# Patient Record
Sex: FEMALE | Race: WHITE | NOT HISPANIC OR LATINO | Employment: OTHER | URBAN - NONMETROPOLITAN AREA
[De-identification: names, ages, dates, MRNs, and addresses within clinical notes are randomized per-mention and may not be internally consistent; named-entity substitution may affect disease eponyms.]

---

## 2017-01-02 ENCOUNTER — HOSPITAL ENCOUNTER (OUTPATIENT)
Dept: PHYSICAL THERAPY | Facility: OTHER | Age: 72
Setting detail: THERAPIES SERIES
End: 2017-01-02

## 2017-01-02 ENCOUNTER — ANTICOAGULATION - GICH (OUTPATIENT)
Dept: INTERNAL MEDICINE | Facility: OTHER | Age: 72
End: 2017-01-02

## 2017-01-02 DIAGNOSIS — Z51.81 ENCOUNTER FOR THERAPEUTIC DRUG LEVEL MONITORING: ICD-10-CM

## 2017-01-02 DIAGNOSIS — Z79.01 LONG TERM CURRENT USE OF ANTICOAGULANT: ICD-10-CM

## 2017-01-02 DIAGNOSIS — I63.9 CEREBRAL INFARCTION (H): ICD-10-CM

## 2017-01-02 LAB — INR - HISTORICAL: 2.1

## 2017-01-04 ENCOUNTER — HOSPITAL ENCOUNTER (OUTPATIENT)
Dept: PHYSICAL THERAPY | Facility: OTHER | Age: 72
Setting detail: THERAPIES SERIES
End: 2017-01-04

## 2017-01-05 ENCOUNTER — HISTORY (OUTPATIENT)
Dept: FAMILY MEDICINE | Facility: OTHER | Age: 72
End: 2017-01-05

## 2017-01-05 ENCOUNTER — OFFICE VISIT - GICH (OUTPATIENT)
Dept: FAMILY MEDICINE | Facility: OTHER | Age: 72
End: 2017-01-05

## 2017-01-05 DIAGNOSIS — I48.20 CHRONIC ATRIAL FIBRILLATION (H): ICD-10-CM

## 2017-01-05 DIAGNOSIS — I63.9 CEREBRAL INFARCTION (H): ICD-10-CM

## 2017-01-05 DIAGNOSIS — Z79.01 LONG TERM CURRENT USE OF ANTICOAGULANT: ICD-10-CM

## 2017-01-05 DIAGNOSIS — F32.1 MAJOR DEPRESSIVE DISORDER, SINGLE EPISODE, MODERATE (H): ICD-10-CM

## 2017-01-05 DIAGNOSIS — F41.9 ANXIETY DISORDER: ICD-10-CM

## 2017-01-05 DIAGNOSIS — Z23 ENCOUNTER FOR IMMUNIZATION: ICD-10-CM

## 2017-01-05 DIAGNOSIS — Z12.31 ENCOUNTER FOR SCREENING MAMMOGRAM FOR MALIGNANT NEOPLASM OF BREAST: ICD-10-CM

## 2017-01-05 DIAGNOSIS — Z51.81 ENCOUNTER FOR THERAPEUTIC DRUG LEVEL MONITORING: ICD-10-CM

## 2017-01-10 ENCOUNTER — HOSPITAL ENCOUNTER (OUTPATIENT)
Dept: PHYSICAL THERAPY | Facility: OTHER | Age: 72
Setting detail: THERAPIES SERIES
End: 2017-01-10

## 2017-01-12 ENCOUNTER — HOSPITAL ENCOUNTER (OUTPATIENT)
Dept: PHYSICAL THERAPY | Facility: OTHER | Age: 72
Setting detail: THERAPIES SERIES
End: 2017-01-12

## 2017-01-30 ENCOUNTER — ANTICOAGULATION - GICH (OUTPATIENT)
Dept: INTERNAL MEDICINE | Facility: OTHER | Age: 72
End: 2017-01-30

## 2017-01-30 DIAGNOSIS — Z51.81 ENCOUNTER FOR THERAPEUTIC DRUG LEVEL MONITORING: ICD-10-CM

## 2017-01-30 DIAGNOSIS — I63.9 CEREBRAL INFARCTION (H): ICD-10-CM

## 2017-01-30 DIAGNOSIS — Z79.01 LONG TERM CURRENT USE OF ANTICOAGULANT: ICD-10-CM

## 2017-01-30 LAB — INR - HISTORICAL: 1.7

## 2017-02-06 ENCOUNTER — HOSPITAL ENCOUNTER (OUTPATIENT)
Dept: RADIOLOGY | Facility: OTHER | Age: 72
End: 2017-02-06
Attending: FAMILY MEDICINE

## 2017-02-06 ENCOUNTER — HISTORY (OUTPATIENT)
Dept: RADIOLOGY | Facility: OTHER | Age: 72
End: 2017-02-06

## 2017-02-06 ENCOUNTER — HISTORY (OUTPATIENT)
Dept: FAMILY MEDICINE | Facility: OTHER | Age: 72
End: 2017-02-06

## 2017-02-06 ENCOUNTER — ANTICOAGULATION - GICH (OUTPATIENT)
Dept: INTERNAL MEDICINE | Facility: OTHER | Age: 72
End: 2017-02-06

## 2017-02-06 ENCOUNTER — OFFICE VISIT - GICH (OUTPATIENT)
Dept: FAMILY MEDICINE | Facility: OTHER | Age: 72
End: 2017-02-06

## 2017-02-06 DIAGNOSIS — Z79.01 LONG TERM CURRENT USE OF ANTICOAGULANT: ICD-10-CM

## 2017-02-06 DIAGNOSIS — Z51.81 ENCOUNTER FOR THERAPEUTIC DRUG LEVEL MONITORING: ICD-10-CM

## 2017-02-06 DIAGNOSIS — I48.20 CHRONIC ATRIAL FIBRILLATION (H): ICD-10-CM

## 2017-02-06 DIAGNOSIS — I63.9 CEREBRAL INFARCTION (H): ICD-10-CM

## 2017-02-06 DIAGNOSIS — F32.1 MAJOR DEPRESSIVE DISORDER, SINGLE EPISODE, MODERATE (H): ICD-10-CM

## 2017-02-06 DIAGNOSIS — Z12.31 ENCOUNTER FOR SCREENING MAMMOGRAM FOR MALIGNANT NEOPLASM OF BREAST: ICD-10-CM

## 2017-02-06 DIAGNOSIS — F41.9 ANXIETY DISORDER: ICD-10-CM

## 2017-02-06 LAB — INR - HISTORICAL: 1.8

## 2017-02-06 ASSESSMENT — ANXIETY QUESTIONNAIRES
7. FEELING AFRAID AS IF SOMETHING AWFUL MIGHT HAPPEN: NOT AT ALL
5. BEING SO RESTLESS THAT IT IS HARD TO SIT STILL: NOT AT ALL
3. WORRYING TOO MUCH ABOUT DIFFERENT THINGS: NOT AT ALL
GAD7 TOTAL SCORE: 0
2. NOT BEING ABLE TO STOP OR CONTROL WORRYING: NOT AT ALL
4. TROUBLE RELAXING: NOT AT ALL
1. FEELING NERVOUS, ANXIOUS, OR ON EDGE: NOT AT ALL
6. BECOMING EASILY ANNOYED OR IRRITABLE: NOT AT ALL

## 2017-02-06 ASSESSMENT — PATIENT HEALTH QUESTIONNAIRE - PHQ9: SUM OF ALL RESPONSES TO PHQ QUESTIONS 1-9: 0

## 2017-02-13 ENCOUNTER — COMMUNICATION - GICH (OUTPATIENT)
Dept: FAMILY MEDICINE | Facility: OTHER | Age: 72
End: 2017-02-13

## 2017-02-13 DIAGNOSIS — Z79.01 LONG TERM CURRENT USE OF ANTICOAGULANT: ICD-10-CM

## 2017-02-13 DIAGNOSIS — Z51.81 ENCOUNTER FOR THERAPEUTIC DRUG LEVEL MONITORING: ICD-10-CM

## 2017-02-13 DIAGNOSIS — I63.9 CEREBRAL INFARCTION (H): ICD-10-CM

## 2017-02-20 ENCOUNTER — ANTICOAGULATION - GICH (OUTPATIENT)
Dept: INTERNAL MEDICINE | Facility: OTHER | Age: 72
End: 2017-02-20

## 2017-02-20 DIAGNOSIS — Z51.81 ENCOUNTER FOR THERAPEUTIC DRUG LEVEL MONITORING: ICD-10-CM

## 2017-02-20 DIAGNOSIS — I63.9 CEREBRAL INFARCTION (H): ICD-10-CM

## 2017-02-20 DIAGNOSIS — Z79.01 LONG TERM CURRENT USE OF ANTICOAGULANT: ICD-10-CM

## 2017-02-20 LAB — INR - HISTORICAL: 2.3

## 2017-03-06 ENCOUNTER — ANTICOAGULATION - GICH (OUTPATIENT)
Dept: INTERNAL MEDICINE | Facility: OTHER | Age: 72
End: 2017-03-06

## 2017-03-06 DIAGNOSIS — I63.9 CEREBRAL INFARCTION (H): ICD-10-CM

## 2017-03-06 DIAGNOSIS — Z51.81 ENCOUNTER FOR THERAPEUTIC DRUG LEVEL MONITORING: ICD-10-CM

## 2017-03-06 DIAGNOSIS — Z79.01 LONG TERM CURRENT USE OF ANTICOAGULANT: ICD-10-CM

## 2017-03-06 LAB — INR - HISTORICAL: 2.5

## 2017-03-23 ENCOUNTER — COMMUNICATION - GICH (OUTPATIENT)
Dept: FAMILY MEDICINE | Facility: OTHER | Age: 72
End: 2017-03-23

## 2017-03-23 DIAGNOSIS — E78.5 HYPERLIPIDEMIA: ICD-10-CM

## 2017-04-03 ENCOUNTER — ANTICOAGULATION - GICH (OUTPATIENT)
Dept: INTERNAL MEDICINE | Facility: OTHER | Age: 72
End: 2017-04-03

## 2017-04-03 DIAGNOSIS — Z79.01 LONG TERM CURRENT USE OF ANTICOAGULANT: ICD-10-CM

## 2017-04-03 DIAGNOSIS — Z51.81 ENCOUNTER FOR THERAPEUTIC DRUG LEVEL MONITORING: ICD-10-CM

## 2017-04-03 DIAGNOSIS — I63.9 CEREBRAL INFARCTION (H): ICD-10-CM

## 2017-04-03 LAB — INR - HISTORICAL: 2.3

## 2017-04-27 ENCOUNTER — COMMUNICATION - GICH (OUTPATIENT)
Dept: SURGERY | Facility: OTHER | Age: 72
End: 2017-04-27

## 2017-05-01 ENCOUNTER — ANTICOAGULATION - GICH (OUTPATIENT)
Dept: INTERNAL MEDICINE | Facility: OTHER | Age: 72
End: 2017-05-01

## 2017-05-01 DIAGNOSIS — Z51.81 ENCOUNTER FOR THERAPEUTIC DRUG LEVEL MONITORING: ICD-10-CM

## 2017-05-01 DIAGNOSIS — Z79.01 LONG TERM CURRENT USE OF ANTICOAGULANT: ICD-10-CM

## 2017-05-01 DIAGNOSIS — I63.9 CEREBRAL INFARCTION (H): ICD-10-CM

## 2017-05-01 LAB — INR - HISTORICAL: 2

## 2017-05-18 ENCOUNTER — COMMUNICATION - GICH (OUTPATIENT)
Dept: FAMILY MEDICINE | Facility: OTHER | Age: 72
End: 2017-05-18

## 2017-05-18 DIAGNOSIS — Z79.01 LONG TERM CURRENT USE OF ANTICOAGULANT: ICD-10-CM

## 2017-05-18 DIAGNOSIS — Z51.81 ENCOUNTER FOR THERAPEUTIC DRUG LEVEL MONITORING: ICD-10-CM

## 2017-05-18 DIAGNOSIS — I63.9 CEREBRAL INFARCTION (H): ICD-10-CM

## 2017-05-31 ENCOUNTER — ANTICOAGULATION - GICH (OUTPATIENT)
Dept: INTERNAL MEDICINE | Facility: OTHER | Age: 72
End: 2017-05-31

## 2017-05-31 DIAGNOSIS — I63.9 CEREBRAL INFARCTION (H): ICD-10-CM

## 2017-05-31 DIAGNOSIS — Z51.81 ENCOUNTER FOR THERAPEUTIC DRUG LEVEL MONITORING: ICD-10-CM

## 2017-05-31 DIAGNOSIS — Z79.01 LONG TERM CURRENT USE OF ANTICOAGULANT: ICD-10-CM

## 2017-05-31 LAB — INR - HISTORICAL: 2

## 2017-06-20 ENCOUNTER — COMMUNICATION - GICH (OUTPATIENT)
Dept: FAMILY MEDICINE | Facility: OTHER | Age: 72
End: 2017-06-20

## 2017-06-20 DIAGNOSIS — I10 ESSENTIAL (PRIMARY) HYPERTENSION: ICD-10-CM

## 2017-06-28 ENCOUNTER — ANTICOAGULATION - GICH (OUTPATIENT)
Dept: INTERNAL MEDICINE | Facility: OTHER | Age: 72
End: 2017-06-28

## 2017-06-28 DIAGNOSIS — Z51.81 ENCOUNTER FOR THERAPEUTIC DRUG LEVEL MONITORING: ICD-10-CM

## 2017-06-28 DIAGNOSIS — Z79.01 LONG TERM CURRENT USE OF ANTICOAGULANT: ICD-10-CM

## 2017-06-28 DIAGNOSIS — I63.9 CEREBRAL INFARCTION (H): ICD-10-CM

## 2017-06-28 LAB — INR - HISTORICAL: 2.3

## 2017-07-26 ENCOUNTER — ANTICOAGULATION - GICH (OUTPATIENT)
Dept: INTERNAL MEDICINE | Facility: OTHER | Age: 72
End: 2017-07-26

## 2017-07-26 DIAGNOSIS — Z51.81 ENCOUNTER FOR THERAPEUTIC DRUG LEVEL MONITORING: ICD-10-CM

## 2017-07-26 DIAGNOSIS — I63.9 CEREBRAL INFARCTION (H): ICD-10-CM

## 2017-07-26 DIAGNOSIS — Z79.01 LONG TERM CURRENT USE OF ANTICOAGULANT: ICD-10-CM

## 2017-07-26 LAB — INR - HISTORICAL: 2.2

## 2017-08-17 ENCOUNTER — COMMUNICATION - GICH (OUTPATIENT)
Dept: FAMILY MEDICINE | Facility: OTHER | Age: 72
End: 2017-08-17

## 2017-08-17 DIAGNOSIS — I63.9 CEREBRAL INFARCTION (H): ICD-10-CM

## 2017-08-17 DIAGNOSIS — Z51.81 ENCOUNTER FOR THERAPEUTIC DRUG LEVEL MONITORING: ICD-10-CM

## 2017-08-17 DIAGNOSIS — Z79.01 LONG TERM CURRENT USE OF ANTICOAGULANT: ICD-10-CM

## 2017-08-23 ENCOUNTER — ANTICOAGULATION - GICH (OUTPATIENT)
Dept: INTERNAL MEDICINE | Facility: OTHER | Age: 72
End: 2017-08-23

## 2017-08-23 DIAGNOSIS — Z79.01 LONG TERM CURRENT USE OF ANTICOAGULANT: ICD-10-CM

## 2017-08-23 DIAGNOSIS — Z51.81 ENCOUNTER FOR THERAPEUTIC DRUG LEVEL MONITORING: ICD-10-CM

## 2017-08-23 DIAGNOSIS — I63.9 CEREBRAL INFARCTION (H): ICD-10-CM

## 2017-08-23 LAB — INR - HISTORICAL: 2.8

## 2017-08-30 ENCOUNTER — HISTORY (OUTPATIENT)
Dept: FAMILY MEDICINE | Facility: OTHER | Age: 72
End: 2017-08-30

## 2017-08-30 ENCOUNTER — AMBULATORY - GICH (OUTPATIENT)
Dept: FAMILY MEDICINE | Facility: OTHER | Age: 72
End: 2017-08-30

## 2017-08-30 ENCOUNTER — COMMUNICATION - GICH (OUTPATIENT)
Dept: FAMILY MEDICINE | Facility: OTHER | Age: 72
End: 2017-08-30

## 2017-08-30 ENCOUNTER — OFFICE VISIT - GICH (OUTPATIENT)
Dept: FAMILY MEDICINE | Facility: OTHER | Age: 72
End: 2017-08-30

## 2017-08-30 DIAGNOSIS — R29.898 OTHER SYMPTOMS AND SIGNS INVOLVING THE MUSCULOSKELETAL SYSTEM: ICD-10-CM

## 2017-08-30 DIAGNOSIS — Z79.01 LONG TERM CURRENT USE OF ANTICOAGULANT: ICD-10-CM

## 2017-08-30 DIAGNOSIS — Z12.11 ENCOUNTER FOR SCREENING FOR MALIGNANT NEOPLASM OF COLON: ICD-10-CM

## 2017-08-30 DIAGNOSIS — F32.1 MAJOR DEPRESSIVE DISORDER, SINGLE EPISODE, MODERATE (H): ICD-10-CM

## 2017-08-30 DIAGNOSIS — F41.9 ANXIETY DISORDER: ICD-10-CM

## 2017-08-30 DIAGNOSIS — F33.40 RECURRENT MAJOR DEPRESSIVE DISORDER IN REMISSION (H): ICD-10-CM

## 2017-08-30 DIAGNOSIS — Z87.891 PERSONAL HISTORY OF NICOTINE DEPENDENCE: ICD-10-CM

## 2017-08-30 DIAGNOSIS — I10 ESSENTIAL (PRIMARY) HYPERTENSION: ICD-10-CM

## 2017-08-30 DIAGNOSIS — N39.41 URGE INCONTINENCE: ICD-10-CM

## 2017-08-30 DIAGNOSIS — R68.2 DRY MOUTH: ICD-10-CM

## 2017-08-30 DIAGNOSIS — I48.20 CHRONIC ATRIAL FIBRILLATION (H): ICD-10-CM

## 2017-08-30 DIAGNOSIS — I63.9 CEREBRAL INFARCTION (H): ICD-10-CM

## 2017-08-30 DIAGNOSIS — Z51.81 ENCOUNTER FOR THERAPEUTIC DRUG LEVEL MONITORING: ICD-10-CM

## 2017-08-30 LAB
A/G RATIO - HISTORICAL: 1.2 (ref 1–2)
ABSOLUTE BASOPHILS - HISTORICAL: 0 THOU/CU MM
ABSOLUTE EOSINOPHILS - HISTORICAL: 0.2 THOU/CU MM
ABSOLUTE IMMATURE GRANULOCYTES(METAS,MYELOS,PROS) - HISTORICAL: 0 THOU/CU MM
ABSOLUTE LYMPHOCYTES - HISTORICAL: 1.7 THOU/CU MM (ref 0.9–2.9)
ABSOLUTE MONOCYTES - HISTORICAL: 1 THOU/CU MM
ABSOLUTE NEUTROPHILS - HISTORICAL: 5.8 THOU/CU MM (ref 1.7–7)
ALBUMIN SERPL-MCNC: 3.7 G/DL (ref 3.5–5.7)
ALP SERPL-CCNC: 97 IU/L (ref 34–104)
ALT (SGPT) - HISTORICAL: 36 IU/L (ref 7–52)
ANION GAP - HISTORICAL: 9 (ref 5–18)
AST SERPL-CCNC: 26 IU/L (ref 13–39)
BASOPHILS # BLD AUTO: 0.3 %
BILIRUB SERPL-MCNC: 0.6 MG/DL (ref 0.3–1)
BILIRUB UR QL: NEGATIVE
BUN SERPL-MCNC: 16 MG/DL (ref 7–25)
BUN/CREAT RATIO - HISTORICAL: 17
CALCIUM SERPL-MCNC: 10.2 MG/DL (ref 8.6–10.3)
CHLORIDE SERPLBLD-SCNC: 107 MMOL/L (ref 98–107)
CHOL/HDL RATIO - HISTORICAL: 2.25
CHOLESTEROL TOTAL: 90 MG/DL
CLARITY, URINE: CLEAR CLARITY
CO2 SERPL-SCNC: 25 MMOL/L (ref 21–31)
COLOR UR: YELLOW COLOR
CREAT SERPL-MCNC: 0.95 MG/DL (ref 0.7–1.3)
EOSINOPHIL NFR BLD AUTO: 1.8 %
ERYTHROCYTE [DISTWIDTH] IN BLOOD BY AUTOMATED COUNT: 13.6 % (ref 11.5–15.5)
GFR IF NOT AFRICAN AMERICAN - HISTORICAL: 58 ML/MIN/1.73M2
GLOBULIN - HISTORICAL: 3 G/DL (ref 2–3.7)
GLUCOSE SERPL-MCNC: 75 MG/DL (ref 70–105)
GLUCOSE URINE: NEGATIVE MG/DL
HCT VFR BLD AUTO: 43.6 % (ref 33–51)
HDLC SERPL-MCNC: 40 MG/DL (ref 23–92)
HEMOGLOBIN: 14.4 G/DL (ref 12–16)
IMMATURE GRANULOCYTES(METAS,MYELOS,PROS) - HISTORICAL: 0.2 %
KETONES UR QL: NEGATIVE MG/DL
LDLC SERPL CALC-MCNC: 39 MG/DL
LEUKOCYTE ESTERASE URINE: NEGATIVE
LYMPHOCYTES NFR BLD AUTO: 20 % (ref 20–44)
MCH RBC QN AUTO: 31.6 PG (ref 26–34)
MCHC RBC AUTO-ENTMCNC: 33 G/DL (ref 32–36)
MCV RBC AUTO: 96 FL (ref 80–100)
MONOCYTES NFR BLD AUTO: 11.1 %
NEUTROPHILS NFR BLD AUTO: 66.6 % (ref 42–72)
NITRITE UR QL STRIP: NEGATIVE
NON-HDL CHOLESTEROL - HISTORICAL: 50 MG/DL
OCCULT BLOOD,URINE - HISTORICAL: NEGATIVE
PATIENT STATUS - HISTORICAL: NORMAL
PH UR: 6.5 [PH]
PLATELET # BLD AUTO: 166 THOU/CU MM (ref 140–440)
PMV BLD: 11.1 FL (ref 6.5–11)
POTASSIUM SERPL-SCNC: 4 MMOL/L (ref 3.5–5.1)
PROT SERPL-MCNC: 6.7 G/DL (ref 6.4–8.9)
PROTEIN QUALITATIVE,URINE - HISTORICAL: NEGATIVE MG/DL
RED BLOOD COUNT - HISTORICAL: 4.55 MIL/CU MM (ref 4–5.2)
SODIUM SERPL-SCNC: 141 MMOL/L (ref 133–143)
SP GR UR STRIP: 1.01
TRIGL SERPL-MCNC: 55 MG/DL
UROBILINOGEN,QUALITATIVE - HISTORICAL: NORMAL EU/DL
WHITE BLOOD COUNT - HISTORICAL: 8.7 THOU/CU MM (ref 4.5–11)

## 2017-08-30 ASSESSMENT — ANXIETY QUESTIONNAIRES
1. FEELING NERVOUS, ANXIOUS, OR ON EDGE: NOT AT ALL
6. BECOMING EASILY ANNOYED OR IRRITABLE: NOT AT ALL
4. TROUBLE RELAXING: NOT AT ALL
2. NOT BEING ABLE TO STOP OR CONTROL WORRYING: NOT AT ALL
7. FEELING AFRAID AS IF SOMETHING AWFUL MIGHT HAPPEN: NOT AT ALL
5. BEING SO RESTLESS THAT IT IS HARD TO SIT STILL: NOT AT ALL
3. WORRYING TOO MUCH ABOUT DIFFERENT THINGS: NOT AT ALL
GAD7 TOTAL SCORE: 0

## 2017-09-01 ENCOUNTER — COMMUNICATION - GICH (OUTPATIENT)
Dept: SURGERY | Facility: OTHER | Age: 72
End: 2017-09-01

## 2017-09-01 DIAGNOSIS — Z86.0100 HISTORY OF COLONIC POLYPS: ICD-10-CM

## 2017-09-07 ENCOUNTER — OFFICE VISIT - GICH (OUTPATIENT)
Dept: UROLOGY | Facility: OTHER | Age: 72
End: 2017-09-07

## 2017-09-07 ENCOUNTER — HISTORY (OUTPATIENT)
Dept: UROLOGY | Facility: OTHER | Age: 72
End: 2017-09-07

## 2017-09-07 DIAGNOSIS — R68.2 DRY MOUTH: ICD-10-CM

## 2017-09-07 DIAGNOSIS — N39.41 URGE INCONTINENCE: ICD-10-CM

## 2017-09-11 ENCOUNTER — COMMUNICATION - GICH (OUTPATIENT)
Dept: SURGERY | Facility: OTHER | Age: 72
End: 2017-09-11

## 2017-09-11 ENCOUNTER — COMMUNICATION - GICH (OUTPATIENT)
Dept: UROLOGY | Facility: OTHER | Age: 72
End: 2017-09-11

## 2017-09-11 DIAGNOSIS — N39.41 URGE INCONTINENCE: ICD-10-CM

## 2017-09-12 ENCOUNTER — HOSPITAL ENCOUNTER (OUTPATIENT)
Dept: PHYSICAL THERAPY | Facility: OTHER | Age: 72
Setting detail: THERAPIES SERIES
End: 2017-09-12
Attending: FAMILY MEDICINE

## 2017-09-12 DIAGNOSIS — I63.9 CEREBRAL INFARCTION (H): ICD-10-CM

## 2017-09-12 DIAGNOSIS — R29.898 OTHER SYMPTOMS AND SIGNS INVOLVING THE MUSCULOSKELETAL SYSTEM: ICD-10-CM

## 2017-09-14 ENCOUNTER — HOSPITAL ENCOUNTER (OUTPATIENT)
Dept: PHYSICAL THERAPY | Facility: OTHER | Age: 72
Setting detail: THERAPIES SERIES
End: 2017-09-14
Attending: FAMILY MEDICINE

## 2017-09-19 ENCOUNTER — HOSPITAL ENCOUNTER (OUTPATIENT)
Dept: PHYSICAL THERAPY | Facility: OTHER | Age: 72
Setting detail: THERAPIES SERIES
End: 2017-09-19
Attending: FAMILY MEDICINE

## 2017-09-20 ENCOUNTER — ANTICOAGULATION - GICH (OUTPATIENT)
Dept: INTERNAL MEDICINE | Facility: OTHER | Age: 72
End: 2017-09-20

## 2017-09-20 DIAGNOSIS — I63.9 CEREBRAL INFARCTION (H): ICD-10-CM

## 2017-09-20 DIAGNOSIS — Z79.01 LONG TERM CURRENT USE OF ANTICOAGULANT: ICD-10-CM

## 2017-09-20 DIAGNOSIS — Z51.81 ENCOUNTER FOR THERAPEUTIC DRUG LEVEL MONITORING: ICD-10-CM

## 2017-09-20 LAB — INR - HISTORICAL: 2.4

## 2017-09-21 ENCOUNTER — HOSPITAL ENCOUNTER (OUTPATIENT)
Dept: PHYSICAL THERAPY | Facility: OTHER | Age: 72
Setting detail: THERAPIES SERIES
End: 2017-09-21
Attending: FAMILY MEDICINE

## 2017-09-28 ENCOUNTER — HOSPITAL ENCOUNTER (OUTPATIENT)
Dept: PHYSICAL THERAPY | Facility: OTHER | Age: 72
Setting detail: THERAPIES SERIES
End: 2017-09-28
Attending: FAMILY MEDICINE

## 2017-10-05 ENCOUNTER — HISTORY (OUTPATIENT)
Dept: UROLOGY | Facility: OTHER | Age: 72
End: 2017-10-05

## 2017-10-05 ENCOUNTER — HOSPITAL ENCOUNTER (OUTPATIENT)
Dept: PHYSICAL THERAPY | Facility: OTHER | Age: 72
Setting detail: THERAPIES SERIES
End: 2017-10-05
Attending: FAMILY MEDICINE

## 2017-10-05 ENCOUNTER — OFFICE VISIT - GICH (OUTPATIENT)
Dept: UROLOGY | Facility: OTHER | Age: 72
End: 2017-10-05

## 2017-10-05 DIAGNOSIS — N39.41 URGE INCONTINENCE: ICD-10-CM

## 2017-10-10 ENCOUNTER — HOSPITAL ENCOUNTER (OUTPATIENT)
Dept: PHYSICAL THERAPY | Facility: OTHER | Age: 72
Setting detail: THERAPIES SERIES
End: 2017-10-10
Attending: FAMILY MEDICINE

## 2017-10-12 ENCOUNTER — HOSPITAL ENCOUNTER (OUTPATIENT)
Dept: PHYSICAL THERAPY | Facility: OTHER | Age: 72
Setting detail: THERAPIES SERIES
End: 2017-10-12
Attending: FAMILY MEDICINE

## 2017-10-17 ENCOUNTER — HOSPITAL ENCOUNTER (OUTPATIENT)
Dept: PHYSICAL THERAPY | Facility: OTHER | Age: 72
Setting detail: THERAPIES SERIES
End: 2017-10-17
Attending: FAMILY MEDICINE

## 2017-10-18 ENCOUNTER — ANTICOAGULATION - GICH (OUTPATIENT)
Dept: INTERNAL MEDICINE | Facility: OTHER | Age: 72
End: 2017-10-18

## 2017-10-18 DIAGNOSIS — Z79.01 LONG TERM CURRENT USE OF ANTICOAGULANT: ICD-10-CM

## 2017-10-18 DIAGNOSIS — I63.9 CEREBRAL INFARCTION (H): ICD-10-CM

## 2017-10-18 DIAGNOSIS — Z51.81 ENCOUNTER FOR THERAPEUTIC DRUG LEVEL MONITORING: ICD-10-CM

## 2017-10-18 LAB — INR - HISTORICAL: 1.9

## 2017-10-23 ENCOUNTER — SURGERY (OUTPATIENT)
Dept: SURGERY | Facility: OTHER | Age: 72
End: 2017-10-23

## 2017-10-23 ENCOUNTER — HISTORY (OUTPATIENT)
Dept: EMERGENCY MEDICINE | Facility: OTHER | Age: 72
End: 2017-10-23

## 2017-10-30 ENCOUNTER — ANTICOAGULATION - GICH (OUTPATIENT)
Dept: INTERNAL MEDICINE | Facility: OTHER | Age: 72
End: 2017-10-30

## 2017-10-30 DIAGNOSIS — I63.9 CEREBRAL INFARCTION (H): ICD-10-CM

## 2017-10-30 DIAGNOSIS — Z79.01 LONG TERM CURRENT USE OF ANTICOAGULANT: ICD-10-CM

## 2017-10-30 DIAGNOSIS — Z51.81 ENCOUNTER FOR THERAPEUTIC DRUG LEVEL MONITORING: ICD-10-CM

## 2017-10-30 LAB — INR - HISTORICAL: 2.9

## 2017-11-01 ENCOUNTER — OFFICE VISIT - GICH (OUTPATIENT)
Dept: CARDIOLOGY | Facility: OTHER | Age: 72
End: 2017-11-01

## 2017-11-01 ENCOUNTER — MEDICAL CORRESPONDENCE (OUTPATIENT)
Dept: CARDIOLOGY | Facility: CLINIC | Age: 72
End: 2017-11-01
Payer: COMMERCIAL

## 2017-11-01 ENCOUNTER — HISTORY (OUTPATIENT)
Dept: CARDIOLOGY | Facility: OTHER | Age: 72
End: 2017-11-01

## 2017-11-01 ENCOUNTER — HISTORY (OUTPATIENT)
Dept: INTENSIVE CARE | Facility: OTHER | Age: 72
End: 2017-11-01

## 2017-11-01 DIAGNOSIS — I48.0 PAROXYSMAL ATRIAL FIBRILLATION (H): ICD-10-CM

## 2017-11-01 DIAGNOSIS — I63.9 CEREBRAL INFARCTION (H): ICD-10-CM

## 2017-11-01 DIAGNOSIS — R00.1 BRADYCARDIA: ICD-10-CM

## 2017-11-01 LAB
T4 FREE SERPL-MCNC: 1.09 NG/DL (ref 0.58–1.64)
TSH - HISTORICAL: 1.52 UIU/ML (ref 0.34–5.6)

## 2017-11-01 PROCEDURE — 99205 OFFICE O/P NEW HI 60 MIN: CPT | Mod: ZP | Performed by: NURSE PRACTITIONER

## 2017-11-01 ASSESSMENT — ANXIETY QUESTIONNAIRES
5. BEING SO RESTLESS THAT IT IS HARD TO SIT STILL: NOT AT ALL
7. FEELING AFRAID AS IF SOMETHING AWFUL MIGHT HAPPEN: NOT AT ALL
4. TROUBLE RELAXING: NOT AT ALL
2. NOT BEING ABLE TO STOP OR CONTROL WORRYING: NOT AT ALL
3. WORRYING TOO MUCH ABOUT DIFFERENT THINGS: NOT AT ALL
GAD7 TOTAL SCORE: 0
6. BECOMING EASILY ANNOYED OR IRRITABLE: NOT AT ALL
1. FEELING NERVOUS, ANXIOUS, OR ON EDGE: NOT AT ALL

## 2017-11-01 ASSESSMENT — PATIENT HEALTH QUESTIONNAIRE - PHQ9: SUM OF ALL RESPONSES TO PHQ QUESTIONS 1-9: 1

## 2017-11-05 ENCOUNTER — AMBULATORY - GICH (OUTPATIENT)
Dept: SCHEDULING | Facility: OTHER | Age: 72
End: 2017-11-05

## 2017-11-08 ENCOUNTER — HISTORY (OUTPATIENT)
Dept: CARDIOLOGY | Facility: OTHER | Age: 72
End: 2017-11-08

## 2017-11-08 ENCOUNTER — ANTICOAGULATION - GICH (OUTPATIENT)
Dept: INTERNAL MEDICINE | Facility: OTHER | Age: 72
End: 2017-11-08

## 2017-11-08 ENCOUNTER — MEDICAL CORRESPONDENCE (OUTPATIENT)
Dept: CARDIOLOGY | Facility: CLINIC | Age: 72
End: 2017-11-08
Payer: COMMERCIAL

## 2017-11-08 ENCOUNTER — TRANSFERRED RECORDS (OUTPATIENT)
Dept: HEALTH INFORMATION MANAGEMENT | Facility: CLINIC | Age: 72
End: 2017-11-08

## 2017-11-08 ENCOUNTER — OFFICE VISIT - GICH (OUTPATIENT)
Dept: CARDIOLOGY | Facility: OTHER | Age: 72
End: 2017-11-08

## 2017-11-08 DIAGNOSIS — Z95.0 PRESENCE OF CARDIAC PACEMAKER: ICD-10-CM

## 2017-11-08 DIAGNOSIS — I10 ESSENTIAL (PRIMARY) HYPERTENSION: ICD-10-CM

## 2017-11-08 DIAGNOSIS — Z79.01 LONG TERM CURRENT USE OF ANTICOAGULANT: ICD-10-CM

## 2017-11-08 DIAGNOSIS — I48.20 CHRONIC ATRIAL FIBRILLATION (H): ICD-10-CM

## 2017-11-08 DIAGNOSIS — I63.9 CEREBRAL INFARCTION (H): ICD-10-CM

## 2017-11-08 DIAGNOSIS — R00.1 BRADYCARDIA: ICD-10-CM

## 2017-11-08 DIAGNOSIS — Z51.81 ENCOUNTER FOR THERAPEUTIC DRUG LEVEL MONITORING: ICD-10-CM

## 2017-11-08 LAB — INR - HISTORICAL: 1.5

## 2017-11-08 PROCEDURE — 99214 OFFICE O/P EST MOD 30 MIN: CPT | Mod: ZP | Performed by: NURSE PRACTITIONER

## 2017-11-10 ENCOUNTER — COMMUNICATION - GICH (OUTPATIENT)
Dept: CARDIOLOGY | Facility: OTHER | Age: 72
End: 2017-11-10

## 2017-11-16 ENCOUNTER — OFFICE VISIT - GICH (OUTPATIENT)
Dept: FAMILY MEDICINE | Facility: OTHER | Age: 72
End: 2017-11-16

## 2017-11-16 ENCOUNTER — HISTORY (OUTPATIENT)
Dept: FAMILY MEDICINE | Facility: OTHER | Age: 72
End: 2017-11-16

## 2017-11-16 ENCOUNTER — ANTICOAGULATION - GICH (OUTPATIENT)
Dept: INTERNAL MEDICINE | Facility: OTHER | Age: 72
End: 2017-11-16

## 2017-11-16 DIAGNOSIS — I48.20 CHRONIC ATRIAL FIBRILLATION (H): ICD-10-CM

## 2017-11-16 DIAGNOSIS — Z51.81 ENCOUNTER FOR THERAPEUTIC DRUG LEVEL MONITORING: ICD-10-CM

## 2017-11-16 DIAGNOSIS — Z79.01 LONG TERM CURRENT USE OF ANTICOAGULANT: ICD-10-CM

## 2017-11-16 DIAGNOSIS — E78.5 HYPERLIPIDEMIA: ICD-10-CM

## 2017-11-16 DIAGNOSIS — Z95.0 PRESENCE OF CARDIAC PACEMAKER: ICD-10-CM

## 2017-11-16 DIAGNOSIS — F33.40 RECURRENT MAJOR DEPRESSIVE DISORDER IN REMISSION (H): ICD-10-CM

## 2017-11-16 DIAGNOSIS — I63.9 CEREBRAL INFARCTION (H): ICD-10-CM

## 2017-11-16 DIAGNOSIS — I10 ESSENTIAL (PRIMARY) HYPERTENSION: ICD-10-CM

## 2017-11-16 LAB — INR - HISTORICAL: 2.3

## 2017-11-16 ASSESSMENT — PATIENT HEALTH QUESTIONNAIRE - PHQ9: SUM OF ALL RESPONSES TO PHQ QUESTIONS 1-9: 0

## 2017-11-20 ENCOUNTER — OFFICE VISIT - GICH (OUTPATIENT)
Dept: CARDIOLOGY | Facility: OTHER | Age: 72
End: 2017-11-20

## 2017-11-20 ENCOUNTER — HISTORY (OUTPATIENT)
Dept: CARDIOLOGY | Facility: OTHER | Age: 72
End: 2017-11-20

## 2017-11-20 DIAGNOSIS — I48.20 CHRONIC ATRIAL FIBRILLATION (H): ICD-10-CM

## 2017-11-20 DIAGNOSIS — Z95.0 PRESENCE OF CARDIAC PACEMAKER: ICD-10-CM

## 2017-11-20 DIAGNOSIS — I10 ESSENTIAL (PRIMARY) HYPERTENSION: ICD-10-CM

## 2017-11-20 ASSESSMENT — ANXIETY QUESTIONNAIRES
5. BEING SO RESTLESS THAT IT IS HARD TO SIT STILL: NOT AT ALL
GAD7 TOTAL SCORE: 0
7. FEELING AFRAID AS IF SOMETHING AWFUL MIGHT HAPPEN: NOT AT ALL
1. FEELING NERVOUS, ANXIOUS, OR ON EDGE: NOT AT ALL
4. TROUBLE RELAXING: NOT AT ALL
6. BECOMING EASILY ANNOYED OR IRRITABLE: NOT AT ALL
3. WORRYING TOO MUCH ABOUT DIFFERENT THINGS: NOT AT ALL
2. NOT BEING ABLE TO STOP OR CONTROL WORRYING: NOT AT ALL

## 2017-11-27 ENCOUNTER — ANTICOAGULATION - GICH (OUTPATIENT)
Dept: INTERNAL MEDICINE | Facility: OTHER | Age: 72
End: 2017-11-27

## 2017-11-27 DIAGNOSIS — Z79.01 LONG TERM CURRENT USE OF ANTICOAGULANT: ICD-10-CM

## 2017-11-27 DIAGNOSIS — I63.9 CEREBRAL INFARCTION (H): ICD-10-CM

## 2017-11-27 DIAGNOSIS — Z51.81 ENCOUNTER FOR THERAPEUTIC DRUG LEVEL MONITORING: ICD-10-CM

## 2017-11-27 LAB — INR - HISTORICAL: 1.9

## 2017-12-11 ENCOUNTER — ANTICOAGULATION - GICH (OUTPATIENT)
Dept: INTERNAL MEDICINE | Facility: OTHER | Age: 72
End: 2017-12-11

## 2017-12-11 DIAGNOSIS — I63.9 CEREBRAL INFARCTION (H): ICD-10-CM

## 2017-12-11 DIAGNOSIS — Z51.81 ENCOUNTER FOR THERAPEUTIC DRUG LEVEL MONITORING: ICD-10-CM

## 2017-12-11 DIAGNOSIS — Z79.01 LONG TERM CURRENT USE OF ANTICOAGULANT: ICD-10-CM

## 2017-12-11 LAB — INR - HISTORICAL: 2.1

## 2017-12-27 NOTE — PROGRESS NOTES
"Patient Information     Patient Name MRN Sex Kate Akins [id number] Female 1945      Progress Notes by Sandy Klein, PT at 2017  1:48 PM     Author:  Sandy Klein PT Service:  (none) Author Type:  PT- Physical Therapist     Filed:  2017  2:45 PM Date of Service:  2017  1:48 PM Status:  Signed     :  Sandy Klein PT (PT- Physical Therapist)            [facility]  Outpatient PT - Daily note    Date of Service: 2017   Visit #3    PSFS Visit:  3/10  PSFS Completed:  2017     Patient Name: Kate Chacon   YOB: 1945   Referring MD/Provider: Asher Campos MD  Medical and Treatment Diagnosis: right leg weakness s/p ischemic stroke  PT Treatment Diagnosis: impaired lower extremity strength and endurance, right > left   Insurance: Medicare, and [employer]  Start of Service: 17  Certification Dates: Start of Service: 17  Medicare/MA Re-Cert Due: 17       Subjective        Patient reports her right hip [points to right SI joint] is sore today.         Objective  STANDING:      Lumbar ROM:   Forward Flexion Test: left pos  Hip Drop Test: neg  Gilet's: neg     SITTING:     LES / Sacral Base Positioning: posterior left base  Lumbar Findings: neg  L5 Lift Test: negative      SUPINE:     Leg length: left leg long, left anterior superior iliac spine inferior     PRONE:     Palpation:  LES / Sacral Base Findings: none, equal  Lumbar Findings: none  posterior superior iliac spine high left     Today's Intervention:    -TM 10% 1.1 mph x3 mins, retro at 0.6 mph x2 mins    Supine muscle energy technique for left anterior innominate rotation    Med x:   Leg press 120# seat 19 x20   Hip abduction 50# 2x10   hamstring curls 60# seat 9 x15    Lateral step up and over on foam    Contralateral heel taps, walking balance with 1 loss of balance requiring mod A to correct    Step ups to 6\" step right lower extremity to fatigue    Foam balance: " wide stance, mini squat to over head press with 6# med ball in bilateral upper extremities    Home Exercise Program:    Access Code: [email or web address] URL: [email or web address] Date: 09/12/2017 Prepared by: Sandy Klein   Exercises   Standing Hip Flexion with Anchored Resistance and Chair Support - 10-15 reps - 1-2 sets - 5 seconds hold - 1x daily   Standing Hip Abduction with Anchored Resistance - 10-15 reps - 1-2 sets - 5 hold - 1x daily   Standing Hip Extension with Anchored Resistance - 10-15 reps - 1-2 sets - 5 hold - 1x daily   Added 9/14/2017: Single leg stance with level pelvis x1 min bilateral     Assessment  Patient demonstrates improvement in innominate symmetry following muscle energy technique. Decrease in pain reported following as well. Patient demonstrates anxiety with foam balance activities as well as step ups due to hesitancy with right hip flexion capability.     Patient is at increased fall risk due to poor lower extremity endurance and would benefit from skilled physical therapy services to address limitations and return to prior level of function.    Completed 9/12/2017:  Patient Specific Functional and Pain Scales (PSFS)  Clinician Instructions: Complete after the history and before the exam.   Initial Assessment:   We want to know what 3 activities in your life you are unable to perform, or are having the most difficulty performing, as a result of your chief problem. Please list and score at least 3 activities that you are unable to perform, or having the most difficulty performing, because of your chief problem.   Patient Specific Activity Scoring Scheme (score one number for each activity):   Activity Score (0-10)  0= Unable to perform activity  10= Able to perform activity at same level as before injury or problem   1. Picking up the right leg 5/10   2. Walking 30 mins without hanging onto something 2/10   3.  /10   4.  /10   5.  /10   Totals:  7/20= 35% ability, 65% impairment    Patient verbally states that they understand that the information they have provided above is current and complete to the best of their knowledge.   Patient Specific Functional Scale Modifier Scale Conversion: (patient's modifier that correlates with pt's score on PSFS): 4-CL (60% Impaired).  Initial Primary G Code and Modifier:    Per the Patient's intake and/or assessment the Primary G Code is: Mobility .   The Patient's Impairment, Limitation or Restriction Modifier would be best described as: CK - 40% - 60% Impairment.   Goal Primary G Code and Modifier:    The Patient's G Code Goal would be: Mobility    The Patient's Impairment, Limitation or Restriction Modifier goal would be best described as: CI - 1% - 20% Impairment.       Goals:  Patient goal:  Improve lower extremity strength and stability with walking in 8 weeks.    Functional Short Term Goals (4 weeks):   Patient will have improved MMT grade by 1/2 grade to improve stability during ambulation.  Patient will ambulate 15 mins without hanging onto cart or other object for support.      Functional Long Term Goals (8 weeks):   Patient will ambulate 30 minutes with no support for improved stability and endurance.  Patient will have improved MMT grade by at least 1 full grade for decreased fall risk and improved stepping strategy.      Plan    Treatment Plan:      Frequency:   16 visits    Duration of Treatment: 8 weeks    Planned Interventions:    Home Exercise Program development  Therapeutic Exercise (ROM & Strengthening)  Therapeutic Activities  Neuromuscular Re-education  Manual Therapy  Ultrasound  E-Stim  Gait Training  Hot Packs / Cold Pack Modalities  Vasopneumatic Compression with Cold Therapy ( Game Ready )    Plan for next visit:  TM on incline, single leg balance, step ups, upright bike or Nustep    Thank you for your referral to [facility].  Please call with any questions, concerns or comments.  [phone]  [phone]  Sandy Klein, PT, DPT    The signature, of the referring medical provider, on this document indicates certification of the above prescribed plan of care and is medically necessary.

## 2017-12-27 NOTE — PROGRESS NOTES
Patient Information     Patient Name MRN Sex Kate Akins [id number] Female 1945      Progress Notes by Asher Campos MD at 2017 11:30 AM     Author:  Asher Campos MD Service:  (none) Author Type:  Physician     Filed:  2017  3:08 PM Encounter Date:  2017 Status:  Signed     :  Asher Campos MD (Physician)            Nursing Notes:   Tiffany White  2017 11:34 AM  Signed  She is here today to follow up after having a pacemaker put in at [facility].  Tiffany White LPN..................2017   11:31 AM      SUBJECTIVE:  Kate Chacon  is a 72 y.o. female who comes in today for follow-up after pacemaker placement for bradycardia. She was scheduled for colonoscopy but prior to her prep was bradycardic. She was put in the hospital and her beta blocker was held. She developed rapid heart rate and ended up having to be hospitalized again. They tried gradually increasing her beta blocker but she did not tolerate that and so was sent to [facility] for consultation with the electrophysiology specialist and ended up with a pacemaker placement. Her metoprolol was gradually increased to 25 mg twice a day and she seems to be tolerating that well.    Past Medical, Family, and Social History reviewed and updated as noted below.   ROS is negative except as noted above       No Known Allergies,   Family History       Problem   Relation Age of Onset     Diabetes  Father      Hypertension  Father      Other  Mother      h/o coronary artery disease/dementia       Asthma  Mother      Cancer  Maternal Aunt      Uterine       Diabetes  Maternal Grandmother      Cancer-breast  No Family History    ,   Current Outpatient Prescriptions on File Prior to Visit       Medication  Sig Dispense Refill     CALCIUM CARBONATE (TUMS ORAL) Take 1 tablet by mouth every 8 hours if needed (Heartburn).       ibuprofen (ADVIL; MOTRIN) 200 mg tablet Take 200 mg by mouth 4 times daily if needed for  Pain or Headache.       oxybutynin XL (DITROPAN XL) 10 mg CR tablet Take 10 mg by mouth at bedtime.       warfarin (COUMADIN) 5 mg tablet TAKE ONE TABLET BY MOUTH ONCE DAILY 90 tablet prn     No current facility-administered medications on file prior to visit.    ,   Past Medical History:     Diagnosis  Date     Ganglion cyst of wrist     right      Helicobacter positive gastritis     treated and 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       NVD (normal vaginal delivery)     x3    ,   Patient Active Problem List       Diagnosis  Date Noted     Status post placement of cardiac pacemaker  11/08/2017     DNI (do not intubate)  10/25/2017     As discussed with patient on 10/25/17        Urge incontinence  10/05/2017     MDD (recurrent major depressive disorder) in remission (HC)  08/30/2017     Former smoker  08/30/2017     Anxiety  01/05/2017     Hypertension  12/08/2016     Chronic atrial fibrillation (HC)  11/02/2016     Anticoagulation goal of INR 2 to 3  10/28/2016     Ischemic stroke (HC)  10/15/2016     DIVERTICULOSIS, SIGMOID COLON  04/04/2011     COLONIC POLYPS, ADENOMATOUS, HX OF  03/02/2011     OSTEOPOROSIS, LUMBAR SPINE  02/08/2006   ,   Past Surgical History:      Procedure  Laterality Date     APPENDECTOMY       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       COLONOSCOPY  4/4/11     COLONOSCOPY  5/7/12     ESOPHAGOGASTRODUODENOSCOPY  8/19/05     TONSILLECTOMY      and   Social History      Substance Use Topics        Smoking status:  Former Smoker     Packs/day: 0.50     Years: 49.00     Types: Cigarettes     Quit date: 10/14/2016     Smokeless tobacco:  Never Used     Alcohol use  No     OBJECTIVE:  BP 90/66  Pulse 76  Wt 79.8 kg (176 lb)  Breastfeeding? No  BMI 27.57 kg/m2   EXAM:  alert and cooperative, no distress. Blood pressure is as noted above. Lungs are clear, no rales  rhonchi wheezes are heard. Cardiac today is regular with a rate in the 70s. No pedal edema. Pacemaker pocket appears to be healing well. Steri-Strips are still in place.  ASSESSMENT/Plan :      Kate was seen today for follow up.    Diagnoses and all orders for this visit:    Chronic atrial fibrillation (HC)  -     metoprolol tartrate (LOPRESSOR) 25 mg tablet; Take 1 tablet by mouth 2 times daily.    Anticoagulation goal of INR 2 to 3    Hypertension  -     lisinopril (PRINIVIL; ZESTRIL) 20 mg tablet; Take 1 tablet by mouth once daily.  -     metoprolol tartrate (LOPRESSOR) 25 mg tablet; Take 1 tablet by mouth 2 times daily.    Status post placement of cardiac pacemaker    MDD (recurrent major depressive disorder) in remission (HC)  -     sertraline (ZOLOFT) 50 mg tablet; Take 1 tablet by mouth at bedtime.    Dyslipidemia  -     atorvastatin (LIPITOR) 80 mg tablet; Take 1 tablet by mouth at bedtime.     renewal on her medications. Her blood pressure is a little bit lower than we like and so we'll drop her lisinopril back to 20 mg daily down from 40. It had been increased when she was in the hospital and her beta blocker was held prior to pacemaker placement.    Continue with follow-up in the [facility]. Follow up here on an as-needed basis. She will let us know when she wants to proceed with colonoscopy again.      Asher Campos MD

## 2017-12-27 NOTE — PROGRESS NOTES
Patient Information     Patient Name MRN Sex Kate Akins [id number] Female 1945      Progress Notes by Steve Fox MD at 10/5/2017 11:15 AM     Author:  Steve Fox MD Service:  (none) Author Type:  Physician     Filed:  10/5/2017 11:43 AM Encounter Date:  10/5/2017 Status:  Signed     :  Steve Fox MD (Physician)            Type of Visit  EST    Chief Complaint  Incontinence    HPI  Ms. Chacon is a 72 y.o. female with history of CVA who presents with incontinence.    Prior to starting oxybutynin the patient's previous symptoms were as follows:  Patient leaks urine about several times daily.  Volume of incontinence is described as Small.  Overall, leaking urine interferes (bother score) with daily living approximately 5/10.  Onset was 3 Month(s) ago.    Since starting oxybutynin XL 10 mg once daily 4 weeks ago the patient reports significant improvement in incontinence.  Her quality of life score is now 1-2 out of 10.  She continues to deny dysuria and hematuria.  She also denies constipation or dry mouth and is tolerating the medication quite well.  She is very satisfied with the results.      Family History  Family History       Problem   Relation Age of Onset     Diabetes  Father      Hypertension  Father      Other  Mother      h/o coronary artery disease/dementia       Asthma  Mother      Cancer  Maternal Aunt      Uterine       Diabetes  Maternal Grandmother      Cancer-breast  No Family History        Review of Systems  I personally reviewed the ROS with the patient.    Nursing Notes:   Virgie Gallardo  10/5/2017 11:21 AM  Signed  Here for one month follow up on Oxybutynin.  Review of Systems:    Weight loss:    No     Recent fever/chills:  No   Night sweats:   No  Current skin rash:  No   Recent hair loss:  No  Heat intolerance:  No   Cold intolerance:  No  Chest pain:   No   Palpitations:   No  Shortness of breath:  No   Wheezing:   No  Constipation:     No   Diarrhea:   No   Nausea:   No   Vomiting:   No   Kidney/side pain:  No   Back pain:   No  Frequent headaches:  No   Dizziness:     No  Leg swelling:   No   Calf pain:    No    Post-Void Residual  Verbal order read back from Steve Fox MD to perform a post-void residual bladder scan.  A post-void residual was measured by ultrasonic bladder scanner.  18 mL  Virgie Gallardo LPN  10/5/2017  11:13 AM              Physical Exam  Vitals:     10/05/17 1107   BP: 132/70   Resp: 12   Weight: 81.6 kg (180 lb)     Constitutional: NAD, WDWN  Head: NCAT  Eyes: Conjunctivae normal  Cardiovascular: Regular rate  Pulmonary/Chest: Respirations are even and non-labored bilaterally  Abdominal: Soft with no distension, tenderness, masses, guarding or CVA tenderness  Neurological: A + O x 3,  cranial nerves II-XII grossly intact  Extremities: MARI x 4, warm, no clubbing, no cyanosis  Skin: Pink, warm, dry with no rash  Psychiatric:  Normal mood and affect  Genitourinary: Nonpalpable bladder    Labs  CREATININE (mg/dL)    Date Value   08/30/2017 0.95       Results for YOUSIF COLLINS (MRN [id number]) as of 9/7/2017 11:33   10/15/2016 08:25 8/30/2017 09:32   COLOR                     Yellow Yellow   CLARITY                   Clear Clear   SPECIFIC GRAVITY,URINE    1.010 1.015   PH,URINE                  7.0 6.5   UROBILINOGEN,QUALITATIVE  Normal Normal   PROTEIN, URINE Negative Negative   GLUCOSE, URINE Negative Negative   KETONES,URINE             Negative Negative   BILIRUBIN,URINE           Negative Negative   OCCULT BLOOD,URINE        Negative Negative   NITRITE                   Negative Negative   LEUKOCYTE ESTERASE        Negative Negative       Post-Void Residual  A post-void residual was measured by ultrasonic bladder scanner.  18 mL today  21 mL (previously recorded)    Assessment  Ms. Collins is a 72 y.o. female w/h/o CVA with urge incontinence.  She is doing well.    Plan  continue oxybutynin ER 10mg PO once  daily  Follow up in 1 year with [facility]

## 2017-12-27 NOTE — PROGRESS NOTES
Patient Information     Patient Name MRN Sex Kate Akins [id number] Female 1945      Progress Notes by Asher Campos MD at 2017  8:15 AM     Author:  Asher Campos MD  Service:  (none) Author Type:  Physician     Filed:  2017  1:13 PM  Encounter Date:  2017 Status:  Addendum     :  Asher Campos MD (Physician)        Related Notes: Original Note by Asher Campos MD (Physician) filed at 2017  9:14 AM            Nursing Notes:   Nicol Darden  2017  8:48 AM  Signed  Pt present for follow up stroke. Pt does want zoster vaccine.  Nicol Darden ....................  2017   8:22 AM      SUBJECTIVE:  Kate Chacon  is a 72 y.o. female who comes in today for follow-up. I last saw her 6 months ago. She continues on warfarin. She continues on metoprolol, lisinopril, and Lipitor. She continues on sertraline for depression.    She is walking but feels she is losing strength in her right leg as she is not able to do weights.  She is back driving. She has some numbness in her right index finger and thumb since the stroke.     She has had some itching of her ears.     She is having trouble with urinary urgency. She has very dry mouth, so would unlikely tolerate an oral medication.     She is up-to-date on health maintenance issues but has not had Zostavax.    PHQ Depression Screening 2017   Date of PHQ exam (doc flow) 2017   1. Lack of interest/pleasure 0 - Not at all 0 - Not at all   2. Feeling down/depressed 0 - Not at all 0 - Not at all   PHQ-2 TOTAL SCORE 0 0   3. Trouble sleeping 0 - Not at all -   4. Decreased energy 0 - Not at all -   5. Appetite change 0 - Not at all -   6. Feelings of failure 0 - Not at all -   7. Trouble concentrating 0 - Not at all -   8. Activity level 0 - Not at all -   9. Hurting yourself 0 - Not at all -   PHQ-9 TOTAL SCORE 0 -   PHQ-9 Severity Level none -   Functional Impairment not difficult at all -    Some recent data might be hidden        ANETTE-7 ANXIETY SCREENING 2/6/2017 8/30/2017   ANETTE date (doc flow) 2/6/2017 8/30/2017   Nervous, anxious 0 0   Cannot stop worrying 0 0   Worry about different things 0 0   Cannot relax 0 0   Feeling restless 0 0   Easily annoyed/irritated 0 0   Afraid of awful event 0 0   Score 0 0   Severity none none   Some recent data might be hidden          Past Medical, Family, and Social History reviewed and updated as noted below.   ROS is negative except as noted above       No Known Allergies,   Family History       Problem   Relation Age of Onset     Diabetes  Father      Hypertension  Father      Other  Mother      h/o coronary artery disease/dementia       Asthma  Mother      Cancer  Maternal Aunt      Uterine       Diabetes  Maternal Grandmother      Cancer-breast  No Family History    ,   Current Outpatient Prescriptions on File Prior to Visit       Medication  Sig Dispense Refill     atorvastatin (LIPITOR) 80 mg tablet Take 1 tablet by mouth at bedtime. 90 tablet 3     ibuprofen (ADVIL) 200 mg tablet Take 400 mg by mouth 4 times daily if needed.       lisinopril (PRINIVIL; ZESTRIL) 20 mg tablet Take 1 tablet by mouth once daily. 90 tablet 3     No current facility-administered medications on file prior to visit.    ,   Past Medical History:     Diagnosis  Date     Ganglion cyst of wrist     right      Helicobacter positive gastritis     treated and 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       NVD (normal vaginal delivery)     x3    ,   Patient Active Problem List      Diagnosis Date Noted     MDD (recurrent major depressive disorder) in remission (HC) 08/30/2017     Former smoker 08/30/2017     Anxiety 01/05/2017     Hypertension 12/08/2016     Chronic atrial fibrillation (HC) 11/02/2016     Anticoagulation goal of INR 2 to 3 10/28/2016     Ischemic stroke (HC) 10/15/2016     DIVERTICULOSIS, SIGMOID COLON 04/04/2011     COLONIC POLYPS, ADENOMATOUS, HX OF  03/02/2011     OSTEOPOROSIS, LUMBAR SPINE 02/08/2006   ,   Past Surgical History:      Procedure  Laterality Date     APPENDECTOMY       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       COLONOSCOPY  4/4/11     COLONOSCOPY  5/7/12     ESOPHAGOGASTRODUODENOSCOPY  8/19/05     TONSILLECTOMY      and   Social History      Substance Use Topics        Smoking status:  Former Smoker     Packs/day: 0.50     Years: 49.00     Types: Cigarettes     Quit date: 10/14/2016     Smokeless tobacco:  Never Used     Alcohol use  No     OBJECTIVE:  /74  Pulse 82  Wt 80.8 kg (178 lb 3.2 oz)  BMI 27.91 kg/m2   EXAM:  Alert and cooperative, no distress. Gets up onto the exam table with no difficulty but slight weakness noted in the right leg. Formal neurologic exam reveals slight weakness of the right leg but not easy to detect other than when she is weightbearing. Lungs are clear, no rales rhonchi or wheezes are heard. Cardiac is regular today rate is controlled. No pedal edema. TMs are clear. Some wax on the left greater than the right. Abdomen is soft and nontender.  ASSESSMENT/Plan :      Kate was seen today for follow up.    Diagnoses and all orders for this visit:    Chronic atrial fibrillation (HC)  -     CBC AND DIFFERENTIAL; Future  -     COMP METABOLIC PANEL; Future  -     LIPID PANEL; Future  -     metoprolol (LOPRESSOR) 100 mg tablet; Take 1 tablet by mouth 2 times daily.  -     CBC AND DIFFERENTIAL  -     COMP METABOLIC PANEL  -     LIPID PANEL  -     CBC WITH AUTO DIFFERENTIAL    Anticoagulation goal of INR 2 to 3  -     CBC AND DIFFERENTIAL; Future  -     warfarin (COUMADIN) 5 mg tablet; TAKE ONE TABLET BY MOUTH ONCE DAILY  -     CBC AND DIFFERENTIAL  -     CBC WITH AUTO DIFFERENTIAL    Hypertension  -     CBC AND DIFFERENTIAL; Future  -     COMP METABOLIC PANEL; Future  -     LIPID PANEL; Future  -     CBC  AND DIFFERENTIAL  -     COMP METABOLIC PANEL  -     LIPID PANEL  -     CBC WITH AUTO DIFFERENTIAL    Ischemic stroke (HC)  -     CBC AND DIFFERENTIAL; Future  -     COMP METABOLIC PANEL; Future  -     LIPID PANEL; Future  -     AMB CONSULT TO PHYSICAL THERAPY; Future  -     CBC AND DIFFERENTIAL  -     COMP METABOLIC PANEL  -     LIPID PANEL  -     CBC WITH AUTO DIFFERENTIAL    MDD (recurrent major depressive disorder) in remission (HC)    Anxiety  -     sertraline (ZOLOFT) 50 mg tablet; Take 1 tablet by mouth once daily.    Former smoker    Right leg weakness  -     AMB CONSULT TO PHYSICAL THERAPY; Future    Single major depressive episode, moderate (HC)  -     sertraline (ZOLOFT) 50 mg tablet; Take 1 tablet by mouth once daily.    Dry mouth  -     AMB CONSULT TO UROLOGY; Future    Urge incontinence  -     AMB CONSULT TO UROLOGY; Future  -     URINALYSIS W REFLEX MICROSCOPIC IF POSITIVE; Future  -     URINALYSIS W REFLEX MICROSCOPIC IF POSITIVE    Cerebrovascular accident (CVA), unspecified mechanism (HC)  -     warfarin (COUMADIN) 5 mg tablet; TAKE ONE TABLET BY MOUTH ONCE DAILY    Screen for colon cancer  -     COLONOSCOPY SCREENING-GICH; Future    Other orders  -     Cancel: OMNI ZOSTER VACCINE LIVE SQ      She is doing well. We'll continue with her current medications and those are renewed. She will continue to use some hydrocortisone cream for her itching ears which seems to help. Also discussed cerumen removal at some point if needed.    Referred back to physical therapy for evaluation and treatment and then she can transition to the peak performance program.    Will notify of lab results when available. Continue with anticoagulation clinic.    For her urge incontinence, she has significant dry mouth I don't think she would tolerate oral medications. Referred to urology for evaluation and consideration of possible Botox injections or other options of treatment. If she would be a candidate for intravesicular  Botox, would then be safe to stop warfarin for several days prior.    She'll stop at the pharmacy today to get Zostavax.    Follow-up in 6-12 months, sooner if needed.  She is due for colonoscopy and should be at low risk for same. Will need to hold warfarin for 4 days prior.     A total of 25 minutes was spent with the patient, greater than 50% of the time was spent in counseling/discussion of the aforementioned concerns.     Asher Campos MD

## 2017-12-27 NOTE — PROGRESS NOTES
"Patient Information     Patient Name MRN Sex Kate Akins [id number] Female 1945      Progress Notes by Sandy Klein, PT at 10/12/2017 11:06 AM     Author:  Sandy Klein, PT Service:  (none) Author Type:  PT- Physical Therapist     Filed:  10/12/2017 12:02 PM Date of Service:  10/12/2017 11:06 AM Status:  Signed     :  Sandy Klein PT (PT- Physical Therapist)            [facility]  Outpatient PT - Daily note    Date of Service: 10/12/2017   Visit #8    PSFS Visit:  8/10  PSFS Completed:  2017     Patient Name: Kaet Chacon   YOB: 1945   Referring MD/Provider: Asher Campos MD  Medical and Treatment Diagnosis: right leg weakness s/p ischemic stroke  PT Treatment Diagnosis: impaired lower extremity strength and endurance, right > left   Insurance: Medicare, and [employer]  Start of Service: 17  Certification Dates: Start of Service: 17  Medicare/MA Re-Cert Due: 17       Subjective        Kate reports no pain today. Frustrated with how \"heavy\" the right leg feels. Does feel it is somewhat stronger, but wobbly and unstable still.    Objective    Today's Intervention:      TM x4 minutes 10% incline 1.1 mph with B railings working on decreasing upper extremity support and focusing on heel strike;    side stepping 10%  x2 mins each direction    single leg balance with TRX straps    BOSU   Step ups to round side, upper extremity support on railing   single leg balance flat side    Hurdles 9\" x4 times through, reciprocal pattern    6\" step: cues for R TKE    - lateral step down RLE x20 8\"  - retro step up R x20 easy      Med x: patient instructed in set up of machines for transition to PEAK program   Leg press seat 19 100# x20, right only   Hip abduction 54# 2x15         Knee ext 40# single leg x15 seat 9   hamstring curls seat 9 60#  x15       Deferred: 1 foot on foam, 1 foot on round surface BOSU x1 min each                                "               Home Exercise Program:    Access Code: [email or web address] URL: [email or web address] Date: 09/12/2017 Prepared by: Sandy Klein   Exercises   Standing Hip Flexion with Anchored Resistance and Chair Support - 10-15 reps - 1-2 sets - 5 seconds hold - 1x daily   Standing Hip Abduction with Anchored Resistance - 10-15 reps - 1-2 sets - 5 hold - 1x daily   Standing Hip Extension with Anchored Resistance - 10-15 reps - 1-2 sets - 5 hold - 1x daily   Added 9/14/2017: Single leg stance with level pelvis x1 min bilateral     Assessment  Patient demonstrates need for cues to promote heel strike during gait on treadmill, improving strength for TKE activities but continued fatigue with therapeutic exercise.     Patient is at increased fall risk due to poor lower extremity endurance and would benefit from skilled physical therapy services to address limitations and return to prior level of function.    Completed 9/12/2017:  Patient Specific Functional and Pain Scales (PSFS)  Clinician Instructions: Complete after the history and before the exam.   Initial Assessment:   We want to know what 3 activities in your life you are unable to perform, or are having the most difficulty performing, as a result of your chief problem. Please list and score at least 3 activities that you are unable to perform, or having the most difficulty performing, because of your chief problem.   Patient Specific Activity Scoring Scheme (score one number for each activity):   Activity Score (0-10)  0= Unable to perform activity  10= Able to perform activity at same level as before injury or problem   1. Picking up the right leg 5/10   2. Walking 30 mins without hanging onto something 2/10   3.  /10   4.  /10   5.  /10   Totals:  7/20= 35% ability, 65% impairment   Patient verbally states that they understand that the information they have provided above is current and complete to the best of their knowledge.   Patient Specific Functional  Scale Modifier Scale Conversion: (patient's modifier that correlates with pt's score on PSFS): 4-CL (60% Impaired).  Initial Primary G Code and Modifier:    Per the Patient's intake and/or assessment the Primary G Code is: Mobility .   The Patient's Impairment, Limitation or Restriction Modifier would be best described as: CK - 40% - 60% Impairment.   Goal Primary G Code and Modifier:    The Patient's G Code Goal would be: Mobility    The Patient's Impairment, Limitation or Restriction Modifier goal would be best described as: CI - 1% - 20% Impairment.       Goals:  Patient goal:  Improve lower extremity strength and stability with walking in 8 weeks.    Functional Short Term Goals (4 weeks):   Patient will have improved MMT grade by 1/2 grade to improve stability during ambulation.  Patient will ambulate 15 mins without hanging onto cart or other object for support.      Functional Long Term Goals (8 weeks):   Patient will ambulate 30 minutes with no support for improved stability and endurance.  Patient will have improved MMT grade by at least 1 full grade for decreased fall risk and improved stepping strategy.      Plan    Treatment Plan:      Frequency:   16 visits    Duration of Treatment: 8 weeks    Planned Interventions:    Home Exercise Program development  Therapeutic Exercise (ROM & Strengthening)  Therapeutic Activities  Neuromuscular Re-education  Manual Therapy  Ultrasound  E-Stim  Gait Training  Hot Packs / Cold Pack Modalities  Vasopneumatic Compression with Cold Therapy ( Game Ready )    Plan for next visit:  TM on incline, single leg balance, step ups, upright bike or Nustep    Thank you for your referral to [facility].  Please call with any questions, concerns or comments.  [phone]  Sandy Klein, PT, DPT    The signature, of the referring medical provider, on this document indicates certification of the above prescribed plan of care and is medically  necessary.

## 2017-12-27 NOTE — PROGRESS NOTES
Patient Information     Patient Name MRN Kate Ann [id number] Female 1945      Progress Notes by Steve Fox MD at 2017 11:15 AM     Author:  Steve Fox MD Service:  (none) Author Type:  Physician     Filed:  2017  1:57 PM Encounter Date:  2017 Status:  Signed     :  Steve Fox MD (Physician)            Type of Visit  NPV    Chief Complaint  Incontinence    HPI  Ms. Chacon is a 72 y.o. female with history of CVA who presents with incontinence.  Patient leaks urine about several times daily.  Volume of incontinence is described as Small.  Overall, leaking urine interferes (bother score) with daily living approximately 5/10.    Patient uses 0 pads per day.  Onset was 3 Month(s) ago.  Patient denies: dysuria and gross hematuria.    Leakage of urine occurs with urgency? Yes  Leakage of urine occurs with valsalva? No  Leakage of urine occurs without sensation? No    Failed treatments:     None      Past Medical History  She  has a past medical history of Ganglion cyst of wrist; Helicobacter positive gastritis; and NVD (normal vaginal delivery).  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    Medications  She has a current medication list which includes the following prescription(s): atorvastatin, ibuprofen, lisinopril, metoprolol,  sertraline, and warfarin.    Allergies  No Known Allergies    Social History  She  reports that she quit smoking about 10 months ago. Her smoking use included Cigarettes. She has a 24.50 pack-year smoking history. She has never used smokeless tobacco. She reports that she does not drink alcohol or use illicit drugs.  No drug abuse.    Family History  Family History       Problem   Relation Age of Onset     Diabetes  Father      Hypertension  Father      Other  Mother      h/o coronary artery disease/dementia       Asthma  Mother      Cancer  Maternal Aunt      Uterine       Diabetes  Maternal Grandmother      Cancer-breast  No Family History        Review of Systems  I personally reviewed the ROS with the patient.    Nursing Notes:   Chrystal Hurtado  9/7/2017 11:33 AM  Signed  Patient presents to the clinic for consult on urinary incontinence.  Chrystal Hurtado LPN........................9/7/2017  11:21 AM    Review of Systems:    Weight loss:    No     Recent fever/chills:  No   Night sweats:   No  Current skin rash:  No   Recent hair loss:  No  Heat intolerance:  No   Cold intolerance:  No  Chest pain:   No   Palpitations:   No  Shortness of breath:  No   Wheezing:   No  Constipation:    No   Diarrhea:   No   Nausea:   No   Vomiting:   No   Kidney/side pain:  No   Back pain:   No  Frequent headaches:  No   Dizziness:     No  Leg swelling:   No   Calf pain:    No    Parents, brothers or sisters with history of kidney cancer?   No  Parents, brothers or sisters with history of bladder cancer: No      Post-Void Residual  A post-void residual was measured by ultrasonic bladder scanner.  21 mL      Physical Exam  Vitals:     09/07/17 1121   BP: 124/68   Pulse: 52   Weight: 81.3 kg (179 lb 3.2 oz)     Constitutional: NAD, WDWN  Head: NCAT  Eyes: Conjunctivae normal  Cardiovascular: Regular rate  Pulmonary/Chest: Respirations are even and non-labored bilaterally  Abdominal: Soft with no distension, tenderness, masses,  guarding or CVA tenderness  Neurological: A + O x 3,  cranial nerves II-XII grossly intact  Extremities: MARI x 4, warm, no clubbing, no cyanosis  Skin: Pink, warm, dry with no rash  Psychiatric:  Normal mood and affect  Genitourinary: Nonpalpable bladder    Labs  CREATININE (mg/dL)    Date Value   08/30/2017 0.95       Results for YOUSIF COLLINS (MRN [id number]) as of 9/7/2017 11:33   10/15/2016 08:25 8/30/2017 09:32   COLOR                     Yellow Yellow   CLARITY                   Clear Clear   SPECIFIC GRAVITY,URINE    1.010 1.015   PH,URINE                  7.0 6.5   UROBILINOGEN,QUALITATIVE  Normal Normal   PROTEIN, URINE Negative Negative   GLUCOSE, URINE Negative Negative   KETONES,URINE             Negative Negative   BILIRUBIN,URINE           Negative Negative   OCCULT BLOOD,URINE        Negative Negative   NITRITE                   Negative Negative   LEUKOCYTE ESTERASE        Negative Negative       Post-Void Residual  A post-void residual was measured by ultrasonic bladder scanner.  21 mL    Assessment  Ms. Collins is a 72 y.o. female w/h/o CVA with urge incontinence.  The patient was informed of the most common side effects with oral anticholinergic medication such as dry mouth, dry eyes, confusion and constipation.    Plan  Start oxybutynin ER 10mg PO once daily  Follow up in 4 weeks with PVR

## 2017-12-27 NOTE — PROGRESS NOTES
"Patient Information     Patient Name MRN Sex Kate Akins [id number] Female 1945      Progress Notes by Sandy Klein, PT at 2017  1:53 PM     Author:  Sandy Klein, PT Service:  (none) Author Type:  PT- Physical Therapist     Filed:  2017  2:51 PM Date of Service:  2017  1:53 PM Status:  Signed     :  Sandy Klein PT (PT- Physical Therapist)            [facility]  Outpatient PT - Daily note    Date of Service: 2017   Visit #2    PSFS Visit:  2/10  PSFS Completed:  2017     Patient Name: Kate Chacon   YOB: 1945   Referring MD/Provider: Asher Campos MD  Medical and Treatment Diagnosis: right leg weakness s/p ischemic stroke  PT Treatment Diagnosis: impaired lower extremity strength and endurance, right > left   Insurance: Medicare, and [employer]  Start of Service: 17  Certification Dates: Start of Service: 17  Medicare/MA Re-Cert Due: 17         Subjective        Patient states she did do the band exercises at home.        Objective    Today's Intervention:    -TM 8% 1.1 mph x5 mins, retro at 0.6 mph x2 mins    Cone toe taps with 2# ankle weights to fatigue, eyes down at target and eyes raised to challenge coordination    Lateral step up and over on foam    Five 9\" hurdles forward x4 and side stepping x1 each    single leg balance with TRX straps 2 x held to tolerance bilateral lower extremities    Monster walk red theraband x30 feet each (min hip fatigue but generalized tiredness)    Seated hip internal rotation with ball squeeze between knees, red theraband around ankles x20    Supine straight leg raise flexion no weight, 3 sec holds x15 bilateral  Supine bridging x15 with 3 sec hold    Deferred:  Med x:   Leg press 120# seat 19 x20   Hip abduction 40# x15, 50# x15   hamstring curls 60# seat 9 x15    Home Exercise Program:    Access Code: [email or web address] URL: [email or web address] Date: 2017 " Prepared by: Sandy Klein   Exercises   Standing Hip Flexion with Anchored Resistance and Chair Support - 10-15 reps - 1-2 sets - 5 seconds hold - 1x daily   Standing Hip Abduction with Anchored Resistance - 10-15 reps - 1-2 sets - 5 hold - 1x daily   Standing Hip Extension with Anchored Resistance - 10-15 reps - 1-2 sets - 5 hold - 1x daily   Added 9/14/2017: Single leg stance with level pelvis x1 min bilateral     Assessment  Patient reports fatigue with therapeutic exercise and challenge with hip stability during single leg activities due to hip weakness.    Patient is at increased fall risk due to poor lower extremity endurance and would benefit from skilled physical therapy services to address limitations and return to prior level of function.    Completed 9/12/2017:  Patient Specific Functional and Pain Scales (PSFS)  Clinician Instructions: Complete after the history and before the exam.   Initial Assessment:   We want to know what 3 activities in your life you are unable to perform, or are having the most difficulty performing, as a result of your chief problem. Please list and score at least 3 activities that you are unable to perform, or having the most difficulty performing, because of your chief problem.   Patient Specific Activity Scoring Scheme (score one number for each activity):   Activity Score (0-10)  0= Unable to perform activity  10= Able to perform activity at same level as before injury or problem   1. Picking up the right leg 5/10   2. Walking 30 mins without hanging onto something 2/10   3.  /10   4.  /10   5.  /10   Totals:  7/20= 35% ability, 65% impairment   Patient verbally states that they understand that the information they have provided above is current and complete to the best of their knowledge.   Patient Specific Functional Scale Modifier Scale Conversion: (patient's modifier that correlates with pt's score on PSFS): 4-CL (60% Impaired).  Initial Primary G Code and Modifier:    Per  the Patient's intake and/or assessment the Primary G Code is: Mobility .   The Patient's Impairment, Limitation or Restriction Modifier would be best described as: CK - 40% - 60% Impairment.   Goal Primary G Code and Modifier:    The Patient's G Code Goal would be: Mobility    The Patient's Impairment, Limitation or Restriction Modifier goal would be best described as: CI - 1% - 20% Impairment.       Goals:  Patient goal:  Improve lower extremity strength and stability with walking in 8 weeks.    Functional Short Term Goals (4 weeks):   Patient will have improved MMT grade by 1/2 grade to improve stability during ambulation.  Patient will ambulate 15 mins without hanging onto cart or other object for support.      Functional Long Term Goals (8 weeks):   Patient will ambulate 30 minutes with no support for improved stability and endurance.  Patient will have improved MMT grade by at least 1 full grade for decreased fall risk and improved stepping strategy.      Plan    Treatment Plan:      Frequency:   16 visits    Duration of Treatment: 8 weeks    Planned Interventions:    Home Exercise Program development  Therapeutic Exercise (ROM & Strengthening)  Therapeutic Activities  Neuromuscular Re-education  Manual Therapy  Ultrasound  E-Stim  Gait Training  Hot Packs / Cold Pack Modalities  Vasopneumatic Compression with Cold Therapy ( Game Ready )    Plan for next visit:  TM on incline, single leg balance, step ups, upright bike or Nustep    Thank you for your referral to [facility].  Please call with any questions, concerns or comments.  [phone]  Sandy Klein PT, DPT    The signature, of the referring medical provider, on this document indicates certification of the above prescribed plan of care and is medically necessary.

## 2017-12-27 NOTE — PROGRESS NOTES
"Patient Information     Patient Name MRN Sex Kate Akins [id number] Female 1945      Progress Notes by Susan Stockton at 10/5/2017  9:27 AM     Author:  Susan Stockton Service:  (none) Author Type:  PT- Physical Therapy Assistant     Filed:  10/5/2017 11:29 AM Date of Service:  10/5/2017  9:27 AM Status:  Signed     :  Susan Stockton (PT- Physical Therapy Assistant)            [facility]  Outpatient PT - Daily note    Date of Service: 10/5/2017   Visit #6    PSFS Visit:  6/10  PSFS Completed:  2017     Patient Name: Kate Chacon   YOB: 1945   Referring MD/Provider: Asher Campos MD  Medical and Treatment Diagnosis: right leg weakness s/p ischemic stroke  PT Treatment Diagnosis: impaired lower extremity strength and endurance, right > left   Insurance: Medicare, and [employer]  Start of Service: 17  Certification Dates: Start of Service: 17  Medicare/MA Re-Cert Due: 17       Subjective        Kate reports no pain or fatigue today. Lucho denies falls, but admits to frequent close calls with the most recent being yesterday in her kitchen. She states she turned too fast and her R LE didn't go with her.     Objective    Today's Intervention:      TM x4 minutes (to fatigue) 10% incline 1.1-1.3 mph with B railings    - Kate was noted to have improved heelstrike and knee extension of R LE after about 2 minutes.     6\" step: cues for R TKE   - lateral R LE x20   - lateral step overs B x20   - retro step up R x20     Stance on 1/2 foam roll:  - controlled DF/PF x2 minutes   - balancing in front of the bar x2 minutes   - balancing away from the bar while working on ankle strategy x1 minute   - also cues to step off foam when unable to correct LOB     Med x:   Leg press seat 19 130# x20 out with BLE, in with right only   Hip abduction 50# 2x15         hamstring curls seat 9 60#  x15   Knee ext 50# x 10 progressed to x15                          "                            Home Exercise Program:    Access Code: [email or web address] URL: [email or web address] Date: 09/12/2017 Prepared by: Sandy Klein   Exercises   Standing Hip Flexion with Anchored Resistance and Chair Support - 10-15 reps - 1-2 sets - 5 seconds hold - 1x daily   Standing Hip Abduction with Anchored Resistance - 10-15 reps - 1-2 sets - 5 hold - 1x daily   Standing Hip Extension with Anchored Resistance - 10-15 reps - 1-2 sets - 5 hold - 1x daily   Added 9/14/2017: Single leg stance with level pelvis x1 min bilateral   Assessment  Patient demonstrates early fatigue of lower extremities right> left but improving strength for therapeutic exercise.    Patient is at increased fall risk due to poor lower extremity endurance and would benefit from skilled physical therapy services to address limitations and return to prior level of function.    Completed 9/12/2017:  Patient Specific Functional and Pain Scales (PSFS)  Clinician Instructions: Complete after the history and before the exam.   Initial Assessment:   We want to know what 3 activities in your life you are unable to perform, or are having the most difficulty performing, as a result of your chief problem. Please list and score at least 3 activities that you are unable to perform, or having the most difficulty performing, because of your chief problem.   Patient Specific Activity Scoring Scheme (score one number for each activity):   Activity Score (0-10)  0= Unable to perform activity  10= Able to perform activity at same level as before injury or problem   1. Picking up the right leg 5/10   2. Walking 30 mins without hanging onto something 2/10   3.  /10   4.  /10   5.  /10   Totals:  7/20= 35% ability, 65% impairment   Patient verbally states that they understand that the information they have provided above is current and complete to the best of their knowledge.   Patient Specific Functional Scale Modifier Scale Conversion: (patient's  modifier that correlates with pt's score on PSFS): 4-CL (60% Impaired).  Initial Primary G Code and Modifier:    Per the Patient's intake and/or assessment the Primary G Code is: Mobility .   The Patient's Impairment, Limitation or Restriction Modifier would be best described as: CK - 40% - 60% Impairment.   Goal Primary G Code and Modifier:    The Patient's G Code Goal would be: Mobility    The Patient's Impairment, Limitation or Restriction Modifier goal would be best described as: CI - 1% - 20% Impairment.       Goals:  Patient goal:  Improve lower extremity strength and stability with walking in 8 weeks.    Functional Short Term Goals (4 weeks):   Patient will have improved MMT grade by 1/2 grade to improve stability during ambulation.  Patient will ambulate 15 mins without hanging onto cart or other object for support.      Functional Long Term Goals (8 weeks):   Patient will ambulate 30 minutes with no support for improved stability and endurance.  Patient will have improved MMT grade by at least 1 full grade for decreased fall risk and improved stepping strategy.      Plan    Treatment Plan:      Frequency:   16 visits    Duration of Treatment: 8 weeks    Planned Interventions:    Home Exercise Program development  Therapeutic Exercise (ROM & Strengthening)  Therapeutic Activities  Neuromuscular Re-education  Manual Therapy  Ultrasound  E-Stim  Gait Training  Hot Packs / Cold Pack Modalities  Vasopneumatic Compression with Cold Therapy ( Game Ready )    Plan for next visit:  TM on incline, single leg balance, step ups, upright bike or Nustep    Thank you for your referral to [facility].  Please call with any questions, concerns or comments.  [phone]  Sandy Klein, PT, DPT    The signature, of the referring medical provider, on this document indicates certification of the above prescribed plan of care and is medically necessary.

## 2017-12-27 NOTE — PROGRESS NOTES
Patient Information     Patient Name MRN Sex Kate Akins [id number] Female 1945      Progress Notes by Dinora Younger NP at 2017  2:45 PM     Author:  Dinora Younger NP Service:  (none) Author Type:  PHYS- Nurse Practitioner     Filed:  2017  3:31 PM Encounter Date:  2017 Status:  Signed     :  Dinora Younger NP (PHYS- Nurse Practitioner)            [facility] HEART CARE   CARDIOLOGY PROGRESS NOTE    Kate Chacon  [address] [address]    Asher Campos MD    Chief Complaint     Patient presents with       Follow Up      incision site check         HPI:  Kate Chacon  is a 72 y.o. female who comes in today for follow-up after pacemaker placement for SA bradycardia, chronic atrial fib with RVR. She has a history of AF, hypertension, and history ischemic stroke concerning for cryptogenic stroke.      Mrs. Chacon was initially admitted to the hospital for asymptomatic severe bradycardia on 10/23/17. She was initially scheduled for a colonoscopy that day, presented to the preoperative area and had an initial EKG which was notable for severe sinus bradycardia with rates in the upper 20s with stable blood pressure. She was transferred to the emergency department, there she remained asymptomatic and underwent routine lab evaluation and was subsequently admitted to the ICU for further monitoring. Over her hospital course she had no evidence of high-grade AV block or tachycardia. With rest her heart rate remained in the 50s and 60s. With ambulation her rates were found to be in the 30s to 60s. She had no evidence of electrolyte abnormalities and she was ruled out for ACS overnight. On the day of discharge her heart rate remained in the upper 30s to 50s, sinus rhythm with QTc 370 and stable over hospital course. She was advised to hold her metoprolol and a ZIO patch was placed at discharge.        She was seen in clinic on 17 for follow-up sinoatrial  bradycardia. She was found to be in AF at the time with RVR, rates in 90's to 170 with beat to beat variability. She was largely asymptomatic with this and likely rebound tachycardia since d/c from beta blockade with severe bradycardia. It was discussed pacer in order to medically manage fast rates with AV eugene blocking agent. Initially Kate was hesitant of this option. Wished to medically managed and was accepted in the hospital for AF rate control with RVR and beat to beat variability with suspected beta blockade rebound. During second hospitalization she developed significant bradycardia with pauses of greater then 2 seconds. Patient was transferred to the care of Dr. Amanda at [facility] for pacer placement on 11/2/17.      Her metoprolol was gradually increased to 25 mg twice a day and she seems to be tolerating that well. Admits to some fatigue. She was last seen in clinic on 11/16/17 and Lisinopril was decreased from 40 mg to 20 mg due to low pressures. She is doing well with this. Admits that she previously felt palpitations and lightheadedness which she related to stress. Admits that since pacer and restarting Beta blocker this has entirely resolved.     She was seen at [facility] on 11/10 by device nurse with normal functioning pacer device. Next appt for interrogation in January 2018.         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       NVD (normal vaginal delivery)     x3        FAMILY HISTORY:  Family History       Problem   Relation Age of Onset     Diabetes  Father      Hypertension  Father      Other  Mother      h/o coronary artery disease/dementia       Asthma  Mother      Cancer  Maternal Aunt      Uterine       Diabetes  Maternal Grandmother      Cancer-breast  No Family History        PAST SURGICAL  HISTORY:  Past Surgical History:      Procedure  Laterality Date     APPENDECTOMY       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       COLONOSCOPY  4/4/11     COLONOSCOPY  5/7/12     ESOPHAGOGASTRODUODENOSCOPY  8/19/05     TONSILLECTOMY         SOCIAL HISTORY:  Social History     Social History        Marital status:       Spouse name: N/A     Number of children:  N/A     Years of education:  N/A     Social History Main Topics        Smoking status:  Former Smoker     Packs/day: 0.50     Years: 49.00     Types: Cigarettes     Quit date: 10/14/2016     Smokeless tobacco:  Never Used     Alcohol use  No     Drug use:  No     Sexual activity:  Not Currently     Other Topics  Concern     None      Social History Narrative     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  She enjoys gardening, Adknowledgeling and going to stock car [employer] that her son participates in.     to her  [place].  They run [employer].  Live in town independently.                                    CURRENT MEDICATIONS:  Current Outpatient Prescriptions on File Prior to Visit       Medication  Sig Dispense Refill     atorvastatin (LIPITOR) 80 mg tablet Take 1 tablet by mouth at bedtime. 90 tablet 3     CALCIUM CARBONATE (TUMS ORAL) Take 1 tablet by mouth every 8 hours if needed (Heartburn).       ibuprofen (ADVIL; MOTRIN) 200 mg tablet Take 200 mg by mouth 4 times daily if needed for Pain or Headache.       lisinopril (PRINIVIL; ZESTRIL) 20 mg tablet Take 1 tablet by mouth once daily. 90 tablet prn     metoprolol tartrate (LOPRESSOR) 25 mg tablet Take 1 tablet by mouth 2 times daily. 60 tablet prn     oxybutynin XL (DITROPAN XL) 10 mg CR tablet Take 10 mg by mouth at bedtime.       sertraline (ZOLOFT) 50 mg tablet Take 1 tablet by mouth at bedtime. 90 tablet prn     warfarin (COUMADIN) 5 mg tablet TAKE ONE TABLET BY MOUTH ONCE  DAILY 90 tablet prn     No current facility-administered medications on file prior to visit.        ALLERGIES:  No Known Allergies      ROS:  CONSTITUTIONAL:  No weight loss, fever, chills or weakness. Positive for mild fatigue over past week.  CARDIOVASCULAR:  No chest pain, chest pressure or chest discomfort. No palpitations or lower extremity edema.  RESPIRATORY:  No shortness of breath, dyspnea upon exertion, cough or sputum production.  NEUROLOGICAL:  No headache, lightheadedness, dizziness, syncope, ataxia or weakness.  HEMATOLOGIC:  No anemia, bleeding or bruising.  SKIN:  No complications at pacer incision site, healing well.      PHYSICAL EXAM:  /72  Pulse 60  Wt 82.2 kg (181 lb 4.8 oz)  BMI 28.4 kg/m2  GENERAL: The patient is a well-developed, well-nourished, in no apparent distress. Alert and oriented x3.  HEENT: Head is normocephalic and atraumatic. Eyes are symmetrical with normal visual tracking. Nares appeared normal without nasal drainage. Mucous membranes are moist.   NECK: Supple.   HEART: Regular rate and rhythm, S1S2 present without murmur, rub or gallop.  LUNGS: Respirations regular and unlabored. Clear to auscultation.  EXTREMITIES: No peripheral edema present.   NEUROLOGIC: Alert and oriented X3. No focal neurologic deficits.   SKIN: No jaundice. No rashes or visible skin lesions present. Pacer incision site healed well with no complication. Inferior to incision site is small blister from adhesive reaction, healing well without drainage.       LAB RESULTS:  Anticoagulation on 11/16/2017        Component  Date Value Ref Range Status     INR 11/16/2017 2.3* <1.3 Final   Anticoagulation on 11/08/2017        Component  Date Value Ref Range Status     INR 11/08/2017 1.5* <1.3 Final   Admission on 11/01/2017, Discharged on 11/02/2017        Component  Date Value Ref Range Status     SODIUM 11/02/2017 141  133 - 143 mmol/L Final     POTASSIUM 11/02/2017 4.0  3.5 - 5.1 mmol/L Final      CHLORIDE 11/02/2017 107  98 - 107 mmol/L Final     CO2,TOTAL 11/02/2017 27  21 - 31 mmol/L Final     ANION GAP 11/02/2017 7  5 - 18                 Final     GLUCOSE 11/02/2017 101  70 - 105 mg/dL Final     CALCIUM 11/02/2017 10.3  8.6 - 10.3 mg/dL Final     BUN 11/02/2017 13  7 - 25 mg/dL Final     CREATININE 11/02/2017 0.92  0.70 - 1.30 mg/dL Final     BUN/CREAT RATIO           11/02/2017 14                  Final     GFR if  11/02/2017 >60  >60 ml/min/1.73m2 Final     GFR if not  11/02/2017 60* >60 ml/min/1.73m2 Final     WHITE BLOOD COUNT         11/02/2017 7.9  4.5 - 11.0 thou/cu mm Final     RED BLOOD COUNT           11/02/2017 4.92  4.00 - 5.20 mil/cu mm Final     HEMOGLOBIN                11/02/2017 15.6  12.0 - 16.0 g/dL Final     HEMATOCRIT                11/02/2017 47.0  33.0 - 51.0 % Final     MCV                       11/02/2017 96  80 - 100 fL Final     MCH                       11/02/2017 31.7  26.0 - 34.0 pg Final     MCHC                      11/02/2017 33.2  32.0 - 36.0 g/dL Final     RDW                       11/02/2017 13.2  11.5 - 15.5 % Final     PLATELET COUNT            11/02/2017 202  140 - 440 thou/cu mm Final     MPV                       11/02/2017 11.0  6.5 - 11.0 fL Final     MAGNESIUM 11/02/2017 1.9  1.9 - 2.7 mg/dL Final     INR 11/02/2017 2.9* <1.3 Final     PROTIME 11/02/2017 34.6* 11.9 - 15.2 sec Final   Office Visit on 11/01/2017        Component  Date Value Ref Range Status     TSH 11/01/2017 1.52  0.34 - 5.60 uIU/mL Final     T4,FREE 11/01/2017 1.09  0.58 - 1.64 ng/dL Final   Anticoagulation on 10/30/2017        Component  Date Value Ref Range Status     INR 10/30/2017 2.9* <1.3 Final   Admission on 10/23/2017, Discharged on 10/25/2017        Component  Date Value Ref Range Status     INR 10/23/2017 1.4* <1.3 Final     PROTIME 10/23/2017 16.5* 11.9 - 15.2 sec Final     APTT 10/23/2017 30  26 - 39 sec Final     SODIUM 10/23/2017 137  133 - 143  mmol/L Final     POTASSIUM 10/23/2017 3.8  3.5 - 5.1 mmol/L Final     CHLORIDE 10/23/2017 108* 98 - 107 mmol/L Final     CO2,TOTAL 10/23/2017 23  21 - 31 mmol/L Final     ANION GAP 10/23/2017 6  5 - 18                 Final     GLUCOSE 10/23/2017 104  70 - 105 mg/dL Final     CALCIUM 10/23/2017 9.9  8.6 - 10.3 mg/dL Final     BUN 10/23/2017 12  7 - 25 mg/dL Final     CREATININE 10/23/2017 0.98  0.70 - 1.30 mg/dL Final     BUN/CREAT RATIO           10/23/2017 12                  Final     GFR if  10/23/2017 >60  >60 ml/min/1.73m2 Final     GFR if not  10/23/2017 55* >60 ml/min/1.73m2 Final     ALBUMIN 10/23/2017 3.6  3.5 - 5.7 g/dL Final     PROTEIN,TOTAL 10/23/2017 6.4  6.4 - 8.9 g/dL Final     GLOBULIN                  10/23/2017 2.8  2.0 - 3.7 g/dL Final     A/G RATIO 10/23/2017 1.3  1.0 - 2.0                 Final     BILIRUBIN,TOTAL 10/23/2017 1.3* 0.3 - 1.0 mg/dL Final     ALK PHOSPHATASE 10/23/2017 80  34 - 104 IU/L Final     ALT (SGPT) 10/23/2017 41  7 - 52 IU/L Final     AST (SGOT) 10/23/2017 30  13 - 39 IU/L Final     TROPONIN I 10/23/2017 <0.030  <0.034 ng/mL Final     WHITE BLOOD COUNT         10/23/2017 7.3  4.5 - 11.0 thou/cu mm Final     RED BLOOD COUNT           10/23/2017 4.41  4.00 - 5.20 mil/cu mm Final     HEMOGLOBIN                10/23/2017 14.1  12.0 - 16.0 g/dL Final     HEMATOCRIT                10/23/2017 42.0  33.0 - 51.0 % Final     MCV                       10/23/2017 95  80 - 100 fL Final     MCH                       10/23/2017 32.0  26.0 - 34.0 pg Final     MCHC                      10/23/2017 33.6  32.0 - 36.0 g/dL Final     RDW                       10/23/2017 13.2  11.5 - 15.5 % Final     PLATELET COUNT            10/23/2017 166  140 - 440 thou/cu mm Final     MPV                       10/23/2017 11.3* 6.5 - 11.0 fL Final     NEUTROPHILS               10/23/2017 65.6  42.0 - 72.0 % Final     LYMPHOCYTES               10/23/2017 20.7  20.0 - 44.0 %  Final     MONOCYTES                 10/23/2017 10.8  <12.0 % Final     EOSINOPHILS               10/23/2017 2.2  <8.0 % Final     BASOPHILS                 10/23/2017 0.4  <3.0 % Final     IMMATURE GRANULOCYTES(METAS,MYELOS* 10/23/2017 0.3  % Final     ABSOLUTE NEUTROPHILS      10/23/2017 4.8  1.7 - 7.0 thou/cu mm Final     ABSOLUTE LYMPHOCYTES      10/23/2017 1.5  0.9 - 2.9 thou/cu mm Final     ABSOLUTE MONOCYTES        10/23/2017 0.8  <0.9 thou/cu mm Final     ABSOLUTE EOSINOPHILS      10/23/2017 0.2  <0.5 thou/cu mm Final     ABSOLUTE BASOPHILS        10/23/2017 0.0  <0.3 thou/cu mm Final     ABSOLUTE IMMATURE GRANULOCYTES(MET* 10/23/2017 0.0  <=0.3 thou/cu mm Final     MAGNESIUM 10/23/2017 1.8* 1.9 - 2.7 mg/dL Final     TROPONIN I 10/24/2017 <0.030  <0.034 ng/mL Final     SODIUM 10/24/2017 135  133 - 143 mmol/L Final     POTASSIUM 10/24/2017 3.5  3.5 - 5.1 mmol/L Final     CHLORIDE 10/24/2017 107  98 - 107 mmol/L Final     CO2,TOTAL 10/24/2017 24  21 - 31 mmol/L Final     ANION GAP 10/24/2017 4* 5 - 18                 Final     GLUCOSE 10/24/2017 103  70 - 105 mg/dL Final     CALCIUM 10/24/2017 8.9  8.6 - 10.3 mg/dL Final     BUN 10/24/2017 10  7 - 25 mg/dL Final     CREATININE 10/24/2017 0.78  0.70 - 1.30 mg/dL Final     BUN/CREAT RATIO           10/24/2017 13                  Final     GFR if  10/24/2017 >60  >60 ml/min/1.73m2 Final     GFR if not  10/24/2017 >60  >60 ml/min/1.73m2 Final     MAGNESIUM 10/24/2017 2.0  1.9 - 2.7 mg/dL Final     TROPONIN I 10/24/2017 <0.030  <0.034 ng/mL Final     INR 10/24/2017 1.4* <1.3 Final     PROTIME 10/24/2017 16.7* 11.9 - 15.2 sec Final     INR 10/25/2017 1.7* <1.3 Final     PROTIME 10/25/2017 19.9* 11.9 - 15.2 sec Final     SODIUM 10/25/2017 141  133 - 143 mmol/L Final     POTASSIUM 10/25/2017 3.8  3.5 - 5.1 mmol/L Final     CHLORIDE 10/25/2017 110* 98 - 107 mmol/L Final     CO2,TOTAL 10/25/2017 23  21 - 31 mmol/L Final     ANION GAP  10/25/2017 8  5 - 18                 Final     GLUCOSE 10/25/2017 93  70 - 105 mg/dL Final     CALCIUM 10/25/2017 9.6  8.6 - 10.3 mg/dL Final     BUN 10/25/2017 6* 7 - 25 mg/dL Final     CREATININE 10/25/2017 0.78  0.70 - 1.30 mg/dL Final     BUN/CREAT RATIO           10/25/2017 8                  Final     GFR if  10/25/2017 >60  >60 ml/min/1.73m2 Final     GFR if not  10/25/2017 >60  >60 ml/min/1.73m2 Final     MAGNESIUM 10/25/2017 1.9  1.9 - 2.7 mg/dL Final   Anticoagulation on 10/18/2017        Component  Date Value Ref Range Status     INR 10/18/2017 1.9* <1.3 Final   Anticoagulation on 09/20/2017        Component  Date Value Ref Range Status     INR 09/20/2017 2.4* <1.3 Final   Office Visit on 08/30/2017        Component  Date Value Ref Range Status     SODIUM 08/30/2017 141  133 - 143 mmol/L Final     POTASSIUM 08/30/2017 4.0  3.5 - 5.1 mmol/L Final     CHLORIDE 08/30/2017 107  98 - 107 mmol/L Final     CO2,TOTAL 08/30/2017 25  21 - 31 mmol/L Final     ANION GAP 08/30/2017 9  5 - 18                 Final     GLUCOSE 08/30/2017 75  70 - 105 mg/dL Final     CALCIUM 08/30/2017 10.2  8.6 - 10.3 mg/dL Final     BUN 08/30/2017 16  7 - 25 mg/dL Final     CREATININE 08/30/2017 0.95  0.70 - 1.30 mg/dL Final     BUN/CREAT RATIO           08/30/2017 17                  Final     GFR if  08/30/2017 >60  >60 ml/min/1.73m2 Final     GFR if not  08/30/2017 58* >60 ml/min/1.73m2 Final     ALBUMIN 08/30/2017 3.7  3.5 - 5.7 g/dL Final     PROTEIN,TOTAL 08/30/2017 6.7  6.4 - 8.9 g/dL Final     GLOBULIN                  08/30/2017 3.0  2.0 - 3.7 g/dL Final     A/G RATIO 08/30/2017 1.2  1.0 - 2.0                 Final     BILIRUBIN,TOTAL 08/30/2017 0.6  0.3 - 1.0 mg/dL Final     ALK PHOSPHATASE 08/30/2017 97  34 - 104 IU/L Final     ALT (SGPT) 08/30/2017 36  7 - 52 IU/L Final     AST (SGOT) 08/30/2017 26  13 - 39 IU/L Final     CHOLESTEROL,TOTAL 08/30/2017 90  <200  mg/dL Final     TRIGLYCERIDES 08/30/2017 55  <150 mg/dL Final     HDL CHOLESTEROL 08/30/2017 40  23 - 92 mg/dL Final     NON-HDL CHOLESTEROL 08/30/2017 50  <145 mg/dl Final     CHOL/HDL RATIO            08/30/2017 2.25  <4.50                 Final     LDL CHOLESTEROL 08/30/2017 39  <100 mg/dL Final     PATIENT STATUS            08/30/2017 FASTING                  Final     WHITE BLOOD COUNT         08/30/2017 8.7  4.5 - 11.0 thou/cu mm Final     RED BLOOD COUNT           08/30/2017 4.55  4.00 - 5.20 mil/cu mm Final     HEMOGLOBIN                08/30/2017 14.4  12.0 - 16.0 g/dL Final     HEMATOCRIT                08/30/2017 43.6  33.0 - 51.0 % Final     MCV                       08/30/2017 96  80 - 100 fL Final     MCH                       08/30/2017 31.6  26.0 - 34.0 pg Final     MCHC                      08/30/2017 33.0  32.0 - 36.0 g/dL Final     RDW                       08/30/2017 13.6  11.5 - 15.5 % Final     PLATELET COUNT            08/30/2017 166  140 - 440 thou/cu mm Final     MPV                       08/30/2017 11.1* 6.5 - 11.0 fL Final     NEUTROPHILS               08/30/2017 66.6  42.0 - 72.0 % Final     LYMPHOCYTES               08/30/2017 20.0  20.0 - 44.0 % Final     MONOCYTES                 08/30/2017 11.1  <12.0 % Final     EOSINOPHILS               08/30/2017 1.8  <8.0 % Final     BASOPHILS                 08/30/2017 0.3  <3.0 % Final     IMMATURE GRANULOCYTES(METAS,MYELOS* 08/30/2017 0.2  % Final     ABSOLUTE NEUTROPHILS      08/30/2017 5.8  1.7 - 7.0 thou/cu mm Final     ABSOLUTE LYMPHOCYTES      08/30/2017 1.7  0.9 - 2.9 thou/cu mm Final     ABSOLUTE MONOCYTES        08/30/2017 1.0* <0.9 thou/cu mm Final     ABSOLUTE EOSINOPHILS      08/30/2017 0.2  <0.5 thou/cu mm Final     ABSOLUTE BASOPHILS        08/30/2017 0.0  <0.3 thou/cu mm Final     ABSOLUTE IMMATURE GRANULOCYTES(MET* 08/30/2017 0.0  <=0.3 thou/cu mm Final     COLOR                     08/30/2017 Yellow  Yellow Color Final      CLARITY                   08/30/2017 Clear  Clear Clarity Final     SPECIFIC GRAVITY,URINE    08/30/2017 1.015  1.010, 1.015, 1.020, 1.025                 Final     PH,URINE                  08/30/2017 6.5  6.0, 7.0, 8.0, 5.5, 6.5, 7.5, 8.5                 Final     UROBILINOGEN,QUALITATIVE  08/30/2017 Normal  Normal EU/dl Final     PROTEIN, URINE 08/30/2017 Negative  Negative mg/dL Final     GLUCOSE, URINE 08/30/2017 Negative  Negative mg/dL Final     KETONES,URINE             08/30/2017 Negative  Negative mg/dL Final     BILIRUBIN,URINE           08/30/2017 Negative  Negative                 Final     OCCULT BLOOD,URINE        08/30/2017 Negative  Negative                 Final     NITRITE                   08/30/2017 Negative  Negative                 Final     LEUKOCYTE ESTERASE        08/30/2017 Negative  Negative                 Final   Anticoagulation on 08/23/2017        Component  Date Value Ref Range Status     INR 08/23/2017 2.8* <1.3 Final   There may be more visits with results that are not included.      ASSESSMENT:  Kate Chacon presents for follow-up pacer placement, asymptomatic severe bradycardia. History of PAF with stroke one year ago, concern cryptogenic stroke. She has been on chronic anticoagulation since that time. Slowly increased metoprolol tartrate to 25 mg TWICE A DAY and tolerating with mild fatigue, this should improve over next two weeks. Lisinopril reduced to 20 mg daily with stable pressures. Initial device interrogation at [facility] on 11/10/17.      PLAN:  1. Chronic atrial fibrillation (HC)  Stable and asymptomatic. Palpitations and lightheadedness resolved. Rate controlled with beta blockade. Continue Coumadin anticoagulation.    2. Hypertension  /72 today, no medication adjustments.     3. Status post placement of cardiac pacemaker  Incision site healing well, normal initial pacer interrogation at [facility]. She will be scheduled for next interrogation with Madelyn in  January 2018.      Follow-up with cardiology clinic and pacer nurse in 3 months, certainly sooner as needed.     Thank you for allowing me to participate in the care of your patient. Please do not hesitate to contact me if you have any questions.     JANAE Collado, CFNP

## 2017-12-28 NOTE — TELEPHONE ENCOUNTER
"Patient Information     Patient Name MRN Kate Ann [id number] Female 1945      Telephone Encounter by Nicol Parks RN at 2017  9:49 AM     Author:  Nicol Parks RN Service:  (none) Author Type:  NURS- Registered Nurse     Filed:  2017  9:51 AM Encounter Date:  2017 Status:  Signed     :  Nicol Parks RN (NURS- Registered Nurse)            [facility] is looking for oxybutynin prescription per patient request.  Pt.'s last office visit with Steve Fox MD was on 17 and note states:  \"Plan  Start oxybutynin ER 10mg PO once daily  Follow up in 4 weeks with PVR\"  To MD to address.  Nicol Parks RN.........2017...9:50 AM        "

## 2017-12-28 NOTE — PROGRESS NOTES
"Patient Information     Patient Name MRN Sex Kate Akins [id number] Female 1945      Progress Notes by Sandy Klein, PT at 10/17/2017 11:13 AM     Author:  Sandy Klein PT Service:  (none) Author Type:  PT- Physical Therapist     Filed:  10/17/2017 12:10 PM Date of Service:  10/17/2017 11:13 AM Status:  Signed     :  Sandy Klein PT (PT- Physical Therapist)            [facility]  Outpatient PT - Daily note    Date of Service: 10/17/2017   Visit #9    PSFS Visit:  9/10  PSFS Completed:  2017     Patient Name: Kate Chacon   YOB: 1945   Referring MD/Provider: Asher Campos MD  Medical and Treatment Diagnosis: right leg weakness s/p ischemic stroke  PT Treatment Diagnosis: impaired lower extremity strength and endurance, right > left   Insurance: Medicare, and [employer]  Start of Service: 17  Certification Dates: Start of Service: 17  Medicare/MA Re-Cert Due: 17       Subjective        Patient reports taking a fall last night over some vacuum hose that was left out in the garage. Landed on her butt, but denies any injury or significant soreness. Frustrated with this because she just felt like she was starting to walk better.    Objective    Today's Intervention:      TM x4 minutes 10% incline 1.1 mph with B railings working on decreasing upper extremity support and focusing on heel strike x4 mins   side stepping 10%  x1.5 mins each direction    single leg balance with TRX straps  TRX deep squats x4, mid squats x10    1/2 kneel to BOSU x4 each lower extremity with min upper extremity support    BOSU   Step ups to round side, upper extremity support on railing   single leg balance flat side    6\" step: cues for R TKE    - lateral step down RLE x20 8\"  - retro step up R x10. Caused knee pain today    Med x: patient instructed in set up of machines for transition to PEAK program      Hip abduction 54# 2x15         Knee ext 40# single " "leg x15 seat 9    1 foot on foam, 1 foot on round surface BOSU x1 min each  Wide stance, eyes closed on foam x1 min  1/2 foam balance: A/P on flat and round sides, encouraging hip strategy       Deferred:   hamstring curls seat 9 60#  x15         Leg press seat 19 100# x20, right only                  Home Exercise Program:    Access Code: [email or web address] URL: [email or web address] on Request/ Date: 09/12/2017 Prepared by: Sandy Klein   Exercises   Standing Hip Flexion with Anchored Resistance and Chair Support - 10-15 reps - 1-2 sets - 5 seconds hold - 1x daily   Standing Hip Abduction with Anchored Resistance - 10-15 reps - 1-2 sets - 5 hold - 1x daily   Standing Hip Extension with Anchored Resistance - 10-15 reps - 1-2 sets - 5 hold - 1x daily   Added 9/14/2017: Single leg stance with level pelvis x1 min bilateral     Assessment  Patient demonstrates difficulty with controlled terminal knee extension with gait, which is likely where the majority of her reports of knee instability and \"wobbliness\" come from. Patient has poor strength for getting up from the ground with both lower extremities and would benefit from additional functional training and strengthening for safety with floor to stand transfers.    Patient is at increased fall risk due to poor lower extremity endurance and would benefit from skilled physical therapy services to address limitations and return to prior level of function.    Completed 9/12/2017:  Patient Specific Functional and Pain Scales (PSFS)  Clinician Instructions: Complete after the history and before the exam.   Initial Assessment:   We want to know what 3 activities in your life you are unable to perform, or are having the most difficulty performing, as a result of your chief problem. Please list and score at least 3 activities that you are unable to perform, or having the most difficulty performing, because of your chief problem.   Patient Specific Activity Scoring Scheme (score one " number for each activity):   Activity Score (0-10)  0= Unable to perform activity  10= Able to perform activity at same level as before injury or problem   1. Picking up the right leg 5/10   2. Walking 30 mins without hanging onto something 2/10   3.  /10   4.  /10   5.  /10   Totals:  7/20= 35% ability, 65% impairment   Patient verbally states that they understand that the information they have provided above is current and complete to the best of their knowledge.   Patient Specific Functional Scale Modifier Scale Conversion: (patient's modifier that correlates with pt's score on PSFS): 4-CL (60% Impaired).  Initial Primary G Code and Modifier:    Per the Patient's intake and/or assessment the Primary G Code is: Mobility .   The Patient's Impairment, Limitation or Restriction Modifier would be best described as: CK - 40% - 60% Impairment.   Goal Primary G Code and Modifier:    The Patient's G Code Goal would be: Mobility    The Patient's Impairment, Limitation or Restriction Modifier goal would be best described as: CI - 1% - 20% Impairment.       Goals:  Patient goal:  Improve lower extremity strength and stability with walking in 8 weeks.    Functional Short Term Goals (4 weeks):   Patient will have improved MMT grade by 1/2 grade to improve stability during ambulation.  Patient will ambulate 15 mins without hanging onto cart or other object for support.      Functional Long Term Goals (8 weeks):   Patient will ambulate 30 minutes with no support for improved stability and endurance.  Patient will have improved MMT grade by at least 1 full grade for decreased fall risk and improved stepping strategy.      Plan    Treatment Plan:      Frequency:   16 visits    Duration of Treatment: 8 weeks    Planned Interventions:    Home Exercise Program development  Therapeutic Exercise (ROM & Strengthening)  Therapeutic Activities  Neuromuscular Re-education  Manual Therapy  Ultrasound  E-Stim  Gait Training  Hot  Packs / Cold Pack Modalities  Vasopneumatic Compression with Cold Therapy ( Game Ready )    Plan for next visit:  TM on incline, single leg balance, step ups, upright bike or Nustep    Thank you for your referral to [facility].  Please call with any questions, concerns or comments.  [phone]  Sandy Klein PT, DPT    The signature, of the referring medical provider, on this document indicates certification of the above prescribed plan of care and is medically necessary.

## 2017-12-28 NOTE — ADDENDUM NOTE
Patient Information     Patient Name MRN Sex Kate Akins [id number] Female 1945      Addendum Note by Regina Campos MD at 2017  1:15 PM     Author:  Regina Campos MD Service:  (none) Author Type:  Physician     Filed:  2017  1:15 PM Encounter Date:  2017 Status:  Signed     :  Regina Campos MD (Physician)       Addended by: REGINA CAMPOS on: 2017 01:15 PM        Modules accepted: Orders

## 2017-12-28 NOTE — PROGRESS NOTES
Patient Information     Patient Name MRN Sex Kate Akins [id number] Female 1945      Progress Notes by Dinora Younger NP at 2017  3:15 PM     Author:  Dinora Younger NP Service:  (none) Author Type:  PHYS- Nurse Practitioner     Filed:  11/10/2017  1:12 PM Encounter Date:  2017 Status:  Signed     :  Dinora Younger NP (PHYS- Nurse Practitioner)            [facility]   CARDIOLOGY PROGRESS NOTE    Kate Chacon  [address] [address]    Asher Campos MD    Chief Complaint     Patient presents with       Follow Up      pacemaker placement         HPI:  Kate Chacon is a 72 year old female who presents for cardiology follow-up dual chamber pacer placement for sinoatrial bradycardia. She has a history of PAF, hypertension, and history ischemic stroke concerning for cryptogenic stroke.     Mrs. Chacon was initially admitted to the hospital for asymptomatic severe bradycardia on 10/23/17. She was initially scheduled for a colonoscopy that day, presented to the preoperative area and had an initial EKG which was notable for severe sinus bradycardia with rates in the upper 20s with stable blood pressure. She was transferred to the emergency department, there she remained asymptomatic and underwent routine lab evaluation and was subsequently admitted to the ICU for further monitoring. Over her hospital course she had no evidence of high-grade AV block or tachycardia. With rest her heart rate remained in the 50s and 60s. With ambulation her rates were found to be in the 30s to 60s. She had no evidence of electrolyte abnormalities and she was ruled out for ACS overnight. On the day of discharge her heart rate remained in the upper 30s to 50s, sinus rhythm with QTc 370 and stable over hospital course. She was advised to hold her metoprolol and a ZIO patch was placed at discharge.     Kate was initially diagnosed with atrial fibrillation as an incidental  finding when she presented to the ER on October 15, 2016 with right-sided hemiparesis. She was found to have an ischemic stroke, concerning for cryptogenic stroke with new finding of AF. She was started on Coumadin anticoagulation at that time. She was also started on metoprolol for rate control. With this she remained largely asymptomatic with her atrial fibrillation. She has had no bleeding complications since being on warfarin anticoagulation.     She was seen in clinic on 11/1/17 for follow-up sinoatrial bradycardia. She was found to be in AF at the time with RVR, rates in 90's to 170 with beat to beat variability. She was largely asymptomatic with this and likely rebound tachycardia since d/c from beta blockade with severe bradycardia. It was discussed pacer in order to medically manage fast rates with AV eugene blocking agent. Initially Kate was hesitant of this option. Wished to medically managed and was accepted in the hospital for AF rate control with RVR and beat to beat variability with suspected beta blockade rebound since discontinued. Concern to treat outpatient with severe bradycardia in 20's during recent hospitalization.     Kate was again admitted on 11/1/17 as stated above, she was discharged on 11/2/17. She was started on low dose IV metoprolol inpatient, during the night she developed significant bradycardia with pauses of greater then 2 seconds. Patient was transferred to the care of Dr. Amanda at [facility] for pacer placement on 11/2/17.     Kate admits pacer procedure went well, she is feeling good and has been on metoprolol tartrate 12.5 mg TWICE A DAY following pacer insertion. Notes some tenderness and itching at site of bandage around pacer site left chest. No fever and no drainage from site.         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       NVD (normal vaginal delivery)     x3        FAMILY HISTORY:  Family History       Problem   Relation Age of Onset     Diabetes  Father      Hypertension  Father      Other  Mother      h/o coronary artery disease/dementia       Asthma  Mother      Cancer  Maternal Aunt      Uterine       Diabetes  Maternal Grandmother      Cancer-breast  No Family History        PAST SURGICAL HISTORY:  Past Surgical History:      Procedure  Laterality Date     APPENDECTOMY       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       COLONOSCOPY  4/4/11     COLONOSCOPY  5/7/12     ESOPHAGOGASTRODUODENOSCOPY  8/19/05     TONSILLECTOMY         SOCIAL HISTORY:  Social History     Social History        Marital status:       Spouse name: N/A     Number of children:  N/A     Years of education:  N/A     Social History Main Topics        Smoking status:  Former Smoker     Packs/day: 0.50     Years: 49.00     Types: Cigarettes     Quit date: 10/14/2016     Smokeless tobacco:  Never Used     Alcohol use  No     Drug use:  No     Sexual activity:  Not Currently     Other Topics  Concern     None      Social History Narrative     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  She enjoys gardening, [place] and going to stock car races that her son participates in.     to her  Maco.  They run [employer].  Live in town independently.                                    CURRENT MEDICATIONS:  Current Outpatient Prescriptions on File Prior to Visit       Medication  Sig Dispense Refill     atorvastatin (LIPITOR) 80 mg tablet Take 1 tablet by mouth at bedtime. 90 tablet 3     CALCIUM CARBONATE (TUMS ORAL) Take 1 tablet by mouth every 8 hours if needed (Heartburn).       ibuprofen (ADVIL; MOTRIN) 200 mg tablet Take 200 mg by mouth 4 times daily if needed for Pain or Headache.        lisinopril (PRINIVIL; ZESTRIL) 20 mg tablet Take 2 tablets by mouth once daily. 90 tablet 3     oxybutynin XL (DITROPAN XL) 10 mg CR tablet Take 10 mg by mouth at bedtime.       sertraline (ZOLOFT) 50 mg tablet Take 50 mg by mouth at bedtime.       warfarin (COUMADIN) 5 mg tablet TAKE ONE TABLET BY MOUTH ONCE DAILY 90 tablet prn     No current facility-administered medications on file prior to visit.        ALLERGIES:  No Known Allergies      ROS:  CONSTITUTIONAL:  No weight loss, fever, chills, weakness or fatigue.  CARDIOVASCULAR:  No chest pain, chest pressure or chest discomfort. No palpitations or lower extremity edema.  RESPIRATORY:  No shortness of breath, dyspnea upon exertion, cough or sputum production.  NEUROLOGICAL:  No headache, lightheadedness, dizziness, syncope, ataxia or weakness.  HEMATOLOGIC:  No anemia, bleeding or bruising.  SKIN:  Itching at bandage site, no drainage. Original bandage in place.       PHYSICAL EXAM:  /78  Pulse 64  Wt 79.9 kg (176 lb 1.6 oz)  BMI 27.58 kg/m2  GENERAL: The patient is a well-developed, well-nourished, in no apparent distress. Alert and oriented x3.  HEENT: Head is normocephalic and atraumatic. Eyes are symmetrical with normal visual tracking. Nares appeared normal without nasal drainage. Mucous membranes are moist.   NECK: Supple.  HEART: Regular rate and rhythm, S1S2 present without murmur, rub or gallop.  LUNGS: Respirations regular and unlabored. Clear to auscultation.  NEUROLOGIC: Alert and oriented X3. No focal neurologic deficits.   SKIN: Bandage removed from incisional site left chest. Incision with no erythema or drainage, steri strips intact. Bandage around incision site with skin breakdown from adhesive over inferior margin. Mild erythema at blister present at line of adhesive. ABD pad applied over site with Tegaderm to secure around site.       LAB RESULTS:  Anticoagulation on 11/08/2017        Component  Date Value Ref Range Status     INR  11/08/2017 1.5* <1.3 Final   Admission on 11/01/2017, Discharged on 11/02/2017        Component  Date Value Ref Range Status     SODIUM 11/02/2017 141  133 - 143 mmol/L Final     POTASSIUM 11/02/2017 4.0  3.5 - 5.1 mmol/L Final     CHLORIDE 11/02/2017 107  98 - 107 mmol/L Final     CO2,TOTAL 11/02/2017 27  21 - 31 mmol/L Final     ANION GAP 11/02/2017 7  5 - 18                 Final     GLUCOSE 11/02/2017 101  70 - 105 mg/dL Final     CALCIUM 11/02/2017 10.3  8.6 - 10.3 mg/dL Final     BUN 11/02/2017 13  7 - 25 mg/dL Final     CREATININE 11/02/2017 0.92  0.70 - 1.30 mg/dL Final     BUN/CREAT RATIO           11/02/2017 14                  Final     GFR if  11/02/2017 >60  >60 ml/min/1.73m2 Final     GFR if not  11/02/2017 60* >60 ml/min/1.73m2 Final     WHITE BLOOD COUNT         11/02/2017 7.9  4.5 - 11.0 thou/cu mm Final     RED BLOOD COUNT           11/02/2017 4.92  4.00 - 5.20 mil/cu mm Final     HEMOGLOBIN                11/02/2017 15.6  12.0 - 16.0 g/dL Final     HEMATOCRIT                11/02/2017 47.0  33.0 - 51.0 % Final     MCV                       11/02/2017 96  80 - 100 fL Final     MCH                       11/02/2017 31.7  26.0 - 34.0 pg Final     MCHC                      11/02/2017 33.2  32.0 - 36.0 g/dL Final     RDW                       11/02/2017 13.2  11.5 - 15.5 % Final     PLATELET COUNT            11/02/2017 202  140 - 440 thou/cu mm Final     MPV                       11/02/2017 11.0  6.5 - 11.0 fL Final     MAGNESIUM 11/02/2017 1.9  1.9 - 2.7 mg/dL Final     INR 11/02/2017 2.9* <1.3 Final     PROTIME 11/02/2017 34.6* 11.9 - 15.2 sec Final   Office Visit on 11/01/2017        Component  Date Value Ref Range Status     TSH 11/01/2017 1.52  0.34 - 5.60 uIU/mL Final     T4,FREE 11/01/2017 1.09  0.58 - 1.64 ng/dL Final   Anticoagulation on 10/30/2017        Component  Date Value Ref Range Status     INR 10/30/2017 2.9* <1.3 Final   Admission on 10/23/2017,  Discharged on 10/25/2017        Component  Date Value Ref Range Status     INR 10/23/2017 1.4* <1.3 Final     PROTIME 10/23/2017 16.5* 11.9 - 15.2 sec Final     APTT 10/23/2017 30  26 - 39 sec Final     SODIUM 10/23/2017 137  133 - 143 mmol/L Final     POTASSIUM 10/23/2017 3.8  3.5 - 5.1 mmol/L Final     CHLORIDE 10/23/2017 108* 98 - 107 mmol/L Final     CO2,TOTAL 10/23/2017 23  21 - 31 mmol/L Final     ANION GAP 10/23/2017 6  5 - 18                 Final     GLUCOSE 10/23/2017 104  70 - 105 mg/dL Final     CALCIUM 10/23/2017 9.9  8.6 - 10.3 mg/dL Final     BUN 10/23/2017 12  7 - 25 mg/dL Final     CREATININE 10/23/2017 0.98  0.70 - 1.30 mg/dL Final     BUN/CREAT RATIO           10/23/2017 12                  Final     GFR if  10/23/2017 >60  >60 ml/min/1.73m2 Final     GFR if not  10/23/2017 55* >60 ml/min/1.73m2 Final     ALBUMIN 10/23/2017 3.6  3.5 - 5.7 g/dL Final     PROTEIN,TOTAL 10/23/2017 6.4  6.4 - 8.9 g/dL Final     GLOBULIN                  10/23/2017 2.8  2.0 - 3.7 g/dL Final     A/G RATIO 10/23/2017 1.3  1.0 - 2.0                 Final     BILIRUBIN,TOTAL 10/23/2017 1.3* 0.3 - 1.0 mg/dL Final     ALK PHOSPHATASE 10/23/2017 80  34 - 104 IU/L Final     ALT (SGPT) 10/23/2017 41  7 - 52 IU/L Final     AST (SGOT) 10/23/2017 30  13 - 39 IU/L Final     TROPONIN I 10/23/2017 <0.030  <0.034 ng/mL Final     WHITE BLOOD COUNT         10/23/2017 7.3  4.5 - 11.0 thou/cu mm Final     RED BLOOD COUNT           10/23/2017 4.41  4.00 - 5.20 mil/cu mm Final     HEMOGLOBIN                10/23/2017 14.1  12.0 - 16.0 g/dL Final     HEMATOCRIT                10/23/2017 42.0  33.0 - 51.0 % Final     MCV                       10/23/2017 95  80 - 100 fL Final     MCH                       10/23/2017 32.0  26.0 - 34.0 pg Final     MCHC                      10/23/2017 33.6  32.0 - 36.0 g/dL Final     RDW                       10/23/2017 13.2  11.5 - 15.5 % Final     PLATELET COUNT             10/23/2017 166  140 - 440 thou/cu mm Final     MPV                       10/23/2017 11.3* 6.5 - 11.0 fL Final     NEUTROPHILS               10/23/2017 65.6  42.0 - 72.0 % Final     LYMPHOCYTES               10/23/2017 20.7  20.0 - 44.0 % Final     MONOCYTES                 10/23/2017 10.8  <12.0 % Final     EOSINOPHILS               10/23/2017 2.2  <8.0 % Final     BASOPHILS                 10/23/2017 0.4  <3.0 % Final     IMMATURE GRANULOCYTES(METAS,MYELOS* 10/23/2017 0.3  % Final     ABSOLUTE NEUTROPHILS      10/23/2017 4.8  1.7 - 7.0 thou/cu mm Final     ABSOLUTE LYMPHOCYTES      10/23/2017 1.5  0.9 - 2.9 thou/cu mm Final     ABSOLUTE MONOCYTES        10/23/2017 0.8  <0.9 thou/cu mm Final     ABSOLUTE EOSINOPHILS      10/23/2017 0.2  <0.5 thou/cu mm Final     ABSOLUTE BASOPHILS        10/23/2017 0.0  <0.3 thou/cu mm Final     ABSOLUTE IMMATURE GRANULOCYTES(MET* 10/23/2017 0.0  <=0.3 thou/cu mm Final     MAGNESIUM 10/23/2017 1.8* 1.9 - 2.7 mg/dL Final     TROPONIN I 10/24/2017 <0.030  <0.034 ng/mL Final     SODIUM 10/24/2017 135  133 - 143 mmol/L Final     POTASSIUM 10/24/2017 3.5  3.5 - 5.1 mmol/L Final     CHLORIDE 10/24/2017 107  98 - 107 mmol/L Final     CO2,TOTAL 10/24/2017 24  21 - 31 mmol/L Final     ANION GAP 10/24/2017 4* 5 - 18                 Final     GLUCOSE 10/24/2017 103  70 - 105 mg/dL Final     CALCIUM 10/24/2017 8.9  8.6 - 10.3 mg/dL Final     BUN 10/24/2017 10  7 - 25 mg/dL Final     CREATININE 10/24/2017 0.78  0.70 - 1.30 mg/dL Final     BUN/CREAT RATIO           10/24/2017 13                  Final     GFR if  10/24/2017 >60  >60 ml/min/1.73m2 Final     GFR if not  10/24/2017 >60  >60 ml/min/1.73m2 Final     MAGNESIUM 10/24/2017 2.0  1.9 - 2.7 mg/dL Final     TROPONIN I 10/24/2017 <0.030  <0.034 ng/mL Final     INR 10/24/2017 1.4* <1.3 Final     PROTIME 10/24/2017 16.7* 11.9 - 15.2 sec Final     INR 10/25/2017 1.7* <1.3 Final     PROTIME 10/25/2017 19.9* 11.9 -  15.2 sec Final     SODIUM 10/25/2017 141  133 - 143 mmol/L Final     POTASSIUM 10/25/2017 3.8  3.5 - 5.1 mmol/L Final     CHLORIDE 10/25/2017 110* 98 - 107 mmol/L Final     CO2,TOTAL 10/25/2017 23  21 - 31 mmol/L Final     ANION GAP 10/25/2017 8  5 - 18                 Final     GLUCOSE 10/25/2017 93  70 - 105 mg/dL Final     CALCIUM 10/25/2017 9.6  8.6 - 10.3 mg/dL Final     BUN 10/25/2017 6* 7 - 25 mg/dL Final     CREATININE 10/25/2017 0.78  0.70 - 1.30 mg/dL Final     BUN/CREAT RATIO           10/25/2017 8                  Final     GFR if  10/25/2017 >60  >60 ml/min/1.73m2 Final     GFR if not  10/25/2017 >60  >60 ml/min/1.73m2 Final     MAGNESIUM 10/25/2017 1.9  1.9 - 2.7 mg/dL Final   Anticoagulation on 10/18/2017        Component  Date Value Ref Range Status     INR 10/18/2017 1.9* <1.3 Final   Anticoagulation on 09/20/2017        Component  Date Value Ref Range Status     INR 09/20/2017 2.4* <1.3 Final   Office Visit on 08/30/2017        Component  Date Value Ref Range Status     SODIUM 08/30/2017 141  133 - 143 mmol/L Final     POTASSIUM 08/30/2017 4.0  3.5 - 5.1 mmol/L Final     CHLORIDE 08/30/2017 107  98 - 107 mmol/L Final     CO2,TOTAL 08/30/2017 25  21 - 31 mmol/L Final     ANION GAP 08/30/2017 9  5 - 18                 Final     GLUCOSE 08/30/2017 75  70 - 105 mg/dL Final     CALCIUM 08/30/2017 10.2  8.6 - 10.3 mg/dL Final     BUN 08/30/2017 16  7 - 25 mg/dL Final     CREATININE 08/30/2017 0.95  0.70 - 1.30 mg/dL Final     BUN/CREAT RATIO           08/30/2017 17                  Final     GFR if  08/30/2017 >60  >60 ml/min/1.73m2 Final     GFR if not  08/30/2017 58* >60 ml/min/1.73m2 Final     ALBUMIN 08/30/2017 3.7  3.5 - 5.7 g/dL Final     PROTEIN,TOTAL 08/30/2017 6.7  6.4 - 8.9 g/dL Final     GLOBULIN                  08/30/2017 3.0  2.0 - 3.7 g/dL Final     A/G RATIO 08/30/2017 1.2  1.0 - 2.0                 Final     BILIRUBIN,TOTAL  08/30/2017 0.6  0.3 - 1.0 mg/dL Final     ALK PHOSPHATASE 08/30/2017 97  34 - 104 IU/L Final     ALT (SGPT) 08/30/2017 36  7 - 52 IU/L Final     AST (SGOT) 08/30/2017 26  13 - 39 IU/L Final     CHOLESTEROL,TOTAL 08/30/2017 90  <200 mg/dL Final     TRIGLYCERIDES 08/30/2017 55  <150 mg/dL Final     HDL CHOLESTEROL 08/30/2017 40  23 - 92 mg/dL Final     NON-HDL CHOLESTEROL 08/30/2017 50  <145 mg/dl Final     CHOL/HDL RATIO            08/30/2017 2.25  <4.50                 Final     LDL CHOLESTEROL 08/30/2017 39  <100 mg/dL Final     PATIENT STATUS            08/30/2017 FASTING                  Final     WHITE BLOOD COUNT         08/30/2017 8.7  4.5 - 11.0 thou/cu mm Final     RED BLOOD COUNT           08/30/2017 4.55  4.00 - 5.20 mil/cu mm Final     HEMOGLOBIN                08/30/2017 14.4  12.0 - 16.0 g/dL Final     HEMATOCRIT                08/30/2017 43.6  33.0 - 51.0 % Final     MCV                       08/30/2017 96  80 - 100 fL Final     MCH                       08/30/2017 31.6  26.0 - 34.0 pg Final     MCHC                      08/30/2017 33.0  32.0 - 36.0 g/dL Final     RDW                       08/30/2017 13.6  11.5 - 15.5 % Final     PLATELET COUNT            08/30/2017 166  140 - 440 thou/cu mm Final     MPV                       08/30/2017 11.1* 6.5 - 11.0 fL Final     NEUTROPHILS               08/30/2017 66.6  42.0 - 72.0 % Final     LYMPHOCYTES               08/30/2017 20.0  20.0 - 44.0 % Final     MONOCYTES                 08/30/2017 11.1  <12.0 % Final     EOSINOPHILS               08/30/2017 1.8  <8.0 % Final     BASOPHILS                 08/30/2017 0.3  <3.0 % Final     IMMATURE GRANULOCYTES(METAS,MYELOS* 08/30/2017 0.2  % Final     ABSOLUTE NEUTROPHILS      08/30/2017 5.8  1.7 - 7.0 thou/cu mm Final     ABSOLUTE LYMPHOCYTES      08/30/2017 1.7  0.9 - 2.9 thou/cu mm Final     ABSOLUTE MONOCYTES        08/30/2017 1.0* <0.9 thou/cu mm Final     ABSOLUTE EOSINOPHILS      08/30/2017 0.2  <0.5 thou/cu  mm Final     ABSOLUTE BASOPHILS        08/30/2017 0.0  <0.3 thou/cu mm Final     ABSOLUTE IMMATURE GRANULOCYTES(MET* 08/30/2017 0.0  <=0.3 thou/cu mm Final     COLOR                     08/30/2017 Yellow  Yellow Color Final     CLARITY                   08/30/2017 Clear  Clear Clarity Final     SPECIFIC GRAVITY,URINE    08/30/2017 1.015  1.010, 1.015, 1.020, 1.025                 Final     PH,URINE                  08/30/2017 6.5  6.0, 7.0, 8.0, 5.5, 6.5, 7.5, 8.5                 Final     UROBILINOGEN,QUALITATIVE  08/30/2017 Normal  Normal EU/dl Final     PROTEIN, URINE 08/30/2017 Negative  Negative mg/dL Final     GLUCOSE, URINE 08/30/2017 Negative  Negative mg/dL Final     KETONES,URINE             08/30/2017 Negative  Negative mg/dL Final     BILIRUBIN,URINE           08/30/2017 Negative  Negative                 Final     OCCULT BLOOD,URINE        08/30/2017 Negative  Negative                 Final     NITRITE                   08/30/2017 Negative  Negative                 Final     LEUKOCYTE ESTERASE        08/30/2017 Negative  Negative                 Final   Anticoagulation on 08/23/2017        Component  Date Value Ref Range Status     INR 08/23/2017 2.8* <1.3 Final   Anticoagulation on 07/26/2017        Component  Date Value Ref Range Status     INR 07/26/2017 2.2* <1.3 Final   There may be more visits with results that are not included.      ASSESSMENT:  Kate Chacon presents for follow-up pacer placement, asymptomatic severe bradycardia. History of PAF with stroke one year ago, concern cryptogenic stroke. She has been on chronic anticoagulation since that time.  Currently back on metoprolol tartrate 12.5 mg TWICE A DAY following pacer implant.     PLAN:  1. Status post placement of cardiac pacemaker  Doing well with incision site healing well, new dressing placed today. Some blistering from adhesive bandage. No infection. Would like device regular interrogation her at [facility] by Madelyn fuentes  [facility] device nurse, she will be scheduled.     2. Hypertension  Increase metoprolol tartrate to 25 mg TWICE A DAY.     - metoprolol tartrate (LOPRESSOR) 25 mg tablet; Take 1 tablet by mouth 2 times daily.; Refill: 0    3. Chronic atrial fibrillation (HC)  Rate controlled today, continue with Metoprolol and anticoagulation. Largely asymptomatic with AF.  - metoprolol tartrate (LOPRESSOR) 25 mg tablet; Take 1 tablet by mouth 2 times daily.; Refill: 0      Follow-up in 5 days as scheduled, certainly sooner as needed.     Thank you for allowing me to participate in the care of your patient. Please do not hesitate to contact me if you have any questions.     JANAE Collado, BROOKE  [facility]

## 2017-12-28 NOTE — TELEPHONE ENCOUNTER
Patient Information     Patient Name MRN Sex Kate Akins [id number] Female 1945      Telephone Encounter by Ira Curtis at 2017 11:33 AM     Author:  Ira Curtis Service:  (none) Author Type:  (none)     Filed:  2017 11:34 AM Encounter Date:  2017 Status:  Signed     :  Ira Curtis            Notified patient per Dr. Matthews to stop coumadin 3 days prior to colonoscopy.  Ira Curtis LPN..........2017  11:34 AM

## 2017-12-28 NOTE — TELEPHONE ENCOUNTER
Patient Information     Patient Name MRN Kate Ann [id number] Female 1945      Telephone Encounter by Antonietta Lin at 2017  8:54 AM     Author:  Antonietta Lin Service:  (none) Author Type:  (none)     Filed:  2017  9:00 AM Encounter Date:  2017 Status:  Signed     :  Antonietta Lin            Screening Questions for the Scheduling of Screening Colonoscopies   (If Colonoscopy is diagnostic, Provider should review the chart before scheduling.)  Are you younger than 50 or older than 80?  NO   Do you take aspirin or fish oil?  NO  (if yes, tell patient to stop 1 week prior to Colonoscopy)  Do you take warfarin (Coumadin), clopidogrel (Plavix), apixaban (Eliquis), dabigatram (Pradaxa), rivaroxaban (Xarelto) or any blood thinner? COUMADIN   Do you use oxygen at home?  NO  Do you have kidney disease? NO  Are you on dialysis? NO  Have you had a stroke or heart attack in the last year? NO  Have you had a stent in your heart or any blood vessel in the last year? NO  Have you had a transplant of any organ? NO  Have you had a colonoscopy or upper endoscopy (EGD) before? YES          When?    -  [facility]   Date of scheduled Colonoscopy. 10/23/2017  Provider CHRISTINA   Pharmacy [facility]       **   Patient is on Coumadin, Need to know when she needs to stop before procedure. **

## 2017-12-28 NOTE — PROGRESS NOTES
"Patient Information     Patient Name MRN Kate Ann [id number] Female 1945      Progress Notes by Cristina Medrano PTA at 2017  2:33 PM     Author:  Cristina Medrano PTA Service:  (none) Author Type:  PT- Physical Therapy Assistant     Filed:  2017  2:40 PM Date of Service:  2017  2:33 PM Status:  Signed     :  Cristina Medrano PTA (PT- Physical Therapy Assistant)            [facility]  Outpatient PT - Daily note    Date of Service: 2017   Visit #4    PSFS Visit:  4/10  PSFS Completed:  2017     Patient Name: Kate Chacon   YOB: 1945   Referring MD/Provider: Asher Campos MD  Medical and Treatment Diagnosis: right leg weakness s/p ischemic stroke  PT Treatment Diagnosis: impaired lower extremity strength and endurance, right > left   Insurance: Medicare, and [employer]  Start of Service: 17  Certification Dates: Start of Service: 17  Medicare/MA Re-Cert Due: 17       Subjective        Patient reports her LE's are very tired and shaky today.  Pt complains of mild light headedness today.         Objective        Today's Intervention:    -TM 10% 1.1 mph x3 mins.  Did not do retro walk due to LE fatigue today.    Med x:   Leg press 126# seat 19 x20   Hip abduction 50# 2x15   hamstring curls 60# seat 9 x15   Knee ext 40# x 10    Lateral step up and over on foam     Walking balance with ball toss    Step ups to 6\" step right lower extremity to fatigue    Foam balance: wide stance, mini squat to over head press with 6# med ball in bilateral upper extremities  Foam: Romberg with head turns, nods and eyes closed    Seated rests needed due to LE fatigue today.    Home Exercise Program:    Access Code: [email or web address] URL: [email or web address] Date: 2017 Prepared by: Sandy Klein   Exercises   Standing Hip Flexion with Anchored Resistance and Chair Support - 10-15 reps - 1-2 sets - 5 seconds hold - 1x daily "   Standing Hip Abduction with Anchored Resistance - 10-15 reps - 1-2 sets - 5 hold - 1x daily   Standing Hip Extension with Anchored Resistance - 10-15 reps - 1-2 sets - 5 hold - 1x daily   Added 9/14/2017: Single leg stance with level pelvis x1 min bilateral     Assessment    Patient is at increased fall risk due to poor lower extremity endurance and would benefit from skilled physical therapy services to address limitations and return to prior level of function.    Completed 9/12/2017:  Patient Specific Functional and Pain Scales (PSFS)  Clinician Instructions: Complete after the history and before the exam.   Initial Assessment:   We want to know what 3 activities in your life you are unable to perform, or are having the most difficulty performing, as a result of your chief problem. Please list and score at least 3 activities that you are unable to perform, or having the most difficulty performing, because of your chief problem.   Patient Specific Activity Scoring Scheme (score one number for each activity):   Activity Score (0-10)  0= Unable to perform activity  10= Able to perform activity at same level as before injury or problem   1. Picking up the right leg 5/10   2. Walking 30 mins without hanging onto something 2/10   3.  /10   4.  /10   5.  /10   Totals:  7/20= 35% ability, 65% impairment   Patient verbally states that they understand that the information they have provided above is current and complete to the best of their knowledge.   Patient Specific Functional Scale Modifier Scale Conversion: (patient's modifier that correlates with pt's score on PSFS): 4-CL (60% Impaired).  Initial Primary G Code and Modifier:    Per the Patient's intake and/or assessment the Primary G Code is: Mobility .   The Patient's Impairment, Limitation or Restriction Modifier would be best described as: CK - 40% - 60% Impairment.   Goal Primary G Code and Modifier:    The Patient's G Code Goal would be: Mobility     The Patient's Impairment, Limitation or Restriction Modifier goal would be best described as: CI - 1% - 20% Impairment.       Goals:  Patient goal:  Improve lower extremity strength and stability with walking in 8 weeks.    Functional Short Term Goals (4 weeks):   Patient will have improved MMT grade by 1/2 grade to improve stability during ambulation.  Patient will ambulate 15 mins without hanging onto cart or other object for support.      Functional Long Term Goals (8 weeks):   Patient will ambulate 30 minutes with no support for improved stability and endurance.  Patient will have improved MMT grade by at least 1 full grade for decreased fall risk and improved stepping strategy.      Plan    Treatment Plan:      Frequency:   16 visits    Duration of Treatment: 8 weeks    Planned Interventions:    Home Exercise Program development  Therapeutic Exercise (ROM & Strengthening)  Therapeutic Activities  Neuromuscular Re-education  Manual Therapy  Ultrasound  E-Stim  Gait Training  Hot Packs / Cold Pack Modalities  Vasopneumatic Compression with Cold Therapy ( Game Ready )    Plan for next visit:  TM on incline, single leg balance, step ups, upright bike or Nustep    Thank you for your referral to [facility].  Please call with any questions, concerns or comments.  [phone]  Sandy Klein, PT, DPT    The signature, of the referring medical provider, on this document indicates certification of the above prescribed plan of care and is medically necessary.

## 2017-12-28 NOTE — TELEPHONE ENCOUNTER
Patient Information     Patient Name MRN Kate Ann [id number] Female 1945      Telephone Encounter by Erika Salvador at 2017  9:43 AM     Author:  Erika Salvador Service:  (none) Author Type:  (none)     Filed:  2017  9:43 AM Encounter Date:  2017 Status:  Signed     :  Erika Salvador

## 2017-12-28 NOTE — TELEPHONE ENCOUNTER
Patient Information     Patient Name MRN Sex Kate Akins [id number] Female 1945      Telephone Encounter by Paul Torres RN at 2017  4:41 PM     Author:  Paul Torres RN Service:  (none) Author Type:  NURS- Registered Nurse     Filed:  2017  4:48 PM Encounter Date:  2017 Status:  Signed     :  Paul Torres RN (NURS- Registered Nurse)            This is a Refill request from: [facility]  Name of Medication: Lisinopril  Quantity requested: 90 tabs  Last fill date: 17 as per rx request  Due for refill: Yes, as per rx request and chart review  Last visit with REGINA CAMPOS was on: 2017 in [facility]  PCP:  Regina Campos MD  Controlled Substance Agreement:  N/A   Diagnosis r/t this medication request: Hypertension    Chart review shows that patient was last seen by PCP on 17. PCP addresses use of lisinopril in office visit notes on that date. States for patient to continue on her current medications. However patient was also to follow up in 3 months as per office visit notes on that date. No follow up noted. Rx as requested is also listed as historical on patient's active med list. Writer will route rx request to PCP for his consideration/approval and send patient a reminder letter.     Unable to complete prescription refill per RN Medication Refill Policy.................... Paul Torres RN ....................  2017   4:42 PM

## 2017-12-28 NOTE — TELEPHONE ENCOUNTER
Patient Information     Patient Name MRN Sex Kate Akins [id number] Female 1945      Telephone Encounter by Radha Burns RN at 2017  3:13 PM     Author:  Radha Burns RN Service:  (none) Author Type:  NURS- Registered Nurse     Filed:  2017  3:14 PM Encounter Date:  2017 Status:  Signed     :  Radha Burns RN (NURS- Registered Nurse)            Anticoagulant    Office visit in the past 12 months.    Last visit with REGINA VAUGHN was on: 2017 in [facility]  Next visit with REGINA VAUGHN is on: 2017 in [facility]  Next visit with Family Practice is on: 2017 in [facility]    Lab tests:  PT/INR at least monthly    INR (no units)    Date Value   2017 2.2 (H)     HEMOGLOBIN                (g/dL)    Date Value   10/15/2016 16.1 (H)     No components found for: CBC      Max refills 6 months.    Prescription refilled per RN Medication Refill Policy.................... Radha Burns RN ....................  2017   3:13 PM

## 2017-12-28 NOTE — TELEPHONE ENCOUNTER
Patient Information     Patient Name MRN Kate Ann [id number] Female 1945      Telephone Encounter by Lindy Duarte at 2017  9:30 AM     Author:  Lindy Duarte Service:  (none) Author Type:  (none)     Filed:  2017  9:32 AM Encounter Date:  2017 Status:  Signed     :  Lindy Duarte            Patient forgot to tell you that she got a letter about getting a colonscopy.   Can you put in a referral.   Thank You  Lindy Duarte ....................  2017   9:31 AM

## 2017-12-28 NOTE — PATIENT INSTRUCTIONS
Patient Information     Patient Name MRN Kate Ann [id number] Female 1945      Patient Instructions by Radha Burns RN at 2017  9:45 AM     Author:  Radha Burns RN Service:  (none) Author Type:  NURS- Registered Nurse     Filed:  2017  9:48 AM Encounter Date:  2017 Status:  Signed     :  Radha Burns RN (NURS- Registered Nurse)            2017 Details    Sun Mon Tue Wed Thu Fri Sat         1               2               3                 4               5               6               7               8               9               10                 11               12               13               14               15               16               17                 18               19               20               21               22               23               24                 25               26               27               28      5 mg   See details      29      5 mg         30      5 mg           Date Details    This INR check               How to take your warfarin dose     To take:  5 mg Take one of the 5 mg tablets.           2017 Details    Sun Mon Tue Wed Thu Fri Sat           1      5 mg           2      5 mg         3      5 mg         4      5 mg         5      5 mg         6      5 mg         7      5 mg         8      5 mg           9      5 mg         10      5 mg         11      5 mg         12      5 mg         13      5 mg         14      5 mg         15      5 mg           16      5 mg         17      5 mg         18      5 mg         19      5 mg         20      5 mg         21      5 mg         22      5 mg           23      5 mg         24      5 mg         25      5 mg         26      5 mg         27               28               29                 30               31                     Date Details   No additional details    Date of next INR:  2017         How to take your warfarin dose     To take:  5  mg Take one of the 5 mg tablets.             Description          Continue same Warfarin dose and recheck in 1 month. Radha Burns RN    6/28/2017  9:48 AM              Anticoagulation Summary as of 6/28/2017     INR goal 2.0-3.0    Today's INR 2.3    Next INR check 7/26/2017          Call your Anticoagulation Clinic at Dept: [phone]   if:   1. Any medications are started, stopped, or there is a change in dose.  2. You experience any bleeding that is not easily stopped or if it is recurrent.  3. You notice an increase in bruising or any bruising that does not heal.  4. You are scheduled for surgery, colonoscopy, dental extraction or any other procedure where you may need to stop your Coumadin (warfarin).

## 2017-12-28 NOTE — PROGRESS NOTES
"Patient Information     Patient Name MRN Kate Ann [id number] Female 1945      Progress Notes by Sandy Klein, PT at 2017  1:42 PM     Author:  Sandy Klein, PT Service:  (none) Author Type:  PT- Physical Therapist     Filed:  2017  2:46 PM Date of Service:  2017  1:42 PM Status:  Signed     :  Sandy Klein PT (PT- Physical Therapist)            [facility]  Outpatient PT - Daily note    Date of Service: 2017   Visit #5    PSFS Visit:  5/10  PSFS Completed:  2017     Patient Name: Kate Chacon   YOB: 1945   Referring MD/Provider: Asher Campos MD  Medical and Treatment Diagnosis: right leg weakness s/p ischemic stroke  PT Treatment Diagnosis: impaired lower extremity strength and endurance, right > left   Insurance: Medicare, and [employer]  Start of Service: 17  Certification Dates: Start of Service: 17  Medicare/MA Re-Cert Due: 17       Subjective        Patient reports she feels good today.     Objective        Today's Intervention:    -Nustep level 5 seat 9, no arms, x5 mins    Med x:   Leg press 130# seat 19 x20 out with BLE, in with right only   Hip abduction 50# 2x15   hamstring curls 60# seat 9 x15   Knee ext 50# x 10    Retro step ups to 4\" step right lower extremity to fatigue    single leg balance- carpet    Monster walk, forward and side stepping red theraband 2 x20 feet     Incline wedge squats- min fatigue so D/C    Deferred: TM 10% x3 mins  Home Exercise Program:    Access Code: [email or web address] URL: [email or web address] Date: 2017 Prepared by: Sandy Klein   Exercises   Standing Hip Flexion with Anchored Resistance and Chair Support - 10-15 reps - 1-2 sets - 5 seconds hold - 1x daily   Standing Hip Abduction with Anchored Resistance - 10-15 reps - 1-2 sets - 5 hold - 1x daily   Standing Hip Extension with Anchored Resistance - 10-15 reps - 1-2 sets - 5 hold - 1x daily   Added " 9/14/2017: Single leg stance with level pelvis x1 min bilateral     Assessment  Patient demonstrates early fatigue of lower extremities right> left but improving strength for therapeutic exercise.    Patient is at increased fall risk due to poor lower extremity endurance and would benefit from skilled physical therapy services to address limitations and return to prior level of function.    Completed 9/12/2017:  Patient Specific Functional and Pain Scales (PSFS)  Clinician Instructions: Complete after the history and before the exam.   Initial Assessment:   We want to know what 3 activities in your life you are unable to perform, or are having the most difficulty performing, as a result of your chief problem. Please list and score at least 3 activities that you are unable to perform, or having the most difficulty performing, because of your chief problem.   Patient Specific Activity Scoring Scheme (score one number for each activity):   Activity Score (0-10)  0= Unable to perform activity  10= Able to perform activity at same level as before injury or problem   1. Picking up the right leg 5/10   2. Walking 30 mins without hanging onto something 2/10   3.  /10   4.  /10   5.  /10   Totals:  7/20= 35% ability, 65% impairment   Patient verbally states that they understand that the information they have provided above is current and complete to the best of their knowledge.   Patient Specific Functional Scale Modifier Scale Conversion: (patient's modifier that correlates with pt's score on PSFS): 4-CL (60% Impaired).  Initial Primary G Code and Modifier:    Per the Patient's intake and/or assessment the Primary G Code is: Mobility .   The Patient's Impairment, Limitation or Restriction Modifier would be best described as: CK - 40% - 60% Impairment.   Goal Primary G Code and Modifier:    The Patient's G Code Goal would be: Mobility    The Patient's Impairment, Limitation or Restriction Modifier goal would be  best described as: CI - 1% - 20% Impairment.       Goals:  Patient goal:  Improve lower extremity strength and stability with walking in 8 weeks.    Functional Short Term Goals (4 weeks):   Patient will have improved MMT grade by 1/2 grade to improve stability during ambulation.  Patient will ambulate 15 mins without hanging onto cart or other object for support.      Functional Long Term Goals (8 weeks):   Patient will ambulate 30 minutes with no support for improved stability and endurance.  Patient will have improved MMT grade by at least 1 full grade for decreased fall risk and improved stepping strategy.      Plan    Treatment Plan:      Frequency:   16 visits    Duration of Treatment: 8 weeks    Planned Interventions:    Home Exercise Program development  Therapeutic Exercise (ROM & Strengthening)  Therapeutic Activities  Neuromuscular Re-education  Manual Therapy  Ultrasound  E-Stim  Gait Training  Hot Packs / Cold Pack Modalities  Vasopneumatic Compression with Cold Therapy ( Game Ready )    Plan for next visit:  TM on incline, single leg balance, step ups, upright bike or Nustep    Thank you for your referral to [facility].  Please call with any questions, concerns or comments.  [phone]  Sandy Klein PT, DPT    The signature, of the referring medical provider, on this document indicates certification of the above prescribed plan of care and is medically necessary.

## 2017-12-28 NOTE — PROGRESS NOTES
Patient Information     Patient Name MRN Sex Kate Akins [id number] Female 1945      Progress Notes by Dinora Younger NP at 2017  9:45 AM     Author:  Dinora Younger NP  Service:  (none) Author Type:  PHYS- Nurse Practitioner     Filed:  2017  2:14 PM  Encounter Date:  2017 Status:  Addendum     :  Dinora Younger NP (PHYS- Nurse Practitioner)        Related Notes: Original Note by Dinora Younger NP (PHYS- Nurse Practitioner) filed at 2017  2:03 PM            [facility]   CARDIOLOGY CONSULT    Kate Chacon  [address] [address]    Asher Campos MD    Chief Complaint     Patient presents with       Consult      ER f/u, sinoatrial bradycardia, HTN,          HPI:  Kate Chacon is a 72 year old female who presents for cardiology consult due to sinoatrial bradycardia, PAF, hypertension, and history ischemic stroke concerning for cryptogenic stroke.     Mrs. Chacon was recently admitted to the hospital for asymptomatic severe bradycardia on 10/23/17. She was initially scheduled for a colonoscopy that day, presented to the preoperative area and had an initial EKG which was notable for severe sinus bradycardia with rates in the upper 20s with stable blood pressure. She was transferred to the emergency department, there she remained asymptomatic and underwent routine lab evaluation and was subsequently admitted to the ICU for further monitoring. Over her hospital course she had no evidence of high-grade AV block or tachycardia. With rest her heart rate remained in the 50s and 60s. With ambulation her rates were found to be in the 30s to 60s. She had no evidence of electrolyte abnormalities and she was ruled out for ACS overnight. On the day of discharge her heart rate remained in the upper 30s to 50s, sinus rhythm with QTc 370 and stable over hospital course. She was advised to hold her metoprolol and a ZIO patch was placed at  discharge.    Kate was initially diagnosed with atrial fibrillation as an incidental finding when she presented to the ER on October 15, 2016 with right-sided hemiparesis. She was found to have an ischemic stroke, concerning for cryptogenic stroke with new finding of AF. She was started on Coumadin anticoagulation at that time. She was also started on metoprolol for rate control. With this she remained largely asymptomatic with her atrial fibrillation. She has had no bleeding complications since being on warfarin anticoagulation.    She denies any history of CAD, she has had no chest pain or chest pressure. No SHORTNESS OF BREATH or MARSHALL. No edema. She has a past history of tobacco abuse, 24.5 pack years. No history of sleep apnea. She is not sure of history thyroid disease, admits that long ago this was mentioned. Mother had atrial fib and father with a pacer.    Currently on day 7 of ZIO patch monitor, complains of some itching at site, no other complications.    IMAGING RESULTS:  PROCEDURE:  XR CHEST 1 VIEW PORTABLE     HISTORY:  SLOW HEARTBEAT.     COMPARISON:  None.     FINDINGS:     The cardiomediastinal contours are prominent but likely magnified by portable technique. There is calcific aortic atherosclerosis.   No focal consolidation, effusion or pneumothorax. Interstitial coarsening is noted.     IMPRESSION:  Probable emphysema.      Mildly prominent cardiac silhouette likely reflects technique. Consider follow-up PA and lateral views.       Electronically Signed By: Brad Aceves on 10/23/2017 8:44 AM    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       NVD (normal vaginal delivery)     x3        FAMILY HISTORY:  Family History       Problem   Relation Age of Onset     Diabetes  Father      Hypertension  Father      Other  Mother      h/o  coronary artery disease/dementia       Asthma  Mother      Cancer  Maternal Aunt      Uterine       Diabetes  Maternal Grandmother      Cancer-breast  No Family History        PAST SURGICAL HISTORY:  Past Surgical History:      Procedure  Laterality Date     APPENDECTOMY       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       COLONOSCOPY  4/4/11     COLONOSCOPY  5/7/12     ESOPHAGOGASTRODUODENOSCOPY  8/19/05     TONSILLECTOMY         SOCIAL HISTORY:  Social History     Social History        Marital status:       Spouse name: N/A     Number of children:  N/A     Years of education:  N/A     Social History Main Topics        Smoking status:  Former Smoker     Packs/day: 0.50     Years: 49.00     Types: Cigarettes     Quit date: 10/14/2016     Smokeless tobacco:  Never Used     Alcohol use  No     Drug use:  No     Sexual activity:  Not Currently     Other Topics  Concern     None      Social History Narrative     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  She enjoys gardening, [employer] and going to stock car races that her son participates in.     to her  [place].  They run [employer] [facility] [employer].  Live in town independently.                                    CURRENT MEDICATIONS:  Current Outpatient Prescriptions on File Prior to Visit       Medication  Sig Dispense Refill     atorvastatin (LIPITOR) 80 mg tablet Take 1 tablet by mouth at bedtime. 90 tablet 3     CALCIUM CARBONATE (TUMS ORAL) Take 1 tablet by mouth every 8 hours if needed.       ibuprofen (ADVIL) 200 mg tablet Take 400 mg by mouth 4 times daily if needed.       lisinopril (PRINIVIL; ZESTRIL) 20 mg tablet Take 2 tablets by mouth once daily. 90 tablet 3     oxybutynin XL (DITROPAN XL) 10 mg CR tablet Take 10 mg by mouth at bedtime.       sertraline (ZOLOFT) 50 mg tablet Take 50 mg by mouth at bedtime.       warfarin (COUMADIN) 5 mg tablet  "TAKE ONE TABLET BY MOUTH ONCE DAILY 90 tablet prn     No current facility-administered medications on file prior to visit.        ALLERGIES:  No Known Allergies      ROS:  CONSTITUTIONAL:  No weight loss, fever, chills, weakness or fatigue.  CARDIOVASCULAR:  No chest pain, chest pressure or chest discomfort. No palpitations or lower extremity edema.  RESPIRATORY:  No shortness of breath, dyspnea upon exertion, cough or sputum production.  GASTROINTESTINAL: No reported abdominal pain. No anorexia, nausea, vomiting or diarrhea.   NEUROLOGICAL:  No reported headache, lightheadedness, dizziness, syncope, ataxia or weakness.  HEMATOLOGIC:  No anemia, bleeding or bruising.  ENDOCRINOLOGIC:  No reports of sweating, cold or heat intolerance.       PHYSICAL EXAM:  /82  Pulse 68  Ht 1.68 m (5' 6.14\")  Wt 80.7 kg (178 lb)  BMI 28.61 kg/m2  GENERAL: The patient is a well-developed, well-nourished, in no apparent distress. Alert and oriented x3.  HEENT: Head is normocephalic and atraumatic. Eyes are symmetrical with normal visual tracking. Nares appeared normal without nasal drainage. Mucous membranes are moist.   NECK: Supple.   HEART: Irregular rate ranging from 90's-170's and irregular rhythm, S1S2 present without murmur, rub or gallop.  LUNGS: Respirations regular and unlabored. Clear to auscultation.  EXTREMITIES: No peripheral edema present.   NEUROLOGIC: Alert and oriented X3. No focal neurologic deficits.     EKG:  AF with RVR, rate 104. Left anterior fascicular block. Patient left on monitoring with severe rapid rate fluctuations from 90's-170's on monitor.     LAB RESULTS:  Anticoagulation on 10/30/2017        Component  Date Value Ref Range Status     INR 10/30/2017 2.9* <1.3 Final   Admission on 10/23/2017, Discharged on 10/25/2017        Component  Date Value Ref Range Status     INR 10/23/2017 1.4* <1.3 Final     PROTIME 10/23/2017 16.5* 11.9 - 15.2 sec Final     APTT 10/23/2017 30  26 - 39 sec Final     " SODIUM 10/23/2017 137  133 - 143 mmol/L Final     POTASSIUM 10/23/2017 3.8  3.5 - 5.1 mmol/L Final     CHLORIDE 10/23/2017 108* 98 - 107 mmol/L Final     CO2,TOTAL 10/23/2017 23  21 - 31 mmol/L Final     ANION GAP 10/23/2017 6  5 - 18                 Final     GLUCOSE 10/23/2017 104  70 - 105 mg/dL Final     CALCIUM 10/23/2017 9.9  8.6 - 10.3 mg/dL Final     BUN 10/23/2017 12  7 - 25 mg/dL Final     CREATININE 10/23/2017 0.98  0.70 - 1.30 mg/dL Final     BUN/CREAT RATIO           10/23/2017 12                  Final     GFR if  10/23/2017 >60  >60 ml/min/1.73m2 Final     GFR if not  10/23/2017 55* >60 ml/min/1.73m2 Final     ALBUMIN 10/23/2017 3.6  3.5 - 5.7 g/dL Final     PROTEIN,TOTAL 10/23/2017 6.4  6.4 - 8.9 g/dL Final     GLOBULIN                  10/23/2017 2.8  2.0 - 3.7 g/dL Final     A/G RATIO 10/23/2017 1.3  1.0 - 2.0                 Final     BILIRUBIN,TOTAL 10/23/2017 1.3* 0.3 - 1.0 mg/dL Final     ALK PHOSPHATASE 10/23/2017 80  34 - 104 IU/L Final     ALT (SGPT) 10/23/2017 41  7 - 52 IU/L Final     AST (SGOT) 10/23/2017 30  13 - 39 IU/L Final     TROPONIN I 10/23/2017 <0.030  <0.034 ng/mL Final     WHITE BLOOD COUNT         10/23/2017 7.3  4.5 - 11.0 thou/cu mm Final     RED BLOOD COUNT           10/23/2017 4.41  4.00 - 5.20 mil/cu mm Final     HEMOGLOBIN                10/23/2017 14.1  12.0 - 16.0 g/dL Final     HEMATOCRIT                10/23/2017 42.0  33.0 - 51.0 % Final     MCV                       10/23/2017 95  80 - 100 fL Final     MCH                       10/23/2017 32.0  26.0 - 34.0 pg Final     MCHC                      10/23/2017 33.6  32.0 - 36.0 g/dL Final     RDW                       10/23/2017 13.2  11.5 - 15.5 % Final     PLATELET COUNT            10/23/2017 166  140 - 440 thou/cu mm Final     MPV                       10/23/2017 11.3* 6.5 - 11.0 fL Final     NEUTROPHILS               10/23/2017 65.6  42.0 - 72.0 % Final     LYMPHOCYTES                10/23/2017 20.7  20.0 - 44.0 % Final     MONOCYTES                 10/23/2017 10.8  <12.0 % Final     EOSINOPHILS               10/23/2017 2.2  <8.0 % Final     BASOPHILS                 10/23/2017 0.4  <3.0 % Final     IMMATURE GRANULOCYTES(METAS,MYELOS* 10/23/2017 0.3  % Final     ABSOLUTE NEUTROPHILS      10/23/2017 4.8  1.7 - 7.0 thou/cu mm Final     ABSOLUTE LYMPHOCYTES      10/23/2017 1.5  0.9 - 2.9 thou/cu mm Final     ABSOLUTE MONOCYTES        10/23/2017 0.8  <0.9 thou/cu mm Final     ABSOLUTE EOSINOPHILS      10/23/2017 0.2  <0.5 thou/cu mm Final     ABSOLUTE BASOPHILS        10/23/2017 0.0  <0.3 thou/cu mm Final     ABSOLUTE IMMATURE GRANULOCYTES(MET* 10/23/2017 0.0  <=0.3 thou/cu mm Final     MAGNESIUM 10/23/2017 1.8* 1.9 - 2.7 mg/dL Final     TROPONIN I 10/24/2017 <0.030  <0.034 ng/mL Final     SODIUM 10/24/2017 135  133 - 143 mmol/L Final     POTASSIUM 10/24/2017 3.5  3.5 - 5.1 mmol/L Final     CHLORIDE 10/24/2017 107  98 - 107 mmol/L Final     CO2,TOTAL 10/24/2017 24  21 - 31 mmol/L Final     ANION GAP 10/24/2017 4* 5 - 18                 Final     GLUCOSE 10/24/2017 103  70 - 105 mg/dL Final     CALCIUM 10/24/2017 8.9  8.6 - 10.3 mg/dL Final     BUN 10/24/2017 10  7 - 25 mg/dL Final     CREATININE 10/24/2017 0.78  0.70 - 1.30 mg/dL Final     BUN/CREAT RATIO           10/24/2017 13                  Final     GFR if  10/24/2017 >60  >60 ml/min/1.73m2 Final     GFR if not  10/24/2017 >60  >60 ml/min/1.73m2 Final     MAGNESIUM 10/24/2017 2.0  1.9 - 2.7 mg/dL Final     TROPONIN I 10/24/2017 <0.030  <0.034 ng/mL Final     INR 10/24/2017 1.4* <1.3 Final     PROTIME 10/24/2017 16.7* 11.9 - 15.2 sec Final     INR 10/25/2017 1.7* <1.3 Final     PROTIME 10/25/2017 19.9* 11.9 - 15.2 sec Final     SODIUM 10/25/2017 141  133 - 143 mmol/L Final     POTASSIUM 10/25/2017 3.8  3.5 - 5.1 mmol/L Final     CHLORIDE 10/25/2017 110* 98 - 107 mmol/L Final     CO2,TOTAL 10/25/2017 23  21 - 31  mmol/L Final     ANION GAP 10/25/2017 8  5 - 18                 Final     GLUCOSE 10/25/2017 93  70 - 105 mg/dL Final     CALCIUM 10/25/2017 9.6  8.6 - 10.3 mg/dL Final     BUN 10/25/2017 6* 7 - 25 mg/dL Final     CREATININE 10/25/2017 0.78  0.70 - 1.30 mg/dL Final     BUN/CREAT RATIO           10/25/2017 8                  Final     GFR if  10/25/2017 >60  >60 ml/min/1.73m2 Final     GFR if not  10/25/2017 >60  >60 ml/min/1.73m2 Final     MAGNESIUM 10/25/2017 1.9  1.9 - 2.7 mg/dL Final   Anticoagulation on 10/18/2017        Component  Date Value Ref Range Status     INR 10/18/2017 1.9* <1.3 Final   Anticoagulation on 09/20/2017        Component  Date Value Ref Range Status     INR 09/20/2017 2.4* <1.3 Final   Office Visit on 08/30/2017        Component  Date Value Ref Range Status     SODIUM 08/30/2017 141  133 - 143 mmol/L Final     POTASSIUM 08/30/2017 4.0  3.5 - 5.1 mmol/L Final     CHLORIDE 08/30/2017 107  98 - 107 mmol/L Final     CO2,TOTAL 08/30/2017 25  21 - 31 mmol/L Final     ANION GAP 08/30/2017 9  5 - 18                 Final     GLUCOSE 08/30/2017 75  70 - 105 mg/dL Final     CALCIUM 08/30/2017 10.2  8.6 - 10.3 mg/dL Final     BUN 08/30/2017 16  7 - 25 mg/dL Final     CREATININE 08/30/2017 0.95  0.70 - 1.30 mg/dL Final     BUN/CREAT RATIO           08/30/2017 17                  Final     GFR if  08/30/2017 >60  >60 ml/min/1.73m2 Final     GFR if not  08/30/2017 58* >60 ml/min/1.73m2 Final     ALBUMIN 08/30/2017 3.7  3.5 - 5.7 g/dL Final     PROTEIN,TOTAL 08/30/2017 6.7  6.4 - 8.9 g/dL Final     GLOBULIN                  08/30/2017 3.0  2.0 - 3.7 g/dL Final     A/G RATIO 08/30/2017 1.2  1.0 - 2.0                 Final     BILIRUBIN,TOTAL 08/30/2017 0.6  0.3 - 1.0 mg/dL Final     ALK PHOSPHATASE 08/30/2017 97  34 - 104 IU/L Final     ALT (SGPT) 08/30/2017 36  7 - 52 IU/L Final     AST (SGOT) 08/30/2017 26  13 - 39 IU/L Final      CHOLESTEROL,TOTAL 08/30/2017 90  <200 mg/dL Final     TRIGLYCERIDES 08/30/2017 55  <150 mg/dL Final     HDL CHOLESTEROL 08/30/2017 40  23 - 92 mg/dL Final     NON-HDL CHOLESTEROL 08/30/2017 50  <145 mg/dl Final     CHOL/HDL RATIO            08/30/2017 2.25  <4.50                 Final     LDL CHOLESTEROL 08/30/2017 39  <100 mg/dL Final     PATIENT STATUS            08/30/2017 FASTING                  Final     WHITE BLOOD COUNT         08/30/2017 8.7  4.5 - 11.0 thou/cu mm Final     RED BLOOD COUNT           08/30/2017 4.55  4.00 - 5.20 mil/cu mm Final     HEMOGLOBIN                08/30/2017 14.4  12.0 - 16.0 g/dL Final     HEMATOCRIT                08/30/2017 43.6  33.0 - 51.0 % Final     MCV                       08/30/2017 96  80 - 100 fL Final     MCH                       08/30/2017 31.6  26.0 - 34.0 pg Final     MCHC                      08/30/2017 33.0  32.0 - 36.0 g/dL Final     RDW                       08/30/2017 13.6  11.5 - 15.5 % Final     PLATELET COUNT            08/30/2017 166  140 - 440 thou/cu mm Final     MPV                       08/30/2017 11.1* 6.5 - 11.0 fL Final     NEUTROPHILS               08/30/2017 66.6  42.0 - 72.0 % Final     LYMPHOCYTES               08/30/2017 20.0  20.0 - 44.0 % Final     MONOCYTES                 08/30/2017 11.1  <12.0 % Final     EOSINOPHILS               08/30/2017 1.8  <8.0 % Final     BASOPHILS                 08/30/2017 0.3  <3.0 % Final     IMMATURE GRANULOCYTES(METAS,MYELOS* 08/30/2017 0.2  % Final     ABSOLUTE NEUTROPHILS      08/30/2017 5.8  1.7 - 7.0 thou/cu mm Final     ABSOLUTE LYMPHOCYTES      08/30/2017 1.7  0.9 - 2.9 thou/cu mm Final     ABSOLUTE MONOCYTES        08/30/2017 1.0* <0.9 thou/cu mm Final     ABSOLUTE EOSINOPHILS      08/30/2017 0.2  <0.5 thou/cu mm Final     ABSOLUTE BASOPHILS        08/30/2017 0.0  <0.3 thou/cu mm Final     ABSOLUTE IMMATURE GRANULOCYTES(MET* 08/30/2017 0.0  <=0.3 thou/cu mm Final     COLOR                      08/30/2017 Yellow  Yellow Color Final     CLARITY                   08/30/2017 Clear  Clear Clarity Final     SPECIFIC GRAVITY,URINE    08/30/2017 1.015  1.010, 1.015, 1.020, 1.025                 Final     PH,URINE                  08/30/2017 6.5  6.0, 7.0, 8.0, 5.5, 6.5, 7.5, 8.5                 Final     UROBILINOGEN,QUALITATIVE  08/30/2017 Normal  Normal EU/dl Final     PROTEIN, URINE 08/30/2017 Negative  Negative mg/dL Final     GLUCOSE, URINE 08/30/2017 Negative  Negative mg/dL Final     KETONES,URINE             08/30/2017 Negative  Negative mg/dL Final     BILIRUBIN,URINE           08/30/2017 Negative  Negative                 Final     OCCULT BLOOD,URINE        08/30/2017 Negative  Negative                 Final     NITRITE                   08/30/2017 Negative  Negative                 Final     LEUKOCYTE ESTERASE        08/30/2017 Negative  Negative                 Final   Anticoagulation on 08/23/2017        Component  Date Value Ref Range Status     INR 08/23/2017 2.8* <1.3 Final   Anticoagulation on 07/26/2017        Component  Date Value Ref Range Status     INR 07/26/2017 2.2* <1.3 Final   Anticoagulation on 06/28/2017        Component  Date Value Ref Range Status     INR 06/28/2017 2.3* <1.3 Final   Anticoagulation on 05/31/2017        Component  Date Value Ref Range Status     INR 05/31/2017 2.0* <1.3 Final         ASSESSMENT:  Kate Chacon presents for cardiology consult severe sinus bradycardia and PAF. History of stroke one year ago, concerncryptogenic stroke. She has been on chronic anticoagulation since that time. Was also previously on metoprolol for rate control. This was discontinued when she was discharged from the hospital, a ZIO patch monitor was also placed and not yet completed.      PLAN:  1. Paroxysmal atrial fibrillation with RVR (HC)  Patient in AF today with severe beat to beat variability with RVR up to 170's in clinic. She is largely asymptomatic with her atrial fibrillation.  She was recently discontinued off of her beta blockade due to severe sinus bradycardia. This is likely why her rapid beat to beat variability seen today. She is not a candidate for AV eugene blocking due to bradycardia history. She is currently on ZIO patch monitor to assess. We did discuss initiating antiarrhythmic versus pacer and restarting Beta Blockade, she is concerned about pacer at this time and interested in ATT. She has no thyroid dysfunction or pulmonary disease. Discussed case Dr. Dong, cardiologist. Ganesh would recommended pacer and treating rates, if patient not interested in this at the current time, amiodarone would be best ATT with given bradycardia history, hospitalize to monitor HR with amiodarone loading. Dr. Yan accepted admission for amiodarone loading with AF RVR with beat to beat variability, monitoring for secondary bradycardia. Kate is agreeable with plan of care and will be admitted to the ICU. Follow-up cardiology appointment has been scheduled for one week.    - EKG 12 LEAD UNIT PERFORMED  - IL ELECTROCARDIOGRAM TRACING  - TSH; Future  - T4,FREE; Future  - TSH  - T4,FREE    2. Bradycardia  Discussed sinus bradycardia, ZIO patch in place. Not a candidate for further AV eugene blocking agent with her AF. She may likely be a candidate for pace maker, which was discussed today.    3. Ischemic stroke (HC)  History of ischemic stroke one year ago, found to be in AF at that time, concern for cryptogenic stroke. She has been on warfarin oral anticoagulation since her CVA.      Patient transported to ICU for admission by nursing staff, she will follow-up in one week with cardiology. Can again discuss pacer at next appointment.         Thank you for allowing me to participate in the care of your patient. Please do not hesitate to contact me if you have any questions.     Dinora Younger, JANAE, CFJOHN

## 2017-12-28 NOTE — PATIENT INSTRUCTIONS
Patient Information     Patient Name MRN Sex Kate Akins [id number] Female 1945      Patient Instructions by Rut Rivas RN at 2017  2:45 PM     Author:  Rut Rivas RN Service:  (none) Author Type:  NURS- Registered Nurse     Filed:  2017  3:15 PM Encounter Date:  2017 Status:  Signed     :  Rut Rivas RN (NURS- Registered Nurse)            Pacemaker check in January    Please follow-up with Dinora Younger NP in January

## 2017-12-28 NOTE — PROGRESS NOTES
"Patient Information     Patient Name MRN Sex Kate Akins [id number] Female 1945      Progress Notes by Sandy Klein, PT at 10/10/2017 11:10 AM     Author:  Sandy Klein PT Service:  (none) Author Type:  PT- Physical Therapist     Filed:  10/10/2017 12:10 PM Date of Service:  10/10/2017 11:10 AM Status:  Signed     :  Sandy Klein PT (PT- Physical Therapist)            [facility]  Outpatient PT - Daily note    Date of Service: 10/10/2017   Visit #7    PSFS Visit:  7/10  PSFS Completed:  2017     Patient Name: Kate Chacon   YOB: 1945   Referring MD/Provider: Asher Campos MD  Medical and Treatment Diagnosis: right leg weakness s/p ischemic stroke  PT Treatment Diagnosis: impaired lower extremity strength and endurance, right > left   Insurance: Medicare, and [employer]  Start of Service: 17  Certification Dates: Start of Service: 17  Medicare/MA Re-Cert Due: 17       Subjective        Kate reports no pain today. Frustrated with how \"heavy\" the right leg feels. Does feel it is somewhat stronger, but wobbly and unstable still.    Objective    Today's Intervention:      TM x4 minutes (to fatigue) 10% incline 1.1 mph with B railings working on decreasing upper extremity support; 0% focusing on heel strike x1 min   Retro walking x1.5 min 0%    SIMONE TKE 6.5 kg    Hurdles 9\" x4 times through, reciprocal pattern    Marching hip flexion walk with 2# ankle weights x 30 feet    single leg balance on blue foam with TRX strap support, RLE     6\" step: cues for R TKE    - lateral step overs B x20   - retro step up R x20     1 foot on foam, 1 foot on round surface BOSU x1 min each    Med x:   Leg press seat 19 100# x20, right only   Hip abduction 54# 2x15         Knee ext 40# single leg x15    Deferred: hamstring curls seat 9 60#  x15                                                  Home Exercise Program:    Access Code: [email or web address] URL: " [email or web address] Date: 09/12/2017 Prepared by: Sandy Klein   Exercises   Standing Hip Flexion with Anchored Resistance and Chair Support - 10-15 reps - 1-2 sets - 5 seconds hold - 1x daily   Standing Hip Abduction with Anchored Resistance - 10-15 reps - 1-2 sets - 5 hold - 1x daily   Standing Hip Extension with Anchored Resistance - 10-15 reps - 1-2 sets - 5 hold - 1x daily   Added 9/14/2017: Single leg stance with level pelvis x1 min bilateral     Assessment  Patient demonstrates need for cues to promote heel strike during gait on treadmill, cues for weight shifting to the right with single leg stance, and fatigue reported with TKE activities.    Patient is at increased fall risk due to poor lower extremity endurance and would benefit from skilled physical therapy services to address limitations and return to prior level of function.    Completed 9/12/2017:  Patient Specific Functional and Pain Scales (PSFS)  Clinician Instructions: Complete after the history and before the exam.   Initial Assessment:   We want to know what 3 activities in your life you are unable to perform, or are having the most difficulty performing, as a result of your chief problem. Please list and score at least 3 activities that you are unable to perform, or having the most difficulty performing, because of your chief problem.   Patient Specific Activity Scoring Scheme (score one number for each activity):   Activity Score (0-10)  0= Unable to perform activity  10= Able to perform activity at same level as before injury or problem   1. Picking up the right leg 5/10   2. Walking 30 mins without hanging onto something 2/10   3.  /10   4.  /10   5.  /10   Totals:  7/20= 35% ability, 65% impairment   Patient verbally states that they understand that the information they have provided above is current and complete to the best of their knowledge.   Patient Specific Functional Scale Modifier Scale Conversion: (patient's  modifier that correlates with pt's score on PSFS): 4-CL (60% Impaired).  Initial Primary G Code and Modifier:    Per the Patient's intake and/or assessment the Primary G Code is: Mobility .   The Patient's Impairment, Limitation or Restriction Modifier would be best described as: CK - 40% - 60% Impairment.   Goal Primary G Code and Modifier:    The Patient's G Code Goal would be: Mobility    The Patient's Impairment, Limitation or Restriction Modifier goal would be best described as: CI - 1% - 20% Impairment.       Goals:  Patient goal:  Improve lower extremity strength and stability with walking in 8 weeks.    Functional Short Term Goals (4 weeks):   Patient will have improved MMT grade by 1/2 grade to improve stability during ambulation.  Patient will ambulate 15 mins without hanging onto cart or other object for support.      Functional Long Term Goals (8 weeks):   Patient will ambulate 30 minutes with no support for improved stability and endurance.  Patient will have improved MMT grade by at least 1 full grade for decreased fall risk and improved stepping strategy.      Plan    Treatment Plan:      Frequency:   16 visits    Duration of Treatment: 8 weeks    Planned Interventions:    Home Exercise Program development  Therapeutic Exercise (ROM & Strengthening)  Therapeutic Activities  Neuromuscular Re-education  Manual Therapy  Ultrasound  E-Stim  Gait Training  Hot Packs / Cold Pack Modalities  Vasopneumatic Compression with Cold Therapy ( Game Ready )    Plan for next visit:  TM on incline, single leg balance, step ups, upright bike or Nustep    Thank you for your referral to [facility].  Please call with any questions, concerns or comments.  [phone]  Sandy Klein, PT, DPT    The signature, of the referring medical provider, on this document indicates certification of the above prescribed plan of care and is medically necessary.

## 2017-12-28 NOTE — TELEPHONE ENCOUNTER
Patient Information     Patient Name MRN Sex Kate Akins [id number] Female 1945      Telephone Encounter by Rut Rivas RN at 11/10/2017  9:49 AM     Author:  Rut Rivas RN  Service:  (none) Author Type:  NURS- Registered Nurse     Filed:  11/10/2017  1:56 PM  Encounter Date:  11/10/2017 Status:  Addendum     :  Rut Rivas RN (NURS- Registered Nurse)        Related Notes: Original Note by Rut Rivas RN (NURS- Registered Nurse) filed at 11/10/2017 10:00 AM            Wondering about having pacemaker checks done here instead of in [place].  She wanted to know if Dinora Younger NP has called them to see if that is possible.  Stated she had an appt down there today, 11/10/17, but is unable to make it due to not having a ride.  Explained if we were able to transfer pacemaker appts to [facility] it wouldn't be until Feb or Mar of 2018, so the first couple readings would need to be done in [place].  She had several questions about appts in [place].  Encouraged her to ask those questions when she called to reschedule [facility] appt for a time when she had a .  She was ok with this.  Will send to Dinora Younger NP for advisement on switching pacemaker checks to [facility].  Pt would like a call on Monday regarding this.    Rut Rivas RN 11/10/2017 9:57 AM    Patient will have follow-up appt with us, no longer doing follow-up at [facility] per patients request. [facility] appt is no longer active. She will be scheduled for first device check with [facility] in   . It is okay to double book for this appt with [facility].     Thanks,   Dinora    Called pt and left message that we scheduled her for 17 at 10:30 with [facility] for a device check.  Will see her 17 in follow up.    Rut Rivas RN 11/10/2017 1:56 PM

## 2017-12-28 NOTE — TELEPHONE ENCOUNTER
Patient Information     Patient Name MRN Sex Kate Akins [id number] Female 1945      Telephone Encounter by Easton Matthews MD at 2017  9:28 AM     Author:  Easton Matthews MD Service:  (none) Author Type:  Physician     Filed:  2017  9:28 AM Encounter Date:  2017 Status:  Signed     :  Easton Matthews MD (Physician)            Hold coumadin for three days pre-op

## 2017-12-29 NOTE — H&P
Patient Information     Patient Name MRN Kate Ann [id number] Female 1945      H&P by Asher Campos MD at 2017  8:15 AM     Author:  Asher Campos MD Service:  (none) Author Type:  Physician     Filed:  2017  1:14 PM Encounter Date:  2017 Status:  Signed     :  Asher Campos MD (Physician)            Nursing Notes:   Nicol Darden  2017  8:48 AM  Signed  Pt present for follow up stroke. Pt does want zoster vaccine.  Nicol Darden ....................  2017   8:22 AM      SUBJECTIVE:  Kate Chacon  is a 72 y.o. female who comes in today for follow-up. I last saw her 6 months ago. She continues on warfarin. She continues on metoprolol, lisinopril, and Lipitor. She continues on sertraline for depression.    She is walking but feels she is losing strength in her right leg as she is not able to do weights.  She is back driving. She has some numbness in her right index finger and thumb since the stroke.     She has had some itching of her ears.     She is having trouble with urinary urgency. She has very dry mouth, so would unlikely tolerate an oral medication.     She is up-to-date on health maintenance issues but has not had Zostavax.    PHQ Depression Screening 2017   Date of PHQ exam (doc flow) 2017   1. Lack of interest/pleasure 0 - Not at all 0 - Not at all   2. Feeling down/depressed 0 - Not at all 0 - Not at all   PHQ-2 TOTAL SCORE 0 0   3. Trouble sleeping 0 - Not at all -   4. Decreased energy 0 - Not at all -   5. Appetite change 0 - Not at all -   6. Feelings of failure 0 - Not at all -   7. Trouble concentrating 0 - Not at all -   8. Activity level 0 - Not at all -   9. Hurting yourself 0 - Not at all -   PHQ-9 TOTAL SCORE 0 -   PHQ-9 Severity Level none -   Functional Impairment not difficult at all -   Some recent data might be hidden        ANETTE-7 ANXIETY SCREENING 2017   ANETTE date (doc flow) 2017  8/30/2017   Nervous, anxious 0 0   Cannot stop worrying 0 0   Worry about different things 0 0   Cannot relax 0 0   Feeling restless 0 0   Easily annoyed/irritated 0 0   Afraid of awful event 0 0   Score 0 0   Severity none none   Some recent data might be hidden          Past Medical, Family, and Social History reviewed and updated as noted below.   ROS is negative except as noted above       No Known Allergies,   Family History       Problem   Relation Age of Onset     Diabetes  Father      Hypertension  Father      Other  Mother      h/o coronary artery disease/dementia       Asthma  Mother      Cancer  Maternal Aunt      Uterine       Diabetes  Maternal Grandmother      Cancer-breast  No Family History    ,   Current Outpatient Prescriptions on File Prior to Visit       Medication  Sig Dispense Refill     atorvastatin (LIPITOR) 80 mg tablet Take 1 tablet by mouth at bedtime. 90 tablet 3     ibuprofen (ADVIL) 200 mg tablet Take 400 mg by mouth 4 times daily if needed.       lisinopril (PRINIVIL; ZESTRIL) 20 mg tablet Take 1 tablet by mouth once daily. 90 tablet 3     No current facility-administered medications on file prior to visit.    ,   Past Medical History:     Diagnosis  Date     Ganglion cyst of wrist     right      Helicobacter positive gastritis     treated and 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       NVD (normal vaginal delivery)     x3    ,   Patient Active Problem List      Diagnosis Date Noted     MDD (recurrent major depressive disorder) in remission (HC) 08/30/2017     Former smoker 08/30/2017     Anxiety 01/05/2017     Hypertension 12/08/2016     Chronic atrial fibrillation (HC) 11/02/2016     Anticoagulation goal of INR 2 to 3 10/28/2016     Ischemic stroke (HC) 10/15/2016     DIVERTICULOSIS, SIGMOID COLON 04/04/2011     COLONIC POLYPS, ADENOMATOUS, HX OF 03/02/2011     OSTEOPOROSIS, LUMBAR SPINE 02/08/2006   ,   Past Surgical History:      Procedure  Laterality Date      APPENDECTOMY       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       COLONOSCOPY  4/4/11     COLONOSCOPY  5/7/12     ESOPHAGOGASTRODUODENOSCOPY  8/19/05     TONSILLECTOMY      and   Social History      Substance Use Topics        Smoking status:  Former Smoker     Packs/day: 0.50     Years: 49.00     Types: Cigarettes     Quit date: 10/14/2016     Smokeless tobacco:  Never Used     Alcohol use  No     OBJECTIVE:  /74  Pulse 82  Wt 80.8 kg (178 lb 3.2 oz)  BMI 27.91 kg/m2   EXAM:  Alert and cooperative, no distress. Gets up onto the exam table with no difficulty but slight weakness noted in the right leg. Formal neurologic exam reveals slight weakness of the right leg but not easy to detect other than when she is weightbearing. Lungs are clear, no rales rhonchi or wheezes are heard. Cardiac is regular today rate is controlled. No pedal edema. TMs are clear. Some wax on the left greater than the right. Abdomen is soft and nontender.  ASSESSMENT/Plan :      Kate was seen today for follow up.    Diagnoses and all orders for this visit:    Chronic atrial fibrillation (HC)  -     CBC AND DIFFERENTIAL; Future  -     COMP METABOLIC PANEL; Future  -     LIPID PANEL; Future  -     metoprolol (LOPRESSOR) 100 mg tablet; Take 1 tablet by mouth 2 times daily.  -     CBC AND DIFFERENTIAL  -     COMP METABOLIC PANEL  -     LIPID PANEL  -     CBC WITH AUTO DIFFERENTIAL    Anticoagulation goal of INR 2 to 3  -     CBC AND DIFFERENTIAL; Future  -     warfarin (COUMADIN) 5 mg tablet; TAKE ONE TABLET BY MOUTH ONCE DAILY  -     CBC AND DIFFERENTIAL  -     CBC WITH AUTO DIFFERENTIAL    Hypertension  -     CBC AND DIFFERENTIAL; Future  -     COMP METABOLIC PANEL; Future  -     LIPID PANEL; Future  -     CBC AND DIFFERENTIAL  -     COMP METABOLIC PANEL  -     LIPID PANEL  -     CBC WITH AUTO DIFFERENTIAL    Ischemic stroke  (HC)  -     CBC AND DIFFERENTIAL; Future  -     COMP METABOLIC PANEL; Future  -     LIPID PANEL; Future  -     AMB CONSULT TO PHYSICAL THERAPY; Future  -     CBC AND DIFFERENTIAL  -     COMP METABOLIC PANEL  -     LIPID PANEL  -     CBC WITH AUTO DIFFERENTIAL    MDD (recurrent major depressive disorder) in remission (HC)    Anxiety  -     sertraline (ZOLOFT) 50 mg tablet; Take 1 tablet by mouth once daily.    Former smoker    Right leg weakness  -     AMB CONSULT TO PHYSICAL THERAPY; Future    Single major depressive episode, moderate (HC)  -     sertraline (ZOLOFT) 50 mg tablet; Take 1 tablet by mouth once daily.    Dry mouth  -     AMB CONSULT TO UROLOGY; Future    Urge incontinence  -     AMB CONSULT TO UROLOGY; Future  -     URINALYSIS W REFLEX MICROSCOPIC IF POSITIVE; Future  -     URINALYSIS W REFLEX MICROSCOPIC IF POSITIVE    Cerebrovascular accident (CVA), unspecified mechanism (HC)  -     warfarin (COUMADIN) 5 mg tablet; TAKE ONE TABLET BY MOUTH ONCE DAILY    Screen for colon cancer  -     COLONOSCOPY SCREENING-GICH; Future    Other orders  -     Cancel: OMNI ZOSTER VACCINE LIVE SQ      She is doing well. We'll continue with her current medications and those are renewed. She will continue to use some hydrocortisone cream for her itching ears which seems to help. Also discussed cerumen removal at some point if needed.    Referred back to physical therapy for evaluation and treatment and then she can transition to the peak performance program.    Will notify of lab results when available. Continue with anticoagulation clinic.    For her urge incontinence, she has significant dry mouth I don't think she would tolerate oral medications. Referred to urology for evaluation and consideration of possible Botox injections or other options of treatment. If she would be a candidate for intravesicular Botox, would then be safe to stop warfarin for several days prior.    She'll stop at the pharmacy today to get  Zostavax.    Follow-up in 6-12 months, sooner if needed.  She is due for colonoscopy and should be at low risk for same. Will need to hold warfarin for 4 days prior.     A total of 25 minutes was spent with the patient, greater than 50% of the time was spent in counseling/discussion of the aforementioned concerns.     Asher Campos MD

## 2017-12-29 NOTE — PATIENT INSTRUCTIONS
Patient Information     Patient Name MRN Sex Kate Akins [id number] Female 1945      Patient Instructions by Radha Burns RN at 10/18/2017 10:15 AM     Author:  Radha Burns RN Service:  (none) Author Type:  NURS- Registered Nurse     Filed:  10/18/2017 10:11 AM Encounter Date:  10/18/2017 Status:  Signed     :  Radha Burns RN (NURS- Registered Nurse)            2017 Details    Sun  Fri Sat     1               2               3               4               5               6               7                 8               9               10               11               12               13               14                 15               16               17               18      5 mg   See details      19      5 mg         20      Hold         21      Hold           22      Hold         23      7.5 mg         24      5 mg         25      5 mg         26      5 mg         27      5 mg         28      5 mg           29      5 mg         30      5 mg         31                    Date Details   10/18 This INR check       Date of next INR:  10/30/2017         How to take your warfarin dose     To take:  5 mg Take one of the 5 mg tablets.    To take:  7.5 mg Take one and a half of the 5 mg tablets.    Hold Do not take your warfarin dose. The details table to the right may include additional information.                  Description          Take 7.5 mg day of procedure and resume 5 mg and recheck 1 week following procedure. Radha Burns RN    10/18/2017  10:11 AM                    Anticoagulation Summary as of 10/18/2017     INR goal 2.0-3.0    Today's INR 1.9    Next INR check 10/30/2017          Call your Anticoagulation Clinic at Dept: [phone]   if:   1. Any medications are started, stopped, or there is a change in dose.  2. You experience any bleeding that is not easily stopped or if it is recurrent.  3. You notice an increase in bruising or any  bruising that does not heal.  4. You are scheduled for surgery, colonoscopy, dental extraction or any other procedure where you may need to stop your Coumadin (warfarin).

## 2017-12-29 NOTE — PATIENT INSTRUCTIONS
Patient Information     Patient Name MRN Kate Ann [id number] Female 1945      Patient Instructions by Radha Burns RN at 2017 10:00 AM     Author:  Radha Burns RN Service:  (none) Author Type:  NURS- Registered Nurse     Filed:  2017  9:51 AM Encounter Date:  2017 Status:  Signed     :  Radha Burns RN (NURS- Registered Nurse)            2017 Details    Sun Mon Tue Wed Thu Fri Sat          1               2                 3               4               5               6               7               8               9                 10               11               12               13               14               15               16                 17               18               19               20      5 mg   See details      21      5 mg         22      5 mg         23      5 mg           24      5 mg         25      5 mg         26      5 mg         27      5 mg         28      5 mg         29      5 mg         30      5 mg          Date Details    This INR check               How to take your warfarin dose     To take:  5 mg Take one of the 5 mg tablets.           2017 Details    Sun Mon Tue Wed Thu Fri Sat     1      5 mg         2      5 mg         3      5 mg         4      5 mg         5      5 mg         6      5 mg         7      5 mg           8      5 mg         9      5 mg         10      5 mg         11      5 mg         12      5 mg         13      5 mg         14      5 mg           15      5 mg         16      5 mg         17      5 mg         18      5 mg         19               20               21                 22               23               24               25               26               27               28                 29               30               31                    Date Details   No additional details    Date of next INR:  10/18/2017         How to take your warfarin dose     To take:  5  mg Take one of the 5 mg tablets.             Description          Continue same Warfarin dose and recheck in 1 month. Radha Burns RN    9/20/2017  9:50 AM                  Anticoagulation Summary as of 9/20/2017     INR goal 2.0-3.0    Today's INR 2.4    Next INR check 10/18/2017          Call your Anticoagulation Clinic at Dept: [phone]   if:   1. Any medications are started, stopped, or there is a change in dose.  2. You experience any bleeding that is not easily stopped or if it is recurrent.  3. You notice an increase in bruising or any bruising that does not heal.  4. You are scheduled for surgery, colonoscopy, dental extraction or any other procedure where you may need to stop your Coumadin (warfarin).

## 2017-12-29 NOTE — PATIENT INSTRUCTIONS
Patient Information     Patient Name MRN Sex Kate Akins [id number] Female 1945      Patient Instructions by Asher Campos MD at 2017 11:30 AM     Author:  Asher Campos MD Service:  (none) Author Type:  Physician     Filed:  2017 12:08 PM Encounter Date:  2017 Status:  Signed     :  Asher Campos MD (Physician)            Decrease back to 20 mg on Lisinopril. Continue other medications the same. Check blood pressure at home.      130/80 or less is goal.  We want your heart rate between 60-90.

## 2017-12-29 NOTE — PATIENT INSTRUCTIONS
Patient Information     Patient Name MRN Kate Ann [id number] Female 1945      Patient Instructions by Valeria Weaver at 2017 10:00 AM     Author:  Valeria Weaver Service:  (none) Author Type:  NURS- Student Nurse     Filed:  2017  9:52 AM Encounter Date:  2017 Status:  Signed     :  Valeria Weaver (NURS- Student Nurse)            2017 Details    Sun Mon Tue Wed Thu Fri Sat       1               2               3               4               5                 6               7               8               9               10               11               12                 13               14               15               16               17               18               19                 20               21               22               23      5 mg   See details      24      5 mg         25      5 mg         26      5 mg           27      5 mg         28      5 mg         29      5 mg         30      5 mg         31      5 mg            Date Details    This INR check               How to take your warfarin dose     To take:  5 mg Take one of the 5 mg tablets.           2017 Details    Sun Mon Tue Wed Thu Fri Sat          1      5 mg         2      5 mg           3      5 mg         4      5 mg         5      5 mg         6      5 mg         7      5 mg         8      5 mg         9      5 mg           10      5 mg         11      5 mg         12      5 mg         13      5 mg         14      5 mg         15      5 mg         16      5 mg           17      5 mg         18      5 mg         19      5 mg         20      5 mg         21               22               23                 24               25               26               27               28               29               30                Date Details   No additional details    Date of next INR:  2017         How to take your warfarin dose     To take:  5 mg Take one of  the 5 mg tablets.             Description          Continue same Warfarin dose and recheck in 1 month.  Valeria Weaver RN   8/23/2017    9:51 AM                Anticoagulation Summary as of 8/23/2017     INR goal 2.0-3.0    Today's INR 2.8    Next INR check 9/20/2017          Call your Anticoagulation Clinic at Dept: [phone]   if:   1. Any medications are started, stopped, or there is a change in dose.  2. You experience any bleeding that is not easily stopped or if it is recurrent.  3. You notice an increase in bruising or any bruising that does not heal.  4. You are scheduled for surgery, colonoscopy, dental extraction or any other procedure where you may need to stop your Coumadin (warfarin).

## 2017-12-29 NOTE — PATIENT INSTRUCTIONS
Patient Information     Patient Name MRN Kate Ann [id number] Female 1945      Patient Instructions by Rut Rob RN at 10/30/2017 10:30 AM     Author:  Rut Rob RN Service:  (none) Author Type:  NURS- Registered Nurse     Filed:  10/30/2017 10:37 AM Encounter Date:  10/30/2017 Status:  Signed     :  Rut Rob RN (NURS- Registered Nurse)            2017 Details    Sun Mon Tue Wed Thu Fri Sat     1               2               3               4               5               6               7                 8               9               10               11               12               13               14                 15               16               17               18               19               20               21                 22               23               24               25               26               27               28                 29               30      5 mg   See details      31      5 mg              Date Details   10/30 This INR check               How to take your warfarin dose     To take:  5 mg Take one of the 5 mg tablets.           2017 Details    Sun Mon Tue Wed Thu Fri Sat        1      5 mg         2      5 mg         3      5 mg         4      5 mg           5      5 mg         6      5 mg         7      5 mg         8      5 mg         9      5 mg         10      5 mg         11      5 mg           12      5 mg         13      5 mg         14               15               16               17               18                 19               20               21               22               23               24               25                 26               27               28               29               30                  Date Details   No additional details    Date of next INR:  2017         How to take your warfarin dose     To take:  5 mg Take one of the 5 mg tablets.             Description           Continue same dose and recheck in 2 weeks. Rut Rob RN ....................  10/30/2017   10:34 AM                  Anticoagulation Summary as of 10/30/2017     INR goal 2.0-3.0    Today's INR 2.9    Next INR check 11/13/2017          Call your Anticoagulation Clinic at Dept: [phone]   if:   1. Any medications are started, stopped, or there is a change in dose.  2. You experience any bleeding that is not easily stopped or if it is recurrent.  3. You notice an increase in bruising or any bruising that does not heal.  4. You are scheduled for surgery, colonoscopy, dental extraction or any other procedure where you may need to stop your Coumadin (warfarin).

## 2017-12-29 NOTE — PATIENT INSTRUCTIONS
Patient Information     Patient Name MRN Kate Ann [id number] Female 1945      Patient Instructions by Radha Burns RN at 2017 10:45 AM     Author:  Radha Burns RN Service:  (none) Author Type:  NURS- Registered Nurse     Filed:  2017 10:44 AM Encounter Date:  2017 Status:  Signed     :  Radha Burns RN (NURS- Registered Nurse)            2017 Details    Sun  Fri Sat        1               2               3               4                 5               6               7               8      10 mg   See details      9      5 mg         10      5 mg         11      5 mg           12      5 mg         13      5 mg         14      5 mg         15      5 mg         16      5 mg         17               18                 19               20               21               22               23               24               25                 26               27               28               29               30                  Date Details    This INR check       Date of next INR:  2017         How to take your warfarin dose     To take:  5 mg Take one of the 5 mg tablets.    To take:  10 mg Take two of the 5 mg tablets.             Description          Take 10 mg today then resume 5 mg daily and recheck in 1 week. Radha Burns RN    2017  10:43 AM                  Anticoagulation Summary as of 2017     INR goal 2.0-3.0    Today's INR 1.5    Next INR check 2017          Call your Anticoagulation Clinic at Dept: [phone]   if:   1. Any medications are started, stopped, or there is a change in dose.  2. You experience any bleeding that is not easily stopped or if it is recurrent.  3. You notice an increase in bruising or any bruising that does not heal.  4. You are scheduled for surgery, colonoscopy, dental extraction or any other procedure where you may need to stop your Coumadin (warfarin).

## 2017-12-29 NOTE — PATIENT INSTRUCTIONS
Patient Information     Patient Name MRN Kate Ann [id number] Female 1945      Patient Instructions by Khushi Barros RN at 2017  9:45 AM     Author:  Khushi Barros RN Service:  (none) Author Type:  NURS- Registered Nurse     Filed:  2017  9:43 AM Encounter Date:  2017 Status:  Signed     :  Khushi Barros RN (NURS- Registered Nurse)            2017 Details    Sun Mon Tue Wed Thu Fri Sat           1                 2               3               4               5               6               7               8                 9               10               11               12               13               14               15                 16               17               18               19               20               21               22                 23               24               25               26      5 mg   See details      27      5 mg         28      5 mg         29      5 mg           30      5 mg         31      5 mg               Date Details    This INR check               How to take your warfarin dose     To take:  5 mg Take one of the 5 mg tablets.           2017 Details    Sun Mon Tue Wed Thu Fri Sat       1      5 mg         2      5 mg         3      5 mg         4      5 mg         5      5 mg           6      5 mg         7      5 mg         8      5 mg         9      5 mg         10      5 mg         11      5 mg         12      5 mg           13      5 mg         14      5 mg         15      5 mg         16      5 mg         17      5 mg         18      5 mg         19      5 mg           20      5 mg         21      5 mg         22      5 mg         23      5 mg         24               25               26                 27               28               29               30               31                  Date Details   No additional details    Date of next INR:  2017         How to take your warfarin dose      To take:  5 mg Take one of the 5 mg tablets.             Description          Continue same Warfarin dose and recheck in 1 month. SANDRITA HONEYCUTT RN ....................  7/26/2017   9:43 AM                Anticoagulation Summary as of 7/26/2017     INR goal 2.0-3.0    Today's INR 2.2    Next INR check 8/23/2017          Call your Anticoagulation Clinic at Dept: [phone]   if:   1. Any medications are started, stopped, or there is a change in dose.  2. You experience any bleeding that is not easily stopped or if it is recurrent.  3. You notice an increase in bruising or any bruising that does not heal.  4. You are scheduled for surgery, colonoscopy, dental extraction or any other procedure where you may need to stop your Coumadin (warfarin).

## 2017-12-29 NOTE — PATIENT INSTRUCTIONS
Patient Information     Patient Name MRN Sex Kate Akins [id number] Female 1945      Patient Instructions by Sara Rodriguez RN at 2017 11:15 AM     Author:  Sara Rodriguez RN Service:  (none) Author Type:  NURS- Registered Nurse     Filed:  2017 11:09 AM Encounter Date:  2017 Status:  Signed     :  Sara Rodriguez RN (NURS- Registered Nurse)            2017 Details    Sun Mon Tue Wed Thu Fri Sat        1               2               3               4                 5               6               7               8               9               10               11                 12               13               14               15               16      5 mg   See details      17      5 mg         18      5 mg           19      5 mg         20      5 mg         21      5 mg         22      5 mg         23      5 mg         24               25                 26               27               28               29               30                  Date Details    This INR check       Date of next INR:  2017         How to take your warfarin dose     To take:  5 mg Take one of the 5 mg tablets.             Description          Continue same dose and recheck in 1 week. SARA RODRIGUEZ RN    2017    11:09 AM                  Anticoagulation Summary as of 2017     INR goal 2.0-3.0    Today's INR 2.3    Next INR check 2017          Call your Anticoagulation Clinic at Dept: [phone]   if:   1. Any medications are started, stopped, or there is a change in dose.  2. You experience any bleeding that is not easily stopped or if it is recurrent.  3. You notice an increase in bruising or any bruising that does not heal.  4. You are scheduled for surgery, colonoscopy, dental extraction or any other procedure where you may need to stop your Coumadin (warfarin).

## 2017-12-29 NOTE — PATIENT INSTRUCTIONS
Patient Information     Patient Name MRN Kate Ann [id number] Female 1945      Patient Instructions by Radha Burns RN at 2017  9:30 AM     Author:  Radha Burns RN Service:  (none) Author Type:  NURS- Registered Nurse     Filed:  2017  9:41 AM Encounter Date:  2017 Status:  Signed     :  Radha Burns RN (NURS- Registered Nurse)            2017 Details    Sun Mon Tue Wed Thu Fri Sat        1               2               3               4                 5               6               7               8               9               10               11                 12               13               14               15               16               17               18                 19               20               21               22               23               24               25                 26               27      7.5 mg   See details      28      5 mg         29      5 mg         30      5 mg            Date Details    This INR check               How to take your warfarin dose     To take:  5 mg Take one of the 5 mg tablets.    To take:  7.5 mg Take one and a half of the 5 mg tablets.           2017 Details    Sun Mon Tue Wed Thu Fri Sat          1      5 mg         2      5 mg           3      5 mg         4      7.5 mg         5      5 mg         6      5 mg         7      5 mg         8      5 mg         9      5 mg           10      5 mg         11      7.5 mg         12               13               14               15               16                 17               18               19               20               21               22               23                 24               25               26               27               28               29               30                 31                      Date Details   No additional details    Date of next INR:  2017         How to take your  warfarin dose     To take:  5 mg Take one of the 5 mg tablets.    To take:  7.5 mg Take one and a half of the 5 mg tablets.             Description          Continue same dose and recheck in 2-3 weeks. ..............Radha Burns RN   11/27/2017    9:40 AM                  Anticoagulation Summary as of 11/27/2017     INR goal 2.0-3.0    Today's INR 1.9    Next INR check 12/11/2017          Call your Anticoagulation Clinic at Dept: [phone]   if:   1. Any medications are started, stopped, or there is a change in dose.  2. You experience any bleeding that is not easily stopped or if it is recurrent.  3. You notice an increase in bruising or any bruising that does not heal.  4. You are scheduled for surgery, colonoscopy, dental extraction or any other procedure where you may need to stop your Coumadin (warfarin).

## 2017-12-30 NOTE — NURSING NOTE
Patient Information     Patient Name MRN Kate Ann [id number] Female 1945      Nursing Note by Virgie Gallardo at 10/5/2017 11:15 AM     Author:  Virgie Gallardo Service:  (none) Author Type:  (none)     Filed:  10/5/2017 11:21 AM Encounter Date:  10/5/2017 Status:  Signed     :  Virgie Gallardo            Here for one month follow up on Oxybutynin.  Review of Systems:    Weight loss:    No     Recent fever/chills:  No   Night sweats:   No  Current skin rash:  No   Recent hair loss:  No  Heat intolerance:  No   Cold intolerance:  No  Chest pain:   No   Palpitations:   No  Shortness of breath:  No   Wheezing:   No  Constipation:    No   Diarrhea:   No   Nausea:   No   Vomiting:   No   Kidney/side pain:  No   Back pain:   No  Frequent headaches:  No   Dizziness:     No  Leg swelling:   No   Calf pain:    No    Post-Void Residual  Verbal order read back from Steve Fox MD to perform a post-void residual bladder scan.  A post-void residual was measured by ultrasonic bladder scanner.  18 mL  Virgie Gallardo LPN  10/5/2017  11:13 AM

## 2017-12-30 NOTE — NURSING NOTE
Patient Information     Patient Name MRN Sex Kate Akins [id number] Female 1945      Nursing Note by Pennie Chester at 2017  9:45 AM     Author:  Pennie Chester Service:  (none) Author Type:  (none)     Filed:  2017 10:34 AM Encounter Date:  2017 Status:  Signed     :  Pennie Chester            Performed EKG in clinic per VORB from Dinora Younger NP.  Order sent to provider for co-sign.  Pennie Chester 2017 10:32 AM

## 2017-12-30 NOTE — NURSING NOTE
Patient Information     Patient Name MRN Sex Kate Akins [id number] Female 1945      Nursing Note by Rut Rivas RN at 2017  3:15 PM     Author:  Rut Rivas RN Service:  (none) Author Type:  NURS- Registered Nurse     Filed:  2017  3:20 PM Encounter Date:  2017 Status:  Signed     :  Rut Rivas RN (NURS- Registered Nurse)            Pt present today for follow up after pacemaker placement on November 3.  States she has been feeling ok since then.  States incision site is itchy and sore.  Rut Rivas RN 2017 3:16 PM

## 2017-12-30 NOTE — INITIAL ASSESSMENTS
"Patient Information     Patient Name MRN Kate Ann [id number] Female 1945      Initial Assessments by Sandy Klein PT at 2017  8:52 AM     Author:  Sandy Klein PT Service:  (none) Author Type:  PT- Physical Therapist     Filed:  2017 12:48 PM Date of Service:  2017  8:52 AM Status:  Signed     :  Sandy Klein PT (PT- Physical Therapist)            [facility]  Outpatient PT - Initial Evaluation  Lower Extremity Eval    Date of Service: 2017   Visit #1    PSFS Visit:  1/10  PSFS Completed:  2017     Patient Name: Kate Chacon   YOB: 1945   Referring MD/Provider: Asher Campos MD  Medical and Treatment Diagnosis: right leg weakness s/p ischemic stroke  PT Treatment Diagnosis: impaired lower extremity strength and endurance, right > left   Insurance: Medicare, and [employer]  Start of Service: 17  Certification Dates: Start of Service: 17  Medicare/MA Re-Cert Due: 17     Precautions:  None   Cognition:  Oriented to Person, Place, and Time.     Were cultural / age or other special adaptations needed? No      Patient is a vulnerable adult: No      Patient is aware of diagnosis: Yes      Risks and benefits explained: Yes        Subjective        Date of onset: almost a year since her ischemic CVA affecting the right side  Follow up with physician:  none  Details: thought she could \"do it at home\" re: strengthening, but seems to be getting weaker again in the right leg. Notices it walking around, mostly feeling unstable standing on the right lower extremity. Has fallen twice, once on ice and once tripped on landscaping bricks. Doesn't feel too much like it would buckle on her, but prolonged walking (several hours of being on her feet) makes her feel extremely weak and sore.     Rates pain: none  Aggravating: prolonged walking, standing long periods  Easing: resting      Completed 2017:  Patient Specific " Functional and Pain Scales (PSFS)  Clinician Instructions: Complete after the history and before the exam.   Initial Assessment:   We want to know what 3 activities in your life you are unable to perform, or are having the most difficulty performing, as a result of your chief problem. Please list and score at least 3 activities that you are unable to perform, or having the most difficulty performing, because of your chief problem.   Patient Specific Activity Scoring Scheme (score one number for each activity):   Activity Score (0-10)  0= Unable to perform activity  10= Able to perform activity at same level as before injury or problem   1. Picking up the right leg 5/10   2. Walking 30 mins without hanging onto something 2/10   3.  /10   4.  /10   5.  /10   Totals:  7/20= 35% ability, 65% impairment   Patient verbally states that they understand that the information they have provided above is current and complete to the best of their knowledge.   Patient Specific Functional Scale Modifier Scale Conversion: (patient's modifier that correlates with pt's score on PSFS): 4-CL (60% Impaired).  Initial Primary G Code and Modifier:    Per the Patient's intake and/or assessment the Primary G Code is: Mobility .   The Patient's Impairment, Limitation or Restriction Modifier would be best described as: CK - 40% - 60% Impairment.   Goal Primary G Code and Modifier:    The Patient's G Code Goal would be: Mobility    The Patient's Impairment, Limitation or Restriction Modifier goal would be best described as: CI - 1% - 20% Impairment.     Functional difficulties: (Min / Mod / Max / Unable   min difficulty standing 30 minutes     min difficulty walking up and down stairs reciprocally    min difficulty walking on level ground    mod difficulty walking on uneven/slopes ground     Prior Level:  Minimal to no difficulty completing functional activities.      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       COLONOSCOPY  4/4/11     COLONOSCOPY  5/7/12     ESOPHAGOGASTRODUODENOSCOPY  8/19/05     TONSILLECTOMY       Driving:  yes  Home Environment:  Patient lives in a single story home with a basement with 3 steps to enter and 1 flights up/down stairs.  Assistive Devices: front wheeled walker but doesn't use anymore    Occupation:  retired    Previous Treatment:    Pain Meds / Anti-inflammatory Meds  - no   Physical Therapy - yes  Injections - no  Surgery - No  Pain Clinic - No    Diagnostics:  Reviewed (see chart)   Current Medications:  Reviewed (see chart)    Drug Allergies:  Reviewed (see chart)  ?   Latex Allergy:  No          Objective    Items left blank indicate that the test was inappropriate or not meaningful at the time of evaluation and therefore not performed.    Fall Risk Screening:  No risk factors identified      ROM / Strength:                 Norms Left  Right Strength Left  Right   Hip Flexion 120    Hip Flexion 4+ 4   Hip Extension 15   Hip Extension 4 3+   Hip Abduction 50   Hip Abduction 4+ 4-   Hip External Rot. 60   Hip External Rot. 4+ 4   Hip Internal Rot. 40   Hip Internal Rot. 5 5-   Knee Extension 0   Knee Extension 5 5-   Knee Flexion 135   Knee Flexion 5- 4   Dorsiflexion 20   Dorsiflexion 5 5   Plantarflexion    Plantarflexion       Observation:      Palpation:  NT    Gait:  normal    Special Tests:    30 second sit to stand test: 8 reps (mean is 13 for age group)    Today's Intervention:    - Evaluation  - Patient education for results of evaluation, goals, and treatment plan.  Patient voices  understanding and agreement.    Hip 3 way red theraband * bilateral lower extremity x15 each    Med x:   Leg press 120# seat 19 x20   Hip abduction 40# x15, 50# x15   hamstring curls 60# seat 9 x15    Home Exercise Program:    Access Code: [email or web address] URL: [email or web address] Date: 09/12/2017 Prepared by: Sandy Klein   Exercises   Standing Hip Flexion with Anchored Resistance and Chair Support - 10-15 reps - 1-2 sets - 5 seconds hold - 1x daily   Standing Hip Abduction with Anchored Resistance - 10-15 reps - 1-2 sets - 5 hold - 1x daily   Standing Hip Extension with Anchored Resistance - 10-15 reps - 1-2 sets - 5 hold - 1x daily       Assessment    Therapist Assessment / Clinical Impression:  Signs and symptoms consistent with impaired lower extremity strength and endurance s/p CVA nearly 1 year ago. Patient is at increased fall risk due to poor lower extremity endurance and would benefit from skilled physical therapy services to address limitations and return to prior level of function.      Functional Impairment(s): See subjective on initial evaluation and Functional Assessment / Summary Report from PSFS.    Physical Impairment(s):  lower extremity weakness      Goals:  Patient goal:  Improve lower extremity strength and stability with walking in 8 weeks.    Functional Short Term Goals (4 weeks):   Patient will have improved MMT grade by 1/2 grade to improve stability during ambulation.  Patient will ambulate 15 mins without hanging onto cart or other object for support.      Functional Long Term Goals (8 weeks):   Patient will ambulate 30 minutes with no support for improved stability and endurance.  Patient will have improved MMT grade by at least 1 full grade for decreased fall risk and improved stepping strategy.        Patient participated in goal selection and understand(s) the plan of care: Yes   Patient Potential for Achieving Desired Outcome: Good       Response to Intervention:  Good  tolerance to treatment         Plan    Treatment Plan:      Frequency:   16 visits    Duration of Treatment: 8 weeks    Planned Interventions:    Home Exercise Program development  Therapeutic Exercise (ROM & Strengthening)  Therapeutic Activities  Neuromuscular Re-education  Manual Therapy  Ultrasound  E-Stim  Gait Training  Hot Packs / Cold Pack Modalities  Vasopneumatic Compression with Cold Therapy ( Game [employer] )    Plan for next visit:  TM on incline, single leg balance, step ups, upright bike or Nustep    Thank you for your referral to [facility].  Please call with any questions, concerns or comments.  [phone]  Sandy Klein PT, DPT    The signature, of the referring medical provider, on this document indicates certification of the above prescribed plan of care and is medically necessary.

## 2017-12-30 NOTE — NURSING NOTE
Patient Information     Patient Name MRN Sex Kate Akins [id number] Female 1945      Nursing Note by Pennie Chester at 2017  9:45 AM     Author:  Pennie Chester  Service:  (none) Author Type:  (none)     Filed:  2017 10:33 AM  Encounter Date:  2017 Status:  Addendum     :  Pennie Chester        Related Notes: Original Note by Pennie Chester filed at 2017 10:00 AM            Patient comes in for consult from ER follow up on sinoatrial bradycardia  , HTN.  Pennie Chester LPN ....................  2017   10:00 AM

## 2017-12-30 NOTE — NURSING NOTE
Patient Information     Patient Name MRN Kate Ann [id number] Female 1945      Nursing Note by Tiffany White at 2017 11:30 AM     Author:  Tiffany White Service:  (none) Author Type:  (none)     Filed:  2017 11:34 AM Encounter Date:  2017 Status:  Signed     :  Tiffany White            She is here today to follow up after having a pacemaker put in at [facility].  Tiffany White LPN..................2017   11:31 AM

## 2017-12-30 NOTE — NURSING NOTE
Patient Information     Patient Name MRN Sex Kate Akins [id number] Female 1945      Nursing Note by Rut Rivas RN at 2017  2:45 PM     Author:  Rut Rivas RN Service:  (none) Author Type:  NURS- Registered Nurse     Filed:  2017  3:00 PM Encounter Date:  2017 Status:  Signed     :  Rut Rivas RN (NURS- Registered Nurse)            Pt presents today for follow up for incision site.  States things have going well the last couple weeks.  Rut Rivas RN 2017 2:56 PM

## 2017-12-30 NOTE — NURSING NOTE
Patient Information     Patient Name MRN Kate Ann [id number] Female 1945      Nursing Note by Nicol Darden at 2017  8:15 AM     Author:  Nicol Darden Service:  (none) Author Type:  (none)     Filed:  2017  8:48 AM Encounter Date:  2017 Status:  Signed     :  Nicol Darden            Pt present for follow up stroke. Pt does want zoster vaccine.  Nicol Darden ....................  2017   8:22 AM

## 2017-12-30 NOTE — NURSING NOTE
Patient Information     Patient Name MRN Kate Ann [id number] Female 1945      Nursing Note by Chrystal Hurtado at 2017 11:15 AM     Author:  Chrystal Hurtado Service:  (none) Author Type:  (none)     Filed:  2017 11:33 AM Encounter Date:  2017 Status:  Signed     :  Chrystal Hurtado            Patient presents to the clinic for consult on urinary incontinence.  Chrystal Hurtado LPN........................2017  11:21 AM    Review of Systems:    Weight loss:    No     Recent fever/chills:  No   Night sweats:   No  Current skin rash:  No   Recent hair loss:  No  Heat intolerance:  No   Cold intolerance:  No  Chest pain:   No   Palpitations:   No  Shortness of breath:  No   Wheezing:   No  Constipation:    No   Diarrhea:   No   Nausea:   No   Vomiting:   No   Kidney/side pain:  No   Back pain:   No  Frequent headaches:  No   Dizziness:     No  Leg swelling:   No   Calf pain:    No    Parents, brothers or sisters with history of kidney cancer?   No  Parents, brothers or sisters with history of bladder cancer: No      Post-Void Residual  A post-void residual was measured by ultrasonic bladder scanner.  21 mL

## 2018-01-02 NOTE — PROGRESS NOTES
"Patient Information     Patient Name MRN Sex Kate Akins [id number] Female 1945      Progress Notes by Sandy Klein PT at 2017 11:22 AM     Author:  Sandy Klein PT Service:  (none) Author Type:  PT- Physical Therapist     Filed:  2017 12:17 PM Date of Service:  2017 11:22 AM Status:  Signed     :  Sandy Klein PT (PT- Physical Therapist)            [facility]  Outpatient PT - Daily note      Date of Service:  2017   Visit #: 12    Patient Name: Kate Chacon    YOB: 1945   Referring MD/Provider: Referring MD/Provider: Bassem Anderson MD   Diagnosis: Medical and Treatment Diagnosis: s/p CVA   Treatment Diagnosis: right sided weakness and balance impairment s/p CVA  Insurance: Medicare  Start of Care Date: Start of Service: 16  Certification Dates: From: 2016   Re-Cert Due: Medicare/MA Re-Cert Due: 2/15/2017    Subjective      Patient reports she washed her kitchen floor yesterday and her legs \"feel like rubber\" today.    Objective    Today's Intervention:    Nustep level 8 no arms seat. Seat 10    Patient instructed in set up on med x machines for transition to PEAK:  Abduction 52#   Hamstring curls 72#    double leg balance with A/P rocking on flat side 1/2 foam    Mini trampoline balance: x1-2 min each   Eyes closed romberg stance   Tandem stance   Gaze x1 feet partial tandem   Slow marching    Foam balance:   Bending to touch cone with med ball, then D2 flexion bilateral to fatigue. Patient reports fatigue and balance challenge. Partial romberg stance    Dynamic balance: 35 feet each   Braiding x20 feet bilateral. More difficulty going to the left due to right lower extremity coordination and balance   Tandem walk    Contralateral heel taps   Walking lunges with rotation with med ball. VC for form    BOSU  Step ups right lower extremity x15    Wobble board x1 min double leg     TRX straps single leg foam balance " "with contralateral heel taps in flexion, abduction and extension x15    Deferred:  Med x: to fatigue   # x20 right lower extremity emphasis. Seat 19    Walking up 6\" wedge: x3 reps each    Forward and retro walk. Cues to keep weight over toes with retro and slow momentum   Side stepping- 2 mod A for loss of balance with coming downhill to the right, cues for weight shift.      Home Exercise Program: *= added to HEP.  Pt given handout.   Access Code: [email or web address]   URL: [email or web address]   Date: 11/09/2016   Prepared by: Sandy Klein     Exercises   Standing Hip Abduction with Anchored Resistance - 10-15 reps - 1-2 sets - 5 hold - 1x daily   Standing Hip Extension with Anchored Resistance - 10-15 reps - 1-2 sets - 5 hold - 1x daily   Tandem Stance - 30-60 hold - 1x daily     Assessment    Patient demonstrates improved hip strategy utilization, but needs some assist and cuing to prevent loss of balance. Progressing lower extremity strength and endurance noted.      Patient will benefit from continued skilled physical therapy services to address aforementioned impairments and return patient to previous level of functioning.      Goals:     Short Term Goals: To be met in 4 weeks:   Following physical therapy intervention, the patient will be able to:   1. Improve dynamic stability to allow use of single point cane for all mobility with no evidence for imbalance and no falls.   2. Improve lower extremity strength to 5-/5 for improved endurance for activities of daily living.    Long Term Goals: To be met in 8 weeks:   Following physical therapy intervention, the patient will be able to:   1. Improve endurance to allow walking without assistive device for up to 30 mins to complete grocery shopping with minimal difficulty.  2. Patient will be independent with HEP to continue to maintain ROM and strength achieved during physical therapy.      G Codes and Modifier taken from patient completing the dynamic " gait index and functional gait assessment: updated 12/21/2016   Current Primary G Code and Modifier:    Per the Patient's intake and/or assessment the Primary G Code is: Mobility .   The Patient's Impairment, Limitation or Restriction Modifier would be best described as: CI - 1% - 20% Impairment.   Goal Primary G Code and Modifier:    The Patient's G Code Goal would be: Mobility    The Patient's Impairment, Limitation or Restriction Modifier goal would be best described as: CI - 1% - 20% Impairment.     Therapist Assessment of Today's Intervention:  Patient needed a few rest breaks due to fatigue.    Plan  Treatment Plan / Targeted Outcomes:     Frequency:   10 additional visits (addended)     Duration of Treatment: 8 weeks     Plan of Care Due Date: Medicare/MA Re-Cert Due: 2/15/2017    Plan for next visit:  lower extremity strengthening, Nustep, balance training, med x as able    Thank you for your referral to [facility].  Please call with any questions, concerns or comments.  [phone]  Sandy Klein, PT, DPT

## 2018-01-02 NOTE — PROGRESS NOTES
Patient Information     Patient Name MRN Sex Kate Akins [id number] Female 1945      Progress Notes by Asher Campos MD at 2017  9:00 AM     Author:  Asher Campos MD Service:  (none) Author Type:  Physician     Filed:  2017  7:13 PM Encounter Date:  2017 Status:  Signed     :  Asher Campos MD (Physician)            There are no exam notes on file for this visit.    SUBJECTIVE:  Kate Chacon  is a 71 y.o. female who comes in today for follow-up. I last saw her a month ago for follow-up after her stroke. She has atrial fibrillation and biatrial enlargement and had poor rate control. We increased her metoprolol to 75 mg twice a day and left her on lisinopril 20 mg daily and continue to atorvastatin. She still had elevated blood pressure and pulse still in the 80s, so we increased her metoprolol tartrate to 200 mg daily.    She was also having depressive symptoms and we began sertraline 50 mg daily and discussed seeing a counselor but she wishes to hold off. She's here today for follow-up.    She feels better when she goes to bed, but has some morning nausea when she first wakes up. It will gradually disappear, it was present before the new medication.     She at times will be a bit light headed at times. She wants us to check her ears. She thinks it is a motion sensation. It doesn't seem positional, but seems like a car sick feeling.     She has a few sessions of PT left then will be doing a HEP. She is doing airam and bands.  She is going to sign up for [facility] [employer].     Past Medical, Family, and Social History reviewed and updated as noted below.   ROS is negative except as noted above       No Known Allergies,   Family History       Problem   Relation Age of Onset     Diabetes  Father      Hypertension  Father      Other  Mother      h/o coronary artery disease/dementia       Asthma  Mother      Cancer  Maternal Aunt      Uterine       Diabetes  Maternal Grandmother     ,   Current Outpatient Prescriptions on File Prior to Visit       Medication  Sig Dispense Refill     atorvastatin (LIPITOR) 80 mg tablet Take 80 mg by mouth.       Blood Pressure Test Kit-Large kit As directed. 1 Kit 0     ibuprofen (ADVIL) 200 mg tablet Take 400 mg by mouth 4 times daily if needed.       lisinopril (PRINIVIL; ZESTRIL) 20 mg tablet Take 20 mg by mouth.       metoprolol tartrate (LOPRESSOR) 100 mg tablet Take 1 tablet by mouth 2 times daily. 60 tablet 11     sertraline (ZOLOFT) 50 mg tablet Take 1 tablet by mouth once daily. 30 tablet 11     warfarin (COUMADIN) 5 mg tablet Take 5 mg x 6 days/week, and 2.5 mg x 1 days/week. 90 tablet 0     No current facility-administered medications on file prior to visit.    ,   Past Medical History      Diagnosis   Date     Ganglion cyst of wrist       right      Helicobacter positive gastritis       treated and 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       NVD (normal vaginal delivery)       x3    ,   Patient Active Problem List       Diagnosis  Date Noted     MDD (major depressive disorder), single episode, moderate (HC)  01/05/2017     Anxiety  01/05/2017     Hypertension  12/08/2016     Chronic atrial fibrillation (HC)  11/02/2016     Stroke (HC)  10/28/2016     Anticoagulation goal of INR 2 to 3  10/28/2016     Cerebrovascular accident (CVA), unspecified mechanism  10/28/2016     A-fib (HC)  10/16/2016     Ischemic stroke (HC)  10/15/2016     SHOULDER IMPINGEMENT SYNDROME, LEFT  02/15/2012     EAR PAIN  02/15/2012     DIVERTICULOSIS, SIGMOID COLON  04/04/2011     SINUSITIS, ACUTE  03/25/2011     CERUMEN IMPACTION, BILATERAL  03/02/2011     COLONIC POLYPS, ADENOMATOUS, HX OF  03/02/2011     HORMONE REPLACEMENT THERAPY       DYSPEPSIA, CHRONIC       chronic acid dyspepsia          TOBACCO ABUSE       chronic          OSTEOPOROSIS, LUMBAR SPINE  02/08/2006   ,   Past Surgical History       Procedure   Laterality Date     Appendectomy         "Tonsillectomy        Chondrodermatitis nodularis   8/3/05     helices on the right ear        Colonoscopy   8/19/05     Cecal biopsy with tubular adenoma low grade dysplasia.       Esophagogastroduodenoscopy   8/19/05     Colonoscopy   4/4/11     Colonoscopy   5/7/12    and   Social History      Substance Use Topics        Smoking status:  Former Smoker     Packs/day: 0.50     Years: 49.00     Types: Cigarettes     Quit date: 10/14/2016     Smokeless tobacco:  Never Used     Alcohol use  No     OBJECTIVE:  Visit Vitals       /90     Pulse 86     Ht 1.702 m (5' 7\")     Wt 72.2 kg (159 lb 3.2 oz)     Breastfeeding No     BMI 24.93 kg/m2      EXAM:  Alert cooperative, no distress. Affect is broad ranging and appropriate. She smiles more easily and seems more relaxed. TMs are clear bilaterally. Nose is clear. Throat is clear. Neck is supple without significant adenopathy. Heart rate is better controlled in the 80s, rhythm is still irregular consistent with atrial fibrillation. Lungs are clear, no rales rhonchi or wheezes are heard. She is still smoke free and was congratulated on same. She is stronger and gets up onto the exam table easily without assistance  ASSESSMENT/Plan :      Kate was seen today for follow up.    Diagnoses and all orders for this visit:    Ischemic stroke (HC)    Anticoagulation goal of INR 2 to 3    Chronic atrial fibrillation (HC)    Anxiety    MDD (major depressive disorder), single episode, moderate (HC)    Screening mammogram, encounter for  -     XR MAMMO BILAT SCREENING; Future    Need for vaccination with 13-polyvalent pneumococcal conjugate vaccine  -     OMNI PREVNAR 13 (AKA PNEUMOCOCCAL VACCINE 13-VALENT IM)  -     PA ADMIN VACC INITIAL    She is doing very well, and will continue with therapy. Discussed continuing with [facility] once she finishes therapy.    We'll continue with her same dose of metoprolol and lisinopril and 8 atorvastatin.    Continue with sertraline 50 " mg daily and follow-up in one month as she may need a dose adjustment but seems to be doing much better.    Prevnar today. Mammogram is scheduled.    A total of 25 minutes was spent with the patient, greater than 50% of the time was spent in counseling/discussion of the aforementioned concerns.       Asher Campos MD

## 2018-01-02 NOTE — PATIENT INSTRUCTIONS
Patient Information     Patient Name MRN Kate Ann [id number] Female 1945      Patient Instructions by Radha Burns RN at 2017  9:45 AM     Author:  Radha Burns RN Service:  (none) Author Type:  NURS- Registered Nurse     Filed:  2017  9:51 AM Encounter Date:  2017 Status:  Signed     :  Radha Burns RN (NURS- Registered Nurse)            2017 Details    Sun Mon  Thu Fri Sat     1               2      5 mg   See details      3      5 mg         4      5 mg         5      5 mg         6      2.5 mg         7      5 mg           8      5 mg         9      5 mg         10      5 mg         11      5 mg         12      5 mg         13      2.5 mg         14      5 mg           15      5 mg         16      5 mg         17      5 mg         18      5 mg         19      5 mg         20      2.5 mg         21      5 mg           22      5 mg         23      5 mg         24      5 mg         25      5 mg         26      5 mg         27      2.5 mg         28      5 mg           29      5 mg         30      5 mg         31                    Date Details    This INR check       Date of next INR:  2017         How to take your warfarin dose     To take:  2.5 mg Take half of a 5 mg tablet.    To take:  5 mg Take one of the 5 mg tablets.             Description          Continue same Warfarin dose and recheck in 1 month.  Radha Burns RN   2017    9:51 AM          Anticoagulation Summary as of 2017     INR goal 2.0-3.0    Today's INR 2.1    Next INR check 2017          Call your Anticoagulation Clinic at Dept: [phone]   if:   1. Any medications are started, stopped, or there is a change in dose.  2. You experience any bleeding that is not easily stopped or if it is recurrent.  3. You notice an increase in bruising or any bruising that does not heal.  You are scheduled for surgery, colonoscopy, dental extraction or any other procedure  where you may need to stop your Coumadin (warfarin).

## 2018-01-02 NOTE — PROGRESS NOTES
Patient Information     Patient Name MRN Sex Kate Akins [id number] Female 1945      Progress Notes by Sandy Klein PT at 1/10/2017 10:17 AM     Author:  Sandy Klein PT Service:  (none) Author Type:  PT- Physical Therapist     Filed:  1/10/2017 11:56 AM Date of Service:  1/10/2017 10:17 AM Status:  Signed     :  Sandy Klein PT (PT- Physical Therapist)            [facility]  Outpatient PT - Daily note      Date of Service:  1/10/2017   Visit #: 13    Patient Name: Kate Chacon    YOB: 1945   Referring MD/Provider: Referring MD/Provider: Bassem Anderson MD   Diagnosis: Medical and Treatment Diagnosis: s/p CVA   Treatment Diagnosis: right sided weakness and balance impairment s/p CVA  Insurance: Medicare  Start of Care Date: Start of Service: 16  Certification Dates: From: 2016   Re-Cert Due: Medicare/MA Re-Cert Due: 2/15/2017    Subjective      Patient reports she didn't get too sore following last visit.     Objective    Today's Intervention:    Nustep level 8 no arms seat. Seat 10    Patient instructed in set up on med x machines for transition to PEAK:  Med x: to fatigue   # x20 right lower extremity emphasis. Seat 19   Abduction 52#    Hamstring curls 72# seat 9   Leg extension 40# seat 5    double leg balance with A/P rocking on flat side 1/2 foam    Foam balance:   Bending to touch cone with med ball, then D2 flexion bilateral to fatigue. Patient reports fatigue and balance challenge. Partial romberg stance    Dynamic balance: 35 feet each   Braiding x20 feet bilateral. Progressed to looking up from feet and then eyes closed with one self corrected loss of balance    Walking lunges with rotation with med ball. VC for form    BOSU lunges with back leg on foam. Added head rotation to look over shoulders      Home Exercise Program: *= added to HEP.  Pt given handout.   Access Code: [email or web address]   URL:  [email or web address]   Date: 11/09/2016   Prepared by: Sandy Klein     Exercises   Standing Hip Abduction with Anchored Resistance - 10-15 reps - 1-2 sets - 5 hold - 1x daily   Standing Hip Extension with Anchored Resistance - 10-15 reps - 1-2 sets - 5 hold - 1x daily   Tandem Stance - 30-60 hold - 1x daily     Assessment    Patient demonstrates improved hip strategy utilization, but needs some assist and cuing to prevent loss of balance. Able to progress difficulty of balance exercises and increase repetitions for strength and endurance.     Patient will benefit from continued skilled physical therapy services to address aforementioned impairments and return patient to previous level of functioning.      Goals:     Short Term Goals: To be met in 4 weeks:   Following physical therapy intervention, the patient will be able to:   1. Improve dynamic stability to allow use of single point cane for all mobility with no evidence for imbalance and no falls.   2. Improve lower extremity strength to 5-/5 for improved endurance for activities of daily living.    Long Term Goals: To be met in 8 weeks:   Following physical therapy intervention, the patient will be able to:   1. Improve endurance to allow walking without assistive device for up to 30 mins to complete grocery shopping with minimal difficulty.  2. Patient will be independent with HEP to continue to maintain ROM and strength achieved during physical therapy.      G Codes and Modifier taken from patient completing the dynamic gait index and functional gait assessment: updated 12/21/2016   Current Primary G Code and Modifier:    Per the Patient's intake and/or assessment the Primary G Code is: Mobility .   The Patient's Impairment, Limitation or Restriction Modifier would be best described as: CI - 1% - 20% Impairment.   Goal Primary G Code and Modifier:    The Patient's G Code Goal would be: Mobility    The Patient's Impairment, Limitation  or Restriction Modifier goal would be best described as: CI - 1% - 20% Impairment.     Therapist Assessment of Today's Intervention:  Patient needed a few rest breaks due to fatigue.    Plan  Treatment Plan / Targeted Outcomes:     Frequency:   10 additional visits (addended)     Duration of Treatment: 8 weeks     Plan of Care Due Date: Medicare/MA Re-Cert Due: 2/15/2017    Plan for next visit:  lower extremity strengthening, Nustep, balance training, med x as able    Thank you for your referral to [facility].  Please call with any questions, concerns or comments.  [phone]  Sandy Klein, PT, DPT

## 2018-01-02 NOTE — PROGRESS NOTES
"Patient Information     Patient Name MRN Kate Ann [id number] Female 1945      Progress Notes by Lynne Mcrae OT at 2017 10:02 AM     Author:  Lynne Mcrae OT Service:  (none) Author Type:  OT- Occupational Therapist     Filed:  2017 12:57 PM Date of Service:  2017 10:02 AM Status:  Signed     :  Lynne Mcrae OT (OT- Occupational Therapist)            [facility]  Outpatient OT - Neurological Daily Note     Date of Service:  2017     Visit #: 8      Patient Name:  Kate Chacon  Referring MD/Provider: Justin  Diagnosis: CVA with right UE hemiparesis  Treatment Diagnosis: Decreased right dominant UE coordination, right dominant intrinsic hand weakness, decreased I with ADL./IADL/work tasks  Insurance: Medicare  Onset date:   Start of Care Date: 16  Certification Dates: From:  16     Re-Cert Due: 17     Next MD visit: Dr. Campos        Per Dr. Campos's note: goal BP = less than 130/80.     Subjective:  No new concerns. Patient states \"her writing is off, but that is about it\".  She has been writing out checks and her writing is scribbling. The coordination is off but the strength is good. she has been doing cross word puzzles and suduko with no difficulties. she states her thumb and index finger have had decrease sensation since the stroke so picking up small objects is difficult. She is wanting a HEP to follow at home and thinks she can continue with her OT at home.     Objective (coordination ):  BP prior to upper body exercise:  Right arm, automatic cuff 126/79, HR: 75    Post ex:  145/80  HR:70       RUE coordination and neuromuscular re-education task:     -practice pre-writing activities. able to stay on the line 10% of the time.   -picking up small objects with right hand keeping all objects in her hand.    -picking up 4-5 small objects wit in hand translation then " "reversing and placing objects back on table   Top   -picking up objects in right and and placing in jar.     -picking up take 5 pegs and placing in holes. Able to manipulate pegs at finger tip level with out  dropping. Removed pegs as fast as possible and replaced in container      Educated in HEP with red theraband and 1# weight handout provided.      HEP: Patient reports she did not receive a home program upon discharge from [facility],  \"just a ball\" to squeeze in right hand. OT trained in completion of functional hand activities for improving right dominant UE coordination. Provided with tennis ball to use at home for repetitive bounce/catch while seated. Instructed to practice handwriting daily, copying articles from magazines. Trained in completion of intrinsic strengthening using yellow putty for finger adduction and opposition of thumb to each fingertip in turn, against resistance of putty. Written handouts provided for all. 11/28: completes crosswords and suduko puzzles every morning. She feels her legibility is improving.       Assessment: good in hand manipulation. Grasping objects with pincher grasp pattern limited by decrease sensation in fingers.      Patient s goal: \"I can tell my right arm needs some work.\", improve handwriting     Functional short term goals (established in collaboration with the patient, to be met in 8 weeks):  1) Kate will rate her handwriting ability as at least 7 on the PSFS scale, improving from a current rating of 3, to facilitate improved independence with work tasks.  1/4/2017 reports this is still difficult    2) Kate will rate her ability to manage utensils at meals using right dominant hand as 9 on the PSFS scale, improving from a current rating of 7, to facilitate improved independence with self-care tasks. 1/4/2017 reports no difficulty  3) Kate will rate her keyboarding ability as at least 9 on the PSFS scale, improving from a current rating of 6, to facilitate improved " independence with work tasks. 1/4/2017 improved  4) Kate will rate her ability to manage a cup of coffee using right dominant hand as 9 on the PSFS scale, improving from a current rating of 7, to facilitate improved independence with self-care tasks.1/4/2017 patient reports this has improved with no difficulty. Goal met    5) Kate will complete pre-driving assessment to identify safety/recommendations for driving.      Long term goal (to be met in 12 weeks):  1) Kate will obtain objective improvements on standardized coordination tests, to facilitate meeting functional goals above.      Plan:  Patient wants to trial at home activities for OT. She will continue with PT for 2 more sessions then possibly transition to [facility]. Will keep patient chart open.       Lynne Mcrae OTR/L  Occupational Therapist

## 2018-01-02 NOTE — PROGRESS NOTES
"Patient Information     Patient Name MRN Sex Kate Akins [id number] Female 1945      Progress Notes by Sandy Klein PT at 2017 10:32 AM     Author:  Sandy Klein PT Service:  (none) Author Type:  PT- Physical Therapist     Filed:  2017 12:33 PM Date of Service:  2017 10:32 AM Status:  Signed     :  Sandy Klein PT (PT- Physical Therapist)            [facility]  Outpatient PT - Daily note      Date of Service:  2017   Visit #: [id number]    Patient Name: Kate Chacon    YOB: 1945   Referring MD/Provider: Referring MD/Provider: Bassem Anderson MD   Diagnosis: Medical and Treatment Diagnosis: s/p CVA   Treatment Diagnosis: right sided weakness and balance impairment s/p CVA  Insurance: Medicare  Start of Care Date: Start of Service: 16  Certification Dates: From: 2016   Re-Cert Due: Medicare/MA Re-Cert Due: 2/15/2017    Subjective      Patient reports she is still having trouble with the right leg. When questioned, states she doesn't trust it and it gets tired easy.    Objective  blood pressure:  127/73 HR 68  118/86 HR 84    Today's Intervention:    Treadmill 10% incline x3.5 mins. Working on decreasing upper extremity support with smooth gait pattern. Also sidestepping 10% incline 0.7 mph x2.5 mins to right.      Nustep level 8 no arms seat     double leg balance with A/P rocking on flat side 1/2 foam    Walking up 6\" wedge: x3 reps each    Forward and retro walk. Cues to keep weight over toes with retro and slow momentum   Side stepping- 2 mod A for loss of balance with coming downhill to the right, cues for weight shift.    Foam balance: x1-2 min each   Eyes closed with and without head turns horizontal and vertical   Tandem stance with head turns   Cone toe taps, cues to look up rather at feet to challenge proprioception.    Bending to touch cone with med ball, then D2 flexion bilateral to fatigue. Patient reports " fatigue and balance challenge.    Dynamic balance: 35 feet each   Braiding x20 feet bilateral. More difficulty going to the left due to right lower extremity coordination and balance   Tandem walk    Head turns horizontal and vertical    BOSU sustained lunges with back foot on foam 2 x30 sec bilateral     Deferred:  Med x: to fatigue   # x20 right lower extremity emphasis. Seat 19   Abduction 52#    Hamstring curls 72#  Tandem stance on flat side 1/2 foam      Home Exercise Program: *= added to HEP.  Pt given handout.   Access Code: [email or web address]   URL: [email or web address]   Date: 11/09/2016   Prepared by: Sandy Klein     Exercises   Standing Hip Abduction with Anchored Resistance - 10-15 reps - 1-2 sets - 5 hold - 1x daily   Standing Hip Extension with Anchored Resistance - 10-15 reps - 1-2 sets - 5 hold - 1x daily   Tandem Stance - 30-60 hold - 1x daily     Assessment    Patient demonstrates improved hip strategy utilization, but needs some assist and cuing to prevent loss of balance. Progressing lower extremity strength and endurance noted.      Patient will benefit from continued skilled physical therapy services to address aforementioned impairments and return patient to previous level of functioning.      Goals:     Short Term Goals: To be met in 4 weeks:   Following physical therapy intervention, the patient will be able to:   1. Improve dynamic stability to allow use of single point cane for all mobility with no evidence for imbalance and no falls.   2. Improve lower extremity strength to 5-/5 for improved endurance for activities of daily living.    Long Term Goals: To be met in 8 weeks:   Following physical therapy intervention, the patient will be able to:   1. Improve endurance to allow walking without assistive device for up to 30 mins to complete grocery shopping with minimal difficulty.  2. Patient will be independent with HEP to continue to maintain ROM and strength achieved during  physical therapy.      G Codes and Modifier taken from patient completing the dynamic gait index and functional gait assessment: updated 12/21/2016   Current Primary G Code and Modifier:    Per the Patient's intake and/or assessment the Primary G Code is: Mobility .   The Patient's Impairment, Limitation or Restriction Modifier would be best described as: CI - 1% - 20% Impairment.   Goal Primary G Code and Modifier:    The Patient's G Code Goal would be: Mobility    The Patient's Impairment, Limitation or Restriction Modifier goal would be best described as: CI - 1% - 20% Impairment.     Therapist Assessment of Today's Intervention:  Patient needed a few rest breaks due to fatigue.    Plan  Treatment Plan / Targeted Outcomes:     Frequency:   10 additional visits (addended)     Duration of Treatment: 8 weeks     Plan of Care Due Date: Medicare/MA Re-Cert Due: 2/15/2017    Plan for next visit:  lower extremity strengthening, Nustep, balance training, med x as able    Thank you for your referral to [facility].  Please call with any questions, concerns or comments.  [phone]  Sandy Klein, PT, DPT

## 2018-01-03 NOTE — PATIENT INSTRUCTIONS
Patient Information     Patient Name MRN Sex Kate Akins [id number] Female 1945      Patient Instructions by Radha Burns RN at 2017  9:30 AM     Author:  Radha Burns RN Service:  (none) Author Type:  NURS- Registered Nurse     Filed:  2017  9:27 AM Encounter Date:  2017 Status:  Signed     :  Radha Burns RN (NURS- Registered Nurse)            2017 Details    Sun  Thu Fri Sat        1               2               3               4                 5               6      5 mg   See details      7      5 mg         8      5 mg         9      5 mg         10      5 mg         11      5 mg           12      5 mg         13      5 mg         14      5 mg         15      5 mg         16      5 mg         17      5 mg         18      5 mg           19      5 mg         20      5 mg         21               22               23               24               25                 26               27               28                    Date Details    This INR check       Date of next INR:  2017         How to take your warfarin dose     To take:  5 mg Take one of the 5 mg tablets.             Description          Increase dose and recheck in 2 weeks. .............Radha Burns RN.......... 2017  9:27 AM              Anticoagulation Summary as of 2017     INR goal 2.0-3.0    Today's INR 1.8    Next INR check 2017          Call your Anticoagulation Clinic at Dept: [phone]   if:   1. Any medications are started, stopped, or there is a change in dose.  2. You experience any bleeding that is not easily stopped or if it is recurrent.  3. You notice an increase in bruising or any bruising that does not heal.  You are scheduled for surgery, colonoscopy, dental extraction or any other procedure where you may need to stop your Coumadin (warfarin).

## 2018-01-03 NOTE — PATIENT INSTRUCTIONS
Patient Information     Patient Name MRN Kate Ann [id number] Female 1945      Patient Instructions by Radha Burns RN at 3/6/2017  9:45 AM     Author:  Radha Burns RN Service:  (none) Author Type:  NURS- Registered Nurse     Filed:  3/6/2017  9:45 AM Encounter Date:  3/6/2017 Status:  Signed     :  Radha Burns RN (NURS- Registered Nurse)            2017 Details    Sun Mon Tue Wed Thu Fri Sat        1               2               3               4                 5               6      5 mg   See details      7      5 mg         8      5 mg         9      5 mg         10      5 mg         11      5 mg           12      5 mg         13      5 mg         14      5 mg         15      5 mg         16      5 mg         17      5 mg         18      5 mg           19      5 mg         20      5 mg         21      5 mg         22      5 mg         23      5 mg         24      5 mg         25      5 mg           26      5 mg         27      5 mg         28      5 mg         29      5 mg         30      5 mg         31      5 mg           Date Details    This INR check               How to take your warfarin dose     To take:  5 mg Take one of the 5 mg tablets.           2017 Details    Sun Mon Tue Wed Thu Fri Sat           1      5 mg           2      5 mg         3      5 mg         4               5               6               7               8                 9               10               11               12               13               14               15                 16               17               18               19               20               21               22                 23               24               25               26               27               28               29                 30                      Date Details   No additional details    Date of next INR:  4/3/2017         How to take your warfarin dose     To take:  5 mg  Take one of the 5 mg tablets.             Description          Continue same Warfarin dose and recheck in 1 month.  Radha Burns RN   3/6/2017    9:45 AM            Anticoagulation Summary as of 3/6/2017     INR goal 2.0-3.0    Today's INR 2.5    Next INR check 4/3/2017          Call your Anticoagulation Clinic at Dept: [phone]   if:   1. Any medications are started, stopped, or there is a change in dose.  2. You experience any bleeding that is not easily stopped or if it is recurrent.  3. You notice an increase in bruising or any bruising that does not heal.  You are scheduled for surgery, colonoscopy, dental extraction or any other procedure where you may need to stop your Coumadin (warfarin).

## 2018-01-03 NOTE — DISCHARGE SUMMARY
Patient Information     Patient Name MRN Sex Kate Akins [id number] Female 1945      Discharge Summaries by Sandy Klein PT at 3/10/2017  3:01 PM     Author:  Sandy Klein PT Service:  (none) Author Type:  PT- Physical Therapist     Filed:  3/10/2017  3:03 PM Date of Service:  3/10/2017  3:01 PM Status:  Signed     :  Sandy Klein PT (PT- Physical Therapist)            [facility]  Outpatient PT - Discharge Summary    Date of discharge: 3/10/2017     Patient Name: Kate Chacon    YOB: 1945   Referring MD/Provider: Referring MD/Provider: Bassem Anderson MD   Diagnosis: Medical and Treatment Diagnosis: s/p CVA   Treatment Diagnosis: right sided weakness and balance impairment s/p CVA  Insurance: Medicare  Start of Care Date: Start of Service: 16     Subjective from last visit on 17:     Patient reports she still has a slight mistrust of her right leg, but that is getting better. Feels good about her balance, although that can be a little off at times too. Overall, states she is pretty much back to doing everything she used to do. Not driving yet because of the snow and icy roads.    Dates of Service: 16    Thru:  17  Number of visits: 14           Reason for Discharge:  Goals of therapy met     Progress from Initial to Discharge (if applicable):  Increased strength   Improved ambulation distance  Improved quality of gait with decreased deviation   Improved balance  Low risk of fall    Comments: Please see last visit note from 17 for details of patient progress.    Further Recommendations:    Patient will continue with established home exercise program    Patient is discharged from Physical Therapy    Thank you for your referral to [facility].  Please call with any questions, concerns or comments.  [phone]          Sandy Klein PT, DPT

## 2018-01-03 NOTE — PATIENT INSTRUCTIONS
Patient Information     Patient Name MRN Kate Ann [id number] Female 1945      Patient Instructions by Sara Rodriguez RN at 2017  9:45 AM     Author:  Sara Rodriguez RN Service:  (none) Author Type:  NURS- Registered Nurse     Filed:  2017  9:42 AM Encounter Date:  2017 Status:  Signed     :  Sara Rodriguez RN (NURS- Registered Nurse)            2017 Details    Sun Mon Tue Wed Thu Fri Sat        1               2               3               4                 5               6               7               8               9               10               11                 12               13               14               15               16               17               18                 19               20      5 mg   See details      21      5 mg         22      5 mg         23      5 mg         24      5 mg         25      5 mg           26      5 mg         27      5 mg         28      5 mg              Date Details    This INR check               How to take your warfarin dose     To take:  5 mg Take one of the 5 mg tablets.           2017 Details    Sun Mon Tue Wed Thu Fri Sat        1      5 mg         2      5 mg         3      5 mg         4      5 mg           5      5 mg         6      5 mg         7               8               9               10               11                 12               13               14               15               16               17               18                 19               20               21               22               23               24               25                 26               27               28               29               30               31                 Date Details   No additional details    Date of next INR:  3/6/2017         How to take your warfarin dose     To take:  5 mg Take one of the 5 mg tablets.             Description          Increase dose and recheck  in 2 weeks. NEYMAR SPICER RN ....................  2/20/2017   9:42 AM            Anticoagulation Summary as of 2/20/2017     INR goal 2.0-3.0    Today's INR 2.3    Next INR check 3/6/2017          Call your Anticoagulation Clinic at Dept: [phone]   if:   1. Any medications are started, stopped, or there is a change in dose.  2. You experience any bleeding that is not easily stopped or if it is recurrent.  3. You notice an increase in bruising or any bruising that does not heal.  You are scheduled for surgery, colonoscopy, dental extraction or any other procedure where you may need to stop your Coumadin (warfarin).

## 2018-01-03 NOTE — PROGRESS NOTES
Patient Information     Patient Name MRN Sex Kate Akins [id number] Female 1945      Progress Notes by Asher Campos MD at 2017 10:30 AM     Author:  Asher Campos MD Service:  (none) Author Type:  Physician     Filed:  2017  5:53 PM Encounter Date:  2017 Status:  Signed     :  Asher Campos MD (Physician)            There are no exam notes on file for this visit.    SUBJECTIVE:  Kate Chacon  is a 71 y.o. female who comes in today for follow-up after her stroke. I last saw her a month ago. She has moved on from physical therapy to a home exercise program. She continues on metoprolol, lisinopril, and Lipitor.  She continues on sertraline for depression. She just had mammogram today.    PHQ Depression Screening 2017   Date of PHQ exam (doc flow) 2017   1. Lack of interest/pleasure (No Data) 0 - Not at all   2. Feeling down/depressed - 0 - Not at all   PHQ-2 TOTAL SCORE - 0   3. Trouble sleeping - 0 - Not at all   4. Decreased energy - 0 - Not at all   5. Appetite change - 0 - Not at all   6. Feelings of failure - 0 - Not at all   7. Trouble concentrating - 0 - Not at all   8. Activity level - 0 - Not at all   9. Hurting yourself - 0 - Not at all   PHQ-9 TOTAL SCORE - 0   PHQ-9 Severity Level - none   Functional Impairment - not difficult at all        Past Medical, Family, and Social History reviewed and updated as noted below.   ROS is negative except as noted above       No Known Allergies,   Family History       Problem   Relation Age of Onset     Diabetes  Father      Hypertension  Father      Other  Mother      h/o coronary artery disease/dementia       Asthma  Mother      Cancer  Maternal Aunt      Uterine       Diabetes  Maternal Grandmother      Cancer-breast  No Family History    ,   Current Outpatient Prescriptions on File Prior to Visit       Medication  Sig Dispense Refill     atorvastatin (LIPITOR) 80 mg tablet Take 80 mg by mouth.        Blood Pressure Test Kit-Large kit As directed. 1 Kit 0     ibuprofen (ADVIL) 200 mg tablet Take 400 mg by mouth 4 times daily if needed.       lisinopril (PRINIVIL; ZESTRIL) 20 mg tablet Take 20 mg by mouth.       metoprolol tartrate (LOPRESSOR) 100 mg tablet Take 1 tablet by mouth 2 times daily. 60 tablet 11     sertraline (ZOLOFT) 50 mg tablet Take 1 tablet by mouth once daily. 30 tablet 11     No current facility-administered medications on file prior to visit.    ,   Past Medical History      Diagnosis   Date     Ganglion cyst of wrist       right      Helicobacter positive gastritis       treated and 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       NVD (normal vaginal delivery)       x3    ,   Patient Active Problem List       Diagnosis  Date Noted     MDD (major depressive disorder), single episode, moderate (HC)  01/05/2017     Anxiety  01/05/2017     Hypertension  12/08/2016     Chronic atrial fibrillation (HC)  11/02/2016     Stroke (HC)  10/28/2016     Anticoagulation goal of INR 2 to 3  10/28/2016     Cerebrovascular accident (CVA), unspecified mechanism  10/28/2016     A-fib (HC)  10/16/2016     Ischemic stroke (HC)  10/15/2016     SHOULDER IMPINGEMENT SYNDROME, LEFT  02/15/2012     EAR PAIN  02/15/2012     DIVERTICULOSIS, SIGMOID COLON  04/04/2011     SINUSITIS, ACUTE  03/25/2011     CERUMEN IMPACTION, BILATERAL  03/02/2011     COLONIC POLYPS, ADENOMATOUS, HX OF  03/02/2011     HORMONE REPLACEMENT THERAPY       DYSPEPSIA, CHRONIC       chronic acid dyspepsia          TOBACCO ABUSE       chronic          OSTEOPOROSIS, LUMBAR SPINE  02/08/2006   ,   Past Surgical History       Procedure   Laterality Date     Appendectomy        Tonsillectomy        Chondrodermatitis nodularis   8/3/05     helices on the right ear        Colonoscopy   8/19/05     Cecal biopsy with tubular adenoma low grade dysplasia.       Esophagogastroduodenoscopy   8/19/05     Colonoscopy   4/4/11     Colonoscopy   5/7/12  "   and   Social History      Substance Use Topics        Smoking status:  Former Smoker     Packs/day: 0.50     Years: 49.00     Types: Cigarettes     Quit date: 10/14/2016     Smokeless tobacco:  Never Used     Alcohol use  No     OBJECTIVE:  Visit Vitals       /84     Pulse 72     Ht 1.702 m (5' 7\")     Wt 72.9 kg (160 lb 12.8 oz)     Breastfeeding No     BMI 25.18 kg/m2      EXAM:  Alert and cooperative, no distress. No significant weakness on the right side and balance seems to be good. Speech and language appear appropriate. No focal neurologic findings. Lungs are clear, no rales rhonchi or wheezes are heard. Cardiac irregularly irregular  without murmur. Affect is broad ranging and appropriate.  ASSESSMENT/Plan :      Kate was seen today for follow up.    Diagnoses and all orders for this visit:    Ischemic stroke (HC)    Chronic atrial fibrillation (HC)    Anticoagulation goal of INR 2 to 3    MDD (major depressive disorder), single episode, moderate (HC)    Anxiety    she continues to improve. Encouraged her to pursue her exercises. Continue with her current medications. Recommend follow-up in 3 months, sooner if needed.    Asher Campos MD            "

## 2018-01-03 NOTE — PATIENT INSTRUCTIONS
Patient Information     Patient Name MRN Kate Ann [id number] Female 1945      Patient Instructions by Radha Burns RN at 2017  9:30 AM     Author:  Radha Burns RN Service:  (none) Author Type:  NURS- Registered Nurse     Filed:  2017  9:30 AM Encounter Date:  2017 Status:  Signed     :  Radha Burns RN (NURS- Registered Nurse)            2017 Details    Sun Mon Tue Wed Thu Fri Sat     1               2               3               4               5               6               7                 8               9               10               11               12               13               14                 15               16               17               18               19               20               21                 22               23               24               25               26               27               28                 29               30      5 mg   See details      31      5 mg              Date Details    This INR check               How to take your warfarin dose     To take:  5 mg Take one of the 5 mg tablets.           2017 Details    Sun Mon Tue Wed Thu Fri Sat        1      5 mg         2      5 mg         3      2.5 mg         4      5 mg           5      5 mg         6      5 mg         7               8               9               10               11                 12               13               14               15               16               17               18                 19               20               21               22               23               24               25                 26               27               28                    Date Details   No additional details    Date of next INR:  2017         How to take your warfarin dose     To take:  2.5 mg Take half of a 5 mg tablet.    To take:  5 mg Take one of the 5 mg tablets.             Description           Continue same Warfarin dose and recheck in 1 week. Radha Burns RN    1/30/2017  9:30 AM            Anticoagulation Summary as of 1/30/2017     INR goal 2.0-3.0    Today's INR 1.7    Next INR check 2/6/2017          Call your Anticoagulation Clinic at Dept: [phone]   if:   1. Any medications are started, stopped, or there is a change in dose.  2. You experience any bleeding that is not easily stopped or if it is recurrent.  3. You notice an increase in bruising or any bruising that does not heal.  You are scheduled for surgery, colonoscopy, dental extraction or any other procedure where you may need to stop your Coumadin (warfarin).

## 2018-01-03 NOTE — PROGRESS NOTES
Patient Information     Patient Name MRN Sex Kate Akins [id number] Female 1945      Progress Notes by Svetlana Goel at 2017  9:47 AM     Author:  Svetlana Goel Service:  (none) Author Type:  (none)     Filed:  2017  9:48 AM Date of Service:  2017  9:47 AM Status:  Signed     :  Svetlana Goel            Falls Risk Criteria:    Age 65 and older or under age 4        Sensory deficits    Poor vision    Use of ambulatory aides    Impaired judgment    Unable to walk independently    Meets High Risk criteria for falls:  No               1.  Do you have dizziness or vertigo?    no                    2.  Do you need help standing or walking?   no                 3.  Have you fallen within the last 6 months?    no           4.  Has the patient been fasting?      no       If any risks are marked Yes, the following interventions are utilized:    Do not leave patient unattended     Assist patient in the dressing room and bathroom    Have ambulatory aides available throughout procedure    Involve patient s family if available

## 2018-01-03 NOTE — PROGRESS NOTES
Patient Information     Patient Name MRN Sex Kate Akins [id number] Female 1945      Progress Notes by Sandy Klein PT at 2017 10:09 AM     Author:  Sandy Klein PT Service:  (none) Author Type:  PT- Physical Therapist     Filed:  2017 12:19 PM Date of Service:  2017 10:09 AM Status:  Signed     :  Sandy Klein PT (PT- Physical Therapist)            [facility]  Outpatient PT - Progress note      Date of Service:  2017   Visit #: [id number]    Patient Name: Kate Chacon    YOB: 1945   Referring MD/Provider: Referring MD/Provider: Bassem Anderson MD   Diagnosis: Medical and Treatment Diagnosis: s/p CVA   Treatment Diagnosis: right sided weakness and balance impairment s/p CVA  Insurance: Medicare  Start of Care Date: Start of Service: 16  Certification Dates: From: 2016   Re-Cert Due: Medicare/MA Re-Cert Due: 2/15/2017    Subjective      Patient reports she still has a slight mistrust of her right leg, but that is getting better. Feels good about her balance, although that can be a little off at times too. Overall, states she is pretty much back to doing everything she used to do. Not driving yet because of the snow and icy roads.    Objective  dynamic gait index /24  functional gait assessment 27/30     ROM / Strength: 5-/5 hip    Today's Intervention:    Nustep level 8 no arms seat. Seat 10    Patient instructed in set up on med x machines for transition to PEAK:  Med x: to fatigue   # x20 right lower extremity emphasis. Seat 19   Abduction 56#    Hamstring curls 72# seat 9    Med ball toss to therapist with feet in partial tandem on foam x1 min    Wall squats theraball behind x20    Dynamic balance: 35 feet each   Walking lunges with rotation with med ball. VC for form    Deferred:  Leg extension 40# seat 5    Home Exercise Program: *= added to HEP.  Pt given handout.   Access Code: [email or web address]   URL:  [email or web address]   Date: 11/09/2016   Prepared by: Sandy Klein     Exercises   Standing Hip Abduction with Anchored Resistance - 10-15 reps - 1-2 sets - 5 hold - 1x daily   Standing Hip Extension with Anchored Resistance - 10-15 reps - 1-2 sets - 5 hold - 1x daily   Tandem Stance - 30-60 hold - 1x daily   Braiding, tandem walking added 1/12/2017     Assessment    Patient demonstrates low risk of falls as evidenced by her score on dynamic gait index and functional gait assessment and improved lower extremity strength compared to initial visit. She has met all goals at this time and will transition to independent home exercise program and possibly utilize the PEAK program.    Patient will benefit from continued skilled physical therapy services to address aforementioned impairments and return patient to previous level of functioning.      Goals:     Short Term Goals: To be met in 4 weeks:   Following physical therapy intervention, the patient will be able to:   1. Improve dynamic stability to allow use of single point cane for all mobility with no evidence for imbalance and no falls. Met  2. Improve lower extremity strength to 5-/5 for improved endurance for activities of daily living. Met    Long Term Goals: To be met in 8 weeks:   Following physical therapy intervention, the patient will be able to:   1. Improve endurance to allow walking without assistive device for up to 30 mins to complete grocery shopping with minimal difficulty. Met  2. Patient will be independent with HEP to continue to maintain ROM and strength achieved during physical therapy.  Met    G Codes and Modifier taken from patient completing the dynamic gait index and functional gait assessment: updated 1/12/2017   Current Primary G Code and Modifier:    Per the Patient's intake and/or assessment the Primary G Code is: Mobility .   The Patient's Impairment, Limitation or Restriction Modifier would be best described as: CI -  1% - 20% Impairment.   Goal Primary G Code and Modifier:    The Patient's G Code Goal would be: Mobility    The Patient's Impairment, Limitation or Restriction Modifier goal would be best described as: CI - 1% - 20% Impairment.     Therapist Assessment of Today's Intervention:  Patient needed no rest breaks due to fatigue.    Plan  Treatment Plan / Targeted Outcomes:     Frequency:   10 additional visits      Duration of Treatment: 8 weeks     Plan of Care Due Date: Medicare/MA Re-Cert Due: 2/15/2017    Plan for next visit: transition to independent home exercise program and PEAK. Patient's chart will remain open for 4 weeks before discharge per her request.    Thank you for your referral to [facility].  Please call with any questions, concerns or comments.  [phone]  Sandy Klein, PT, DPT

## 2018-01-03 NOTE — TELEPHONE ENCOUNTER
Patient Information     Patient Name MRN Sex Kate Akins [id number] Female 1945      Telephone Encounter by Sara Rodriguez RN at 2017  3:33 PM     Author:  Sara Rodriguez RN Service:  (none) Author Type:  NURS- Registered Nurse     Filed:  2017  3:37 PM Encounter Date:  2017 Status:  Signed     :  Sara Rodriguez RN (NURS- Registered Nurse)            Anticoagulant    Office visit in the past 12 months.    Last [facility]  Next visit with REGINA VAUGHN is on: No future appointment listed with this provider  Next visit with Family Practice is on: No future appointment listed in this department    Lab tests:  PT/INR at least monthly    INR (no units)    Date Value   2017 1.8 (H)     HEMOGLOBIN                (g/dL)    Date Value   10/15/2016 16.1 (H)     Prescription refilled per RN Medication Refill Policy.................... SARA RODRIGUEZ RN ....................  2017   3:34 PM

## 2018-01-04 NOTE — TELEPHONE ENCOUNTER
Patient Information     Patient Name MRN Sex Kate Akins [id number] Female 1945      Telephone Encounter by Paul Torres RN at 3/23/2017  4:22 PM     Author:  Paul Torres RN Service:  (none) Author Type:  NURS- Registered Nurse     Filed:  3/23/2017  4:29 PM Encounter Date:  3/23/2017 Status:  Signed     :  Paul Torres RN (NURS- Registered Nurse)            This is a Refill request from: [facility] pharmacy  Name of Medication: Atorvastatin  Quantity requested: 90 tabs  Last fill date: 2/10/17  Due for refill: Yes as per rx request  [facility]  PCP:  Regina Campos MD  Controlled Substance Agreement:  N/A   Diagnosis r/t this medication request: unknown    Chart review shows that patient was most recently seen by PCP on 17. Lipitor use was mentioned at that office visit with PCP, as well as plan as follows with regards to patient's medications:    she continues to improve. Encouraged her to pursue her exercises. Continue with her current medications. Recommend follow-up in 3 months, sooner if needed.    Writer unable to fill requested rx as rx is listed as historical on patient's active med list. Will route rx request to PCP for his consideration/approval.     Unable to complete prescription refill per RN Medication Refill Policy.................... Paul Torres RN ....................  3/23/2017   4:23 PM

## 2018-01-04 NOTE — PATIENT INSTRUCTIONS
Patient Information     Patient Name MRN Kate Ann [id number] Female 1945      Patient Instructions by Radha Burns RN at 4/3/2017  9:45 AM     Author:  Radha Burns RN Service:  (none) Author Type:  NURS- Registered Nurse     Filed:  4/3/2017  9:39 AM Encounter Date:  4/3/2017 Status:  Signed     :  Radha Burns RN (NURS- Registered Nurse)            2017 Details    Sun Mon Tue Wed Thu Fri Sat           1                 2               3      5 mg   See details      4      5 mg         5      5 mg         6      5 mg         7      5 mg         8      5 mg           9      5 mg         10      5 mg         11      5 mg         12      5 mg         13      5 mg         14      5 mg         15      5 mg           16      5 mg         17      5 mg         18      5 mg         19      5 mg         20      5 mg         21      5 mg         22      5 mg           23      5 mg         24      5 mg         25      5 mg         26      5 mg         27      5 mg         28      5 mg         29      5 mg           30      5 mg                Date Details    This INR check               How to take your warfarin dose     To take:  5 mg Take one of the 5 mg tablets.           May 2017 Details    Sun Mon Tue Wed Thu Fri Sat      1      5 mg         2               3               4               5               6                 7               8               9               10               11               12               13                 14               15               16               17               18               19               20                 21               22               23               24               25               26               27                 28               29               30               31                   Date Details   No additional details    Date of next INR:  2017         How to take your warfarin dose     To take:  5 mg  Take one of the 5 mg tablets.             Description          Continue same Warfarin dose and recheck in 1 month.  Radha Burns RN    4/3/2017  9:39 AM            Anticoagulation Summary as of 4/3/2017     INR goal 2.0-3.0    Today's INR 2.3    Next INR check 5/1/2017          Call your Anticoagulation Clinic at Dept: [phone]   if:   1. Any medications are started, stopped, or there is a change in dose.  2. You experience any bleeding that is not easily stopped or if it is recurrent.  3. You notice an increase in bruising or any bruising that does not heal.  You are scheduled for surgery, colonoscopy, dental extraction or any other procedure where you may need to stop your Coumadin (warfarin).

## 2018-01-04 NOTE — PATIENT INSTRUCTIONS
Patient Information     Patient Name MRN Kate Ann [id number] Female 1945      Patient Instructions by Sara Spicer RN at 2017  9:30 AM     Author:  Sara Spicer RN Service:  (none) Author Type:  NURS- Registered Nurse     Filed:  2017  9:30 AM Encounter Date:  2017 Status:  Signed     :  Sara Spiecr RN (NURS- Registered Nurse)            May 2017 Details    Sun  Thu Fri Sat      1      7.5 mg   See details      2      5 mg         3      5 mg         4      5 mg         5      5 mg         6      5 mg           7      5 mg         8      5 mg         9      5 mg         10      5 mg         11      5 mg         12      5 mg         13      5 mg           14      5 mg         15      5 mg         16      5 mg         17      5 mg         18      5 mg         19      5 mg         20      5 mg           21      5 mg         22      5 mg         23      5 mg         24      5 mg         25      5 mg         26      5 mg         27      5 mg           28      5 mg         29      5 mg         30               31                   Date Details    This INR check       Date of next INR:  2017         How to take your warfarin dose     To take:  5 mg Take one of the 5 mg tablets.    To take:  7.5 mg Take one and a half of the 5 mg tablets.             Description          Take extra Warfarin today (7.5 mg instead of 5 mg), continue same Warfarin dose and recheck in 1 month.  SARA SPICER RN ....................  2017   9:29 AM          Anticoagulation Summary as of 2017     INR goal 2.0-3.0    Today's INR 2.0    Next INR check 2017          Call your Anticoagulation Clinic at Dept: [phone]   if:   1. Any medications are started, stopped, or there is a change in dose.  2. You experience any bleeding that is not easily stopped or if it is recurrent.  3. You notice an increase in bruising or any bruising that does not heal.  4. You  are scheduled for surgery, colonoscopy, dental extraction or any other procedure where you may need to stop your Coumadin (warfarin).

## 2018-01-05 NOTE — TELEPHONE ENCOUNTER
Patient Information     Patient Name MRN Sex Kate Akins [id number] Female 1945      Telephone Encounter by Radha Burns RN at 2017 12:43 PM     Author:  Radha Burns RN Service:  (none) Author Type:  NURS- Registered Nurse     Filed:  2017 12:44 PM Encounter Date:  2017 Status:  Signed     :  Radha Burns RN (NURS- Registered Nurse)            Anticoagulant    Office visit in the past 12 months.    Last visit with REGINA VAUGHN was on: 2017 in [facility]  Next visit with REGINA VAUGHN is on: No future appointment listed with this provider  Next visit with Family Practice is on: No future appointment listed in this department    Lab tests:  PT/INR at least monthly    INR (no units)    Date Value   2017 2.0 (H)     HEMOGLOBIN                (g/dL)    Date Value   10/15/2016 16.1 (H)         Prescription refilled per RN Medication Refill Policy.................... Radha Burns RN ....................  2017   12:44 PM

## 2018-01-05 NOTE — PATIENT INSTRUCTIONS
Patient Information     Patient Name MRN Kate Ann [id number] Female 1945      Patient Instructions by Rut Rivas RN at 2017  9:00 AM     Author:  Rut Rivas RN Service:  (none) Author Type:  NURS- Registered Nurse     Filed:  2017  9:05 AM Encounter Date:  2017 Status:  Signed     :  Rut Rivas RN (NURS- Registered Nurse)            May 2017 Details    Sun Mon Tue Wed Thu Fri Sat      1               2               3               4               5               6                 7               8               9               10               11               12               13                 14               15               16               17               18               19               20                 21               22               23               24               25               26               27                 28               29               30               31      5 mg   See details          Date Details    This INR check               How to take your warfarin dose     To take:  5 mg Take one of the 5 mg tablets.           2017 Details    Sun Mon Tue Wed Thu Fri Sat         1      5 mg         2      5 mg         3      5 mg           4      5 mg         5      5 mg         6      5 mg         7      5 mg         8      5 mg         9      5 mg         10      5 mg           11      5 mg         12      5 mg         13      5 mg         14      5 mg         15      5 mg         16      5 mg         17      5 mg           18      5 mg         19      5 mg         20      5 mg         21      5 mg         22      5 mg         23      5 mg         24      5 mg           25      5 mg         26      5 mg         27      5 mg         28      5 mg         29               30                 Date Details   No additional details    Date of next INR:  2017         How to take your warfarin dose     To take:  5  mg Take one of the 5 mg tablets.             Description          Continue same Warfarin dose and recheck in 1 month.  Rut Rivas RN   5/31/2017    9:04 AM            Anticoagulation Summary as of 5/31/2017     INR goal 2.0-3.0    Today's INR 2.0    Next INR check 6/28/2017          Call your Anticoagulation Clinic at Dept: [phone]   if:   1. Any medications are started, stopped, or there is a change in dose.  2. You experience any bleeding that is not easily stopped or if it is recurrent.  3. You notice an increase in bruising or any bruising that does not heal.  4. You are scheduled for surgery, colonoscopy, dental extraction or any other procedure where you may need to stop your Coumadin (warfarin).

## 2018-01-08 ENCOUNTER — ANTICOAGULATION - GICH (OUTPATIENT)
Dept: INTERNAL MEDICINE | Facility: OTHER | Age: 73
End: 2018-01-08

## 2018-01-08 DIAGNOSIS — Z51.81 ENCOUNTER FOR THERAPEUTIC DRUG LEVEL MONITORING: ICD-10-CM

## 2018-01-08 DIAGNOSIS — I63.9 CEREBRAL INFARCTION (H): ICD-10-CM

## 2018-01-08 DIAGNOSIS — Z79.01 LONG TERM CURRENT USE OF ANTICOAGULANT: ICD-10-CM

## 2018-01-08 LAB — INR - HISTORICAL: 2.1

## 2018-01-15 ENCOUNTER — MEDICAL CORRESPONDENCE (OUTPATIENT)
Facility: CLINIC | Age: 73
End: 2018-01-15
Payer: COMMERCIAL

## 2018-01-15 ENCOUNTER — COMMUNICATION - GICH (OUTPATIENT)
Dept: CARDIOLOGY | Facility: OTHER | Age: 73
End: 2018-01-15

## 2018-01-15 ENCOUNTER — AMBULATORY - GICH (OUTPATIENT)
Dept: SCHEDULING | Facility: OTHER | Age: 73
End: 2018-01-15

## 2018-01-15 ENCOUNTER — OFFICE VISIT - GICH (OUTPATIENT)
Dept: CARDIOLOGY | Facility: OTHER | Age: 73
End: 2018-01-15

## 2018-01-15 ENCOUNTER — TRANSFERRED RECORDS (OUTPATIENT)
Dept: HEALTH INFORMATION MANAGEMENT | Facility: CLINIC | Age: 73
End: 2018-01-15

## 2018-01-15 DIAGNOSIS — Z95.0 PRESENCE OF CARDIAC PACEMAKER: ICD-10-CM

## 2018-01-15 DIAGNOSIS — Z79.01 LONG TERM CURRENT USE OF ANTICOAGULANT: ICD-10-CM

## 2018-01-15 DIAGNOSIS — I10 ESSENTIAL (PRIMARY) HYPERTENSION: ICD-10-CM

## 2018-01-15 DIAGNOSIS — E78.5 HYPERLIPIDEMIA: ICD-10-CM

## 2018-01-15 DIAGNOSIS — Z51.81 ENCOUNTER FOR THERAPEUTIC DRUG LEVEL MONITORING: ICD-10-CM

## 2018-01-15 DIAGNOSIS — I48.19 PERSISTENT ATRIAL FIBRILLATION (H): ICD-10-CM

## 2018-01-15 DIAGNOSIS — I63.9 CEREBRAL INFARCTION (H): ICD-10-CM

## 2018-01-15 PROCEDURE — 99214 OFFICE O/P EST MOD 30 MIN: CPT | Mod: ZP | Performed by: NURSE PRACTITIONER

## 2018-01-15 ASSESSMENT — ANXIETY QUESTIONNAIRES
3. WORRYING TOO MUCH ABOUT DIFFERENT THINGS: NOT AT ALL
7. FEELING AFRAID AS IF SOMETHING AWFUL MIGHT HAPPEN: SEVERAL DAYS
6. BECOMING EASILY ANNOYED OR IRRITABLE: NOT AT ALL
GAD7 TOTAL SCORE: 1
1. FEELING NERVOUS, ANXIOUS, OR ON EDGE: NOT AT ALL
4. TROUBLE RELAXING: NOT AT ALL
2. NOT BEING ABLE TO STOP OR CONTROL WORRYING: NOT AT ALL
5. BEING SO RESTLESS THAT IT IS HARD TO SIT STILL: NOT AT ALL

## 2018-01-15 ASSESSMENT — PATIENT HEALTH QUESTIONNAIRE - PHQ9: SUM OF ALL RESPONSES TO PHQ QUESTIONS 1-9: 1

## 2018-01-27 VITALS
HEART RATE: 60 BPM | WEIGHT: 181.3 LBS | BODY MASS INDEX: 29.14 KG/M2 | DIASTOLIC BLOOD PRESSURE: 72 MMHG | SYSTOLIC BLOOD PRESSURE: 138 MMHG

## 2018-01-27 VITALS
SYSTOLIC BLOOD PRESSURE: 132 MMHG | SYSTOLIC BLOOD PRESSURE: 126 MMHG | RESPIRATION RATE: 12 BRPM | DIASTOLIC BLOOD PRESSURE: 78 MMHG | HEART RATE: 82 BPM | WEIGHT: 176.1 LBS | BODY MASS INDEX: 27.91 KG/M2 | BODY MASS INDEX: 28.19 KG/M2 | BODY MASS INDEX: 27.58 KG/M2 | HEART RATE: 64 BPM | DIASTOLIC BLOOD PRESSURE: 74 MMHG | WEIGHT: 180 LBS | SYSTOLIC BLOOD PRESSURE: 136 MMHG | WEIGHT: 178.2 LBS | DIASTOLIC BLOOD PRESSURE: 70 MMHG

## 2018-01-27 VITALS
BODY MASS INDEX: 24.99 KG/M2 | SYSTOLIC BLOOD PRESSURE: 132 MMHG | WEIGHT: 159.2 LBS | DIASTOLIC BLOOD PRESSURE: 90 MMHG | HEIGHT: 67 IN | HEART RATE: 86 BPM

## 2018-01-27 VITALS
SYSTOLIC BLOOD PRESSURE: 124 MMHG | WEIGHT: 160.8 LBS | HEART RATE: 72 BPM | DIASTOLIC BLOOD PRESSURE: 84 MMHG | HEIGHT: 67 IN | BODY MASS INDEX: 25.24 KG/M2

## 2018-01-27 VITALS
DIASTOLIC BLOOD PRESSURE: 82 MMHG | BODY MASS INDEX: 28.61 KG/M2 | HEIGHT: 66 IN | BODY MASS INDEX: 28.07 KG/M2 | HEART RATE: 68 BPM | SYSTOLIC BLOOD PRESSURE: 138 MMHG | DIASTOLIC BLOOD PRESSURE: 68 MMHG | WEIGHT: 179.2 LBS | HEART RATE: 52 BPM | SYSTOLIC BLOOD PRESSURE: 124 MMHG | WEIGHT: 178 LBS

## 2018-01-27 VITALS
SYSTOLIC BLOOD PRESSURE: 90 MMHG | DIASTOLIC BLOOD PRESSURE: 66 MMHG | HEART RATE: 76 BPM | WEIGHT: 176 LBS | BODY MASS INDEX: 28.29 KG/M2

## 2018-01-30 ENCOUNTER — TRANSFERRED RECORDS (OUTPATIENT)
Dept: HEALTH INFORMATION MANAGEMENT | Facility: CLINIC | Age: 73
End: 2018-01-30

## 2018-01-30 ENCOUNTER — AMBULATORY - GICH (OUTPATIENT)
Dept: CARDIOLOGY | Facility: OTHER | Age: 73
End: 2018-01-30

## 2018-01-30 DIAGNOSIS — R00.1 BRADYCARDIA: ICD-10-CM

## 2018-01-30 DIAGNOSIS — Z95.0 PRESENCE OF CARDIAC PACEMAKER: ICD-10-CM

## 2018-01-30 ASSESSMENT — ANXIETY QUESTIONNAIRES
GAD7 TOTAL SCORE: 0

## 2018-01-30 ASSESSMENT — PATIENT HEALTH QUESTIONNAIRE - PHQ9
SUM OF ALL RESPONSES TO PHQ QUESTIONS 1-9: 0
SUM OF ALL RESPONSES TO PHQ QUESTIONS 1-9: 1
SUM OF ALL RESPONSES TO PHQ QUESTIONS 1-9: 0

## 2018-02-02 ENCOUNTER — DOCUMENTATION ONLY (OUTPATIENT)
Dept: FAMILY MEDICINE | Facility: OTHER | Age: 73
End: 2018-02-02

## 2018-02-02 DIAGNOSIS — I63.9 CEREBROVASCULAR ACCIDENT (H): Primary | ICD-10-CM

## 2018-02-02 PROBLEM — F41.9 ANXIETY: Status: ACTIVE | Noted: 2017-01-05

## 2018-02-02 PROBLEM — N39.41 URGE INCONTINENCE: Status: ACTIVE | Noted: 2017-10-05

## 2018-02-02 PROBLEM — Z87.891 FORMER SMOKER: Status: ACTIVE | Noted: 2017-08-30

## 2018-02-02 PROBLEM — Z95.0 STATUS POST PLACEMENT OF CARDIAC PACEMAKER: Status: ACTIVE | Noted: 2017-11-08

## 2018-02-02 PROBLEM — F33.40 MDD (RECURRENT MAJOR DEPRESSIVE DISORDER) IN REMISSION (H): Status: ACTIVE | Noted: 2017-08-30

## 2018-02-02 PROBLEM — Z78.9 DNI (DO NOT INTUBATE): Status: ACTIVE | Noted: 2017-10-25

## 2018-02-02 RX ORDER — LISINOPRIL 20 MG/1
20 TABLET ORAL DAILY
COMMUNITY
Start: 2017-11-16 | End: 2018-12-30

## 2018-02-02 RX ORDER — ATORVASTATIN CALCIUM 80 MG/1
80 TABLET, FILM COATED ORAL AT BEDTIME
COMMUNITY
Start: 2017-11-16 | End: 2018-04-23 | Stop reason: DRUGHIGH

## 2018-02-02 RX ORDER — IBUPROFEN 200 MG
400 TABLET ORAL 4 TIMES DAILY PRN
Status: ON HOLD | COMMUNITY
End: 2019-12-11

## 2018-02-02 RX ORDER — OXYBUTYNIN CHLORIDE 10 MG/1
10 TABLET, EXTENDED RELEASE ORAL AT BEDTIME
COMMUNITY
End: 2018-07-02

## 2018-02-02 RX ORDER — METOPROLOL TARTRATE 25 MG/1
25 TABLET, FILM COATED ORAL 2 TIMES DAILY
COMMUNITY
Start: 2017-11-16 | End: 2018-04-23 | Stop reason: ALTCHOICE

## 2018-02-02 RX ORDER — CALCIUM CARBONATE 750 MG/1
1 TABLET, CHEWABLE ORAL EVERY 8 HOURS PRN
COMMUNITY
End: 2024-09-19

## 2018-02-02 RX ORDER — WARFARIN SODIUM 5 MG/1
TABLET ORAL
COMMUNITY
Start: 2017-11-27 | End: 2018-10-23

## 2018-02-07 ENCOUNTER — ANTICOAGULATION - GICH (OUTPATIENT)
Dept: INTERNAL MEDICINE | Facility: OTHER | Age: 73
End: 2018-02-07

## 2018-02-07 DIAGNOSIS — I63.9 CEREBRAL INFARCTION (H): ICD-10-CM

## 2018-02-07 DIAGNOSIS — Z79.01 LONG TERM CURRENT USE OF ANTICOAGULANT: ICD-10-CM

## 2018-02-07 DIAGNOSIS — Z51.81 ENCOUNTER FOR THERAPEUTIC DRUG LEVEL MONITORING: ICD-10-CM

## 2018-02-07 LAB — INR - HISTORICAL: 2

## 2018-02-09 VITALS
BODY MASS INDEX: 29.73 KG/M2 | DIASTOLIC BLOOD PRESSURE: 82 MMHG | HEART RATE: 60 BPM | SYSTOLIC BLOOD PRESSURE: 132 MMHG | WEIGHT: 185 LBS

## 2018-02-10 ASSESSMENT — PATIENT HEALTH QUESTIONNAIRE - PHQ9: SUM OF ALL RESPONSES TO PHQ QUESTIONS 1-9: 1

## 2018-02-10 ASSESSMENT — ANXIETY QUESTIONNAIRES: GAD7 TOTAL SCORE: 1

## 2018-02-11 ENCOUNTER — HEALTH MAINTENANCE LETTER (OUTPATIENT)
Age: 73
End: 2018-02-11

## 2018-02-11 PROBLEM — I63.9 CEREBRAL INFARCTION, UNSPECIFIED: Status: ACTIVE | Noted: 2018-02-11

## 2018-02-11 PROBLEM — Z51.81 ENCOUNTER FOR THERAPEUTIC DRUG LEVEL MONITORING: Status: ACTIVE | Noted: 2018-02-11

## 2018-02-12 NOTE — PATIENT INSTRUCTIONS
Patient Information     Patient Name MRN Kate Ann [id number] Female 1945      Patient Instructions by Sara Rodriguez RN at 2018  9:15 AM     Author:  Sara Rodriguez RN Service:  (none) Author Type:  NURS- Registered Nurse     Filed:  2018  9:10 AM Encounter Date:  2018 Status:  Signed     :  Sara Rodriguez RN (NURS- Registered Nurse)            2018 Details    Sun Mon Tue Wed Thu Fri Sat      1               2               3               4               5               6                 7               8      7.5 mg   See details      9      5 mg         10      5 mg         11      5 mg         12      5 mg         13      5 mg           14      5 mg         15      7.5 mg         16      5 mg         17      5 mg         18      5 mg         19      5 mg         20      5 mg           21      5 mg         22      7.5 mg         23      5 mg         24      5 mg         25      5 mg         26      5 mg         27      5 mg           28      5 mg         29      7.5 mg         30      5 mg         31      5 mg             Date Details    This INR check               How to take your warfarin dose     To take:  5 mg Take one of the 5 mg tablets.    To take:  7.5 mg Take one and a half of the 5 mg tablets.           2018 Details    Sun Mon Tue Wed Thu Fri Sat         1      5 mg         2      5 mg         3      5 mg           4      5 mg         5      7.5 mg         6               7               8               9               10                 11               12               13               14               15               16               17                 18               19               20               21               22               23               24                 25               26               27               28                   Date Details   No additional details    Date of next INR:  2018         How to take  your warfarin dose     To take:  5 mg Take one of the 5 mg tablets.    To take:  7.5 mg Take one and a half of the 5 mg tablets.             Description          Continue same dose and recheck in 4 weeks. NEYMAR SPICER RN ....................  1/8/2018   9:10 AM                Anticoagulation Summary as of 1/8/2018     INR goal 2.0-3.0    Today's INR 2.1    Next INR check 2/5/2018          Call your Anticoagulation Clinic at Dept: [phone]   if:   1. Any medications are started, stopped, or there is a change in dose.  2. You experience any bleeding that is not easily stopped or if it is recurrent.  3. You notice an increase in bruising or any bruising that does not heal.  4. You are scheduled for surgery, colonoscopy, dental extraction or any other procedure where you may need to stop your Coumadin (warfarin).

## 2018-02-12 NOTE — PATIENT INSTRUCTIONS
Patient Information     Patient Name MRN Kate Ann [id number] Female 1945      Patient Instructions by Radha Burns RN at 2017 10:15 AM     Author:  Radha Burns RN Service:  (none) Author Type:  NURS- Registered Nurse     Filed:  2017 10:14 AM Encounter Date:  2017 Status:  Signed     :  Radha Burns RN (NURS- Registered Nurse)            2017 Details    Sun Mon Tue Wed Thu Fri Sat          1               2                 3               4               5               6               7               8               9                 10               11      7.5 mg   See details      12      5 mg         13      5 mg         14      5 mg         15      5 mg         16      5 mg           17      5 mg         18      7.5 mg         19      5 mg         20      5 mg         21      5 mg         22      5 mg         23      5 mg           24      5 mg         25      7.5 mg         26      5 mg         27      5 mg         28      5 mg         29      5 mg         30      5 mg           31      5 mg                Date Details    This INR check               How to take your warfarin dose     To take:  5 mg Take one of the 5 mg tablets.    To take:  7.5 mg Take one and a half of the 5 mg tablets.           2018 Details    Sun Mon Tue Wed Thu Fri Sat      1      7.5 mg         2      5 mg         3      5 mg         4      5 mg         5      5 mg         6      5 mg           7      5 mg         8      7.5 mg         9               10               11               12               13                 14               15               16               17               18               19               20                 21               22               23               24               25               26               27                 28               29               30               31                   Date Details   No  additional details    Date of next INR:  1/8/2018         How to take your warfarin dose     To take:  5 mg Take one of the 5 mg tablets.    To take:  7.5 mg Take one and a half of the 5 mg tablets.             Description          Continue same dose and recheck in 4 weeks. ..............Radha Burns RN   12/11/2017    10:14 AM                    Anticoagulation Summary as of 12/11/2017     INR goal 2.0-3.0    Today's INR 2.1    Next INR check 1/8/2018          Call your Anticoagulation Clinic at Dept: [phone]   if:   1. Any medications are started, stopped, or there is a change in dose.  2. You experience any bleeding that is not easily stopped or if it is recurrent.  3. You notice an increase in bruising or any bruising that does not heal.  4. You are scheduled for surgery, colonoscopy, dental extraction or any other procedure where you may need to stop your Coumadin (warfarin).

## 2018-02-12 NOTE — DISCHARGE SUMMARY
Patient Information     Patient Name MRN Sex Kate Akins [id number] Female 1945      Discharge Summaries by Sandy Klein PT at 2017  3:31 PM     Author:  Sandy Klein PT Service:  (none) Author Type:  PT- Physical Therapist     Filed:  2017  3:33 PM Date of Service:  2017  3:31 PM Status:  Signed     :  Sandy Klein PT (PT- Physical Therapist)            [facility]  Outpatient PT - Discharge Summary    Date of discharge: 2017     Patient Name: Kate Chacon   YOB: 1945   Referring MD/Provider: Asher Campos MD  Medical and Treatment Diagnosis: right leg weakness s/p ischemic stroke  PT Treatment Diagnosis: impaired lower extremity strength and endurance, right > left   Insurance: Medicare, and [employer]  Start of Service: 17      Subjective from last visit on 10/17/17:      Patient reports taking a fall last night over some vacuum hose that was left out in the garage. Landed on her butt, but denies any injury or significant soreness. Frustrated with this because she just felt like she was starting to walk better.    Dates of Service: 17    Thru:  10/17/17  Number of visits: 9           Reason for Discharge:  Goals partially met  Patient failed to schedule further appointments    Progress from Initial to Discharge (if applicable):  Increased strength   Improved balance    Comments: Please see last visit note from 10/17 for details of patient progress. This is an unplanned discharge.    Further Recommendations:    Patient will continue with established home exercise program    Patient is discharged from Physical Therapy    Thank you for your referral to [facility].  Please call with any questions, concerns or comments.  [phone]            Sandy Klein PT, DPT

## 2018-02-12 NOTE — PATIENT INSTRUCTIONS
Patient Information     Patient Name MRN Kate Ann [id number] Female 1945      Patient Instructions by Pennie Chester at 1/15/2018  9:45 AM     Author:  Pennie Chester Service:  (none) Author Type:  (none)     Filed:  1/15/2018 10:47 AM Encounter Date:  1/15/2018 Status:  Signed     :  Pennie Chester            Lipitor 40 mg at bedtime.    Lisinopril 40 mg everyday.    Metoprolol 25 mg everyday.

## 2018-02-12 NOTE — NURSING NOTE
Patient Information     Patient Name MRN Kate Ann [id number] Female 1945      Nursing Note by Pennie Chester at 1/15/2018  9:45 AM     Author:  Pennie Chester Service:  (none) Author Type:  (none)     Filed:  1/15/2018  9:48 AM Encounter Date:  1/15/2018 Status:  Signed     :  Pennie Chester            Patient comes in for 2 month follow up pacemaker placement.  Pennie Chester LPN ....................  1/15/2018   9:45 AM

## 2018-02-13 NOTE — PROGRESS NOTES
Patient Information     Patient Name MRN Sex Kate Akins [id number] Female 1945      Progress Notes by Dinora Younger NP at 1/15/2018  9:45 AM     Author:  Dinora Younger NP Service:  (none) Author Type:  PHYS- Nurse Practitioner     Filed:  1/15/2018 11:11 AM Encounter Date:  1/15/2018 Status:  Signed     :  Dinora Younger NP (PHYS- Nurse Practitioner)            [facility] HEART CARE   CARDIOLOGY PROGRESS NOTE      Chief Complaint     Patient presents with       Follow Up      s/p pacemaker          HPI:  Kate Chacon  is a 72 y.o. female who comes in today for follow-up persistent atrial fibrillation, retention, hyperlipidemia and recent dual-chamber pacer as a result of SA bradycardia, chronic atrial fib with RVR. She has a history ischemic stroke concerning for cryptogenic stroke on 10/15/16.      Mrs. Chacon was initially admitted to the hospital for asymptomatic severe bradycardia on 10/23/17. She was initially scheduled for a colonoscopy that day, presented to the preoperative area and had an initial EKG which was notable for severe sinus bradycardia with rates in the upper 20s with stable blood pressure. She was transferred to the emergency department, there she remained asymptomatic and underwent routine lab evaluation and was subsequently admitted to the ICU for further monitoring. Over her hospital course she had no evidence of high-grade AV block or tachycardia. With rest her heart rate remained in the 50s and 60s. With ambulation her rates were found to be in the 30s to 60s. She had no evidence of electrolyte abnormalities and she was ruled out for ACS overnight. On the day of discharge her heart rate remained in the upper 30s to 50s, sinus rhythm with QTc 370 and stable over hospital course. She was advised to hold her metoprolol and a ZIO patch was placed at discharge.      She was seen in clinic on 17 for follow-up sinoatrial bradycardia. She was found to be in  AF at the time with RVR, rates in 90's to 170 with beat to beat variability. She was largely asymptomatic with this and likely rebound tachycardia since d/c from beta blockade with severe bradycardia. It was discussed pacer in order to medically manage fast rates with AV eugene blocking agent. Initially Kate was hesitant of this option. Wished to medically managed and was accepted in the hospital for AF rate control with RVR and beat to beat variability with suspected beta blockade rebound. During second hospitalization she developed significant bradycardia with pauses of greater then 2 seconds. Patient was transferred to the care of Dr. Amanda at [facility] for pacer placement on 11/2/17.       She was seen at [facility] on 11/10 by device nurse with normal functioning pacer device. Next appt for interrogation in January 23rd, 2018 by Madelyn Lee RN.     Interval history:  Patient reports no specific change since her last visit on 11/20/17. She does admit to palpitations from time to time and does not describe as bothersome currently. She has been slightly fatigued which has been ongoing. She denies any chest pain or chest pressure. No shortness of breath or MARSHALL. No lightheadedness or syncope. No increased edema of her lower extremities. With her persistent atrial fibrillation, she is currently on Warfarin anticoagulation in addition to metoprolol 25 mg twice a day for rate control.      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       NVD (normal vaginal delivery)     x3        FAMILY HISTORY:  Family History       Problem   Relation Age of Onset     Diabetes  Father      Hypertension  Father      Other  Mother      h/o coronary artery disease/dementia       Asthma  Mother      Cancer  Maternal Aunt      Uterine       Diabetes   Maternal Grandmother      Cancer-breast  No Family History        PAST SURGICAL HISTORY:  Past Surgical History:      Procedure  Laterality Date     APPENDECTOMY       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       COLONOSCOPY  4/4/11     COLONOSCOPY  5/7/12     ESOPHAGOGASTRODUODENOSCOPY  8/19/05     TONSILLECTOMY         SOCIAL HISTORY:  Social History     Social History        Marital status:       Spouse name: N/A     Number of children:  N/A     Years of education:  N/A     Social History Main Topics        Smoking status:  Former Smoker     Packs/day: 0.50     Years: 49.00     Types: Cigarettes     Quit date: 10/14/2016     Smokeless tobacco:  Never Used     Alcohol use  No     Drug use:  No     Sexual activity:  Not Currently     Other Topics  Concern     Not on file      Social History Narrative     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  She enjoys gardening, [employer] and going to stock car [employer] that her son participates in.     to her  [place].  They run [employer].  Live in town independently.                                    CURRENT MEDICATIONS:  Current Outpatient Prescriptions on File Prior to Visit       Medication  Sig Dispense Refill     CALCIUM CARBONATE (TUMS ORAL) Take 1 tablet by mouth every 8 hours if needed (Heartburn).       ibuprofen (ADVIL; MOTRIN) 200 mg tablet Take 200 mg by mouth 4 times daily if needed for Pain or Headache.       oxybutynin XL (DITROPAN XL) 10 mg CR tablet Take 10 mg by mouth at bedtime.       sertraline (ZOLOFT) 50 mg tablet Take 1 tablet by mouth at bedtime. 90 tablet prn     warfarin (COUMADIN) 5 mg tablet TAKE 7.5 mg x 1 day and 5 mg x 6 days/week 90 tablet prn     No current facility-administered medications on file prior to visit.        ALLERGIES:  No Known Allergies      ROS:  CONSTITUTIONAL:  No weight loss, fever, chills or  weakness. Positive for continued fatigue, mild and unchanged.   CARDIOVASCULAR:  No chest pain, chest pressure or chest discomfort. Positive for intermittent palpitations, no lower extremity edema.  RESPIRATORY:  No shortness of breath, dyspnea upon exertion, cough or sputum production.  GASTROINTESTINAL: No abdominal pain. No anorexia, nausea, vomiting or diarrhea.   NEUROLOGICAL:  No lightheadedness, dizziness, syncope, ataxia or weakness.  HEMATOLOGIC:  No anemia, bleeding or bruising.  ENDOCRINOLOGIC:  No reports of sweating, cold or heat intolerance.   SKIN:  No abnormal rashes or itching.      PHYSICAL EXAM:  /82 (Cuff Site: Right Arm, Position: Sitting, Cuff Size: Adult Regular)  Pulse 60  Wt 83.9 kg (185 lb)  BMI 28.98 kg/m2  GENERAL: The patient is a well-developed, well-nourished, in no apparent distress. Alert and oriented x3.  HEENT: Head is normocephalic and atraumatic. Eyes are symmetrical with normal visual tracking. Nares appeared normal without nasal drainage. Mucous membranes are moist.   NECK: Supple.   HEART: Irregular regular rate and rhythm, S1S2 present without murmur, rub or gallop.  LUNGS: Respirations regular and unlabored. Clear to auscultation.  GI: Abdomen is nondistended.    EXTREMITIES: Trace peripheral edema present.   NEUROLOGIC: Alert and oriented X3. No focal neurologic deficits.   SKIN: No rashes or visible skin lesions present.    LAB RESULTS:  Anticoagulation on 01/08/2018        Component  Date Value Ref Range Status     INR 01/08/2018 2.1* <1.3 Final   Anticoagulation on 12/11/2017        Component  Date Value Ref Range Status     INR 12/11/2017 2.1* <1.3 Final   Anticoagulation on 11/27/2017        Component  Date Value Ref Range Status     INR 11/27/2017 1.9* <1.3 Final   Anticoagulation on 11/16/2017        Component  Date Value Ref Range Status     INR 11/16/2017 2.3* <1.3 Final   Anticoagulation on 11/08/2017        Component  Date Value Ref Range Status     INR  11/08/2017 1.5* <1.3 Final   Admission on 11/01/2017, Discharged on 11/02/2017        Component  Date Value Ref Range Status     SODIUM 11/02/2017 141  133 - 143 mmol/L Final     POTASSIUM 11/02/2017 4.0  3.5 - 5.1 mmol/L Final     CHLORIDE 11/02/2017 107  98 - 107 mmol/L Final     CO2,TOTAL 11/02/2017 27  21 - 31 mmol/L Final     ANION GAP 11/02/2017 7  5 - 18                 Final     GLUCOSE 11/02/2017 101  70 - 105 mg/dL Final     CALCIUM 11/02/2017 10.3  8.6 - 10.3 mg/dL Final     BUN 11/02/2017 13  7 - 25 mg/dL Final     CREATININE 11/02/2017 0.92  0.70 - 1.30 mg/dL Final     BUN/CREAT RATIO           11/02/2017 14                  Final     GFR if  11/02/2017 >60  >60 ml/min/1.73m2 Final     GFR if not  11/02/2017 60* >60 ml/min/1.73m2 Final     WHITE BLOOD COUNT         11/02/2017 7.9  4.5 - 11.0 thou/cu mm Final     RED BLOOD COUNT           11/02/2017 4.92  4.00 - 5.20 mil/cu mm Final     HEMOGLOBIN                11/02/2017 15.6  12.0 - 16.0 g/dL Final     HEMATOCRIT                11/02/2017 47.0  33.0 - 51.0 % Final     MCV                       11/02/2017 96  80 - 100 fL Final     MCH                       11/02/2017 31.7  26.0 - 34.0 pg Final     MCHC                      11/02/2017 33.2  32.0 - 36.0 g/dL Final     RDW                       11/02/2017 13.2  11.5 - 15.5 % Final     PLATELET COUNT            11/02/2017 202  140 - 440 thou/cu mm Final     MPV                       11/02/2017 11.0  6.5 - 11.0 fL Final     MAGNESIUM 11/02/2017 1.9  1.9 - 2.7 mg/dL Final     INR 11/02/2017 2.9* <1.3 Final     PROTIME 11/02/2017 34.6* 11.9 - 15.2 sec Final   Office Visit on 11/01/2017        Component  Date Value Ref Range Status     TSH 11/01/2017 1.52  0.34 - 5.60 uIU/mL Final     T4,FREE 11/01/2017 1.09  0.58 - 1.64 ng/dL Final   Anticoagulation on 10/30/2017        Component  Date Value Ref Range Status     INR 10/30/2017 2.9* <1.3 Final   Admission on 10/23/2017,  Discharged on 10/25/2017        Component  Date Value Ref Range Status     INR 10/23/2017 1.4* <1.3 Final     PROTIME 10/23/2017 16.5* 11.9 - 15.2 sec Final     APTT 10/23/2017 30  26 - 39 sec Final     SODIUM 10/23/2017 137  133 - 143 mmol/L Final     POTASSIUM 10/23/2017 3.8  3.5 - 5.1 mmol/L Final     CHLORIDE 10/23/2017 108* 98 - 107 mmol/L Final     CO2,TOTAL 10/23/2017 23  21 - 31 mmol/L Final     ANION GAP 10/23/2017 6  5 - 18                 Final     GLUCOSE 10/23/2017 104  70 - 105 mg/dL Final     CALCIUM 10/23/2017 9.9  8.6 - 10.3 mg/dL Final     BUN 10/23/2017 12  7 - 25 mg/dL Final     CREATININE 10/23/2017 0.98  0.70 - 1.30 mg/dL Final     BUN/CREAT RATIO           10/23/2017 12                  Final     GFR if  10/23/2017 >60  >60 ml/min/1.73m2 Final     GFR if not  10/23/2017 55* >60 ml/min/1.73m2 Final     ALBUMIN 10/23/2017 3.6  3.5 - 5.7 g/dL Final     PROTEIN,TOTAL 10/23/2017 6.4  6.4 - 8.9 g/dL Final     GLOBULIN                  10/23/2017 2.8  2.0 - 3.7 g/dL Final     A/G RATIO 10/23/2017 1.3  1.0 - 2.0                 Final     BILIRUBIN,TOTAL 10/23/2017 1.3* 0.3 - 1.0 mg/dL Final     ALK PHOSPHATASE 10/23/2017 80  34 - 104 IU/L Final     ALT (SGPT) 10/23/2017 41  7 - 52 IU/L Final     AST (SGOT) 10/23/2017 30  13 - 39 IU/L Final     TROPONIN I 10/23/2017 <0.030  <0.034 ng/mL Final     WHITE BLOOD COUNT         10/23/2017 7.3  4.5 - 11.0 thou/cu mm Final     RED BLOOD COUNT           10/23/2017 4.41  4.00 - 5.20 mil/cu mm Final     HEMOGLOBIN                10/23/2017 14.1  12.0 - 16.0 g/dL Final     HEMATOCRIT                10/23/2017 42.0  33.0 - 51.0 % Final     MCV                       10/23/2017 95  80 - 100 fL Final     MCH                       10/23/2017 32.0  26.0 - 34.0 pg Final     MCHC                      10/23/2017 33.6  32.0 - 36.0 g/dL Final     RDW                       10/23/2017 13.2  11.5 - 15.5 % Final     PLATELET COUNT             10/23/2017 166  140 - 440 thou/cu mm Final     MPV                       10/23/2017 11.3* 6.5 - 11.0 fL Final     NEUTROPHILS               10/23/2017 65.6  42.0 - 72.0 % Final     LYMPHOCYTES               10/23/2017 20.7  20.0 - 44.0 % Final     MONOCYTES                 10/23/2017 10.8  <12.0 % Final     EOSINOPHILS               10/23/2017 2.2  <8.0 % Final     BASOPHILS                 10/23/2017 0.4  <3.0 % Final     IMMATURE GRANULOCYTES(METAS,MYELOS* 10/23/2017 0.3  % Final     ABSOLUTE NEUTROPHILS      10/23/2017 4.8  1.7 - 7.0 thou/cu mm Final     ABSOLUTE LYMPHOCYTES      10/23/2017 1.5  0.9 - 2.9 thou/cu mm Final     ABSOLUTE MONOCYTES        10/23/2017 0.8  <0.9 thou/cu mm Final     ABSOLUTE EOSINOPHILS      10/23/2017 0.2  <0.5 thou/cu mm Final     ABSOLUTE BASOPHILS        10/23/2017 0.0  <0.3 thou/cu mm Final     ABSOLUTE IMMATURE GRANULOCYTES(MET* 10/23/2017 0.0  <=0.3 thou/cu mm Final     MAGNESIUM 10/23/2017 1.8* 1.9 - 2.7 mg/dL Final     TROPONIN I 10/24/2017 <0.030  <0.034 ng/mL Final     SODIUM 10/24/2017 135  133 - 143 mmol/L Final     POTASSIUM 10/24/2017 3.5  3.5 - 5.1 mmol/L Final     CHLORIDE 10/24/2017 107  98 - 107 mmol/L Final     CO2,TOTAL 10/24/2017 24  21 - 31 mmol/L Final     ANION GAP 10/24/2017 4* 5 - 18                 Final     GLUCOSE 10/24/2017 103  70 - 105 mg/dL Final     CALCIUM 10/24/2017 8.9  8.6 - 10.3 mg/dL Final     BUN 10/24/2017 10  7 - 25 mg/dL Final     CREATININE 10/24/2017 0.78  0.70 - 1.30 mg/dL Final     BUN/CREAT RATIO           10/24/2017 13                  Final     GFR if  10/24/2017 >60  >60 ml/min/1.73m2 Final     GFR if not  10/24/2017 >60  >60 ml/min/1.73m2 Final     MAGNESIUM 10/24/2017 2.0  1.9 - 2.7 mg/dL Final     TROPONIN I 10/24/2017 <0.030  <0.034 ng/mL Final     INR 10/24/2017 1.4* <1.3 Final     PROTIME 10/24/2017 16.7* 11.9 - 15.2 sec Final     INR 10/25/2017 1.7* <1.3 Final     PROTIME 10/25/2017 19.9* 11.9 -  15.2 sec Final     SODIUM 10/25/2017 141  133 - 143 mmol/L Final     POTASSIUM 10/25/2017 3.8  3.5 - 5.1 mmol/L Final     CHLORIDE 10/25/2017 110* 98 - 107 mmol/L Final     CO2,TOTAL 10/25/2017 23  21 - 31 mmol/L Final     ANION GAP 10/25/2017 8  5 - 18                 Final     GLUCOSE 10/25/2017 93  70 - 105 mg/dL Final     CALCIUM 10/25/2017 9.6  8.6 - 10.3 mg/dL Final     BUN 10/25/2017 6* 7 - 25 mg/dL Final     CREATININE 10/25/2017 0.78  0.70 - 1.30 mg/dL Final     BUN/CREAT RATIO           10/25/2017 8                  Final     GFR if  10/25/2017 >60  >60 ml/min/1.73m2 Final     GFR if not  10/25/2017 >60  >60 ml/min/1.73m2 Final     MAGNESIUM 10/25/2017 1.9  1.9 - 2.7 mg/dL Final   Anticoagulation on 10/18/2017        Component  Date Value Ref Range Status     INR 10/18/2017 1.9* <1.3 Final   There may be more visits with results that are not included.      ASSESSMENT:  Kate Chacon presents for follow-up cardiology follow-up due to persistent atrial fibrillation, hypertension, hyperlipidemia and recent dual-chamber pacer placement as a result of SA bradycardia, chronic atrial fib with RVR . History of PAF with CVA 10/15/16, concern likely cryptogenic stroke. She has been appropriately anticoagulated on warfarin since that time. She is currently on rate controlling medication metoprolol tartrate to 25 mg TWICE A DAY. She does complain of continued mild fatigue which is unchanged. And has intermittent palpitations for which she does not describe as bothersome at this time.     PLAN:  1. Dyslipidemia  Reviewed last lipid results from 8/30/17 with a total cholesterol of 90, triglycerides 50, HDL 40 and LDL of 39. She has no history of known CAD or PCI. It is recommended she reduce her atorvastatin to 40 mg at bedtime.  - atorvastatin (LIPITOR) 80 mg tablet; Take 0.5 tablets by mouth at bedtime.  Dispense: 90 tablet; Refill: 3    2. Persistent atrial fibrillation (HC)  patient  with persistent atrial fibrillation, currently rate controlled on metoprolol 25 mg twice a day, we will switch this to 25 mg once daily in the form of metoprolol succinate. She remains on warfarin anticoagulation. She has had mild fatigue and intermittent palpitations for which she is not describing as bothersome. This may likely be related to her chronic atrial fibrillation. We did discuss options for rhythm control which include antiarrhythmic, she may also be a candidate for cardioversion if necessary with dual chamber pacer device. At this time, she feels that symptoms are tolerable. We will follow up in 3 months to reassess level of symptoms associated to her atrial fibrillation.  - metoprolol succinate (TOPROL XL) 25 mg Sustained-Release tablet; Take 1 tablet by mouth once daily.  Dispense: 60 tablet; Refill: 3    3. HTN (hypertension)  /82 today, again increase ACEi to 40 mg once daily, switch to metoprolol succinate 25 mg once daily.   - metoprolol succinate (TOPROL XL) 25 mg Sustained-Release tablet; Take 1 tablet by mouth once daily.  Dispense: 60 tablet; Refill: 3  - lisinopril (PRINIVIL; ZESTRIL) 20 mg tablet; Take 2 tablets by mouth once daily.  Dispense: 90 tablet; Refill: prn    4. Status post placement of cardiac pacemaker  Normal functioning dual chamber pacer device, [employer], implanted by [facility] Dr. Amanda on 11/2/17 and results of SA bradycardia and A. fib with RVR. Last device check at [facility] on 11/10/17 with normal functioning pacer device. She is scheduled for next device check at [facility] on 1/23/17.    7. Anticoagulation goal of INR 2 to 3  Continue with anticoagulation. No signs or symptoms of bleeding, no complications.       Follow-up with cardiology in 3 months, certainly sooner as needed.    JANAE Collado, MARCNP  [facility]

## 2018-02-13 NOTE — TELEPHONE ENCOUNTER
Patient Information     Patient Name MRN Sex Kate Akins [id number] Female 1945      Telephone Encounter by Pennie Chester at 1/15/2018  1:08 PM     Author:  Pennie Chester Service:  (none) Author Type:  (none)     Filed:  1/15/2018  1:11 PM Encounter Date:  1/15/2018 Status:  Signed     :  Pennie Chester            Confirming metoprolol succinate 25 mg once daily.  Pennie Chester LPN ....................  1/15/2018   1:11 PM

## 2018-02-13 NOTE — PATIENT INSTRUCTIONS
Patient Information     Patient Name MRN Sex Kate Akins [id number] Female 1945      Patient Instructions by Madelyn Lee RN at 2018 10:30 AM     Author:  Madelyn Lee RN Service:  (none) Author Type:  NURS- Registered Nurse     Filed:  2018 12:44 PM Encounter Date:  2018 Status:  Signed     :  Madelyn Lee RN (NURS- Registered Nurse)            It was a pleasure to see you in clinic today.  Please do not hesitate to call with any questions or concerns.  You are scheduled for a remote transmission on 18.  We look forward to seeing you in clinic at your next device check in 6 months.    Madelyn Lee RN, MS, CCRN  Electrophysiology Nurse Clinician  [facility]    During Business Hours Please Call:  [phone]  After Hours Please Call:  [phone] - select option #4 and ask for job code [id number]

## 2018-02-13 NOTE — PATIENT INSTRUCTIONS
Patient Information     Patient Name MRN Kate Ann [id number] Female 1945      Patient Instructions by Radha Burns RN at 2018  4:15 PM     Author:  Radha Burns RN Service:  (none) Author Type:  NURS- Registered Nurse     Filed:  2018  4:00 PM Encounter Date:  2018 Status:  Signed     :  Radha Burns RN (NURS- Registered Nurse)            2018 Details    Sun Mon Tue Wed Thu Fri Sat         1               2               3                 4               5               6               7      5 mg   See details      8      5 mg         9      5 mg         10      5 mg           11      5 mg         12      7.5 mg         13      5 mg         14      5 mg         15      5 mg         16      5 mg         17      5 mg           18      5 mg         19      7.5 mg         20      5 mg         21      5 mg         22      5 mg         23      5 mg         24      5 mg           25      5 mg         26      7.5 mg         27      5 mg         28      5 mg             Date Details    This INR check               How to take your warfarin dose     To take:  5 mg Take one of the 5 mg tablets.    To take:  7.5 mg Take one and a half of the 5 mg tablets.           2018 Details    Sun Mon Tue Wed Thu Fri Sat         1      5 mg         2      5 mg         3      5 mg           4      5 mg         5      7.5 mg         6      5 mg         7      5 mg         8               9               10                 11               12               13               14               15               16               17                 18               19               20               21               22               23               24                 25               26               27               28               29               30               31                Date Details   No additional details    Date of next INR:  3/7/2018         How to take your  warfarin dose     To take:  5 mg Take one of the 5 mg tablets.    To take:  7.5 mg Take one and a half of the 5 mg tablets.             Description          Continue same Warfarin dose and recheck in 1 month.  Radha Burns RN   2/7/2018    3:59 PM                Anticoagulation Summary as of 2/7/2018     INR goal 2.0-3.0    Today's INR 2.0    Next INR check 3/7/2018          Call your Anticoagulation Clinic at Dept: [phone]   if:   1. Any medications are started, stopped, or there is a change in dose.  2. You experience any bleeding that is not easily stopped or if it is recurrent.  3. You notice an increase in bruising or any bruising that does not heal.  4. You are scheduled for surgery, colonoscopy, dental extraction or any other procedure where you may need to stop your Coumadin (warfarin).

## 2018-02-13 NOTE — PROGRESS NOTES
Patient Information     Patient Name MRN Sex Kate Akins [id number] Female 1945      Progress Notes by Madelyn Lee RN at 2018 10:30 AM     Author:  Madelyn Lee RN Service:  (none) Author Type:  NURS- Registered Nurse     Filed:  2018 12:59 PM Encounter Date:  2018 Status:  Signed     :  Madelyn Lee RN (NURS- Registered Nurse)            Patient seen in clinic for evaluation and iterative programming of her [employer] dual lead pacemaker per MD orders.  Patient is s/p pacemaker implant at [facility] on 11/3/17.  Incision is well healed without redness or breakdown.  Normal pacemaker function.  1 NSVT episode recorded that appears to be AF with RVR - 167 bpm, 13 sec.  19 AT/AF episodes recorded - < 1 min - > 48 hrs in duration.  AF burden = 11%.  Patient is taking Coumadin.  Intrinsic rhythm = AP-VS @ 30 bpm.  AP = 60%.   = 7%.  Estimated battery longevity to ÁLVARO = 10 years.  Auto atrial and RV threshold testing programmed on.  KEYLA delay programmed from 200 to 220 ms.  Patient reports that she is feeling pretty well and denies specific complaints.  Plan for patient to send a remote transmission in 3 months and return to clinic in 6 months.    Patient has a [employer] [id number] pacemaker that is included in the 2017 [employer] (MV) sensor product advisory.  MV sensor programmed OFF.  Verified patient has a [employer] Latitude home monitor that is turned on and plugged in.  Reinforced the importance of remote monitoring and compliance of regularly scheduled device checks.  Patient verbalized understanding.    Dual lead pacemaker

## 2018-02-14 ENCOUNTER — TELEPHONE (OUTPATIENT)
Dept: FAMILY MEDICINE | Facility: OTHER | Age: 73
End: 2018-02-14

## 2018-02-14 NOTE — TELEPHONE ENCOUNTER
*We've extended our hours of operation! See details below!    --------------------------------------------------------------------------------------------------------------  [facility]    Monday - Thursday 8 a.m. - 8 p.m.  *Friday  8 a.m. - 8 p.m.  *Saturday  8 a.m. - 4 p.m.   *Sunday  8 a.m. - 4 p.m.     Thank you for choosing [facility].  We hope you had a pleasant experience and we look forward to serving your future needs.    If you have any questions about your visit, please call [phone].    If you have any questions about your bill, please call [phone] and ask for an .    If you need a copy of your medical record, please call [phone].    If you receive a survey in the mail about today's services, we hope that you will take a few minutes to let us know about your experience.  If there is something that needs immediate attention or concern please let us know right away.  --------------------------------------------------------------------------------------------------------------  To use the online Urgent Care reservation system [email or web address] go to:  [email or web address]    Then select the [facility] clinic or other location you would like to be seen at.    *While this does not guarantee an \"appointment time\" it does act as a placeholder for you in line to see the Urgent Care providers as if you were physically in the waiting room.*  There may be times the Urgent Care does take patients out of this order based on the seriousness or emergent nature of their complaints, but overall this should reduce your waiting time in the clinic and notify staff you are on your way.  --------------------------------------------------------------------------------------------------------------  UNLESS OTHERWISE INSTRUCTED BY YOUR URGENT CARE PROVIDER TODAY,   all follow-up for your medical issues should be managed  Patient is needing a tooth pulled, and is to start taking azithromycin today. Patient is wondering what to do for warfarin? Please advise.  Yee Villagomez LPN .............2/14/201812:48 PM     by your primary care provider. The Urgent Care does not manage chronic medical issues or refill medications. You are responsible for scheduling and keeping any necessary follow-up visits with your primary care provider after this visit today.   --------------------------------------------------------------------------------------------------------------    IF YOU WERE PRESCRIBED AN ANTIBIOTIC TODAY: We recommend taking an over-the-counter probiotic (Such as Florajen 3 for adults or [id number] for children -- AVAILABLE IN THE [facility] and other local pharmacies too) once a day for the entire duration of your antibiotics and continuing it for 2 weeks after the antibiotics are finished. This will help reduce your chance of developing antibiotic-related diarrhea and/or yeast infections.  ----------------------------------------------------------------------------------------------------------                                      Follow up with your primary care physician in 4-5 days with continued symptoms or worsening condition; sooner with immediate concerns.  Start the antibiotic today, take for the full 10 days prescribed even if symptoms improve.  Start using Flonase spray daily, do this for at least 2-3 weeks to reduce inflammation and allow the sinuses to drain.    · Ibuprofen (600mg with each meal 3x/day) or Aleve (2 tabs with food every 12 hours) for at least 3 days -- PLUS  · Sudafed or Mucinex-D 1 or 2 times/day for at least 3 days.  · (OR you can take Advil Cold and Sinus instead of Ibuprofen + Sudafed)   ~~Do not take these medications if you have medical reasons to avoid them.~~    · Sinus saline rinse (Neilmed Sinus Rinse Squirt Bottle, available at all pharmacies)  -- do not use if having ear pain/pressure.   -At least twice a day.   -Do evening rinse at least 30 minutes before lying down.   -If having draining into throat during the rinse, focus on breathing slowly through    the mouth  and also make the sound of the letter \"K\" repeatedly while rinsing.    · Nasal spray to decrease nasal congestion (if prescribed)   -Do NOT use over-the-counter Afrin (oxymetazoline)  or Nyquil 4-Way (phenylephrine) for more than 3-4 days, this can cause rebound congestion.    Acute Sinusitis  Acute sinusitis is inflammation (irritation and swelling) of the sinuses. It is often due to a bacterial or viral infection of the sinuses. This may follow a cold or other upper respiratory illness. Your doctor can help you find relief. Read on to learn more.    What Is Acute Sinusitis?  Sinuses are air-filled spaces in the skull behind the face. They are kept moist and clean by a lining of mucosa. Things such as pollen, smoke, and chemical fumes can irritate the mucosa. It can then become inflamed (swell up). As a response to irritation, the mucosa makes more mucus and other fluids. Tiny hairlike cilia cover the mucosa. Cilia help transport mucus toward the opening of the sinus. Too much mucus may cause the cilia to stop working. This blocks the sinus opening. A buildup of fluid in the sinuses then leads to symptoms such as pain and pressure. It an also encourage growth of bacteria in the sinuses.    Common Symptoms of Acute Sinusitis  You may have:  · Facial pain  · Headache  · Fever  · Postnasal drip  · Nasal congestion  · Redness of facial skin over sinus    Home Care:  · Drink plenty of water, hot tea, and other liquids to stay well hydrated. This thins the mucus and promotes sinus drainage.  · Apply heat to the painful areas of the face. Use a towel soaked in hot water. Or,  the shower and direct the hot spray onto your face. This is a good way to inhale warm water vapor and get heat on your face at the same time. (Cover your mouth and nose with your hands so you can still breathe as you do this.)  · Use a vaporizer with products such as Vicks VapoRub (contains menthol) at night. Suck on peppermint, menthol or  eucalyptus hard candies during the day.  · An expectorant containing guaifenesin (such as Robitussin), helps to thin the mucus and promote drainage from the sinuses.  · Antihistamines are useful if allergies are a cause of your sinusitis. The mildest one is chlorpheniramine (available without a prescription). The dose for adults is 8-12mg three times a day. [NOTE: Do not use chlorpheniramine if you have glaucoma or if you are a man with trouble urinating due to an enlarged prostate.] Claritin (loratidine) is an antihistamine that causes less drowsiness and is a good alternative for daytime use.  · If you start antibiotics be sure to finish the full course, even if you are feeling better after a few days.    Get Prompt Medical Attention  if any of the following occur:  · Facial pain or headache becomes more severe  · Stiff neck  · Unusual drowsiness or confusion, or not acting like your normal self  · Swelling of the forehead or eyelids  · Vision problems including blurred or double vision  · Fever of 100.4ºF (38ºC) or higher, or as directed by your healthcare provider  · Seizure    © 6815-7492 [employer], [address] [place] [address], [place] [address]. All rights reserved. This information is not intended as a substitute for professional medical care. Always follow your healthcare professional's instructions.

## 2018-02-14 NOTE — TELEPHONE ENCOUNTER
Patient notified and understands instructions.  Yee Villagomez LPN .............2/14/20181:38 PM

## 2018-02-14 NOTE — TELEPHONE ENCOUNTER
Hold warfarin for 3 days prior to tooth extraction.  Azithromycin may cause the INR to go up a bit, but not a problem.  Resume warfarin the next day after the tooth is pulled.  Asher Campos MD

## 2018-02-19 PROBLEM — Z95.0 CARDIAC PACEMAKER: Status: ACTIVE | Noted: 2018-02-19

## 2018-03-07 ENCOUNTER — ANTICOAGULATION THERAPY VISIT (OUTPATIENT)
Dept: ANTICOAGULATION | Facility: OTHER | Age: 73
End: 2018-03-07
Attending: FAMILY MEDICINE
Payer: MEDICARE

## 2018-03-07 DIAGNOSIS — Z79.01 LONG-TERM (CURRENT) USE OF ANTICOAGULANTS: ICD-10-CM

## 2018-03-07 LAB — INR POINT OF CARE: 2.1 (ref 2–3)

## 2018-03-07 PROCEDURE — 85610 PROTHROMBIN TIME: CPT | Mod: QW,ZL

## 2018-03-07 PROCEDURE — 99207 ZZC NO CHARGE NURSE ONLY: CPT

## 2018-03-07 PROCEDURE — 36416 COLLJ CAPILLARY BLOOD SPEC: CPT | Mod: ZL

## 2018-03-07 NOTE — MR AVS SNAPSHOT
Kate Marvinon   3/7/2018 10:30 AM   Anticoagulation Therapy Visit    Description:  72 year old female   Provider:  [facility]   Department:  [facility]           INR as of 3/7/2018     Today's INR 2.1      Anticoagulation Summary as of 3/7/2018     INR goal 2.0-3.0   Today's INR 2.1   Full instructions 7.5 mg on Mon; 5 mg all other days   Next INR check 4/4/2018    Indications   Cerebral infarction  unspecified [I63.9]  Long-term (current) use of anticoagulants [Z79.01] [Z79.01]         Description     Continue same Warfarin dose and recheck in 1 month.  Radha Burns RN   3/7/2018    10:17 AM        Your next Anticoagulation Clinic appointment(s)     Mar 07, 2018 10:30 AM CST   Anticoagulation Visit with [facility]   [facility] ([facility])    [address]  [place] [address]   [phone]              March 2018 Details    Sun Mon Tue Wed Thu Fri Sat         1               2               3                 4               5               6               7      5 mg   See details      8      5 mg         9      5 mg         10      5 mg           11      5 mg         12      7.5 mg         13      5 mg         14      5 mg         15      5 mg         16      5 mg         17      5 mg           18      5 mg         19      7.5 mg         20      5 mg         21      5 mg         22      5 mg         23      5 mg         24      5 mg           25      5 mg         26      7.5 mg         27      5 mg         28      5 mg         29      5 mg         30      5 mg         31      5 mg          Date Details   03/07 This INR check               How to take your warfarin dose     To take:  5 mg Take 1 of the 5 mg tablets.    To take:  7.5 mg Take 1.5 of the 5 mg tablets.           April 2018 Details    Sun Mon Tue Wed Thu Fri Sat     1      5 mg         2      7.5 mg         3      5 mg         4            5               6               7                  8               9               10               11               12               13               14                 15               16               17               18               19               20               21                 22               23               24               25               26               27               28                 29               30                     Date Details   No additional details    Date of next INR:  4/4/2018         How to take your warfarin dose     To take:  5 mg Take 1 of the 5 mg tablets.    To take:  7.5 mg Take 1.5 of the 5 mg tablets.

## 2018-03-07 NOTE — PROGRESS NOTES
ANTICOAGULATION FOLLOW-UP CLINIC VISIT    Patient Name:  Kate Chacon  Date:  3/7/2018  Contact Type:  Face to Face    SUBJECTIVE:     Patient Findings     Positives No Problem Findings           OBJECTIVE    INR Protime   Date Value Ref Range Status   03/07/2018 2.1 2 - 3 Final       ASSESSMENT / PLAN  INR assessment THER    Recheck INR In: 4 WEEKS    INR Location Clinic      Anticoagulation Summary as of 3/7/2018     INR goal 2.0-3.0   Today's INR 2.1   Maintenance plan 7.5 mg (5 mg x 1.5) on Mon; 5 mg (5 mg x 1) all other days   Full instructions 7.5 mg on Mon; 5 mg all other days   Weekly total 37.5 mg   No change documented Radha Burns, GLENN   Next INR check 4/4/2018   Priority INR   Target end date Indefinite    Indications   Cerebral infarction  unspecified [I63.9]  Long-term (current) use of anticoagulants [Z79.01] [Z79.01]         Anticoagulation Episode Summary     INR check location     Preferred lab     Send INR reminders to  INR    Comments       Anticoagulation Care Providers     Provider Role Specialty Phone number    Asher Campos MD Referring [facility] [phone]            See the Encounter Report to view Anticoagulation Flowsheet and Dosing Calendar (Go to Encounters tab in chart review, and find the Anticoagulation Therapy Visit)        Radha Burns RN

## 2018-04-04 ENCOUNTER — ANTICOAGULATION THERAPY VISIT (OUTPATIENT)
Dept: ANTICOAGULATION | Facility: OTHER | Age: 73
End: 2018-04-04
Attending: FAMILY MEDICINE
Payer: MEDICARE

## 2018-04-04 DIAGNOSIS — Z79.01 LONG-TERM (CURRENT) USE OF ANTICOAGULANTS: ICD-10-CM

## 2018-04-04 LAB — INR POINT OF CARE: 2.6 (ref 2–3)

## 2018-04-04 PROCEDURE — 85610 PROTHROMBIN TIME: CPT | Mod: QW,ZL

## 2018-04-04 PROCEDURE — 36416 COLLJ CAPILLARY BLOOD SPEC: CPT | Mod: ZL

## 2018-04-04 NOTE — MR AVS SNAPSHOT
Kate COPELAND Ines   4/4/2018 9:15 AM   Anticoagulation Therapy Visit    Description:  72 year old female   Provider:  [facility]   Department:  [facility]           INR as of 4/4/2018     Today's INR 2.6      Anticoagulation Summary as of 4/4/2018     INR goal 2.0-3.0   Today's INR 2.6   Full instructions 7.5 mg on Mon; 5 mg all other days   Next INR check 5/16/2018    Indications   Cerebral infarction  unspecified [I63.9]  Long-term (current) use of anticoagulants [Z79.01] [Z79.01]         Description     Continue same dose and recheck in 6 weeks. ...............Radha Burns RN    4/4/2018    9:14 AM      Your next Anticoagulation Clinic appointment(s)     Apr 04, 2018  9:15 AM CDT   Anticoagulation Visit with [facility]   [facility] ([facility])    [address]  [place] [address]   [phone]              April 2018 Details    Sun Mon Tue Wed Thu Fri Sat     1               2               3               4      5 mg   See details      5      5 mg         6      5 mg         7      5 mg           8      5 mg         9      7.5 mg         10      5 mg         11      5 mg         12      5 mg         13      5 mg         14      5 mg           15      5 mg         16      7.5 mg         17      5 mg         18      5 mg         19      5 mg         20      5 mg         21      5 mg           22      5 mg         23      7.5 mg         24      5 mg         25      5 mg         26      5 mg         27      5 mg         28      5 mg           29      5 mg         30      7.5 mg               Date Details   04/04 This INR check               How to take your warfarin dose     To take:  5 mg Take 1 of the 5 mg tablets.    To take:  7.5 mg Take 1.5 of the 5 mg tablets.           May 2018 Details    Sun Mon Tue Wed Thu Fri Sat       1      5 mg         2      5 mg         3      5 mg         4      5 mg         5      5 mg           6      5 mg          7      7.5 mg         8      5 mg         9      5 mg         10      5 mg         11      5 mg         12      5 mg           13      5 mg         14      7.5 mg         15      5 mg         16            17               18               19                 20               21               22               23               24               25               26                 27               28               29               30               31                  Date Details   No additional details    Date of next INR:  5/16/2018         How to take your warfarin dose     To take:  5 mg Take 1 of the 5 mg tablets.    To take:  7.5 mg Take 1.5 of the 5 mg tablets.

## 2018-04-04 NOTE — PROGRESS NOTES
ANTICOAGULATION FOLLOW-UP CLINIC VISIT    Patient Name:  Kate Chacon  Date:  4/4/2018  Contact Type:  Face to Face    SUBJECTIVE:     Patient Findings     Positives No Problem Findings           OBJECTIVE    INR Protime   Date Value Ref Range Status   04/04/2018 2.6 2.0 - 3.0 Final       ASSESSMENT / PLAN  INR assessment THER    Recheck INR In: 6 WEEKS    INR Location Clinic      Anticoagulation Summary as of 4/4/2018     INR goal 2.0-3.0   Today's INR 2.6   Maintenance plan 7.5 mg (5 mg x 1.5) on Mon; 5 mg (5 mg x 1) all other days   Full instructions 7.5 mg on Mon; 5 mg all other days   Weekly total 37.5 mg   No change documented Radha Burns, GLENN   Next INR check 5/16/2018   Priority INR   Target end date Indefinite    Indications   Cerebral infarction  unspecified [I63.9]  Long-term (current) use of anticoagulants [Z79.01] [Z79.01]         Anticoagulation Episode Summary     INR check location     Preferred lab     Send INR reminders to  INR    Comments       Anticoagulation Care Providers     Provider Role Specialty Phone number    Asher Campos MD Referring [facility] [phone]            See the Encounter Report to view Anticoagulation Flowsheet and Dosing Calendar (Go to Encounters tab in chart review, and find the Anticoagulation Therapy Visit)        Radha Burns, RN

## 2018-04-23 ENCOUNTER — OFFICE VISIT (OUTPATIENT)
Dept: CARDIOLOGY | Facility: OTHER | Age: 73
End: 2018-04-23
Attending: NURSE PRACTITIONER
Payer: MEDICARE

## 2018-04-23 VITALS
HEART RATE: 68 BPM | SYSTOLIC BLOOD PRESSURE: 124 MMHG | HEIGHT: 67 IN | BODY MASS INDEX: 30.43 KG/M2 | DIASTOLIC BLOOD PRESSURE: 60 MMHG | WEIGHT: 193.9 LBS

## 2018-04-23 DIAGNOSIS — I10 ESSENTIAL HYPERTENSION: ICD-10-CM

## 2018-04-23 DIAGNOSIS — Z51.81 ANTICOAGULATION GOAL OF INR 2 TO 3: ICD-10-CM

## 2018-04-23 DIAGNOSIS — I48.20 CHRONIC ATRIAL FIBRILLATION (H): ICD-10-CM

## 2018-04-23 DIAGNOSIS — I48.0 PAROXYSMAL ATRIAL FIBRILLATION (H): ICD-10-CM

## 2018-04-23 DIAGNOSIS — Z79.01 ANTICOAGULATION GOAL OF INR 2 TO 3: ICD-10-CM

## 2018-04-23 DIAGNOSIS — Z87.891 FORMER SMOKER: ICD-10-CM

## 2018-04-23 DIAGNOSIS — Z95.0 STATUS POST PLACEMENT OF CARDIAC PACEMAKER: ICD-10-CM

## 2018-04-23 DIAGNOSIS — E78.5 HYPERLIPIDEMIA, UNSPECIFIED HYPERLIPIDEMIA TYPE: Primary | ICD-10-CM

## 2018-04-23 DIAGNOSIS — Z95.0 CARDIAC PACEMAKER: ICD-10-CM

## 2018-04-23 LAB
ANION GAP SERPL CALCULATED.3IONS-SCNC: 4 MMOL/L (ref 3–14)
BUN SERPL-MCNC: 14 MG/DL (ref 7–25)
CALCIUM SERPL-MCNC: 10.4 MG/DL (ref 8.6–10.3)
CHLORIDE SERPL-SCNC: 103 MMOL/L (ref 98–107)
CO2 SERPL-SCNC: 29 MMOL/L (ref 21–31)
CREAT SERPL-MCNC: 0.96 MG/DL (ref 0.6–1.2)
GFR SERPL CREATININE-BSD FRML MDRD: 57 ML/MIN/1.7M2
GLUCOSE SERPL-MCNC: 85 MG/DL (ref 70–105)
POTASSIUM SERPL-SCNC: 4.1 MMOL/L (ref 3.5–5.1)
SODIUM SERPL-SCNC: 136 MMOL/L (ref 134–144)

## 2018-04-23 PROCEDURE — G0463 HOSPITAL OUTPT CLINIC VISIT: HCPCS

## 2018-04-23 PROCEDURE — 80048 BASIC METABOLIC PNL TOTAL CA: CPT | Performed by: NURSE PRACTITIONER

## 2018-04-23 PROCEDURE — 36415 COLL VENOUS BLD VENIPUNCTURE: CPT | Performed by: NURSE PRACTITIONER

## 2018-04-23 PROCEDURE — 99213 OFFICE O/P EST LOW 20 MIN: CPT | Performed by: NURSE PRACTITIONER

## 2018-04-23 RX ORDER — METOPROLOL SUCCINATE 25 MG/1
25 TABLET, EXTENDED RELEASE ORAL DAILY
Qty: 90 TABLET | Refills: 3 | Status: SHIPPED | OUTPATIENT
Start: 2018-04-23 | End: 2019-06-05

## 2018-04-23 RX ORDER — ATORVASTATIN CALCIUM 40 MG/1
40 TABLET, FILM COATED ORAL AT BEDTIME
Qty: 90 TABLET | Refills: 3 | Status: SHIPPED | OUTPATIENT
Start: 2018-04-23 | End: 2019-05-01

## 2018-04-23 ASSESSMENT — PAIN SCALES - GENERAL: PAINLEVEL: NO PAIN (0)

## 2018-04-23 NOTE — PROGRESS NOTES
[facility] HEART CARE   CARDIOLOGY PROGRESS NOTE    Chief Complaint   Patient presents with     Follow Up For     3 month        HPI:   Kate Chacon  is a 72 y.o. female who comes in today for follow-up paroxysmal atrial fibrillation, HTN, hyperlipidemia and recent dual-chamber pacer as a result of SA bradycardia, chronic atrial fib with RVR. She has a history ischemic stroke concerning for cryptogenic stroke on 10/15/16.      Mrs. Chacon was initially admitted to the hospital for asymptomatic severe bradycardia on 10/23/17. She was initially scheduled for a colonoscopy that day, presented to the preoperative area and had an initial EKG which was notable for severe sinus bradycardia with rates in the upper 20s with stable blood pressure. She was transferred to the emergency department, there she remained asymptomatic and underwent routine lab evaluation and was subsequently admitted to the ICU for further monitoring. Over her hospital course she had no evidence of high-grade AV block or tachycardia. With rest her heart rate remained in the 50s and 60s. With ambulation her rates were found to be in the 30s to 60s. She had no evidence of electrolyte abnormalities and she was ruled out for ACS overnight. On the day of discharge her heart rate remained in the upper 30s to 50s, sinus rhythm with QTc 370 and stable over hospital course. She was advised to hold her metoprolol and a ZIO patch was placed at discharge.      She was seen in clinic on 11/1/17 for follow-up sinoatrial bradycardia. She was found to be in AF at the time with RVR, rates in 90's to 170 with beat to beat variability. She was largely asymptomatic with this and likely rebound tachycardia since d/c from beta blockade with severe bradycardia. It was discussed pacer in order to medically manage fast rates with AV eugene blocking agent. Initially Kate was hesitant of this option. Wished to medically managed and was accepted in the hospital for AF rate  control with RVR and beat to beat variability with suspected beta blockade rebound. During second hospitalization she developed significant bradycardia with pauses of greater then 2 seconds. Patient was transferred to the care of Dr. Amanda at [facility] for pacer placement on 11/2/17.      Last device irrigation on 1/30/2018.  Normal pacemaker function.  She had one episode of NSVT reported that appeared to be in A. fib with RVR at 1 67 bpm.  19 atrial tachycardia/atrial fibrillation episodes recorded.  AF burden equivalent to 11%.  AP 60% BP 7%.  Normal battery life.  She is scheduled for her next remote transmission in 3 months and return to clinic for transmission in 6 months.    INTERVAL HISTORY:  Patient reports changes since her last visit with cardiology.  She remains largely asymptomatic with her atrial fibrillation.  She is currently on metoprolol 25 mg once daily.  This is metoprolol tartrate, which is ordered for 25 mg twice daily.  Admits she was not aware of her evening dose.  Additionally she is appropriately on Coumadin oral anticoagulation without complication.  She denies any episodes of chest pain or chest pressure.  No shortness of breath or MARSHALL.  No lightheadedness or syncope.  No reported palpitations other than when she becomes anxious from time to time.  She denies any increased edema.    She does admit to an 8 pound weight gain since her last visit.  States that this is due to increased calories.  She relates this to not smoking and controlling urges/cravings with increased caloric intake. She has no other concerns today.     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          FAMILY HISTORY:   Family History   Problem Relation Age of Onset     DIABETES Father       Diabetes     Hypertension Father      Hypertension     Other - See Comments Mother      h/o coronary artery disease/dementia     Asthma Mother      Asthma     CANCER Maternal Aunt      Cancer,Uterine     DIABETES Maternal Grandmother      Diabetes     Breast Cancer No family hx of      Cancer-breast          PAST SURGICAL HISTORY:   Past Surgical History:   Procedure Laterality Date     APPENDECTOMY OPEN      No Comments Provided     COLONOSCOPY      8/19/05,Cecal biopsy with tubular adenoma low grade dysplasia.     COLONOSCOPY      4/4/11     COLONOSCOPY      5/7/12     ESOPHAGOSCOPY, GASTROSCOPY, DUODENOSCOPY (EGD), COMBINED      8/19/05     OTHER SURGICAL HISTORY      8/3/05,164225,OTHER,helices on the right ear     TONSILLECTOMY      No Comments Provided          SOCIAL HISTORY:   Social History     Social History     Marital status:      Spouse name: N/A     Number of children: N/A     Years of education: N/A     Social History Main Topics     Smoking status: Former Smoker     Packs/day: 0.50     Years: 49.00     Types: Cigarettes     Quit date: 10/14/2016     Smokeless tobacco: Never Used     Alcohol use No     Drug use: None      Comment: Drug use: No     Sexual activity: Not Currently     Other Topics Concern     None     Social History Narrative    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  She enjoys gardening, [employer] and going to stock car races that her son participates in.   to her  Maco.  They run [employer].  Live in town independently.          CURRENT MEDICATIONS:   Current Outpatient Prescriptions   Medication     atorvastatin (LIPITOR) 40 MG tablet     calcium carbonate 750 MG CHEW     ibuprofen (ADVIL/MOTRIN) 200 MG tablet     lisinopril (PRINIVIL/ZESTRIL) 20 MG tablet     metoprolol succinate (TOPROL-XL) 25 MG 24 hr tablet     oxybutynin (DITROPAN-XL) 10 MG 24 hr tablet     sertraline (ZOLOFT) 50 MG tablet     warfarin (COUMADIN) 5 MG tablet     [DISCONTINUED]  "atorvastatin (LIPITOR) 80 MG tablet     No current facility-administered medications for this visit.           ALLERGIES:   Allergies   Allergen Reactions     Methylpar-Na Bisulfite-Pentazocine Unknown     jittery          ROS:   CONSTITUTIONAL: Noted weight gain, relates to excess calories. No fever, chills, weakness or fatigue.   CARDIOVASCULAR: No chest pain, chest pressure or chest discomfort. Rare palpitations which are not described as bothersome, no increased lower extremity edema.   RESPIRATORY: No shortness of breath, dyspnea upon exertion, cough or sputum production.   GASTROINTESTINAL: No reported abdominal pain.  NEUROLOGICAL: No lightheadedness, dizziness, syncope, ataxia or weakness.   HEMATOLOGIC: No anemia, bleeding or bruising.   ENDOCRINOLOGIC: No reports of sweating, cold or heat intolerance.   SKIN: No abnormal rashes or itching.       PHYSICAL EXAM:   /60 (BP Location: Right arm, Patient Position: Sitting, Cuff Size: Adult Large)  Pulse 68  Ht 1.702 m (5' 7\")  Wt 88 kg (193 lb 14.4 oz)  BMI 30.37 kg/m2  GENERAL: The patient is a well-developed, well-nourished, in no apparent distress. Alert and oriented x3.   HEENT: Head is normocephalic and atraumatic. Eyes are symmetrical with normal visual tracking. Nares appeared normal without nasal drainage. Mucous membranes are moist.   NECK: Supple.   HEART: Regular rate and rhythm, S1S2 present without murmur, rub or gallop.   LUNGS: Respirations regular and unlabored. Clear to auscultation.   GI: Abdomen is nondistended.   EXTREMITIES: Trace peripheral edema present.  NEUROLOGIC: Alert and oriented X3. No focal neurologic deficits.   SKIN: No rashes or visible skin lesions present.       LAB RESULTS:   Anticoagulation Therapy Visit on 04/04/2018   Component Date Value Ref Range Status     INR Protime 04/04/2018 2.6  2.0 - 3.0 Final   Anticoagulation Therapy Visit on 03/07/2018   Component Date Value Ref Range Status     INR Protime 03/07/2018 " 2.1  2 - 3 Final   Anticoagulation - [facility] on 02/07/2018   Component Date Value Ref Range Status     INR - Historical 02/07/2018 2.0* <1.3 Final   Anticoagulation - [facility] on 01/08/2018   Component Date Value Ref Range Status     INR - Historical 01/08/2018 2.1* <1.3 Final   Anticoagulation - [facility] on 12/11/2017   Component Date Value Ref Range Status     INR - Historical 12/11/2017 2.1* <1.3 Final   Anticoagulation - [facility] on 11/27/2017   Component Date Value Ref Range Status     INR - Historical 11/27/2017 1.9* <1.3 Final   Anticoagulation - [facility] on 11/16/2017   Component Date Value Ref Range Status     INR - Historical 11/16/2017 2.3* <1.3 Final   Anticoagulation - [facility] on 11/08/2017   Component Date Value Ref Range Status     INR - Historical 11/08/2017 1.5* <1.3 Final   Office Visit - [facility] on 11/01/2017   Component Date Value Ref Range Status     TSH - Historical 11/01/2017 1.52  0.34 - 5.60 uIU/mL Final     T4 Free 11/01/2017 1.09  0.58 - 1.64 ng/dL Final   Anticoagulation - [facility] on 10/30/2017   Component Date Value Ref Range Status     INR - Historical 10/30/2017 2.9* <1.3 Final          ASSESSMENT:   Kate Chacon presents for 3 month cardiology follow-up. She has a history of paroxysmal atrial fibrillation with occasional RVR, HTN, hyperlipidemia and recent dual-chamber pacer as a result of SA bradycardia. She has a history ischemic stroke concerning for cryptogenic stroke on 10/15/16.    PLAN:   1. Hyperlipidemia, unspecified hyperlipidemia type  Continue with atorvastatin 40 mg every night.    - atorvastatin (LIPITOR) 40 MG tablet; Take 1 tablet (40 mg) by mouth At Bedtime  Dispense: 90 tablet; Refill: 3    2. Paroxysmal atrial fibrillation (H)  She describes herself is largely asymptomatic.  Only notes occasional palpitations when she feels anxious/stressed.  Currently rate controlled on metoprolol.  Continue with metoprolol succinate 25 mg once daily.  Recent device monitoring with rare RVR and 11% A. fib  burden noted.  Continue with Coumadin oral anticoagulation.  She has had no complication with this.    - metoprolol succinate (TOPROL-XL) 25 MG 24 hr tablet; Take 1 tablet (25 mg) by mouth daily  Dispense: 90 tablet; Refill: 3  - Basic metabolic panel    3. Essential hypertension  Blood pressure well controlled at 124/60 today.    4. Anticoagulation goal of INR 2 to 3    5. Cardiac pacemaker, Medtronic, Dual Chamber  Remote transmission in May, device clinic visit on 7/24/2018 with Madelyn.  Normal device function.      Follow-up with cardiology in 6 months, certainly sooner if needed.    Thank you for allowing me to participate in the care of your patient. Please do not hesitate to contact me if you have any questions.     Dinora Younger

## 2018-04-23 NOTE — NURSING NOTE
Pt presents today for 3 month follow up.    Rut Rivas RN............................ 4/23/2018 1:52 PM

## 2018-04-23 NOTE — PATIENT INSTRUCTIONS
Decrease Lipitor to 40 mg daily in the evening    Metoprolol 25 mg once daily    Labs today    Please follow-up with cardiology in 6 months

## 2018-05-01 ENCOUNTER — ALLIED HEALTH/NURSE VISIT (OUTPATIENT)
Dept: CARDIOLOGY | Facility: CLINIC | Age: 73
End: 2018-05-01
Attending: INTERNAL MEDICINE
Payer: MEDICARE

## 2018-05-01 DIAGNOSIS — I49.5 SINOATRIAL NODE DYSFUNCTION (H): Primary | ICD-10-CM

## 2018-05-01 PROCEDURE — 93294 REM INTERROG EVL PM/LDLS PM: CPT | Performed by: INTERNAL MEDICINE

## 2018-05-01 PROCEDURE — 93296 REM INTERROG EVL PM/IDS: CPT | Mod: ZF

## 2018-05-01 NOTE — PROGRESS NOTES
Preliminary Device Interrogation Results.  Final physician signed paceart report to be scanned and attached.    Remote pacemaker transmission received and reviewed.  Device transmission sent per MD orders.  Patient has a Ravalli Traffio dual lead pacemaker.  Normal pacemaker function.   21 AT/AF episodes recorded, shortest 9 sec. -  longest  59 hours  21 min  42 sec Patient is taking Coumadin. AT/AF burden 19%.  1 NSVT episode recorded,171 bpm - 14 sec.  Presenting EGM = AP @ 60 bpm.  AP = 44%.   = 12%.   Estimated battery longevity to ÁLVARO = 9 years.   Patient notified of interrogation results.  Patient reports that she is feeling well and denies specific complaints.  Plan for patient to return to clinic in 3 months as scheduled.    Remote pacemaker transmission       Dressing: bandage

## 2018-05-01 NOTE — MR AVS SNAPSHOT
After Visit Summary   5/1/2018    Kate Chacon    MRN: [id number]           Patient Information     Date Of Birth          1945        Visit Information        Provider Department      5/1/2018 6:00 AM  ICD REMOTE [facility]        Today's Diagnoses     Sinoatrial node dysfunction (H)    -  1       Follow-ups after your visit        Your next 10 appointments already scheduled     May 16, 2018 10:15 AM CDT   Anticoagulation Visit with  ANTI COAG 1   [facility] ([facility])    [address]  [place] [address]   [phone]            Jul 24, 2018 10:00 AM CDT   Pacemaker Check with  PACEMAKER   [facility]    [address]  [place] [address]   [phone]            Oct 22, 2018  1:45 PM CDT   Return Visit with JANAE Ferrell CNP   [facility]    [address]  [place] [address]   [phone]              Who to contact     If you have questions or need follow up information about today's clinic visit or your schedule please contact [facility] directly at [phone].  Normal or non-critical lab and imaging results will be communicated to you by Bigcommercehart, letter or phone within 4 business days after the clinic has received the results. If you do not hear from us within 7 days, please contact the clinic through Bigcommercehart or phone. If you have a critical or abnormal lab result, we will notify you by phone as soon as possible.  Submit refill requests through Optimizely or call your pharmacy and they will forward the refill request to us. Please allow 3 business days for your refill to be completed.          Additional Information About Your Visit        Optimizely Information     Optimizely lets you send messages to your doctor, view your test results, renew your prescriptions,  "schedule appointments and more. To sign up, go to [email or web address] . Click on \"Log in\" on the left side of the screen, which will take you to the Welcome page. Then click on \"Sign up Now\" on the right side of the page.     You will be asked to enter the access code listed below, as well as some personal information. Please follow the directions to create your username and password.     Your access code is: [email or web address]  Expires: 2018  2:48 PM     Your access code will  in 90 days. If you need help or a new code, please call your [place] clinic or [phone].        Care EveryWhere ID     This is your Care EveryWhere ID. This could be used by other organizations to access your [place] medical records  [id number]         Blood Pressure from Last 3 Encounters:   18 124/60   01/15/18 132/82   17 138/72    Weight from Last 3 Encounters:   18 88 kg (193 lb 14.4 oz)   01/15/18 83.9 kg (185 lb)   17 82.2 kg (181 lb 4.8 oz)              We Performed the Following     INTERROGATION DEVICE EVAL REMOTE, PACER/ICD        Primary Care Provider Office Phone # Fax #    Asher WILSON MD And[place] [phone] [address] [facility] [address]        Equal Access to Services     [facility]: Hadii aad ku hadasho Soomaali, waaxda luqadaha, qaybta kaalmada adeegyada, hari saldaña . So [facility] [phone].    ATENCIÓN: Si habla español, tiene a fuentes disposición servicios gratuitos de asistencia lingüística. Llame al [phone].    We comply with applicable federal civil rights laws and [place] laws. We do not discriminate on the basis of race, color, national origin, age, disability, sex, sexual orientation, or gender identity.            Thank you!     Thank you for choosing [facility]  for your care. Our goal is always to provide you with excellent care. Hearing back from our patients is one way we can continue to improve our " services. Please take a few minutes to complete the written survey that you may receive in the mail after your visit with us. Thank you!             Your Updated Medication List - Protect others around you: Learn how to safely use, store and throw away your medicines at www.disposemymeds.org.          This list is accurate as of 5/1/18  3:24 PM.  Always use your most recent med list.                   Brand Name Dispense Instructions for use Diagnosis    atorvastatin 40 MG tablet    LIPITOR    90 tablet    Take 1 tablet (40 mg) by mouth At Bedtime    Hyperlipidemia, unspecified hyperlipidemia type       calcium carbonate 750 MG Chew      Take 1 tablet by mouth every 8 hours as needed for heartburn        ibuprofen 200 MG tablet    ADVIL/MOTRIN     Take 200 mg by mouth 4 times daily as needed for pain or headaches        lisinopril 20 MG tablet    PRINIVIL/ZESTRIL     Take 20 mg by mouth daily        metoprolol succinate 25 MG 24 hr tablet    TOPROL-XL    90 tablet    Take 1 tablet (25 mg) by mouth daily    Paroxysmal atrial fibrillation (H)       oxybutynin 10 MG 24 hr tablet    DITROPAN-XL     Take 10 mg by mouth At Bedtime        sertraline 50 MG tablet    ZOLOFT     Take 50 mg by mouth At Bedtime        warfarin 5 MG tablet    COUMADIN     TAKE 7.5 mg x 1 day and 5 mg x 6 days/week

## 2018-05-10 ENCOUNTER — ANTICOAGULATION THERAPY VISIT (OUTPATIENT)
Dept: ANTICOAGULATION | Facility: OTHER | Age: 73
End: 2018-05-10
Attending: FAMILY MEDICINE
Payer: MEDICARE

## 2018-05-10 DIAGNOSIS — Z79.01 LONG-TERM (CURRENT) USE OF ANTICOAGULANTS: ICD-10-CM

## 2018-05-10 LAB — INR POINT OF CARE: 2.1 (ref 0.86–1.14)

## 2018-05-10 PROCEDURE — 36416 COLLJ CAPILLARY BLOOD SPEC: CPT | Mod: ZL

## 2018-05-10 PROCEDURE — 99207 ZZC NO CHARGE NURSE ONLY: CPT

## 2018-05-10 NOTE — MR AVS SNAPSHOT
Kate Marvinon   5/10/2018 12:45 PM   Anticoagulation Therapy Visit    Description:  72 year old female   Provider:  [facility]   Department:  [facility]           INR as of 5/10/2018     Today's INR 2.1      Anticoagulation Summary as of 5/10/2018     INR goal 2.0-3.0   Today's INR 2.1   Full instructions 7.5 mg on Mon; 5 mg all other days   Next INR check 6/21/2018    Indications   Cerebral infarction  unspecified [I63.9]  Long-term (current) use of anticoagulants [Z79.01] [Z79.01]         Description     Continue same dose and recheck in 6 weeks. ...............Radha Burns RN    5/10/2018    12:40 PM      Your next Anticoagulation Clinic appointment(s)     May 10, 2018 12:45 PM CDT   Anticoagulation Visit with [facility] ANTI COAG 1   [facility]    [address]  [facility] [address]   [phone]              May 2018 Details    Sun Mon Tue Wed Thu Fri Sat       1               2               3               4               5                 6               7               8               9               10      5 mg   See details      11      5 mg         12      5 mg           13      5 mg         14      7.5 mg         15      5 mg         16      5 mg         17      5 mg         18      5 mg         19      5 mg           20      5 mg         21      7.5 mg         22      5 mg         23      5 mg         24      5 mg         25      5 mg         26      5 mg           27      5 mg         28      7.5 mg         29      5 mg         30      5 mg         31      5 mg            Date Details   05/10 This INR check               How to take your warfarin dose     To take:  5 mg Take 1 of the 5 mg tablets.    To take:  7.5 mg Take 1.5 of the 5 mg tablets.           June 2018 Details    Sun Mon Tue Wed Thu Fri Sat          1      5 mg         2      5 mg           3      5 mg         4      7.5 mg         5      5 mg         6      5  mg         7      5 mg         8      5 mg         9      5 mg           10      5 mg         11      7.5 mg         12      5 mg         13      5 mg         14      5 mg         15      5 mg         16      5 mg           17      5 mg         18      7.5 mg         19      5 mg         20      5 mg         21            22               23                 24               25               26               27               28               29               30                Date Details   No additional details    Date of next INR:  6/21/2018         How to take your warfarin dose     To take:  5 mg Take 1 of the 5 mg tablets.    To take:  7.5 mg Take 1.5 of the 5 mg tablets.

## 2018-05-10 NOTE — PROGRESS NOTES
ANTICOAGULATION FOLLOW-UP CLINIC VISIT    Patient Name:  Kate Chacon  Date:  5/10/2018  Contact Type:  Face to Face    SUBJECTIVE:     Patient Findings     Positives No Problem Findings           OBJECTIVE    INR Protime   Date Value Ref Range Status   05/10/2018 2.1 (A) 0.86 - 1.14 Final       ASSESSMENT / PLAN  INR assessment THER    Recheck INR In: 6 WEEKS    INR Location Clinic      Anticoagulation Summary as of 5/10/2018     INR goal 2.0-3.0   Today's INR 2.1   Maintenance plan 7.5 mg (5 mg x 1.5) on Mon; 5 mg (5 mg x 1) all other days   Full instructions 7.5 mg on Mon; 5 mg all other days   Weekly total 37.5 mg   No change documented Radha Burns, GLENN   Next INR check 6/21/2018   Priority INR   Target end date Indefinite    Indications   Cerebral infarction  unspecified [I63.9]  Long-term (current) use of anticoagulants [Z79.01] [Z79.01]         Anticoagulation Episode Summary     INR check location     Preferred lab     Send INR reminders to  INR    Comments       Anticoagulation Care Providers     Provider Role Specialty Phone number    Asher Campos MD [facility] [phone]            See the Encounter Report to view Anticoagulation Flowsheet and Dosing Calendar (Go to Encounters tab in chart review, and find the Anticoagulation Therapy Visit)        Radha Burns, RN

## 2018-06-21 ENCOUNTER — ANTICOAGULATION THERAPY VISIT (OUTPATIENT)
Dept: ANTICOAGULATION | Facility: OTHER | Age: 73
End: 2018-06-21
Attending: FAMILY MEDICINE
Payer: MEDICARE

## 2018-06-21 DIAGNOSIS — Z79.01 LONG-TERM (CURRENT) USE OF ANTICOAGULANTS: ICD-10-CM

## 2018-06-21 LAB — INR POINT OF CARE: 2.1 (ref 0.86–1.14)

## 2018-06-21 PROCEDURE — 85610 PROTHROMBIN TIME: CPT | Mod: QW

## 2018-06-21 NOTE — MR AVS SNAPSHOT
Kate Marvinon   6/21/2018 10:45 AM   Anticoagulation Therapy Visit    Description:  72 year old female   Provider:  [facility]TI COAG 1   Department:  [facility]           INR as of 6/21/2018     Today's INR 2.1      Anticoagulation Summary as of 6/21/2018     INR goal 2.0-3.0   Today's INR 2.1   Full warfarin instructions 7.5 mg on Mon; 5 mg all other days   Next INR check 8/2/2018    Indications   Cerebral infarction  unspecified [I63.9]  Long-term (current) use of anticoagulants [Z79.01] [Z79.01]         Description     Continue current dose and recheck in 6 weeks 8/2/2018.  Jolie Carreno RN  ....................  6/21/2018   10:36 AM        Your next Anticoagulation Clinic appointment(s)     Jun 21, 2018 10:45 AM CDT   Anticoagulation Visit with [facility] ANTI COAG 1   [facility] ([facility])    [address]  [facility] [address]   [phone]              June 2018 Details    Sun Mon Tue Wed Thu Fri Sat          1               2                 3               4               5               6               7               8               9                 10               11               12               13               14               15               16                 17               18               19               20               21      5 mg   See details      22      5 mg         23      5 mg           24      5 mg         25      7.5 mg         26      5 mg         27      5 mg         28      5 mg         29      5 mg         30      5 mg          Date Details   06/21 This INR check               How to take your warfarin dose     To take:  5 mg Take 1 of the 5 mg tablets.    To take:  7.5 mg Take 1.5 of the 5 mg tablets.           July 2018 Details    Sun Mon Tue Wed Thu Fri Sat     1      5 mg         2      7.5 mg         3      5 mg         4      5 mg         5      5 mg         6      5 mg         7      5 mg           8      5 mg          9      7.5 mg         10      5 mg         11      5 mg         12      5 mg         13      5 mg         14      5 mg           15      5 mg         16      7.5 mg         17      5 mg         18      5 mg         19      5 mg         20      5 mg         21      5 mg           22      5 mg         23      7.5 mg         24      5 mg         25      5 mg         26      5 mg         27      5 mg         28      5 mg           29      5 mg         30      7.5 mg         31      5 mg              Date Details   No additional details            How to take your warfarin dose     To take:  5 mg Take 1 of the 5 mg tablets.    To take:  7.5 mg Take 1.5 of the 5 mg tablets.           August 2018 Details    Sun Mon Tue Wed Thu Fri Sat        1      5 mg         2            3               4                 5               6               7               8               9               10               11                 12               13               14               15               16               17               18                 19               20               21               22               23               24               25                 26               27               28               29               30               31                 Date Details   No additional details    Date of next INR:  8/2/2018         How to take your warfarin dose     To take:  5 mg Take 1 of the 5 mg tablets.

## 2018-06-21 NOTE — PROGRESS NOTES
ANTICOAGULATION FOLLOW-UP CLINIC VISIT    Patient Name:  Kate Chacon  Date:  6/21/2018  Contact Type:  Face to Face    SUBJECTIVE:     Patient Findings     Positives No Problem Findings           OBJECTIVE    INR Protime   Date Value Ref Range Status   06/21/2018 2.1 (A) 0.86 - 1.14 Final       ASSESSMENT / PLAN  INR assessment THER    Recheck INR In: 6 WEEKS    INR Location Clinic      Anticoagulation Summary as of 6/21/2018     INR goal 2.0-3.0   Today's INR 2.1   Warfarin maintenance plan 7.5 mg (5 mg x 1.5) on Mon; 5 mg (5 mg x 1) all other days   Full warfarin instructions 7.5 mg on Mon; 5 mg all other days   Weekly warfarin total 37.5 mg   No change documented Jolie Carreno, RN   Next INR check 8/2/2018   Priority INR   Target end date Indefinite    Indications   Cerebral infarction  unspecified [I63.9]  Long-term (current) use of anticoagulants [Z79.01] [Z79.01]         Anticoagulation Episode Summary     INR check location     Preferred lab     Send INR reminders to  INR    Comments       Anticoagulation Care Providers     Provider Role Specialty Phone number    Asher Campos MD [facility] [phone]            See the Encounter Report to view Anticoagulation Flowsheet and Dosing Calendar (Go to Encounters tab in chart review, and find the Anticoagulation Therapy Visit)        Jolie Carreno RN

## 2018-06-28 ENCOUNTER — TELEPHONE (OUTPATIENT)
Dept: UROLOGY | Facility: OTHER | Age: 73
End: 2018-06-28

## 2018-06-28 ENCOUNTER — TELEPHONE (OUTPATIENT)
Dept: FAMILY MEDICINE | Facility: OTHER | Age: 73
End: 2018-06-28

## 2018-06-28 NOTE — TELEPHONE ENCOUNTER
J-Pt requesting appt for some health/med issues related to her stroke a cple years ago. Please call. Thank you.  Connie Cunha

## 2018-06-28 NOTE — TELEPHONE ENCOUNTER
Pt states that the Oxybutin is not working and would like a different medication.  Patient has not seen RTn since 10/5/17.  Suggested that she make and appoint to discuss other options, Pt states that she is soaking 3 -4 pads a day.  Patient was transferred to scheduling

## 2018-06-28 NOTE — TELEPHONE ENCOUNTER
The patient was given an appointment on Monday July 2 nd.  Tiffany White LPN..................6/28/2018   11:08 AM

## 2018-07-02 ENCOUNTER — OFFICE VISIT (OUTPATIENT)
Dept: FAMILY MEDICINE | Facility: OTHER | Age: 73
End: 2018-07-02
Attending: FAMILY MEDICINE
Payer: COMMERCIAL

## 2018-07-02 VITALS
HEART RATE: 64 BPM | SYSTOLIC BLOOD PRESSURE: 106 MMHG | DIASTOLIC BLOOD PRESSURE: 80 MMHG | WEIGHT: 192.2 LBS | BODY MASS INDEX: 30.1 KG/M2

## 2018-07-02 DIAGNOSIS — Z79.01 ANTICOAGULATION GOAL OF INR 2 TO 3: ICD-10-CM

## 2018-07-02 DIAGNOSIS — Z95.0 CARDIAC PACEMAKER: ICD-10-CM

## 2018-07-02 DIAGNOSIS — I48.0 PAROXYSMAL ATRIAL FIBRILLATION (H): ICD-10-CM

## 2018-07-02 DIAGNOSIS — Z51.81 ANTICOAGULATION GOAL OF INR 2 TO 3: ICD-10-CM

## 2018-07-02 DIAGNOSIS — N39.41 URGE INCONTINENCE: ICD-10-CM

## 2018-07-02 DIAGNOSIS — R60.0 LOWER EXTREMITY EDEMA: Primary | ICD-10-CM

## 2018-07-02 DIAGNOSIS — Z79.01 LONG-TERM (CURRENT) USE OF ANTICOAGULANTS: ICD-10-CM

## 2018-07-02 DIAGNOSIS — H61.22 IMPACTED CERUMEN OF LEFT EAR: ICD-10-CM

## 2018-07-02 DIAGNOSIS — I10 ESSENTIAL HYPERTENSION: ICD-10-CM

## 2018-07-02 PROCEDURE — 69209 REMOVE IMPACTED EAR WAX UNI: CPT | Performed by: FAMILY MEDICINE

## 2018-07-02 PROCEDURE — 99214 OFFICE O/P EST MOD 30 MIN: CPT | Performed by: FAMILY MEDICINE

## 2018-07-02 PROCEDURE — G0463 HOSPITAL OUTPT CLINIC VISIT: HCPCS

## 2018-07-02 PROCEDURE — G0463 HOSPITAL OUTPT CLINIC VISIT: HCPCS | Mod: 25

## 2018-07-02 ASSESSMENT — ANXIETY QUESTIONNAIRES
GAD7 TOTAL SCORE: 0
7. FEELING AFRAID AS IF SOMETHING AWFUL MIGHT HAPPEN: NOT AT ALL
6. BECOMING EASILY ANNOYED OR IRRITABLE: NOT AT ALL
2. NOT BEING ABLE TO STOP OR CONTROL WORRYING: NOT AT ALL
5. BEING SO RESTLESS THAT IT IS HARD TO SIT STILL: NOT AT ALL
IF YOU CHECKED OFF ANY PROBLEMS ON THIS QUESTIONNAIRE, HOW DIFFICULT HAVE THESE PROBLEMS MADE IT FOR YOU TO DO YOUR WORK, TAKE CARE OF THINGS AT HOME, OR GET ALONG WITH OTHER PEOPLE: NOT DIFFICULT AT ALL
3. WORRYING TOO MUCH ABOUT DIFFERENT THINGS: NOT AT ALL
1. FEELING NERVOUS, ANXIOUS, OR ON EDGE: NOT AT ALL

## 2018-07-02 ASSESSMENT — PAIN SCALES - GENERAL: PAINLEVEL: NO PAIN (0)

## 2018-07-02 ASSESSMENT — PATIENT HEALTH QUESTIONNAIRE - PHQ9: 5. POOR APPETITE OR OVEREATING: NOT AT ALL

## 2018-07-02 NOTE — PATIENT INSTRUCTIONS
Elevate legs whenever possible. Drink plenty of water. Avoid salt.  Standing and sitting with your feet down is worse than propping them up.  If not better we can consider light compression socks.     Moisten Q tip slightly.

## 2018-07-02 NOTE — NURSING NOTE
The left ear canal was irrigated with body-temperature tap water withthe jet of water directed superiorly.  The ear canal was then re-examined and cleared of the impaction.  The patient tolerated the procedure well.  Tiffany White LPN..................7/2/2018   4:45 PM

## 2018-07-02 NOTE — NURSING NOTE
She has had edema in her lower legs and feet for quite awhile.   Tiffany White LPN..................7/2/2018   3:19 PM

## 2018-07-02 NOTE — MR AVS SNAPSHOT
After Visit Summary   7/2/2018    Kate Chacon    MRN: [id number]           Patient Information     Date Of Birth          1945        Visit Information        Provider Department      7/2/2018 3:00 PM Asher Campos MD [facility]        Today's Diagnoses     Lower extremity edema    -  1    Cardiac pacemaker, Medtronic, Dual Chamber        Long-term (current) use of anticoagulants [Z79.01]        Essential hypertension        Paroxysmal atrial fibrillation (H)        Anticoagulation goal of INR 2 to 3        Impacted cerumen of left ear        Urge incontinence          Care Instructions    Elevate legs whenever possible. Drink plenty of water. Avoid salt.  Standing and sitting with your feet down is worse than propping them up.  If not better we can consider light compression socks.     Moisten Q tip slightly.            Follow-ups after your visit        Additional Services     UROLOGY ADULT REFERRAL       Your provider has referred you to: [facility]: [facility] [phone]   [email or web address]    Please be aware that coverage of these services is subject to the terms and limitations of your health insurance plan.  Call member services at your health plan with any benefit or coverage questions.      Please bring the following with you to your appointment:    (1) Any X-Rays, CTs or MRIs which have been performed.  Contact the facility where they were done to arrange for  prior to your scheduled appointment.    (2) List of current medications  (3) This referral request   (4) Any documents/labs given to you for this referral                  Your next 10 appointments already scheduled     Jul 24, 2018 10:00 AM CDT   Pacemaker Check with  PACEMAKER   [facility]    [address]  [facility] [address]   [phone]            Aug 02, 2018  9:30 AM CDT  "  Anticoagulation Visit with  ANTI COAG 1   [facility]    [address]  [place] [address] [phone]            Oct 22, 2018  1:45 PM CDT   Return Visit with JANAE Ferrell CNP   [facility]    [address]  [place] [address] [phone]              Who to contact     If you have questions or need follow up information about today's clinic visit or your schedule please contact [facility] directly at [phone].  Normal or non-critical lab and imaging results will be communicated to you by [employer], letter or phone within 4 business days after the clinic has received the results. If you do not hear from us within 7 days, please contact the clinic through [employer] or phone. If you have a critical or abnormal lab result, we will notify you by phone as soon as possible.  Submit refill requests through [employer] or call your pharmacy and they will forward the refill request to us. Please allow 3 business days for your refill to be completed.          Additional Information About Your Visit        [employer] Information     [employer] lets you send messages to your doctor, view your test results, renew your prescriptions, schedule appointments and more. To sign up, go to [email or web address] . Click on \"Log in\" on the left side of the screen, which will take you to the Welcome page. Then click on \"Sign up Now\" on the right side of the page.     You will be asked to enter the access code listed below, as well as some personal information. Please follow the directions to create your username and password.     Your access code is: [email or web address]  Expires: 2018  2:48 PM     Your access code will  in 90 days. If you need help or a new code, please call your [facility] or [phone].        Care EveryWhere ID     This is your Care " EveryWhere ID. This could be used by other organizations to access your [place] medical records  [id number]        Your Vitals Were     Pulse Breastfeeding? BMI (Body Mass Index)             64 No 30.1 kg/m2          Blood Pressure from Last 3 Encounters:   07/02/18 106/80   04/23/18 124/60   01/15/18 132/82    Weight from Last 3 Encounters:   07/02/18 192 lb 3.2 oz (87.2 kg)   04/23/18 193 lb 14.4 oz (88 kg)   01/15/18 185 lb (83.9 kg)              We Performed the Following     HC REMOVAL IMPACTED CERUMEN IRRIGATION/LVG UNILAT     UROLOGY ADULT REFERRAL          Today's Medication Changes          These changes are accurate as of 7/2/18  6:16 PM.  If you have any questions, ask your nurse or doctor.               Stop taking these medicines if you haven't already. Please contact your care team if you have questions.     oxybutynin 10 MG 24 hr tablet   Commonly known as:  DITROPAN-XL   Stopped by:  Asher Campos MD                    Primary Care Provider Office Phone # Fax #    Asher Campos [phone] [address] [facility] [address]        Equal Access to Services     [facility] AH: Hadii noemi hudson Sohattie, waaxda luqadaha, qaybta kaalmada adelissyda, hari sheth. So [facility] [phone].    ATENCIÓN: Si habla español, tiene a fuentes disposición servicios gratuitos de asistencia lingüística. Héctor al [phone].    We comply with applicable federal civil rights laws and [place] laws. We do not discriminate on the basis of race, color, national origin, age, disability, sex, sexual orientation, or gender identity.            Thank you!     Thank you for choosing [facility]  for your care. Our goal is always to provide you with excellent care. Hearing back from our patients is one way we can continue to improve our services. Please take a few minutes to complete the written survey that you may receive in the mail  after your visit with us. Thank you!             Your Updated Medication List - Protect others around you: Learn how to safely use, store and throw away your medicines at www.disposemymeds.org.          This list is accurate as of 7/2/18  6:16 PM.  Always use your most recent med list.                   Brand Name Dispense Instructions for use Diagnosis    atorvastatin 40 MG tablet    LIPITOR    90 tablet    Take 1 tablet (40 mg) by mouth At Bedtime    Hyperlipidemia, unspecified hyperlipidemia type       calcium carbonate 750 MG Chew      Take 1 tablet by mouth every 8 hours as needed for heartburn        ibuprofen 200 MG tablet    ADVIL/MOTRIN     Take 200 mg by mouth 4 times daily as needed for pain or headaches        lisinopril 20 MG tablet    PRINIVIL/ZESTRIL     Take 20 mg by mouth daily        metoprolol succinate 25 MG 24 hr tablet    TOPROL-XL    90 tablet    Take 1 tablet (25 mg) by mouth daily    Paroxysmal atrial fibrillation (H)       sertraline 50 MG tablet    ZOLOFT     Take 50 mg by mouth At Bedtime        warfarin 5 MG tablet    COUMADIN     TAKE 7.5 mg x 1 day and 5 mg x 6 days/week

## 2018-07-02 NOTE — PROGRESS NOTES
Nursing Notes:   Tiffany White, LPN  7/2/2018  3:28 PM  Signed  She has had edema in her lower legs and feet for quite awhile.   Tiffany White LPN..................7/2/2018   3:19 PM      Tiffany White, LPN  7/2/2018  4:45 PM  Signed    The left ear canal was irrigated with body-temperature tap water withthe jet of water directed superiorly.  The ear canal was then re-examined and cleared of the impaction.  The patient tolerated the procedure well.  Tiffany White LPN..................7/2/2018   4:45 PM        SUBJECTIVE:  Kate Chacon  is a 72 year old female who comes in for evaluation of leg swelling. Seems to be more in the ankles and feet.  It is less in the a.m.  She was last seen by cardiology in April.  She has a history of stroke and has a dual-chamber pacemaker as a result of SA bradycardia, chronic A. fib with RVR.  She has hypertension and hyperlipidemia.  She is rate controlled with metoprolol and continue with Coumadin for oral anticoagulation.  She continues on Lipitor.  She has not had an echocardiogram for a long time.  Her last was in 2016 showing biatrial enlargement and EF of 55% done at [facility] in [place].    She does get some palpitations from time to time.     She has itching of her ears. Left worse than right.    She doesn't find that oxybutynin was helpful. She has a hard time with urgency.     She has been going to [employer] and exercises regularly. She has better balance.  She is careful but doesn't feel she is gaining.     Past Medical, Family, and Social History reviewed and updated as noted below.   ROS is negative except as noted above       Allergies   Allergen Reactions     Methylpar-Na Bisulfite-Pentazocine Unknown     jittery   ,   Family History   Problem Relation Age of Onset     Diabetes Father      Diabetes     Hypertension Father      Hypertension     Other - See Comments Mother      h/o coronary artery disease/dementia     Asthma Mother      Asthma      Diabetes Maternal Grandmother      Diabetes     Cancer Maternal Aunt      Cancer,Uterine     Breast Cancer No family hx of      Cancer-breast   ,   Current Outpatient Prescriptions   Medication     atorvastatin (LIPITOR) 40 MG tablet     calcium carbonate 750 MG CHEW     ibuprofen (ADVIL/MOTRIN) 200 MG tablet     lisinopril (PRINIVIL/ZESTRIL) 20 MG tablet     metoprolol succinate (TOPROL-XL) 25 MG 24 hr tablet     sertraline (ZOLOFT) 50 MG tablet     warfarin (COUMADIN) 5 MG tablet     No current facility-administered medications for this visit.    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   ,   Patient Active Problem List    Diagnosis Date Noted     Sinoatrial node dysfunction (H) 05/01/2018     Priority: Medium     Long-term (current) use of anticoagulants [Z79.01] 03/07/2018     Priority: Medium     Cardiac pacemaker, Medtronic, Dual Chamber 02/19/2018     Priority: Medium     Cerebral infarction, unspecified 02/11/2018     Priority: Medium     Encounter for therapeutic drug level monitoring 02/11/2018     Priority: Medium     DNI (do not intubate) 10/25/2017     Priority: Medium     Overview:   As discussed with patient on 10/25/17       Urge incontinence 10/05/2017     Priority: Medium     Former smoker 08/30/2017     Priority: Medium     MDD (recurrent major depressive disorder) in remission (H) 08/30/2017     Priority: Medium     Anxiety 01/05/2017     Priority: Medium     Hypertension 12/08/2016     Priority: Medium     Paroxysmal atrial fibrillation (H) 11/02/2016     Priority: Medium     Anticoagulation goal of INR 2 to 3 10/28/2016     Priority: Medium     Ischemic stroke (H) 10/15/2016     Priority: Medium     Diverticulosis of large intestine 04/04/2011     Priority: Medium     History of colonic polyps 03/02/2011      Priority: Medium     Osteoporosis 02/08/2006     Priority: Medium   ,   Past Surgical History:   Procedure Laterality Date     APPENDECTOMY OPEN      No Comments Provided     COLONOSCOPY      8/19/05,Cecal biopsy with tubular adenoma low grade dysplasia.     COLONOSCOPY      4/4/11     COLONOSCOPY      5/7/12     ESOPHAGOSCOPY, GASTROSCOPY, DUODENOSCOPY (EGD), COMBINED      8/19/05     OTHER SURGICAL HISTORY      8/3/05,[id number],OTHER,helices on the right ear     TONSILLECTOMY      No Comments Provided    and   Social History   Substance Use Topics     Smoking status: Former Smoker     Packs/day: 0.50     Years: 49.00     Types: Cigarettes     Quit date: 10/14/2016     Smokeless tobacco: Never Used     Alcohol use No     OBJECTIVE:  /80 (BP Location: Right arm, Patient Position: Chair, Cuff Size: Adult Large)  Pulse 64  Wt 192 lb 3.2 oz (87.2 kg)  Breastfeeding? No  BMI 30.1 kg/m2   EXAM:  Alert cooperative, no distress.  Left ear canal is occluded with cerumen.  Right is clear.  Affect is broad ranging and appropriate.  She has no JVD.  Lungs are clear, no rales rhonchi or wheezes are heard.  Cardiac RRR without murmur.  Abdomen is soft and nontender.  She has mild dependent edema bilaterally.  ASSESSMENT/Plan :    Kate was seen today for edema.    Diagnoses and all orders for this visit:    Lower extremity edema    Cardiac pacemaker, Medtronic, Dual Chamber    Long-term (current) use of anticoagulants [Z79.01]    Essential hypertension    Paroxysmal atrial fibrillation (H)    Anticoagulation goal of INR 2 to 3    Impacted cerumen of left ear  -     HC REMOVAL IMPACTED CERUMEN IRRIGATION/LVG UNILAT    Urge incontinence  -     UROLOGY ADULT REFERRAL      Discussed moist doing a Q-tip and using some hydrocortisone cream in her ear canals to help with the itching.  I suspect after ear lavage this may be much improved.    Recommend she continue with other medications.  Her palpitations are more  intermittent and not terribly symptomatic and likely do not represent anything serious but if she has more significant issues she will let us know.    Discussed her urge incontinence and felt that referral back to Dr. Fox was appropriate.  We discussed possible use of other anticholinergic medications but it is unlikely that that would produce a much different result than oxybutynin and will leave it to Dr. Fox's expertise.  Discussed potential other options for her.    Discussed dependent edema pathophysiology and options for treatment.    Discussed her improvement since her stroke and encouraged her to continue with her regular exercise and healthy lifestyle choices.  Congratulated on ongoing smoking cessation.    A total of 25 minutes was spent with the patient, greater than 50% of the time was spent in counseling/discussion of the aforementioned concerns.     Asher Campos MD

## 2018-07-03 ASSESSMENT — PATIENT HEALTH QUESTIONNAIRE - PHQ9: SUM OF ALL RESPONSES TO PHQ QUESTIONS 1-9: 4

## 2018-07-03 ASSESSMENT — ANXIETY QUESTIONNAIRES: GAD7 TOTAL SCORE: 0

## 2018-07-23 NOTE — PROGRESS NOTES
Patient Information     Patient Name  Kate Chacon MRN  [id number] Sex  Female   1945      Letter by Asher Campos MD at      Author:  Asher Campos MD Service:  (none) Author Type:  (none)    Filed:   Encounter Date:  2017 Status:  (Other)           Kate Chacon  [address] [facility] [address]          2017    Dear Kate:    Your lab tests were outstanding. Your complete blood count and urinalysis are normal. Your comprehensive metabolic panel (a test that looks at liver and kidney function, blood sugar, electrolytes, and nutritional status) was normal. Your cholesterol was fine.    It was a pleasure seeing you the other day.  If you have any questions, please don't hesitate to call us.     Sincerely,          Asher Campos MD                                          Results for orders placed or performed in visit on 17      COMP METABOLIC PANEL      Result  Value Ref Range    SODIUM 141 133 - 143 mmol/L    POTASSIUM 4.0 3.5 - 5.1 mmol/L    CHLORIDE 107 98 - 107 mmol/L    CO2,TOTAL 25 21 - 31 mmol/L    ANION GAP 9 5 - 18                    GLUCOSE 75 70 - 105 mg/dL    CALCIUM 10.2 8.6 - 10.3 mg/dL    BUN 16 7 - 25 mg/dL    CREATININE 0.95 0.70 - 1.30 mg/dL    BUN/CREAT RATIO           17                    GFR if African American >60 >60 ml/min/1.73m2    GFR if not African American 58 (L) >60 ml/min/1.73m2    ALBUMIN 3.7 3.5 - 5.7 g/dL    PROTEIN,TOTAL 6.7 6.4 - 8.9 g/dL    GLOBULIN                  3.0 2.0 - 3.7 g/dL    A/G RATIO 1.2 1.0 - 2.0                    BILIRUBIN,TOTAL 0.6 0.3 - 1.0 mg/dL    ALK PHOSPHATASE 97 34 - 104 IU/L    ALT (SGPT) 36 7 - 52 IU/L    AST (SGOT) 26 13 - 39 IU/L   LIPID PANEL      Result  Value Ref Range    CHOLESTEROL,TOTAL 90 <200 mg/dL    TRIGLYCERIDES 55 <150 mg/dL    HDL CHOLESTEROL 40 23 - 92 mg/dL    NON-HDL CHOLESTEROL 50 <145 mg/dl    CHOL/HDL RATIO            2.25 <4.50                    LDL CHOLESTEROL 39 <100 mg/dL     PATIENT STATUS            FASTING                   CBC WITH AUTO DIFFERENTIAL      Result  Value Ref Range    WHITE BLOOD COUNT         8.7 4.5 - 11.0 thou/cu mm    RED BLOOD COUNT           4.55 4.00 - 5.20 mil/cu mm    HEMOGLOBIN                14.4 12.0 - 16.0 g/dL    HEMATOCRIT                43.6 33.0 - 51.0 %    MCV                       96 80 - 100 fL    MCH                       31.6 26.0 - 34.0 pg    MCHC                      33.0 32.0 - 36.0 g/dL    RDW                       13.6 11.5 - 15.5 %    PLATELET COUNT            166 140 - 440 thou/cu mm    MPV                       11.1 (H) 6.5 - 11.0 fL    NEUTROPHILS               66.6 42.0 - 72.0 %    LYMPHOCYTES               20.0 20.0 - 44.0 %    MONOCYTES                 11.1 <12.0 %    EOSINOPHILS               1.8 <8.0 %    BASOPHILS                 0.3 <3.0 %    IMMATURE GRANULOCYTES(METAS,MYELOS,PROS) 0.2 %    ABSOLUTE NEUTROPHILS      5.8 1.7 - 7.0 thou/cu mm    ABSOLUTE LYMPHOCYTES      1.7 0.9 - 2.9 thou/cu mm    ABSOLUTE MONOCYTES        1.0 (H) <0.9 thou/cu mm    ABSOLUTE EOSINOPHILS      0.2 <0.5 thou/cu mm    ABSOLUTE BASOPHILS        0.0 <0.3 thou/cu mm    ABSOLUTE IMMATURE GRANULOCYTES(METAS,MYELOS,PROS) 0.0 <=0.3 thou/cu mm   URINALYSIS W REFLEX MICROSCOPIC IF POSITIVE      Result  Value Ref Range    COLOR                     Yellow Yellow Color    CLARITY                   Clear Clear Clarity    SPECIFIC GRAVITY,URINE    1.015 1.010, 1.015, 1.020, 1.025                    PH,URINE                  6.5 6.0, 7.0, 8.0, 5.5, 6.5, 7.5, 8.5                    UROBILINOGEN,QUALITATIVE  Normal Normal EU/dl    PROTEIN, URINE Negative Negative mg/dL    GLUCOSE, URINE Negative Negative mg/dL    KETONES,URINE             Negative Negative mg/dL    BILIRUBIN,URINE           Negative Negative                    OCCULT BLOOD,URINE        Negative Negative                    NITRITE                   Negative Negative                    LEUKOCYTE ESTERASE         Negative Negative

## 2018-07-23 NOTE — PROGRESS NOTES
Patient Information     Patient Name  Kate Chacon MRN  [id number] Sex  Female   1945      Letter by Asher Campos MD at      Author:  Asher Campos MD Service:  (none) Author Type:  (none)    Filed:   Encounter Date:  2017 Status:  (Other)           Kate Chacon  [address] [facility] [address]          2017    Dear Ms. Chacon:    This is to remind you that you are overdue for your 3 month follow-up appointment with Asher Campos MD.  Your last visit was on 17. Additional refills of your medication require you to complete this visit.    Please call [phone] to schedule your appointment.    Thank you for choosing [facility] for your health care needs.    Sincerely,      Refill RN  [facility]

## 2018-07-23 NOTE — PROGRESS NOTES
Patient Information     Patient Name  Kate Chacon MRN  [id number] Sex  Female   1945      Letter by Ira Curtis at      Author:  Ira Curtis Service:  (none) Author Type:  (none)    Filed:   Encounter Date:  2017 Status:  (Other)           Kate Chacon  [address] [facility] [address]          2017    Dear Ms. Chacon:    It has come to our attention that you are due for a colonoscopy.  According to our records, your last colonoscopy was done on 12.  It  is time for your repeat colonoscopy.  Please contact the Schedulers at [phone] and we will be happy to set up this colonoscopy for you.  If you have had a colonoscopy since your last one with us, please let us know so we can update our records.      Sincerely,     General Surgery      Reviewed and electronically signed by provider.

## 2018-07-24 ENCOUNTER — ALLIED HEALTH/NURSE VISIT (OUTPATIENT)
Dept: CARDIOLOGY | Facility: OTHER | Age: 73
End: 2018-07-24
Attending: FAMILY MEDICINE
Payer: COMMERCIAL

## 2018-07-24 DIAGNOSIS — I49.5 SINOATRIAL NODE DYSFUNCTION (H): Primary | ICD-10-CM

## 2018-07-24 PROCEDURE — 93280 PM DEVICE PROGR EVAL DUAL: CPT

## 2018-07-24 NOTE — MR AVS SNAPSHOT
After Visit Summary   7/24/2018    Kate Chacon    MRN: [id number]           Patient Information     Date Of Birth          1945        Visit Information        Provider Department      7/24/2018 10:00 AM  PACEMAKER [facility]        Today's Diagnoses     Sinoatrial node dysfunction (H)    -  1      Care Instructions    It was a pleasure to see you in clinic today.  Please do not hesitate to call with any questions or concerns.  You are scheduled for a remote transmission on 10/29/18.  We look forward to seeing you in clinic at your next device check in 6 months.    Madelyn Lee, RN, MS, CCRN  Electrophysiology Nurse Clinician  [facility]    During Business Hours Please Call:  [phone]  After Hours Please Call:  [phone] - select option #4 and ask for job code [phone]                        Follow-ups after your visit        Your next 10 appointments already scheduled     Jul 26, 2018 10:45 AM CDT   Return Visit with Steve Fox MD   [facility] ([facility])    [address]  [place] [address]   [phone]            Aug 02, 2018  9:30 AM CDT   Anticoagulation Visit with  ANTI COAG 1   [facility] ([facility])    [address]  [place] [address] [phone]            Oct 22, 2018  1:45 PM CDT   Return Visit with JANAE Ferrell [facility] ([facility])    [address]  [place] [address] [phone]              Who to contact     If you have questions or need follow up information about today's clinic visit or your schedule please contact [employer] directly at [phone].  Normal or non-critical lab and imaging results will be communicated to you by MyChart, letter or phone within 4 business days after the clinic has  received the results. If you do not hear from us within 7 days, please contact the clinic through [employer] or phone. If you have a critical or abnormal lab result, we will notify you by phone as soon as possible.  Submit refill requests through [employer] or call your pharmacy and they will forward the refill request to us. Please allow 3 business days for your refill to be completed.          Additional Information About Your Visit        Care EveryWhere ID     This is your Care EveryWhere ID. This could be used by other organizations to access your [place] medical records  [id number]         Blood Pressure from Last 3 Encounters:   07/02/18 106/80   04/23/18 124/60   01/15/18 132/82    Weight from Last 3 Encounters:   07/02/18 87.2 kg (192 lb 3.2 oz)   04/23/18 88 kg (193 lb 14.4 oz)   01/15/18 83.9 kg (185 lb)              We Performed the Following     PM DEVICE PROGRAMMING EVAL, DUAL LEAD PACER        Primary Care Provider Office Phone # Fax #    Asher WILSON MD And[place] [phone]       [address] [facility] [address]        Equal Access to Services     [facility]: Hadii aad ku hadasho Soomaali, waaxda luqadaha, qaybta kaalmada adebradyatimoteo, hari saldaña . So [facility] [phone].    ATENCIÓN: Si habla español, tiene a fuentes disposición servicios gratuitos de asistencia lingüística. [facility] [phone].    We comply with applicable federal civil rights laws and [place] laws. We do not discriminate on the basis of race, color, national origin, age, disability, sex, sexual orientation, or gender identity.            Thank you!     Thank you for choosing [facility]  for your care. Our goal is always to provide you with excellent care. Hearing back from our patients is one way we can continue to improve our services. Please take a few minutes to complete the written survey that you may receive in the mail after your visit with us. Thank you!              Your Updated Medication List - Protect others around you: Learn how to safely use, store and throw away your medicines at www.disposemymeds.org.          This list is accurate as of 7/24/18 11:59 PM.  Always use your most recent med list.                   Brand Name Dispense Instructions for use Diagnosis    atorvastatin 40 MG tablet    LIPITOR    90 tablet    Take 1 tablet (40 mg) by mouth At Bedtime    Hyperlipidemia, unspecified hyperlipidemia type       calcium carbonate 750 MG Chew      Take 1 tablet by mouth every 8 hours as needed for heartburn        ibuprofen 200 MG tablet    ADVIL/MOTRIN     Take 200 mg by mouth 4 times daily as needed for pain or headaches        lisinopril 20 MG tablet    PRINIVIL/ZESTRIL     Take 20 mg by mouth daily        metoprolol succinate 25 MG 24 hr tablet    TOPROL-XL    90 tablet    Take 1 tablet (25 mg) by mouth daily    Paroxysmal atrial fibrillation (H)       sertraline 50 MG tablet    ZOLOFT     Take 50 mg by mouth At Bedtime        warfarin 5 MG tablet    COUMADIN     TAKE 7.5 mg x 1 day and 5 mg x 6 days/week

## 2018-07-25 NOTE — PROGRESS NOTES
Preliminary Device Interrogation Results.  Final physician signed paceart report to be scanned and attached.    Patient seen in clinic for evaluation and iterative programming of her [employer] dual lead pacemaker per MD orders.  Normal pacemaker function.  9 episodes recorded as NSVT - 155-201 bpm, 12-15 sec.  Stored EGM's reveal what appears to be AF with RVR.  31 AT/AF episodes recorded - < 1 min - > 48 hrs.  Patient is taking Coumadin.  Intrinsic rhythm = AS-VS @ < 30 bpm.  AP = 46%.   = 11%.  Estimated battery longevity to ÁLVARO = 9 years.  Patient reports that she is feeling well and denies specific complaints.  Plan for patient to send a remote transmission in 3 months and return to clinic in 6 months.    Dual lead pacemaker

## 2018-07-25 NOTE — PATIENT INSTRUCTIONS
It was a pleasure to see you in clinic today.  Please do not hesitate to call with any questions or concerns.  You are scheduled for a remote transmission on 10/29/18.  We look forward to seeing you in clinic at your next device check in 6 months.    Madelyn Lee, RN, MS, CCRN  Electrophysiology Nurse Clinician  [facility]    During Business Hours Please Call:  [phone]  After Hours Please Call:  [phone] - select option #[phone] and ask for job code [phone]

## 2018-07-26 ENCOUNTER — OFFICE VISIT (OUTPATIENT)
Dept: UROLOGY | Facility: OTHER | Age: 73
End: 2018-07-26
Attending: FAMILY MEDICINE
Payer: COMMERCIAL

## 2018-07-26 VITALS — RESPIRATION RATE: 16 BRPM | BODY MASS INDEX: 30.45 KG/M2 | HEART RATE: 62 BPM | WEIGHT: 194.4 LBS

## 2018-07-26 DIAGNOSIS — N39.41 URGE INCONTINENCE: Primary | ICD-10-CM

## 2018-07-26 PROCEDURE — G0463 HOSPITAL OUTPT CLINIC VISIT: HCPCS | Mod: 25

## 2018-07-26 PROCEDURE — 51798 US URINE CAPACITY MEASURE: CPT | Performed by: UROLOGY

## 2018-07-26 PROCEDURE — 99214 OFFICE O/P EST MOD 30 MIN: CPT | Performed by: UROLOGY

## 2018-07-26 ASSESSMENT — PAIN SCALES - GENERAL: PAINLEVEL: NO PAIN (0)

## 2018-07-26 NOTE — MR AVS SNAPSHOT
After Visit Summary   7/26/2018    Kate Chacon    MRN: [id number]           Patient Information     Date Of Birth          1945        Visit Information        Provider Department      7/26/2018 10:45 AM Steve Fox MD [facility]        Today's Diagnoses     Urge incontinence    -  1       Follow-ups after your visit        Your next 10 appointments already scheduled     Aug 02, 2018  9:30 AM CDT   Anticoagulation Visit with GH ANTI COAG 1   [facility] ([facility])    [address]  [place] [address] [phone]            Oct 22, 2018  1:45 PM CDT   Return Visit with JANAE Ferrell CNP   [facility] ([facility])    [address]  [place] [address] [phone]              Who to contact     If you have questions or need follow up information about today's clinic visit or your schedule please contact [facility] directly at [phone].  Normal or non-critical lab and imaging results will be communicated to you by MyChart, letter or phone within 4 business days after the clinic has received the results. If you do not hear from us within 7 days, please contact the clinic through MyChart or phone. If you have a critical or abnormal lab result, we will notify you by phone as soon as possible.  Submit refill requests through [employer] or call your pharmacy and they will forward the refill request to us. Please allow 3 business days for your refill to be completed.          Additional Information About Your Visit        Care EveryWhere ID     This is your Care EveryWhere ID. This could be used by other organizations to access your [place] medical records  [id number]        Your Vitals Were     Pulse Respirations BMI (Body Mass Index)             62 16 30.45 kg/m2          Blood Pressure from Last 3 Encounters:   07/02/18  106/80   04/23/18 124/60   01/15/18 132/82    Weight from Last 3 Encounters:   07/26/18 88.2 kg (194 lb 6.4 oz)   07/02/18 87.2 kg (192 lb 3.2 oz)   04/23/18 88 kg (193 lb 14.4 oz)              We Performed the Following     POST-VOID RESIDUAL BLADDER SCAN        Primary Care Provider Office Phone # Fax #    Asher WILSON MD [place] [phone]       [address] [facility] [address]        Equal Access to Services     [facility]: Hadii aad ku hadasho Soomaali, waaxda luqadaha, qaybta kaalmada adebradyatimoteo, hari saldaña . So [facility] [phone].    ATENCIÓN: Si habla español, tiene a fuentes disposición servicios gratuitos de asistencia lingüística. [facility] [phone].    We comply with applicable federal civil rights laws and [place] laws. We do not discriminate on the basis of race, color, national origin, age, disability, sex, sexual orientation, or gender identity.            Thank you!     Thank you for choosing [facility]  for your care. Our goal is always to provide you with excellent care. Hearing back from our patients is one way we can continue to improve our services. Please take a few minutes to complete the written survey that you may receive in the mail after your visit with us. Thank you!             Your Updated Medication List - Protect others around you: Learn how to safely use, store and throw away your medicines at www.disposemymeds.org.          This list is accurate as of 7/26/18 11:59 PM.  Always use your most recent med list.                   Brand Name Dispense Instructions for use Diagnosis    atorvastatin 40 MG tablet    LIPITOR    90 tablet    Take 1 tablet (40 mg) by mouth At Bedtime    Hyperlipidemia, unspecified hyperlipidemia type       calcium carbonate 750 MG Chew      Take 1 tablet by mouth every 8 hours as needed for heartburn        ibuprofen 200 MG tablet    ADVIL/MOTRIN     Take 200 mg by mouth 4 times daily  as needed for pain or headaches        lisinopril 20 MG tablet    PRINIVIL/ZESTRIL     Take 20 mg by mouth daily        metoprolol succinate 25 MG 24 hr tablet    TOPROL-XL    90 tablet    Take 1 tablet (25 mg) by mouth daily    Paroxysmal atrial fibrillation (H)       sertraline 50 MG tablet    ZOLOFT     Take 50 mg by mouth At Bedtime        warfarin 5 MG tablet    COUMADIN     TAKE 7.5 mg x 1 day and 5 mg x 6 days/week

## 2018-07-26 NOTE — PROGRESS NOTES
Type of Visit  EST    Chief Complaint  Incontinence, urge    HPI  Ms. Chacon is a 73 y.o. female with history of CVA who follows up with urge incontinence.  Patient has been on oxybutynin for over 1 year.  She initially had some improvement in symptoms.  She wears about 6 pads per day.  Initial issues started 3 years ago.  The benefit of the medication has weaned over the last year.  She feels like she is right back to baseline symptoms with high quality of life bother.  Quality of life bother score is 8/10.      Family History  Family History   Problem Relation Age of Onset     Diabetes Father      Hypertension Father      Other Mother      h/o coronary artery disease/dementia     Asthma Mother      Cancer Maternal Aunt      Uterine     Diabetes Maternal Grandmother      Cancer-breast No Family History        Review of Systems  I personally reviewed the ROS with the patient.    Nursing Notes:   Lynne Hodge LPN  7/26/2018 11:24 AM  Signed  Pt presents to clinic for follow up on urge incontinence  Unable to give urine sample at this time, just urinated    Review of Systems:    Weight loss:    No     Recent fever/chills:  No   Night sweats:   No  Current skin rash:  No   Recent hair loss:  No  Heat intolerance:  No   Cold intolerance:  No  Chest pain:   No   Palpitations:   Yes  Shortness of breath:  No   Wheezing:   No  Constipation:    No   Diarrhea:   No   Nausea:   No   Vomiting:   No   Kidney/side pain:  No   Back pain:   No  Frequent headaches:  Yes   Dizziness:     No  Leg swelling:   No   Calf pain:    No        Post-Void Residual  A post-void residual was measured by ultrasonic bladder scanner.  12 mL      Physical Exam  Pulse 62  Resp 16  Wt 88.2 kg (194 lb 6.4 oz)  BMI 30.45 kg/m2  Constitutional: NAD, WDWN  Head: NCAT  Eyes: Conjunctivae normal  Cardiovascular: Regular rate  Pulmonary/Chest: Respirations are even and non-labored bilaterally  Abdominal: Soft with no distension, tenderness,  masses, guarding or CVA tenderness  Neurological: A + O x 3,  cranial nerves II-XII grossly intact  Extremities: MARI x 4, warm, no clubbing, no cyanosis  Skin: Pink, warm, dry with no rash  Psychiatric:  Normal mood and affect  Genitourinary: Nonpalpable bladder    Labs  CREATININE (mg/dL)   Date Value   08/30/2017 0.95       Results for YOUSIF COLLINS (MRN [id number]) as of 9/7/2017 11:33   10/15/2016 08:25 8/30/2017 09:32   COLOR                     Yellow Yellow   CLARITY                   Clear Clear   SPECIFIC GRAVITY,URINE    1.010 1.015   PH,URINE                  7.0 6.5   UROBILINOGEN,QUALITATIVE  Normal Normal   PROTEIN, URINE Negative Negative   GLUCOSE, URINE Negative Negative   KETONES,URINE             Negative Negative   BILIRUBIN,URINE           Negative Negative   OCCULT BLOOD,URINE        Negative Negative   NITRITE                   Negative Negative   LEUKOCYTE ESTERASE        Negative Negative       Post-Void Residual  A post-void residual was measured by ultrasonic bladder scanner.  12 mL  today  18 mL (previously recorded)  21 mL (previously recorded)    Assessment  Ms. Collins is a 73 y.o. female w/h/o CVA with urge incontinence.    Patient complains of significant urinary frequency, urgency and urge incontinence.    Prior treatments include attempted behavior modification and anticholinergic medication.  Failure of anticholinergics due to poor efficacy and intolerable side effects.  Treatment options were discussed: Botox injection, posterior tibial nerve stimulation, and Interstim neuromodulation.      Patient elected to proceed with intradetrusor Botox injections.  Discussed risks of UTI and/or urinary retention (6%) requiring self cath or indwelling catheter for a temporary period of time.  The benefit lasts about 6 months on average and I explained the need for retreatment.    Plan  Discontinue oxybutynin  Intravesical Botox 100 units in the clinic  Follow up in 1 year with PVR

## 2018-07-26 NOTE — NURSING NOTE
Pt presents to clinic for follow up on urge incontinence  Unable to give urine sample at this time, just urinated    Review of Systems:    Weight loss:    No     Recent fever/chills:  No   Night sweats:   No  Current skin rash:  No   Recent hair loss:  No  Heat intolerance:  No   Cold intolerance:  No  Chest pain:   No   Palpitations:   Yes  Shortness of breath:  No   Wheezing:   No  Constipation:    No   Diarrhea:   No   Nausea:   No   Vomiting:   No   Kidney/side pain:  No   Back pain:   No  Frequent headaches:  Yes   Dizziness:     No  Leg swelling:   No   Calf pain:    No        Post-Void Residual  A post-void residual was measured by ultrasonic bladder scanner.  12 mL

## 2018-08-02 ENCOUNTER — OFFICE VISIT (OUTPATIENT)
Dept: UROLOGY | Facility: OTHER | Age: 73
End: 2018-08-02
Attending: UROLOGY
Payer: MEDICARE

## 2018-08-02 ENCOUNTER — ANTICOAGULATION THERAPY VISIT (OUTPATIENT)
Dept: ANTICOAGULATION | Facility: OTHER | Age: 73
End: 2018-08-02
Attending: FAMILY MEDICINE
Payer: MEDICARE

## 2018-08-02 VITALS — HEART RATE: 60 BPM | HEIGHT: 67 IN | WEIGHT: 194.4 LBS | RESPIRATION RATE: 12 BRPM | BODY MASS INDEX: 30.51 KG/M2

## 2018-08-02 DIAGNOSIS — N39.41 URGE INCONTINENCE: ICD-10-CM

## 2018-08-02 DIAGNOSIS — N32.81 OVERACTIVE BLADDER: Primary | ICD-10-CM

## 2018-08-02 DIAGNOSIS — N39.41 URGE INCONTINENCE OF URINE: ICD-10-CM

## 2018-08-02 DIAGNOSIS — Z79.01 LONG-TERM (CURRENT) USE OF ANTICOAGULANTS: ICD-10-CM

## 2018-08-02 LAB
ALBUMIN UR-MCNC: NEGATIVE MG/DL
APPEARANCE UR: CLEAR
BILIRUB UR QL STRIP: NEGATIVE
COLOR UR AUTO: YELLOW
GLUCOSE UR STRIP-MCNC: NEGATIVE MG/DL
HGB UR QL STRIP: NEGATIVE
INR POINT OF CARE: 2.3 (ref 2–3)
KETONES UR STRIP-MCNC: NEGATIVE MG/DL
LEUKOCYTE ESTERASE UR QL STRIP: NEGATIVE
NITRATE UR QL: NEGATIVE
PH UR STRIP: 6.5 PH (ref 5–9)
SOURCE: NORMAL
SP GR UR STRIP: 1.01 (ref 1–1.03)
UROBILINOGEN UR STRIP-ACNC: 0.2 EU/DL (ref 0.2–1)

## 2018-08-02 PROCEDURE — 99207 ZZC NO CHARGE NURSE ONLY: CPT

## 2018-08-02 PROCEDURE — G0463 HOSPITAL OUTPT CLINIC VISIT: HCPCS | Mod: 25

## 2018-08-02 PROCEDURE — 81003 URINALYSIS AUTO W/O SCOPE: CPT | Performed by: UROLOGY

## 2018-08-02 PROCEDURE — 25000576 ZZH RX IP 250 OP 636 J0585: Performed by: UROLOGY

## 2018-08-02 PROCEDURE — 52287 CYSTOSCOPY CHEMODENERVATION: CPT | Performed by: UROLOGY

## 2018-08-02 PROCEDURE — 85610 PROTHROMBIN TIME: CPT | Mod: QW

## 2018-08-02 RX ADMIN — ONABOTULINUMTOXINA 100 UNITS: 100 INJECTION, POWDER, LYOPHILIZED, FOR SOLUTION INTRADERMAL; INTRAMUSCULAR at 12:30

## 2018-08-02 ASSESSMENT — PAIN SCALES - GENERAL: PAINLEVEL: NO PAIN (0)

## 2018-08-02 NOTE — PROGRESS NOTES
ANTICOAGULATION FOLLOW-UP CLINIC VISIT    Patient Name:  Kate Chacon  Date:  8/2/2018  Contact Type:  Face to Face    SUBJECTIVE:     Patient Findings     Positives No Problem Findings           OBJECTIVE    INR Protime   Date Value Ref Range Status   08/02/2018 2.3 2.0 - 3.0 Final       ASSESSMENT / PLAN  INR assessment THER    Recheck INR In: 6 WEEKS    INR Location Clinic      Anticoagulation Summary as of 8/2/2018     INR goal 2.0-3.0   Today's INR 2.3   Warfarin maintenance plan 7.5 mg (5 mg x 1.5) on Mon; 5 mg (5 mg x 1) all other days   Full warfarin instructions 7.5 mg on Mon; 5 mg all other days   Weekly warfarin total 37.5 mg   No change documented Jolie Carreno, RN   Next INR check 9/13/2018   Priority INR   Target end date Indefinite    Indications   Cerebral infarction  unspecified [I63.9]  Long-term (current) use of anticoagulants [Z79.01] [Z79.01]         Anticoagulation Episode Summary     INR check location     Preferred lab     Send INR reminders to  INR    Comments       Anticoagulation Care Providers     Provider Role Specialty Phone number    Asher Campos MD [facility] [phone]            See the Encounter Report to view Anticoagulation Flowsheet and Dosing Calendar (Go to Encounters tab in chart review, and find the Anticoagulation Therapy Visit)        Jolie Carreno RN

## 2018-08-02 NOTE — NURSING NOTE
Botox  Verbal Order per Steve Fox MD Read Back to prep patient, place 16 Faroese briscoe catheter into bladder, drain bladder, instill lidocaine mixture (30mL lidocaine1% and 30mL 0.9% sodium chloride) into bladder for 15 minutes then drain bladder.  Patient positioned in frog leg position, perineum area prepped with chlorhexidene Gluconate and patient draped per sterile technique. 16 Faroese - 5mL briscoe catheter placed with clear yellow urine return. Bladder emptied. Lidocaine and normal saline mixture instilled into bladder via briscoe catheter and catheter clamped for 15 minutes.    Lidocaine 1%, 30mL mixed with 0.9% Sodium Chloride  Lot #: [id number]  Expiration date: 02/2020  : [employer]  NDC: 57538-208-61    Sodium Chloride 0.9%, 30mL mixed with Lidocaine 1%  Lot #: [id number]  Expiration date: Apr 21  : [facility]  NDC: 2909-4120-06    Catheter unclamped after lidocaine solution in bladder for 15 minutes. Bladder emptied. Catheter removed. Perineum area prepped with chlorhexidene Gluconate and patient draped per sterile technique.    Sodium Chloride 0.9%, 10mL mixed with Botox  Lot #: [id number]  Expiration date: 10/1/2019  : [employer]  NDC: 7718-7837-57    Ten Sleep Protocol    A. Pre-procedure verification complete Yes  1-relevant information / documentation available, reviewed and properly matched to the patient; 2-consent accurate and complete, 3-equipment and supplies available    B. Site marking complete N/A  Site marked if not in continuous attendance with patient    C. TIME OUT completed Yes  Time Out was conducted just prior to starting procedure to verify the eight required elements: 1-patient identity, 2-consent accurate and complete, 3-position, 4-correct side/site marked (if applicable), 5-procedure, 6-relevant images / results properly labeled and displayed (if applicable), 7-antibiotics / irrigation fluids (if applicable), 8-safety precautions.    After  procedure perineum area rinsed.  Patient voided.  Discharge instructions reviewed with patient.  Patient verbalized understanding of discharge instructions and discharged ambulatory.

## 2018-08-02 NOTE — PROGRESS NOTES
Preprocedure diagnosis  Urge incontinence with hypertonicity of bladder    Postprocedure diagnosis  Urge incontinence with hypertonicity of bladder    Procedure  Cystourethroscopy with intradetrusor injection of Botox, 100 units    Surgeon  Steve Fox MD    Anesthesia  Lidocaine bladder instillation    Complications  None    Indications  The patient previously failed anticholinergic medication for treatment of the above named indication.  Informed consent was obtained.  We discussed the risks of Botox including, but not limited to, the risk of urinary retention and UTI.  Discussed performing the procedure in the OR versus clinic- risks and benefits.    Findings  Cystoscopy revealed one right and left ureteral orifice in the normal anatomic position, normal bladder mucosa and no tumors, masses or stones.    Procedure  The patient was taken to the procedure room and placed supine on the procedure table.    A catheter was passed through the urethra and 30mL of lidocaine 1% was instilled in the bladder to dwell for 20 minutes.     The patient was positioned in dorsal lithotomy, prepped and draped in a sterile fashion.  A time-out was performed.    The cystoscope was passed through the urethra and into the bladder.    The injection needle was passed through the working port of the cystoscope and 5 units/mL/injection x 20 injections for a total of 100 international units of Botox was injected submucosally.   I injected 10 doses from the left to right lateral wall just above the trigonal ridge.    Two radial injections of 5 doses were then used rising toward the dome.    I encountered minimal bleeding from the sites and avoided injection of the trigone.   Once the injections were completed, the bladder was emptied and the scope was removed.    Plan  Follow up in 4-6 weeks for a PVR in clinic

## 2018-08-02 NOTE — NURSING NOTE
Ciprofloxacin 500mg tablet by mouth one time now ordered by Steve Fox MD, PharmD.  Medication administered per verbal order   Lot # [id number]  Exp. Jan 2020  Patient tolerated well.  Nicol Parks..................8/2/2018  11:52 AM

## 2018-08-02 NOTE — MR AVS SNAPSHOT
Kate Chacon   8/2/2018 9:30 AM   Anticoagulation Therapy Visit    Description:  73 year old female   Provider:  ANNEMARIE ANTI COAG 1   Department:  [facility]           INR as of 8/2/2018     Today's INR 2.3      Anticoagulation Summary as of 8/2/2018     INR goal 2.0-3.0   Today's INR 2.3   Full warfarin instructions 7.5 mg on Mon; 5 mg all other days   Next INR check 9/13/2018    Indications   Cerebral infarction  unspecified [I63.9]  Long-term (current) use of anticoagulants [Z79.01] [Z79.01]         Description     Continue current dose and recheck in 6 weeks (9/13/2018).  Jolie Carreno RN  ....................  8/2/2018   9:28 AM        Your next Anticoagulation Clinic appointment(s)     Aug 02, 2018  9:30 AM CDT   Anticoagulation Visit with ANNEMARIE ANTI COAG 1   [facility] ([facility])    [address]  [place] [address]   [phone]              August 2018 Details    Sun Mon Tue Wed Thu Fri Sat        1               2      5 mg   See details      3      5 mg         4      5 mg           5      5 mg         6      7.5 mg         7      5 mg         8      5 mg         9      5 mg         10      5 mg         11      5 mg           12      5 mg         13      7.5 mg         14      5 mg         15      5 mg         16      5 mg         17      5 mg         18      5 mg           19      5 mg         20      7.5 mg         21      5 mg         22      5 mg         23      5 mg         24      5 mg         25      5 mg           26      5 mg         27      7.5 mg         28      5 mg         29      5 mg         30      5 mg         31      5 mg           Date Details   08/02 This INR check               How to take your warfarin dose     To take:  5 mg Take 1 of the 5 mg tablets.    To take:  7.5 mg Take 1.5 of the 5 mg tablets.           September 2018 Details    Sun Mon Tue Wed Thu Fri Sat           1      5 mg           2      5 mg         3       7.5 mg         4      5 mg         5      5 mg         6      5 mg         7      5 mg         8      5 mg           9      5 mg         10      7.5 mg         11      5 mg         12      5 mg         13            14               15                 16               17               18               19               20               21               22                 23               24               25               26               27               28               29                 30                      Date Details   No additional details    Date of next INR:  9/13/2018         How to take your warfarin dose     To take:  5 mg Take 1 of the 5 mg tablets.    To take:  7.5 mg Take 1.5 of the 5 mg tablets.

## 2018-08-02 NOTE — PATIENT INSTRUCTIONS
Home Care after Botox Treatment  Follow these guidelines for your care after your procedure.    Activity  No limitations    Bathing or showering  No limitations    Symptoms  You may notice some burning with urination but this usually resolves after 1-2 days.  You may also notice small amounts of blood in your urine.  Please increase water intake for the next few days to help with these symptoms.    Contacts  General Questions: [phone]  Appointments:  [phone]  Emergencies:  [phone]    When to call the clinic  If you develop any of the following symptoms please call the clinic immediately.  If the clinic is closed please be seen at an urgent care clinic or the Emergency Department.  - Burning with urination that worsens after 2 days  - Unable to urinate causing severe pelvic pain  - Fevers of greater than 101 degrees F  - Flank pain that is not responding to pain medication    Follow up  If you are currently taking an anticholinergic for urgency (such as oxybutynin, Detrol or Sanctura) please continue for 1 week then stop.  Please follow up in 6 weeks for a bladder scan.

## 2018-09-13 ENCOUNTER — ANTICOAGULATION THERAPY VISIT (OUTPATIENT)
Dept: ANTICOAGULATION | Facility: OTHER | Age: 73
End: 2018-09-13
Attending: FAMILY MEDICINE
Payer: MEDICARE

## 2018-09-13 ENCOUNTER — OFFICE VISIT (OUTPATIENT)
Dept: UROLOGY | Facility: OTHER | Age: 73
End: 2018-09-13
Attending: UROLOGY
Payer: MEDICARE

## 2018-09-13 VITALS
RESPIRATION RATE: 14 BRPM | HEART RATE: 62 BPM | WEIGHT: 193.4 LBS | SYSTOLIC BLOOD PRESSURE: 130 MMHG | DIASTOLIC BLOOD PRESSURE: 70 MMHG | BODY MASS INDEX: 30.29 KG/M2

## 2018-09-13 DIAGNOSIS — Z79.01 LONG-TERM (CURRENT) USE OF ANTICOAGULANTS: ICD-10-CM

## 2018-09-13 DIAGNOSIS — N39.41 URGE INCONTINENCE: Primary | ICD-10-CM

## 2018-09-13 LAB — INR POINT OF CARE: 2.6 (ref 0.86–1.14)

## 2018-09-13 PROCEDURE — 51798 US URINE CAPACITY MEASURE: CPT | Performed by: UROLOGY

## 2018-09-13 PROCEDURE — 99213 OFFICE O/P EST LOW 20 MIN: CPT | Performed by: UROLOGY

## 2018-09-13 PROCEDURE — G0463 HOSPITAL OUTPT CLINIC VISIT: HCPCS | Mod: 25

## 2018-09-13 PROCEDURE — 36416 COLLJ CAPILLARY BLOOD SPEC: CPT | Mod: ZL

## 2018-09-13 ASSESSMENT — PAIN SCALES - GENERAL: PAINLEVEL: NO PAIN (0)

## 2018-09-13 NOTE — NURSING NOTE
Pt presents to clinic for a 4-6 week follow up after Botox injections    Post-Void Residual  A post-void residual was measured by ultrasonic bladder scanner.  >93 mL    Review of Systems:    Weight loss:    No     Recent fever/chills:  No   Night sweats:   No  Current skin rash:  No   Recent hair loss:  No  Heat intolerance:  No   Cold intolerance:  No  Chest pain:   No   Palpitations:   Yes  Shortness of breath:  No   Wheezing:   No  Constipation:    Yes   Diarrhea:   No   Nausea:   No   Vomiting:   No   Kidney/side pain:  No   Back pain:   No  Frequent headaches:  Yes   Dizziness:     No  Leg swelling:   Yes   Calf pain:    No

## 2018-09-13 NOTE — MR AVS SNAPSHOT
After Visit Summary   9/13/2018    Kate Chacon    MRN: [id number]           Patient Information     Date Of Birth          1945        Visit Information        Provider Department      9/13/2018 10:45 AM Steve Fox MD [facility]        Today's Diagnoses     Urge incontinence    -  1       Follow-ups after your visit        Your next 10 appointments already scheduled     Oct 22, 2018  1:45 PM CDT   Return Visit with JANAE Ferrell CNP   [facility] ([facility])    [address]  [place] [address] [phone]            Oct 25, 2018 10:15 AM CDT   Anticoagulation Visit with GH ANTI COAG 2   [facility] ([facility])    [address]  [place] [address] [phone]              Who to contact     If you have questions or need follow up information about today's clinic visit or your schedule please contact [facility] directly at [phone].  Normal or non-critical lab and imaging results will be communicated to you by MyChart, letter or phone within 4 business days after the clinic has received the results. If you do not hear from us within 7 days, please contact the clinic through MyChart or phone. If you have a critical or abnormal lab result, we will notify you by phone as soon as possible.  Submit refill requests through [employer] or call your pharmacy and they will forward the refill request to us. Please allow 3 business days for your refill to be completed.          Additional Information About Your Visit        Care EveryWhere ID     This is your Care EveryWhere ID. This could be used by other organizations to access your [place] medical records  [id number]        Your Vitals Were     Pulse Respirations BMI (Body Mass Index)             62 14 30.29 kg/m2          Blood Pressure from Last 3 Encounters:   09/13/18  130/70   07/02/18 106/80   04/23/18 124/60    Weight from Last 3 Encounters:   09/13/18 87.7 kg (193 lb 6.4 oz)   08/02/18 88.2 kg (194 lb 6.4 oz)   07/26/18 88.2 kg (194 lb 6.4 oz)              We Performed the Following     POST-VOID RESIDUAL BLADDER SCAN        Primary Care Provider Office Phone # Fax #    Asher WILSON MD [facility] [phone] [address] [facility] [address]        Equal Access to Services     [facility]: Hadii aad ku hadasho Soomaali, waaxda luqadaha, qaybta kaalmada heath, hari saldaña . So [facility] [phone].    ATENCIÓN: Si habla español, tiene a fuentes disposición servicios gratuitos de asistencia lingüística. [facility] [phone].    We comply with applicable federal civil rights laws and [place] laws. We do not discriminate on the basis of race, color, national origin, age, disability, sex, sexual orientation, or gender identity.            Thank you!     Thank you for choosing [facility]  for your care. Our goal is always to provide you with excellent care. Hearing back from our patients is one way we can continue to improve our services. Please take a few minutes to complete the written survey that you may receive in the mail after your visit with us. Thank you!             Your Updated Medication List - Protect others around you: Learn how to safely use, store and throw away your medicines at www.disposemymeds.org.          This list is accurate as of 9/13/18 11:46 AM.  Always use your most recent med list.                   Brand Name Dispense Instructions for use Diagnosis    atorvastatin 40 MG tablet    LIPITOR    90 tablet    Take 1 tablet (40 mg) by mouth At Bedtime    Hyperlipidemia, unspecified hyperlipidemia type       calcium carbonate 750 MG Chew      Take 1 tablet by mouth every 8 hours as needed for heartburn        ibuprofen 200 MG tablet    ADVIL/MOTRIN     Take 200 mg by mouth 4 times daily  as needed for pain or headaches        lisinopril 20 MG tablet    PRINIVIL/ZESTRIL     Take 20 mg by mouth daily        metoprolol succinate 25 MG 24 hr tablet    TOPROL-XL    90 tablet    Take 1 tablet (25 mg) by mouth daily    Paroxysmal atrial fibrillation (H)       sertraline 50 MG tablet    ZOLOFT     Take 50 mg by mouth At Bedtime        warfarin 5 MG tablet    COUMADIN     TAKE 7.5 mg x 1 day and 5 mg x 6 days/week

## 2018-09-13 NOTE — MR AVS SNAPSHOT
Kate Marvinon   9/13/2018 10:15 AM   Anticoagulation Therapy Visit    Description:  73 year old female   Provider:  ANNEMARIE ANTI COAG 2   Department:  [facility]           INR as of 9/13/2018     Today's INR 2.6      Anticoagulation Summary as of 9/13/2018     INR goal 2.0-3.0   Today's INR 2.6   Full warfarin instructions 7.5 mg on Mon; 5 mg all other days   Next INR check 10/25/2018    Indications   Cerebral infarction  unspecified [I63.9]  Long-term (current) use of anticoagulants [Z79.01] [Z79.01]         Description     Continue same dose and recheck in 6 weeks. ..............Georgia Benton RN on 9/13/2018 at 10:06 AM          Your next Anticoagulation Clinic appointment(s)     Sep 13, 2018 10:15 AM CDT   Anticoagulation Visit with ANNEMARIE ANTI COAG 2   [facility] ([facility])    [address]  [facility] [address]   [phone]              September 2018 Details    Sun Mon Tue Wed Thu Fri Sat           1                 2               3               4               5               6               7               8                 9               10               11               12               13      5 mg   See details      14      5 mg         15      5 mg           16      5 mg         17      7.5 mg         18      5 mg         19      5 mg         20      5 mg         21      5 mg         22      5 mg           23      5 mg         24      7.5 mg         25      5 mg         26      5 mg         27      5 mg         28      5 mg         29      5 mg           30      5 mg                Date Details   09/13 This INR check               How to take your warfarin dose     To take:  5 mg Take 1 of the 5 mg tablets.    To take:  7.5 mg Take 1.5 of the 5 mg tablets.           October 2018 Details    Sun Mon Tue Wed Thu Fri Sat      1      7.5 mg         2      5 mg         3      5 mg         4      5 mg         5      5 mg         6      5 mg            7      5 mg         8      7.5 mg         9      5 mg         10      5 mg         11      5 mg         12      5 mg         13      5 mg           14      5 mg         15      7.5 mg         16      5 mg         17      5 mg         18      5 mg         19      5 mg         20      5 mg           21      5 mg         22      7.5 mg         23      5 mg         24      5 mg         25            26               27                 28               29               30               31                   Date Details   No additional details    Date of next INR:  10/25/2018         How to take your warfarin dose     To take:  5 mg Take 1 of the 5 mg tablets.    To take:  7.5 mg Take 1.5 of the 5 mg tablets.

## 2018-09-13 NOTE — PROGRESS NOTES
Type of Visit  Established    Chief Complaint  Urgency  Urge incontinence    HPI  Ms. Chacon is a 73 year old female with history of urge urinary incontinence s/p Botox 100 units 5 weeks ago performed in clinic.  She reports significant improvement since undergoing the Botox treatment.  She reports her incontinence has improved by 85% since the Botox treatment.  She did not experience post-op retention or UTIs.  She denies difficulty emptying bladder and weak stream.      Review of Systems  I reviewed the ROS with the patient.    Nursing Notes:   Lynne Hodge LPN  9/13/2018 11:05 AM  Signed  Pt presents to clinic for a 4-6 week follow up after Botox injections    Post-Void Residual  A post-void residual was measured by ultrasonic bladder scanner.  >93 mL    Review of Systems:    Weight loss:    No     Recent fever/chills:  No   Night sweats:   No  Current skin rash:  No   Recent hair loss:  No  Heat intolerance:  No   Cold intolerance:  No  Chest pain:   No   Palpitations:   Yes  Shortness of breath:  No   Wheezing:   No  Constipation:    Yes   Diarrhea:   No   Nausea:   No   Vomiting:   No   Kidney/side pain:  No   Back pain:   No  Frequent headaches:  Yes   Dizziness:     No  Leg swelling:   Yes   Calf pain:    No            Physical Exam  Vitals:    09/13/18 1051   BP: 130/70   BP Location: Left arm   Patient Position: Sitting   Cuff Size: Adult Regular   Pulse: 62   Resp: 14   Weight: 87.7 kg (193 lb 6.4 oz)     Constitutional: NAD, WDWN.  Cardiovascular: Regular rate.  Pulmonary/Chest: Respirations are even and non-labored bilaterally.  Abdominal: Soft. No distension, tenderness, masses or guarding. No CVA tenderness.  Extremities: MARI x 4, Warm. No clubbing.  No cyanosis.    Skin: Pink, warm and dry.  No rashes noted.    Labs  Results for orders placed or performed in visit on 09/13/18   INR point of care   Result Value Ref Range    INR Protime 2.6 (A) 0.86 - 1.14       Post-Void Residual  A post-void  residual was measured by ultrasonic bladder scanner.  93 mL    Assessment  Ms. Chacon is a 73 year old female s/p Botox 100 units 5 weeks ago  Her symptoms are improving.    Plan  Continue Botox therapy  Follow up when symptoms return to schedule retreatment

## 2018-09-13 NOTE — MR AVS SNAPSHOT
After Visit Summary   9/13/2018    Kate Chacon    MRN: [id number]           Patient Information     Date Of Birth          1945        Visit Information        Provider Department      9/13/2018 10:15 AM GH ANTI COAG 2 [facility]        Today's Diagnoses     Long-term (current) use of anticoagulants           Follow-ups after your visit        Your next 10 appointments already scheduled     Sep 13, 2018 10:15 AM CDT   Anticoagulation Visit with GH ANTI COAG 2   [facility] ([facility])    [address]  [place] [address]   [phone]            Sep 13, 2018 10:45 AM CDT   Return Visit with Steve Fox MD   [facility]    [address]  [place] [address]   [phone]            Oct 22, 2018  1:45 PM CDT   Return Visit with JANAE Ferrell CNP   [facility]    [address]  [place] [address]   [phone]              Who to contact     If you have questions or need follow up information about today's clinic visit or your schedule please contact [facility] directly at [phone].  Normal or non-critical lab and imaging results will be communicated to you by MyChart, letter or phone within 4 business days after the clinic has received the results. If you do not hear from us within 7 days, please contact the clinic through MyChart or phone. If you have a critical or abnormal lab result, we will notify you by phone as soon as possible.  Submit refill requests through [employer] or call your pharmacy and they will forward the refill request to us. Please allow 3 business days for your refill to be completed.          Additional Information About Your Visit        Care EveryWhere ID     This is your Care EveryWhere ID. This could be used by other  organizations to access your [place] medical records  [id number]         Blood Pressure from Last 3 Encounters:   07/02/18 106/80   04/23/18 124/60   01/15/18 132/82    Weight from Last 3 Encounters:   08/02/18 194 lb 6.4 oz (88.2 kg)   07/26/18 194 lb 6.4 oz (88.2 kg)   07/02/18 192 lb 3.2 oz (87.2 kg)              We Performed the Following     INR point of care        Primary Care Provider Office Phone # Fax #    Asher WILSON MD [facility] [phone]       [address] [facility] [address]        Equal Access to Services     JANELLE GURROLA : Hadii noemi Babcock, wacyndy butterfield, qaybjon kaalmada heath, hari sheth. So [facility] [phone].    ATENCIÓN: Si habla español, tiene a fuentes disposición servicios gratuitos de asistencia lingüística. Llame al [phone].    We comply with applicable federal civil rights laws and [place] laws. We do not discriminate on the basis of race, color, national origin, age, disability, sex, sexual orientation, or gender identity.            Thank you!     Thank you for choosing [facility] hospitals  for your care. Our goal is always to provide you with excellent care. Hearing back from our patients is one way we can continue to improve our services. Please take a few minutes to complete the written survey that you may receive in the mail after your visit with us. Thank you!             Your Updated Medication List - Protect others around you: Learn how to safely use, store and throw away your medicines at www.disposemymeds.org.          This list is accurate as of 9/13/18 10:07 AM.  Always use your most recent med list.                   Brand Name Dispense Instructions for use Diagnosis    atorvastatin 40 MG tablet    LIPITOR    90 tablet    Take 1 tablet (40 mg) by mouth At Bedtime    Hyperlipidemia, unspecified hyperlipidemia type       calcium carbonate 750 MG Chew      Take 1 tablet by mouth every 8  hours as needed for heartburn        ibuprofen 200 MG tablet    ADVIL/MOTRIN     Take 200 mg by mouth 4 times daily as needed for pain or headaches        lisinopril 20 MG tablet    PRINIVIL/ZESTRIL     Take 20 mg by mouth daily        metoprolol succinate 25 MG 24 hr tablet    TOPROL-XL    90 tablet    Take 1 tablet (25 mg) by mouth daily    Paroxysmal atrial fibrillation (H)       sertraline 50 MG tablet    ZOLOFT     Take 50 mg by mouth At Bedtime        warfarin 5 MG tablet    COUMADIN     TAKE 7.5 mg x 1 day and 5 mg x 6 days/week

## 2018-09-13 NOTE — PROGRESS NOTES
ANTICOAGULATION FOLLOW-UP CLINIC VISIT    Patient Name:  Kate Chacon  Date:  9/13/2018  Contact Type:  Face to Face    SUBJECTIVE:     Patient Findings     Positives No Problem Findings           OBJECTIVE    INR Protime   Date Value Ref Range Status   09/13/2018 2.6 (A) 0.86 - 1.14 Final       ASSESSMENT / PLAN  INR assessment THER    Recheck INR In: 6 WEEKS    INR Location Clinic      Anticoagulation Summary as of 9/13/2018     INR goal 2.0-3.0   Today's INR 2.6   Warfarin maintenance plan 7.5 mg (5 mg x 1.5) on Mon; 5 mg (5 mg x 1) all other days   Full warfarin instructions 7.5 mg on Mon; 5 mg all other days   Weekly warfarin total 37.5 mg   No change documented Georgia Benton, RN   Next INR check 10/25/2018   Priority INR   Target end date Indefinite    Indications   Cerebral infarction  unspecified [I63.9]  Long-term (current) use of anticoagulants [Z79.01] [Z79.01]         Anticoagulation Episode Summary     INR check location     Preferred lab     Send INR reminders to  INR    Comments       Anticoagulation Care Providers     Provider Role Specialty Phone number    Asher Campos MD [facility] [phone]            See the Encounter Report to view Anticoagulation Flowsheet and Dosing Calendar (Go to Encounters tab in chart review, and find the Anticoagulation Therapy Visit)        Georgia Benton, RN

## 2018-10-11 ENCOUNTER — OFFICE VISIT (OUTPATIENT)
Dept: FAMILY MEDICINE | Facility: OTHER | Age: 73
End: 2018-10-11
Attending: FAMILY MEDICINE
Payer: MEDICARE

## 2018-10-11 VITALS
WEIGHT: 194 LBS | TEMPERATURE: 97.5 F | OXYGEN SATURATION: 95 % | SYSTOLIC BLOOD PRESSURE: 128 MMHG | RESPIRATION RATE: 18 BRPM | HEART RATE: 60 BPM | BODY MASS INDEX: 30.45 KG/M2 | HEIGHT: 67 IN | DIASTOLIC BLOOD PRESSURE: 78 MMHG

## 2018-10-11 DIAGNOSIS — I63.9 ISCHEMIC STROKE (H): ICD-10-CM

## 2018-10-11 DIAGNOSIS — I10 ESSENTIAL HYPERTENSION: ICD-10-CM

## 2018-10-11 DIAGNOSIS — I48.0 PAROXYSMAL ATRIAL FIBRILLATION (H): ICD-10-CM

## 2018-10-11 DIAGNOSIS — Z87.891 FORMER SMOKER: ICD-10-CM

## 2018-10-11 DIAGNOSIS — Z95.0 CARDIAC PACEMAKER: ICD-10-CM

## 2018-10-11 DIAGNOSIS — Z23 NEED FOR INFLUENZA VACCINATION: ICD-10-CM

## 2018-10-11 DIAGNOSIS — Z01.818 PREOPERATIVE EXAMINATION: Primary | ICD-10-CM

## 2018-10-11 DIAGNOSIS — Z23 NEED FOR PROPHYLACTIC VACCINATION AND INOCULATION AGAINST INFLUENZA: ICD-10-CM

## 2018-10-11 DIAGNOSIS — Z79.01 ANTICOAGULATION GOAL OF INR 2 TO 3: ICD-10-CM

## 2018-10-11 DIAGNOSIS — H26.9 CATARACT OF BOTH EYES, UNSPECIFIED CATARACT TYPE: ICD-10-CM

## 2018-10-11 DIAGNOSIS — Z51.81 ANTICOAGULATION GOAL OF INR 2 TO 3: ICD-10-CM

## 2018-10-11 DIAGNOSIS — S93.509A SPRAIN OF TOE, INITIAL ENCOUNTER: ICD-10-CM

## 2018-10-11 LAB
ALBUMIN SERPL-MCNC: 3.8 G/DL (ref 3.5–5.7)
ALP SERPL-CCNC: 82 U/L (ref 34–104)
ALT SERPL W P-5'-P-CCNC: 23 U/L (ref 7–52)
ANION GAP SERPL CALCULATED.3IONS-SCNC: 5 MMOL/L (ref 3–14)
AST SERPL W P-5'-P-CCNC: 22 U/L (ref 13–39)
BASOPHILS # BLD AUTO: 0 10E9/L (ref 0–0.2)
BASOPHILS NFR BLD AUTO: 0.3 %
BILIRUB SERPL-MCNC: 0.6 MG/DL (ref 0.3–1)
BUN SERPL-MCNC: 14 MG/DL (ref 7–25)
CALCIUM SERPL-MCNC: 10.2 MG/DL (ref 8.6–10.3)
CHLORIDE SERPL-SCNC: 106 MMOL/L (ref 98–107)
CO2 SERPL-SCNC: 30 MMOL/L (ref 21–31)
CREAT SERPL-MCNC: 0.93 MG/DL (ref 0.6–1.2)
DIFFERENTIAL METHOD BLD: NORMAL
EOSINOPHIL # BLD AUTO: 0.2 10E9/L (ref 0–0.7)
EOSINOPHIL NFR BLD AUTO: 1.9 %
ERYTHROCYTE [DISTWIDTH] IN BLOOD BY AUTOMATED COUNT: 13 % (ref 10–15)
GFR SERPL CREATININE-BSD FRML MDRD: 59 ML/MIN/1.7M2
GLUCOSE SERPL-MCNC: 97 MG/DL (ref 70–105)
HCT VFR BLD AUTO: 44.1 % (ref 35–47)
HGB BLD-MCNC: 14.4 G/DL (ref 11.7–15.7)
IMM GRANULOCYTES # BLD: 0 10E9/L (ref 0–0.4)
IMM GRANULOCYTES NFR BLD: 0.2 %
LYMPHOCYTES # BLD AUTO: 1.6 10E9/L (ref 0.8–5.3)
LYMPHOCYTES NFR BLD AUTO: 18.2 %
MCH RBC QN AUTO: 30.9 PG (ref 26.5–33)
MCHC RBC AUTO-ENTMCNC: 32.7 G/DL (ref 31.5–36.5)
MCV RBC AUTO: 95 FL (ref 78–100)
MONOCYTES # BLD AUTO: 0.9 10E9/L (ref 0–1.3)
MONOCYTES NFR BLD AUTO: 10.2 %
NEUTROPHILS # BLD AUTO: 6 10E9/L (ref 1.6–8.3)
NEUTROPHILS NFR BLD AUTO: 69.2 %
PLATELET # BLD AUTO: 207 10E9/L (ref 150–450)
POTASSIUM SERPL-SCNC: 4.7 MMOL/L (ref 3.5–5.1)
PROT SERPL-MCNC: 6.8 G/DL (ref 6.4–8.9)
RBC # BLD AUTO: 4.66 10E12/L (ref 3.8–5.2)
SODIUM SERPL-SCNC: 141 MMOL/L (ref 134–144)
WBC # BLD AUTO: 8.6 10E9/L (ref 4–11)

## 2018-10-11 PROCEDURE — G0008 ADMIN INFLUENZA VIRUS VAC: HCPCS

## 2018-10-11 PROCEDURE — 36415 COLL VENOUS BLD VENIPUNCTURE: CPT | Performed by: FAMILY MEDICINE

## 2018-10-11 PROCEDURE — 90662 IIV NO PRSV INCREASED AG IM: CPT | Performed by: FAMILY MEDICINE

## 2018-10-11 PROCEDURE — 85025 COMPLETE CBC W/AUTO DIFF WBC: CPT | Performed by: FAMILY MEDICINE

## 2018-10-11 PROCEDURE — 80053 COMPREHEN METABOLIC PANEL: CPT | Performed by: FAMILY MEDICINE

## 2018-10-11 PROCEDURE — 99214 OFFICE O/P EST MOD 30 MIN: CPT | Performed by: FAMILY MEDICINE

## 2018-10-11 PROCEDURE — G0463 HOSPITAL OUTPT CLINIC VISIT: HCPCS | Mod: 25 | Performed by: FAMILY MEDICINE

## 2018-10-11 ASSESSMENT — PAIN SCALES - GENERAL: PAINLEVEL: NO PAIN (0)

## 2018-10-11 ASSESSMENT — ANXIETY QUESTIONNAIRES
2. NOT BEING ABLE TO STOP OR CONTROL WORRYING: SEVERAL DAYS
6. BECOMING EASILY ANNOYED OR IRRITABLE: SEVERAL DAYS
3. WORRYING TOO MUCH ABOUT DIFFERENT THINGS: SEVERAL DAYS
1. FEELING NERVOUS, ANXIOUS, OR ON EDGE: SEVERAL DAYS
7. FEELING AFRAID AS IF SOMETHING AWFUL MIGHT HAPPEN: SEVERAL DAYS
GAD7 TOTAL SCORE: 5
5. BEING SO RESTLESS THAT IT IS HARD TO SIT STILL: NOT AT ALL
IF YOU CHECKED OFF ANY PROBLEMS ON THIS QUESTIONNAIRE, HOW DIFFICULT HAVE THESE PROBLEMS MADE IT FOR YOU TO DO YOUR WORK, TAKE CARE OF THINGS AT HOME, OR GET ALONG WITH OTHER PEOPLE: SOMEWHAT DIFFICULT

## 2018-10-11 ASSESSMENT — PATIENT HEALTH QUESTIONNAIRE - PHQ9: 5. POOR APPETITE OR OVEREATING: NOT AT ALL

## 2018-10-11 NOTE — PROGRESS NOTES

## 2018-10-11 NOTE — MR AVS SNAPSHOT
After Visit Summary   10/11/2018    Kate Chacon    MRN: [id number]           Patient Information     Date Of Birth          1945        Visit Information        Provider Department      10/11/2018 8:00 AM Asher Campos MD [facility]        Today's Diagnoses     Preoperative examination    -  1    Cataract of both eyes, unspecified cataract type        Paroxysmal atrial fibrillation (H)        Anticoagulation goal of INR 2 to 3        Essential hypertension        Cardiac pacemaker, Medtronic, Dual Chamber        Ischemic stroke (H)        Former smoker        Need for influenza vaccination        Need for prophylactic vaccination and inoculation against influenza        Sprain of toe, initial encounter           Follow-ups after your visit        Your next 10 appointments already scheduled     Oct 22, 2018  1:45 PM CDT   Return Visit with JANAE Ferrell CNP   [facility] ([facility])    [address]  [facility] [id number] [phone]            Oct 25, 2018 10:15 AM CDT   Anticoagulation Visit with  ANTI COAG 2   [facility] ([facility])    [address]  [facility] [id number] [phone]              Who to contact     If you have questions or need follow up information about today's clinic visit or your schedule please contact [facility] directly at [phone].  Normal or non-critical lab and imaging results will be communicated to you by MyChart, letter or phone within 4 business days after the clinic has received the results. If you do not hear from us within 7 days, please contact the clinic through MyChart or phone. If you have a critical or abnormal lab result, we will notify you by phone as soon as possible.  Submit refill requests through [employer] or call your pharmacy and they will forward the refill request to  "us. Please allow 3 business days for your refill to be completed.          Additional Information About Your Visit        MyChart Information     Phoneplus lets you send messages to your doctor, view your test results, renew your prescriptions, schedule appointments and more. To sign up, go to [email or web address] . Click on \"Log in\" on the left side of the screen, which will take you to the Welcome page. Then click on \"Sign up Now\" on the right side of the page.     You will be asked to enter the access code listed below, as well as some personal information. Please follow the directions to create your username and password.     Your access code is: [email or web address]  Expires: 2019  3:04 PM     Your access code will  in 90 days. If you need help or a new code, please call your [place] clinic or [phone].        Care EveryWhere ID     This is your [facility] EveryWhere ID. This could be used by other organizations to access your [place] medical records  [id number]        Your Vitals Were     Pulse Temperature Respirations Height Pulse Oximetry Breastfeeding?    60 97.5  F (36.4  C) (Temporal) 18 5' 7\" (1.702 m) 95% No    BMI (Body Mass Index)                   30.38 kg/m2            Blood Pressure from Last 3 Encounters:   10/11/18 128/78   18 130/70   18 106/80    Weight from Last 3 Encounters:   10/11/18 194 lb (88 kg)   18 193 lb 6.4 oz (87.7 kg)   18 194 lb 6.4 oz (88.2 kg)              We Performed the Following     CBC with platelets differential     Comprehensive metabolic panel     GH IMM-  HC FLU VACCINE, INCREASED ANTIGEN, PRESV FREE        Primary Care Provider Office Phone # Fax #    Asher WILSON [phone] [address] [facility] [address]        Equal Access to Services     SCOTT GURROLA AH: Hadii noemi Babcock, waaxda luqadaha, qaybta kaalmada javyyatimoteo, hari saldaña . So [facility] " [phone].    ATENCIÓN: Si josephine torres, tiene a fuentes disposición servicios gratuitos de asistencia lingüística. Héctor rivera [phone].    We comply with applicable federal civil rights laws and [place] laws. We do not discriminate on the basis of race, color, national origin, age, disability, sex, sexual orientation, or gender identity.            Thank you!     Thank you for choosing [facility] AND [facility]  for your care. Our goal is always to provide you with excellent care. Hearing back from our patients is one way we can continue to improve our services. Please take a few minutes to complete the written survey that you may receive in the mail after your visit with us. Thank you!             Your Updated Medication List - Protect others around you: Learn how to safely use, store and throw away your medicines at www.disposemymeds.org.          This list is accurate as of 10/11/18 12:44 PM.  Always use your most recent med list.                   Brand Name Dispense Instructions for use Diagnosis    atorvastatin 40 MG tablet    LIPITOR    90 tablet    Take 1 tablet (40 mg) by mouth At Bedtime    Hyperlipidemia, unspecified hyperlipidemia type       calcium carbonate 750 MG Chew      Take 1 tablet by mouth every 8 hours as needed for heartburn        ibuprofen 200 MG tablet    ADVIL/MOTRIN     Take 200 mg by mouth 4 times daily as needed for pain or headaches        lisinopril 20 MG tablet    PRINIVIL/ZESTRIL     Take 20 mg by mouth daily        metoprolol succinate 25 MG 24 hr tablet    TOPROL-XL    90 tablet    Take 1 tablet (25 mg) by mouth daily    Paroxysmal atrial fibrillation (H)       sertraline 50 MG tablet    ZOLOFT     Take 50 mg by mouth At Bedtime        warfarin 5 MG tablet    COUMADIN     TAKE 7.5 mg x 1 day and 5 mg x 6 days/week

## 2018-10-11 NOTE — NURSING NOTE
"Chief Complaint   Patient presents with     Pre-Op Exam     surgery date 10/18/18       Initial /78  Pulse 60  Temp 97.5  F (36.4  C) (Temporal)  Resp 18  Ht 5' 7\" (1.702 m)  Wt 194 lb (88 kg)  SpO2 95%  Breastfeeding? No  BMI 30.38 kg/m2 Estimated body mass index is 30.38 kg/(m^2) as calculated from the following:    Height as of this encounter: 5' 7\" (1.702 m).    Weight as of this encounter: 194 lb (88 kg).  Medication Reconciliation: complete    Tiffany White LPN       Date of Surgery: 10/18/2018  Type of Surgery: Left Cataract  Surgeon: Dr Johnson  Hospital:  [facility]  Fax: [phone]    Fever/Chills or other infectious symptoms in past month: no  >10lb weight loss in past two months: no    Health Care Directive/Code status:  no  Hx of blood transfusions:    no   Td up to date:  yes  History of VRE/MRSA:  no     Preoperative Evaluation: Obstructive Sleep Apnea screening    S:Snore -  Do you snore loudly? (louder than talking or loud enough to be heard through closed doors) yes  T: Tired - Do you often feel tired, fatigued, or sleepy during the daytime? yes  O: Observed - Has anyone ever observed you stop breathing during your sleep? no  P: Pressure - Do you have or are you being treated for high blood pressure? yes  B: BMI - BMI greater than 35kg/m2? no  A: Age - Age over 50 years old?yes  N: Neck - Neck circumference greater than 40 cm?no  G: Gender - Gender: Male? no    Total number of \"YES\" responses:  4    Scoring: Low risk of YAAKOV 0-2  At Risk of YAAKOV: >3 High Risk of YAAKOV: 5-8    Tiffany White LPN..................10/11/2018   8:28 AM          "

## 2018-10-11 NOTE — LETTER
October 11, 2018      Kate Chacon  [address] [address]        Dear Kate,     Your labs look fine. Your complete blood count was normal. Your comprehensive metabolic panel (a test that looks at liver and kidney function, blood sugar, electrolytes, and nutritional status) was normal.     It was a pleasure seeing you the other day.  If you have any questions, please don't hesitate to call us.       Sincerely,        Asher Campos MD                                        Results for orders placed or performed in visit on 10/11/18   CBC with platelets differential   Result Value Ref Range    WBC 8.6 4.0 - 11.0 10e9/L    RBC Count 4.66 3.8 - 5.2 10e12/L    Hemoglobin 14.4 11.7 - 15.7 g/dL    Hematocrit 44.1 35.0 - 47.0 %    MCV 95 78 - 100 fl    MCH 30.9 26.5 - 33.0 pg    MCHC 32.7 31.5 - 36.5 g/dL    RDW 13.0 10.0 - 15.0 %    Platelet Count 207 150 - 450 10e9/L    Diff Method Automated Method     % Neutrophils 69.2 %    % Lymphocytes 18.2 %    % Monocytes 10.2 %    % Eosinophils 1.9 %    % Basophils 0.3 %    % Immature Granulocytes 0.2 %    Absolute Neutrophil 6.0 1.6 - 8.3 10e9/L    Absolute Lymphocytes 1.6 0.8 - 5.3 10e9/L    Absolute Monocytes 0.9 0.0 - 1.3 10e9/L    Absolute Eosinophils 0.2 0.0 - 0.7 10e9/L    Absolute Basophils 0.0 0.0 - 0.2 10e9/L    Abs Immature Granulocytes 0.0 0 - 0.4 10e9/L   Comprehensive metabolic panel   Result Value Ref Range    Sodium 141 134 - 144 mmol/L    Potassium 4.7 3.5 - 5.1 mmol/L    Chloride 106 98 - 107 mmol/L    Carbon Dioxide 30 21 - 31 mmol/L    Anion Gap 5 3 - 14 mmol/L    Glucose 97 70 - 105 mg/dL    Urea Nitrogen 14 7 - 25 mg/dL    Creatinine 0.93 0.60 - 1.20 mg/dL    GFR Estimate 59 (L) >60 mL/min/1.7m2    GFR Estimate If Black 71 >60 mL/min/1.7m2    Calcium 10.2 8.6 - 10.3 mg/dL    Bilirubin Total 0.6 0.3 - 1.0 mg/dL    Albumin 3.8 3.5 - 5.7 g/dL    Protein Total 6.8 6.4 - 8.9 g/dL    Alkaline Phosphatase 82 34 - 104 U/L    ALT 23 7 - 52  U/L    AST 22 13 - 39 U/L

## 2018-10-11 NOTE — PROGRESS NOTES
"Nursing Notes:   Tiffany White LPN  10/11/2018  8:28 AM  Signed  Chief Complaint   Patient presents with     Pre-Op Exam     surgery date 10/18/18       Initial /78  Pulse 60  Temp 97.5  F (36.4  C) (Temporal)  Resp 18  Ht 5' 7\" (1.702 m)  Wt 194 lb (88 kg)  SpO2 95%  Breastfeeding? No  BMI 30.38 kg/m2 Estimated body mass index is 30.38 kg/(m^2) as calculated from the following:    Height as of this encounter: 5' 7\" (1.702 m).    Weight as of this encounter: 194 lb (88 kg).  Medication Reconciliation: complete    Tiffany White LPN       Date of Surgery: 10/18/2018  Type of Surgery: Left Cataract  Surgeon: Dr Johnson  Hospital:  [facility]  Fax: [phone]    Fever/Chills or other infectious symptoms in past month: no  >10lb weight loss in past two months: no    Health Care Directive/Code status:  no  Hx of blood transfusions:    no   Td up to date:  yes  History of VRE/MRSA:  no     Preoperative Evaluation: Obstructive Sleep Apnea screening    S:Snore -  Do you snore loudly? (louder than talking or loud enough to be heard through closed doors) yes  T: Tired - Do you often feel tired, fatigued, or sleepy during the daytime? yes  O: Observed - Has anyone ever observed you stop breathing during your sleep? no  P: Pressure - Do you have or are you being treated for high blood pressure? yes  B: BMI - BMI greater than 35kg/m2? no  A: Age - Age over 50 years old?yes  N: Neck - Neck circumference greater than 40 cm?no  G: Gender - Gender: Male? no    Total number of \"YES\" responses:  4    Scoring: Low risk of YAAKOV 0-2  At Risk of YAAKOV: >3 High Risk of YAAKOV: 5-8    Tiffany White LPN..................10/11/2018   8:28 AM          ----------------- PREOPERATIVE EXAM ------------------  10/11/2018    SUBJECTIVE:  Kate Chacon is a 73 year old female here for preoperative optimization.    Preoperative risk assessment consultation was requested on Kate Chacon by Dr. Johnson prior " to bilateral cataract surgery left then right sequentially.   This is scheduled for October 18, 2018 for the first one. I am asked to see this patient for preoperative clearance prior to this procedure.     She has some trouble with her right 4th toe. She stubbed her toe on a quilt rack about 3 weeks ago.     Patient Active Problem List    Diagnosis Date Noted     Sinoatrial node dysfunction (H) 05/01/2018     Priority: Medium     Cardiac pacemaker, Medtronic, Dual Chamber 02/19/2018     Priority: Medium     DNI (do not intubate) 10/25/2017     Priority: Medium     Overview:   As discussed with patient on 10/25/17       Urge incontinence 10/05/2017     Priority: Medium     Former smoker 08/30/2017     Priority: Medium     MDD (recurrent major depressive disorder) in remission (H) 08/30/2017     Priority: Medium     Anxiety 01/05/2017     Priority: Medium     Hypertension 12/08/2016     Priority: Medium     Paroxysmal atrial fibrillation (H) 11/02/2016     Priority: Medium     Anticoagulation goal of INR 2 to 3 10/28/2016     Priority: Medium     Ischemic stroke (H) 10/15/2016     Priority: Medium     Diverticulosis of large intestine 04/04/2011     Priority: Medium     History of colonic polyps 03/02/2011     Priority: Medium     Osteoporosis 02/08/2006     Priority: Medium       Past Medical History:   Diagnosis Date     Encounter for full-term uncomplicated delivery     x3     Ganglion of wrist     right     Gastritis without bleeding     treated and 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     COLONOSCOPY      4/4/11     COLONOSCOPY      5/7/12     ESOPHAGOSCOPY, GASTROSCOPY, DUODENOSCOPY (EGD), COMBINED      8/19/05     OTHER SURGICAL HISTORY      8/3/05,800216,OTHER,helices on the right ear     TONSILLECTOMY       "No Comments Provided       Family History   Problem Relation Age of Onset     Diabetes Father      Diabetes     Hypertension Father      Hypertension     Other - See Comments Mother      h/o coronary artery disease/dementia     Asthma Mother      Asthma     Diabetes Maternal Grandmother      Diabetes     Cancer Maternal Aunt      Cancer,Uterine     Breast Cancer No family hx of      Cancer-breast       Social History   Substance Use Topics     Smoking status: Former Smoker     Packs/day: 0.50     Years: 49.00     Types: Cigarettes     Quit date: 10/14/2016     Smokeless tobacco: Never Used     Alcohol use No       Current Outpatient Prescriptions   Medication Sig Dispense Refill     atorvastatin (LIPITOR) 40 MG tablet Take 1 tablet (40 mg) by mouth At Bedtime 90 tablet 3     calcium carbonate 750 MG CHEW Take 1 tablet by mouth every 8 hours as needed for heartburn       ibuprofen (ADVIL/MOTRIN) 200 MG tablet Take 200 mg by mouth 4 times daily as needed for pain or headaches       lisinopril (PRINIVIL/ZESTRIL) 20 MG tablet Take 20 mg by mouth daily       metoprolol succinate (TOPROL-XL) 25 MG 24 hr tablet Take 1 tablet (25 mg) by mouth daily 90 tablet 3     sertraline (ZOLOFT) 50 MG tablet Take 50 mg by mouth At Bedtime       warfarin (COUMADIN) 5 MG tablet TAKE 7.5 mg x 1 day and 5 mg x 6 days/week         Allergies:  Allergies   Allergen Reactions     Methylpar-Na Bisulfite-Pentazocine Unknown     jittery       ROS:    Surgical:  patient denies previous complications from prior surgeries including but not limited to prolonged bleeding, anesthesia complications, dysrhythmias, surgical wound infections, or prolonged hospital stay.    Denies family hx of bleeding tendencies, anesthesia complications, or other problems with surgery.     -------------------------------------------------------------    PHYSICAL EXAM:  /78  Pulse 60  Temp 97.5  F (36.4  C) (Temporal)  Resp 18  Ht 5' 7\" (1.702 m)  Wt 194 lb (88 " kg)  SpO2 95%  Breastfeeding? No  BMI 30.38 kg/m2    EXAM:  General Appearance: Pleasant, alert, appropriate appearance for age. No acute distress  Head Exam: Normal. Normocephalic, atraumatic.  Eyes: PERRL, EOMI  Ears: Normal TM's bilaterally. Normal auditory canals and external ears.   OroPharynx: Normal buccal mucosa. Normal pharynx.  Neck: Supple, no masses or nodes, no lymphadenopathy.  No thyromegaly.  Lungs: Normal chest wall and respirations. Clear to auscultation, no wheezes or crackles.  Cardiovascular: Regular rate and rhythm. S1, S2, no murmurs.  Gastrointestinal: Soft, nontender, no abnormal masses or organomegaly. BS normal   Musculoskeletal: No edema. She has a fusiform swelling of her right 4th toe that is not tender.  Skin is normal.   Skin: no concerning or new rashes.  Neurologic Exam: CN 2-12 grossly intact.  Normal gait.  normal gross motor movement, tone, and coordination. No tremor.  Psychiatric Exam: Alert and oriented, appropriate affect.      EKG:  Notes Recorded by Easton Bowen MD on 11/3/2017 at 5:01 PM EKG Interpretation:  Rhythm: Irregularly irregular Rate: 104 Axis: Left Conduction: Left anterior fascicular block pattern QRS: Normal ST Segments: Normal T wave: Normal Chambers: Normal PACs   Present: No PVCs Present: No  Impression:  Abnormal EKG. Atrial flutter with variable penetration. Left anterior fascicular block. Compared to October 24, 2017: Atrial flutter is new.  Signed, Easton Bowen MD [facility] Medicine [facility] Pediatric[facility] 11/3/2017    5:00 PM  ---------------------------------------------------------------  LABS  Results for orders placed or performed in visit on 10/11/18   CBC with platelets differential   Result Value Ref Range    WBC 8.6 4.0 - 11.0 10e9/L    RBC Count 4.66 3.8 - 5.2 10e12/L    Hemoglobin 14.4 11.7 - 15.7 g/dL    Hematocrit 44.1 35.0 - 47.0 %    MCV 95 78 - 100 fl    MCH 30.9 26.5 - 33.0 pg    MCHC 32.7 31.5 - 36.5 g/dL    RDW 13.0 10.0 - 15.0 %     Platelet Count 207 150 - 450 10e9/L    Diff Method Automated Method     % Neutrophils 69.2 %    % Lymphocytes 18.2 %    % Monocytes 10.2 %    % Eosinophils 1.9 %    % Basophils 0.3 %    % Immature Granulocytes 0.2 %    Absolute Neutrophil 6.0 1.6 - 8.3 10e9/L    Absolute Lymphocytes 1.6 0.8 - 5.3 10e9/L    Absolute Monocytes 0.9 0.0 - 1.3 10e9/L    Absolute Eosinophils 0.2 0.0 - 0.7 10e9/L    Absolute Basophils 0.0 0.0 - 0.2 10e9/L    Abs Immature Granulocytes 0.0 0 - 0.4 10e9/L   Comprehensive metabolic panel   Result Value Ref Range    Sodium 141 134 - 144 mmol/L    Potassium 4.7 3.5 - 5.1 mmol/L    Chloride 106 98 - 107 mmol/L    Carbon Dioxide 30 21 - 31 mmol/L    Anion Gap 5 3 - 14 mmol/L    Glucose 97 70 - 105 mg/dL    Urea Nitrogen 14 7 - 25 mg/dL    Creatinine 0.93 0.60 - 1.20 mg/dL    GFR Estimate 59 (L) >60 mL/min/1.7m2    GFR Estimate If Black 71 >60 mL/min/1.7m2    Calcium 10.2 8.6 - 10.3 mg/dL    Bilirubin Total 0.6 0.3 - 1.0 mg/dL    Albumin 3.8 3.5 - 5.7 g/dL    Protein Total 6.8 6.4 - 8.9 g/dL    Alkaline Phosphatase 82 34 - 104 U/L    ALT 23 7 - 52 U/L    AST 22 13 - 39 U/L       ASSESSEMENT AND PLAN:    Kate was seen today for pre-op exam.    Diagnoses and all orders for this visit:    Preoperative examination  -     CBC with platelets differential; Future  -     Comprehensive metabolic panel; Future  -     CBC with platelets differential  -     Comprehensive metabolic panel    Cataract of both eyes, unspecified cataract type    Paroxysmal atrial fibrillation (H)  -     CBC with platelets differential; Future  -     CBC with platelets differential    Anticoagulation goal of INR 2 to 3    Essential hypertension  -     Comprehensive metabolic panel; Future  -     Comprehensive metabolic panel    Cardiac pacemaker, Medtronic, Dual Chamber    Ischemic stroke (H)    Former smoker    Need for influenza vaccination  -     GH IMM-  HC FLU VACCINE, INCREASED ANTIGEN, PRESV FREE    Need for prophylactic  vaccination and inoculation against influenza    Sprain of toe, initial encounter        PRE OP RECOMMENDATIONS:  Patient is on chronic pain medications no   Patient is on antiplatlet/anticoagulation medication yes  Other medications that need adjustment perioperatively no    Current recommendations do not necessarily suggest stopping warfarin but will leave it to the discretion of the ophthalmologist.  She should be at low risk for the planned procedures    Other:  Patient was advised to call our office and the surgical services with any change in condition or new symptoms if they were to develop between today and their surgical date.  Especially any cardiopulmonary symptoms or symptoms concerning for an infection.    Flu shot given today.  Reassured about the toe sprain and advised that it may take 6 months for the swelling to go down.  Asher Campos 10/11/2018

## 2018-10-12 ASSESSMENT — ANXIETY QUESTIONNAIRES: GAD7 TOTAL SCORE: 5

## 2018-10-22 ENCOUNTER — OFFICE VISIT (OUTPATIENT)
Dept: CARDIOLOGY | Facility: OTHER | Age: 73
End: 2018-10-22
Attending: NURSE PRACTITIONER
Payer: COMMERCIAL

## 2018-10-22 VITALS
RESPIRATION RATE: 16 BRPM | HEIGHT: 67 IN | WEIGHT: 195.9 LBS | BODY MASS INDEX: 30.75 KG/M2 | SYSTOLIC BLOOD PRESSURE: 126 MMHG | HEART RATE: 60 BPM | DIASTOLIC BLOOD PRESSURE: 80 MMHG

## 2018-10-22 DIAGNOSIS — R60.0 LOWER EXTREMITY EDEMA: ICD-10-CM

## 2018-10-22 DIAGNOSIS — Z79.01 ANTICOAGULATION GOAL OF INR 2 TO 3: ICD-10-CM

## 2018-10-22 DIAGNOSIS — Z95.0 CARDIAC PACEMAKER: ICD-10-CM

## 2018-10-22 DIAGNOSIS — Z87.891 FORMER SMOKER: ICD-10-CM

## 2018-10-22 DIAGNOSIS — Z51.81 ANTICOAGULATION GOAL OF INR 2 TO 3: ICD-10-CM

## 2018-10-22 DIAGNOSIS — I63.9 ISCHEMIC STROKE (H): ICD-10-CM

## 2018-10-22 DIAGNOSIS — I49.5 SINOATRIAL NODE DYSFUNCTION (H): ICD-10-CM

## 2018-10-22 DIAGNOSIS — I48.0 PAROXYSMAL ATRIAL FIBRILLATION (H): Primary | ICD-10-CM

## 2018-10-22 DIAGNOSIS — R53.83 FATIGUE, UNSPECIFIED TYPE: ICD-10-CM

## 2018-10-22 DIAGNOSIS — I10 ESSENTIAL HYPERTENSION: ICD-10-CM

## 2018-10-22 PROCEDURE — 99214 OFFICE O/P EST MOD 30 MIN: CPT | Performed by: NURSE PRACTITIONER

## 2018-10-22 PROCEDURE — G0463 HOSPITAL OUTPT CLINIC VISIT: HCPCS

## 2018-10-22 RX ORDER — FUROSEMIDE 20 MG
20 TABLET ORAL DAILY
Qty: 60 TABLET | Refills: 3 | Status: SHIPPED | OUTPATIENT
Start: 2018-10-22 | End: 2019-05-29

## 2018-10-22 ASSESSMENT — ANXIETY QUESTIONNAIRES
7. FEELING AFRAID AS IF SOMETHING AWFUL MIGHT HAPPEN: NOT AT ALL
GAD7 TOTAL SCORE: 0
6. BECOMING EASILY ANNOYED OR IRRITABLE: NOT AT ALL
1. FEELING NERVOUS, ANXIOUS, OR ON EDGE: NOT AT ALL
2. NOT BEING ABLE TO STOP OR CONTROL WORRYING: NOT AT ALL
IF YOU CHECKED OFF ANY PROBLEMS ON THIS QUESTIONNAIRE, HOW DIFFICULT HAVE THESE PROBLEMS MADE IT FOR YOU TO DO YOUR WORK, TAKE CARE OF THINGS AT HOME, OR GET ALONG WITH OTHER PEOPLE: NOT DIFFICULT AT ALL
3. WORRYING TOO MUCH ABOUT DIFFERENT THINGS: NOT AT ALL
5. BEING SO RESTLESS THAT IT IS HARD TO SIT STILL: NOT AT ALL

## 2018-10-22 ASSESSMENT — PAIN SCALES - GENERAL: PAINLEVEL: NO PAIN (0)

## 2018-10-22 ASSESSMENT — PATIENT HEALTH QUESTIONNAIRE - PHQ9: 5. POOR APPETITE OR OVEREATING: NOT AT ALL

## 2018-10-22 NOTE — PATIENT INSTRUCTIONS
You were seen by  JANAE Collado CNP      1. Start taking Lasix 20 mg every morning     2. Echocardiogram, they will call you to schedule this    You will follow up with  JANAE Collado CNP  in 4 weeks.       Please call the cardiology office with problems, questions, or concerns at [phone].    If you experience chest pain, chest pressure, chest tightness, shortness of breath, fainting, lightheadedness, nausea, vomiting, or other concerning symptoms, please report to the Emergency Department or call 911. These symptoms may be emergent, and best treated in the Emergency Department.     PERLITA KangN, RN  [facility]  [phone]

## 2018-10-22 NOTE — MR AVS SNAPSHOT
After Visit Summary   10/22/2018    Kate Chacon    MRN: [id number]           Patient Information     Date Of Birth          1945        Visit Information        Provider Department      10/22/2018 1:45 PM Dinora Younger APRN CNP [facility]        Today's Diagnoses     Paroxysmal atrial fibrillation (H)    -  1    Essential hypertension        Ischemic stroke (H)        Former smoker        Anticoagulation goal of INR 2 to 3        Sinoatrial node dysfunction (H)        Cardiac pacemaker        Fatigue, unspecified type          Care Instructions    You were seen by  JANAE Collado CNP      1. Start taking Lasix 20 mg every morning     2. Echocardiogram, they will call you to schedule this    You will follow up with  JANAE Collado CNP  in 4 weeks.       Please call the cardiology office with problems, questions, or concerns at [phone].    If you experience chest pain, chest pressure, chest tightness, shortness of breath, fainting, lightheadedness, nausea, vomiting, or other concerning symptoms, please report to the Emergency Department or call 911. These symptoms may be emergent, and best treated in the Emergency Department.     PERLITA KangN, RN  [facility]  [phone]             Follow-ups after your visit        Your next 10 appointments already scheduled     Oct 25, 2018 10:15 AM CDT   Anticoagulation [facility] with [facility]   [facility]    [address]  [facility] [id number] [phone]              Future tests that were ordered for you today     Open Future Orders        Priority Expected Expires Ordered    Echocardiogram Complete Routine  10/22/2019 10/22/2018            Who to contact     If you have questions or need follow up information about today's clinic visit or your schedule please contact [facility] directly at  "[phone].  Normal or non-critical lab and imaging results will be communicated to you by MyChart, letter or phone within 4 business days after the clinic has received the results. If you do not hear from us within 7 days, please contact the clinic through Izooblehart or phone. If you have a critical or abnormal lab result, we will notify you by phone as soon as possible.  Submit refill requests through ShipEarly or call your pharmacy and they will forward the refill request to us. Please allow 3 business days for your refill to be completed.          Additional Information About Your Visit        Izooblehart Information     ShipEarly lets you send messages to your doctor, view your test results, renew your prescriptions, schedule appointments and more. To sign up, go to [email or web address] . Click on \"Log in\" on the left side of the screen, which will take you to the Welcome page. Then click on \"Sign up Now\" on the right side of the page.     You will be asked to enter the access code listed below, as well as some personal information. Please follow the directions to create your username and password.     Your access code is: [email or web address]  Expires: 2019  3:04 PM     Your access code will  in 90 days. If you need help or a new code, please call your [place] clinic or [phone].        Care EveryWhere ID     This is your Care EveryWhere ID. This could be used by other organizations to access your [place] medical records  [id number]        Your Vitals Were     Pulse Respirations Height BMI (Body Mass Index)          60 16 1.702 m (5' 7\") 30.68 kg/m2         Blood Pressure from Last 3 Encounters:   10/22/18 126/80   10/11/18 128/78   18 130/70    Weight from Last 3 Encounters:   10/22/18 88.9 kg (195 lb 14.4 oz)   10/11/18 88 kg (194 lb)   18 87.7 kg (193 lb 6.4 oz)               Primary Care Provider Office Phone # Fax #    Asher WILSON [phone]       [facility] [employer] COURSE " SH[address]AL  [facility] [address]        Equal Access to Services     [facility] Hospital[facility] : Hadii noemi macedo lisbethozzy Jerardoali, wajaspalda luqkayleneha, qareglata sachinalfredohari wharton. So [facility] [phone].    ATENCIÓN: Si habla español, tiene a fuentes disposición servicios gratuitos de asistencia lingüística. [facility] [phone].    We comply with applicable federal civil rights laws and [place] laws. We do not discriminate on the basis of race, color, national origin, age, disability, sex, sexual orientation, or gender identity.            Thank you!     Thank you for choosing [facility] AND [facility]  for your care. Our goal is always to provide you with excellent care. Hearing back from our patients is one way we can continue to improve our services. Please take a few minutes to complete the written survey that you may receive in the mail after your visit with us. Thank you!             Your Updated Medication List - Protect others around you: Learn how to safely use, store and throw away your medicines at www.disposemymeds.org.          This list is accurate as of 10/22/18  2:39 PM.  Always use your most recent med list.                   Brand Name Dispense Instructions for use Diagnosis    atorvastatin 40 MG tablet    LIPITOR    90 tablet    Take 1 tablet (40 mg) by mouth At Bedtime    Hyperlipidemia, unspecified hyperlipidemia type       calcium carbonate 750 MG Chew      Take 1 tablet by mouth every 8 hours as needed for heartburn        ibuprofen 200 MG tablet    ADVIL/MOTRIN     Take 200 mg by mouth 4 times daily as needed for pain or headaches        lisinopril 20 MG tablet    PRINIVIL/ZESTRIL     Take 20 mg by mouth daily        metoprolol succinate 25 MG 24 hr tablet    TOPROL-XL    90 tablet    Take 1 tablet (25 mg) by mouth daily    Paroxysmal atrial fibrillation (H)       sertraline 50 MG tablet    ZOLOFT     Take 50 mg by mouth At Bedtime        warfarin 5 MG tablet    COUMADIN      TAKE 7.5 mg x 1 day and 5 mg x 6 days/week

## 2018-10-22 NOTE — NURSING NOTE
"Pt presents today for 6 month follow up. Denies chest pain/pressure, SOB, nausea, or increased fatigue.   Chief Complaint   Patient presents with     Follow Up For     6 month f/u       Initial /80 (BP Location: Right arm, Patient Position: Sitting, Cuff Size: Adult Regular)  Pulse 60  Resp 16  Ht 1.702 m (5' 7\")  Wt 88.9 kg (195 lb 14.4 oz)  BMI 30.68 kg/m2 Estimated body mass index is 30.68 kg/(m^2) as calculated from the following:    Height as of this encounter: 1.702 m (5' 7\").    Weight as of this encounter: 88.9 kg (195 lb 14.4 oz).  Meds Reconciled: complete  Pt is not on Aspirin  Pt is on a Statin  PHQ and/or ANETTE reviewed. Pt referred to PCP/MH Provider as appropriate.    Sabrina Washington RN      "

## 2018-10-22 NOTE — PROGRESS NOTES
[facility] HEART CARE   CARDIOLOGY PROGRESS NOTE    Kate Chacon   [address] [facility] [address]      Asher Campos     Chief Complaint   Patient presents with     Follow Up For     6 month f/u        HPI:   Kate Chacon  is a 73 y.o. female who comes in today for follow-up paroxysmal atrial fibrillation, HTN, hyperlipidemia and recent dual-chamber pacer as a result of bradycardia secondary to SA node dysfunction. She has a history ischemic stroke concerning for cryptogenic stroke on 10/15/16.         She was seen in clinic on 11/1/17 for hospital follow-up with AFib and bradycardia. She was found to be in AF at the time with RVR, rates in 90's to 170 with beat to beat variability. She was largely asymptomatic with this and considered likely rebound tachycardia since d/c from beta blockade with severe bradycardia. She was hospitalized for rate control with RVR and during hospitalization she developed significant bradycardia with pauses of greater then 2 seconds. Patient was transferred to the care of Dr. Amanda at [facility] for pacer placement on 11/2/17.       Last device irrigation on 7/24/2018.  Normal pacemaker function.  She had 9 episodes recorded as NSVT, 12-15 seconds.  Started EEG arms revealed what appeared to be A. fib with RVR.   31 atrial tachycardia/atrial fibrillation episodes recorded.  AP 46%,   11%.  Normal battery life.         INTERVAL HISTORY:  Patient reports no changes since her last visit with cardiology.  She remains largely asymptomatic with her atrial fibrillation.  She is currently on metoprolol 25 mg once daily with good rate control and Coumadin oral anticoagulation without complication. She denies any chest pain or pressure. No shortness of breath or MARSHALL. No palpitations, lightheadedness or syncope. Positive for increased fatigue and increased lower extremity edema.       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          FAMILY HISTORY:   Family History   Problem Relation Age of Onset     Diabetes Father      Diabetes     Hypertension Father      Hypertension     Other - See Comments Mother      h/o coronary artery disease/dementia     Asthma Mother      Asthma     Diabetes Maternal Grandmother      Diabetes     Cancer Maternal Aunt      Cancer,Uterine     Breast Cancer No family hx of      Cancer-breast          PAST SURGICAL HISTORY:   Past Surgical History:   Procedure Laterality Date     APPENDECTOMY OPEN      No Comments Provided     COLONOSCOPY      8/19/05,Cecal biopsy with tubular adenoma low grade dysplasia.     COLONOSCOPY      4/4/11     COLONOSCOPY      5/7/12     ESOPHAGOSCOPY, GASTROSCOPY, DUODENOSCOPY (EGD), COMBINED      8/19/05     OTHER SURGICAL HISTORY      8/3/05,537694,OTHER,helices on the right ear     TONSILLECTOMY      No Comments Provided          SOCIAL HISTORY:   Social History     Social History     Marital status:      Spouse name: N/A     Number of children: N/A     Years of education: N/A     Social History Main Topics     Smoking status: Former Smoker     Packs/day: 0.50     Years: 49.00     Types: Cigarettes     Quit date: 10/14/2016     Smokeless tobacco: Never Used     Alcohol use No     Drug use: No      Comment: Drug use: No     Sexual activity: Not Currently     Other Topics Concern     None     Social History Narrative    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  She enjoys gardening, bowling and going to stock car races that her son participates in.   to her  [place].  They run [employer].  Live in town independently.          CURRENT MEDICATIONS:   Prior to Admission medications    Medication Sig Start Date End Date Taking? Authorizing Provider   atorvastatin (LIPITOR) 40 MG  tablet Take 1 tablet (40 mg) by mouth At Bedtime 4/23/18  Yes Dinora Younger APRN CNP   calcium carbonate 750 MG CHEW Take 1 tablet by mouth every 8 hours as needed for heartburn   Yes Reported, Patient   ibuprofen (ADVIL/MOTRIN) 200 MG tablet Take 200 mg by mouth 4 times daily as needed for pain or headaches   Yes Reported, Patient   lisinopril (PRINIVIL/ZESTRIL) 20 MG tablet Take 20 mg by mouth daily 11/16/17  Yes Reported, Patient   metoprolol succinate (TOPROL-XL) 25 MG 24 hr tablet Take 1 tablet (25 mg) by mouth daily 4/23/18  Yes Dinora Younger APRN CNP   sertraline (ZOLOFT) 50 MG tablet Take 50 mg by mouth At Bedtime 11/16/17  Yes Reported, Patient   warfarin (COUMADIN) 5 MG tablet TAKE 7.5 mg x 1 day and 5 mg x 6 days/week 11/27/17  Yes Reported, Patient          ALLERGIES:   Allergies   Allergen Reactions     Methylpar-Na Bisulfite-Pentazocine Unknown     jittery          ROS:   CONSTITUTIONAL: No reported fever or chills. No significant changes in weight.  ENT: No visual disturbance, ear ache, epistaxis or sore throat.   CARDIOVASCULAR: No chest pain, chest pressure or chest discomfort. No palpitations. Positive for increased lower extremity edema.   RESPIRATORY: No shortness of breath, dyspnea upon exertion, cough, wheezing or hemoptysis.   GI: No reported abdominal pain. No melena or hematochezia.  : No reported hematuria or dysuria.   NEUROLOGICAL: No lightheadedness, dizziness, syncope, ataxia, paresthesias or weakness.   HEMATOLOGIC: No history of anemia. No bleeding or excessive bruising.  MUSCULOSKELETAL: No joint pain or swelling, no muscle pain.  ENDOCRINOLOGIC: No temperature intolerance. No hair or skin changes.  SKIN: No abnormal rashes or sores, no unusual itching.  PSYCHIATRIC: No history of depression or anxiety. No changes in mood, feeling down or anxious. No changes in sleep.      PHYSICAL EXAM:   /80 (BP Location: Right arm, Patient Position: Sitting, Cuff Size: Adult  "Regular)  Pulse 60  Resp 16  Ht 1.702 m (5' 7\")  Wt 88.9 kg (195 lb 14.4 oz)  BMI 30.68 kg/m2  GENERAL: The patient is a well-developed, well-nourished, in no apparent distress.  HEENT: Head is normocephalic and atraumatic. Eyes are symmetrical with normal visual tracking. No icterus, no xanthelasmas. Nares appeared normal without nasal drainage. Mucous membranes are moist, no cyanosis.  NECK: Supple. No visible JVP.  CHEST/ LUNGS: Lungs clear to auscultation, no rales, rhonchi or wheezes, no use of accessory muscles, no retractions, respirations unlabored and normal respiratory rate.   CARDIO: Regular rate and rhythm normal with S1 and S2, no S3 or S4 and no murmur, click or rub.   ABD: Abdomen is nondistended.   EXTREMITIES: 1+ edema present.   MUSCULOSKELETAL: No joint swelling.   NEUROLOGIC: Alert and oriented X3. Normal speech, gait and affect. No focal neurologic deficits.   SKIN: No jaundice. No rashes or visible skin lesions present. No ecchymosis.       LAB RESULTS:   Office Visit on 10/11/2018   Component Date Value Ref Range Status     WBC 10/11/2018 8.6  4.0 - 11.0 10e9/L Final     RBC Count 10/11/2018 4.66  3.8 - 5.2 10e12/L Final     Hemoglobin 10/11/2018 14.4  11.7 - 15.7 g/dL Final     Hematocrit 10/11/2018 44.1  35.0 - 47.0 % Final     MCV 10/11/2018 95  78 - 100 fl Final     MCH 10/11/2018 30.9  26.5 - 33.0 pg Final     MCHC 10/11/2018 32.7  31.5 - 36.5 g/dL Final     RDW 10/11/2018 13.0  10.0 - 15.0 % Final     Platelet Count 10/11/2018 207  150 - 450 10e9/L Final     Diff Method 10/11/2018 Automated Method   Final     % Neutrophils 10/11/2018 69.2  % Final     % Lymphocytes 10/11/2018 18.2  % Final     % Monocytes 10/11/2018 10.2  % Final     % Eosinophils 10/11/2018 1.9  % Final     % Basophils 10/11/2018 0.3  % Final     % Immature Granulocytes 10/11/2018 0.2  % Final     Absolute Neutrophil 10/11/2018 6.0  1.6 - 8.3 10e9/L Final     Absolute Lymphocytes 10/11/2018 1.6  0.8 - 5.3 10e9/L " Final     Absolute Monocytes 10/11/2018 0.9  0.0 - 1.3 10e9/L Final     Absolute Eosinophils 10/11/2018 0.2  0.0 - 0.7 10e9/L Final     Absolute Basophils 10/11/2018 0.0  0.0 - 0.2 10e9/L Final     Abs Immature Granulocytes 10/11/2018 0.0  0 - 0.4 10e9/L Final     Sodium 10/11/2018 141  134 - 144 mmol/L Final     Potassium 10/11/2018 4.7  3.5 - 5.1 mmol/L Final     Chloride 10/11/2018 106  98 - 107 mmol/L Final     Carbon Dioxide 10/11/2018 30  21 - 31 mmol/L Final     Anion Gap 10/11/2018 5  3 - 14 mmol/L Final     Glucose 10/11/2018 97  70 - 105 mg/dL Final     Urea Nitrogen 10/11/2018 14  7 - 25 mg/dL Final     Creatinine 10/11/2018 0.93  0.60 - 1.20 mg/dL Final     GFR Estimate 10/11/2018 59* >60 mL/min/1.7m2 Final     GFR Estimate If Black 10/11/2018 71  >60 mL/min/1.7m2 Final     Calcium 10/11/2018 10.2  8.6 - 10.3 mg/dL Final     Bilirubin Total 10/11/2018 0.6  0.3 - 1.0 mg/dL Final     Albumin 10/11/2018 3.8  3.5 - 5.7 g/dL Final     Protein Total 10/11/2018 6.8  6.4 - 8.9 g/dL Final     Alkaline Phosphatase 10/11/2018 82  34 - 104 U/L Final     ALT 10/11/2018 23  7 - 52 U/L Final     AST 10/11/2018 22  13 - 39 U/L Final   Anticoagulation Therapy Visit on 09/13/2018   Component Date Value Ref Range Status     INR Protime 09/13/2018 2.6* 0.86 - 1.14 Final   Office Visit on 08/02/2018   Component Date Value Ref Range Status     Color Urine 08/02/2018 Yellow   Final     Appearance Urine 08/02/2018 Clear   Final     Glucose Urine 08/02/2018 Negative  NEG^Negative mg/dL Final     Bilirubin Urine 08/02/2018 Negative  NEG^Negative Final     Ketones Urine 08/02/2018 Negative  NEG^Negative mg/dL Final     Specific Gravity Urine 08/02/2018 1.010  1.000 - 1.030 Final     Blood Urine 08/02/2018 Negative  NEG^Negative Final     pH Urine 08/02/2018 6.5  5.0 - 9.0 pH Final     Protein Albumin Urine 08/02/2018 Negative  NEG^Negative mg/dL Final     Urobilinogen Urine 08/02/2018 0.2  0.2 - 1.0 EU/dL Final     Nitrite Urine  08/02/2018 Negative  NEG^Negative Final     Leukocyte Esterase Urine 08/02/2018 Negative  NEG^Negative Final     Source 08/02/2018 Midstream Urine   Final   Anticoagulation Therapy Visit on 08/02/2018   Component Date Value Ref Range Status     INR Protime 08/02/2018 2.3  2.0 - 3.0 Final          ASSESSMENT:   Kate Chacon presents for follow-up paroxysmal atrial fibrillation, HTN, hyperlipidemia and recent dual-chamber pacer as a result of bradycardia secondary to SA node dysfunction. She has a history ischemic stroke concerning for cryptogenic stroke on 10/15/16. Reports increase LE edema today.     1. Paroxysmal atrial fibrillation (H)  2. Essential hypertension  3. Ischemic stroke (H)  4. Former smoker  5. Anticoagulation goal of INR 2 to 3  6. Sinoatrial node dysfunction (H)  7. Cardiac pacemaker  8. Fatigue, unspecified type  9. Lower extremity edema    PLAN:   1. Patient with PAF, rate well controlled on Metoprolol Succinate 25 mg daily. Overall, asymptomatic. Continue with Coumadin oral anticoagulation.   2. BP remains well controlled at 126/80 today.   3. History of ischemic CVA felt to be likely cryptogenic, now appropriately on oral anticoagulation.   4. Recent Device check note reviewed with normal functioning device. Remote transmission in 3 months.   5. Reports increased LE edema and fatigue, begin Lasix 20 mg every AM.   6. Echocardiogram ordered with history of PAF and increased LE edema.       Thank you for allowing me to participate in the care of your patient. Please do not hesitate to contact me if you have any questions.     Dinora Younger

## 2018-10-23 DIAGNOSIS — I48.0 PAROXYSMAL ATRIAL FIBRILLATION (H): Primary | ICD-10-CM

## 2018-10-23 RX ORDER — WARFARIN SODIUM 5 MG/1
TABLET ORAL
Qty: 100 TABLET | Refills: 1 | Status: SHIPPED | OUTPATIENT
Start: 2018-10-23 | End: 2019-05-07

## 2018-10-23 ASSESSMENT — ANXIETY QUESTIONNAIRES: GAD7 TOTAL SCORE: 0

## 2018-10-23 NOTE — TELEPHONE ENCOUNTER
Prescription approved per [facility] Refill Protocol.  Radha Burns RN    10/23/2018  4:23 PM

## 2018-10-25 ENCOUNTER — ANTICOAGULATION THERAPY VISIT (OUTPATIENT)
Dept: ANTICOAGULATION | Facility: OTHER | Age: 73
End: 2018-10-25
Attending: FAMILY MEDICINE
Payer: MEDICARE

## 2018-10-25 LAB — INR POINT OF CARE: 2.7 (ref 0.86–1.14)

## 2018-10-25 PROCEDURE — 85610 PROTHROMBIN TIME: CPT | Mod: QW,ZL

## 2018-10-25 NOTE — PROGRESS NOTES
ANTICOAGULATION FOLLOW-UP CLINIC VISIT    Patient Name:  Kate Chacon  Date:  10/25/2018  Contact Type:  Face to Face    SUBJECTIVE:     Patient Findings     Positives No Problem Findings           OBJECTIVE    INR Protime   Date Value Ref Range Status   10/25/2018 2.7 (A) 0.86 - 1.14 Final       ASSESSMENT / PLAN  INR assessment THER    Recheck INR In: 6 WEEKS    INR Location Clinic      Anticoagulation Summary as of 10/25/2018     INR goal 2.0-3.0    Today's INR 2.7    Warfarin maintenance plan 7.5 mg (5 mg x 1.5) on Mon; 5 mg (5 mg x 1) all other days    Full warfarin instructions 7.5 mg on Mon; 5 mg all other days    Weekly warfarin total 37.5 mg    No change documented Radha Burns RN    Next INR check 12/6/2018    Priority INR    Target end date Indefinite      Anticoagulation Episode Summary     INR check location     Preferred lab     Send INR reminders to  INR    Comments       Anticoagulation Care Providers     Provider Role Specialty Phone number    Asher Campos MD [facility] [phone]            See the Encounter Report to view Anticoagulation Flowsheet and Dosing Calendar (Go to Encounters tab in chart review, and find the Anticoagulation Therapy Visit)        Radha Burns, RN

## 2018-10-25 NOTE — MR AVS SNAPSHOT
Kate Chacon   10/25/2018 10:15 AM   Anticoagulation Therapy Visit    Description:  73 year old female   Provider:  [facility]AG 2   Department:  [facility]           INR as of 10/25/2018     Today's INR 2.7      Anticoagulation Summary as of 10/25/2018     INR goal 2.0-3.0    Today's INR 2.7    Full warfarin instructions 7.5 mg on Mon; 5 mg all other days    Next INR check 12/6/2018      Description     Continue same dose of Warfarin/Coumadin and recheck INR in 6 weeks.   Radha Burns RN    10/25/2018  10:10 AM        Your next Anticoagulation Clinic appointment(s)     Oct 25, 2018 10:15 AM CDT   Anticoagulation Visit with ANNEMARIE ANTI COAG 2   [facility] ([facility])    [address]  [place] [address]   [phone]              October 2018 Details    Sun Mon Tue Wed Thu Fri Sat      1               2               3               4               5               6                 7               8               9               10               11               12               13                 14               15               16               17               18               19               20                 21               22               23               24               25      5 mg   See details      26      5 mg         27      5 mg           28      5 mg         29      7.5 mg         30      5 mg         31      5 mg             Date Details   10/25 This INR check               How to take your warfarin dose     To take:  5 mg Take 1 of the 5 mg tablets.    To take:  7.5 mg Take 1.5 of the 5 mg tablets.           November 2018 Details    Sun Mon Tue Wed Thu Fri Sat         1      5 mg         2      5 mg         3      5 mg           4      5 mg         5      7.5 mg         6      5 mg         7      5 mg         8      5 mg         9      5 mg         10      5 mg           11      5 mg         12      7.5 mg         13      5 mg          14      5 mg         15      5 mg         16      5 mg         17      5 mg           18      5 mg         19      7.5 mg         20      5 mg         21      5 mg         22      5 mg         23      5 mg         24      5 mg           25      5 mg         26      7.5 mg         27      5 mg         28      5 mg         29      5 mg         30      5 mg           Date Details   No additional details            How to take your warfarin dose     To take:  5 mg Take 1 of the 5 mg tablets.    To take:  7.5 mg Take 1.5 of the 5 mg tablets.           December 2018 Details    Sun Mon Tue Wed Thu Fri Sat           1      5 mg           2      5 mg         3      7.5 mg         4      5 mg         5      5 mg         6            7               8                 9               10               11               12               13               14               15                 16               17               18               19               20               21               22                 23               24               25               26               27               28               29                 30               31                     Date Details   No additional details    Date of next INR:  12/6/2018         How to take your warfarin dose     To take:  5 mg Take 1 of the 5 mg tablets.    To take:  7.5 mg Take 1.5 of the 5 mg tablets.

## 2018-10-29 ENCOUNTER — ALLIED HEALTH/NURSE VISIT (OUTPATIENT)
Dept: CARDIOLOGY | Facility: CLINIC | Age: 73
End: 2018-10-29
Attending: INTERNAL MEDICINE
Payer: MEDICARE

## 2018-10-29 DIAGNOSIS — I49.5 SINOATRIAL NODE DYSFUNCTION (H): Primary | ICD-10-CM

## 2018-10-29 PROCEDURE — 93296 REM INTERROG EVL PM/IDS: CPT | Mod: ZF

## 2018-10-29 PROCEDURE — 93295 DEV INTERROG REMOTE 1/2/MLT: CPT | Performed by: INTERNAL MEDICINE

## 2018-10-29 NOTE — MR AVS SNAPSHOT
After Visit Summary   10/29/2018    Kate Chacon    MRN: [id number]           Patient Information     Date Of Birth          1945        Visit Information        Provider Department      10/29/2018 6:00 AM  ICD REMOTE [facility]        Today's Diagnoses     Sinoatrial node dysfunction (H)    -  1       Follow-ups after your visit        Your next 10 appointments already scheduled     Nov 19, 2018  1:45 PM CST   Return Visit with JANAE Ferrell CNP   [facility] ([facility])    [address]  [place] [address]   [phone]            Dec 06, 2018 10:30 AM CST   Anticoagulation Visit with  ANTI COAG 2   [facility] ([facility])    [address]  [place] [address]   [phone]            Feb 05, 2019 10:00 AM CST   Pacemaker Check with  PACEMAKER   [facility] ([facility])    [address]  [place] [address]   [phone]              Who to contact     If you have questions or need follow up information about today's clinic visit or your schedule please contact [facility] directly at [phone].  Normal or non-critical lab and imaging results will be communicated to you by AdNectarhart, letter or phone within 4 business days after the clinic has received the results. If you do not hear from us within 7 days, please contact the clinic through AdNectarhart or phone. If you have a critical or abnormal lab result, we will notify you by phone as soon as possible.  Submit refill requests through Post-i or call your pharmacy and they will forward the refill request to us. Please allow 3 business days for your refill to be completed.          Additional Information About Your Visit        Post-i Information     Post-i lets you send messages to your doctor, view your test results, renew your  "prescriptions, schedule appointments and more. To sign up, go to [email or web address] . Click on \"Log in\" on the left side of the screen, which will take you to the Welcome page. Then click on \"Sign up Now\" on the right side of the page.     You will be asked to enter the access code listed below, as well as some personal information. Please follow the directions to create your username and password.     Your access code is: [email or web address]  Expires: 2019  2:04 PM     Your access code will  in 90 days. If you need help or a new code, please call your [facility] clinic or [phone].        Care EveryWhere ID     This is your Care EveryWhere ID. This could be used by other organizations to access your [facility] medical records  [id number]         Blood Pressure from Last 3 Encounters:   10/22/18 126/80   10/11/18 128/78   18 130/70    Weight from Last 3 Encounters:   10/22/18 88.9 kg (195 lb 14.4 oz)   10/11/18 88 kg (194 lb)   18 87.7 kg (193 lb 6.4 oz)              We Performed the Following     INTERROGATION DEVICE EVAL REMOTE, PACER/ICD        Primary Care Provider Office Phone # Fax #    Asher WILSON [phone] [address] [facility] [address]        Equal Access to Services     [facility]: Hadii aad ku hadasho Soomaali, waaxda luqadaha, qaybta kaalmada adeegyada, hari saldaña . So [facility] [phone].    ATENCIÓN: Si habla español, tiene a fuentes disposición servicios gratuitos de asistencia lingüística. Llame al [phone].    We comply with applicable federal civil rights laws and [place] laws. We do not discriminate on the basis of race, color, national origin, age, disability, sex, sexual orientation, or gender identity.            Thank you!     Thank you for choosing [facility]  for your care. Our goal is always to provide you with excellent care. Hearing back from our patients is one way we can continue " to improve our services. Please take a few minutes to complete the written survey that you may receive in the mail after your visit with us. Thank you!             Your Updated Medication List - Protect others around you: Learn how to safely use, store and throw away your medicines at www.disposemymeds.org.          This list is accurate as of 10/29/18 11:59 PM.  Always use your most recent med list.                   Brand Name Dispense Instructions for use Diagnosis    atorvastatin 40 MG tablet    LIPITOR    90 tablet    Take 1 tablet (40 mg) by mouth At Bedtime    Hyperlipidemia, unspecified hyperlipidemia type       calcium carbonate 750 MG Chew      Take 1 tablet by mouth every 8 hours as needed for heartburn        furosemide 20 MG tablet    LASIX    60 tablet    Take 1 tablet (20 mg) by mouth daily    Lower extremity edema       ibuprofen 200 MG tablet    ADVIL/MOTRIN     Take 200 mg by mouth 4 times daily as needed for pain or headaches        lisinopril 20 MG tablet    PRINIVIL/ZESTRIL     Take 20 mg by mouth daily        metoprolol succinate 25 MG 24 hr tablet    TOPROL-XL    90 tablet    Take 1 tablet (25 mg) by mouth daily    Paroxysmal atrial fibrillation (H)       sertraline 50 MG tablet    ZOLOFT     Take 50 mg by mouth At Bedtime        warfarin 5 MG tablet    COUMADIN    100 tablet    Take 7.5 mg x 1 day and 5 mg x 6 days/week or as directed by [facility] clinic    Paroxysmal atrial fibrillation (H)

## 2018-10-31 ENCOUNTER — HOSPITAL ENCOUNTER (OUTPATIENT)
Dept: CARDIOLOGY | Facility: OTHER | Age: 73
Discharge: HOME | End: 2018-10-31
Attending: NURSE PRACTITIONER | Admitting: NURSE PRACTITIONER
Payer: MEDICARE

## 2018-10-31 DIAGNOSIS — I48.0 PAROXYSMAL ATRIAL FIBRILLATION (H): ICD-10-CM

## 2018-10-31 DIAGNOSIS — I10 ESSENTIAL HYPERTENSION: ICD-10-CM

## 2018-10-31 DIAGNOSIS — R53.83 FATIGUE, UNSPECIFIED TYPE: ICD-10-CM

## 2018-10-31 PROCEDURE — 93306 TTE W/DOPPLER COMPLETE: CPT | Mod: 26 | Performed by: INTERNAL MEDICINE

## 2018-10-31 PROCEDURE — 93306 TTE W/DOPPLER COMPLETE: CPT

## 2018-11-06 NOTE — PROGRESS NOTES
Preliminary Device Interrogation Results.  Final physician signed paceart report to be scanned and attached.    Scheduled [employer], dual chamber pacemaker, remote transmission received and reviewed. Device transmission sent per MD orders. Her presenting rhythm is AP/VS @ 60 bpm. AF burden= 29%. She is taking coumadin. Normal device function. AP= 30% and = 12%. Lead trends appear stable. Battery estimates 8 years to ÁLVARO. Pt notified of transmission results. Plan for pt to RTC in 3 months as scheduled.  Remote pacemaker transmission

## 2018-11-19 ENCOUNTER — OFFICE VISIT (OUTPATIENT)
Dept: CARDIOLOGY | Facility: OTHER | Age: 73
End: 2018-11-19
Attending: NURSE PRACTITIONER
Payer: COMMERCIAL

## 2018-11-19 VITALS
HEART RATE: 64 BPM | DIASTOLIC BLOOD PRESSURE: 70 MMHG | SYSTOLIC BLOOD PRESSURE: 112 MMHG | HEIGHT: 67 IN | BODY MASS INDEX: 30.36 KG/M2 | WEIGHT: 193.44 LBS

## 2018-11-19 DIAGNOSIS — I49.5 SINOATRIAL NODE DYSFUNCTION (H): ICD-10-CM

## 2018-11-19 DIAGNOSIS — I48.0 PAROXYSMAL ATRIAL FIBRILLATION (H): Primary | ICD-10-CM

## 2018-11-19 DIAGNOSIS — I63.9 ISCHEMIC STROKE (H): ICD-10-CM

## 2018-11-19 DIAGNOSIS — Z95.0 CARDIAC PACEMAKER: ICD-10-CM

## 2018-11-19 DIAGNOSIS — I10 ESSENTIAL HYPERTENSION: ICD-10-CM

## 2018-11-19 PROCEDURE — 99213 OFFICE O/P EST LOW 20 MIN: CPT | Performed by: NURSE PRACTITIONER

## 2018-11-19 PROCEDURE — G0463 HOSPITAL OUTPT CLINIC VISIT: HCPCS

## 2018-11-19 ASSESSMENT — ANXIETY QUESTIONNAIRES
5. BEING SO RESTLESS THAT IT IS HARD TO SIT STILL: NOT AT ALL
7. FEELING AFRAID AS IF SOMETHING AWFUL MIGHT HAPPEN: NOT AT ALL
3. WORRYING TOO MUCH ABOUT DIFFERENT THINGS: NOT AT ALL
6. BECOMING EASILY ANNOYED OR IRRITABLE: NOT AT ALL
2. NOT BEING ABLE TO STOP OR CONTROL WORRYING: NOT AT ALL
GAD7 TOTAL SCORE: 0
1. FEELING NERVOUS, ANXIOUS, OR ON EDGE: NOT AT ALL

## 2018-11-19 ASSESSMENT — PATIENT HEALTH QUESTIONNAIRE - PHQ9: 5. POOR APPETITE OR OVEREATING: NOT AT ALL

## 2018-11-19 ASSESSMENT — PAIN SCALES - GENERAL: PAINLEVEL: NO PAIN (0)

## 2018-11-19 NOTE — PROGRESS NOTES
[facility] HEART [facility]ARE   CARDIOLOGY PROGRESS NOTE    Kate Chacon   [address] [address]      Asher Campos     Chief Complaint   Patient presents with     Follow Up     4 week f/u         HPI:   Kate Chacon  is a 73 y.o. female who comes in today for follow-up paroxysmal atrial fibrillation and reviewed results of TTE. She has a history of HTN, hyperlipidemia and recent dual-chamber pacer as a result of bradycardia secondary to SA node dysfunction. She has a history ischemic stroke concerning for cryptogenic stroke on 10/15/16.          She was seen in clinic on 11/1/17 for hospital follow-up with AFib and bradycardia. She was found to be in AF at the time with RVR, rates in 90's to 170 with beat to beat variability. She was largely asymptomatic with this and considered likely rebound tachycardia since d/c from beta blockade with severe bradycardia. She was hospitalized for rate control with RVR and during hospitalization she developed significant bradycardia with pauses of greater then 2 seconds. Patient was transferred to the care of Dr. Amanda at [facility] for pacer placement on 11/2/17.        At her last visit on 10/22/2018.  She reported increased lower extremity edema and fatigue.  Therefore, she was started on Lasix 20 mg every morning.  She also had an updated echocardiogram with her history of paroxysmal atrial fibrillation and increased lower extremity edema.    This echocardiogram was completed on 10/31/2018 with normal LV size and function, LVEF 60-65%.  Diastolic dysfunction was indeterminate on this study.  RV size and function was normal.  Both atria appeared normal.  Mild mitral valve annular calcification with trace mitral insufficiency.  Mild aortic valve sclerosis without stenosis.  Trace tricuspid insufficiency.  Normal pulmonary artery systolic pressure.  Trace pulmonic insufficiency.  Mildly elevated right atrial pressure at 8 mmHg.    Patient  reports she has been feeling good since her last visit with cardiology.  She remains largely asymptomatic with her atrial fibrillation.  She is currently on metoprolol 25 mg once daily with good rate control and Coumadin oral anticoagulation without complication. She reported chest pain or pressure. No shortness of breath or MARSHALL. No palpitations, lightheadedness or syncope. Positive for increased fatigue and increased lower extremity edema.      IMAGING RESULTS:   [facility]  [address] [place] [address]     Name: YOUSIF COLLINS  MRN: [id number]  : 1945  Study Date: 10/31/2018 10:31 AM  Age: 73 yrs  Gender: Female  Patient Location: [facility]  Reason For Study: , Fatigue, unspecified type, Paroxysmal atrial fibrillation  (H),  Ordering Physician: CHAVA WALKER  Referring Physician: CHAVA WALKER  Performed By: Jeniffer Durham RDCS, CHANDRA     BSA: 2.0 m2  Height: 67 in  Weight: 195 lb  HR: 60  BP: 126/80 mmHg  _____________________________________________________________________________  __        Procedure  Echocardiogram with two-dimensional, color and spectral Doppler performed.  _____________________________________________________________________________  __        Interpretation Summary  Left ventricular function, chamber size, wall motion, and wall thickness are  normal.The EF is 60-65%.  Right ventricular function, chamber size, wall motion, and thickness are  normal.  No significant valvular abnormalities noted.  Previous study not available for comparison.  _____________________________________________________________________________  __        Left Ventricle  Left ventricular function, chamber size, wall motion, and wall thickness are  normal.The EF is 60-65%. Left ventricular diastolic function is indeterminate.     Right Ventricle  Right ventricular function, chamber size, wall motion, and thickness are  normal. A pacemaker lead is noted in the right  ventricle.     Atria  Both atria appear normal. The atrial septum is intact as assessed by color  Doppler .     Mitral Valve  Mild mitral annular calcification is present. Trace mitral insufficiency is  present.     Aortic Valve  Mild aortic valve sclerosis is present.        Tricuspid Valve  The tricuspid valve is normal. Trace tricuspid insufficiency is present.  Pulmonary artery systolic pressure is normal. The right ventricular systolic  pressure is approximated at 24.4 mmHg plus the right atrial pressure.     Pulmonic Valve  The pulmonic valve is normal. Trace pulmonic insufficiency is present.     Vessels  The aorta root is normal. The thoracic aorta is normal. The pulmonary artery  cannot be assessed. Dilation of the inferior vena cava is present with normal  respiratory variation in diameter. Estimated mean right atrial pressure is 8  mmHg (mildly elevated).     Pericardium  No pericardial effusion is present.     Compared to Previous Study  Previous study not available for comparison.     _____________________________________________________________________________  __  MMode/2D Measurements & Calculations  IVSd: 1.1 cm  LVIDd: 5.2 cm  LVIDs: 3.6 cm  LVPWd: 1.0 cm  FS: 30.0 %  LV mass(C)d: 212.0 grams  LV mass(C)dI: 106.0 grams/m2     Ao root diam: 3.4 cm  LA dimension: 4.4 cm  asc Aorta Diam: 3.2 cm  LA/Ao: 1.3  LVOT diam: 2.2 cm  LVOT area: 3.8 cm2  LA Volume (BP): 64.8 ml  LA Volume Index (BP): 32.4 ml/m2  RWT: 0.39        Doppler Measurements & Calculations  MV E max mikey: 114.0 cm/sec  MV A max mikey: 66.5 cm/sec  MV E/A: 1.7  MV dec time: 0.19 sec     Ao V2 max: 151.0 cm/sec  Ao max P.0 mmHg  SHANNON(V,D): 2.9 cm2  LV V1 max P.2 mmHg  LV V1 max: 114.0 cm/sec  TR max mikey: 247.0 cm/sec  TR max P.4 mmHg  AV Mikey Ratio (DI): 0.75  E/E' av.0  Lateral E/e': 13.8  Medial E/e': 20.1           _____________________________________________________________________________  __           Report approved  by: Nilson Jimenez 10/31/2018 03:27 PM      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          FAMILY HISTORY:   Family History   Problem Relation Age of Onset     Diabetes Father      Diabetes     Hypertension Father      Hypertension     Other - See Comments Mother      h/o coronary artery disease/dementia     Asthma Mother      Asthma     Diabetes Maternal Grandmother      Diabetes     Cancer Maternal Aunt      Cancer,Uterine     Breast Cancer No family hx of      Cancer-breast          PAST SURGICAL HISTORY:   Past Surgical History:   Procedure Laterality Date     APPENDECTOMY OPEN      No Comments Provided     COLONOSCOPY      8/19/05,Cecal biopsy with tubular adenoma low grade dysplasia.     COLONOSCOPY      4/4/11     COLONOSCOPY      5/7/12     ESOPHAGOSCOPY, GASTROSCOPY, DUODENOSCOPY (EGD), COMBINED      8/19/05     OTHER SURGICAL HISTORY      8/3/05,[id number],OTHER,helices on the right ear     TONSILLECTOMY      No Comments Provided          SOCIAL HISTORY:   Social History     Social History     Marital status:      Spouse name: N/A     Number of children: N/A     Years of education: N/A     Social History Main Topics     Smoking status: Former Smoker     Packs/day: 0.50     Years: 49.00     Types: Cigarettes     Quit date: 10/14/2016     Smokeless tobacco: Never Used     Alcohol use No     Drug use: No      Comment: Drug use: No     Sexual activity: Not Currently     Other Topics Concern     None     Social History Narrative    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  She enjoys gardening, bowling and going to stock car races that her son participates in.   to her  [place].  They run [employer].  Live in town independently.          CURRENT MEDICATIONS:   Prior to  Admission medications    Medication Sig Start Date End Date Taking? Authorizing Provider   atorvastatin (LIPITOR) 40 MG tablet Take 1 tablet (40 mg) by mouth At Bedtime 4/23/18  Yes Dinora Younger APRN CNP   calcium carbonate 750 MG CHEW Take 1 tablet by mouth every 8 hours as needed for heartburn   Yes Reported, Patient   furosemide (LASIX) 20 MG tablet Take 1 tablet (20 mg) by mouth daily 10/22/18  Yes Dinora Younger APRN CNP   ibuprofen (ADVIL/MOTRIN) 200 MG tablet Take 200 mg by mouth 4 times daily as needed for pain or headaches   Yes Reported, Patient   lisinopril (PRINIVIL/ZESTRIL) 20 MG tablet Take 20 mg by mouth daily 11/16/17  Yes Reported, Patient   metoprolol succinate (TOPROL-XL) 25 MG 24 hr tablet Take 1 tablet (25 mg) by mouth daily 4/23/18  Yes Dinora Younger APRN CNP   sertraline (ZOLOFT) 50 MG tablet Take 50 mg by mouth At Bedtime 11/16/17  Yes Reported, Patient   warfarin (COUMADIN) 5 MG tablet Take 7.5 mg x 1 day and 5 mg x 6 days/week or [facility] 10/23/18  Yes Asher Campos MD          ALLERGIES:   Allergies   Allergen Reactions     Methylpar-Na Bisulfite-Pentazocine Unknown     jittery          ROS:   CONSTITUTIONAL: No reported fever or chills. No changes in weight.  ENT: No visual disturbance, ear ache, epistaxis or sore throat.   CARDIOVASCULAR: No chest pain, chest pressure or chest discomfort. No palpitations or increased lower extremity edema.   RESPIRATORY: No reported shortness of breath, dyspnea upon exertion, cough, wheezing or hemoptysis.   GI: Nor reported abdominal pain.   : No reported hematuria or dysuria.   NEUROLOGICAL: No lightheadedness, dizziness, syncope, ataxia, paresthesias or weakness.   HEMATOLOGIC: No history of anemia. No bleeding or excessive bruising. No history of blood clots.   MUSCULOSKELETAL: No joint pain or swelling, no muscle pain.  ENDOCRINOLOGIC: No temperature intolerance. No hair or skin changes.  SKIN: No  "abnormal rashes or sores, no unusual itching.  PSYCHIATRIC: No history of depression or anxiety. No changes in mood, feeling down or anxious. No changes in sleep.      PHYSICAL EXAM:   /70 (BP Location: Right arm, Patient Position: Sitting, Cuff Size: Adult Regular)  Pulse 64  Ht 1.702 m (5' 7\")  Wt 87.7 kg (193 lb 7 oz)  BMI 30.3 kg/m2  GENERAL: The patient is a well-developed, well-nourished, in no apparent distress.  HEENT: Head is normocephalic and atraumatic. Eyes are symmetrical with normal visual tracking. No icterus, no xanthelasmas. Nares appeared normal without nasal drainage. Mucous membranes are moist, no cyanosis.  NECK: Supple.   CHEST/ LUNGS: Lungs clear to auscultation, no rales, rhonchi or wheezes, no use of accessory muscles, no retractions, respirations unlabored and normal respiratory rate.   CARDIO: Regular rate and rhythm normal with S1 and S2, no S3 or S4 and no murmur, click or rub.    ABD: Abdomen is nondistended.   EXTREMITIES: Trace edema present.   MUSCULOSKELETAL: No joint swelling.   NEUROLOGIC: Alert and oriented X3. Normal speech, gait and affect. No focal neurologic deficits.   SKIN: No jaundice. No rashes or visible skin lesions present. No ecchymosis.     LAB RESULTS:   Anticoagulation Therapy Visit on 10/25/2018   Component Date Value Ref Range Status     INR Protime 10/25/2018 2.7* 0.86 - 1.14 Final   Office Visit on 10/11/2018   Component Date Value Ref Range Status     WBC 10/11/2018 8.6  4.0 - 11.0 10e9/L Final     RBC Count 10/11/2018 4.66  3.8 - 5.2 10e12/L Final     Hemoglobin 10/11/2018 14.4  11.7 - 15.7 g/dL Final     Hematocrit 10/11/2018 44.1  35.0 - 47.0 % Final     MCV 10/11/2018 95  78 - 100 fl Final     MCH 10/11/2018 30.9  26.5 - 33.0 pg Final     MCHC 10/11/2018 32.7  31.5 - 36.5 g/dL Final     RDW 10/11/2018 13.0  10.0 - 15.0 % Final     Platelet Count 10/11/2018 207  150 - 450 10e9/L Final     Diff Method 10/11/2018 Automated Method   Final     % " Neutrophils 10/11/2018 69.2  % Final     % Lymphocytes 10/11/2018 18.2  % Final     % Monocytes 10/11/2018 10.2  % Final     % Eosinophils 10/11/2018 1.9  % Final     % Basophils 10/11/2018 0.3  % Final     % Immature Granulocytes 10/11/2018 0.2  % Final     Absolute Neutrophil 10/11/2018 6.0  1.6 - 8.3 10e9/L Final     Absolute Lymphocytes 10/11/2018 1.6  0.8 - 5.3 10e9/L Final     Absolute Monocytes 10/11/2018 0.9  0.0 - 1.3 10e9/L Final     Absolute Eosinophils 10/11/2018 0.2  0.0 - 0.7 10e9/L Final     Absolute Basophils 10/11/2018 0.0  0.0 - 0.2 10e9/L Final     Abs Immature Granulocytes 10/11/2018 0.0  0 - 0.4 10e9/L Final     Sodium 10/11/2018 141  134 - 144 mmol/L Final     Potassium 10/11/2018 4.7  3.5 - 5.1 mmol/L Final     Chloride 10/11/2018 106  98 - 107 mmol/L Final     Carbon Dioxide 10/11/2018 30  21 - 31 mmol/L Final     Anion Gap 10/11/2018 5  3 - 14 mmol/L Final     Glucose 10/11/2018 97  70 - 105 mg/dL Final     Urea Nitrogen 10/11/2018 14  7 - 25 mg/dL Final     Creatinine 10/11/2018 0.93  0.60 - 1.20 mg/dL Final     GFR Estimate 10/11/2018 59* >60 mL/min/1.7m2 Final     GFR Estimate If Black 10/11/2018 71  >60 mL/min/1.7m2 Final     Calcium 10/11/2018 10.2  8.6 - 10.3 mg/dL Final     Bilirubin Total 10/11/2018 0.6  0.3 - 1.0 mg/dL Final     Albumin 10/11/2018 3.8  3.5 - 5.7 g/dL Final     Protein Total 10/11/2018 6.8  6.4 - 8.9 g/dL Final     Alkaline Phosphatase 10/11/2018 82  34 - 104 U/L Final     ALT 10/11/2018 23  7 - 52 U/L Final     AST 10/11/2018 22  13 - 39 U/L Final   Anticoagulation Therapy Visit on 09/13/2018   Component Date Value Ref Range Status     INR Protime 09/13/2018 2.6* 0.86 - 1.14 Final          ASSESSMENT:   Kate Marvinon presents for cardiology follow-up paroxysmal atrial fibrillation and reviewed results of TTE. She has a history of HTN, hyperlipidemia and recent dual-chamber pacer as a result of bradycardia secondary to SA node dysfunction. She has a history  ischemic stroke concerning for cryptogenic stroke on 10/15/16.    1. Paroxysmal atrial fibrillation (H)  2. Essential hypertension  3. Sinoatrial node dysfunction (H)  4. Ischemic stroke (H)  5. Cardiac pacemaker    PLAN:   1. Patient with PAF, rate well controlled on Metoprolol Succinate 25 mg daily. Overall, asymptomatic. Continue with Coumadin oral anticoagulation.   2. BP remains well controlled at 112/70 today.   3. History of ischemic CVA felt to be likely cryptogenic, now appropriately on oral anticoagulation.   4. Recent Device check note reviewed with normal functioning device. Remote transmission again in approx 3 months.   5. Reports improved edema with Lasix. She will continue this as 20 mg PRN.   6. Echocardiogram reviewed with no concerning findings.    Follow-up with cardiology in 6 months, certainly sooner if needed.        Thank you for allowing me to participate in the care of your patient. Please do not hesitate to contact me if you have any questions.     Dinora Younger

## 2018-11-19 NOTE — MR AVS SNAPSHOT
After Visit Summary   11/19/2018    Kate Chacon    MRN: [id number]           Patient Information     Date Of Birth          1945        Visit Information        Provider Department      11/19/2018 1:45 PM Dinora Younger APRN CNP [facility]        Care Instructions    You were seen by  JANAE Collado CNP      1.  No changes today     You will follow up with  JANAE Collado CNP  in 6 months.       Please call the cardiology office with problems, questions, or concerns at [phone].    If you experience chest pain, chest pressure, chest tightness, shortness of breath, fainting, lightheadedness, nausea, vomiting, or other concerning symptoms, please report to the Emergency Department or call 911. These symptoms may be emergent, and best treated in the Emergency Department.     HEMANT Kang, RN  [facility] Cardiology  [phone]             Follow-ups after your visit        Your next 10 appointments already scheduled     Dec 06, 2018 10:30 AM CST   Anticoagulation Visit with  ANTI COAG 2   [facility] ([facility])    [address]  [place] [address] [phone]            Feb 05, 2019 10:00 AM CST   Pacemaker Check with  PACEMAKER   [facility] ([facility])    [address]  [place] [address] [phone]              Who to contact     If you have questions or need follow up information about today's clinic visit or your schedule please contact [facility] directly at [phone].  Normal or non-critical lab and imaging results will be communicated to you by MyChart, letter or phone within 4 business days after the clinic has received the results. If you do not hear from us within 7 days, please contact the clinic through MyChart or phone. If you have a critical or abnormal lab result, we will notify you  "by phone as soon as possible.  Submit refill requests through [employer] or call your pharmacy and they will forward the refill request to us. Please allow 3 business days for your refill to be completed.          Additional Information About Your Visit        Care EveryWhere ID     This is your Care EveryWhere ID. This could be used by other organizations to access your [facility] medical records  [id number]        Your Vitals Were     Pulse Height BMI (Body Mass Index)             64 1.702 m (5' 7\") 30.3 kg/m2          Blood Pressure from Last 3 Encounters:   11/19/18 112/70   10/22/18 126/80   10/11/18 128/78    Weight from Last 3 Encounters:   11/19/18 87.7 kg (193 lb 7 oz)   10/22/18 88.9 kg (195 lb 14.4 oz)   10/11/18 88 kg (194 lb)              Today, you had the following     No orders found for display       Primary Care Provider Office Phone # Fax #    Asher WILSON [phone]       [address] [facility] [address]        Equal Access to Services     [facility]: Hadii aad ku hadasho Sohattie, waaxda luqadaha, qaybta kaalmada heath, hari saldaña . So [facility] [phone].    ATENCIÓN: Si habla español, tiene a fuentes disposición servicios gratuitos de asistencia lingüística. [facility] [phone].    We comply with applicable federal civil rights laws and [place] laws. We do not discriminate on the basis of race, color, national origin, age, disability, sex, sexual orientation, or gender identity.            Thank you!     Thank you for choosing [facility] AND [facility]  for your care. Our goal is always to provide you with excellent care. Hearing back from our patients is one way we can continue to improve our services. Please take a few minutes to complete the written survey that you may receive in the mail after your visit with us. Thank you!             Your Updated Medication List - Protect others around you: Learn how to safely use, " store and throw away your medicines at www.disposemymeds.org.          This list is accurate as of 11/19/18  2:32 PM.  Always use your most recent med list.                   Brand Name Dispense Instructions for use Diagnosis    atorvastatin 40 MG tablet    LIPITOR    90 tablet    Take 1 tablet (40 mg) by mouth At Bedtime    Hyperlipidemia, unspecified hyperlipidemia type       calcium carbonate 750 MG Chew      Take 1 tablet by mouth every 8 hours as needed for heartburn        furosemide 20 MG tablet    LASIX    60 tablet    Take 1 tablet (20 mg) by mouth daily    Lower extremity edema       ibuprofen 200 MG tablet    ADVIL/MOTRIN     Take 200 mg by mouth 4 times daily as needed for pain or headaches        lisinopril 20 MG tablet    PRINIVIL/ZESTRIL     Take 20 mg by mouth daily        metoprolol succinate 25 MG 24 hr tablet    TOPROL-XL    90 tablet    Take 1 tablet (25 mg) by mouth daily    Paroxysmal atrial fibrillation (H)       sertraline 50 MG tablet    ZOLOFT     Take 50 mg by mouth At Bedtime        warfarin 5 MG tablet    COUMADIN    100 tablet    Take 7.5 mg x 1 day and 5 mg x 6 days/week or as directed by [facility] clinic    Paroxysmal atrial fibrillation (H)

## 2018-11-19 NOTE — PATIENT INSTRUCTIONS
You were seen by  JANAE Collado CNP      1.  No changes today     You will follow up with  JNAAE Collado CNP  in 6 months.       Please call the cardiology office with problems, questions, or concerns at [phone].    If you experience chest pain, chest pressure, chest tightness, shortness of breath, fainting, lightheadedness, nausea, vomiting, or other concerning symptoms, please report to the Emergency Department or call 911. These symptoms may be emergent, and best treated in the Emergency Department.     PERLITA KangN, RN  [facility] Cardiology  [phone]

## 2018-11-19 NOTE — NURSING NOTE
Patient presents to clinic today for a follow up.     Patient declines PHQ and ANETTE    No LMP recorded. Patient is postmenopausal.  Medication Reconciliation: complete    Rose Hodge LPN  11/19/2018 2:00 PM

## 2018-11-20 ASSESSMENT — ANXIETY QUESTIONNAIRES: GAD7 TOTAL SCORE: 0

## 2018-12-02 DIAGNOSIS — F33.40 MDD (RECURRENT MAJOR DEPRESSIVE DISORDER) IN REMISSION (H): Primary | ICD-10-CM

## 2018-12-04 NOTE — TELEPHONE ENCOUNTER
LOV with PCP was for a preop on 10/11/18. Rx as requested is noted in office visit notes on that date with no changes and no specific follow up timeline. Writer will refill Rx as requested at this time.    Prescription refilled per RN Medication Refill Policy..................Paul Torres 12/4/2018 1:03 PM

## 2018-12-06 ENCOUNTER — ANTICOAGULATION THERAPY VISIT (OUTPATIENT)
Dept: ANTICOAGULATION | Facility: OTHER | Age: 73
End: 2018-12-06
Attending: FAMILY MEDICINE
Payer: MEDICARE

## 2018-12-06 DIAGNOSIS — Z79.01 ANTICOAGULATION GOAL OF INR 2 TO 3: ICD-10-CM

## 2018-12-06 DIAGNOSIS — Z51.81 ANTICOAGULATION GOAL OF INR 2 TO 3: ICD-10-CM

## 2018-12-06 DIAGNOSIS — I48.0 PAROXYSMAL ATRIAL FIBRILLATION (H): ICD-10-CM

## 2018-12-06 LAB — INR POINT OF CARE: 2.4 (ref 0.86–1.14)

## 2018-12-06 PROCEDURE — 85610 PROTHROMBIN TIME: CPT | Mod: QW,ZL

## 2018-12-06 NOTE — PROGRESS NOTES
ANTICOAGULATION FOLLOW-UP CLINIC VISIT    Patient Name:  Kate Chacon  Date:  12/6/2018  Contact Type:  Face to Face    SUBJECTIVE:     Patient Findings     Positives No Problem Findings           OBJECTIVE    INR Protime   Date Value Ref Range Status   12/06/2018 2.4 (A) 0.86 - 1.14 Final       ASSESSMENT / PLAN  INR assessment THER    Recheck INR In: 6 WEEKS    INR Location Clinic      Anticoagulation Summary as of 12/6/2018     INR goal 2.0-3.0   Today's INR 2.4   Warfarin maintenance plan 7.5 mg (5 mg x 1.5) on Mon; 5 mg (5 mg x 1) all other days   Full warfarin instructions 7.5 mg on Mon; 5 mg all other days   Weekly warfarin total 37.5 mg   No change documented Radha Burns, GLENN   Next INR check 1/17/2019   Priority INR   Target end date Indefinite    Indications   Paroxysmal atrial fibrillation (H) [I48.0]  Anticoagulation goal of INR 2 to 3 [Z51.81  Z79.01]         Anticoagulation Episode Summary     INR check location     Preferred lab     Send INR reminders to  INR    Comments       Anticoagulation Care Providers     Provider Role Specialty Phone number    Asher Campos MD [facility] [phone]            See the Encounter Report to view Anticoagulation Flowsheet and Dosing Calendar (Go to Encounters tab in chart review, and find the Anticoagulation Therapy Visit)        Radha Burns, RN

## 2018-12-06 NOTE — MR AVS SNAPSHOT
Kate COPELAND Ines   12/6/2018 10:30 AM   Anticoagulation Therapy Visit    Description:  73 year old female   Provider:  FRANCISCO ANTI COAG 2   Department:  [facility]           INR as of 12/6/2018     Today's INR 2.4      Anticoagulation Summary as of 12/6/2018     INR goal 2.0-3.0   Today's INR 2.4   Full warfarin instructions 7.5 mg on Mon; 5 mg all other days   Next INR check 1/17/2019    Indications   Paroxysmal atrial fibrillation (H) [I48.0]  Anticoagulation goal of INR 2 to 3 [Z51.81  Z79.01]         Description     Continue same dose of Warfarin/Coumadin and recheck INR in 6 weeks.   Radha Burns RN    12/6/2018  10:18 AM        Your next Anticoagulation Clinic appointment(s)     Dec 06, 2018 10:30 AM CST   Anticoagulation Visit with FRANCISCO ANTI COAG 2   [facility] ([facility])    [address]  [place] [facility] [address]   [phone]              December 2018 Details    Sun Mon Tue Wed Thu Fri Sat           1                 2               3               4               5               6      5 mg   See details      7      5 mg         8      5 mg           9      5 mg         10      7.5 mg         11      5 mg         12      5 mg         13      5 mg         14      5 mg         15      5 mg           16      5 mg         17      7.5 mg         18      5 mg         19      5 mg         20      5 mg         21      5 mg         22      5 mg           23      5 mg         24      7.5 mg         25      5 mg         26      5 mg         27      5 mg         28      5 mg         29      5 mg           30      5 mg         31      7.5 mg               Date Details   12/06 This INR check               How to take your warfarin dose     To take:  5 mg Take 1 of the 5 mg tablets.    To take:  7.5 mg Take 1.5 of the 5 mg tablets.           January 2019 Details    Sun Mon Tue Wed Thu Fri Sat       1      5 mg         2      5 mg         3      5 mg         4       5 mg         5      5 mg           6      5 mg         7      7.5 mg         8      5 mg         9      5 mg         10      5 mg         11      5 mg         12      5 mg           13      5 mg         14      7.5 mg         15      5 mg         16      5 mg         17            18               19                 20               21               22               23               24               25               26                 27               28               29               30               31                  Date Details   No additional details    Date of next INR:  1/17/2019         How to take your warfarin dose     To take:  5 mg Take 1 of the 5 mg tablets.    To take:  7.5 mg Take 1.5 of the 5 mg tablets.

## 2018-12-30 DIAGNOSIS — I10 ESSENTIAL HYPERTENSION: Primary | ICD-10-CM

## 2019-01-02 RX ORDER — LISINOPRIL 20 MG/1
TABLET ORAL
Qty: 90 TABLET | Refills: 1 | Status: SHIPPED | OUTPATIENT
Start: 2019-01-02 | End: 2019-07-01

## 2019-01-02 NOTE — TELEPHONE ENCOUNTER
Refill request for: LISINOPRIL 20MG TAB   From: [id number]   Rx written date: 11/16/2017   LOV: 11/19/18 with Cardiology, 10/11/18 with PCP  Next appt: 05/20/2019 in Cardiology   Protocol: ACE Inhibitors (Including Combos) Protocol Passed     Prescription approved per [facility] Refill Protocol. Sbarina Washington RN on 1/2/2019 at 4:25 PM

## 2019-01-10 ENCOUNTER — HOSPITAL ENCOUNTER (EMERGENCY)
Facility: OTHER | Age: 74
Discharge: HOME | End: 2019-01-10
Attending: PHYSICIAN ASSISTANT | Admitting: PHYSICIAN ASSISTANT
Payer: MEDICARE

## 2019-01-10 ENCOUNTER — APPOINTMENT (OUTPATIENT)
Dept: GENERAL RADIOLOGY | Facility: OTHER | Age: 74
End: 2019-01-10
Attending: SPECIALIST
Payer: MEDICARE

## 2019-01-10 VITALS
RESPIRATION RATE: 16 BRPM | HEART RATE: 80 BPM | OXYGEN SATURATION: 97 % | TEMPERATURE: 98.4 F | SYSTOLIC BLOOD PRESSURE: 150 MMHG | WEIGHT: 193 LBS | HEIGHT: 67 IN | BODY MASS INDEX: 30.29 KG/M2 | DIASTOLIC BLOOD PRESSURE: 115 MMHG

## 2019-01-10 DIAGNOSIS — S52.122A LEFT RADIAL HEAD FRACTURE: Primary | ICD-10-CM
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
## 2019-01-10 PROCEDURE — 73030 X-RAY EXAM OF SHOULDER: CPT | Mod: LT

## 2019-01-10 PROCEDURE — 73090 X-RAY EXAM OF FOREARM: CPT | Mod: LT

## 2019-01-10 PROCEDURE — 99283 EMERGENCY DEPT VISIT LOW MDM: CPT | Mod: Z6 | Performed by: PHYSICIAN ASSISTANT

## 2019-01-10 PROCEDURE — 99284 EMERGENCY DEPT VISIT MOD MDM: CPT | Mod: 25 | Performed by: PHYSICIAN ASSISTANT

## 2019-01-10 RX ORDER — HYDROCODONE BITARTRATE AND ACETAMINOPHEN 5; 325 MG/1; MG/1
1 TABLET ORAL EVERY 6 HOURS PRN
Qty: 15 TABLET | Refills: 0 | Status: SHIPPED | OUTPATIENT
Start: 2019-01-10 | End: 2019-05-29

## 2019-01-10 ASSESSMENT — ENCOUNTER SYMPTOMS
ADENOPATHY: 0
BRUISES/BLEEDS EASILY: 0
CHEST TIGHTNESS: 0
HEMATURIA: 0
BACK PAIN: 0
CONFUSION: 0
ABDOMINAL PAIN: 0
FEVER: 0
CHILLS: 0
SHORTNESS OF BREATH: 0
WOUND: 0

## 2019-01-10 ASSESSMENT — MIFFLIN-ST. JEOR: SCORE: 1403.07

## 2019-01-10 NOTE — ED AVS SNAPSHOT
[facility]  [address]  [place] [address]  Phone:  [phone]  Fax:  [phone]                                    Kate Chacon   MRN: [id number]    Department:  [facility]   Date of Visit:  1/10/2019           After Visit Summary Signature Page    I have received my discharge instructions, and my questions have been answered. I have discussed any challenges I see with this plan with the nurse or doctor.    ..........................................................................................................................................  Patient/Patient Representative Signature      ..........................................................................................................................................  Patient Representative Print Name and Relationship to Patient    ..................................................               ................................................  Date                                   Time    ..........................................................................................................................................  Reviewed by Signature/Title    ...................................................              ..............................................  Date                                               Time          22EPIC Rev 08/18

## 2019-01-10 NOTE — ED PROVIDER NOTES
History     Chief Complaint   Patient presents with     Arm Injury     Fall     This is a 73-year-old female who was walking outside today she slipped on the ice and landed on her outstretched left arm.  She reports she has mainly pain to the proximal forearm area.   Denies any other injuries.  She has right hand dominant.              Problem List:    Patient Active Problem List    Diagnosis Date Noted     Sinoatrial node dysfunction (H) 05/01/2018     Priority: Medium     Cardiac pacemaker 02/19/2018     Priority: Medium     DNI (do not intubate) 10/25/2017     Priority: Medium     Overview:   As discussed with patient on 10/25/17       Urge incontinence 10/05/2017     Priority: Medium     Former smoker 08/30/2017     Priority: Medium     MDD (recurrent major depressive disorder) in remission (H) 08/30/2017     Priority: Medium     Anxiety 01/05/2017     Priority: Medium     Hypertension 12/08/2016     Priority: Medium     Paroxysmal atrial fibrillation (H) 11/02/2016     Priority: Medium     Anticoagulation goal of INR 2 to 3 10/28/2016     Priority: Medium     Ischemic stroke (H) 10/15/2016     Priority: Medium     Diverticulosis of large intestine 04/04/2011     Priority: Medium     History of colonic polyps 03/02/2011     Priority: Medium     Osteoporosis 02/08/2006     Priority: Medium        Past Medical History:    Past Medical History:   Diagnosis Date     Encounter for full-term uncomplicated delivery      Ganglion of wrist      Gastritis without bleeding        Past Surgical History:    Past Surgical History:   Procedure Laterality Date     APPENDECTOMY OPEN      No Comments Provided     COLONOSCOPY  08/19/2005 8/19/05,Cecal biopsy with tubular adenoma low grade dysplasia.     COLONOSCOPY  04/04/2011 4/4/11     COLONOSCOPY  05/07/2012 5/7/12     ESOPHAGOSCOPY, GASTROSCOPY, DUODENOSCOPY (EGD), COMBINED      8/19/05     OTHER SURGICAL HISTORY      8/3/05,253838,OTHER,helices on the right ear      TONSILLECTOMY      No Comments Provided       Family History:    Family History   Problem Relation Age of Onset     Diabetes Father         Diabetes     Hypertension Father         Hypertension     Other - See Comments Mother         h/o coronary artery disease/dementia     Asthma Mother         Asthma     Diabetes Maternal Grandmother         Diabetes     Cancer Maternal Aunt         Cancer,Uterine     Breast Cancer No family hx of         Cancer-breast       Social History:  Marital Status:   [2]  Social History     Tobacco Use     Smoking status: Former Smoker     Packs/day: 0.50     Years: 49.00     Pack years: 24.50     Types: Cigarettes     Last attempt to quit: 10/14/2016     Years since quittin.2     Smokeless tobacco: Never Used   Substance Use Topics     Alcohol use: No     Drug use: No     Comment: Drug use: No        Medications:      atorvastatin (LIPITOR) 40 MG tablet   calcium carbonate 750 MG CHEW   ibuprofen (ADVIL/MOTRIN) 200 MG tablet   lisinopril (PRINIVIL/ZESTRIL) 20 MG tablet   metoprolol succinate (TOPROL-XL) 25 MG 24 hr tablet   sertraline (ZOLOFT) 50 MG tablet   warfarin (COUMADIN) 5 MG tablet   furosemide (LASIX) 20 MG tablet         Review of Systems   Constitutional: Negative for chills and fever.   HENT: Negative for congestion.    Eyes: Negative for visual disturbance.   Respiratory: Negative for chest tightness and shortness of breath.    Cardiovascular: Negative for chest pain.   Gastrointestinal: Negative for abdominal pain.   Genitourinary: Negative for hematuria.   Musculoskeletal: Negative for back pain.        Left forearm injury and pain.  Some pain to the left shoulder as well.   Skin: Negative for rash and wound.   Neurological: Negative for syncope.   Hematological: Negative for adenopathy. Does not bruise/bleed easily.   Psychiatric/Behavioral: Negative for confusion.       Physical Exam   BP: (!) 152/104  Heart Rate: 90  Temp: 98.4  F (36.9  C)  Height: 170.2 cm  "(5' 7\")  Weight: 87.5 kg (193 lb)  SpO2: 97 %      Physical Exam   Constitutional: She is oriented to person, place, and time. She appears well-developed and well-nourished. No distress.   HENT:   Head: Normocephalic and atraumatic.   Eyes: Conjunctivae are normal. No scleral icterus.   Neck: Neck supple.   Cardiovascular: Normal rate and regular rhythm.   Pulmonary/Chest: Effort normal.   Abdominal: Soft. There is no tenderness.   Musculoskeletal: Normal range of motion. She exhibits tenderness. She exhibits no deformity.   Left shoulder appears intact she has some tenderness over the humeral head.  She is able to raise her arm but with some difficulties due to pain.  Primary only some bruising to the left forearm area but no tenderness with palpation she has good flexion extension at the elbow increased pain with supination and pronation.  But CMS x4.   Lymphadenopathy:     She has no cervical adenopathy.   Neurological: She is alert and oriented to person, place, and time.   Skin: Skin is warm and dry. Capillary refill takes less than 2 seconds. No rash noted. She is not diaphoretic.   Psychiatric: She has a normal mood and affect.       ED Course        Procedures                   Results for orders placed or performed during the hospital encounter of 01/10/19 (from the past 24 hour(s))   XR Forearm Left 2 Views    Narrative    PROCEDURE:  XR SHOULDER 2 VIEW LEFT, XR FOREARM LT 2 VW    HISTORY: fall.    COMPARISON:  None.    TECHNIQUE:  2 views left shoulder, 2 views left forearm.    FINDINGS:  Images of the left shoulder demonstrate no evidence of  proximal humeral fracture or dislocation. Mild degenerative changes  are present. A left chest pacemaker is in place.    2 views of the left forearm demonstrate an acute slightly depressed  left radial head fracture. No other fracture is seen.      Impression    IMPRESSION: Acute, mildly depressed left proximal radial head  fracture.      NA CARTAGENA MD   XR " Shoulder Left 2 Views    Narrative    PROCEDURE:  XR SHOULDER 2 VIEW LEFT, XR FOREARM LT 2 VW    HISTORY: fall.    COMPARISON:  None.    TECHNIQUE:  2 views left shoulder, 2 views left forearm.    FINDINGS:  Images of the left shoulder demonstrate no evidence of  proximal humeral fracture or dislocation. Mild degenerative changes  are present. A left chest pacemaker is in place.    2 views of the left forearm demonstrate an acute slightly depressed  left radial head fracture. No other fracture is seen.      Impression    IMPRESSION: Acute, mildly depressed left proximal radial head  fracture.      NA CARTAGENA MD       Medications - No data to display    Assessments & Plan (with Medical Decision Making)     I have reviewed the nursing notes.    I have reviewed the findings, diagnosis, plan and need for follow up with the patient.         Medication List      Started    HYDROcodone-acetaminophen 5-325 MG tablet  Commonly known as:  NORCO  1 tablet, Oral, EVERY 6 HOURS PRN            Final diagnoses:   Fall due to ice or snow, initial encounter   Left radial head fracture     Afebrile.  Vital signs stable.  But blood pressure is noticed to be elevated at 150s over 100s.  Slip and fall on the ice today landing on her left arm.  Full active and passive range of motion.  Shoulder x-rays are unremarkable.  Left forearm x-rays show a left radial head fracture minimally displaced.  Discussed rice to help with pain.  She was fitted in a sling at this time and orthopedic follow-up was arranged with Dr. Diallo tomorrow.  Rx for Norco in the interim.  Follow-up sooner if there is any other concerns problems or questions.  1/10/2019   [facility]     Zachery Jo PA-C  01/10/19 0268

## 2019-01-10 NOTE — ED TRIAGE NOTES
"ED Nursing Triage Note (General)   ________________________________    Kate Chacon is a 73 year old Female that presents to triage private car  With history of  Falling on the ice and injuring her left proximal forearm reported by patient   Significant symptoms had onset 1 hour(s) ago.  BP (!) 152/104   Temp 98.4  F (36.9  C)   Ht 1.702 m (5' 7\")   Wt 87.5 kg (193 lb)   SpO2 97%   Breastfeeding? No   BMI 30.23 kg/m  t  Patient appears alert , in moderate distress., and cooperative behavior.    GCS Total = 15  Airway: intact  Breathing noted as Normal.  Circulation Normal  Skin normal  Action taken:  To room 905      PRE HOSPITAL PRIOR LIVING SITUATION Spouse  "

## 2019-01-10 NOTE — ED AVS SNAPSHOT
"    [facility]: [phone]                                              INTERAGENCY TRANSFER FORM - LAB / IMAGING / EKG / EMG RESULTS   1/10/2019                   Hospital Admission Date: 1/10/2019  YOUSIF COLLINS   : 1945  Sex: Female         Attending Provider:  Zachery Jo PA-C    Allergies:  Methylpar-na Bisulfite-pentazocine    Infection:  None   Service:  EMERGENCY ME    Ht:  1.702 m (5' 7\")   Wt:  87.5 kg (193 lb)   Admission Wt:  87.5 kg (193 lb)    BMI:  30.23 kg/m 2   BSA:  2.03 m 2            Patient PCP Information     Provider PCP Type    JANAE Mcnamara CNP Assigned PCP    Asher Campos MD General      Unresulted Labs (24h ago, onward)    None         Imaging Results - 3 Days      XR Forearm Left 2 Views [[id number]]  Resulted: 01/10/19 1133, Result status: Final result   Ordering provider:  Zachery Jo PA-C  01/10/19 1047 Resulted by:  Ramon Cartagena MD   Performed:  01/10/19 1049 - 01/10/19 1129 Accession number:  [id number]   Resulting lab:  RADIOLOGY RESULTS   Narrative:  PROCEDURE:  XR SHOULDER 2 VIEW LEFT, XR FOREARM LT 2 VW    HISTORY: fall.    COMPARISON:  None.    TECHNIQUE:  2 views left shoulder, 2 views left forearm.    FINDINGS:  Images of the left shoulder demonstrate no evidence of  proximal humeral fracture or dislocation. Mild degenerative changes  are present. A left chest pacemaker is in place.    2 views of the left forearm demonstrate an acute slightly depressed  left radial head fracture. No other fracture is seen.     Impression:  IMPRESSION: Acute, mildly depressed left proximal radial head  fracture.      RAMON CARTAGENA MD      XR Shoulder Left 2 Views [[id number]]  Resulted: 01/10/19 1133, Result status: Final result   Ordering provider:  Zachery Jo PA-C  01/10/19 1047 Resulted by:  Ramon Cartagena MD   Performed:  01/10/19 1050 - 01/10/19 1129 Accession number:  [id number]   Resulting lab:  " RADIOLOGY RESULTS   Narrative:  PROCEDURE:  XR SHOULDER 2 VIEW LEFT, XR FOREARM LT 2 VW    HISTORY: fall.    COMPARISON:  None.    TECHNIQUE:  2 views left shoulder, 2 views left forearm.    FINDINGS:  Images of the left shoulder demonstrate no evidence of  proximal humeral fracture or dislocation. Mild degenerative changes  are present. A left chest pacemaker is in place.    2 views of the left forearm demonstrate an acute slightly depressed  left radial head fracture. No other fracture is seen.     Impression:  IMPRESSION: Acute, mildly depressed left proximal radial head  fracture.      NA CARTAGENA MD      Testing Performed By     Lab - Abbreviation Name Director Address Valid Date Range    104 - Rad Rslts RADIOLOGY RESULTS Unknown Unknown 02/16/05 1553 - Present            Encounter-Level Documents:    There are no encounter-level documents.     Order-Level Documents:    There are no order-level documents.

## 2019-01-11 ENCOUNTER — OFFICE VISIT (OUTPATIENT)
Dept: ORTHOPEDICS | Facility: OTHER | Age: 74
End: 2019-01-11
Attending: SPECIALIST
Payer: MEDICARE

## 2019-01-11 DIAGNOSIS — S52.122A LEFT RADIAL HEAD FRACTURE: ICD-10-CM

## 2019-01-11 PROCEDURE — G0463 HOSPITAL OUTPT CLINIC VISIT: HCPCS

## 2019-01-11 PROCEDURE — G0463 HOSPITAL OUTPT CLINIC VISIT: HCPCS | Mod: 25,27

## 2019-01-16 NOTE — PROGRESS NOTES
VITALS:   Height:   67  Weight:   193  Pulse rate:  64  Blood Pressure:  112 / 70      CHIEF COMPLAINT: Left Arm Pain    PROBLEMS:   Patient has no noted problems.    PATIENT REPORTED MEDICATIONS:  COUMADIN 5 MG ORAL TABLET (WARFARIN SODIUM) ; Route: ORAL  ZOLOFT TABLET (SERTRALINE HCL TABS)   TOPROL XL 25 MG ORAL TABLET EXTENDED RELEASE 24 HOUR (METOPROLOL SUCCINATE) ; Route: ORAL  LISINOPRIL 20 MG ORAL TABLET (LISINOPRIL) ; Route: ORAL  IBUPROFEN 200 MG ORAL CAPSULE (IBUPROFEN) ; Route: ORAL  NORCO 5-325 MG ORAL TABLET (HYDROCODONE-ACETAMINOPHEN) ; Route: ORAL  FUROSEMIDE 20 MG ORAL TABLET (FUROSEMIDE) ; Route: ORAL  CALCIUM CARBONATE ANTACID TABLET (CALCIUM CARBONATE ANTACID TABS)   LIPITOR 40 MG ORAL TABLET (ATORVASTATIN CALCIUM) ; Route: ORAL    Medications were reviewed with patient this visit.    PATIENT REPORTED ALLERGIES:  * METHYLPAR (SevereReaction: Unknown Reaction)  * BISULFITE PENTAZOCINE (SevereReaction: Unknown Reaction)    Allergies were reviewed during this visit.    RISK FACTORS:  Tobacco use:   former smoker  Passive smoke exposure: No  Alcohol Use:   No    HISTORY OF PRESENT ILLNESS:    Kate is a 73-year-old, right-hand dominant female who slipped on the ice, injuring her elbow.  She was seen in the emergency room and splinted.   She was placed in a sling.       PMH:   Health history form dated 01/11/19 is reviewed and signed.  Past medical history, surgical history, social history, family history, medications, and review of systems is noted and scanned into the chart.     PAST MEDICAL HISTORY:    Stroke    PAST SURGICAL HISTORY:    Tonsillectomy  Appendectomy    SOCIAL HISTORY:   Marital Status;    Alcohol Use:  No  Tobacco Use:  Former   Secondhand Smoke Exposure:  No  History HIV:  No  History Hepatitis:  No    REVIEW OF SYSTEMS:  Joint or Muscle pain: Yes  Stiffness:  Yes  Difficulty in walking: Yes  Cold extremities: No  Rash or Itching: No    PHYSICAL EXAMINATION:    General:   "  Physical examination reveals a 73-year-old female.  Height: 5' 7\",  Weight: 193 pounds,  BP: 112/70,  Pulse: 64.  The patient is pleasant, alert, oriented x3, appropriate, in no acute distress.    The patient's gait and station are appropriate.  The patient is well groomed, well kempt.  Skin is healthy and intact with no erythema, warmth, rashes, or bruising.   Normal capillary refill.  Pulses are palpable.  Motor is five out of five in all muscle groups tested.   Sensory exam is intact.    Musculoskeletal:        Examination of the left upper extremity shows a splint.   She has tenderness to palpation over the radial head.   Gentle range of motion is comfortable with the exception of full extension and flexion.     X-RAY:  AP, lateral views of the left shoulder dated 01/10/19 are reviewed and reveal a slightly impacted left radial head fracture with intraarticular effusion.      ASSESSMENT:    Left Radial Head Fracture    PLAN:   Left Radial Head Fracture:  Our plan will be to treat this nonoperatively.  We will begin gentle range of motion exercises.  We will see her in three weeks for x-rays of the elbow at that time - AP and lateral views.     IMAGING:  AP and lateral views of the left elbow in three weeks.    Dictated by Og Diallo M.D.  D:  01/11/19  T:   01/15/19    Copy to Asher Campos M.D.    Typed and/or reviewed and corrected by signing  below, and sent to the Physician for final review and signature.     This report was created using voice recording software and computer-generated templates. Although every effort has been made to review for and eliminate errors, some errors may still occur.         Electronically signed by Elisabeth Landin  on 01/16/2019 at 7:39 AM    ________________________________________________________________________      "

## 2019-01-17 ENCOUNTER — ANTICOAGULATION THERAPY VISIT (OUTPATIENT)
Dept: ANTICOAGULATION | Facility: OTHER | Age: 74
End: 2019-01-17
Attending: FAMILY MEDICINE
Payer: MEDICARE

## 2019-01-17 DIAGNOSIS — Z79.01 ANTICOAGULATION GOAL OF INR 2 TO 3: ICD-10-CM

## 2019-01-17 DIAGNOSIS — I48.0 PAROXYSMAL ATRIAL FIBRILLATION (H): ICD-10-CM

## 2019-01-17 DIAGNOSIS — Z51.81 ANTICOAGULATION GOAL OF INR 2 TO 3: ICD-10-CM

## 2019-01-17 LAB — INR POINT OF CARE: 2.5 (ref 0.86–1.14)

## 2019-01-17 PROCEDURE — 85610 PROTHROMBIN TIME: CPT | Mod: QW,ZL

## 2019-01-17 NOTE — PROGRESS NOTES
ANTICOAGULATION FOLLOW-UP CLINIC VISIT    Patient Name:  Kate Chacon  Date:  2019  Contact Type:  Face to Face    SUBJECTIVE:     Patient Findings     Positives:   No Problem Findings           OBJECTIVE    INR Protime   Date Value Ref Range Status   2019 2.5 (A) 0.86 - 1.14 Final       ASSESSMENT / PLAN  INR assessment THER    Recheck INR In: 6 WEEKS    INR Location Clinic      Anticoagulation Summary  As of 2019    INR goal:   2.0-3.0   TTR:   90.1 % (2.2 y)   INR used for dosin.5 (2019)   Warfarin maintenance plan:   7.5 mg (5 mg x 1.5) every Mon; 5 mg (5 mg x 1) all other days   Full warfarin instructions:   7.5 mg every Mon; 5 mg all other days   Weekly warfarin total:   37.5 mg   No change documented:   Radha Burns, GLENN   Next INR check:   2019   Priority:   INR   Target end date:   Indefinite    Indications    Paroxysmal atrial fibrillation (H) [I48.0]  Anticoagulation goal of INR 2 to 3 [Z51.81  Z79.01]             Anticoagulation Episode Summary     INR check location:       Preferred lab:       Send INR reminders to:    INR    Comments:         Anticoagulation Care Providers     Provider Role Specialty Phone number    Asher Campos MD [facility] [phone]            See the Encounter Report to view Anticoagulation Flowsheet and Dosing Calendar (Go to Encounters tab in chart review, and find the Anticoagulation Therapy Visit)        Radha Burns RN

## 2019-01-29 DIAGNOSIS — S52.125D CLOSED NONDISPLACED FRACTURE OF HEAD OF LEFT RADIUS WITH ROUTINE HEALING, SUBSEQUENT ENCOUNTER: Primary | ICD-10-CM

## 2019-02-01 ENCOUNTER — HOSPITAL ENCOUNTER (OUTPATIENT)
Dept: GENERAL RADIOLOGY | Facility: OTHER | Age: 74
Discharge: HOME | End: 2019-02-01
Attending: SPECIALIST | Admitting: SPECIALIST
Payer: MEDICARE

## 2019-02-01 ENCOUNTER — OFFICE VISIT (OUTPATIENT)
Dept: ORTHOPEDICS | Facility: OTHER | Age: 74
End: 2019-02-01
Attending: SPECIALIST
Payer: MEDICARE

## 2019-02-01 DIAGNOSIS — S52.125D CLOSED NONDISPLACED FRACTURE OF HEAD OF LEFT RADIUS WITH ROUTINE HEALING, SUBSEQUENT ENCOUNTER: ICD-10-CM

## 2019-02-01 DIAGNOSIS — Z00.00 ROUTINE GENERAL MEDICAL EXAMINATION AT A HEALTH CARE FACILITY: Primary | ICD-10-CM

## 2019-02-01 PROCEDURE — 73080 X-RAY EXAM OF ELBOW: CPT | Mod: LT

## 2019-02-01 PROCEDURE — G0463 HOSPITAL OUTPT CLINIC VISIT: HCPCS

## 2019-02-07 NOTE — PROGRESS NOTES
CHIEF COMPLAINT: Left Radial Neck Fracture    PROBLEMS:   Patient has no noted problems.    PATIENT REPORTED MEDICATIONS:  COUMADIN 5 MG ORAL TABLET (WARFARIN SODIUM) ; Route: ORAL  ZOLOFT TABLET (SERTRALINE HCL TABS)   TOPROL XL 25 MG ORAL TABLET EXTENDED RELEASE 24 HOUR (METOPROLOL SUCCINATE) ; Route: ORAL  LISINOPRIL 20 MG ORAL TABLET (LISINOPRIL) ; Route: ORAL  IBUPROFEN 200 MG ORAL CAPSULE (IBUPROFEN) ; Route: ORAL  NORCO 5-325 MG ORAL TABLET (HYDROCODONE-ACETAMINOPHEN) ; Route: ORAL  FUROSEMIDE 20 MG ORAL TABLET (FUROSEMIDE) ; Route: ORAL  CALCIUM CARBONATE ANTACID TABLET (CALCIUM CARBONATE ANTACID TABS)   LIPITOR 40 MG ORAL TABLET (ATORVASTATIN CALCIUM) ; Route: ORAL    PATIENT REPORTED ALLERGIES:  * METHYLPAR (SevereReaction: Unknown Reaction)  * BISULFITE PENTAZOCINE (SevereReaction: Unknown Reaction)    HISTORY OF PRESENT ILLNESS:    Kate returns for followup with respect to her left radial neck fracture.   This occurred on 01/10/19.  She is back today overall noticing improving function.   She has a mild lack of flexion with some mild discomfort.       PAST MEDICAL HISTORY:    Stroke    PAST SURGICAL HISTORY:    Tonsillectomy  Appendectomy    SOCIAL HISTORY:   Marital Status;    Alcohol Use:  No  Tobacco Use:  Former   Secondhand Smoke Exposure:  No  History HIV:  No  History Hepatitis:  No    PHYSICAL EXAMINATION:    Physical examination today reveals her left elbow to show mild flexion contracture with flexion to 125 , pronation and supination is full.      X-RAY:  AP, lateral, oblique views of the left elbow show minimally displaced radial head fracture.    ASSESSMENT:    Healing Radial Head Fracture    PLAN:   Healing Radial Head Fracture:  Plan will be to advance her activities.   We will see her back for followup in four weeks on an as-needed basis.    Dictated by Og Diallo M.D.  D:  02/01/19  T:   02/05/19    Typed and/or reviewed and corrected by signing   below, and sent to the Physician for final review and signature.     This report was created using voice recording software and computer-generated templates. Although every effort has been made to review for and eliminate errors, some errors may still occur.     Electronically signed by Elisabeth Landin  on 02/06/2019 at 12:48 PM    ________________________________________________________________________

## 2019-02-18 DIAGNOSIS — S52.125D CLOSED NONDISPLACED FRACTURE OF HEAD OF LEFT RADIUS WITH ROUTINE HEALING, SUBSEQUENT ENCOUNTER: Primary | ICD-10-CM

## 2019-02-26 ENCOUNTER — ANTICOAGULATION THERAPY VISIT (OUTPATIENT)
Dept: ANTICOAGULATION | Facility: OTHER | Age: 74
End: 2019-02-26
Attending: FAMILY MEDICINE
Payer: COMMERCIAL

## 2019-02-26 ENCOUNTER — HOSPITAL ENCOUNTER (OUTPATIENT)
Dept: CARDIOLOGY | Facility: OTHER | Age: 74
Discharge: HOME | End: 2019-02-26
Attending: FAMILY MEDICINE | Admitting: FAMILY MEDICINE
Payer: MEDICARE

## 2019-02-26 DIAGNOSIS — I48.0 PAROXYSMAL ATRIAL FIBRILLATION (H): Primary | ICD-10-CM

## 2019-02-26 DIAGNOSIS — Z51.81 ANTICOAGULATION GOAL OF INR 2 TO 3: ICD-10-CM

## 2019-02-26 DIAGNOSIS — I48.91 ATRIAL FIBRILLATION (H): ICD-10-CM

## 2019-02-26 DIAGNOSIS — Z79.01 ANTICOAGULATION GOAL OF INR 2 TO 3: ICD-10-CM

## 2019-02-26 LAB — INR POINT OF CARE: 2.6 (ref 0.86–1.14)

## 2019-02-26 PROCEDURE — 93280 PM DEVICE PROGR EVAL DUAL: CPT | Performed by: INTERNAL MEDICINE

## 2019-02-26 PROCEDURE — 85610 PROTHROMBIN TIME: CPT | Mod: QW,ZL

## 2019-02-26 NOTE — PATIENT INSTRUCTIONS
It was a pleasure to see you in clinic today. Please do not hesitate to call with any questions or concerns. You are scheduled for a remote pacemaker transmission in 3 months. This will happen automatically in the night. We will call in 1-2 business days to discuss the results with you. We look forward to seeing you in clinic at your next device check in 6 months.    Linda Obrien, RN  Electrophysiology Nurse Clinician  [facility]  During business hours call:  [phone]  After business hours please call: [phone]- select option #4 and ask for job code [id number].

## 2019-02-28 LAB
MDC_IDC_LEAD_IMPLANT_DT: NORMAL
MDC_IDC_LEAD_IMPLANT_DT: NORMAL
MDC_IDC_LEAD_LOCATION: NORMAL
MDC_IDC_LEAD_LOCATION: NORMAL
MDC_IDC_LEAD_LOCATION_DETAIL_1: NORMAL
MDC_IDC_LEAD_LOCATION_DETAIL_1: NORMAL
MDC_IDC_LEAD_MFG: NORMAL
MDC_IDC_LEAD_MFG: NORMAL
MDC_IDC_LEAD_MODEL: NORMAL
MDC_IDC_LEAD_MODEL: NORMAL
MDC_IDC_LEAD_POLARITY_TYPE: NORMAL
MDC_IDC_LEAD_POLARITY_TYPE: NORMAL
MDC_IDC_LEAD_SERIAL: NORMAL
MDC_IDC_LEAD_SERIAL: NORMAL
MDC_IDC_MSMT_BATTERY_STATUS: NORMAL
MDC_IDC_MSMT_LEADCHNL_RA_IMPEDANCE_VALUE: 790 OHM
MDC_IDC_MSMT_LEADCHNL_RA_PACING_THRESHOLD_AMPLITUDE: 0.4 V
MDC_IDC_MSMT_LEADCHNL_RA_PACING_THRESHOLD_PULSEWIDTH: 0.4 MS
MDC_IDC_MSMT_LEADCHNL_RA_SENSING_INTR_AMPL: 9 MV
MDC_IDC_MSMT_LEADCHNL_RV_IMPEDANCE_VALUE: 809 OHM
MDC_IDC_MSMT_LEADCHNL_RV_PACING_THRESHOLD_AMPLITUDE: 0.7 V
MDC_IDC_MSMT_LEADCHNL_RV_PACING_THRESHOLD_PULSEWIDTH: 0.4 MS
MDC_IDC_PG_IMPLANT_DTM: NORMAL
MDC_IDC_PG_MFG: NORMAL
MDC_IDC_PG_MODEL: NORMAL
MDC_IDC_PG_SERIAL: NORMAL
MDC_IDC_PG_TYPE: NORMAL
MDC_IDC_SESS_CLINIC_NAME: NORMAL
MDC_IDC_SESS_DTM: NORMAL
MDC_IDC_SESS_TYPE: NORMAL
MDC_IDC_SET_BRADY_AT_MODE_SWITCH_MODE: NORMAL
MDC_IDC_SET_BRADY_AT_MODE_SWITCH_RATE: 170 {BEATS}/MIN
MDC_IDC_SET_BRADY_LOWRATE: 60 {BEATS}/MIN
MDC_IDC_SET_BRADY_MAX_SENSOR_RATE: 130 {BEATS}/MIN
MDC_IDC_SET_BRADY_MAX_TRACKING_RATE: 130 {BEATS}/MIN
MDC_IDC_SET_BRADY_MODE: NORMAL
MDC_IDC_SET_BRADY_PAV_DELAY_HIGH: 250 MS
MDC_IDC_SET_BRADY_PAV_DELAY_LOW: 250 MS
MDC_IDC_SET_BRADY_SAV_DELAY_HIGH: 220 MS
MDC_IDC_SET_BRADY_SAV_DELAY_LOW: 220 MS
MDC_IDC_SET_LEADCHNL_RA_PACING_AMPLITUDE: 2 V
MDC_IDC_SET_LEADCHNL_RA_PACING_ANODE_ELECTRODE_1: NORMAL
MDC_IDC_SET_LEADCHNL_RA_PACING_ANODE_LOCATION_1: NORMAL
MDC_IDC_SET_LEADCHNL_RA_PACING_CAPTURE_MODE: NORMAL
MDC_IDC_SET_LEADCHNL_RA_PACING_CATHODE_ELECTRODE_1: NORMAL
MDC_IDC_SET_LEADCHNL_RA_PACING_CATHODE_LOCATION_1: NORMAL
MDC_IDC_SET_LEADCHNL_RA_PACING_POLARITY: NORMAL
MDC_IDC_SET_LEADCHNL_RA_PACING_PULSEWIDTH: 0.4 MS
MDC_IDC_SET_LEADCHNL_RA_SENSING_ADAPTATION_MODE: NORMAL
MDC_IDC_SET_LEADCHNL_RA_SENSING_ANODE_ELECTRODE_1: NORMAL
MDC_IDC_SET_LEADCHNL_RA_SENSING_ANODE_LOCATION_1: NORMAL
MDC_IDC_SET_LEADCHNL_RA_SENSING_CATHODE_ELECTRODE_1: NORMAL
MDC_IDC_SET_LEADCHNL_RA_SENSING_CATHODE_LOCATION_1: NORMAL
MDC_IDC_SET_LEADCHNL_RA_SENSING_POLARITY: NORMAL
MDC_IDC_SET_LEADCHNL_RA_SENSING_SENSITIVITY: 0.25 MV
MDC_IDC_SET_LEADCHNL_RV_PACING_AMPLITUDE: 1.2 V
MDC_IDC_SET_LEADCHNL_RV_PACING_ANODE_ELECTRODE_1: NORMAL
MDC_IDC_SET_LEADCHNL_RV_PACING_ANODE_LOCATION_1: NORMAL
MDC_IDC_SET_LEADCHNL_RV_PACING_CAPTURE_MODE: NORMAL
MDC_IDC_SET_LEADCHNL_RV_PACING_CATHODE_ELECTRODE_1: NORMAL
MDC_IDC_SET_LEADCHNL_RV_PACING_CATHODE_LOCATION_1: NORMAL
MDC_IDC_SET_LEADCHNL_RV_PACING_POLARITY: NORMAL
MDC_IDC_SET_LEADCHNL_RV_PACING_PULSEWIDTH: 0.4 MS
MDC_IDC_SET_LEADCHNL_RV_SENSING_ADAPTATION_MODE: NORMAL
MDC_IDC_SET_LEADCHNL_RV_SENSING_ANODE_ELECTRODE_1: NORMAL
MDC_IDC_SET_LEADCHNL_RV_SENSING_ANODE_LOCATION_1: NORMAL
MDC_IDC_SET_LEADCHNL_RV_SENSING_CATHODE_ELECTRODE_1: NORMAL
MDC_IDC_SET_LEADCHNL_RV_SENSING_CATHODE_LOCATION_1: NORMAL
MDC_IDC_SET_LEADCHNL_RV_SENSING_POLARITY: NORMAL
MDC_IDC_SET_LEADCHNL_RV_SENSING_SENSITIVITY: 1.5 MV
MDC_IDC_SET_ZONE_DETECTION_INTERVAL: 375 MS
MDC_IDC_SET_ZONE_TYPE: NORMAL
MDC_IDC_SET_ZONE_VENDOR_TYPE: NORMAL
MDC_IDC_STAT_EPISODE_RECENT_COUNT: 5
MDC_IDC_STAT_EPISODE_RECENT_COUNT_DTM_END: NORMAL
MDC_IDC_STAT_EPISODE_RECENT_COUNT_DTM_START: NORMAL
MDC_IDC_STAT_EPISODE_TOTAL_COUNT: 15
MDC_IDC_STAT_EPISODE_TOTAL_COUNT_DTM_END: NORMAL
MDC_IDC_STAT_EPISODE_TYPE: NORMAL
MDC_IDC_STAT_EPISODE_TYPE: NORMAL
MDC_IDC_STAT_EPISODE_VENDOR_TYPE: NORMAL
MDC_IDC_STAT_EPISODE_VENDOR_TYPE: NORMAL

## 2019-03-12 DIAGNOSIS — S52.132D CLOSED DISPLACED FRACTURE OF NECK OF LEFT RADIUS WITH ROUTINE HEALING, SUBSEQUENT ENCOUNTER: Primary | ICD-10-CM

## 2019-03-15 ENCOUNTER — HOSPITAL ENCOUNTER (OUTPATIENT)
Dept: GENERAL RADIOLOGY | Facility: OTHER | Age: 74
Discharge: HOME | End: 2019-03-15
Attending: SPECIALIST | Admitting: SPECIALIST
Payer: MEDICARE

## 2019-03-15 ENCOUNTER — OFFICE VISIT (OUTPATIENT)
Dept: ORTHOPEDICS | Facility: OTHER | Age: 74
End: 2019-03-15
Attending: SPECIALIST
Payer: MEDICARE

## 2019-03-15 DIAGNOSIS — Z00.00 ROUTINE GENERAL MEDICAL EXAMINATION AT A HEALTH CARE FACILITY: Primary | ICD-10-CM

## 2019-03-15 DIAGNOSIS — S52.132D CLOSED DISPLACED FRACTURE OF NECK OF LEFT RADIUS WITH ROUTINE HEALING, SUBSEQUENT ENCOUNTER: ICD-10-CM

## 2019-03-15 PROCEDURE — 73080 X-RAY EXAM OF ELBOW: CPT | Mod: LT

## 2019-03-15 PROCEDURE — G0463 HOSPITAL OUTPT CLINIC VISIT: HCPCS

## 2019-03-20 NOTE — PROGRESS NOTES
CHIEF COMPLAINT: Left Elbow Fracture Recheck    PROBLEMS:   Patient has no noted problems.    PATIENT REPORTED MEDICATIONS:  COUMADIN 5 MG ORAL TABLET (WARFARIN SODIUM) ; Route: ORAL  ZOLOFT TABLET (SERTRALINE HCL TABS)   TOPROL XL 25 MG ORAL TABLET EXTENDED RELEASE 24 HOUR (METOPROLOL SUCCINATE) ; Route: ORAL  LISINOPRIL 20 MG ORAL TABLET (LISINOPRIL) ; Route: ORAL  IBUPROFEN 200 MG ORAL CAPSULE (IBUPROFEN) ; Route: ORAL  NORCO 5-325 MG ORAL TABLET (HYDROCODONE-ACETAMINOPHEN) ; Route: ORAL  FUROSEMIDE 20 MG ORAL TABLET (FUROSEMIDE) ; Route: ORAL  CALCIUM CARBONATE ANTACID TABLET (CALCIUM CARBONATE ANTACID TABS)   LIPITOR 40 MG ORAL TABLET (ATORVASTATIN CALCIUM) ; Route: ORAL    PATIENT REPORTED ALLERGIES:  * METHYLPAR (SevereReaction: Unknown Reaction)  * BISULFITE PENTAZOCINE (SevereReaction: Unknown Reaction)      HISTORY OF PRESENT ILLNESS:    Kate returns for followup with respect to her left radial neck fracture.   She is doing well.    PAST MEDICAL HISTORY:    Stroke    PAST SURGICAL HISTORY:    Tonsillectomy  Appendectomy    SOCIAL HISTORY:   Marital Status;    Alcohol Use:  No  Tobacco Use:  Former   Secondhand Smoke Exposure:  No  History HIV:  No  History Hepatitis:  No    PHYSICAL EXAMINATION:    Physical examination today shows her excellent left elbow range of motion, full pronation, and full supination.    X-RAY:  X-rays of the left elbow show her fracture to be healing in a reasonable position.    PLAN:   At this point we will advance her activities and see her back in four weeks on an as needed basis.    Dictated by Og Diallo M.D.  D:  03/15/19  T:   03/19/19    Typed and/or reviewed and corrected by signing  below, and sent to the Physician for final review and signature.     This report was created using voice recording software and computer-generated templates. Although every effort has been made to review for and eliminate errors, some errors may still  occur.     Electronically signed by Elisabeth Landin  on 03/19/2019 at 3:21 PM    Electronically signed by gO Diallo MD on 03/19/2019 at 5:08 PM  ________________________________________________________________________

## 2019-04-11 ENCOUNTER — ANTICOAGULATION THERAPY VISIT (OUTPATIENT)
Dept: ANTICOAGULATION | Facility: OTHER | Age: 74
End: 2019-04-11
Attending: FAMILY MEDICINE
Payer: MEDICARE

## 2019-04-11 DIAGNOSIS — Z51.81 ANTICOAGULATION GOAL OF INR 2 TO 3: ICD-10-CM

## 2019-04-11 DIAGNOSIS — I48.0 PAROXYSMAL ATRIAL FIBRILLATION (H): ICD-10-CM

## 2019-04-11 DIAGNOSIS — Z79.01 ANTICOAGULATION GOAL OF INR 2 TO 3: ICD-10-CM

## 2019-04-11 LAB — INR POINT OF CARE: 3.4 (ref 0.86–1.14)

## 2019-04-11 PROCEDURE — 85610 PROTHROMBIN TIME: CPT | Mod: QW,ZL

## 2019-04-11 NOTE — PROGRESS NOTES
ANTICOAGULATION FOLLOW-UP CLINIC VISIT    Patient Name:  Kate Chacon  Date:  4/11/2019  Contact Type:  Face to Face    SUBJECTIVE:     Patient Findings            OBJECTIVE    INR Protime   Date Value Ref Range Status   04/11/2019 3.4 (A) 0.86 - 1.14 Final       ASSESSMENT / PLAN  INR assessment SUPRA    Recheck INR In: 3 WEEKS    INR Location Clinic      Anticoagulation Summary  As of 4/11/2019    INR goal:   2.0-3.0   TTR:   88.5 % (2.4 y)   INR used for dosing:   3.4! (4/11/2019)   Warfarin maintenance plan:   7.5 mg (5 mg x 1.5) every Mon; 5 mg (5 mg x 1) all other days   Full warfarin instructions:   4/11: 2.5 mg; Otherwise 7.5 mg every Mon; 5 mg all other days   Weekly warfarin total:   37.5 mg   Next INR check:   5/2/2019   Priority:   INR   Target end date:   Indefinite    Indications    Paroxysmal atrial fibrillation (H) [I48.0]  Anticoagulation goal of INR 2 to 3 [Z51.81  Z79.01]             Anticoagulation Episode Summary     INR check location:       Preferred lab:       Send INR reminders to:    INR    Comments:         Anticoagulation Care Providers     Provider Role Specialty Phone number    Asher Campos MD [facility] [phone]            See the Encounter Report to view Anticoagulation Flowsheet and Dosing Calendar (Go to Encounters tab in chart review, and find the Anticoagulation Therapy Visit)        Sara Rodriguez RN

## 2019-05-01 DIAGNOSIS — E78.5 HYPERLIPIDEMIA, UNSPECIFIED HYPERLIPIDEMIA TYPE: ICD-10-CM

## 2019-05-02 RX ORDER — ATORVASTATIN CALCIUM 40 MG/1
TABLET, FILM COATED ORAL
Qty: 90 TABLET | Refills: 0 | Status: SHIPPED | OUTPATIENT
Start: 2019-05-02 | End: 2019-07-26

## 2019-05-02 NOTE — TELEPHONE ENCOUNTER
Refill request for: Atorvastatin 40 mg tabs   From: [facility]   Rx written date: 04/23/2018 #90 with 3 refills  LOV: 11/19/2018 with C  Next appt: 05/20/2019 with [facility]  Protocol: Statins Protocol Failed   LDL on file in past 12 months       Pt due for f/u. Is scheduled in 3 weeks. Will refill until pt can be seen. Sabrina Washington RN on 5/2/2019 at 10:47 AM

## 2019-05-03 ENCOUNTER — ANTICOAGULATION THERAPY VISIT (OUTPATIENT)
Dept: ANTICOAGULATION | Facility: OTHER | Age: 74
End: 2019-05-03
Attending: FAMILY MEDICINE
Payer: MEDICARE

## 2019-05-03 DIAGNOSIS — I48.0 PAROXYSMAL ATRIAL FIBRILLATION (H): ICD-10-CM

## 2019-05-03 DIAGNOSIS — Z51.81 ANTICOAGULATION GOAL OF INR 2 TO 3: ICD-10-CM

## 2019-05-03 DIAGNOSIS — Z79.01 ANTICOAGULATION GOAL OF INR 2 TO 3: ICD-10-CM

## 2019-05-03 LAB — INR POINT OF CARE: 2.9 (ref 0.86–1.14)

## 2019-05-03 PROCEDURE — 85610 PROTHROMBIN TIME: CPT | Mod: QW,ZL

## 2019-05-03 NOTE — PROGRESS NOTES
ANTICOAGULATION FOLLOW-UP CLINIC VISIT    Patient Name:  Kate Chacon  Date:  5/3/2019  Contact Type:  Face to Face    SUBJECTIVE:     Patient Findings     Comments:   The patient was assessed for diet, medication, and activity level changes, missed or extra doses, bruising or bleeding, with no problem findings.             OBJECTIVE    INR Protime   Date Value Ref Range Status   2019 2.9 (A) 0.86 - 1.14 Final       ASSESSMENT / PLAN  INR assessment THER    Recheck INR In: 6 WEEKS    INR Location Clinic      Anticoagulation Summary  As of 5/3/2019    INR goal:   2.0-3.0   TTR:   86.9 % (2.5 y)   INR used for dosin.9 (5/3/2019)   Warfarin maintenance plan:   7.5 mg (5 mg x 1.5) every Mon; 5 mg (5 mg x 1) all other days   Full warfarin instructions:   7.5 mg every Mon; 5 mg all other days   Weekly warfarin total:   37.5 mg   No change documented:   Radha Burns RN   Next INR check:   2019   Priority:   INR   Target end date:   Indefinite    Indications    Paroxysmal atrial fibrillation (H) [I48.0]  Anticoagulation goal of INR 2 to 3 [Z51.81  Z79.01]             Anticoagulation Episode Summary     INR check location:       Preferred lab:       Send INR reminders to:    INR    Comments:         Anticoagulation Care Providers     Provider Role Specialty Phone number    Asher Campos MD [facility] [phone]            See the Encounter Report to view Anticoagulation Flowsheet and Dosing Calendar (Go to Encounters tab in chart review, and find the Anticoagulation Therapy Visit)        Radha Burns RN

## 2019-05-07 DIAGNOSIS — I48.0 PAROXYSMAL ATRIAL FIBRILLATION (H): ICD-10-CM

## 2019-05-07 RX ORDER — WARFARIN SODIUM 5 MG/1
TABLET ORAL
Qty: 100 TABLET | Refills: 1 | Status: SHIPPED | OUTPATIENT
Start: 2019-05-07 | End: 2019-07-12

## 2019-05-07 NOTE — TELEPHONE ENCOUNTER
"Requested Prescriptions   Pending Prescriptions Disp Refills     warfarin (COUMADIN) 5 MG tablet [Pharmacy Med Name: WARFARIN 5MG        TAB] 100 tablet 1     Sig: TAKE 7.5 MG (1 & 1/2 TABLETS) ON 1 DAY A WEEK AND TAKE 5 MG (1 TABLET) ON 6 DAYS A WEEK OR AS DIRECTED BY [facility]       Vitamin K Antagonists Failed - 5/7/2019 11:24 AM        Failed - INR is within goal in the past 6 weeks     Confirm INR is within goal in the past 6 weeks.     Recent Labs   Lab Test 05/03/19   INR 2.9*                       Passed - Recent (12 mo) or future (30 days) visit within the authorizing provider's specialty     Patient had office visit in the last 12 months or has a visit in the next 30 days with authorizing provider or within the authorizing provider's specialty.  See \"Patient Info\" tab in inbasket, or \"Choose Columns\" in Meds & Orders section of the refill encounter.              Passed - Medication is active on med list        Passed - Patient is 18 years of age or older        Passed - Patient is not pregnant        Passed - No positive pregnancy on file in past 12 months        Prescription approved per [facility] Refill Protocol.    "

## 2019-05-15 ENCOUNTER — ANCILLARY PROCEDURE (OUTPATIENT)
Dept: CARDIOLOGY | Facility: CLINIC | Age: 74
End: 2019-05-15
Attending: INTERNAL MEDICINE
Payer: MEDICARE

## 2019-05-15 DIAGNOSIS — I48.20 CHRONIC A-FIB (H): ICD-10-CM

## 2019-05-15 PROCEDURE — 93296 REM INTERROG EVL PM/IDS: CPT | Mod: ZF

## 2019-05-15 PROCEDURE — 93294 REM INTERROG EVL PM/LDLS PM: CPT | Performed by: INTERNAL MEDICINE

## 2019-05-29 ENCOUNTER — OFFICE VISIT (OUTPATIENT)
Dept: CARDIOLOGY | Facility: OTHER | Age: 74
End: 2019-05-29
Attending: NURSE PRACTITIONER
Payer: COMMERCIAL

## 2019-05-29 VITALS
DIASTOLIC BLOOD PRESSURE: 74 MMHG | SYSTOLIC BLOOD PRESSURE: 116 MMHG | TEMPERATURE: 99.1 F | HEART RATE: 64 BPM | BODY MASS INDEX: 31.08 KG/M2 | HEIGHT: 67 IN | RESPIRATION RATE: 16 BRPM | OXYGEN SATURATION: 95 % | WEIGHT: 198 LBS

## 2019-05-29 DIAGNOSIS — I49.5 SINOATRIAL NODE DYSFUNCTION (H): ICD-10-CM

## 2019-05-29 DIAGNOSIS — I10 ESSENTIAL HYPERTENSION: ICD-10-CM

## 2019-05-29 DIAGNOSIS — Z95.0 CARDIAC PACEMAKER: ICD-10-CM

## 2019-05-29 DIAGNOSIS — I48.19 PERSISTENT ATRIAL FIBRILLATION (H): Primary | ICD-10-CM

## 2019-05-29 DIAGNOSIS — R06.09 DOE (DYSPNEA ON EXERTION): ICD-10-CM

## 2019-05-29 DIAGNOSIS — R53.83 FATIGUE, UNSPECIFIED TYPE: ICD-10-CM

## 2019-05-29 LAB
ERYTHROCYTE [DISTWIDTH] IN BLOOD BY AUTOMATED COUNT: 13.5 % (ref 10–15)
HCT VFR BLD AUTO: 48.2 % (ref 35–47)
HGB BLD-MCNC: 15.7 G/DL (ref 11.7–15.7)
MCH RBC QN AUTO: 30.7 PG (ref 26.5–33)
MCHC RBC AUTO-ENTMCNC: 32.6 G/DL (ref 31.5–36.5)
MCV RBC AUTO: 94 FL (ref 78–100)
PLATELET # BLD AUTO: 232 10E9/L (ref 150–450)
RBC # BLD AUTO: 5.12 10E12/L (ref 3.8–5.2)
TSH SERPL DL<=0.05 MIU/L-ACNC: 1.57 IU/ML (ref 0.34–5.6)
VIT B12 SERPL-MCNC: 263 PG/ML (ref 180–914)
WBC # BLD AUTO: 8.6 10E9/L (ref 4–11)

## 2019-05-29 PROCEDURE — 84443 ASSAY THYROID STIM HORMONE: CPT | Mod: ZL | Performed by: NURSE PRACTITIONER

## 2019-05-29 PROCEDURE — 99214 OFFICE O/P EST MOD 30 MIN: CPT | Performed by: NURSE PRACTITIONER

## 2019-05-29 PROCEDURE — 85027 COMPLETE CBC AUTOMATED: CPT | Mod: ZL | Performed by: NURSE PRACTITIONER

## 2019-05-29 PROCEDURE — G0463 HOSPITAL OUTPT CLINIC VISIT: HCPCS

## 2019-05-29 PROCEDURE — 36415 COLL VENOUS BLD VENIPUNCTURE: CPT | Mod: ZL | Performed by: NURSE PRACTITIONER

## 2019-05-29 PROCEDURE — 82607 VITAMIN B-12: CPT | Mod: ZL | Performed by: NURSE PRACTITIONER

## 2019-05-29 RX ORDER — FUROSEMIDE 20 MG
TABLET ORAL
Qty: 60 TABLET | Refills: 3 | Status: SHIPPED | OUTPATIENT
Start: 2019-05-29 | End: 2019-10-26

## 2019-05-29 ASSESSMENT — MIFFLIN-ST. JEOR: SCORE: 1425.75

## 2019-05-29 ASSESSMENT — ANXIETY QUESTIONNAIRES
2. NOT BEING ABLE TO STOP OR CONTROL WORRYING: NOT AT ALL
GAD7 TOTAL SCORE: 0
1. FEELING NERVOUS, ANXIOUS, OR ON EDGE: NOT AT ALL
3. WORRYING TOO MUCH ABOUT DIFFERENT THINGS: NOT AT ALL
5. BEING SO RESTLESS THAT IT IS HARD TO SIT STILL: NOT AT ALL
6. BECOMING EASILY ANNOYED OR IRRITABLE: NOT AT ALL
7. FEELING AFRAID AS IF SOMETHING AWFUL MIGHT HAPPEN: NOT AT ALL
IF YOU CHECKED OFF ANY PROBLEMS ON THIS QUESTIONNAIRE, HOW DIFFICULT HAVE THESE PROBLEMS MADE IT FOR YOU TO DO YOUR WORK, TAKE CARE OF THINGS AT HOME, OR GET ALONG WITH OTHER PEOPLE: NOT DIFFICULT AT ALL

## 2019-05-29 ASSESSMENT — PATIENT HEALTH QUESTIONNAIRE - PHQ9
5. POOR APPETITE OR OVEREATING: NOT AT ALL
SUM OF ALL RESPONSES TO PHQ QUESTIONS 1-9: 3

## 2019-05-29 ASSESSMENT — PAIN SCALES - GENERAL: PAINLEVEL: NO PAIN (0)

## 2019-05-29 NOTE — NURSING NOTE
"Chief Complaint   Patient presents with     Follow Up     6 month follow up       Initial /74 (BP Location: Right arm, Patient Position: Sitting, Cuff Size: Adult Large)   Pulse 64   Temp 99.1  F (37.3  C) (Tympanic)   Resp 16   Ht 1.702 m (5' 7\")   Wt 89.8 kg (198 lb)   SpO2 95%   BMI 31.01 kg/m   Estimated body mass index is 31.01 kg/m  as calculated from the following:    Height as of this encounter: 1.702 m (5' 7\").    Weight as of this encounter: 89.8 kg (198 lb).  Meds Reconciled: complete  Pt is not on Aspirin  Pt is on a Statin  PHQ and/or ANETTE reviewed. Pt referred to PCP/MH Provider as appropriate.    Tessa Douglas LPN      "

## 2019-05-29 NOTE — PROGRESS NOTES
[facility]   CARDIOLOGY PROGRESS NOTE    Kate Chacon   [address] [address]      Asher Campos     Chief Complaint   Patient presents with     Follow Up     6 month follow up        HPI:   Kate Chacon  is a 73 y.o. female who comes in today for 6 month cardiology follow-up with history of persistent atrial fibrillation. She has a history of HTN, hyperlipidemia and recent dual-chamber pacer as a result of bradycardia secondary to SA node dysfunction. She has a history ischemic stroke concerning for cryptogenic stroke on 10/15/16.          She was seen in clinic on 11/1/17 for hospital follow-up with AFib and bradycardia. She was found to be in AF at the time with RVR, rates in 90's to 170 with beat to beat variability. She was largely asymptomatic with this and considered likely rebound tachycardia since d/c from beta blockade with severe bradycardia. She was hospitalized for rate control with RVR and during hospitalization she developed significant bradycardia with pauses of greater then 2 seconds. Patient was transferred to the care of Dr. Amanda at [facility] for pacer placement on 11/2/17.       Patient reports increased MARSHALL and and increased fatigue since her last visit with cardiology. Occasional palpitations with AF which she does not describe as bothersome, rate has been well controlled and she is doing good on coumadin oral anticoagulation without complication. We did discuss that her MARSHALL and fatigue may be likely associated to her atrial fib. She denies any chest pain or pressure. No n/v or diaphoresis episodes reported. No lightheadedness or syncope. No increased edema. She has not been taking her Lasix, not needed.      PAST MEDICAL HISTORY:   Past Medical History:   Diagnosis Date     Encounter for full-term uncomplicated delivery     x3     Ganglion of wrist     right     Gastritis without bleeding     treated and tubular adenoma with low grade  dysplasia in the cecum          FAMILY HISTORY:   Family History   Problem Relation Age of Onset     Diabetes Father         Diabetes     Hypertension Father         Hypertension     Other - See Comments Mother         h/o coronary artery disease/dementia     Asthma Mother         Asthma     Diabetes Maternal Grandmother         Diabetes     Cancer Maternal Aunt         Cancer,Uterine     Breast Cancer No family hx of         Cancer-breast          PAST SURGICAL HISTORY:   Past Surgical History:   Procedure Laterality Date     APPENDECTOMY OPEN      No Comments Provided     COLONOSCOPY  08/19/2005 8/19/05,Cecal biopsy with tubular adenoma low grade dysplasia.     COLONOSCOPY  04/04/2011 4/4/11     COLONOSCOPY  05/07/2012 5/7/12     ESOPHAGOSCOPY, GASTROSCOPY, DUODENOSCOPY (EGD), COMBINED      8/19/05     OTHER SURGICAL HISTORY      8/3/05,800958,OTHER,helices on the right ear     TONSILLECTOMY      No Comments Provided          SOCIAL HISTORY:   Social History     Social History     Marital status:      Spouse name: N/A     Number of children: N/A     Years of education: N/A     Social History Main Topics     Smoking status: Former Smoker     Packs/day: 0.50     Years: 49.00     Types: Cigarettes     Quit date: 10/14/2016     Smokeless tobacco: Never Used     Alcohol use No     Drug use: No      Comment: Drug use: No     Sexual activity: Not Currently     Other Topics Concern     None     Social History Narrative    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  She enjoys gardening, bowling and going to stock car races that her son participates in.   to her  Mac[place].  They run [employer].  Live in town independently.          CURRENT MEDICATIONS:   Prior to Admission medications    Medication Sig Start Date End Date Taking? Authorizing Provider   atorvastatin (LIPITOR) 40 MG tablet Take 1 tablet (40 mg) by mouth At Bedtime 4/23/18  Yes Dinora Younger APRN CNP   calcium carbonate 750  MG CHEW Take 1 tablet by mouth every 8 hours as needed for heartburn   Yes Reported, Patient   furosemide (LASIX) 20 MG tablet Take 1 tablet (20 mg) by mouth daily 10/22/18  Yes Dinora Younger APRN CNP   ibuprofen (ADVIL/MOTRIN) 200 MG tablet Take 200 mg by mouth 4 times daily as needed for pain or headaches   Yes Reported, Patient   lisinopril (PRINIVIL/ZESTRIL) 20 MG tablet Take 20 mg by mouth daily 11/16/17  Yes Reported, Patient   metoprolol succinate (TOPROL-XL) 25 MG 24 hr tablet Take 1 tablet (25 mg) by mouth daily 4/23/18  Yes Dinora Younger APRN CNP   sertraline (ZOLOFT) 50 MG tablet Take 50 mg by mouth At Bedtime 11/16/17  Yes Reported, Patient   warfarin (COUMADIN) 5 MG tablet Take 7.5 mg x 1 day and 5 mg x 6 days/week or as directed by [facility] clinic 10/23/18  Yes Asher Campos MD          ALLERGIES:   Allergies   Allergen Reactions     Methylpar-Na Bisulfite-Pentazocine Unknown     jittery          ROS:   CONSTITUTIONAL: No reported fever or chills. Reports weight gain following smoking cessation, describes high caloric intake.  ENT: No visual disturbance, ear ache, epistaxis or sore throat.   CARDIOVASCULAR: No chest pain, chest pressure or chest discomfort. Occasional palpitations, no increased lower extremity edema.   RESPIRATORY: Positive for increased dyspnea upon exertion. No cough, wheezing or hemoptysis.   GI: No reported abdominal pain.   : No reported hematuria or dysuria.   NEUROLOGICAL: No lightheadedness, dizziness, syncope, ataxia, paresthesias or weakness.   HEMATOLOGIC: No history of anemia. No bleeding or excessive bruising. No history of blood clots.   MUSCULOSKELETAL: No reported joint pain or swelling, no muscle pain.  ENDOCRINOLOGIC: No temperature intolerance. No hair or skin changes.  SKIN: No abnormal rashes or sores, no unusual itching.  PSYCHIATRIC: No history of depression or anxiety. No changes in mood, feeling down or anxious. No changes in  "sleep.      PHYSICAL EXAM:   /74 (BP Location: Right arm, Patient Position: Sitting, Cuff Size: Adult Large)   Pulse 64   Temp 99.1  F (37.3  C) (Tympanic)   Resp 16   Ht 1.702 m (5' 7\")   Wt 89.8 kg (198 lb)   SpO2 95%   BMI 31.01 kg/m    GENERAL: The patient is a well-developed, well-nourished, in no apparent distress.  HEENT: Head is normocephalic and atraumatic. Eyes are symmetrical with normal visual tracking. No icterus, no xanthelasmas. Nares appeared normal without nasal drainage. Mucous membranes are moist, no cyanosis.  NECK: Supple. No visible JVP.  CHEST/ LUNGS: Lungs clear to auscultation, no rales, rhonchi or wheezes, no use of accessory muscles, no retractions, respirations unlabored and normal respiratory rate.   CARDIO: Irregular rate and rhythm with S1 and S2, no S3 or S4 and no murmur, click or rub.    ABD: Abdomen is nondistended.   EXTREMITIES: Trace edema present.   MUSCULOSKELETAL: No visible joint swelling.   NEUROLOGIC: Alert and oriented X3. Normal speech, gait and affect. No focal neurologic deficits.   SKIN: No jaundice. No rashes or visible skin lesions present. No ecchymosis.     LAB RESULTS:   Anticoagulation Therapy Visit on 10/25/2018   Component Date Value Ref Range Status     INR Protime 10/25/2018 2.7* 0.86 - 1.14 Final   Office Visit on 10/11/2018   Component Date Value Ref Range Status     WBC 10/11/2018 8.6  4.0 - 11.0 10e9/L Final     RBC Count 10/11/2018 4.66  3.8 - 5.2 10e12/L Final     Hemoglobin 10/11/2018 14.4  11.7 - 15.7 g/dL Final     Hematocrit 10/11/2018 44.1  35.0 - 47.0 % Final     MCV 10/11/2018 95  78 - 100 fl Final     MCH 10/11/2018 30.9  26.5 - 33.0 pg Final     MCHC 10/11/2018 32.7  31.5 - 36.5 g/dL Final     RDW 10/11/2018 13.0  10.0 - 15.0 % Final     Platelet Count 10/11/2018 207  150 - 450 10e9/L Final     Diff Method 10/11/2018 Automated Method   Final     % Neutrophils 10/11/2018 69.2  % Final     % Lymphocytes 10/11/2018 18.2  % Final     " % Monocytes 10/11/2018 10.2  % Final     % Eosinophils 10/11/2018 1.9  % Final     % Basophils 10/11/2018 0.3  % Final     % Immature Granulocytes 10/11/2018 0.2  % Final     Absolute Neutrophil 10/11/2018 6.0  1.6 - 8.3 10e9/L Final     Absolute Lymphocytes 10/11/2018 1.6  0.8 - 5.3 10e9/L Final     Absolute Monocytes 10/11/2018 0.9  0.0 - 1.3 10e9/L Final     Absolute Eosinophils 10/11/2018 0.2  0.0 - 0.7 10e9/L Final     Absolute Basophils 10/11/2018 0.0  0.0 - 0.2 10e9/L Final     Abs Immature Granulocytes 10/11/2018 0.0  0 - 0.4 10e9/L Final     Sodium 10/11/2018 141  134 - 144 mmol/L Final     Potassium 10/11/2018 4.7  3.5 - 5.1 mmol/L Final     Chloride 10/11/2018 106  98 - 107 mmol/L Final     Carbon Dioxide 10/11/2018 30  21 - 31 mmol/L Final     Anion Gap 10/11/2018 5  3 - 14 mmol/L Final     Glucose 10/11/2018 97  70 - 105 mg/dL Final     Urea Nitrogen 10/11/2018 14  7 - 25 mg/dL Final     Creatinine 10/11/2018 0.93  0.60 - 1.20 mg/dL Final     GFR Estimate 10/11/2018 59* >60 mL/min/1.7m2 Final     GFR Estimate If Black 10/11/2018 71  >60 mL/min/1.7m2 Final     Calcium 10/11/2018 10.2  8.6 - 10.3 mg/dL Final     Bilirubin Total 10/11/2018 0.6  0.3 - 1.0 mg/dL Final     Albumin 10/11/2018 3.8  3.5 - 5.7 g/dL Final     Protein Total 10/11/2018 6.8  6.4 - 8.9 g/dL Final     Alkaline Phosphatase 10/11/2018 82  34 - 104 U/L Final     ALT 10/11/2018 23  7 - 52 U/L Final     AST 10/11/2018 22  13 - 39 U/L Final   Anticoagulation Therapy Visit on 09/13/2018   Component Date Value Ref Range Status     INR Protime 09/13/2018 2.6* 0.86 - 1.14 Final          ASSESSMENT:   Kate Chacon presents for 6 month cardiology follow-up with history of persistent atrial fibrillation. She has a history of HTN, hyperlipidemia and recent dual-chamber pacer as a result of bradycardia secondary to SA node dysfunction. She has a history ischemic stroke concerning for cryptogenic stroke on 10/15/16.  Patient reports increased MARSHALL  and and increased fatigue since her last visit with cardiology. Occasional palpitations with AF which she does not describe as bothersome, rate has been well controlled and she is doing good on coumadin oral anticoagulation without complication. We did discuss that her MARSHALL and fatigue may be likely associated to her atrial fib. She denies any chest pain or pressure. No n/v or diaphoresis episodes reported. No lightheadedness or syncope. No increased edema. She has not been taking her Lasix, not needed.      1. Persistent atrial fibrillation (H)  2. Essential hypertension  3. MARSHALL (dyspnea on exertion)  4. Cardiac pacemaker  5. Sinoatrial node dysfunction (H)  6. Fatigue, unspecified type      PLAN:   1. Patient with persistent Atrial Fib, rate well controlled on Metoprolol Succinate 25 mg daily. Overall, asymptomatic. Continue with Coumadin oral anticoagulation with [id number] >2.  2. Reports increased MARSHALL and fatigue. Discussed that this is likely secondary to her AF however, she does have significant CAD risk factors. Recommended Lexiscan stress testing, no previous stress testing in the past.   3. History of ischemic CVA felt to be likely cryptogenic, now appropriately on oral anticoagulation.   4. Recent Device check note reviewed with normal functioning device. Remote transmission again in approx 3 months.   5. Labs today with reports of fatigue, see orders.   6. Congratulated on her tobacco cessation.      Thank you for allowing me to participate in the care of your patient. Please do not hesitate to contact me if you have any questions.     Dinora Younger

## 2019-05-29 NOTE — PATIENT INSTRUCTIONS
You were seen by  JANAE Collado CNP      1. Laboratory blood work has been ordered.  You will be notified by phone call or [email or web address] message when the results are available.     2. A Lexiscan stress test has been ordered. You will be called to schedule this. You will receive instructions for testing at that time. We will call with results.       You will follow up with [facility] based on test results.     Please call the cardiology office with problems, questions, or concerns at [phone].    If you experience chest pain, chest pressure, chest tightness, shortness of breath, fainting, lightheadedness, nausea, vomiting, or other concerning symptoms, please report to the Emergency Department or call 911. These symptoms may be emergent, and best treated in the Emergency Department.     PERLITA KangN, RN  [facility]  [phone]

## 2019-05-30 DIAGNOSIS — E53.8 VITAMIN B12 DEFICIENCY (NON ANEMIC): Primary | ICD-10-CM

## 2019-05-30 RX ORDER — CYANOCOBALAMIN 1000 UG/ML
1000 INJECTION, SOLUTION INTRAMUSCULAR; SUBCUTANEOUS ONCE
Status: COMPLETED | OUTPATIENT
Start: 2019-05-30 | End: 2019-05-31

## 2019-05-30 ASSESSMENT — ANXIETY QUESTIONNAIRES: GAD7 TOTAL SCORE: 0

## 2019-05-31 ENCOUNTER — ALLIED HEALTH/NURSE VISIT (OUTPATIENT)
Dept: CARDIOLOGY | Facility: OTHER | Age: 74
End: 2019-05-31
Attending: INTERNAL MEDICINE
Payer: MEDICARE

## 2019-05-31 DIAGNOSIS — E53.8 VITAMIN B12 DEFICIENCY: Primary | ICD-10-CM

## 2019-05-31 PROCEDURE — 96372 THER/PROPH/DIAG INJ SC/IM: CPT

## 2019-05-31 PROCEDURE — 25000128 H RX IP 250 OP 636: Performed by: NURSE PRACTITIONER

## 2019-05-31 RX ADMIN — CYANOCOBALAMIN 1000 MCG: 1000 INJECTION, SOLUTION INTRAMUSCULAR at 09:26

## 2019-05-31 NOTE — NURSING NOTE
Prior to injection, verified patient identity using patient's name and date of birth.  Due to injection administration, patient instructed to remain in clinic for 15 minutes  afterwards, and to report any adverse reaction to me immediately.    Drug Amount Wasted:  None.  Vial/Syringe: Single dose vial  Expiration Date:  Oct 20    Pt tolerated injection well. Sabrina Washington RN on 5/31/2019 at 9:33 AM

## 2019-06-05 ENCOUNTER — TELEPHONE (OUTPATIENT)
Dept: CARDIOLOGY | Facility: OTHER | Age: 74
End: 2019-06-05

## 2019-06-05 DIAGNOSIS — I48.0 PAROXYSMAL ATRIAL FIBRILLATION (H): ICD-10-CM

## 2019-06-05 RX ORDER — METOPROLOL SUCCINATE 25 MG/1
TABLET, EXTENDED RELEASE ORAL
Qty: 90 TABLET | Refills: 3 | Status: SHIPPED | OUTPATIENT
Start: 2019-06-05 | End: 2020-05-26

## 2019-06-05 NOTE — TELEPHONE ENCOUNTER
"Refill request for: Metoprolol succ 25 mg tablets   From: [facility]  Rx written date: 05/29/2019 #60 with 3 refills  LOV: 05/29/2019 with MALINI  Next appt: none noted  Protocol: Beta-Blockers Protocol Passed     Per LOV with MALINI, \"Patient with persistent Atrial Fib, rate well controlled on Metoprolol Succinate 25 mg daily.\" Prescription approved per [facility] Refill Protocol. Sabrina Washington RN on 6/5/2019 at 4:37 PM   "

## 2019-06-05 NOTE — TELEPHONE ENCOUNTER
Patient returned our call and verified her .  Instructions given for her stress test appointment and patient verbalized understanding.

## 2019-06-07 ENCOUNTER — HOSPITAL ENCOUNTER (OUTPATIENT)
Dept: NUCLEAR MEDICINE | Facility: OTHER | Age: 74
End: 2019-06-07
Attending: NURSE PRACTITIONER
Payer: MEDICARE

## 2019-06-07 ENCOUNTER — HOSPITAL ENCOUNTER (OUTPATIENT)
Dept: NUCLEAR MEDICINE | Facility: OTHER | Age: 74
Discharge: HOME | End: 2019-06-07
Attending: NURSE PRACTITIONER | Admitting: NURSE PRACTITIONER
Payer: MEDICARE

## 2019-06-07 DIAGNOSIS — R06.09 DOE (DYSPNEA ON EXERTION): ICD-10-CM

## 2019-06-07 DIAGNOSIS — R53.83 FATIGUE, UNSPECIFIED TYPE: ICD-10-CM

## 2019-06-07 LAB
MDC_IDC_EPISODE_DTM: NORMAL
MDC_IDC_EPISODE_DURATION: 1 S
MDC_IDC_EPISODE_DURATION: 11 S
MDC_IDC_EPISODE_DURATION: 12 S
MDC_IDC_EPISODE_DURATION: 5 S
MDC_IDC_EPISODE_DURATION: 9 S
MDC_IDC_EPISODE_DURATION: NORMAL S
MDC_IDC_EPISODE_ID: NORMAL
MDC_IDC_EPISODE_TYPE: NORMAL
MDC_IDC_LEAD_IMPLANT_DT: NORMAL
MDC_IDC_LEAD_IMPLANT_DT: NORMAL
MDC_IDC_LEAD_LOCATION: NORMAL
MDC_IDC_LEAD_LOCATION: NORMAL
MDC_IDC_LEAD_LOCATION_DETAIL_1: NORMAL
MDC_IDC_LEAD_LOCATION_DETAIL_1: NORMAL
MDC_IDC_LEAD_MFG: NORMAL
MDC_IDC_LEAD_MFG: NORMAL
MDC_IDC_LEAD_MODEL: NORMAL
MDC_IDC_LEAD_MODEL: NORMAL
MDC_IDC_LEAD_POLARITY_TYPE: NORMAL
MDC_IDC_LEAD_POLARITY_TYPE: NORMAL
MDC_IDC_LEAD_SERIAL: NORMAL
MDC_IDC_LEAD_SERIAL: NORMAL
MDC_IDC_MSMT_BATTERY_DTM: NORMAL
MDC_IDC_MSMT_BATTERY_REMAINING_LONGEVITY: 78 MO
MDC_IDC_MSMT_BATTERY_REMAINING_PERCENTAGE: 100 %
MDC_IDC_MSMT_BATTERY_STATUS: NORMAL
MDC_IDC_MSMT_LEADCHNL_RA_IMPEDANCE_VALUE: 774 OHM
MDC_IDC_MSMT_LEADCHNL_RV_IMPEDANCE_VALUE: 849 OHM
MDC_IDC_MSMT_LEADCHNL_RV_PACING_THRESHOLD_AMPLITUDE: 0.6 V
MDC_IDC_MSMT_LEADCHNL_RV_PACING_THRESHOLD_PULSEWIDTH: 0.4 MS
MDC_IDC_PG_IMPLANT_DTM: NORMAL
MDC_IDC_PG_MFG: NORMAL
MDC_IDC_PG_MODEL: NORMAL
MDC_IDC_PG_SERIAL: NORMAL
MDC_IDC_PG_TYPE: NORMAL
MDC_IDC_SESS_CLINIC_NAME: NORMAL
MDC_IDC_SESS_DTM: NORMAL
MDC_IDC_SESS_TYPE: NORMAL
MDC_IDC_SET_BRADY_AT_MODE_SWITCH_MODE: NORMAL
MDC_IDC_SET_BRADY_AT_MODE_SWITCH_RATE: 170 {BEATS}/MIN
MDC_IDC_SET_BRADY_LOWRATE: 60 {BEATS}/MIN
MDC_IDC_SET_BRADY_MAX_SENSOR_RATE: 130 {BEATS}/MIN
MDC_IDC_SET_BRADY_MAX_TRACKING_RATE: 130 {BEATS}/MIN
MDC_IDC_SET_BRADY_MODE: NORMAL
MDC_IDC_SET_BRADY_PAV_DELAY_LOW: 250 MS
MDC_IDC_SET_BRADY_SAV_DELAY_LOW: 220 MS
MDC_IDC_SET_LEADCHNL_RA_PACING_AMPLITUDE: 2 V
MDC_IDC_SET_LEADCHNL_RA_PACING_CAPTURE_MODE: NORMAL
MDC_IDC_SET_LEADCHNL_RA_PACING_POLARITY: NORMAL
MDC_IDC_SET_LEADCHNL_RA_PACING_PULSEWIDTH: 0.4 MS
MDC_IDC_SET_LEADCHNL_RA_SENSING_ADAPTATION_MODE: NORMAL
MDC_IDC_SET_LEADCHNL_RA_SENSING_POLARITY: NORMAL
MDC_IDC_SET_LEADCHNL_RA_SENSING_SENSITIVITY: 0.25 MV
MDC_IDC_SET_LEADCHNL_RV_PACING_AMPLITUDE: 1.1 V
MDC_IDC_SET_LEADCHNL_RV_PACING_CAPTURE_MODE: NORMAL
MDC_IDC_SET_LEADCHNL_RV_PACING_POLARITY: NORMAL
MDC_IDC_SET_LEADCHNL_RV_PACING_PULSEWIDTH: 0.4 MS
MDC_IDC_SET_LEADCHNL_RV_SENSING_ADAPTATION_MODE: NORMAL
MDC_IDC_SET_LEADCHNL_RV_SENSING_POLARITY: NORMAL
MDC_IDC_SET_LEADCHNL_RV_SENSING_SENSITIVITY: 1.5 MV
MDC_IDC_SET_ZONE_DETECTION_INTERVAL: 375 MS
MDC_IDC_SET_ZONE_TYPE: NORMAL
MDC_IDC_SET_ZONE_VENDOR_TYPE: NORMAL
MDC_IDC_STAT_AT_BURDEN_PERCENT: 75 %
MDC_IDC_STAT_AT_DTM_END: NORMAL
MDC_IDC_STAT_AT_DTM_START: NORMAL
MDC_IDC_STAT_BRADY_DTM_END: NORMAL
MDC_IDC_STAT_BRADY_DTM_START: NORMAL
MDC_IDC_STAT_BRADY_RA_PERCENT_PACED: 6 %
MDC_IDC_STAT_BRADY_RV_PERCENT_PACED: 31 %
MDC_IDC_STAT_EPISODE_RECENT_COUNT: 0
MDC_IDC_STAT_EPISODE_RECENT_COUNT: 11
MDC_IDC_STAT_EPISODE_RECENT_COUNT_DTM_END: NORMAL
MDC_IDC_STAT_EPISODE_RECENT_COUNT_DTM_START: NORMAL
MDC_IDC_STAT_EPISODE_TYPE: NORMAL
MDC_IDC_STAT_EPISODE_VENDOR_TYPE: NORMAL

## 2019-06-07 PROCEDURE — A9500 TC99M SESTAMIBI: HCPCS | Performed by: NURSE PRACTITIONER

## 2019-06-07 PROCEDURE — 78452 HT MUSCLE IMAGE SPECT MULT: CPT

## 2019-06-07 PROCEDURE — 34300033 ZZH RX 343: Performed by: NURSE PRACTITIONER

## 2019-06-07 PROCEDURE — 93018 CV STRESS TEST I&R ONLY: CPT | Performed by: INTERNAL MEDICINE

## 2019-06-07 PROCEDURE — 93017 CV STRESS TEST TRACING ONLY: CPT

## 2019-06-07 PROCEDURE — 25000128 H RX IP 250 OP 636: Performed by: INTERNAL MEDICINE

## 2019-06-07 PROCEDURE — 93016 CV STRESS TEST SUPVJ ONLY: CPT | Performed by: INTERNAL MEDICINE

## 2019-06-07 RX ORDER — AMINOPHYLLINE 25 MG/ML
50 INJECTION, SOLUTION INTRAVENOUS 2 TIMES DAILY PRN
Status: DISCONTINUED | OUTPATIENT
Start: 2019-06-07 | End: 2019-06-08 | Stop reason: HOSPADM

## 2019-06-07 RX ORDER — REGADENOSON 0.08 MG/ML
0.4 INJECTION, SOLUTION INTRAVENOUS ONCE
Status: COMPLETED | OUTPATIENT
Start: 2019-06-07 | End: 2019-06-07

## 2019-06-07 RX ADMIN — KIT FOR THE PREPARATION OF TECHNETIUM TC99M SESTAMIBI 8 MILLICURIE: 1 INJECTION, POWDER, LYOPHILIZED, FOR SOLUTION PARENTERAL at 08:10

## 2019-06-07 RX ADMIN — KIT FOR THE PREPARATION OF TECHNETIUM TC99M SESTAMIBI 31 MILLICURIE: 1 INJECTION, POWDER, LYOPHILIZED, FOR SOLUTION PARENTERAL at 10:10

## 2019-06-07 RX ADMIN — REGADENOSON 0.4 MG: 0.08 INJECTION, SOLUTION INTRAVENOUS at 10:13

## 2019-06-07 NOTE — PROGRESS NOTES
0800:  The patient arrived for a Lexiscan Cardiolite stress test.  The procedure, risks, and benefits were discussed with the patient ,and the consent was signed.  A saline lock was started,and the Cardiolite was injected by x-ray.  The patient was taken to the waiting area, to await resting images at 0840.  0940:  The patient returned from x-ray and was prepped for the stress test.    arrived, and the patient was administered the Lexiscan per procedure.  The patient tolerated the procedure.  She was given a snack and taken to x-ray in stable condition for stress images.  The saline lock will be removed by x-ray for proper disposal.  Please see the chart for the complete test results.

## 2019-06-14 ENCOUNTER — ANTICOAGULATION THERAPY VISIT (OUTPATIENT)
Dept: ANTICOAGULATION | Facility: OTHER | Age: 74
End: 2019-06-14
Attending: FAMILY MEDICINE
Payer: MEDICARE

## 2019-06-14 DIAGNOSIS — Z79.01 ANTICOAGULATION GOAL OF INR 2 TO 3: ICD-10-CM

## 2019-06-14 DIAGNOSIS — Z51.81 ANTICOAGULATION GOAL OF INR 2 TO 3: ICD-10-CM

## 2019-06-14 DIAGNOSIS — I48.0 PAROXYSMAL ATRIAL FIBRILLATION (H): ICD-10-CM

## 2019-06-14 LAB — INR POINT OF CARE: 3.3 (ref 0.86–1.14)

## 2019-06-14 PROCEDURE — 85610 PROTHROMBIN TIME: CPT | Mod: QW,ZL

## 2019-06-14 NOTE — PROGRESS NOTES
ANTICOAGULATION FOLLOW-UP CLINIC VISIT    Patient Name:  Kate Chacon  Date:  6/14/2019  Contact Type:  Face to Face    SUBJECTIVE:  Patient Findings     Positives:   Change in medications (has been taking tylenol for HA at night)             OBJECTIVE    INR Protime   Date Value Ref Range Status   06/14/2019 3.3 (A) 0.86 - 1.14 Final       ASSESSMENT / PLAN  INR assessment THER    Recheck INR In: 3 WEEKS    INR Location Clinic      Anticoagulation Summary  As of 6/14/2019    INR goal:   2.0-3.0   TTR:   84.1 % (2.6 y)   INR used for dosing:   3.3! (6/14/2019)   Warfarin maintenance plan:   7.5 mg (5 mg x 1.5) every Mon; 5 mg (5 mg x 1) all other days   Full warfarin instructions:   7.5 mg every Mon; 5 mg all other days   Weekly warfarin total:   37.5 mg   No change documented:   Radha Burns RN   Next INR check:   7/5/2019   Priority:   INR   Target end date:   Indefinite    Indications    Paroxysmal atrial fibrillation (H) [I48.0]  Anticoagulation goal of INR 2 to 3 [Z51.81  Z79.01]             Anticoagulation Episode Summary     INR check location:       Preferred lab:       Send INR reminders to:    INR    Comments:         Anticoagulation Care Providers     Provider Role Specialty Phone number    Asher Campos MD [facility] [phone]            See the Encounter Report to view Anticoagulation Flowsheet and Dosing Calendar (Go to Encounters tab in chart review, and find the Anticoagulation Therapy Visit)        Radha Burns RN

## 2019-07-01 DIAGNOSIS — I10 ESSENTIAL HYPERTENSION: ICD-10-CM

## 2019-07-01 DIAGNOSIS — F33.40 MDD (RECURRENT MAJOR DEPRESSIVE DISORDER) IN REMISSION (H): ICD-10-CM

## 2019-07-05 ENCOUNTER — ANTICOAGULATION THERAPY VISIT (OUTPATIENT)
Dept: ANTICOAGULATION | Facility: OTHER | Age: 74
End: 2019-07-05
Attending: FAMILY MEDICINE
Payer: MEDICARE

## 2019-07-05 DIAGNOSIS — I48.0 PAROXYSMAL ATRIAL FIBRILLATION (H): ICD-10-CM

## 2019-07-05 DIAGNOSIS — Z51.81 ANTICOAGULATION GOAL OF INR 2 TO 3: ICD-10-CM

## 2019-07-05 DIAGNOSIS — Z79.01 ANTICOAGULATION GOAL OF INR 2 TO 3: ICD-10-CM

## 2019-07-05 LAB — INR POINT OF CARE: 3.4 (ref 0.86–1.14)

## 2019-07-05 PROCEDURE — 85610 PROTHROMBIN TIME: CPT | Mod: QW,ZL

## 2019-07-05 RX ORDER — LISINOPRIL 20 MG/1
TABLET ORAL
Qty: 90 TABLET | Refills: 0 | Status: SHIPPED | OUTPATIENT
Start: 2019-07-05 | End: 2019-10-07

## 2019-07-05 NOTE — TELEPHONE ENCOUNTER
"Requested Prescriptions   Pending Prescriptions Disp Refills     lisinopril (PRINIVIL/ZESTRIL) 20 MG tablet [Pharmacy Med Name: LISINOPRIL 20MG     TAB] 90 tablet 1     Sig: TAKE 1 TABLET BY MOUTH ONCE DAILY       ACE Inhibitors (Including Combos) Protocol Passed - 7/1/2019  9:26 PM        Passed - Blood pressure under 140/90 in past 12 months     BP Readings from Last 3 Encounters:   05/29/19 116/74   01/10/19 (!) 150/115   11/19/18 112/70                 Passed - Recent (12 mo) or future (30 days) visit within the authorizing provider's specialty     Patient had office visit in the last 12 months or has a visit in the next 30 days with authorizing provider or within the authorizing provider's specialty.  See \"Patient Info\" tab in inbasket, or \"Choose Columns\" in Meds & Orders section of the refill encounter.              Passed - Medication is active on med list        Passed - Patient is age 18 or older        Passed - No active pregnancy on record        Passed - Normal serum creatinine on file in past 12 months     Recent Labs   Lab Test 10/11/18  0919   CR 0.93             Passed - Normal serum potassium on file in past 12 months     Recent Labs   Lab Test 10/11/18  0919   POTASSIUM 4.7             Passed - No positive pregnancy test within past 12 months        sertraline (ZOLOFT) 50 MG tablet [Pharmacy Med Name: SERTRALINE 50MG TAB] 90 tablet 2     Sig: TAKE 1 TABLET BY MOUTH AT BEDTIME       SSRIs Protocol Passed - 7/1/2019  9:26 PM        Passed - PHQ-9 score less than 5 in past 6 months     Please review last PHQ-9 score.           Passed - Medication is active on med list        Passed - Patient is age 18 or older        Passed - No active pregnancy on record        Passed - No positive pregnancy test in last 12 months        Passed - Recent (6 mo) or future (30 days) visit within the authorizing provider's specialty     Patient had office visit in the last 6 months or has a visit in the next 30 days " "with authorizing provider or within the authorizing provider's specialty.  See \"Patient Info\" tab in inbasket, or \"Choose Columns\" in Meds & Orders section of the refill encounter.            LOV and last labs per protocol 10/11/18    Prescription approved per [facility] Refill Protocol.    "

## 2019-07-05 NOTE — PROGRESS NOTES
ANTICOAGULATION FOLLOW-UP CLINIC VISIT    Patient Name:  Kate Chacon  Date:  7/5/2019  Contact Type:  Face to Face    SUBJECTIVE:  Patient Findings     Positives:   Change in medications (Taking Acetaminophen for headaches)        Clinical Outcomes     Negatives:   Major bleeding event, Thromboembolic event, Anticoagulation-related hospital admission, Anticoagulation-related ED visit, Anticoagulation-related fatality           OBJECTIVE    INR Protime   Date Value Ref Range Status   07/05/2019 3.4 (A) 0.86 - 1.14 Final       ASSESSMENT / PLAN  INR assessment SUPRA    Recheck INR In: 1 WEEK    INR Location Clinic      Anticoagulation Summary  As of 7/5/2019    INR goal:   2.0-3.0   TTR:   82.3 % (2.6 y)   INR used for dosing:   3.4! (7/5/2019)   Warfarin maintenance plan:   5 mg (5 mg x 1) every day   Full warfarin instructions:   5 mg every day   Weekly warfarin total:   35 mg   Plan last modified:   Joi Whelan RN (7/5/2019)   Next INR check:   7/12/2019   Priority:   INR   Target end date:   Indefinite    Indications    Paroxysmal atrial fibrillation (H) [I48.0]  Anticoagulation goal of INR 2 to 3 [Z51.81  Z79.01]             Anticoagulation Episode Summary     INR check location:       Preferred lab:       Send INR reminders to:    INR    Comments:         Anticoagulation Care Providers     Provider Role Specialty Phone number    Asher Campos MD [facility] [phone]            See the Encounter Report to view Anticoagulation Flowsheet and Dosing Calendar (Go to Encounters tab in chart review, and find the Anticoagulation Therapy Visit)       Joi Whelan RN

## 2019-07-12 ENCOUNTER — ANTICOAGULATION THERAPY VISIT (OUTPATIENT)
Dept: ANTICOAGULATION | Facility: OTHER | Age: 74
End: 2019-07-12
Attending: FAMILY MEDICINE
Payer: MEDICARE

## 2019-07-12 DIAGNOSIS — Z51.81 ANTICOAGULATION GOAL OF INR 2 TO 3: ICD-10-CM

## 2019-07-12 DIAGNOSIS — I48.0 PAROXYSMAL ATRIAL FIBRILLATION (H): ICD-10-CM

## 2019-07-12 DIAGNOSIS — Z79.01 ANTICOAGULATION GOAL OF INR 2 TO 3: ICD-10-CM

## 2019-07-12 LAB — INR POINT OF CARE: 3 (ref 0.86–1.14)

## 2019-07-12 PROCEDURE — 85610 PROTHROMBIN TIME: CPT | Mod: QW,ZL

## 2019-07-12 RX ORDER — WARFARIN SODIUM 5 MG/1
TABLET ORAL
Qty: 100 TABLET | Refills: 1 | COMMUNITY
Start: 2019-07-12 | End: 2019-11-18

## 2019-07-12 NOTE — PROGRESS NOTES
ANTICOAGULATION FOLLOW-UP CLINIC VISIT    Patient Name:  Kate Chacon  Date:  7/12/2019  Contact Type:  Face to Face    SUBJECTIVE:  Patient Findings         Clinical Outcomes     Negatives:   Major bleeding event, Thromboembolic event, Anticoagulation-related hospital admission, Anticoagulation-related ED visit, Anticoagulation-related fatality           OBJECTIVE    INR Protime   Date Value Ref Range Status   07/12/2019 3.0 (A) 0.86 - 1.14 Final       ASSESSMENT / PLAN  INR assessment THER    Recheck INR In: 2 WEEKS    INR Location Clinic      Anticoagulation Summary  As of 7/12/2019    INR goal:   2.0-3.0   TTR:   81.7 % (2.7 y)   INR used for dosing:   3.0 (7/12/2019)   Warfarin maintenance plan:   5 mg (5 mg x 1) every day   Full warfarin instructions:   5 mg every day   Weekly warfarin total:   35 mg   Plan last modified:   Joi Whelan RN (7/5/2019)   Next INR check:   7/26/2019   Priority:   INR   Target end date:   Indefinite    Indications    Paroxysmal atrial fibrillation (H) [I48.0]  Anticoagulation goal of INR 2 to 3 [Z51.81  Z79.01]             Anticoagulation Episode Summary     INR check location:       Preferred lab:       Send INR reminders to:    INR    Comments:         Anticoagulation Care Providers     Provider Role Specialty Phone number    Asher Campos MD [facility] [phone]            See the Encounter Report to view Anticoagulation Flowsheet and Dosing Calendar (Go to Encounters tab in chart review, and find the Anticoagulation Therapy Visit)        Sara Rodriguez RN

## 2019-07-26 ENCOUNTER — ANTICOAGULATION THERAPY VISIT (OUTPATIENT)
Dept: ANTICOAGULATION | Facility: OTHER | Age: 74
End: 2019-07-26
Attending: FAMILY MEDICINE
Payer: MEDICARE

## 2019-07-26 DIAGNOSIS — Z51.81 ANTICOAGULATION GOAL OF INR 2 TO 3: ICD-10-CM

## 2019-07-26 DIAGNOSIS — I48.0 PAROXYSMAL ATRIAL FIBRILLATION (H): ICD-10-CM

## 2019-07-26 DIAGNOSIS — E78.5 HYPERLIPIDEMIA, UNSPECIFIED HYPERLIPIDEMIA TYPE: ICD-10-CM

## 2019-07-26 DIAGNOSIS — Z79.01 ANTICOAGULATION GOAL OF INR 2 TO 3: ICD-10-CM

## 2019-07-26 LAB — INR POINT OF CARE: 2.9 (ref 0.86–1.14)

## 2019-07-26 PROCEDURE — 85610 PROTHROMBIN TIME: CPT | Mod: QW,ZL

## 2019-07-26 NOTE — TELEPHONE ENCOUNTER
Refill request for: Atorvastatin 40 mg tablets   [email or web address]   Rx written date: 05/02/2019 #90 with 0 refills  LOV: 05/29/2019 with WASHINGTON  Next appt: none noted other than pacemaker check  Protocol:   Statins Protocol Failed   LDL on file in past 12 months     Reviewed LOV with MALINI, as well as, results of Lexiscan on 06/07/2019. Pt to follow with Cardiology on an as needed basis. Will route to PCP for review. Sabrina Washington RN on 7/26/2019 at 10:30 AM

## 2019-07-26 NOTE — PROGRESS NOTES
ANTICOAGULATION FOLLOW-UP CLINIC VISIT    Patient Name:  Kate Chacon  Date:  2019  Contact Type:  Face to Face    SUBJECTIVE:  Patient Findings     Comments:   The patient was assessed for diet, medication, and activity level changes, missed or extra doses, bruising or bleeding, with no problem findings.          Clinical Outcomes     Negatives:   Major bleeding event, Thromboembolic event, Anticoagulation-related hospital admission, Anticoagulation-related ED visit, Anticoagulation-related fatality    Comments:   The patient was assessed for diet, medication, and activity level changes, missed or extra doses, bruising or bleeding, with no problem findings.             OBJECTIVE    INR Protime   Date Value Ref Range Status   2019 2.9 (A) 0.86 - 1.14 Final       ASSESSMENT / PLAN  INR assessment THER    Recheck INR In: 3 WEEKS    INR Location Clinic      Anticoagulation Summary  As of 2019    INR goal:   2.0-3.0   TTR:   82.0 % (2.7 y)   INR used for dosin.9 (2019)   Warfarin maintenance plan:   5 mg (5 mg x 1) every day   Full warfarin instructions:   5 mg every day   Weekly warfarin total:   35 mg   Plan last modified:   Joi Whelan RN (2019)   Next INR check:   2019   Priority:   INR   Target end date:   Indefinite    Indications    Paroxysmal atrial fibrillation (H) [I48.0]  Anticoagulation goal of INR 2 to 3 [Z51.81  Z79.01]             Anticoagulation Episode Summary     INR check location:       Preferred lab:       Send INR reminders to:    INR    Comments:         Anticoagulation Care Providers     Provider Role Specialty Phone number    Asher Campos MD [facility] [phone]            See the Encounter Report to view Anticoagulation Flowsheet and Dosing Calendar (Go to Encounters tab in chart review, and find the Anticoagulation Therapy Visit)      Joi Whelan RN

## 2019-07-29 RX ORDER — ATORVASTATIN CALCIUM 40 MG/1
TABLET, FILM COATED ORAL
Qty: 90 TABLET | Refills: 3 | Status: SHIPPED | OUTPATIENT
Start: 2019-07-29 | End: 2020-08-05

## 2019-07-30 ENCOUNTER — TELEPHONE (OUTPATIENT)
Dept: UROLOGY | Facility: OTHER | Age: 74
End: 2019-07-30

## 2019-07-30 NOTE — TELEPHONE ENCOUNTER
PA form sent to be addressed by the [facility]'s.  Nicol Parks RN......July 30, 2019...10:55 AM

## 2019-07-30 NOTE — TELEPHONE ENCOUNTER
Patient would like another Botox treatment. Last Office Visit: 9-13-18. Plan: Continue Botox therapy. Follow up when symptoms return to schedule re-treatment. Please submit for Authorization. Thanks.

## 2019-07-31 ENCOUNTER — APPOINTMENT (OUTPATIENT)
Dept: GENERAL RADIOLOGY | Facility: OTHER | Age: 74
End: 2019-07-31
Attending: FAMILY MEDICINE
Payer: MEDICARE

## 2019-07-31 ENCOUNTER — HOSPITAL ENCOUNTER (EMERGENCY)
Facility: OTHER | Age: 74
Discharge: HOME | End: 2019-07-31
Attending: FAMILY MEDICINE | Admitting: FAMILY MEDICINE
Payer: MEDICARE

## 2019-07-31 VITALS
HEART RATE: 73 BPM | SYSTOLIC BLOOD PRESSURE: 138 MMHG | DIASTOLIC BLOOD PRESSURE: 79 MMHG | HEIGHT: 67 IN | RESPIRATION RATE: 16 BRPM | BODY MASS INDEX: 29.82 KG/M2 | OXYGEN SATURATION: 98 % | WEIGHT: 190 LBS | TEMPERATURE: 97.6 F

## 2019-07-31 DIAGNOSIS — R07.81 RIB PAIN ON RIGHT SIDE: ICD-10-CM

## 2019-07-31 PROCEDURE — 99283 EMERGENCY DEPT VISIT LOW MDM: CPT | Mod: 25 | Performed by: FAMILY MEDICINE

## 2019-07-31 PROCEDURE — 99283 EMERGENCY DEPT VISIT LOW MDM: CPT | Mod: Z6 | Performed by: FAMILY MEDICINE

## 2019-07-31 PROCEDURE — 71046 X-RAY EXAM CHEST 2 VIEWS: CPT

## 2019-07-31 RX ORDER — TRAMADOL HYDROCHLORIDE 50 MG/1
50 TABLET ORAL EVERY 8 HOURS PRN
Qty: 15 TABLET | Refills: 0 | Status: SHIPPED | OUTPATIENT
Start: 2019-07-31 | End: 2019-10-29

## 2019-07-31 ASSESSMENT — ENCOUNTER SYMPTOMS
CHILLS: 0
DIAPHORESIS: 0
FEVER: 0
EYES NEGATIVE: 1
SHORTNESS OF BREATH: 0

## 2019-07-31 ASSESSMENT — MIFFLIN-ST. JEOR: SCORE: 1384.46

## 2019-07-31 NOTE — ED AVS SNAPSHOT
[facility]  [address]  [place] [address]  Phone:  [phone]  Fax:  [phone]                                    Kate Chacon   MRN: [id number]    Department:  [facility]   Date of Visit:  7/31/2019           After Visit Summary Signature Page    I have received my discharge instructions, and my questions have been answered. I have discussed any challenges I see with this plan with the nurse or doctor.    ..........................................................................................................................................  Patient/Patient Representative Signature      ..........................................................................................................................................  Patient Representative Print Name and Relationship to Patient    ..................................................               ................................................  Date                                   Time    ..........................................................................................................................................  Reviewed by Signature/Title    ...................................................              ..............................................  Date                                               Time          22EPIC Rev 08/18

## 2019-08-01 ENCOUNTER — TELEPHONE (OUTPATIENT)
Dept: FAMILY MEDICINE | Facility: OTHER | Age: 74
End: 2019-08-01

## 2019-08-01 NOTE — ED PROVIDER NOTES
History     Chief Complaint   Patient presents with     Rib Pain     HPI  Kate Chacon is a 74 year old female who awoke with right rib tenderness yesterday morning after coughing the previous night.  No fevers chills or sweats.  No shortness of breath.  No abnormal bleeding.  She just had her INR checked this past Friday and it was therapeutic.  She has had no change in diet/intake/dose in the interim.  She has not had a shingles vaccine.    Allergies:  Allergies   Allergen Reactions     Methylpar-Na Bisulfite-Pentazocine Unknown     jittery       Problem List:    Patient Active Problem List    Diagnosis Date Noted     Sinoatrial node dysfunction (H) 05/01/2018     Priority: Medium     Cardiac pacemaker 02/19/2018     Priority: Medium     DNI (do not intubate) 10/25/2017     Priority: Medium     Overview:   As discussed with patient on 10/25/17       Urge incontinence 10/05/2017     Priority: Medium     Former smoker 08/30/2017     Priority: Medium     MDD (recurrent major depressive disorder) in remission (H) 08/30/2017     Priority: Medium     Anxiety 01/05/2017     Priority: Medium     Hypertension 12/08/2016     Priority: Medium     Paroxysmal atrial fibrillation (H) 11/02/2016     Priority: Medium     Anticoagulation goal of INR 2 to 3 10/28/2016     Priority: Medium     Ischemic stroke (H) 10/15/2016     Priority: Medium     Diverticulosis of large intestine 04/04/2011     Priority: Medium     History of colonic polyps 03/02/2011     Priority: Medium     Osteoporosis 02/08/2006     Priority: Medium        Past Medical History:    Past Medical History:   Diagnosis Date     Encounter for full-term uncomplicated delivery      Ganglion of wrist      Gastritis without bleeding        Past Surgical History:    Past Surgical History:   Procedure Laterality Date     APPENDECTOMY OPEN      No Comments Provided     COLONOSCOPY  08/19/2005 8/19/05,Cecal biopsy with tubular adenoma low grade dysplasia.      "COLONOSCOPY  2011     COLONOSCOPY  2012     ESOPHAGOSCOPY, GASTROSCOPY, DUODENOSCOPY (EGD), COMBINED      05     OTHER SURGICAL HISTORY      8/3/05,468470,OTHER,helices on the right ear     TONSILLECTOMY      No Comments Provided       Family History:    Family History   Problem Relation Age of Onset     Diabetes Father         Diabetes     Hypertension Father         Hypertension     Other - See Comments Mother         h/o coronary artery disease/dementia     Asthma Mother         Asthma     Diabetes Maternal Grandmother         Diabetes     Cancer Maternal Aunt         Cancer,Uterine     Breast Cancer No family hx of         Cancer-breast       Social History:  Marital Status:   [2]  Social History     Tobacco Use     Smoking status: Former Smoker     Packs/day: 0.50     Years: 49.00     Pack years: 24.50     Types: Cigarettes     Last attempt to quit: 10/14/2016     Years since quittin.7     Smokeless tobacco: Never Used   Substance Use Topics     Alcohol use: No     Drug use: No     Comment: Drug use: No        Medications:      atorvastatin (LIPITOR) 40 MG tablet   lisinopril (PRINIVIL/ZESTRIL) 20 MG tablet   metoprolol succinate ER (TOPROL-XL) 25 MG 24 hr tablet   sertraline (ZOLOFT) 50 MG tablet   traMADol (ULTRAM) 50 MG tablet   warfarin (COUMADIN) 5 MG tablet   calcium carbonate 750 MG CHEW   furosemide (LASIX) 20 MG tablet   ibuprofen (ADVIL/MOTRIN) 200 MG tablet         Review of Systems   Constitutional: Negative for chills, diaphoresis and fever.   HENT: Negative.    Eyes: Negative.    Respiratory: Negative for shortness of breath.    Cardiovascular: Positive for chest pain (right rib pain/tenderness with movement of her right arm and with cough/deep breathing.).       Physical Exam   BP: 138/79  Pulse: 73  Temp: 97.6  F (36.4  C)  Resp: 16  Height: 170.2 cm (5' 7\")  Weight: 86.2 kg (190 lb)  SpO2: 98 %      Physical Exam   Constitutional: She appears " well-developed and well-nourished. She appears distressed.   HENT:   Head: Normocephalic and atraumatic.   Eyes: Pupils are equal, round, and reactive to light. EOM are normal. No scleral icterus.   Neck: Normal range of motion.   Cardiovascular: Normal rate, regular rhythm and normal heart sounds.   Pulmonary/Chest: Effort normal and breath sounds normal. No respiratory distress. She exhibits tenderness.       Abdominal: Soft. Bowel sounds are normal. She exhibits no distension. There is no tenderness.   Musculoskeletal: She exhibits tenderness (right chest wall tenderness.).   Neurological: She is alert.   Skin: Skin is warm and dry. No rash noted. She is not diaphoretic. No erythema.   Psychiatric: She has a normal mood and affect.   Nursing note and vitals reviewed.        Exam Information     Exam Date Accession # Results    7/26/19     Component Value Flag Ref Range Units Status Collected Lab   INR Protime 2.9  Abnormal   0.86 - 1.14  Final 07/26/2019 12:00 AM          ED Course        Procedures               Critical Care time:  none               Results for orders placed or performed during the hospital encounter of 07/31/19 (from the past 24 hour(s))   XR Chest 2 Views    Narrative    PROCEDURE:  XR CHEST 2 VW    HISTORY:  Rib pain, and SOB.     COMPARISON:  2017    FINDINGS:   There is a transvenous pacemaker in place there is mild interstitial  thickening seen in the lungs. T there is an irregular distribution of  pulmonary vascularity consistent with emphysema No pleural effusion or  pneumothorax.      Impression    IMPRESSION:  Mild interstitial thickening most likely chronic      HILLARY WILLOUGHBY MD       Medications - No data to display    9:40 PM I discussed easily reproducible pain in the right ribs and intercostal muscles worse with movement of her arm and deep breathing most consistent with musculoskeletal injury and consider intercostal muscle strain versus occult rib fracture as  most likely etiology.  However cannot rule out early presentation of shingles and patient will be on the look out for onset of a shingles rash.  We talked about pain control.  She is on Coumadin and should avoid NSAIDs but she can use Tylenol 4 times daily as needed and will give prescription for tramadol to use 50 mg 3 times daily as needed for worse pain understanding increased risk for falls and not to use for driving or operating machinery.  We discussed options for further work up including blood work to check INR but patient comfortable with current plan of care.    Assessments & Plan (with Medical Decision Making)   74-year-old right-hand-dominant female with history of COPD and a coughing jag 2 nights ago awoke yesterday morning with easily reproducible tenderness in the right lateral chest wall/ribs and easily reproducible tenderness on examination in the same area.  She has no obvious skin changes to suggest infection or shingles rash at this time but cannot rule out early presentation.  However most likely patient has strain of intercostal muscles or occult rib fracture in the 4th-6th rib distribution from her coughing the previous night.  Marlyn symptomatic measures.  She will use ice for pain heat for stiffness and Tylenol for general pain control.  She is instructed to avoid any NSAIDs because of her Coumadin use.  And she may use tramadol 50 mg 3 times daily as needed for more severe pains understanding she is not to drive or operate machinery and understanding increased risk for falls.  We will follow-up in clinic if symptoms persist or progress or if she develops a shingles rash.      I have reviewed the nursing notes.    I have reviewed the findings, diagnosis, plan and need for follow up with the patient.          Medication List      Started    traMADol 50 MG tablet  Commonly known as:  ULTRAM  50 mg, Oral, EVERY 8 HOURS PRN            Final diagnoses:   Rib pain on right side - Likely intercostal  muscle strain versus occult rib fracture, but can't rule out early shingles.       7/31/2019   [facility]     Lonnie Perez MD  07/31/19 [id number]

## 2019-08-01 NOTE — ED TRIAGE NOTES
"Patient reporting right side rib pain, making it hard for patient to take a deep breath. Patient reporting pain started yesterday. Patient denies injury to rib cage, and reporting \"light cough.\"  "

## 2019-08-01 NOTE — TELEPHONE ENCOUNTER
JVC- pt called stating she needs an ER f/u. I informed pt she may not get a phone call back until Monday     Khushi Espinoza on 8/1/2019 at 3:59 PM

## 2019-08-01 NOTE — DISCHARGE INSTRUCTIONS
Dear Ms. Chacon,  It was nice to see you.  As we talked, I think you have pulled muscles in the right side of your chest and very likely have a rib fracture.  Use ice for pain and heat for stiffness.    You can use tylenol 650mg 4 times a day for pain.  You can add Tramadol 50mg up to three times a day for worse pain - but be careful to avoid driving/machine use/and falls on this medication.    Watch for onset of shingles rash and follow up with Dr. Campos if this occurs as the treatment is different.    Dr. Pillo Perez

## 2019-08-02 NOTE — TELEPHONE ENCOUNTER
Gave appointment for 8/20 at 2:00.  Chrystal Hurtado LPN........................8/2/2019  10:13 AM

## 2019-08-06 ENCOUNTER — HOSPITAL ENCOUNTER (OUTPATIENT)
Dept: CARDIOLOGY | Facility: OTHER | Age: 74
Discharge: HOME | End: 2019-08-06
Attending: INTERNAL MEDICINE | Admitting: INTERNAL MEDICINE
Payer: COMMERCIAL

## 2019-08-06 DIAGNOSIS — I48.20 CHRONIC A-FIB (H): ICD-10-CM

## 2019-08-06 DIAGNOSIS — I48.20 CHRONIC A-FIB (H): Primary | ICD-10-CM

## 2019-08-06 PROCEDURE — 93280 PM DEVICE PROGR EVAL DUAL: CPT | Performed by: INTERNAL MEDICINE

## 2019-08-06 NOTE — PATIENT INSTRUCTIONS
It was a pleasure to see you in clinic today.  Please do not hesitate to call with any questions or concerns.  You are scheduled for a remote transmission on 11/8/19.  We look forward to seeing you in clinic at your next device check in 6 months.    Anamaria Garcia RN, BSN  Electrophysiology Nurse Clinician  [facility]    During Business Hours Please Call:  [phone]  After Hours Please Call:  [phone] - select option #4 and ask for job code [phone]

## 2019-08-08 ENCOUNTER — OFFICE VISIT (OUTPATIENT)
Dept: UROLOGY | Facility: OTHER | Age: 74
End: 2019-08-08
Attending: UROLOGY
Payer: MEDICARE

## 2019-08-08 VITALS — RESPIRATION RATE: 16 BRPM | HEART RATE: 64 BPM | WEIGHT: 199.6 LBS | BODY MASS INDEX: 31.26 KG/M2

## 2019-08-08 DIAGNOSIS — N39.0 URINARY TRACT INFECTION WITHOUT HEMATURIA, SITE UNSPECIFIED: ICD-10-CM

## 2019-08-08 DIAGNOSIS — N39.41 URGE INCONTINENCE: Primary | ICD-10-CM

## 2019-08-08 LAB
ALBUMIN UR-MCNC: NEGATIVE MG/DL
APPEARANCE UR: CLEAR
BACTERIA #/AREA URNS HPF: ABNORMAL /HPF
BILIRUB UR QL STRIP: NEGATIVE
COLOR UR AUTO: YELLOW
GLUCOSE UR STRIP-MCNC: NEGATIVE MG/DL
HGB UR QL STRIP: ABNORMAL
KETONES UR STRIP-MCNC: NEGATIVE MG/DL
LEUKOCYTE ESTERASE UR QL STRIP: ABNORMAL
NITRATE UR QL: NEGATIVE
PH UR STRIP: 6.5 PH (ref 5–9)
RBC #/AREA URNS AUTO: ABNORMAL /HPF
SOURCE: ABNORMAL
SP GR UR STRIP: 1.02 (ref 1–1.03)
UROBILINOGEN UR STRIP-ACNC: 0.2 EU/DL (ref 0.2–1)
WBC #/AREA URNS AUTO: ABNORMAL /HPF

## 2019-08-08 PROCEDURE — 51798 US URINE CAPACITY MEASURE: CPT | Performed by: UROLOGY

## 2019-08-08 PROCEDURE — 99213 OFFICE O/P EST LOW 20 MIN: CPT | Mod: 25 | Performed by: UROLOGY

## 2019-08-08 PROCEDURE — G0463 HOSPITAL OUTPT CLINIC VISIT: HCPCS | Mod: 25

## 2019-08-08 PROCEDURE — 87086 URINE CULTURE/COLONY COUNT: CPT | Mod: ZL | Performed by: UROLOGY

## 2019-08-08 PROCEDURE — 81001 URINALYSIS AUTO W/SCOPE: CPT | Mod: ZL | Performed by: UROLOGY

## 2019-08-08 PROCEDURE — 87077 CULTURE AEROBIC IDENTIFY: CPT | Mod: ZL | Performed by: UROLOGY

## 2019-08-08 RX ORDER — CEFUROXIME AXETIL 500 MG/1
500 TABLET ORAL 2 TIMES DAILY
Qty: 14 TABLET | Refills: 0 | Status: SHIPPED | OUTPATIENT
Start: 2019-08-08 | End: 2019-10-26

## 2019-08-08 ASSESSMENT — PAIN SCALES - GENERAL: PAINLEVEL: NO PAIN (0)

## 2019-08-08 NOTE — PROGRESS NOTES
Type of Visit  Established    Chief Complaint  Urge incontinence    HPI  Ms. Chacon is a 74 year old female with urge incontinence who is undergoing Botox injections for treatment.  Her last treatment was about 1 year ago.  Her symptoms are back to baseline and she complains of dysuria with frequency and urgency.  She denies fevers.  Her previous Botox treatment was successful.  She had about 9-10 good months of managed symptoms.      Review of Systems  I reviewed the ROS with the patient.    Nursing Notes:   Nicol Parks RN  8/8/2019  2:36 PM  Signed  Review of Systems:    Weight loss:    No     Recent fever/chills:  No   Night sweats:   No  Current skin rash:  No   Recent hair loss:  No  Heat intolerance:  No   Cold intolerance:  No  Chest pain:   No   Palpitations:   No  Shortness of breath:  No   Wheezing:   No  Constipation:    No   Diarrhea:   No   Nausea:   No   Vomiting:   No   Kidney/side pain:  No   Back pain:   No  Frequent headaches:  No   Dizziness:     No  Leg swelling:   No   Calf pain:    No    Post-Void Residual  A post-void residual was measured by ultrasonic bladder scanner.  89 mL    Family History  Family History   Problem Relation Age of Onset     Diabetes Father         Diabetes     Hypertension Father         Hypertension     Other - See Comments Mother         h/o coronary artery disease/dementia     Asthma Mother         Asthma     Diabetes Maternal Grandmother         Diabetes     Cancer Maternal Aunt         Cancer,Uterine     Breast Cancer No family hx of         Cancer-breast       Physical Exam  Vitals:    08/08/19 1432   Pulse: 64   Resp: 16   Weight: 90.5 kg (199 lb 9.6 oz)     Constitutional: NAD, WDWN.   Cardiovascular: Regular rate.  Pulmonary/Chest: Respirations are even and non-labored bilaterally.  Abdominal: Soft. No distension, tenderness, masses or guarding. No CVA tenderness.  Genitourinary: Nonpalpable bladder.  Extremities: MARI x 4, Warm. No clubbing.  No cyanosis.     Skin: Pink, warm and dry.  No rashes noted.    Labs  Results for orders placed or performed in visit on 08/08/19   UA reflex to Microscopic   Result Value Ref Range    Color Urine Yellow     Appearance Urine Clear     Glucose Urine Negative NEG^Negative mg/dL    Bilirubin Urine Negative NEG^Negative    Ketones Urine Negative NEG^Negative mg/dL    Specific Gravity Urine 1.020 1.000 - 1.030    Blood Urine Trace (A) NEG^Negative    pH Urine 6.5 5.0 - 9.0 pH    Protein Albumin Urine Negative NEG^Negative mg/dL    Urobilinogen Urine 0.2 0.2 - 1.0 EU/dL    Nitrite Urine Negative NEG^Negative    Leukocyte Esterase Urine Moderate (A) NEG^Negative    Source Midstream Urine    Urine Microscopic   Result Value Ref Range    WBC Urine 25-50 (A) OTO5^0 - 5 /HPF    RBC Urine O - 2 OTO2^O - 2 /HPF    Bacteria Urine Moderate (A) NEG^Negative /HPF     Post-Void Residual  A post-void residual was measured by ultrasonic bladder scanner.  89 mL    Assessment  Ms. Chacon is a 74 year old female with urge incontinence who presents for Botox treatment which I canceled today due to a UTI.    Plan  UCx  Ceftin x 1 week  Follow-up in 1 week for Botox

## 2019-08-08 NOTE — NURSING NOTE
Review of Systems:    Weight loss:    No     Recent fever/chills:  No   Night sweats:   No  Current skin rash:  No   Recent hair loss:  No  Heat intolerance:  No   Cold intolerance:  No  Chest pain:   No   Palpitations:   No  Shortness of breath:  No   Wheezing:   No  Constipation:    No   Diarrhea:   No   Nausea:   No   Vomiting:   No   Kidney/side pain:  No   Back pain:   No  Frequent headaches:  No   Dizziness:     No  Leg swelling:   No   Calf pain:    No    Post-Void Residual  A post-void residual was measured by ultrasonic bladder scanner.  89 mL

## 2019-08-10 LAB
BACTERIA SPEC CULT: ABNORMAL
MDC_IDC_LEAD_IMPLANT_DT: NORMAL
MDC_IDC_LEAD_IMPLANT_DT: NORMAL
MDC_IDC_LEAD_LOCATION: NORMAL
MDC_IDC_LEAD_LOCATION: NORMAL
MDC_IDC_LEAD_LOCATION_DETAIL_1: NORMAL
MDC_IDC_LEAD_LOCATION_DETAIL_1: NORMAL
MDC_IDC_LEAD_MFG: NORMAL
MDC_IDC_LEAD_MFG: NORMAL
MDC_IDC_LEAD_MODEL: NORMAL
MDC_IDC_LEAD_MODEL: NORMAL
MDC_IDC_LEAD_POLARITY_TYPE: NORMAL
MDC_IDC_LEAD_POLARITY_TYPE: NORMAL
MDC_IDC_LEAD_SERIAL: NORMAL
MDC_IDC_LEAD_SERIAL: NORMAL
MDC_IDC_MSMT_BATTERY_DTM: NORMAL
MDC_IDC_MSMT_BATTERY_REMAINING_LONGEVITY: 72 MO
MDC_IDC_MSMT_BATTERY_STATUS: NORMAL
MDC_IDC_MSMT_LEADCHNL_RA_IMPEDANCE_VALUE: 774 OHM
MDC_IDC_MSMT_LEADCHNL_RA_SENSING_INTR_AMPL: 3 MV
MDC_IDC_MSMT_LEADCHNL_RV_IMPEDANCE_VALUE: 790 OHM
MDC_IDC_MSMT_LEADCHNL_RV_PACING_THRESHOLD_AMPLITUDE: 0.5 V
MDC_IDC_MSMT_LEADCHNL_RV_PACING_THRESHOLD_PULSEWIDTH: 0.4 MS
MDC_IDC_MSMT_LEADCHNL_RV_SENSING_INTR_AMPL: 16 MV
MDC_IDC_PG_IMPLANT_DTM: NORMAL
MDC_IDC_PG_MFG: NORMAL
MDC_IDC_PG_MODEL: NORMAL
MDC_IDC_PG_SERIAL: NORMAL
MDC_IDC_PG_TYPE: NORMAL
MDC_IDC_SESS_CLINIC_NAME: NORMAL
MDC_IDC_SESS_DTM: NORMAL
MDC_IDC_SESS_TYPE: NORMAL
MDC_IDC_SET_BRADY_AT_MODE_SWITCH_MODE: NORMAL
MDC_IDC_SET_BRADY_AT_MODE_SWITCH_RATE: 170 {BEATS}/MIN
MDC_IDC_SET_BRADY_LOWRATE: 60 {BEATS}/MIN
MDC_IDC_SET_BRADY_MAX_SENSOR_RATE: 130 {BEATS}/MIN
MDC_IDC_SET_BRADY_MAX_TRACKING_RATE: 130 {BEATS}/MIN
MDC_IDC_SET_BRADY_MODE: NORMAL
MDC_IDC_SET_BRADY_PAV_DELAY_HIGH: 250 MS
MDC_IDC_SET_BRADY_PAV_DELAY_LOW: 250 MS
MDC_IDC_SET_BRADY_SAV_DELAY_HIGH: 220 MS
MDC_IDC_SET_BRADY_SAV_DELAY_LOW: 220 MS
MDC_IDC_SET_LEADCHNL_RA_PACING_AMPLITUDE: 5 V
MDC_IDC_SET_LEADCHNL_RA_PACING_ANODE_ELECTRODE_1: NORMAL
MDC_IDC_SET_LEADCHNL_RA_PACING_ANODE_LOCATION_1: NORMAL
MDC_IDC_SET_LEADCHNL_RA_PACING_CAPTURE_MODE: NORMAL
MDC_IDC_SET_LEADCHNL_RA_PACING_CATHODE_ELECTRODE_1: NORMAL
MDC_IDC_SET_LEADCHNL_RA_PACING_CATHODE_LOCATION_1: NORMAL
MDC_IDC_SET_LEADCHNL_RA_PACING_POLARITY: NORMAL
MDC_IDC_SET_LEADCHNL_RA_PACING_PULSEWIDTH: 0.4 MS
MDC_IDC_SET_LEADCHNL_RA_SENSING_ADAPTATION_MODE: NORMAL
MDC_IDC_SET_LEADCHNL_RA_SENSING_ANODE_ELECTRODE_1: NORMAL
MDC_IDC_SET_LEADCHNL_RA_SENSING_ANODE_LOCATION_1: NORMAL
MDC_IDC_SET_LEADCHNL_RA_SENSING_CATHODE_ELECTRODE_1: NORMAL
MDC_IDC_SET_LEADCHNL_RA_SENSING_CATHODE_LOCATION_1: NORMAL
MDC_IDC_SET_LEADCHNL_RA_SENSING_POLARITY: NORMAL
MDC_IDC_SET_LEADCHNL_RA_SENSING_SENSITIVITY: 0.25 MV
MDC_IDC_SET_LEADCHNL_RV_PACING_AMPLITUDE: 1 V
MDC_IDC_SET_LEADCHNL_RV_PACING_ANODE_ELECTRODE_1: NORMAL
MDC_IDC_SET_LEADCHNL_RV_PACING_ANODE_LOCATION_1: NORMAL
MDC_IDC_SET_LEADCHNL_RV_PACING_CAPTURE_MODE: NORMAL
MDC_IDC_SET_LEADCHNL_RV_PACING_CATHODE_ELECTRODE_1: NORMAL
MDC_IDC_SET_LEADCHNL_RV_PACING_CATHODE_LOCATION_1: NORMAL
MDC_IDC_SET_LEADCHNL_RV_PACING_POLARITY: NORMAL
MDC_IDC_SET_LEADCHNL_RV_PACING_PULSEWIDTH: 0.4 MS
MDC_IDC_SET_LEADCHNL_RV_SENSING_ADAPTATION_MODE: NORMAL
MDC_IDC_SET_LEADCHNL_RV_SENSING_ANODE_ELECTRODE_1: NORMAL
MDC_IDC_SET_LEADCHNL_RV_SENSING_ANODE_LOCATION_1: NORMAL
MDC_IDC_SET_LEADCHNL_RV_SENSING_CATHODE_ELECTRODE_1: NORMAL
MDC_IDC_SET_LEADCHNL_RV_SENSING_CATHODE_LOCATION_1: NORMAL
MDC_IDC_SET_LEADCHNL_RV_SENSING_POLARITY: NORMAL
MDC_IDC_SET_LEADCHNL_RV_SENSING_SENSITIVITY: 1.5 MV
MDC_IDC_SET_ZONE_DETECTION_INTERVAL: 375 MS
MDC_IDC_SET_ZONE_TYPE: NORMAL
MDC_IDC_SET_ZONE_VENDOR_TYPE: NORMAL
MDC_IDC_STAT_AT_BURDEN_PERCENT: 72 %
MDC_IDC_STAT_BRADY_RA_PERCENT_PACED: 6 %
MDC_IDC_STAT_BRADY_RV_PERCENT_PACED: 30 %
MDC_IDC_STAT_EPISODE_RECENT_COUNT: 0
MDC_IDC_STAT_EPISODE_RECENT_COUNT: 19
MDC_IDC_STAT_EPISODE_RECENT_COUNT_DTM_END: NORMAL
MDC_IDC_STAT_EPISODE_RECENT_COUNT_DTM_END: NORMAL
MDC_IDC_STAT_EPISODE_RECENT_COUNT_DTM_START: NORMAL
MDC_IDC_STAT_EPISODE_RECENT_COUNT_DTM_START: NORMAL
MDC_IDC_STAT_EPISODE_TOTAL_COUNT: 15
MDC_IDC_STAT_EPISODE_TOTAL_COUNT_DTM_END: NORMAL
MDC_IDC_STAT_EPISODE_TYPE: NORMAL
MDC_IDC_STAT_EPISODE_VENDOR_TYPE: NORMAL
SPECIMEN SOURCE: ABNORMAL

## 2019-08-13 ENCOUNTER — ANTICOAGULATION THERAPY VISIT (OUTPATIENT)
Dept: ANTICOAGULATION | Facility: OTHER | Age: 74
End: 2019-08-13
Attending: FAMILY MEDICINE
Payer: MEDICARE

## 2019-08-13 DIAGNOSIS — Z51.81 ANTICOAGULATION GOAL OF INR 2 TO 3: ICD-10-CM

## 2019-08-13 DIAGNOSIS — I48.0 PAROXYSMAL ATRIAL FIBRILLATION (H): ICD-10-CM

## 2019-08-13 DIAGNOSIS — Z79.01 ANTICOAGULATION GOAL OF INR 2 TO 3: ICD-10-CM

## 2019-08-13 LAB — INR POINT OF CARE: 3.2 (ref 0.86–1.14)

## 2019-08-13 PROCEDURE — 85610 PROTHROMBIN TIME: CPT | Mod: QW,ZL

## 2019-08-13 NOTE — PROGRESS NOTES
ANTICOAGULATION FOLLOW-UP CLINIC VISIT    Patient Name:  Kate Chacon  Date:  8/13/2019  Contact Type:  Face to Face    SUBJECTIVE:  Patient Findings     Positives:   Change in medications (taking ceftin x 7 days)             OBJECTIVE    INR Protime   Date Value Ref Range Status   08/13/2019 3.2 (A) 0.86 - 1.14 Final       ASSESSMENT / PLAN  INR assessment SUPRA    Recheck INR In: 2 WEEKS    INR Location Clinic      Anticoagulation Summary  As of 8/13/2019    INR goal:   2.0-3.0   TTR:   81.1 % (2.8 y)   INR used for dosing:   3.2! (8/13/2019)   Warfarin maintenance plan:   5 mg (5 mg x 1) every day   Full warfarin instructions:   5 mg every day   Weekly warfarin total:   35 mg   No change documented:   Radha Burns RN   Plan last modified:   Joi Whelan RN (7/5/2019)   Next INR check:   8/27/2019   Priority:   INR   Target end date:   Indefinite    Indications    Paroxysmal atrial fibrillation (H) [I48.0]  Anticoagulation goal of INR 2 to 3 [Z51.81  Z79.01]             Anticoagulation Episode Summary     INR check location:       Preferred lab:       Send INR reminders to:    INR    Comments:         Anticoagulation Care Providers     Provider Role Specialty Phone number    Asher Campos MD [facility] [phone]            See the Encounter Report to view Anticoagulation Flowsheet and Dosing Calendar (Go to Encounters tab in chart review, and find the Anticoagulation Therapy Visit)        Radha Burns, RN

## 2019-08-15 ENCOUNTER — OFFICE VISIT (OUTPATIENT)
Dept: UROLOGY | Facility: OTHER | Age: 74
End: 2019-08-15
Attending: UROLOGY
Payer: MEDICARE

## 2019-08-15 VITALS — WEIGHT: 200.6 LBS | RESPIRATION RATE: 20 BRPM | BODY MASS INDEX: 31.42 KG/M2 | HEART RATE: 80 BPM

## 2019-08-15 DIAGNOSIS — N32.81 OVERACTIVE BLADDER: Primary | ICD-10-CM

## 2019-08-15 PROCEDURE — 52287 CYSTOSCOPY CHEMODENERVATION: CPT | Performed by: UROLOGY

## 2019-08-15 PROCEDURE — G0463 HOSPITAL OUTPT CLINIC VISIT: HCPCS | Mod: 25

## 2019-08-15 PROCEDURE — 25000576 ZZH RX IP 250 OP 636 J0585: Performed by: UROLOGY

## 2019-08-15 PROCEDURE — 52287 CYSTOSCOPY CHEMODENERVATION: CPT

## 2019-08-15 RX ADMIN — ONABOTULINUMTOXINA 100 UNITS: 100 INJECTION, POWDER, LYOPHILIZED, FOR SOLUTION INTRADERMAL; INTRAMUSCULAR at 11:15

## 2019-08-15 ASSESSMENT — PAIN SCALES - GENERAL: PAINLEVEL: NO PAIN (0)

## 2019-08-15 NOTE — PROGRESS NOTES
Preprocedure diagnosis  Urge incontinence with hypertonicity of bladder    Postprocedure diagnosis  Urge incontinence with hypertonicity of bladder    Procedure  Cystourethroscopy with intradetrusor injection of Botox, 100 units    Surgeon  Steve Fox MD    Anesthesia  None    Complications  None    Indications  The patient previously failed anticholinergic medication for treatment of the above named indication.  Informed consent was obtained.  We discussed the risks of Botox including, but not limited to, the risk of urinary retention and UTI.  Discussed performing the procedure in the OR versus clinic- risks and benefits.    Findings  Cystoscopy revealed one right and left ureteral orifice in the normal anatomic position, normal bladder mucosa and no tumors, masses or stones.    Procedure  The patient was taken to the procedure room and placed supine on the procedure table.    The patient was positioned in dorsal lithotomy, prepped and draped in a sterile fashion.    A time-out was performed.    The cystoscope was passed through the urethra and into the bladder.    The injection needle was passed through the working port of the cystoscope and 10 units/mL/injection x 10 injections for a total of 100 international units of Botox was injected submucosally.   I injected 4 doses from the left to right lateral wall just above the trigonal ridge.    Two radial injections of 3 doses were then used rising toward the dome.    I encountered minimal bleeding from the sites and avoided injection of the trigone.   Once the injections were completed, the bladder was emptied and the scope was removed.    Plan  Follow up when symptoms return for repeat treatment

## 2019-08-15 NOTE — NURSING NOTE
Botox  Verbal Order per Steve Fox MD Read Back to prep patient.  Perineum area prepped with chlorhexidene Gluconate and patient draped per sterile technique.    Sodium Chloride 0.9%, 10mL mixed with Botox  Lot #: -[id number]  Expiration date: 2/1/2021  : [employer]  NDC: 6785-7123-30    [place] Protocol    A. Pre-procedure verification complete Yes  1-relevant information / documentation available, reviewed and properly matched to the patient; 2-consent accurate and complete, 3-equipment and supplies available    B. Site marking complete N/A  Site marked if not in continuous attendance with patient    C. TIME OUT completed Yes  Time Out was conducted just prior to starting procedure to verify the eight required elements: 1-patient identity, 2-consent accurate and complete, 3-position, 4-correct side/site marked (if applicable), 5-procedure, 6-relevant images / results properly labeled and displayed (if applicable), 7-antibiotics / irrigation fluids (if applicable), 8-safety precautions.    After procedure perineum area rinsed.  Discharge instructions reviewed with patient.  Patient verbalized understanding of discharge instructions and discharged ambulatory.

## 2019-08-15 NOTE — PATIENT INSTRUCTIONS
Home Care after Botox Treatment  Follow these guidelines for your care after your procedure.    Activity  No limitations    Bathing or showering  No limitations    Symptoms  You may notice some burning with urination but this usually resolves after 1-2 days.  You may also notice small amounts of blood in your urine.  Please increase water intake for the next few days to help with these symptoms.    Contacts  General Questions: [phone]  Appointments:  [phone]  Emergencies:  [phone]    When to call the clinic  If you develop any of the following symptoms please call the clinic immediately.  If the clinic is closed please be seen at an urgent care clinic or the Emergency Department.  - Burning with urination that worsens after 2 days  - Unable to urinate causing severe pelvic pain  - Fevers of greater than 101 degrees F  - Flank pain that is not responding to pain medication    Follow up  If you are currently taking an anticholinergic for urgency (such as oxybutynin, Detrol or Sanctura) please continue for 1 week then stop.  Please follow up in 6 weeks for a bladder scan.

## 2019-08-27 ENCOUNTER — ANTICOAGULATION THERAPY VISIT (OUTPATIENT)
Dept: ANTICOAGULATION | Facility: OTHER | Age: 74
End: 2019-08-27
Attending: FAMILY MEDICINE
Payer: MEDICARE

## 2019-08-27 DIAGNOSIS — Z79.01 ANTICOAGULATION GOAL OF INR 2 TO 3: ICD-10-CM

## 2019-08-27 DIAGNOSIS — Z51.81 ANTICOAGULATION GOAL OF INR 2 TO 3: ICD-10-CM

## 2019-08-27 DIAGNOSIS — I48.0 PAROXYSMAL ATRIAL FIBRILLATION (H): ICD-10-CM

## 2019-08-27 LAB — INR POINT OF CARE: 2.9 (ref 0.86–1.14)

## 2019-08-27 PROCEDURE — 85610 PROTHROMBIN TIME: CPT | Mod: QW,ZL

## 2019-08-27 NOTE — PROGRESS NOTES
ANTICOAGULATION FOLLOW-UP CLINIC VISIT    Patient Name:  Kate Chacon  Date:  2019  Contact Type:  Face to Face    SUBJECTIVE:  Patient Findings         Clinical Outcomes     Negatives:   Major bleeding event, Thromboembolic event, Anticoagulation-related hospital admission, Anticoagulation-related ED visit, Anticoagulation-related fatality           OBJECTIVE    INR Protime   Date Value Ref Range Status   2019 2.9 (A) 0.86 - 1.14 Final       ASSESSMENT / PLAN  INR assessment THER    Recheck INR In: 4 WEEKS    INR Location Clinic      Anticoagulation Summary  As of 2019    INR goal:   2.0-3.0   TTR:   80.4 % (2.8 y)   INR used for dosin.9 (2019)   Warfarin maintenance plan:   5 mg (5 mg x 1) every day   Full warfarin instructions:   5 mg every day   Weekly warfarin total:   35 mg   No change documented:   Radha Burns RN   Plan last modified:   Joi Whelan RN (2019)   Next INR check:   2019   Priority:   INR   Target end date:   Indefinite    Indications    Paroxysmal atrial fibrillation (H) [I48.0]  Anticoagulation goal of INR 2 to 3 [Z51.81  Z79.01]             Anticoagulation Episode Summary     INR check location:       Preferred lab:       Send INR reminders to:    INR    Comments:         Anticoagulation Care Providers     Provider Role Specialty Phone number    Asher Campos MD [facility] [phone]            See the Encounter Report to view Anticoagulation Flowsheet and Dosing Calendar (Go to Encounters tab in chart review, and find the Anticoagulation Therapy Visit)        Radha Burns RN

## 2019-09-24 ENCOUNTER — ANTICOAGULATION THERAPY VISIT (OUTPATIENT)
Dept: ANTICOAGULATION | Facility: OTHER | Age: 74
End: 2019-09-24
Attending: FAMILY MEDICINE
Payer: MEDICARE

## 2019-09-24 DIAGNOSIS — Z79.01 ANTICOAGULATION GOAL OF INR 2 TO 3: ICD-10-CM

## 2019-09-24 DIAGNOSIS — Z51.81 ANTICOAGULATION GOAL OF INR 2 TO 3: ICD-10-CM

## 2019-09-24 DIAGNOSIS — I48.0 PAROXYSMAL ATRIAL FIBRILLATION (H): ICD-10-CM

## 2019-09-24 LAB — INR POINT OF CARE: 3.3 (ref 0.86–1.14)

## 2019-09-24 PROCEDURE — 85610 PROTHROMBIN TIME: CPT | Mod: QW,ZL

## 2019-09-24 NOTE — PROGRESS NOTES
ANTICOAGULATION FOLLOW-UP CLINIC VISIT    Patient Name:  Kate Chacon  Date:  9/24/2019  Contact Type:  Face to Face    SUBJECTIVE:  Patient Findings         Clinical Outcomes     Negatives:   Major bleeding event, Thromboembolic event, Anticoagulation-related hospital admission, Anticoagulation-related ED visit, Anticoagulation-related fatality           OBJECTIVE    INR Protime   Date Value Ref Range Status   09/24/2019 3.3 (A) 0.86 - 1.14 Final       ASSESSMENT / PLAN  INR assessment SUPRA    Recheck INR In: 2 WEEKS    INR Location Clinic      Anticoagulation Summary  As of 9/24/2019    INR goal:   2.0-3.0   TTR:   79.0 % (2.9 y)   INR used for dosing:   3.3! (9/24/2019)   Warfarin maintenance plan:   2.5 mg (5 mg x 0.5) every Tue; 5 mg (5 mg x 1) all other days   Full warfarin instructions:   2.5 mg every Tue; 5 mg all other days   Weekly warfarin total:   32.5 mg   Plan last modified:   Radha Burns RN (9/24/2019)   Next INR check:   10/8/2019   Priority:   INR   Target end date:   Indefinite    Indications    Paroxysmal atrial fibrillation (H) [I48.0]  Anticoagulation goal of INR 2 to 3 [Z51.81  Z79.01]             Anticoagulation Episode Summary     INR check location:       Preferred lab:       Send INR reminders to:    INR    Comments:         Anticoagulation Care Providers     Provider Role Specialty Phone number    Asher Campos MD [facility] [phone]            See the Encounter Report to view Anticoagulation Flowsheet and Dosing Calendar (Go to Encounters tab in chart review, and find the Anticoagulation Therapy Visit)        Radha Burns RN

## 2019-10-07 DIAGNOSIS — I10 ESSENTIAL HYPERTENSION: ICD-10-CM

## 2019-10-08 ENCOUNTER — ANTICOAGULATION THERAPY VISIT (OUTPATIENT)
Dept: ANTICOAGULATION | Facility: OTHER | Age: 74
End: 2019-10-08
Attending: FAMILY MEDICINE
Payer: MEDICARE

## 2019-10-08 DIAGNOSIS — Z51.81 ANTICOAGULATION GOAL OF INR 2 TO 3: ICD-10-CM

## 2019-10-08 DIAGNOSIS — I48.0 PAROXYSMAL ATRIAL FIBRILLATION (H): ICD-10-CM

## 2019-10-08 DIAGNOSIS — Z79.01 ANTICOAGULATION GOAL OF INR 2 TO 3: ICD-10-CM

## 2019-10-08 LAB — INR POINT OF CARE: 2.6 (ref 0.86–1.14)

## 2019-10-08 PROCEDURE — 85610 PROTHROMBIN TIME: CPT | Mod: QW,ZL

## 2019-10-08 NOTE — PROGRESS NOTES
ANTICOAGULATION FOLLOW-UP CLINIC VISIT    Patient Name:  Kate Chacon  Date:  10/8/2019  Contact Type:  Face to Face    SUBJECTIVE:  Patient Findings         Clinical Outcomes     Negatives:   Major bleeding event, Thromboembolic event, Anticoagulation-related hospital admission, Anticoagulation-related ED visit, Anticoagulation-related fatality           OBJECTIVE    INR Protime   Date Value Ref Range Status   10/08/2019 2.6 (A) 0.86 - 1.14 Final       ASSESSMENT / PLAN  INR assessment THER    Recheck INR In: 3 WEEKS    INR Location Clinic      Anticoagulation Summary  As of 10/8/2019    INR goal:   2.0-3.0   TTR:   78.7 % (2.9 y)   INR used for dosin.6 (10/8/2019)   Warfarin maintenance plan:   2.5 mg (5 mg x 0.5) every Tue; 5 mg (5 mg x 1) all other days   Full warfarin instructions:   2.5 mg every Tue; 5 mg all other days   Weekly warfarin total:   32.5 mg   No change documented:   Radha Burns RN   Plan last modified:   Radha Burns RN (2019)   Next INR check:   10/29/2019   Priority:   INR   Target end date:   Indefinite    Indications    Paroxysmal atrial fibrillation (H) [I48.0]  Anticoagulation goal of INR 2 to 3 [Z51.81  Z79.01]             Anticoagulation Episode Summary     INR check location:       Preferred lab:       Send INR reminders to:    INR    Comments:         Anticoagulation Care Providers     Provider Role Specialty Phone number    Asher Campos MD [facility] [phone]            See the Encounter Report to view Anticoagulation Flowsheet and Dosing Calendar (Go to Encounters tab in chart review, and find the Anticoagulation Therapy Visit)        Radha Burns RN

## 2019-10-09 DIAGNOSIS — I10 ESSENTIAL HYPERTENSION: ICD-10-CM

## 2019-10-09 DIAGNOSIS — I48.0 PAROXYSMAL ATRIAL FIBRILLATION (H): ICD-10-CM

## 2019-10-09 RX ORDER — LISINOPRIL 20 MG/1
TABLET ORAL
Qty: 90 TABLET | Refills: 0 | Status: SHIPPED | OUTPATIENT
Start: 2019-10-09 | End: 2019-10-29

## 2019-10-09 NOTE — TELEPHONE ENCOUNTER
" Disp Refills Start End YOGESH   lisinopril (PRINIVIL/ZESTRIL) 20 MG tablet 90 tablet 0 7/5/2019  No   Sig: TAKE 1 TABLET BY MOUTH ONCE DAILY   Sent to pharmacy as: lisinopril (PRINIVIL/ZESTRIL) 20 MG tablet   Class: E-Prescribe   Order: [id number]       LOV: 10/11/2018    Future Office visit: No appointment scheduled at this time.       Called patient and she is out of her medication as of today. She did scheduled an annual appointment post phone call. Appointment scheduled 10/25 with PCP.      Limited refill sent to cover patient until her office visit.    Lillian Dodd RN  ....................  10/9/2019   11:40 AM      Requested Prescriptions   Pending Prescriptions Disp Refills     lisinopril (PRINIVIL/ZESTRIL) 20 MG tablet [Pharmacy Med Name: LISINOPRIL 20MG     TAB] 90 tablet 0     Sig: TAKE 1 TABLET BY MOUTH ONCE DAILY       ACE Inhibitors (Including Combos) Protocol Passed - 10/7/2019  5:30 AM        Passed - Blood pressure under 140/90 in past 12 months     BP Readings from Last 3 Encounters:   07/31/19 138/79   05/29/19 116/74   01/10/19 (!) 150/115                 Passed - Recent (12 mo) or future (30 days) visit within the authorizing provider's specialty     Patient has had an office visit with the authorizing provider or a provider within the authorizing providers department within the previous 12 mos or has a future within next 30 days. See \"Patient Info\" tab in inbasket, or \"Choose Columns\" in Meds & Orders section of the refill encounter.              Passed - Medication is active on med list        Passed - Patient is age 18 or older        Passed - No active pregnancy on record        Passed - Normal serum creatinine on file in past 12 months     Recent Labs   Lab Test 10/11/18  0919   CR 0.93             Passed - Normal serum potassium on file in past 12 months     Recent Labs   Lab Test 10/11/18  0919   POTASSIUM 4.7             Passed - No positive pregnancy test within past 12 months        "

## 2019-10-10 RX ORDER — LISINOPRIL 20 MG/1
TABLET ORAL
Qty: 90 TABLET | Refills: 0 | OUTPATIENT
Start: 2019-10-10

## 2019-10-10 NOTE — TELEPHONE ENCOUNTER
Request received from [facility] on 10/9/19. Request respondedto on 10/9/19 will disregard. Khushi Barros RN ....................  10/10/2019   2:20 PM

## 2019-10-26 ENCOUNTER — APPOINTMENT (OUTPATIENT)
Dept: GENERAL RADIOLOGY | Facility: OTHER | Age: 74
End: 2019-10-26
Attending: PHYSICIAN ASSISTANT
Payer: MEDICARE

## 2019-10-26 ENCOUNTER — HOSPITAL ENCOUNTER (EMERGENCY)
Facility: OTHER | Age: 74
Discharge: HOME | End: 2019-10-26
Attending: PHYSICIAN ASSISTANT | Admitting: PHYSICIAN ASSISTANT
Payer: MEDICARE

## 2019-10-26 VITALS
HEIGHT: 67 IN | TEMPERATURE: 97.5 F | BODY MASS INDEX: 31.39 KG/M2 | DIASTOLIC BLOOD PRESSURE: 81 MMHG | WEIGHT: 200 LBS | OXYGEN SATURATION: 95 % | SYSTOLIC BLOOD PRESSURE: 134 MMHG | RESPIRATION RATE: 16 BRPM

## 2019-10-26 DIAGNOSIS — M54.50 RIGHT-SIDED LOW BACK PAIN WITHOUT SCIATICA: ICD-10-CM

## 2019-10-26 DIAGNOSIS — M43.10 SPONDYLISTHESIS: ICD-10-CM

## 2019-10-26 DIAGNOSIS — S39.012A STRAIN OF LUMBAR REGION, INITIAL ENCOUNTER: ICD-10-CM

## 2019-10-26 LAB
ALBUMIN UR-MCNC: NEGATIVE MG/DL
APPEARANCE UR: CLEAR
BILIRUB UR QL STRIP: NEGATIVE
COLOR UR AUTO: YELLOW
GLUCOSE UR STRIP-MCNC: NEGATIVE MG/DL
HGB UR QL STRIP: NEGATIVE
KETONES UR STRIP-MCNC: NEGATIVE MG/DL
LEUKOCYTE ESTERASE UR QL STRIP: NEGATIVE
NITRATE UR QL: NEGATIVE
PH UR STRIP: 7 PH (ref 5–9)
SOURCE: NORMAL
SP GR UR STRIP: 1.01 (ref 1–1.03)
UROBILINOGEN UR STRIP-ACNC: 0.2 EU/DL (ref 0.2–1)

## 2019-10-26 PROCEDURE — 81003 URINALYSIS AUTO W/O SCOPE: CPT | Performed by: PHYSICIAN ASSISTANT

## 2019-10-26 PROCEDURE — 72100 X-RAY EXAM L-S SPINE 2/3 VWS: CPT

## 2019-10-26 PROCEDURE — 96372 THER/PROPH/DIAG INJ SC/IM: CPT | Performed by: PHYSICIAN ASSISTANT

## 2019-10-26 PROCEDURE — 25000132 ZZH RX MED GY IP 250 OP 250 PS 637: Performed by: PHYSICIAN ASSISTANT

## 2019-10-26 PROCEDURE — 99284 EMERGENCY DEPT VISIT MOD MDM: CPT | Performed by: PHYSICIAN ASSISTANT

## 2019-10-26 PROCEDURE — 25000128 H RX IP 250 OP 636: Performed by: PHYSICIAN ASSISTANT

## 2019-10-26 PROCEDURE — 99283 EMERGENCY DEPT VISIT LOW MDM: CPT | Mod: Z6 | Performed by: PHYSICIAN ASSISTANT

## 2019-10-26 RX ORDER — FENTANYL CITRATE 50 UG/ML
100 INJECTION, SOLUTION INTRAMUSCULAR; INTRAVENOUS ONCE
Status: COMPLETED | OUTPATIENT
Start: 2019-10-26 | End: 2019-10-26

## 2019-10-26 RX ORDER — KETOROLAC TROMETHAMINE 30 MG/ML
30 INJECTION, SOLUTION INTRAMUSCULAR; INTRAVENOUS ONCE
Status: COMPLETED | OUTPATIENT
Start: 2019-10-26 | End: 2019-10-26

## 2019-10-26 RX ORDER — CYCLOBENZAPRINE HCL 10 MG
10 TABLET ORAL 3 TIMES DAILY PRN
Qty: 20 TABLET | Refills: 0 | Status: SHIPPED | OUTPATIENT
Start: 2019-10-26 | End: 2019-11-13

## 2019-10-26 RX ORDER — LIDOCAINE 50 MG/G
1 PATCH TOPICAL ONCE
Status: DISCONTINUED | OUTPATIENT
Start: 2019-10-26 | End: 2019-10-26 | Stop reason: HOSPADM

## 2019-10-26 RX ORDER — CYCLOBENZAPRINE HCL 10 MG
10 TABLET ORAL ONCE
Status: COMPLETED | OUTPATIENT
Start: 2019-10-26 | End: 2019-10-26

## 2019-10-26 RX ORDER — HYDROCODONE BITARTRATE AND ACETAMINOPHEN 5; 325 MG/1; MG/1
1 TABLET ORAL EVERY 6 HOURS PRN
Qty: 15 TABLET | Refills: 0 | Status: SHIPPED | OUTPATIENT
Start: 2019-10-26 | End: 2019-11-13

## 2019-10-26 RX ADMIN — CYCLOBENZAPRINE HYDROCHLORIDE 10 MG: 10 TABLET, FILM COATED ORAL at 17:44

## 2019-10-26 RX ADMIN — FENTANYL CITRATE 100 MCG: 50 INJECTION, SOLUTION INTRAMUSCULAR; INTRAVENOUS at 17:44

## 2019-10-26 RX ADMIN — KETOROLAC TROMETHAMINE 30 MG: 30 INJECTION, SOLUTION INTRAMUSCULAR at 17:44

## 2019-10-26 RX ADMIN — LIDOCAINE 1 PATCH: 140 PATCH CUTANEOUS at 17:55

## 2019-10-26 ASSESSMENT — ENCOUNTER SYMPTOMS
DIARRHEA: 0
CHILLS: 0
BACK PAIN: 1
FEVER: 0
VOMITING: 0
CONSTIPATION: 0
BRUISES/BLEEDS EASILY: 0
CHEST TIGHTNESS: 0
NAUSEA: 0
HEMATURIA: 0
SHORTNESS OF BREATH: 0
WOUND: 0
CONFUSION: 0
ADENOPATHY: 0
ABDOMINAL PAIN: 0

## 2019-10-26 ASSESSMENT — MIFFLIN-ST. JEOR: SCORE: 1429.82

## 2019-10-26 NOTE — ED TRIAGE NOTES
"ED Nursing Triage Note (General)   ________________________________    Kate Chacon is a 74 year old Female that presents to triage private car  With history of  Back at right lower back with no injury and rated 9/10 with some radiation down her leg.  Taking tramadol she was prescribed for past problem with no relief reported by patient and also icing her back  Significant symptoms had onset 3 day(s) ago.  BP (!) 144/91   Temp 97.5  F (36.4  C) (Temporal)   Resp 16   Ht 1.702 m (5' 7\")   Wt 90.7 kg (200 lb)   SpO2 95%   BMI 31.32 kg/m  t  Patient appears alert , in moderate distress., and cooperative behavior.    GCS Total = 15  Airway: intact  Breathing noted as Normal.  Circulation Normal  Skin normal  Action taken:  Ice pack offered and accepted    PRE HOSPITAL PRIOR LIVING SITUATION Spouse  "

## 2019-10-26 NOTE — ED AVS SNAPSHOT
[facility]  [address]  [place] [address]  Phone:  [phone]  Fax:  [phone]                                    Kate Chacon   MRN: [id number]    Department:  [facility]   Date of Visit:  10/26/2019           After Visit Summary Signature Page    I have received my discharge instructions, and my questions have been answered. I have discussed any challenges I see with this plan with the nurse or doctor.    ..........................................................................................................................................  Patient/Patient Representative Signature      ..........................................................................................................................................  Patient Representative Print Name and Relationship to Patient    ..................................................               ................................................  Date                                   Time    ..........................................................................................................................................  Reviewed by Signature/Title    ...................................................              ..............................................  Date                                               Time          22EPIC Rev 08/18

## 2019-10-26 NOTE — ED PROVIDER NOTES
History     Chief Complaint   Patient presents with     Back Pain     This is a 74-year-old female who has a history of some back issues.  She started having some right lower back pain that started yesterday rates her pain is maybe 9/10.  Denies any specific injury however when pressed patient reports that she was on her hands and knees washing the floor yesterday.  Denies any loss of bowel or bladder control.  No numbness or tingling.          Allergies:  Allergies   Allergen Reactions     Methylpar-Na Bisulfite-Pentazocine Unknown     jittery       Problem List:    Patient Active Problem List    Diagnosis Date Noted     Sinoatrial node dysfunction (H) 05/01/2018     Priority: Medium     Cardiac pacemaker 02/19/2018     Priority: Medium     DNI (do not intubate) 10/25/2017     Priority: Medium     Overview:   As discussed with patient on 10/25/17       Urge incontinence 10/05/2017     Priority: Medium     Former smoker 08/30/2017     Priority: Medium     MDD (recurrent major depressive disorder) in remission (H) 08/30/2017     Priority: Medium     Anxiety 01/05/2017     Priority: Medium     Hypertension 12/08/2016     Priority: Medium     Paroxysmal atrial fibrillation (H) 11/02/2016     Priority: Medium     Anticoagulation goal of INR 2 to 3 10/28/2016     Priority: Medium     Ischemic stroke (H) 10/15/2016     Priority: Medium     Diverticulosis of large intestine 04/04/2011     Priority: Medium     History of colonic polyps 03/02/2011     Priority: Medium     Osteoporosis 02/08/2006     Priority: Medium        Past Medical History:    Past Medical History:   Diagnosis Date     Encounter for full-term uncomplicated delivery      Ganglion of wrist      Gastritis without bleeding        Past Surgical History:    Past Surgical History:   Procedure Laterality Date     APPENDECTOMY OPEN      No Comments Provided     COLONOSCOPY  08/19/2005 8/19/05,Cecal biopsy with tubular adenoma low grade dysplasia.      COLONOSCOPY  04/04/2011 4/4/11     COLONOSCOPY  05/07/2012 5/7/12     ESOPHAGOSCOPY, GASTROSCOPY, DUODENOSCOPY (EGD), COMBINED      8/19/05     OTHER SURGICAL HISTORY      8/3/05,[id number],OTHER,helices on the right ear     TONSILLECTOMY      No Comments Provided       Family History:    Family History   Problem Relation Age of Onset     Diabetes Father         Diabetes     Hypertension Father         Hypertension     Other - See Comments Mother         h/o coronary artery disease/dementia     Asthma Mother         Asthma     Diabetes Maternal Grandmother         Diabetes     Cancer Maternal Aunt         Cancer,Uterine     Breast Cancer No family hx of         Cancer-breast       Social History:  Marital Status:   [2]  Social History     Tobacco Use     Smoking status: Former Smoker     Packs/day: 0.50     Years: 49.00     Pack years: 24.50     Types: Cigarettes     Last attempt to quit: 10/14/2016     Years since quitting: 3.0     Smokeless tobacco: Never Used   Substance Use Topics     Alcohol use: No     Drug use: No     Comment: Drug use: No        Medications:    atorvastatin (LIPITOR) 40 MG tablet  calcium carbonate 750 MG CHEW  ibuprofen (ADVIL/MOTRIN) 200 MG tablet  lisinopril (PRINIVIL/ZESTRIL) 20 MG tablet  metoprolol succinate ER (TOPROL-XL) 25 MG 24 hr tablet  sertraline (ZOLOFT) 50 MG tablet  traMADol (ULTRAM) 50 MG tablet  warfarin (COUMADIN) 5 MG tablet          Review of Systems   Constitutional: Negative for chills and fever.   HENT: Negative for congestion.    Eyes: Negative for visual disturbance.   Respiratory: Negative for chest tightness and shortness of breath.    Cardiovascular: Negative for chest pain.   Gastrointestinal: Negative for abdominal pain, constipation, diarrhea, nausea and vomiting.   Genitourinary: Negative for hematuria.   Musculoskeletal: Positive for back pain.   Skin: Negative for rash and wound.   Neurological: Negative for syncope.   Hematological: Negative for  "adenopathy. Does not bruise/bleed easily.   Psychiatric/Behavioral: Negative for confusion.       Physical Exam   BP: (!) 144/91  Heart Rate: 80  Temp: 97.5  F (36.4  C)  Resp: 16  Height: 170.2 cm (5' 7\")  Weight: 90.7 kg (200 lb)  SpO2: 95 %      Physical Exam  Constitutional:       General: She is not in acute distress.     Appearance: She is not diaphoretic.   HENT:      Head: Atraumatic.      Mouth/Throat:      Pharynx: No oropharyngeal exudate.   Eyes:      General: No scleral icterus.     Pupils: Pupils are equal, round, and reactive to light.   Cardiovascular:      Heart sounds: Normal heart sounds.   Pulmonary:      Effort: No respiratory distress.      Breath sounds: Normal breath sounds.   Abdominal:      General: Bowel sounds are normal.      Palpations: Abdomen is soft.      Tenderness: There is no tenderness.   Musculoskeletal: Normal range of motion.         General: No tenderness.      Comments: Pain on palpation to the right side L3-S1.  Minimal muscle spasming noted.  No spinous process tenderness.   Skin:     General: Skin is warm.      Capillary Refill: Capillary refill takes less than 2 seconds.      Findings: No rash.         ED Course     Results for orders placed or performed during the hospital encounter of 10/26/19 (from the past 24 hour(s))   *UA reflex to Microscopic   Result Value Ref Range    Color Urine Yellow     Appearance Urine Clear     Glucose Urine Negative NEG^Negative mg/dL    Bilirubin Urine Negative NEG^Negative    Ketones Urine Negative NEG^Negative mg/dL    Specific Gravity Urine 1.015 1.000 - 1.030    Blood Urine Negative NEG^Negative    pH Urine 7.0 5.0 - 9.0 pH    Protein Albumin Urine Negative NEG^Negative mg/dL    Urobilinogen Urine 0.2 0.2 - 1.0 EU/dL    Nitrite Urine Negative NEG^Negative    Leukocyte Esterase Urine Negative NEG^Negative    Source Midstream Urine    XR Lumbar Spine 2/3 Views    Narrative    Procedure: XR LUMBAR SPINE 2-3 VIEWS    HISTORY: " pain    TECHNIQUE: Lumbar spine 3 views.    COMPARISON: None.    FINDINGS:    There is moderate levoscoliosis. There is mild anterolisthesis of L4  relative to L5.  Vertebral body heights are maintained.  Disc spaces  are narrowed with degenerative endplate changes and osteophytes at  multiple levels. There is also multilevel facet arthrosis. There is  diffuse osteopenia.       Impression    IMPRESSION: Scoliosis and multilevel degenerative disease.      JULIAN STODDARD MD       Medications   lidocaine (LIDODERM) 5 % Patch 1 patch (1 patch Transdermal Given 10/26/19 1755)   ketorolac (TORADOL) injection 30 mg (30 mg Intramuscular Given 10/26/19 1744)   cyclobenzaprine (FLEXERIL) tablet 10 mg (10 mg Oral Given 10/26/19 1744)   fentaNYL (PF) (SUBLIMAZE) injection 100 mcg (100 mcg Intramuscular Given 10/26/19 1744)       Assessments & Plan (with Medical Decision Making)     I have reviewed the nursing notes.    I have reviewed the findings, diagnosis, plan and need for follow up with the patient.      New Prescriptions    CYCLOBENZAPRINE (FLEXERIL) 10 MG TABLET    Take 1 tablet (10 mg) by mouth 3 times daily as needed for muscle spasms    HYDROCODONE-ACETAMINOPHEN (NORCO) 5-325 MG TABLET    Take 1 tablet by mouth every 6 hours as needed for moderate to severe pain       Final diagnoses:   Spondylisthesis - L4-L5   Right-sided low back pain without sciatica   Strain of lumbar region, initial encounter     Afebrile.  Vital signs stable.  History of chronic lumbar issues.  Done onset of right-sided low back pain after scrubbing her floors yesterday.  She was given Toradol, Flexeril, and fentanyl in the ER as well as Lidoderm patches to her affected areas.  Her UA is unremarkable with no signs of UTI.  Lumbar x-rays show some scoliosis and multilevel degenerative disease.  She does have some anterolisthesis and spondylolisthesis at L4-L5.  She only had some minimal improvement with the above treatment.  She has salonpas  at  home.  I discussed ice and heat therapy.  Physical therapy referral as well.  She is currently on warfarin and cannot take ibuprofen.  Rx for short course of Norco and Flexeril.  Follow-up with her primary care provider next week as scheduled on 10/29/2019 for further evaluation.  Follow-up sooner if there is any other concerns problems or questions.  10/26/2019   [facility]     Zachery Jo PA-C  10/26/19 1907

## 2019-10-29 ENCOUNTER — ANTICOAGULATION THERAPY VISIT (OUTPATIENT)
Dept: ANTICOAGULATION | Facility: OTHER | Age: 74
End: 2019-10-29
Payer: COMMERCIAL

## 2019-10-29 ENCOUNTER — OFFICE VISIT (OUTPATIENT)
Dept: FAMILY MEDICINE | Facility: OTHER | Age: 74
End: 2019-10-29
Attending: FAMILY MEDICINE
Payer: MEDICARE

## 2019-10-29 VITALS
DIASTOLIC BLOOD PRESSURE: 72 MMHG | OXYGEN SATURATION: 98 % | SYSTOLIC BLOOD PRESSURE: 118 MMHG | BODY MASS INDEX: 32.95 KG/M2 | RESPIRATION RATE: 16 BRPM | HEART RATE: 74 BPM | WEIGHT: 205 LBS | TEMPERATURE: 97.2 F | HEIGHT: 66 IN

## 2019-10-29 DIAGNOSIS — Z51.81 ANTICOAGULATION GOAL OF INR 2 TO 3: ICD-10-CM

## 2019-10-29 DIAGNOSIS — E53.8 VITAMIN B12 DEFICIENCY (NON ANEMIC): ICD-10-CM

## 2019-10-29 DIAGNOSIS — D12.6 TUBULAR ADENOMA OF COLON: ICD-10-CM

## 2019-10-29 DIAGNOSIS — Z95.0 CARDIAC PACEMAKER: ICD-10-CM

## 2019-10-29 DIAGNOSIS — I48.0 PAROXYSMAL ATRIAL FIBRILLATION (H): ICD-10-CM

## 2019-10-29 DIAGNOSIS — Z12.31 VISIT FOR SCREENING MAMMOGRAM: ICD-10-CM

## 2019-10-29 DIAGNOSIS — Z23 ENCOUNTER FOR IMMUNIZATION: ICD-10-CM

## 2019-10-29 DIAGNOSIS — Z87.891 FORMER SMOKER: ICD-10-CM

## 2019-10-29 DIAGNOSIS — Z12.11 ENCOUNTER FOR SCREENING COLONOSCOPY: ICD-10-CM

## 2019-10-29 DIAGNOSIS — Z23 NEEDS FLU SHOT: ICD-10-CM

## 2019-10-29 DIAGNOSIS — Z00.00 ENCOUNTER FOR MEDICARE ANNUAL WELLNESS EXAM: Primary | ICD-10-CM

## 2019-10-29 DIAGNOSIS — Z23 NEED FOR 23-POLYVALENT PNEUMOCOCCAL POLYSACCHARIDE VACCINE: ICD-10-CM

## 2019-10-29 DIAGNOSIS — Z79.01 ANTICOAGULATION GOAL OF INR 2 TO 3: ICD-10-CM

## 2019-10-29 DIAGNOSIS — F41.9 ANXIETY: ICD-10-CM

## 2019-10-29 DIAGNOSIS — K14.8 TONGUE LESION: ICD-10-CM

## 2019-10-29 DIAGNOSIS — I10 ESSENTIAL HYPERTENSION: ICD-10-CM

## 2019-10-29 DIAGNOSIS — F33.40 MDD (RECURRENT MAJOR DEPRESSIVE DISORDER) IN REMISSION (H): ICD-10-CM

## 2019-10-29 DIAGNOSIS — M81.0 OSTEOPOROSIS, UNSPECIFIED OSTEOPOROSIS TYPE, UNSPECIFIED PATHOLOGICAL FRACTURE PRESENCE: ICD-10-CM

## 2019-10-29 DIAGNOSIS — I63.9 ISCHEMIC STROKE (H): ICD-10-CM

## 2019-10-29 LAB
ALBUMIN SERPL-MCNC: 4.3 G/DL (ref 3.5–5.7)
ALP SERPL-CCNC: 78 U/L (ref 34–104)
ALT SERPL W P-5'-P-CCNC: 26 U/L (ref 7–52)
ANION GAP SERPL CALCULATED.3IONS-SCNC: 6 MMOL/L (ref 3–14)
AST SERPL W P-5'-P-CCNC: 24 U/L (ref 13–39)
BASOPHILS # BLD AUTO: 0.1 10E9/L (ref 0–0.2)
BASOPHILS NFR BLD AUTO: 0.6 %
BILIRUB SERPL-MCNC: 0.5 MG/DL (ref 0.3–1)
BUN SERPL-MCNC: 19 MG/DL (ref 7–25)
CALCIUM SERPL-MCNC: 10.6 MG/DL (ref 8.6–10.3)
CHLORIDE SERPL-SCNC: 106 MMOL/L (ref 98–107)
CO2 SERPL-SCNC: 27 MMOL/L (ref 21–31)
CREAT SERPL-MCNC: 0.95 MG/DL (ref 0.6–1.2)
DIFFERENTIAL METHOD BLD: ABNORMAL
EOSINOPHIL # BLD AUTO: 0.3 10E9/L (ref 0–0.7)
EOSINOPHIL NFR BLD AUTO: 3.1 %
ERYTHROCYTE [DISTWIDTH] IN BLOOD BY AUTOMATED COUNT: 13.4 % (ref 10–15)
GFR SERPL CREATININE-BSD FRML MDRD: 57 ML/MIN/{1.73_M2}
GLUCOSE SERPL-MCNC: 87 MG/DL (ref 70–105)
HCT VFR BLD AUTO: 47.8 % (ref 35–47)
HGB BLD-MCNC: 15.2 G/DL (ref 11.7–15.7)
IMM GRANULOCYTES # BLD: 0 10E9/L (ref 0–0.4)
IMM GRANULOCYTES NFR BLD: 0.1 %
INR POINT OF CARE: 3.3 (ref 0.86–1.14)
LYMPHOCYTES # BLD AUTO: 2.6 10E9/L (ref 0.8–5.3)
LYMPHOCYTES NFR BLD AUTO: 30 %
MCH RBC QN AUTO: 30.8 PG (ref 26.5–33)
MCHC RBC AUTO-ENTMCNC: 31.8 G/DL (ref 31.5–36.5)
MCV RBC AUTO: 97 FL (ref 78–100)
MONOCYTES # BLD AUTO: 0.9 10E9/L (ref 0–1.3)
MONOCYTES NFR BLD AUTO: 10.9 %
NEUTROPHILS # BLD AUTO: 4.7 10E9/L (ref 1.6–8.3)
NEUTROPHILS NFR BLD AUTO: 55.3 %
PLATELET # BLD AUTO: 220 10E9/L (ref 150–450)
POTASSIUM SERPL-SCNC: 4 MMOL/L (ref 3.5–5.1)
PROT SERPL-MCNC: 7.8 G/DL (ref 6.4–8.9)
RBC # BLD AUTO: 4.94 10E12/L (ref 3.8–5.2)
SODIUM SERPL-SCNC: 139 MMOL/L (ref 134–144)
WBC # BLD AUTO: 8.5 10E9/L (ref 4–11)

## 2019-10-29 PROCEDURE — 90732 PPSV23 VACC 2 YRS+ SUBQ/IM: CPT

## 2019-10-29 PROCEDURE — 85610 PROTHROMBIN TIME: CPT | Mod: QW,ZL

## 2019-10-29 PROCEDURE — G0463 HOSPITAL OUTPT CLINIC VISIT: HCPCS

## 2019-10-29 PROCEDURE — 90662 IIV NO PRSV INCREASED AG IM: CPT

## 2019-10-29 PROCEDURE — 85025 COMPLETE CBC W/AUTO DIFF WBC: CPT | Mod: ZL | Performed by: FAMILY MEDICINE

## 2019-10-29 PROCEDURE — G0008 ADMIN INFLUENZA VIRUS VAC: HCPCS

## 2019-10-29 PROCEDURE — 96372 THER/PROPH/DIAG INJ SC/IM: CPT

## 2019-10-29 PROCEDURE — G0439 PPPS, SUBSEQ VISIT: HCPCS | Performed by: FAMILY MEDICINE

## 2019-10-29 PROCEDURE — 25000128 H RX IP 250 OP 636: Performed by: FAMILY MEDICINE

## 2019-10-29 PROCEDURE — 80053 COMPREHEN METABOLIC PANEL: CPT | Mod: ZL | Performed by: FAMILY MEDICINE

## 2019-10-29 PROCEDURE — G0009 ADMIN PNEUMOCOCCAL VACCINE: HCPCS

## 2019-10-29 PROCEDURE — 99214 OFFICE O/P EST MOD 30 MIN: CPT | Mod: 25 | Performed by: FAMILY MEDICINE

## 2019-10-29 PROCEDURE — G0463 HOSPITAL OUTPT CLINIC VISIT: HCPCS | Mod: 25

## 2019-10-29 RX ORDER — CYANOCOBALAMIN 1000 UG/ML
1000 INJECTION, SOLUTION INTRAMUSCULAR; SUBCUTANEOUS ONCE
Status: COMPLETED | OUTPATIENT
Start: 2019-10-29 | End: 2019-10-29

## 2019-10-29 RX ORDER — LISINOPRIL 20 MG/1
20 TABLET ORAL DAILY
Qty: 90 TABLET | Refills: 11 | Status: SHIPPED | OUTPATIENT
Start: 2019-10-29 | End: 2020-12-08

## 2019-10-29 RX ADMIN — CYANOCOBALAMIN 1000 MCG: 1000 INJECTION, SOLUTION INTRAMUSCULAR at 14:49

## 2019-10-29 ASSESSMENT — MIFFLIN-ST. JEOR: SCORE: 1435.75

## 2019-10-29 ASSESSMENT — ANXIETY QUESTIONNAIRES
1. FEELING NERVOUS, ANXIOUS, OR ON EDGE: NOT AT ALL
2. NOT BEING ABLE TO STOP OR CONTROL WORRYING: NOT AT ALL
3. WORRYING TOO MUCH ABOUT DIFFERENT THINGS: NOT AT ALL
6. BECOMING EASILY ANNOYED OR IRRITABLE: NOT AT ALL
GAD7 TOTAL SCORE: 0
5. BEING SO RESTLESS THAT IT IS HARD TO SIT STILL: NOT AT ALL
7. FEELING AFRAID AS IF SOMETHING AWFUL MIGHT HAPPEN: NOT AT ALL

## 2019-10-29 ASSESSMENT — PATIENT HEALTH QUESTIONNAIRE - PHQ9
5. POOR APPETITE OR OVEREATING: NOT AT ALL
SUM OF ALL RESPONSES TO PHQ QUESTIONS 1-9: 4

## 2019-10-29 ASSESSMENT — PAIN SCALES - GENERAL: PAINLEVEL: NO PAIN (0)

## 2019-10-29 NOTE — PATIENT INSTRUCTIONS
Patient Education   Personalized Prevention Plan  You are due for the preventive services outlined below.  Your care team is available to assist you in scheduling these services.  If you have already completed any of these items, please share that information with your care team to update in your medical record.  Health Maintenance Due   Topic Date Due     Osteoporosis Screening  1945     Discuss Advance Care Planning  1945     Depression Action Plan  1945     Zoster (Shingles) Vaccine (1 of 2) 07/04/1993     Annual Wellness Visit  07/04/2008     Colonoscopy  05/07/2017     Pneumococcal Vaccine (2 of 2 - PPSV23) 01/05/2018     Flu Vaccine (1) 09/01/2019     Depression Assessment  11/29/2019

## 2019-10-29 NOTE — NURSING NOTE
"Chief Complaint   Patient presents with     Medicare Visit     annual medicare visit       Initial /72   Pulse 74   Temp 97.2  F (36.2  C)   Resp 16   Ht 1.675 m (5' 5.95\")   Wt 93 kg (205 lb)   SpO2 98%   Breastfeeding? No   BMI 33.14 kg/m   Estimated body mass index is 33.14 kg/m  as calculated from the following:    Height as of this encounter: 1.675 m (5' 5.95\").    Weight as of this encounter: 93 kg (205 lb).  Medication Reconciliation: complete    Jessa Ying LPN  "

## 2019-10-29 NOTE — PROGRESS NOTES
ANTICOAGULATION FOLLOW-UP CLINIC VISIT    Patient Name:  Kate Chacon  Date:  10/29/2019  Contact Type:  Face to Face    SUBJECTIVE:  Patient Findings     Positives:   Change in medications (taking norco and flexril for back pain will start physical therapy)             OBJECTIVE    INR Protime   Date Value Ref Range Status   10/29/2019 3.3 (A) 0.86 - 1.14 Final       ASSESSMENT / PLAN  INR assessment SUPRA    Recheck INR In: 2 WEEKS    INR Location Clinic      Anticoagulation Summary  As of 10/29/2019    INR goal:   2.0-3.0   TTR:   78.3 % (3 y)   INR used for dosing:   3.3! (10/29/2019)   Warfarin maintenance plan:   2.5 mg (5 mg x 0.5) every Tue; 5 mg (5 mg x 1) all other days   Full warfarin instructions:   2.5 mg every Tue; 5 mg all other days   Weekly warfarin total:   32.5 mg   Plan last modified:   Radha Burns RN (10/29/2019)   Next INR check:   11/12/2019   Priority:   INR   Target end date:   Indefinite    Indications    Paroxysmal atrial fibrillation (H) [I48.0]  Anticoagulation goal of INR 2 to 3 [Z51.81  Z79.01]             Anticoagulation Episode Summary     INR check location:       Preferred lab:       Send INR reminders to:    INR    Comments:         Anticoagulation Care Providers     Provider Role Specialty Phone number    Asher Campos MD [facility] [phone]            See the Encounter Report to view Anticoagulation Flowsheet and Dosing Calendar (Go to Encounters tab in chart review, and find the Anticoagulation Therapy Visit)        Radha Burns RN

## 2019-10-29 NOTE — LETTER
My Depression Action Plan  Name: Kate Chacon   Date of Birth 1945  Date: 10/29/2019    My doctor: Asher Campos   My clinic: [facility]  [address]  [place] [id number] [phone]          GREEN    ZONE   Good Control    What it looks like:     Things are going generally well. You have normal up s and down s. You may even feel depressed from time to time, but bad moods usually last less than a day.   What you need to do:  1. Continue to care for yourself (see self care plan)  2. Check your depression survival kit and update it as needed  3. Follow your physician s recommendations including any medication.  4. Do not stop taking medication unless you consult with your physician first.           YELLOW         ZONE Getting Worse    What it looks like:     Depression is starting to interfere with your life.     It may be hard to get out of bed; you may be starting to isolate yourself from others.    Symptoms of depression are starting to last most all day and this has happened for several days.     You may have suicidal thoughts but they are not constant.   What you need to do:     1. Call your care team, your response to treatment will improve if you keep your care team informed of your progress. Yellow periods are signs an adjustment may need to be made.     2. Continue your self-care, even if you have to fake it!    3. Talk to someone in your support network    4. Open up your depression survival kit           RED    ZONE Medical Alert - Get Help    What it looks like:     Depression is seriously interfering with your life.     You may experience these or other symptoms: You can t get out of bed most days, can t work or engage in other necessary activities, you have trouble taking care of basic hygiene, or basic responsibilities, thoughts of suicide or death that will not go away, self-injurious behavior.     What you need to do:  1. Call your care  team and request a same-day appointment. If they are not available (weekends or after hours) call your local crisis line, emergency room or 911.            Depression Self Care Plan / Survival Kit    Self-Care for Depression  Here s the deal. Your body and mind are really not as separate as most people think.  What you do and think affects how you feel and how you feel influences what you do and think. This means if you do things that people who feel good do, it will help you feel better.  Sometimes this is all it takes.  There is also a place for medication and therapy depending on how severe your depression is, so be sure to consult with your medical provider and/ or Behavioral Health Consultant if your symptoms are worsening or not improving.     In order to better manage my stress, I will:    Exercise  Get some form of exercise, every day. This will help reduce pain and release endorphins, the  feel good  chemicals in your brain. This is almost as good as taking antidepressants!  This is not the same as joining a gym and then never going! (they count on that by the way ) It can be as simple as just going for a walk or doing some gardening, anything that will get you moving.      Hygiene   Maintain good hygiene (Get out of bed in the morning, Make your bed, Brush your teeth, Take a shower, and Get dressed like you were going to work, even if you are unemployed).  If your clothes don't fit try to get ones that do.    Diet  I will strive to eat foods that are good for me, drink plenty of water, and avoid excessive sugar, caffeine, alcohol, and other mood-altering substances.  Some foods that are helpful in depression are: complex carbohydrates, B vitamins, flaxseed, fish or fish oil, fresh fruits and vegetables.    Psychotherapy  I agree to participate in Individual Therapy (if recommended).    Medication  If prescribed medications, I agree to take them.  Missing doses can result in serious side effects.  I  understand that drinking alcohol, or other illicit drug use, may cause potential side effects.  I will not stop my medication abruptly without first discussing it with my provider.    Staying Connected With Others  I will stay in touch with my friends, family members, and my primary care provider/team.    Use your imagination  Be creative.  We all have a creative side; it doesn t matter if it s oil painting, sand castles, or mud pies! This will also kick up the endorphins.    Witness Beauty  (AKA stop and smell the roses) Take a look outside, even in mid-winter. Notice colors, textures. Watch the squirrels and birds.     Service to others  Be of service to others.  There is always someone else in need.  By helping others we can  get out of ourselves  and remember the really important things.  This also provides opportunities for practicing all the other parts of the program.    Humor  Laugh and be silly!  Adjust your TV habits for less news and crime-drama and more comedy.    Control your stress  Try breathing deep, massage therapy, biofeedback, and meditation. Find time to relax each day.     My support system    Clinic Contact:  Phone number:    Contact 1:  Phone number:    Contact 2:  Phone number:    Restoration/:  Phone number:    Therapist:  Phone number:    Local crisis center:    Phone number:    Other community support:  Phone number:

## 2019-10-29 NOTE — PROGRESS NOTES
"  SUBJECTIVE:   Kate Chacon is a 74 year old female who presents for Preventive Visit.      Are you in the first 12 months of your Medicare Part B coverage?  No    Physical Health:    In general, how would you rate your overall physical health? good    Outside of work, how many days during the week do you exercise? none    Outside of work, approximately how many minutes a day do you exercise?not applicable    If you drink alcohol do you typically have >3 drinks per day or >7 drinks per week? Not Applicable    Do you usually eat at least 4 servings of fruit and vegetables a day, include whole grains & fiber and avoid regularly eating high fat or \"junk\" foods? Yes    Do you have any problems taking medications regularly?  No    Do you have any side effects from medications? none    Needs assistance for the following daily activities: no assistance needed    Which of the following safety concerns are present in your home?  none identified     Hearing impairment: No    In the past 6 months, have you been bothered by leaking of urine? yes    Mental Health:    In general, how would you rate your overall mental or emotional health? excellent  PHQ-2 Score:      Do you feel safe in your environment? YES    Have you ever done Advance Care Planning? (For example, a Health Directive, POLST, or a discussion with a medical provider about your wishes): No, advance care planning information given to patient to review.  Patient plans to discuss their wishes with loved ones or provider.      Additional concerns to address?  No    Fall risk:  Fallen 2 or more times in the past year?: Yes  Any fall with injury in the past year?: Yes    Cognitive Screenin) Repeat 3 items (Leader, Season, Table)    2) Clock draw: NORMAL  3) 3 item recall: Recalls 3 objects  Results: 3 items recalled: COGNITIVE IMPAIRMENT LESS LIKELY    Mini-CogTM Copyright S Jose. Licensed by the author for use in [facility]; reprinted with " "permission (lorena@[facility]). All rights reserved.      Do you have sleep apnea, excessive snoring or daytime drowsiness?: no        See below.    Reviewed and updated as needed this visit by clinical staff  Tobacco  Allergies  Meds  Soc Hx        Reviewed and updated as needed this visit by Provider        Social History     Tobacco Use     Smoking status: Former Smoker     Packs/day: 0.50     Years: 49.00     Pack years: 24.50     Types: Cigarettes     Last attempt to quit: 10/14/2016     Years since quitting: 3.0     Smokeless tobacco: Never Used   Substance Use Topics     Alcohol use: No                           Current providers sharing in care for this patient include:   Patient Care Team:  Asher Campos MD as PCP - General (Family Practice)  Asher Campos MD as Assigned PCP    The following health maintenance items are reviewed in Epic and correct as of today:  Health Maintenance   Topic Date Due     DEXA  1945     ADVANCE CARE PLANNING  1945     ZOSTER IMMUNIZATION (1 of 2) 07/04/1993     MEDICARE ANNUAL WELLNESS VISIT  07/04/2008     COLONOSCOPY  05/07/2017     PNEUMOCOCCAL IMMUNIZATION 65+ LOW/MEDIUM RISK (2 of 2 - PPSV23) 01/05/2018     INFLUENZA VACCINE (1) 09/01/2019     PHQ-9  11/29/2019     FALL RISK ASSESSMENT  05/31/2020     DTAP/TDAP/TD IMMUNIZATION (3 - Td) 03/02/2021     LIPID  08/30/2022     DEPRESSION ACTION PLAN  Completed     IPV IMMUNIZATION  Aged Out     MENINGITIS IMMUNIZATION  Aged Out         ROS:  ROS is negative except as noted above       OBJECTIVE:   /72   Pulse 74   Temp 97.2  F (36.2  C)   Resp 16   Ht 1.675 m (5' 5.95\")   Wt 93 kg (205 lb)   SpO2 98%   Breastfeeding? No   BMI 33.14 kg/m   Estimated body mass index is 33.14 kg/m  as calculated from the following:    Height as of this encounter: 1.675 m (5' 5.95\").    Weight as of this encounter: 93 kg (205 lb).  EXAM:   See below    Diagnostic Test Results:  Labs reviewed in Epic    ASSESSMENT / " PLAN:   Kate was seen today for medicare visit.    Diagnoses and all orders for this visit:    Encounter for Medicare annual wellness exam  -     HC FLU VACCINE, INCREASED ANTIGEN, PRESV FREE [35816]    Encounter for immunization   -     HC FLU VACCINE, INCREASED ANTIGEN, PRESV FREE [62017]    Needs flu shot    Essential hypertension  -     CBC with platelets differential; Future  -     Comprehensive metabolic panel; Future  -     lisinopril (PRINIVIL/ZESTRIL) 20 MG tablet; Take 1 tablet (20 mg) by mouth daily  -     Comprehensive metabolic panel  -     CBC with platelets differential    Paroxysmal atrial fibrillation (H)  -     CBC with platelets differential; Future  -     Comprehensive metabolic panel; Future  -     Comprehensive metabolic panel  -     CBC with platelets differential    Anticoagulation goal of INR 2 to 3    Cardiac pacemaker    Anxiety    MDD (recurrent major depressive disorder) in remission (H)  -     sertraline (ZOLOFT) 50 MG tablet; Take 1 tablet (50 mg) by mouth At Bedtime    Ischemic stroke (H)    Osteoporosis, unspecified osteoporosis type, unspecified pathological fracture presence    Tubular adenoma of colon  -     GASTROENTEROLOGY ADULT REF PROCEDURE ONLY [facility] [phone] (Dr. Matthews)    Former smoker    Visit for screening mammogram  -     MA Screening Digital Bilateral; Future    Tongue lesion  -     OTOLARYNGOLOGY REFERRAL    Encounter for screening colonoscopy  -     GASTROENTEROLOGY ADULT REF PROCEDURE ONLY [facility] [phone] (Dr. Matthews)    Vitamin B12 deficiency (non anemic)  -     cyanocobalamin injection 1,000 mcg    Need for 23-polyvalent pneumococcal polysaccharide vaccine  -     [facility] IMM-  PNEUMOCOCCAL VACCINE,ADULT,SQ OR IM        COUNSELING:  Reviewed preventive health counseling, as reflected in patient instructions       Regular exercise       Healthy diet/nutrition       Dental care       Immunizations    Vaccinated for:  "Influenza and Pneumococcal             Aspirin Prophylaxsis    Estimated body mass index is 33.14 kg/m  as calculated from the following:    Height as of this encounter: 1.675 m (5' 5.95\").    Weight as of this encounter: 93 kg (205 lb).    Weight management plan: Discussed healthy diet and exercise guidelines     reports that she quit smoking about 3 years ago. Her smoking use included cigarettes. She has a 24.50 pack-year smoking history. She has never used smokeless tobacco.      Appropriate preventive services were discussed with this patient, including applicable screening as appropriate for cardiovascular disease, diabetes, osteopenia/osteoporosis, and glaucoma.  As appropriate for age/gender, discussed screening for colorectal cancer, prostate cancer, breast cancer, and cervical cancer. Checklist reviewing preventive services available has been given to the patient.    Reviewed patients plan of care and provided an AVS. The Basic Care Plan (routine screening as documented in Health Maintenance) for Kate meets the Care Plan requirement. This Care Plan has been established and reviewed with the Patient.    Counseling Resources:  ATP IV Guidelines  Pooled Cohorts Equation Calculator  Breast Cancer Risk Calculator  FRAX Risk Assessment  ICSI Preventive Guidelines  Dietary Guidelines for Americans, 2010  USDA's MyPlate  ASA Prophylaxis  Lung CA Screening    Asher Campos MD  [facility]    Nursing Notes:   Jessa Mendoza LPN  10/29/2019  1:33 PM  Signed  Chief Complaint   Patient presents with     Medicare Visit     annual medicare visit       Initial /72   Pulse 74   Temp 97.2  F (36.2  C)   Resp 16   Ht 1.675 m (5' 5.95\")   Wt 93 kg (205 lb)   SpO2 98%   Breastfeeding? No   BMI 33.14 kg/m    Estimated body mass index is 33.14 kg/m  as calculated from the following:    Height as of this encounter: 1.675 m (5' 5.95\").    Weight as of this encounter: 93 kg (205 " lb).  Medication Reconciliation: complete    Jessa Downingt Stepan, JOHN    Jessa Mendoza LPN  10/29/2019  2:55 PM  Signed  Immunization Documentation    Prior to Immunization administration, verified patients identity using patient's name and date of birth. Please see IMMUNIZATIONS  and order for additional information.  Patient / Parent instructed to remain in clinic for 15 minutes and report any adverse reaction to staff immediately.    Was the entire amount of vaccines given used? Yes    Jessa YingJOHN  10/29/2019   2:53 PM  Clinic Administered Medication Documentation    MEDICATION LIST:   Injectable Medication Documentation    Patient was given Cyanocobalamin (B-12). Prior to medication administration, verified patients identity using patient s name and date of birth. Please see MAR and medication order for additional information. Patient instructed to remain in clinic for 15 minutes.      Was entire vial of medication used? Yes  Vial/Syringe: Single dose vial  Expiration Date:  10/01/20  Was this medication supplied by the patient? No    SUBJECTIVE:  Kate Chacon  is a 74 year old female who comes in for several concerns in addition to her Medicare annual wellness.  She has a history of paroxysmal atrial fibrillation for which she is on anticoagulation.  She has hypertension for which she takes lisinopril.  She is on metoprolol for rate control.  She continues on Lipitor because of her history of stroke.    She is in the emergency room 3 days ago because of low back pain.  She had washed and waxed her kitchen floor. The next day her back was really sore.  She had a negative urine and an x-ray showing scoliosis and multilevel degenerative disc disease.  She was in the put on ice and heat and given a short course of pain medication and Flexeril. She got Toradol and Fentanyl. She is better but slowly.  She has good relief from ice. She went to the chiropractor and saw Kuldip  Charly. He manipulated her neck but not her low back.  She has some trouble with her right leg but no pain with cough/sneeze.     She also has osteoporosis.  She is not had a DEXA scan for several years.  She has declined mammograms.  Her last one was in 2017.  Her last colonoscopy was in 2012, and she had tubular adenoma.  She is overdue for follow-up colonoscopy. She was prepped and her heart rate was too low. She had her pacemaker placed then.     She had Botox to the bladder in August through Dr. Fox.     She had some soreness of her lip she thinks from B12. She was felt to be deficient. She stopped and it is better. She is a milk drinker but doesn't take vitamin D.     Her granddaughter transferred from [place] to [facility] and is red-shirted for basketball. She is majoring in construction management.     PHQ-9 SCORE 7/2/2018 5/29/2019 10/29/2019   PHQ-9 Total Score 4 3 4     ANETTE-7 SCORE 11/19/2018 5/29/2019 10/29/2019   Total Score 0 0 0           Past Medical, Family, and Social History reviewed and updated as noted below.   ROS is negative except as noted above       Allergies   Allergen Reactions     Methylpar-Na Bisulfite-Pentazocine Unknown     jittery   ,   Family History   Problem Relation Age of Onset     Diabetes Father         Diabetes     Hypertension Father         Hypertension     Other - See Comments Mother         h/o coronary artery disease/dementia     Asthma Mother         Asthma     Diabetes Maternal Grandmother         Diabetes     Cancer Maternal Aunt         Cancer,Uterine     Breast Cancer No family hx of         Cancer-breast   ,   Current Outpatient Medications   Medication     atorvastatin (LIPITOR) 40 MG tablet     calcium carbonate 750 MG CHEW     cyclobenzaprine (FLEXERIL) 10 MG tablet     HYDROcodone-acetaminophen (NORCO) 5-325 MG tablet     ibuprofen (ADVIL/MOTRIN) 200 MG tablet     lisinopril (PRINIVIL/ZESTRIL) 20 MG tablet     metoprolol succinate ER (TOPROL-XL) 25 MG 24 hr tablet      sertraline (ZOLOFT) 50 MG tablet     warfarin (COUMADIN) 5 MG tablet     bisacodyl (DULCOLAX) 5 MG EC tablet     polyethylene glycol-electrolytes (NULYTELY) 420 g solution     No current facility-administered medications for this visit.    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   ,   Patient Active Problem List    Diagnosis Date Noted     Tubular adenoma of colon 10/29/2019     Priority: Medium     Sinoatrial node dysfunction (H) 05/01/2018     Priority: Medium     Cardiac pacemaker 02/19/2018     Priority: Medium     DNI (do not intubate) 10/25/2017     Priority: Medium     Overview:   As discussed with patient on 10/25/17       Urge incontinence 10/05/2017     Priority: Medium     Former smoker 08/30/2017     Priority: Medium     MDD (recurrent major depressive disorder) in remission (H) 08/30/2017     Priority: Medium     Anxiety 01/05/2017     Priority: Medium     Hypertension 12/08/2016     Priority: Medium     Paroxysmal atrial fibrillation (H) 11/02/2016     Priority: Medium     Anticoagulation goal of INR 2 to 3 10/28/2016     Priority: Medium     Ischemic stroke (H) 10/15/2016     Priority: Medium     Diverticulosis of large intestine 04/04/2011     Priority: Medium     History of colonic polyps 03/02/2011     Priority: Medium     Osteoporosis 02/08/2006     Priority: Medium   ,   Past Surgical History:   Procedure Laterality Date     APPENDECTOMY OPEN      No Comments Provided     COLONOSCOPY  08/19/2005 8/19/05,Cecal biopsy with tubular adenoma low grade dysplasia.     COLONOSCOPY  04/04/2011 4/4/11     COLONOSCOPY  05/07/2012 5/7/12     ESOPHAGOSCOPY, GASTROSCOPY, DUODENOSCOPY (EGD), COMBINED      8/19/05     OTHER SURGICAL HISTORY      8/3/05,214681,OTHER,helices on the right ear     TONSILLECTOMY    "   No Comments Provided    and   Social History     Tobacco Use     Smoking status: Former Smoker     Packs/day: 0.50     Years: 49.00     Pack years: 24.50     Types: Cigarettes     Last attempt to quit: 10/14/2016     Years since quitting: 3.0     Smokeless tobacco: Never Used   Substance Use Topics     Alcohol use: No     OBJECTIVE:  /72   Pulse 74   Temp 97.2  F (36.2  C)   Resp 16   Ht 1.675 m (5' 5.95\")   Wt 93 kg (205 lb)   SpO2 98%   Breastfeeding? No   BMI 33.14 kg/m     EXAM:  General Appearance: Pleasant, alert, appropriate appearance for age. No acute distress  Head Exam: Normal. Normocephalic, atraumatic.  Eye Exam: PERRLA, EOMI, conjunctivae, sclerae normal.  Ear Exam: Normal TM's bilaterally. Normal auditory canals and external ears. Non-tender.  Nose Exam: Normal external nose, mucus membranes, and septum.  OroPharynx Exam:  Dental hygiene adequate. Normal buccal mucosa. Normal pharynx.  Raised lesion on the tongue that appears to be similar to a mucocele but not certain that is what it is  Neck Exam:  Supple, no masses or nodes. No bruits  Thyroid Exam: No nodules or enlargement.  Chest/Respiratory Exam: Normal chest wall and respirations. Clear to auscultation.  Breast Exam: No dimpling, nipple retraction or discharge. No masses or nodes.  Cardiovascular Exam: Regular rate and rhythm. S1, S2, no murmur, click, gallop, or rubs.  Gastrointestinal Exam: Soft, non-tender, no masses or organomegaly.  Lymphatic Exam: Non-palpable nodes in neck,clavicular, axillary, or inguinal regions.  Musculoskeletal Exam: Back is straight and non-tender, full ROM of upper and lower extremities.  Foot Exam: Left and right foot: good pedal pulses  Skin: no rash or abnormalities  Neurologic Exam:  normal gross motor, tone coordination and no tremor.  Psychiatric Exam: Alert and oriented - appropriate affect.     Results for orders placed or performed in visit on 10/29/19   Comprehensive metabolic panel "   Result Value Ref Range    Sodium 139 134 - 144 mmol/L    Potassium 4.0 3.5 - 5.1 mmol/L    Chloride 106 98 - 107 mmol/L    Carbon Dioxide 27 21 - 31 mmol/L    Anion Gap 6 3 - 14 mmol/L    Glucose 87 70 - 105 mg/dL    Urea Nitrogen 19 7 - 25 mg/dL    Creatinine 0.95 0.60 - 1.20 mg/dL    GFR Estimate 57 (L) >60 mL/min/[1.73_m2]    GFR Estimate If Black 69 >60 mL/min/[1.73_m2]    Calcium 10.6 (H) 8.6 - 10.3 mg/dL    Bilirubin Total 0.5 0.3 - 1.0 mg/dL    Albumin 4.3 3.5 - 5.7 g/dL    Protein Total 7.8 6.4 - 8.9 g/dL    Alkaline Phosphatase 78 34 - 104 U/L    ALT 26 7 - 52 U/L    AST 24 13 - 39 U/L   CBC with platelets differential   Result Value Ref Range    WBC 8.5 4.0 - 11.0 10e9/L    RBC Count 4.94 3.8 - 5.2 10e12/L    Hemoglobin 15.2 11.7 - 15.7 g/dL    Hematocrit 47.8 (H) 35.0 - 47.0 %    MCV 97 78 - 100 fl    MCH 30.8 26.5 - 33.0 pg    MCHC 31.8 31.5 - 36.5 g/dL    RDW 13.4 10.0 - 15.0 %    Platelet Count 220 150 - 450 10e9/L    Diff Method Automated Method     % Neutrophils 55.3 %    % Lymphocytes 30.0 %    % Monocytes 10.9 %    % Eosinophils 3.1 %    % Basophils 0.6 %    % Immature Granulocytes 0.1 %    Absolute Neutrophil 4.7 1.6 - 8.3 10e9/L    Absolute Lymphocytes 2.6 0.8 - 5.3 10e9/L    Absolute Monocytes 0.9 0.0 - 1.3 10e9/L    Absolute Eosinophils 0.3 0.0 - 0.7 10e9/L    Absolute Basophils 0.1 0.0 - 0.2 10e9/L    Abs Immature Granulocytes 0.0 0 - 0.4 10e9/L      ASSESSMENT/Plan :    Kate was seen today for medicare visit.    Diagnoses and all orders for this visit:    Encounter for Medicare annual wellness exam  -     HC FLU VACCINE, INCREASED ANTIGEN, PRESV FREE [99165]    Encounter for immunization   -     HC FLU VACCINE, INCREASED ANTIGEN, PRESV FREE [85325]    Needs flu shot    Essential hypertension  -     CBC with platelets differential; Future  -     Comprehensive metabolic panel; Future  -     lisinopril (PRINIVIL/ZESTRIL) 20 MG tablet; Take 1 tablet (20 mg) by mouth daily  -     Comprehensive  metabolic panel  -     CBC with platelets differential    Paroxysmal atrial fibrillation (H)  -     CBC with platelets differential; Future  -     Comprehensive metabolic panel; Future  -     Comprehensive metabolic panel  -     CBC with platelets differential    Anticoagulation goal of INR 2 to 3    Cardiac pacemaker    Anxiety    MDD (recurrent major depressive disorder) in remission (H)  -     sertraline (ZOLOFT) 50 MG tablet; Take 1 tablet (50 mg) by mouth At Bedtime    Ischemic stroke (H)    Osteoporosis, unspecified osteoporosis type, unspecified pathological fracture presence    Tubular adenoma of colon  -     GASTROENTEROLOGY ADULT REF PROCEDURE ONLY [facility] [phone] (Dr. Matthews)    Former smoker    Visit for screening mammogram  -     MA Screening Digital Bilateral; Future    Tongue lesion  -     OTOLARYNGOLOGY REFERRAL    Encounter for screening colonoscopy  -     GASTROENTEROLOGY ADULT REF PROCEDURE ONLY [facility] [phone] (Dr. Matthews)    Vitamin B12 deficiency (non anemic)  -     cyanocobalamin injection 1,000 mcg    Need for 23-polyvalent pneumococcal polysaccharide vaccine  -     [facility] IMM-  PNEUMOCOCCAL VACCINE,ADULT,SQ OR IM        Will notify of lab results when available. Discussed diet, exercise and healthy lifestyle changes. Continue current medications.  Discussed need for ongoing anticoagulation because of atrial fibrillation.  Mammogram scheduled.    Pneumovax and flu vaccine today.    Referred to ENT for consideration of excision of tongue lesion.    Discussed B12 deficiency.  Unclear whether her oral symptoms were due actually to the B12 or not.  We elected to give her thousand micrograms IM and then she can start back on the oral.  If the oral seems to cause her problems would then have her do shots at 1000 mcg weekly for a month and then monthly thereafter.    Referred for colonoscopy as that was deferred due to her stroke and pacemaker  placement.  She should be at low risk for the planned procedure.  She understands that she would need to stop her warfarin 4 days prior to the procedure.    A total of 25 minutes was spent with the patient, greater than 50% of the time was spent in counseling/discussion of the aforementioned concerns in addition to her medicare wellness visit.       Asher Campos MD

## 2019-10-29 NOTE — NURSING NOTE
Immunization Documentation    Prior to Immunization administration, verified patients identity using patient's name and date of birth. Please see IMMUNIZATIONS  and order for additional information.  Patient / Parent instructed to remain in clinic for 15 minutes and report any adverse reaction to staff immediately.    Was the entire amount of vaccines given used? Yes    Jessa Ying LPN  10/29/2019   2:53 PM  Clinic Administered Medication Documentation    MEDICATION LIST:   Injectable Medication Documentation    Patient was given Cyanocobalamin (B-12). Prior to medication administration, verified patients identity using patient s name and date of birth. Please see MAR and medication order for additional information. Patient instructed to remain in clinic for 15 minutes.      Was entire vial of medication used? Yes  Vial/Syringe: Single dose vial  Expiration Date:  10/01/20  Was this medication supplied by the patient? No

## 2019-10-29 NOTE — LETTER
October 30, 2019      Kate Chacon  [address] [facility] [id number]        Dear Kate,     Your labs look fine. Your complete blood count was normal. Your comprehensive metabolic panel (a test that looks at liver and kidney function, blood sugar, electrolytes, and nutritional status) was normal.     It was a pleasure seeing you the other day.  If you have any questions, please don't hesitate to call us.     Sincerely,        Asher Campos MD                                          Results for orders placed or performed in visit on 10/29/19   Comprehensive metabolic panel   Result Value Ref Range    Sodium 139 134 - 144 mmol/L    Potassium 4.0 3.5 - 5.1 mmol/L    Chloride 106 98 - 107 mmol/L    Carbon Dioxide 27 21 - 31 mmol/L    Anion Gap 6 3 - 14 mmol/L    Glucose 87 70 - 105 mg/dL    Urea Nitrogen 19 7 - 25 mg/dL    Creatinine 0.95 0.60 - 1.20 mg/dL    GFR Estimate 57 (L) >60 mL/min/[1.73_m2]    GFR Estimate If Black 69 >60 mL/min/[1.73_m2]    Calcium 10.6 (H) 8.6 - 10.3 mg/dL    Bilirubin Total 0.5 0.3 - 1.0 mg/dL    Albumin 4.3 3.5 - 5.7 g/dL    Protein Total 7.8 6.4 - 8.9 g/dL    Alkaline Phosphatase 78 34 - 104 U/L    ALT 26 7 - 52 U/L    AST 24 13 - 39 U/L   CBC with platelets differential   Result Value Ref Range    WBC 8.5 4.0 - 11.0 10e9/L    RBC Count 4.94 3.8 - 5.2 10e12/L    Hemoglobin 15.2 11.7 - 15.7 g/dL    Hematocrit 47.8 (H) 35.0 - 47.0 %    MCV 97 78 - 100 fl    MCH 30.8 26.5 - 33.0 pg    MCHC 31.8 31.5 - 36.5 g/dL    RDW 13.4 10.0 - 15.0 %    Platelet Count 220 150 - 450 10e9/L    Diff Method Automated Method     % Neutrophils 55.3 %    % Lymphocytes 30.0 %    % Monocytes 10.9 %    % Eosinophils 3.1 %    % Basophils 0.6 %    % Immature Granulocytes 0.1 %    Absolute Neutrophil 4.7 1.6 - 8.3 10e9/L    Absolute Lymphocytes 2.6 0.8 - 5.3 10e9/L    Absolute Monocytes 0.9 0.0 - 1.3 10e9/L    Absolute Eosinophils 0.3 0.0 - 0.7 10e9/L    Absolute Basophils 0.1 0.0 - 0.2 10e9/L    Abs  Immature Granulocytes 0.0 0 - 0.4 10e9/L

## 2019-10-30 ENCOUNTER — TELEPHONE (OUTPATIENT)
Dept: SURGERY | Facility: OTHER | Age: 74
End: 2019-10-30

## 2019-10-30 DIAGNOSIS — Z86.0100 HISTORY OF COLONIC POLYPS: Primary | ICD-10-CM

## 2019-10-30 RX ORDER — POLYETHYLENE GLYCOL 3350, SODIUM CHLORIDE, SODIUM BICARBONATE, POTASSIUM CHLORIDE 420; 11.2; 5.72; 1.48 G/4L; G/4L; G/4L; G/4L
4000 POWDER, FOR SOLUTION ORAL ONCE
Qty: 4000 ML | Refills: 0 | Status: ON HOLD | OUTPATIENT
Start: 2019-10-30 | End: 2019-12-02

## 2019-10-30 RX ORDER — BISACODYL 5 MG/1
TABLET, DELAYED RELEASE ORAL
Qty: 2 TABLET | Refills: 0 | Status: ON HOLD | OUTPATIENT
Start: 2019-10-30 | End: 2019-12-02

## 2019-10-30 ASSESSMENT — ANXIETY QUESTIONNAIRES: GAD7 TOTAL SCORE: 0

## 2019-10-30 NOTE — TELEPHONE ENCOUNTER
Screening Questions for the Scheduling of Screening Colonoscopies   (If Colonoscopy is diagnostic, Provider should review the chart before scheduling.)  Are you younger than 50 or older than 80?  NO   Do you take aspirin or fish oil?  NO  (if yes, tell patient to stop 1 week prior to Colonoscopy)  Do you take warfarin (Coumadin), clopidogrel (Plavix), apixaban (Eliquis), dabigatram (Pradaxa), rivaroxaban (Xarelto) or any blood thinner? YES   Do you use oxygen at home?  NO   Do you have kidney disease? NO   Are you on dialysis? NO   Have you had a stroke or heart attack in the last year? NO   Have you had a stent in your heart or any blood vessel in the last year? NO   Have you had a transplant of any organ? NO   Have you had a colonoscopy or upper endoscopy (EGD) before?  YES          When?  OVER 5 YRS   Date of scheduled Colonoscopy. 12/02/2019  Provider CHRISTINA   Pharmacy [facility]

## 2019-10-30 NOTE — TELEPHONE ENCOUNTER
Patient scheduled for a colonoscopy with you on 12/02 and is on Warfarin,  Need to know when she will need to hold before procedure.   Thank you.   Antonietta Lin on 10/30/2019 at 9:06 AM

## 2019-11-08 ENCOUNTER — ANCILLARY PROCEDURE (OUTPATIENT)
Dept: CARDIOLOGY | Facility: CLINIC | Age: 74
End: 2019-11-08
Attending: INTERNAL MEDICINE
Payer: MEDICARE

## 2019-11-08 DIAGNOSIS — I48.20 CHRONIC A-FIB (H): ICD-10-CM

## 2019-11-08 PROCEDURE — 93296 REM INTERROG EVL PM/IDS: CPT | Mod: ZF

## 2019-11-08 PROCEDURE — 93294 REM INTERROG EVL PM/LDLS PM: CPT | Performed by: INTERNAL MEDICINE

## 2019-11-09 LAB
MDC_IDC_EPISODE_DTM: NORMAL
MDC_IDC_EPISODE_DURATION: 12 S
MDC_IDC_EPISODE_ID: NORMAL
MDC_IDC_EPISODE_TYPE: NORMAL
MDC_IDC_LEAD_IMPLANT_DT: NORMAL
MDC_IDC_LEAD_IMPLANT_DT: NORMAL
MDC_IDC_LEAD_LOCATION: NORMAL
MDC_IDC_LEAD_LOCATION: NORMAL
MDC_IDC_LEAD_LOCATION_DETAIL_1: NORMAL
MDC_IDC_LEAD_LOCATION_DETAIL_1: NORMAL
MDC_IDC_LEAD_MFG: NORMAL
MDC_IDC_LEAD_MFG: NORMAL
MDC_IDC_LEAD_MODEL: NORMAL
MDC_IDC_LEAD_MODEL: NORMAL
MDC_IDC_LEAD_POLARITY_TYPE: NORMAL
MDC_IDC_LEAD_POLARITY_TYPE: NORMAL
MDC_IDC_LEAD_SERIAL: NORMAL
MDC_IDC_LEAD_SERIAL: NORMAL
MDC_IDC_MSMT_BATTERY_DTM: NORMAL
MDC_IDC_MSMT_BATTERY_REMAINING_LONGEVITY: 66 MO
MDC_IDC_MSMT_BATTERY_REMAINING_PERCENTAGE: 100 %
MDC_IDC_MSMT_BATTERY_STATUS: NORMAL
MDC_IDC_MSMT_LEADCHNL_RA_IMPEDANCE_VALUE: 763 OHM
MDC_IDC_MSMT_LEADCHNL_RV_IMPEDANCE_VALUE: 797 OHM
MDC_IDC_MSMT_LEADCHNL_RV_PACING_THRESHOLD_AMPLITUDE: 0.8 V
MDC_IDC_MSMT_LEADCHNL_RV_PACING_THRESHOLD_PULSEWIDTH: 0.4 MS
MDC_IDC_PG_IMPLANT_DTM: NORMAL
MDC_IDC_PG_MFG: NORMAL
MDC_IDC_PG_MODEL: NORMAL
MDC_IDC_PG_SERIAL: NORMAL
MDC_IDC_PG_TYPE: NORMAL
MDC_IDC_SESS_CLINIC_NAME: NORMAL
MDC_IDC_SESS_DTM: NORMAL
MDC_IDC_SESS_TYPE: NORMAL
MDC_IDC_SET_BRADY_AT_MODE_SWITCH_MODE: NORMAL
MDC_IDC_SET_BRADY_AT_MODE_SWITCH_RATE: 170 {BEATS}/MIN
MDC_IDC_SET_BRADY_LOWRATE: 60 {BEATS}/MIN
MDC_IDC_SET_BRADY_MAX_SENSOR_RATE: 130 {BEATS}/MIN
MDC_IDC_SET_BRADY_MAX_TRACKING_RATE: 130 {BEATS}/MIN
MDC_IDC_SET_BRADY_MODE: NORMAL
MDC_IDC_SET_BRADY_PAV_DELAY_LOW: 250 MS
MDC_IDC_SET_BRADY_SAV_DELAY_LOW: 220 MS
MDC_IDC_SET_LEADCHNL_RA_PACING_AMPLITUDE: 5 V
MDC_IDC_SET_LEADCHNL_RA_PACING_CAPTURE_MODE: NORMAL
MDC_IDC_SET_LEADCHNL_RA_PACING_POLARITY: NORMAL
MDC_IDC_SET_LEADCHNL_RA_PACING_PULSEWIDTH: 0.4 MS
MDC_IDC_SET_LEADCHNL_RA_SENSING_ADAPTATION_MODE: NORMAL
MDC_IDC_SET_LEADCHNL_RA_SENSING_POLARITY: NORMAL
MDC_IDC_SET_LEADCHNL_RA_SENSING_SENSITIVITY: 0.25 MV
MDC_IDC_SET_LEADCHNL_RV_PACING_AMPLITUDE: 1.2 V
MDC_IDC_SET_LEADCHNL_RV_PACING_CAPTURE_MODE: NORMAL
MDC_IDC_SET_LEADCHNL_RV_PACING_POLARITY: NORMAL
MDC_IDC_SET_LEADCHNL_RV_PACING_PULSEWIDTH: 0.4 MS
MDC_IDC_SET_LEADCHNL_RV_SENSING_ADAPTATION_MODE: NORMAL
MDC_IDC_SET_LEADCHNL_RV_SENSING_POLARITY: NORMAL
MDC_IDC_SET_LEADCHNL_RV_SENSING_SENSITIVITY: 1.5 MV
MDC_IDC_SET_ZONE_DETECTION_INTERVAL: 375 MS
MDC_IDC_SET_ZONE_TYPE: NORMAL
MDC_IDC_SET_ZONE_VENDOR_TYPE: NORMAL
MDC_IDC_STAT_AT_BURDEN_PERCENT: 100 %
MDC_IDC_STAT_AT_DTM_END: NORMAL
MDC_IDC_STAT_AT_DTM_START: NORMAL
MDC_IDC_STAT_BRADY_DTM_END: NORMAL
MDC_IDC_STAT_BRADY_DTM_START: NORMAL
MDC_IDC_STAT_BRADY_RA_PERCENT_PACED: 0 %
MDC_IDC_STAT_BRADY_RV_PERCENT_PACED: 43 %
MDC_IDC_STAT_EPISODE_RECENT_COUNT: 0
MDC_IDC_STAT_EPISODE_RECENT_COUNT: 1
MDC_IDC_STAT_EPISODE_RECENT_COUNT_DTM_END: NORMAL
MDC_IDC_STAT_EPISODE_RECENT_COUNT_DTM_START: NORMAL
MDC_IDC_STAT_EPISODE_TYPE: NORMAL
MDC_IDC_STAT_EPISODE_VENDOR_TYPE: NORMAL

## 2019-11-12 ENCOUNTER — ANTICOAGULATION THERAPY VISIT (OUTPATIENT)
Dept: ANTICOAGULATION | Facility: OTHER | Age: 74
End: 2019-11-12
Attending: FAMILY MEDICINE
Payer: MEDICARE

## 2019-11-12 ENCOUNTER — HOSPITAL ENCOUNTER (OUTPATIENT)
Dept: MAMMOGRAPHY | Facility: OTHER | Age: 74
Discharge: HOME | End: 2019-11-12
Attending: FAMILY MEDICINE | Admitting: FAMILY MEDICINE
Payer: MEDICARE

## 2019-11-12 DIAGNOSIS — Z79.01 ANTICOAGULATION GOAL OF INR 2 TO 3: ICD-10-CM

## 2019-11-12 DIAGNOSIS — Z51.81 ANTICOAGULATION GOAL OF INR 2 TO 3: ICD-10-CM

## 2019-11-12 DIAGNOSIS — Z12.31 VISIT FOR SCREENING MAMMOGRAM: ICD-10-CM

## 2019-11-12 DIAGNOSIS — I48.0 PAROXYSMAL ATRIAL FIBRILLATION (H): ICD-10-CM

## 2019-11-12 LAB — INR POINT OF CARE: 2.3 (ref 0.86–1.14)

## 2019-11-12 PROCEDURE — 77063 BREAST TOMOSYNTHESIS BI: CPT

## 2019-11-12 PROCEDURE — 85610 PROTHROMBIN TIME: CPT | Mod: QW,ZL

## 2019-11-12 NOTE — PROGRESS NOTES
ANTICOAGULATION FOLLOW-UP CLINIC VISIT    Patient Name:  Kate Chacon  Date:  2019  Contact Type:  Face to Face    SUBJECTIVE:  Patient Findings         Clinical Outcomes     Negatives:   Major bleeding event, Thromboembolic event, Anticoagulation-related hospital admission, Anticoagulation-related ED visit, Anticoagulation-related fatality           OBJECTIVE    INR Protime   Date Value Ref Range Status   2019 2.3 (A) 0.86 - 1.14 Final       ASSESSMENT / PLAN  INR assessment THER    Recheck INR In: 3 WEEKS    INR Location Clinic      Anticoagulation Summary  As of 2019    INR goal:   2.0-3.0   TTR:   78.1 % (3 y)   INR used for dosin.3 (2019)   Warfarin maintenance plan:   2.5 mg (5 mg x 0.5) every Tue; 5 mg (5 mg x 1) all other days   Full warfarin instructions:   : Hold; : Hold; : Hold; Otherwise 2.5 mg every Tue; 5 mg all other days   Weekly warfarin total:   32.5 mg   Plan last modified:   Radha Burns RN (10/29/2019)   Next INR check:   2019   Priority:   INR   Target end date:   Indefinite    Indications    Paroxysmal atrial fibrillation (H) [I48.0]  Anticoagulation goal of INR 2 to 3 [Z51.81  Z79.01]             Anticoagulation Episode Summary     INR check location:       Preferred lab:       Send INR reminders to:    INR    Comments:         Anticoagulation Care Providers     Provider Role Specialty Phone number    Asher Campos MD [facility] [phone]            See the Encounter Report to view Anticoagulation Flowsheet and Dosing Calendar (Go to Encounters tab in chart review, and find the Anticoagulation Therapy Visit)        Radha Burns RN

## 2019-11-13 ENCOUNTER — HOSPITAL ENCOUNTER (OUTPATIENT)
Dept: PHYSICAL THERAPY | Facility: OTHER | Age: 74
Setting detail: THERAPIES SERIES
End: 2019-11-13
Attending: PHYSICIAN ASSISTANT
Payer: MEDICARE

## 2019-11-13 ENCOUNTER — OFFICE VISIT (OUTPATIENT)
Dept: FAMILY MEDICINE | Facility: OTHER | Age: 74
End: 2019-11-13
Attending: FAMILY MEDICINE
Payer: COMMERCIAL

## 2019-11-13 VITALS
SYSTOLIC BLOOD PRESSURE: 142 MMHG | RESPIRATION RATE: 18 BRPM | DIASTOLIC BLOOD PRESSURE: 86 MMHG | OXYGEN SATURATION: 94 % | TEMPERATURE: 97.9 F | HEART RATE: 94 BPM

## 2019-11-13 DIAGNOSIS — M54.41 ACUTE RIGHT-SIDED LOW BACK PAIN WITH RIGHT-SIDED SCIATICA: Primary | ICD-10-CM

## 2019-11-13 DIAGNOSIS — M54.50 RIGHT-SIDED LOW BACK PAIN WITHOUT SCIATICA: ICD-10-CM

## 2019-11-13 DIAGNOSIS — M54.16 LUMBAR RADICULOPATHY: ICD-10-CM

## 2019-11-13 DIAGNOSIS — M43.10 SPONDYLISTHESIS: ICD-10-CM

## 2019-11-13 PROCEDURE — G0463 HOSPITAL OUTPT CLINIC VISIT: HCPCS

## 2019-11-13 PROCEDURE — 97110 THERAPEUTIC EXERCISES: CPT | Mod: GP

## 2019-11-13 PROCEDURE — 97112 NEUROMUSCULAR REEDUCATION: CPT | Mod: GP

## 2019-11-13 PROCEDURE — 97161 PT EVAL LOW COMPLEX 20 MIN: CPT | Mod: GP

## 2019-11-13 PROCEDURE — 99213 OFFICE O/P EST LOW 20 MIN: CPT | Performed by: FAMILY MEDICINE

## 2019-11-13 RX ORDER — DEXAMETHASONE 4 MG/1
TABLET ORAL
Qty: 12 TABLET | Refills: 0 | Status: SHIPPED | OUTPATIENT
Start: 2019-11-13 | End: 2019-11-25

## 2019-11-13 ASSESSMENT — PAIN SCALES - GENERAL: PAINLEVEL: WORST PAIN (10)

## 2019-11-13 ASSESSMENT — PATIENT HEALTH QUESTIONNAIRE - PHQ9: SUM OF ALL RESPONSES TO PHQ QUESTIONS 1-9: 4

## 2019-11-13 NOTE — PROGRESS NOTES
[facility]          OUTPATIENT PHYSICAL THERAPY ORTHOPEDIC EVALUATION  PLAN OF TREATMENT FOR OUTPATIENT REHABILITATION  (COMPLETE FOR INITIAL CLAIMS ONLY)  Patient's Last Name, First Name, M.I.  YOB: 1945  ChaconKate  DINORAH    Provider s Name:  [facility]   Medical Record No.  [id number]   Start of Care Date:  11/13/19   Onset Date:  (P) 10/26/19   Type:     _X__PT   ___OT   ___SLP Medical Diagnosis:  (P) spondylolisthesis; right sided low back pain without sciatica     PT Diagnosis:  (P) low back pain, lumbar segmental dysfunction, pelvis dysfunction, muscle weakness   Visits from SOC:  1      _________________________________________________________________________________  Plan of Treatment/Functional Goals:  (P) balance training, gait training, joint mobilization, manual therapy, neuromuscular re-education, strengthening, stretching     (P) Cryotherapy, Electrical stimulation, Hot packs     Goals  Goal Identifier: (P) joint mechanics  Goal Description: (P) Patient to display normal joint mechanics and equal postural loading to have pain free ability to complete sit to stand.   Target Date: (P) 02/05/20    Goal Identifier: (P) walking  Goal Description: (P) Patient to report ability to walk to complete household tasks and grocery shopping without back pain.   Target Date: (P) 02/05/20    Goal Identifier: (P) HEP  Goal Description: (P) Patient to be compliant with HEP for self management of symptoms.   Target Date: (P) 02/05/20                                                           Therapy Frequency:  (P) 2 times/Week  Predicted Duration of Therapy Intervention:  (P) 12 weeks    Mariel Sharp, PT                 I CERTIFY THE NEED FOR THESE SERVICES FURNISHED UNDER        THIS PLAN OF TREATMENT AND WHILE UNDER MY CARE     (Physician co-signature of this document indicates review and certification of the therapy plan).                        Certification Date From:  (P) 11/13/19   Certification Date To:  (P) 02/05/20    Referring Provider:  Asher Campos MD    Initial Assessment        See Epic Evaluation Start of Care Date: 11/13/19

## 2019-11-13 NOTE — PROGRESS NOTES
"Nursing Notes:   Tiffany White LPN  11/13/2019  4:32 PM  Signed  Chief Complaint   Patient presents with     RECHECK     back pain     She stated her back pain is getting worse. She did have an evaluation in physical therapy today for this.  Tiffany White LPN..................11/13/2019   4:32 PM      Initial BP (!) 154/106   Pulse 94   Temp 97.9  F (36.6  C) (Temporal)   Resp 18   SpO2 94%   Breastfeeding No  Estimated body mass index is 33.14 kg/m  as calculated from the following:    Height as of 10/29/19: 1.675 m (5' 5.95\").    Weight as of 10/29/19: 93 kg (205 lb).  Medication Reconciliation: complete    Tiffany White LPN    SUBJECTIVE:  Kate Chacon  is a 74 year old female who comes in today because of backache.  When I saw her 2 weeks ago she had been to the emergency room after washing and waxing her kitchen floor.  X-ray was remarkable for scoliosis and multilevel degenerative disc disease she did ice and heat and did some pain medication along with Flexeril and had good relief from ice.  She did see the chiropractor but he manipulated her neck and on her back.  She was having some pain with her right leg but no pain with cough or sneeze.  Her symptoms were some better when she saw me so we did not really pursue anything further.  She was referred to physical therapy and was seen for evaluation today.    She has pain in her right lower back and it goes down the front of her right leg on the thigh. No numbness or tingling. She is bothered by her back when she is up and around and better when in bed.     Past Medical, Family, and Social History reviewed and updated as noted below.   ROS is negative except as noted above       Allergies   Allergen Reactions     Methylpar-Na Bisulfite-Pentazocine Unknown     jittery   ,   Family History   Problem Relation Age of Onset     Diabetes Father         Diabetes     Hypertension Father         Hypertension     Other - See Comments Mother         " h/o coronary artery disease/dementia     Asthma Mother         Asthma     Diabetes Maternal Grandmother         Diabetes     Cancer Maternal Aunt         Cancer,Uterine     Breast Cancer No family hx of         Cancer-breast   ,   Current Outpatient Medications   Medication     atorvastatin (LIPITOR) 40 MG tablet     calcium carbonate 750 MG CHEW     dexamethasone (DECADRON) 4 MG tablet     ibuprofen (ADVIL/MOTRIN) 200 MG tablet     lisinopril (PRINIVIL/ZESTRIL) 20 MG tablet     metoprolol succinate ER (TOPROL-XL) 25 MG 24 hr tablet     sertraline (ZOLOFT) 50 MG tablet     warfarin (COUMADIN) 5 MG tablet     bisacodyl (DULCOLAX) 5 MG EC tablet     No current facility-administered medications for this visit.    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   ,   Patient Active Problem List    Diagnosis Date Noted     Tubular adenoma of colon 10/29/2019     Priority: Medium     Sinoatrial node dysfunction (H) 05/01/2018     Priority: Medium     Cardiac pacemaker 02/19/2018     Priority: Medium     DNI (do not intubate) 10/25/2017     Priority: Medium     Overview:   As discussed with patient on 10/25/17       Urge incontinence 10/05/2017     Priority: Medium     Former smoker 08/30/2017     Priority: Medium     MDD (recurrent major depressive disorder) in remission (H) 08/30/2017     Priority: Medium     Anxiety 01/05/2017     Priority: Medium     Hypertension 12/08/2016     Priority: Medium     Paroxysmal atrial fibrillation (H) 11/02/2016     Priority: Medium     Anticoagulation goal of INR 2 to 3 10/28/2016     Priority: Medium     Ischemic stroke (H) 10/15/2016     Priority: Medium     Diverticulosis of large intestine 04/04/2011     Priority: Medium     History of colonic polyps 03/02/2011     Priority: Medium     Osteoporosis  02/08/2006     Priority: Medium   ,   Past Surgical History:   Procedure Laterality Date     APPENDECTOMY OPEN      No Comments Provided     COLONOSCOPY  08/19/2005 8/19/05,Cecal biopsy with tubular adenoma low grade dysplasia.     COLONOSCOPY  04/04/2011 4/4/11     COLONOSCOPY  05/07/2012    Tubular Adenomas F/U 2017     ESOPHAGOSCOPY, GASTROSCOPY, DUODENOSCOPY (EGD), COMBINED      8/19/05     OTHER SURGICAL HISTORY      8/3/05,588820,OTHER,helices on the right ear     TONSILLECTOMY      No Comments Provided    and   Social History     Tobacco Use     Smoking status: Former Smoker     Packs/day: 0.50     Years: 49.00     Pack years: 24.50     Types: Cigarettes     Last attempt to quit: 10/14/2016     Years since quitting: 3.0     Smokeless tobacco: Never Used   Substance Use Topics     Alcohol use: No     OBJECTIVE:  BP (!) 142/86   Pulse 94   Temp 97.9  F (36.6  C) (Temporal)   Resp 18   SpO2 94%   Breastfeeding No    EXAM:  Alert and cooperative, no distress. Examination of the low back reveals no significant paraspinal muscle spasm.  Normal lumbar range of motion including lateral bending and flexion and extension.  There is no SI joint tenderness.  There is no sciatic notch tenderness.  The patient is able to stand on each foot alternately and raise on the toes.  Is able to stand on heels.  Standing flat-footed on each foot alternately and extending the back does not cause any increased SI joint pain. SLR is negative bilaterally.  She has slightly diminished right quadricep and hip flexor strength but that secondary to her stroke.  ASSESSMENT/Plan :    Kate was seen today for recheck.    Diagnoses and all orders for this visit:    Acute right-sided low back pain with right-sided sciatica    Lumbar radiculopathy  -     dexamethasone (DECADRON) 4 MG tablet; One tablet twice daily for 4 days, then once daily for 4 days, then stop.  Take all medication with food.      I think she probably has a  significant amount of facet arthropathy but certainly has some radicular findings today and symptoms are consistent with a nerve impingement.  Will burst with dexamethasone and instructed on use and potential side effects.  Continue with ice.  Since she can get comfortable when she lays down did not prescribe anything stronger for pain.  The muscle relaxants did not seem to help and hydrocodone was not that effective.  She will continue with physical therapy and if she is not improving, would then consider MRI for possible injections.  There is a chance that she may have some lumbar spinal stenosis as well.  She will keep in touch with her progress    Asher Campos MD

## 2019-11-13 NOTE — PROGRESS NOTES
11/13/19 1400   General Information   Type of Visit Initial OP Ortho PT Evaluation   Start of Care Date 11/13/19   Referring Physician Asher Campos MD   Patient/Family Goals Statement reduce pain   Orders Evaluate and Treat   Date of Order 10/26/19   Certification Required? Yes   Medical Diagnosis spondylolisthesis; right sided low back pain without sciatica   Surgical/Medical history reviewed Yes   Body Part(s)   Body Part(s) Lumbar Spine/SI   Presentation and Etiology   Pertinent history of current problem (include personal factors and/or comorbidities that impact the POC) On going back pain off/on. Feels her CVA from 2016 affected her right leg control and this adds to her back pain. No injuries. Was in ER on 10/26, DDD and diffuse osteopenia noted. Pain is in right low back, buttock area and now in ot right thigh. Has tramadol but it doesn't seem to help. Using ice.    Impairments A. Pain;B. Decreased WB tolerance;G. Impaired balance;H. Impaired gait;P. Bowel or bladder problems   Functional Limitations perform activities of daily living   Onset date of current episode/exacerbation 10/26/19   Chronicity Recurrent   Pain rating (0-10 point scale) Worst (/10)   Worst (/10) 10   Pain quality B. Dull;C. Aching   Frequency of pain/symptoms B. Intermittent   Pain/symptoms exacerbated by A. Sitting;B. Walking;G. Certain positions;I. Bending   Pain/symptoms eased by C. Rest;D. Nothing;F. Certain positions;H. Cold   Progression of symptoms since onset: Unchanged   Prior Level of Function   Prior Level of Function-Mobility no limtations   Prior Level of Function-ADLs no limitations   Current Level of Function   Patient role/employment history F. Retired   Fall Risk Screen   Fall screen completed by PT   Have you fallen 2 or more times in the past year? Yes   Have you fallen and had an injury in the past year? No   Is patient a fall risk? Yes   Fall screen comments slip on ice, lost balance   Lumbar Spine/SI Objective  Findings   Posture right shoulder superior in height, pelvis level   Gait/Locomotion antalgic with right weight bearing   Flexion ROM pain reported   Lumbar/Hip/Knee/Foot Strength Comments to be assessed at later date   Lumbar/SI Special Tests Comments + FFT right, + seated FFT right   Segmental Mobility FRS right L4-5, right inferior pubic shear, right unilateral anterior nutated sacrum   Palpation Postural loading limited through right shoulder and pelvis; general listening to right lumbar spine; local listening to L4. Tenderness with right sacrospinous ligament   Planned Therapy Interventions   Planned Therapy Interventions balance training;gait training;joint mobilization;manual therapy;neuromuscular re-education;strengthening;stretching   Planned Modality Interventions   Planned Modality Interventions Cryotherapy;Electrical stimulation;Hot packs   Clinical Impression   Criteria for Skilled Therapeutic Interventions Met yes, treatment indicated   PT Diagnosis low back pain, lumbar segmental dysfunction, pelvis dysfunction, muscle weakness   Influenced by the following impairments pain, limited weight bearing on right leg   Functional limitations due to impairments sit to stand, sitting, walking, certain positions, bending   Clinical Presentation Stable/Uncomplicated   Clinical Decision Making (Complexity) Low complexity   Therapy Frequency 2 times/Week   Predicted Duration of Therapy Intervention (days/wks) 12 weeks   Risk & Benefits of therapy have been explained Yes   Patient, Family & other staff in agreement with plan of care Yes   Clinical Impression Comments Low back pain with noted DDD and spondylolisthesis L4-5; lumbar and pelvis dysfunctions   ORTHO GOALS   PT Ortho Eval Goals 1;2;3;4   Ortho Goal 1   Goal Identifier joint mechanics   Goal Description Patient to display normal joint mechanics and equal postural loading to have pain free ability to complete sit to stand.    Target Date 02/05/20   Ortho  Goal 2   Goal Identifier walking   Goal Description Patient to report ability to walk to complete household tasks and grocery shopping without back pain.    Target Date 02/05/20   Ortho Goal 3   Goal Identifier HEP   Goal Description Patient to be compliant with HEP for self management of symptoms.    Target Date 02/05/20   Total Evaluation Time   PT Eval, Low Complexity Minutes (89606) 20   Therapy Certification   Certification date from 11/13/19   Certification date to 02/05/20   Medical Diagnosis spondylolisthesis; right sided low back pain without sciatica

## 2019-11-13 NOTE — NURSING NOTE
"Chief Complaint   Patient presents with     RECHECK     back pain     She stated her back pain is getting worse. She did have an evaluation in physical therapy today for this.  Tiffany White LPN..................11/13/2019   4:32 PM      Initial BP (!) 154/106   Pulse 94   Temp 97.9  F (36.6  C) (Temporal)   Resp 18   SpO2 94%   Breastfeeding No  Estimated body mass index is 33.14 kg/m  as calculated from the following:    Height as of 10/29/19: 1.675 m (5' 5.95\").    Weight as of 10/29/19: 93 kg (205 lb).  Medication Reconciliation: complete    Tiffany White LPN  "

## 2019-11-18 ENCOUNTER — HOSPITAL ENCOUNTER (OUTPATIENT)
Dept: PHYSICAL THERAPY | Facility: OTHER | Age: 74
Setting detail: THERAPIES SERIES
End: 2019-11-18
Attending: PHYSICIAN ASSISTANT
Payer: MEDICARE

## 2019-11-18 PROCEDURE — 97112 NEUROMUSCULAR REEDUCATION: CPT | Mod: GP

## 2019-11-18 PROCEDURE — 97110 THERAPEUTIC EXERCISES: CPT | Mod: GP

## 2019-11-18 RX ORDER — WARFARIN SODIUM 5 MG/1
TABLET ORAL
Qty: 100 TABLET | Refills: 1 | Status: ON HOLD | OUTPATIENT
Start: 2019-11-18 | End: 2019-12-11

## 2019-11-20 ENCOUNTER — HOSPITAL ENCOUNTER (OUTPATIENT)
Dept: PHYSICAL THERAPY | Facility: OTHER | Age: 74
Setting detail: THERAPIES SERIES
End: 2019-11-20
Attending: PHYSICIAN ASSISTANT
Payer: MEDICARE

## 2019-11-20 PROCEDURE — 97110 THERAPEUTIC EXERCISES: CPT | Mod: GP

## 2019-11-21 PROBLEM — D12.6 TUBULAR ADENOMA OF COLON: Status: RESOLVED | Noted: 2019-10-29 | Resolved: 2019-11-21

## 2019-11-26 ENCOUNTER — HOSPITAL ENCOUNTER (OUTPATIENT)
Dept: PHYSICAL THERAPY | Facility: OTHER | Age: 74
Setting detail: THERAPIES SERIES
End: 2019-11-26
Attending: PHYSICIAN ASSISTANT
Payer: MEDICARE

## 2019-11-26 PROCEDURE — 97112 NEUROMUSCULAR REEDUCATION: CPT | Mod: GP

## 2019-11-26 PROCEDURE — 97140 MANUAL THERAPY 1/> REGIONS: CPT | Mod: GP

## 2019-12-02 ENCOUNTER — HOSPITAL ENCOUNTER (OUTPATIENT)
Facility: OTHER | Age: 74
Discharge: HOME | End: 2019-12-02
Attending: SURGERY | Admitting: SURGERY
Payer: MEDICARE

## 2019-12-02 ENCOUNTER — ANESTHESIA (OUTPATIENT)
Dept: SURGERY | Facility: OTHER | Age: 74
End: 2019-12-02
Payer: MEDICARE

## 2019-12-02 ENCOUNTER — ANESTHESIA EVENT (OUTPATIENT)
Dept: SURGERY | Facility: OTHER | Age: 74
End: 2019-12-02
Payer: MEDICARE

## 2019-12-02 VITALS
DIASTOLIC BLOOD PRESSURE: 88 MMHG | TEMPERATURE: 97.4 F | RESPIRATION RATE: 18 BRPM | OXYGEN SATURATION: 95 % | SYSTOLIC BLOOD PRESSURE: 113 MMHG | HEART RATE: 85 BPM

## 2019-12-02 PROBLEM — K63.5 COLON POLYPS: Status: ACTIVE | Noted: 2019-12-02

## 2019-12-02 LAB — INR PPP: 1.26 (ref 0–1.3)

## 2019-12-02 PROCEDURE — 99100 ANES PT EXTEME AGE<1 YR&>70: CPT | Performed by: NURSE ANESTHETIST, CERTIFIED REGISTERED

## 2019-12-02 PROCEDURE — 25000132 ZZH RX MED GY IP 250 OP 250 PS 637: Performed by: SURGERY

## 2019-12-02 PROCEDURE — 45384 COLONOSCOPY W/LESION REMOVAL: CPT | Mod: PT | Performed by: SURGERY

## 2019-12-02 PROCEDURE — 25000125 ZZHC RX 250: Performed by: SURGERY

## 2019-12-02 PROCEDURE — 45384 COLONOSCOPY W/LESION REMOVAL: CPT | Performed by: NURSE ANESTHETIST, CERTIFIED REGISTERED

## 2019-12-02 PROCEDURE — 40000010 ZZH STATISTIC ANES STAT CODE-CRNA PER MINUTE: Performed by: SURGERY

## 2019-12-02 PROCEDURE — 85610 PROTHROMBIN TIME: CPT | Performed by: SURGERY

## 2019-12-02 PROCEDURE — 25000125 ZZHC RX 250: Performed by: NURSE ANESTHETIST, CERTIFIED REGISTERED

## 2019-12-02 PROCEDURE — 25000128 H RX IP 250 OP 636: Performed by: NURSE ANESTHETIST, CERTIFIED REGISTERED

## 2019-12-02 PROCEDURE — 36415 COLL VENOUS BLD VENIPUNCTURE: CPT | Performed by: SURGERY

## 2019-12-02 PROCEDURE — 88305 TISSUE EXAM BY PATHOLOGIST: CPT

## 2019-12-02 PROCEDURE — 25800030 ZZH RX IP 258 OP 636: Performed by: NURSE ANESTHETIST, CERTIFIED REGISTERED

## 2019-12-02 RX ORDER — ONDANSETRON 2 MG/ML
4 INJECTION INTRAMUSCULAR; INTRAVENOUS
Status: DISCONTINUED | OUTPATIENT
Start: 2019-12-02 | End: 2019-12-02 | Stop reason: HOSPADM

## 2019-12-02 RX ORDER — PROPOFOL 10 MG/ML
INJECTION, EMULSION INTRAVENOUS CONTINUOUS PRN
Status: DISCONTINUED | OUTPATIENT
Start: 2019-12-02 | End: 2019-12-02

## 2019-12-02 RX ORDER — LIDOCAINE 40 MG/G
CREAM TOPICAL
Status: DISCONTINUED | OUTPATIENT
Start: 2019-12-02 | End: 2019-12-02 | Stop reason: HOSPADM

## 2019-12-02 RX ORDER — SODIUM CHLORIDE, SODIUM LACTATE, POTASSIUM CHLORIDE, CALCIUM CHLORIDE 600; 310; 30; 20 MG/100ML; MG/100ML; MG/100ML; MG/100ML
INJECTION, SOLUTION INTRAVENOUS CONTINUOUS
Status: DISCONTINUED | OUTPATIENT
Start: 2019-12-02 | End: 2019-12-02 | Stop reason: HOSPADM

## 2019-12-02 RX ORDER — SIMETHICONE 40MG/0.6ML
SUSPENSION, DROPS(FINAL DOSAGE FORM)(ML) ORAL PRN
Status: DISCONTINUED | OUTPATIENT
Start: 2019-12-02 | End: 2019-12-02 | Stop reason: HOSPADM

## 2019-12-02 RX ORDER — NALOXONE HYDROCHLORIDE 0.4 MG/ML
.1-.4 INJECTION, SOLUTION INTRAMUSCULAR; INTRAVENOUS; SUBCUTANEOUS
Status: DISCONTINUED | OUTPATIENT
Start: 2019-12-02 | End: 2019-12-02 | Stop reason: HOSPADM

## 2019-12-02 RX ORDER — SODIUM CHLORIDE, SODIUM LACTATE, POTASSIUM CHLORIDE, CALCIUM CHLORIDE 600; 310; 30; 20 MG/100ML; MG/100ML; MG/100ML; MG/100ML
INJECTION, SOLUTION INTRAVENOUS CONTINUOUS PRN
Status: DISCONTINUED | OUTPATIENT
Start: 2019-12-02 | End: 2019-12-02

## 2019-12-02 RX ORDER — FLUMAZENIL 0.1 MG/ML
0.2 INJECTION, SOLUTION INTRAVENOUS
Status: DISCONTINUED | OUTPATIENT
Start: 2019-12-02 | End: 2019-12-02 | Stop reason: HOSPADM

## 2019-12-02 RX ORDER — PROPOFOL 10 MG/ML
INJECTION, EMULSION INTRAVENOUS PRN
Status: DISCONTINUED | OUTPATIENT
Start: 2019-12-02 | End: 2019-12-02

## 2019-12-02 RX ADMIN — SODIUM CHLORIDE, POTASSIUM CHLORIDE, SODIUM LACTATE AND CALCIUM CHLORIDE: 600; 310; 30; 20 INJECTION, SOLUTION INTRAVENOUS at 08:16

## 2019-12-02 RX ADMIN — SODIUM CHLORIDE, POTASSIUM CHLORIDE, SODIUM LACTATE AND CALCIUM CHLORIDE: 600; 310; 30; 20 INJECTION, SOLUTION INTRAVENOUS at 08:40

## 2019-12-02 RX ADMIN — PROPOFOL 130 MCG/KG/MIN: 10 INJECTION, EMULSION INTRAVENOUS at 08:20

## 2019-12-02 RX ADMIN — PROPOFOL 60 MG: 10 INJECTION, EMULSION INTRAVENOUS at 08:20

## 2019-12-02 ASSESSMENT — ENCOUNTER SYMPTOMS: DYSRHYTHMIAS: 1

## 2019-12-02 ASSESSMENT — LIFESTYLE VARIABLES: TOBACCO_USE: 1

## 2019-12-02 NOTE — H&P
History and Physical    CHIEF COMPLAINT / REASON FOR PROCEDURE:  H/o polyps    PERTINENT HISTORY   Patient is a 74 year old female who presents today for colonoscopy h/o polyps.   Last colonoscopy 2012.  Patient has no complaints.    Past Medical History:   Diagnosis Date     Encounter for full-term uncomplicated delivery     x3     Ganglion of wrist     right     Gastritis without bleeding     treated and 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     Past Surgical History:   Procedure Laterality Date     APPENDECTOMY OPEN      No Comments Provided     COLONOSCOPY  08/19/2005 8/19/05,Cecal biopsy with tubular adenoma low grade dysplasia.     COLONOSCOPY  04/04/2011 4/4/11     COLONOSCOPY  05/07/2012    Tubular Adenomas F/U 2017     ESOPHAGOSCOPY, GASTROSCOPY, DUODENOSCOPY (EGD), COMBINED      8/19/05     OTHER SURGICAL HISTORY      8/3/05,019963,OTHER,helices on the right ear     TONSILLECTOMY      No Comments Provided       Bleeding tendencies:  No    ALLERGIES/SENSITIVITIES:   Allergies   Allergen Reactions     Methylpar-Na Bisulfite-Pentazocine Unknown     jittery        CURRENT MEDICATIONS:    Prior to Admission medications    Medication Sig Start Date End Date Taking? Authorizing Provider   bisacodyl (DULCOLAX) 5 MG EC tablet Take as directed by colonoscopy prep instructions 10/30/19  Yes Easton Matthews MD   atorvastatin (LIPITOR) 40 MG tablet TAKE 1 TABLET BY MOUTH AT BEDTIME 7/29/19   Asher Campos MD   calcium carbonate 750 MG CHEW Take 1 tablet by mouth every 8 hours as needed for heartburn    Reported, Patient   ibuprofen (ADVIL/MOTRIN) 200 MG tablet Take 200 mg by mouth 4 times daily as needed for pain or headaches    Reported, Patient   lisinopril (PRINIVIL/ZESTRIL) 20 MG tablet Take 1 tablet (20 mg) by mouth daily 10/29/19   Asher Campos MD   metoprolol succinate ER (TOPROL-XL) 25 MG 24 hr tablet TAKE 1 TABLET BY MOUTH ONCE DAILY 6/5/19   Dinora Younger APRN CNP    polyethylene glycol-electrolytes (NULYTELY) 420 g solution Take 4,000 mLs by mouth once for 1 dose 10/30/19 10/30/19  Easton Matthews MD   sertraline (ZOLOFT) 50 MG tablet Take 1 tablet (50 mg) by mouth At Bedtime 10/29/19   Asher Campos MD   warfarin ANTICOAGULANT (COUMADIN) 5 MG tablet Take 5 mg x 6 days and 7.5 mg x 1 day/week OR AS DIRECTED BY PROTIME CLINIC 11/18/19   Asher Campos MD       Physical Exam:  There were no vitals taken for this visit.  EXAM:  Chest/Respiratory Exam: Normal - Clear to auscultation without rales, rhonchi, or wheezing.  Cardiovascular Exam: regular rate and rhythm        PLAN: COLONOSCOPY .  Patient understands risks of bleeding, perforation, potential inability to reach cecum, aspiration and wishes to proceed. MAC needed for age. INR pending.

## 2019-12-02 NOTE — OP NOTE
PROCEDURE NOTE    DATE OF SERVICE: 12/2/2019    SURGEON: Easton Matthews MD    PRE-OP DIAGNOSIS:    History of Polyps    POST-OP DIAGNOSIS:  Same  Sigmoid Diverticulosis  Polyps at AC, HF, mid TC and 10 cm    PROCEDURE:   Colonoscopy with hot biopsy      ANESTHESIA:  MAC D Kellerman CRNA    INDICATION FOR THE PROCEDURE: The patient is a 74 year old female with h/o polyps . The patient has no other complaints  . After explaining the risks to include bleeding, perforation, potential inability toreach the cecum, the patient wished to proceed.    PROCEDURE:After adequate sedation, the patient was in the left lateral decubitus position.  Rectal exam was performed.  There was normal tone and no palpable masses .  The colonoscope was introduced into the rectum and advanced to the cecum with Mild difficulty.  The patient's prep was excellent.  The terminal cecum was reached.  The cecum, ascending, transverse, descending and sigmoid colon was with sigmoid diverticulosis and diminutive polyps at AC, HF, mid TC and at 10 cm that were hot biopsied and destroyed .  The scope was retroflexed in the rectum.  The rectum was unremarkable  .  The scope was straightened and removed.  The patient tolerated the procedure well.     ESTIMATED BLOOD LOSS: none    COMPLICATIONS:  None    TISSUE REMOVED:  Yes    RECOMMEND:        Follow-up pending pathology  Fiber  Given literature on diverticulosis    Easton Matthews MD FACS

## 2019-12-02 NOTE — DISCHARGE INSTRUCTIONS
[facility]  Adult Discharge Orders & Instructions    ________________________________________________________________          For 12 hours after surgery  1. Get plenty of rest.  A responsible adult must stay with you for at least 12 hours after you leave the hospital.   2. You may feel lightheaded.  IF so, sit for a few minutes before standing.  Have someone help you get up.   3. You may have a slight fever. Call the doctor if your fever is over 101 F (38.3 C) (taken under the tongue) or lasts longer than 24 hours.  4. You may have a dry mouth, a sore throat, muscle aches or trouble sleeping.  These should go away after 24 hours.  5. Do not make important or legal decisions.  6.   Do not drive or use heavy equipment.  If you have weakness or tingling, don't drive or use heavy equipment until this feeling goes away.    To contact a doctor, call   [phone]_______________________

## 2019-12-02 NOTE — ANESTHESIA POSTPROCEDURE EVALUATION
Patient: Kate Chacon    Procedure(s):  COLONOSCOPY, WITH LESION EXCISION USING HOT BIOPSY DEVICE    Diagnosis:History of colon polyps [Z86.010]  Diagnosis Additional Information: No value filed.    Anesthesia Type:  MAC    Note:  Anesthesia Post Evaluation    Patient location during evaluation: Phase 2  Patient participation: Able to fully participate in evaluation  Level of consciousness: awake and alert  Pain management: adequate  Airway patency: patent  Cardiovascular status: acceptable  Respiratory status: acceptable  Hydration status: acceptable  PONV: none     Anesthetic complications: None          Last vitals:  Vitals:    12/02/19 0742 12/02/19 0900   BP: (!) 122/91 103/63   Resp: 18 18   Temp: 98.1  F (36.7  C) 97.4  F (36.3  C)   SpO2: 95% 95%         Electronically Signed By: JANAE BLEDSOE CRNA  December 2, 2019  9:11 AM

## 2019-12-02 NOTE — ANESTHESIA PREPROCEDURE EVALUATION
Anesthesia Pre-Procedure Evaluation    Patient: Kate Chacon   MRN: [id number] : 1945          Preoperative Diagnosis: History of colon polyps [Z86.010]    Procedure(s):  COLONOSCOPY    Past Medical History:   Diagnosis Date     Encounter for full-term uncomplicated delivery     x3     Ganglion of wrist     right     Gastritis without bleeding     treated and 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     Past Surgical History:   Procedure Laterality Date     APPENDECTOMY OPEN      No Comments Provided     COLONOSCOPY  2005,Cecal biopsy with tubular adenoma low grade dysplasia.     COLONOSCOPY  2011     COLONOSCOPY  2012    Tubular Adenomas F/U      ESOPHAGOSCOPY, GASTROSCOPY, DUODENOSCOPY (EGD), COMBINED      05     OTHER SURGICAL HISTORY      8/3/05,201014,OTHER,helices on the right ear     TONSILLECTOMY      No Comments Provided       Anesthesia Evaluation     . Pt has had prior anesthetic.     No history of anesthetic complications          ROS/MED HX    ENT/Pulmonary:  - neg pulmonary ROS   (+)tobacco use, Past use , . .    Neurologic:     (+)CVA date:  with deficits- Right lower extremity weakness,     Cardiovascular:     (+) hypertension----. : . . . pacemaker :. dysrhythmias a-fib, .       METS/Exercise Tolerance:  3 - Able to walk 1-2 blocks without stopping   Hematologic:  - neg hematologic  ROS       Musculoskeletal:  - neg musculoskeletal ROS       GI/Hepatic:  - neg GI/hepatic ROS       Renal/Genitourinary:  - ROS Renal section negative       Endo:  - neg endo ROS       Psychiatric:  - neg psychiatric ROS       Infectious Disease:  - neg infectious disease ROS       Malignancy:      - no malignancy   Other:                          Physical Exam  Normal systems: cardiovascular, pulmonary and dental    Airway   Mallampati: II  TM distance: >3 FB  Neck ROM: full  Comment: Prominent overbite    Dental     Cardiovascular   Rhythm and  "rate: regular and normal      Pulmonary    breath sounds clear to auscultation            Lab Results   Component Value Date    WBC 8.5 10/29/2019    HGB 15.2 10/29/2019    HCT 47.8 (H) 10/29/2019     10/29/2019     10/29/2019    POTASSIUM 4.0 10/29/2019    CHLORIDE 106 10/29/2019    CO2 27 10/29/2019    BUN 19 10/29/2019    CR 0.95 10/29/2019    GLC 87 10/29/2019    MASHA 10.6 (H) 10/29/2019    MAG 1.9 11/02/2017    ALBUMIN 4.3 10/29/2019    PROTTOTAL 7.8 10/29/2019    ALT 26 10/29/2019    AST 24 10/29/2019    ALKPHOS 78 10/29/2019    BILITOTAL 0.5 10/29/2019    PTT 30 10/23/2017    INR 2.3 (A) 11/12/2019    T4 1.09 11/01/2017       Preop Vitals  BP Readings from Last 3 Encounters:   11/13/19 (!) 142/86   10/29/19 118/72   10/26/19 134/81    Pulse Readings from Last 3 Encounters:   11/13/19 94   10/29/19 74   08/15/19 80      Resp Readings from Last 3 Encounters:   11/13/19 18   10/29/19 16   10/26/19 16    SpO2 Readings from Last 3 Encounters:   11/13/19 94%   10/29/19 98%   10/26/19 95%      Temp Readings from Last 1 Encounters:   11/13/19 97.9  F (36.6  C) (Temporal)    Ht Readings from Last 1 Encounters:   10/29/19 1.675 m (5' 5.95\")      Wt Readings from Last 1 Encounters:   10/29/19 93 kg (205 lb)    Estimated body mass index is 33.14 kg/m  as calculated from the following:    Height as of 10/29/19: 1.675 m (5' 5.95\").    Weight as of 10/29/19: 93 kg (205 lb).       Anesthesia Plan      History & Physical Review      ASA Status:  3 .    NPO Status:  > 6 hours    Plan for MAC with Propofol induction.          Postoperative Care      Consents  Anesthetic plan, risks, benefits and alternatives discussed with:  Patient..                 JANAE BLEDSOE CRNA  "

## 2019-12-04 ENCOUNTER — HOSPITAL ENCOUNTER (OUTPATIENT)
Dept: PHYSICAL THERAPY | Facility: OTHER | Age: 74
Setting detail: THERAPIES SERIES
End: 2019-12-04
Attending: PHYSICIAN ASSISTANT
Payer: MEDICARE

## 2019-12-04 PROCEDURE — 97140 MANUAL THERAPY 1/> REGIONS: CPT | Mod: GP

## 2019-12-04 PROCEDURE — 97112 NEUROMUSCULAR REEDUCATION: CPT | Mod: GP

## 2019-12-05 PROBLEM — K63.5 COLON POLYPS: Status: RESOLVED | Noted: 2019-12-02 | Resolved: 2019-12-05

## 2019-12-09 ENCOUNTER — HOSPITAL ENCOUNTER (INPATIENT)
Facility: OTHER | Age: 74
LOS: 2 days | Discharge: HOME | DRG: 379 | End: 2019-12-11
Attending: FAMILY MEDICINE | Admitting: INTERNAL MEDICINE
Payer: MEDICARE

## 2019-12-09 DIAGNOSIS — Z79.01 LONG TERM (CURRENT) USE OF ANTICOAGULANTS: ICD-10-CM

## 2019-12-09 DIAGNOSIS — I48.91 ATRIAL FIBRILLATION, UNSPECIFIED TYPE (H): ICD-10-CM

## 2019-12-09 DIAGNOSIS — F41.9 ANXIETY: ICD-10-CM

## 2019-12-09 DIAGNOSIS — Z86.73 PERSONAL HISTORY OF TRANSIENT CEREBRAL ISCHEMIA: ICD-10-CM

## 2019-12-09 DIAGNOSIS — I10 ESSENTIAL HYPERTENSION: ICD-10-CM

## 2019-12-09 DIAGNOSIS — K92.2 GASTROINTESTINAL HEMORRHAGE, UNSPECIFIED GASTROINTESTINAL HEMORRHAGE TYPE: ICD-10-CM

## 2019-12-09 DIAGNOSIS — Z95.0 CARDIAC PACEMAKER IN SITU: ICD-10-CM

## 2019-12-09 DIAGNOSIS — Z79.899 ENCOUNTER FOR LONG-TERM (CURRENT) USE OF OTHER MEDICATIONS: ICD-10-CM

## 2019-12-09 DIAGNOSIS — Z87.891 PERSONAL HISTORY OF TOBACCO USE, PRESENTING HAZARDS TO HEALTH: ICD-10-CM

## 2019-12-09 LAB
ABO + RH BLD: NORMAL
ABO + RH BLD: NORMAL
ALBUMIN SERPL-MCNC: 3.2 G/DL (ref 3.5–5.7)
ALBUMIN UR-MCNC: NEGATIVE MG/DL
ALP SERPL-CCNC: 84 U/L (ref 34–104)
ALT SERPL W P-5'-P-CCNC: 25 U/L (ref 7–52)
ANION GAP SERPL CALCULATED.3IONS-SCNC: 8 MMOL/L (ref 3–14)
APPEARANCE UR: ABNORMAL
APTT PPP: 34 SEC (ref 22–37)
AST SERPL W P-5'-P-CCNC: 21 U/L (ref 13–39)
BACTERIA #/AREA URNS HPF: ABNORMAL /HPF
BASOPHILS # BLD AUTO: 0 10E9/L (ref 0–0.2)
BASOPHILS NFR BLD AUTO: 0.4 %
BILIRUB SERPL-MCNC: 0.5 MG/DL (ref 0.3–1)
BILIRUB UR QL STRIP: NEGATIVE
BLD GP AB SCN SERPL QL: NORMAL
BLOOD BANK CMNT PATIENT-IMP: NORMAL
BUN SERPL-MCNC: 17 MG/DL (ref 7–25)
CALCIUM SERPL-MCNC: 9.3 MG/DL (ref 8.6–10.3)
CHLORIDE SERPL-SCNC: 108 MMOL/L (ref 98–107)
CO2 SERPL-SCNC: 24 MMOL/L (ref 21–31)
COLOR UR AUTO: YELLOW
CREAT SERPL-MCNC: 0.93 MG/DL (ref 0.6–1.2)
DIFFERENTIAL METHOD BLD: NORMAL
EOSINOPHIL # BLD AUTO: 0.2 10E9/L (ref 0–0.7)
EOSINOPHIL NFR BLD AUTO: 2.5 %
ERYTHROCYTE [DISTWIDTH] IN BLOOD BY AUTOMATED COUNT: 12.7 % (ref 10–15)
ERYTHROCYTE [DISTWIDTH] IN BLOOD BY AUTOMATED COUNT: 12.8 % (ref 10–15)
GFR SERPL CREATININE-BSD FRML MDRD: 59 ML/MIN/{1.73_M2}
GLUCOSE SERPL-MCNC: 124 MG/DL (ref 70–105)
GLUCOSE UR STRIP-MCNC: NEGATIVE MG/DL
HCT VFR BLD AUTO: 34 % (ref 35–47)
HCT VFR BLD AUTO: 40.6 % (ref 35–47)
HGB BLD-MCNC: 11 G/DL (ref 11.7–15.7)
HGB BLD-MCNC: 13.5 G/DL (ref 11.7–15.7)
HGB UR QL STRIP: ABNORMAL
IMM GRANULOCYTES # BLD: 0 10E9/L (ref 0–0.4)
IMM GRANULOCYTES NFR BLD: 0.3 %
INR PPP: 2.28 (ref 0–1.3)
KETONES UR STRIP-MCNC: NEGATIVE MG/DL
LEUKOCYTE ESTERASE UR QL STRIP: ABNORMAL
LYMPHOCYTES # BLD AUTO: 1.6 10E9/L (ref 0.8–5.3)
LYMPHOCYTES NFR BLD AUTO: 21.1 %
MCH RBC QN AUTO: 31.1 PG (ref 26.5–33)
MCH RBC QN AUTO: 31.1 PG (ref 26.5–33)
MCHC RBC AUTO-ENTMCNC: 32.4 G/DL (ref 31.5–36.5)
MCHC RBC AUTO-ENTMCNC: 33.3 G/DL (ref 31.5–36.5)
MCV RBC AUTO: 94 FL (ref 78–100)
MCV RBC AUTO: 96 FL (ref 78–100)
MONOCYTES # BLD AUTO: 0.7 10E9/L (ref 0–1.3)
MONOCYTES NFR BLD AUTO: 9.2 %
NEUTROPHILS # BLD AUTO: 5 10E9/L (ref 1.6–8.3)
NEUTROPHILS NFR BLD AUTO: 66.5 %
NITRATE UR QL: NEGATIVE
NON-SQ EPI CELLS #/AREA URNS LPF: ABNORMAL /LPF
PH UR STRIP: 6.5 PH (ref 5–9)
PLATELET # BLD AUTO: 157 10E9/L (ref 150–450)
PLATELET # BLD AUTO: 176 10E9/L (ref 150–450)
POTASSIUM SERPL-SCNC: 3.6 MMOL/L (ref 3.5–5.1)
PROT SERPL-MCNC: 5.7 G/DL (ref 6.4–8.9)
RBC # BLD AUTO: 3.54 10E12/L (ref 3.8–5.2)
RBC # BLD AUTO: 4.34 10E12/L (ref 3.8–5.2)
RBC #/AREA URNS AUTO: ABNORMAL /HPF
SODIUM SERPL-SCNC: 140 MMOL/L (ref 134–144)
SOURCE: ABNORMAL
SP GR UR STRIP: <1.005 (ref 1–1.03)
SPECIMEN EXP DATE BLD: NORMAL
UROBILINOGEN UR STRIP-ACNC: 0.2 EU/DL (ref 0.2–1)
WBC # BLD AUTO: 7.6 10E9/L (ref 4–11)
WBC # BLD AUTO: 8.5 10E9/L (ref 4–11)
WBC #/AREA URNS AUTO: ABNORMAL /HPF

## 2019-12-09 PROCEDURE — C9113 INJ PANTOPRAZOLE SODIUM, VIA: HCPCS | Performed by: FAMILY MEDICINE

## 2019-12-09 PROCEDURE — 96375 TX/PRO/DX INJ NEW DRUG ADDON: CPT | Performed by: FAMILY MEDICINE

## 2019-12-09 PROCEDURE — 99223 1ST HOSP IP/OBS HIGH 75: CPT | Mod: AI | Performed by: INTERNAL MEDICINE

## 2019-12-09 PROCEDURE — 25000128 H RX IP 250 OP 636: Performed by: FAMILY MEDICINE

## 2019-12-09 PROCEDURE — 85730 THROMBOPLASTIN TIME PARTIAL: CPT | Performed by: FAMILY MEDICINE

## 2019-12-09 PROCEDURE — 85610 PROTHROMBIN TIME: CPT | Performed by: FAMILY MEDICINE

## 2019-12-09 PROCEDURE — 25000125 ZZHC RX 250: Performed by: INTERNAL MEDICINE

## 2019-12-09 PROCEDURE — 99285 EMERGENCY DEPT VISIT HI MDM: CPT | Mod: Z6 | Performed by: FAMILY MEDICINE

## 2019-12-09 PROCEDURE — 86850 RBC ANTIBODY SCREEN: CPT | Performed by: FAMILY MEDICINE

## 2019-12-09 PROCEDURE — 85027 COMPLETE CBC AUTOMATED: CPT | Performed by: INTERNAL MEDICINE

## 2019-12-09 PROCEDURE — 85025 COMPLETE CBC W/AUTO DIFF WBC: CPT | Performed by: FAMILY MEDICINE

## 2019-12-09 PROCEDURE — 80053 COMPREHEN METABOLIC PANEL: CPT | Performed by: FAMILY MEDICINE

## 2019-12-09 PROCEDURE — 36415 COLL VENOUS BLD VENIPUNCTURE: CPT | Performed by: INTERNAL MEDICINE

## 2019-12-09 PROCEDURE — 25800030 ZZH RX IP 258 OP 636: Performed by: FAMILY MEDICINE

## 2019-12-09 PROCEDURE — 36415 COLL VENOUS BLD VENIPUNCTURE: CPT | Performed by: FAMILY MEDICINE

## 2019-12-09 PROCEDURE — 81001 URINALYSIS AUTO W/SCOPE: CPT | Performed by: FAMILY MEDICINE

## 2019-12-09 PROCEDURE — 12000000 ZZH R&B MED SURG/OB

## 2019-12-09 PROCEDURE — 99223 1ST HOSP IP/OBS HIGH 75: CPT | Mod: 25 | Performed by: SURGERY

## 2019-12-09 PROCEDURE — 86901 BLOOD TYPING SEROLOGIC RH(D): CPT | Performed by: FAMILY MEDICINE

## 2019-12-09 PROCEDURE — 86900 BLOOD TYPING SEROLOGIC ABO: CPT | Performed by: FAMILY MEDICINE

## 2019-12-09 PROCEDURE — 99285 EMERGENCY DEPT VISIT HI MDM: CPT | Mod: 25 | Performed by: FAMILY MEDICINE

## 2019-12-09 PROCEDURE — 96374 THER/PROPH/DIAG INJ IV PUSH: CPT | Performed by: FAMILY MEDICINE

## 2019-12-09 RX ORDER — ACETAMINOPHEN 325 MG/1
650 TABLET ORAL EVERY 4 HOURS PRN
Status: DISCONTINUED | OUTPATIENT
Start: 2019-12-09 | End: 2019-12-11 | Stop reason: HOSPADM

## 2019-12-09 RX ORDER — SODIUM CHLORIDE 9 MG/ML
1000 INJECTION, SOLUTION INTRAVENOUS CONTINUOUS
Status: DISCONTINUED | OUTPATIENT
Start: 2019-12-09 | End: 2019-12-09

## 2019-12-09 RX ORDER — POTASSIUM CHLORIDE 1500 MG/1
20-40 TABLET, EXTENDED RELEASE ORAL
Status: DISCONTINUED | OUTPATIENT
Start: 2019-12-09 | End: 2019-12-11 | Stop reason: HOSPADM

## 2019-12-09 RX ORDER — METOPROLOL SUCCINATE 25 MG/1
25 TABLET, EXTENDED RELEASE ORAL DAILY
Status: DISCONTINUED | OUTPATIENT
Start: 2019-12-10 | End: 2019-12-11 | Stop reason: HOSPADM

## 2019-12-09 RX ORDER — LIDOCAINE 40 MG/G
CREAM TOPICAL
Status: DISCONTINUED | OUTPATIENT
Start: 2019-12-09 | End: 2019-12-11 | Stop reason: HOSPADM

## 2019-12-09 RX ORDER — AMOXICILLIN 250 MG
2 CAPSULE ORAL 2 TIMES DAILY PRN
Status: DISCONTINUED | OUTPATIENT
Start: 2019-12-09 | End: 2019-12-11 | Stop reason: HOSPADM

## 2019-12-09 RX ORDER — POTASSIUM CHLORIDE 7.45 MG/ML
10 INJECTION INTRAVENOUS
Status: DISCONTINUED | OUTPATIENT
Start: 2019-12-09 | End: 2019-12-11 | Stop reason: HOSPADM

## 2019-12-09 RX ORDER — ONDANSETRON 4 MG/1
4 TABLET, ORALLY DISINTEGRATING ORAL EVERY 6 HOURS PRN
Status: DISCONTINUED | OUTPATIENT
Start: 2019-12-09 | End: 2019-12-11 | Stop reason: HOSPADM

## 2019-12-09 RX ORDER — NALOXONE HYDROCHLORIDE 0.4 MG/ML
.1-.4 INJECTION, SOLUTION INTRAMUSCULAR; INTRAVENOUS; SUBCUTANEOUS
Status: DISCONTINUED | OUTPATIENT
Start: 2019-12-09 | End: 2019-12-11 | Stop reason: HOSPADM

## 2019-12-09 RX ORDER — PROCHLORPERAZINE MALEATE 5 MG
5 TABLET ORAL EVERY 6 HOURS PRN
Status: DISCONTINUED | OUTPATIENT
Start: 2019-12-09 | End: 2019-12-11 | Stop reason: HOSPADM

## 2019-12-09 RX ORDER — ONDANSETRON 2 MG/ML
4 INJECTION INTRAMUSCULAR; INTRAVENOUS EVERY 6 HOURS PRN
Status: DISCONTINUED | OUTPATIENT
Start: 2019-12-09 | End: 2019-12-11 | Stop reason: HOSPADM

## 2019-12-09 RX ORDER — SODIUM CHLORIDE 9 MG/ML
INJECTION, SOLUTION INTRAVENOUS CONTINUOUS
Status: DISCONTINUED | OUTPATIENT
Start: 2019-12-09 | End: 2019-12-09

## 2019-12-09 RX ORDER — AMOXICILLIN 250 MG
1 CAPSULE ORAL 2 TIMES DAILY PRN
Status: DISCONTINUED | OUTPATIENT
Start: 2019-12-09 | End: 2019-12-11 | Stop reason: HOSPADM

## 2019-12-09 RX ORDER — MAGNESIUM SULFATE HEPTAHYDRATE 40 MG/ML
4 INJECTION, SOLUTION INTRAVENOUS EVERY 4 HOURS PRN
Status: DISCONTINUED | OUTPATIENT
Start: 2019-12-09 | End: 2019-12-11 | Stop reason: HOSPADM

## 2019-12-09 RX ORDER — PROCHLORPERAZINE 25 MG
12.5 SUPPOSITORY, RECTAL RECTAL EVERY 12 HOURS PRN
Status: DISCONTINUED | OUTPATIENT
Start: 2019-12-09 | End: 2019-12-11 | Stop reason: HOSPADM

## 2019-12-09 RX ADMIN — SODIUM CHLORIDE 1000 ML: 9 INJECTION, SOLUTION INTRAVENOUS at 11:08

## 2019-12-09 RX ADMIN — FAMOTIDINE 20 MG: 10 INJECTION, SOLUTION INTRAVENOUS at 21:48

## 2019-12-09 RX ADMIN — PHYTONADIONE 2 MG: 2 INJECTION, EMULSION INTRAMUSCULAR; INTRAVENOUS; SUBCUTANEOUS at 13:19

## 2019-12-09 RX ADMIN — PANTOPRAZOLE SODIUM 40 MG: 40 INJECTION, POWDER, LYOPHILIZED, FOR SOLUTION INTRAVENOUS at 11:08

## 2019-12-09 RX ADMIN — SODIUM CHLORIDE 1000 ML: 9 INJECTION, SOLUTION INTRAVENOUS at 12:37

## 2019-12-09 RX ADMIN — FAMOTIDINE 20 MG: 20 INJECTION, SOLUTION INTRAVENOUS at 11:08

## 2019-12-09 ASSESSMENT — ENCOUNTER SYMPTOMS
DIFFICULTY URINATING: 0
NECK STIFFNESS: 0
SHORTNESS OF BREATH: 0
ARTHRALGIAS: 0
COLOR CHANGE: 0
HEADACHES: 0
ABDOMINAL PAIN: 0
FEVER: 0
CONFUSION: 0
EYE REDNESS: 0

## 2019-12-09 ASSESSMENT — ACTIVITIES OF DAILY LIVING (ADL)
ADLS_ACUITY_SCORE: 17
ADLS_ACUITY_SCORE: 16

## 2019-12-09 ASSESSMENT — MIFFLIN-ST. JEOR
SCORE: 1429.82
SCORE: 1422.63

## 2019-12-09 NOTE — ED TRIAGE NOTES
Pt presents to the ER complaining of large amounts of blood in her BM x4 that started around 0930. Pt denies any dizziness, does report some nausea. Pt did have a colonoscopy on 12/2 and is currently taking warfarin.

## 2019-12-09 NOTE — PROGRESS NOTES
" NSG ADMISSION NOTE    Patient admitted to room 312 at approximately 1400 via wheel chair from emergency room. Patient was accompanied by transport tech.     Verbal SBAR report received from Cherie prior to patient arrival.     Patient ambulated to bed with stand-by assist. Patient alert and oriented X 3. The patient is not having any pain.  . Admission vital signs: Blood pressure 110/60, pulse 106, temperature 97.6  F (36.4  C), temperature source Tympanic, resp. rate 16, height 1.702 m (5' 7\"), weight 90 kg (198 lb 6.6 oz), SpO2 97 %, not currently breastfeeding. Patient was oriented to plan of care, call light, bed controls, tv, telephone, bathroom and visiting hours.     Risk Assessment    The following safety risks were identified during admission: fall. Yellow risk band applied: YES.     Skin Initial Assessment    This writer admitted this patient and completed a full skin assessment and Danny score in the Adult PCS flowsheet. Appropriate interventions initiated as needed.     Secondary skin check completed by Radha.    Danny Risk Assessment  Sensory Perception: 4-->no impairment  Moisture: 4-->rarely moist  Activity: 3-->walks occasionally  Mobility: 3-->slightly limited  Nutrition: 3-->adequate  Friction and Shear: 3-->no apparent problem  Danny Score: 20    Education    Patient has a [place] to Observation order: No  Observation education completed and documented:   Patient had two large loose stools, the first was red in color, with clots. The second was maroon in color, also with large clots. Patient was light headed after stool, resolved with rest.    Kristyn Parker RN    "

## 2019-12-09 NOTE — PROGRESS NOTES
"Kate Chacon 74 year old female   Admission date:12/9/2019   Code status: Full Code   Isolation:No active isolations   Principal Problem:GI bleed   Diet: clear liquids, no red liquids  Post Op Day #: NA  Peds:Not applicable  [place]: N/A  Discharge timeline & plan: unsure of discharge date and/or discharge needs at this time.  Vital signs:  Temp: 97.6  F (36.4  C) Temp src: Tympanic BP: 110/60 Pulse: 106 Heart Rate: 70 Resp: 16 SpO2: 97 % O2 Device: None (Room air)   Height: 170.2 cm (5' 7\") Weight: 90 kg (198 lb 6.6 oz)  Estimated body mass index is 31.08 kg/m  as calculated from the following:    Height as of this encounter: 1.702 m (5' 7\").    Weight as of this encounter: 90 kg (198 lb 6.6 oz).  Abnormal Physical Assessment:bloody stools, with clots.   Last Pain/PRN Medication given:none  Telemetry: No  Pending tests/procedures planned:none      SAFETY CHECKLIST  Arm Bands/signs/magnets (code status, fall risk, allergy, limb, etc.) in place:YES  IVF/Medications/rate ordered infusing (do they match the order?):YES  Physical assessment (wounds, incisions, dopplers, LDA's, neuro, CIWA, etc.)YES   Environmental assessment (bed/chair alarm on, call light, side rails, restraints, etc.):{YES   Whiteboard updated:YES    Bedside Handoff complete, safety checks verified by writer and are correct.    Tiffany Crump RN on 12/9/2019 at 7:13 PM              "

## 2019-12-09 NOTE — ED PROVIDER NOTES
History     Chief Complaint   Patient presents with     Rectal Bleeding     HPI  Kate Chacon is a 74 year old female who presents to the emergency department with bloody stools.  Recent colonoscopy and polypectomy.  Patient on Coumadin for A. fib.  No abdominal pain.  No vomiting.  Review nurse's notes below, similar history is related to me.  Pt presents to the ER complaining of large amounts of blood in her BM x4 that started around 0930. Pt denies any dizziness, does report some nausea. Pt did have a colonoscopy on 12/2 and is currently taking warfarin.   Allergies:  Allergies   Allergen Reactions     Methylpar-Na Bisulfite-Pentazocine Unknown     jittery       Problem List:    Patient Active Problem List    Diagnosis Date Noted     Sinoatrial node dysfunction (H) 05/01/2018     Priority: Medium     Cardiac pacemaker 02/19/2018     Priority: Medium     Urge incontinence 10/05/2017     Priority: Medium     Former smoker 08/30/2017     Priority: Medium     MDD (recurrent major depressive disorder) in remission (H) 08/30/2017     Priority: Medium     Anxiety 01/05/2017     Priority: Medium     Hypertension 12/08/2016     Priority: Medium     Paroxysmal atrial fibrillation (H) 11/02/2016     Priority: Medium     Anticoagulation goal of INR 2 to 3 10/28/2016     Priority: Medium     Ischemic stroke (H) 10/15/2016     Priority: Medium     Diverticulosis of large intestine 04/04/2011     Priority: Medium     H/O adenomatous polyp of colon 03/02/2011     Priority: Medium     Osteoporosis 02/08/2006     Priority: Medium        Past Medical History:    Past Medical History:   Diagnosis Date     Encounter for full-term uncomplicated delivery      Ganglion of wrist      Gastritis without bleeding        Past Surgical History:    Past Surgical History:   Procedure Laterality Date     APPENDECTOMY OPEN      No Comments Provided     COLONOSCOPY  08/19/2005 8/19/05,Cecal biopsy with tubular adenoma low grade dysplasia.      COLONOSCOPY  04/04/2011 4/4/11     COLONOSCOPY  05/07/2012    Tubular Adenomas F/U 2017     COLONOSCOPY  12/02/2019    F/U N/A as will be over 78 in 2024. Tubular adenoma     ESOPHAGOSCOPY, GASTROSCOPY, DUODENOSCOPY (EGD), COMBINED      8/19/05     OTHER SURGICAL HISTORY      8/3/05,845833,OTHER,helices on the right ear     TONSILLECTOMY      No Comments Provided       Family History:    Family History   Problem Relation Age of Onset     Diabetes Father         Diabetes     Hypertension Father         Hypertension     Other - See Comments Mother         h/o coronary artery disease/dementia     Asthma Mother         Asthma     Diabetes Maternal Grandmother         Diabetes     Cancer Maternal Aunt         Cancer,Uterine     Breast Cancer No family hx of         Cancer-breast       Social History:  Marital Status:   [2]  Social History     Tobacco Use     Smoking status: Former Smoker     Packs/day: 0.50     Years: 49.00     Pack years: 24.50     Types: Cigarettes     Last attempt to quit: 10/14/2016     Years since quitting: 3.1     Smokeless tobacco: Never Used   Substance Use Topics     Alcohol use: No     Drug use: No     Comment: Drug use: No        Medications:    atorvastatin (LIPITOR) 40 MG tablet  calcium carbonate 750 MG CHEW  ibuprofen (ADVIL/MOTRIN) 200 MG tablet  lisinopril (PRINIVIL/ZESTRIL) 20 MG tablet  metoprolol succinate ER (TOPROL-XL) 25 MG 24 hr tablet  sertraline (ZOLOFT) 50 MG tablet  warfarin ANTICOAGULANT (COUMADIN) 5 MG tablet          Review of Systems   Constitutional: Negative for fever.   HENT: Negative for congestion.    Eyes: Negative for redness.   Respiratory: Negative for shortness of breath.    Cardiovascular: Negative for chest pain.   Gastrointestinal: Negative for abdominal pain.   Genitourinary: Negative for difficulty urinating.   Musculoskeletal: Negative for arthralgias and neck stiffness.   Skin: Negative for color change.   Neurological: Negative for  "headaches.   Psychiatric/Behavioral: Negative for confusion.       Physical Exam   BP: (!) 147/112  Pulse: 99  Heart Rate: 100  Temp: 96.8  F (36  C)  Resp: 16  Height: 170.2 cm (5' 7\")  Weight: 90.7 kg (200 lb)  SpO2: 96 %      Physical Exam  Vitals signs and nursing note reviewed.   Constitutional:       General: She is not in acute distress.     Appearance: She is not diaphoretic.   HENT:      Head: Atraumatic.      Mouth/Throat:      Pharynx: No oropharyngeal exudate.   Eyes:      General: No scleral icterus.     Pupils: Pupils are equal, round, and reactive to light.   Cardiovascular:      Heart sounds: Normal heart sounds.   Pulmonary:      Effort: No respiratory distress.      Breath sounds: Normal breath sounds.   Abdominal:      General: Bowel sounds are normal.      Palpations: Abdomen is soft.      Tenderness: There is no abdominal tenderness.   Musculoskeletal:         General: No tenderness.   Skin:     General: Skin is warm.      Findings: No rash.         ED Course        Procedures            Results for orders placed or performed during the hospital encounter of 12/09/19 (from the past 24 hour(s))   CBC with platelets differential   Result Value Ref Range    WBC 7.6 4.0 - 11.0 10e9/L    RBC Count 4.34 3.8 - 5.2 10e12/L    Hemoglobin 13.5 11.7 - 15.7 g/dL    Hematocrit 40.6 35.0 - 47.0 %    MCV 94 78 - 100 fl    MCH 31.1 26.5 - 33.0 pg    MCHC 33.3 31.5 - 36.5 g/dL    RDW 12.7 10.0 - 15.0 %    Platelet Count 176 150 - 450 10e9/L    Diff Method Automated Method     % Neutrophils 66.5 %    % Lymphocytes 21.1 %    % Monocytes 9.2 %    % Eosinophils 2.5 %    % Basophils 0.4 %    % Immature Granulocytes 0.3 %    Absolute Neutrophil 5.0 1.6 - 8.3 10e9/L    Absolute Lymphocytes 1.6 0.8 - 5.3 10e9/L    Absolute Monocytes 0.7 0.0 - 1.3 10e9/L    Absolute Eosinophils 0.2 0.0 - 0.7 10e9/L    Absolute Basophils 0.0 0.0 - 0.2 10e9/L    Abs Immature Granulocytes 0.0 0 - 0.4 10e9/L   INR   Result Value Ref Range    " INR 2.28 (H) 0 - 1.3   Partial thromboplastin time   Result Value Ref Range    PTT 34 22 - 37 sec   ABO/Rh type and screen   Result Value Ref Range    ABO O     RH(D) Pos     Antibody Screen Neg     Test Valid Only At [facility]        Specimen Expires 12/12/2019    Comprehensive metabolic panel   Result Value Ref Range    Sodium 140 134 - 144 mmol/L    Potassium 3.6 3.5 - 5.1 mmol/L    Chloride 108 (H) 98 - 107 mmol/L    Carbon Dioxide 24 21 - 31 mmol/L    Anion Gap 8 3 - 14 mmol/L    Glucose 124 (H) 70 - 105 mg/dL    Urea Nitrogen 17 7 - 25 mg/dL    Creatinine 0.93 0.60 - 1.20 mg/dL    GFR Estimate 59 (L) >60 mL/min/[1.73_m2]    GFR Estimate If Black 71 >60 mL/min/[1.73_m2]    Calcium 9.3 8.6 - 10.3 mg/dL    Bilirubin Total 0.5 0.3 - 1.0 mg/dL    Albumin 3.2 (L) 3.5 - 5.7 g/dL    Protein Total 5.7 (L) 6.4 - 8.9 g/dL    Alkaline Phosphatase 84 34 - 104 U/L    ALT 25 7 - 52 U/L    AST 21 13 - 39 U/L   UA reflex to Microscopic and Culture   Result Value Ref Range    Color Urine Yellow     Appearance Urine Slightly Cloudy     Glucose Urine Negative NEG^Negative mg/dL    Bilirubin Urine Negative NEG^Negative    Ketones Urine Negative NEG^Negative mg/dL    Specific Gravity Urine <1.005 1.000 - 1.030    Blood Urine Moderate (A) NEG^Negative    pH Urine 6.5 5.0 - 9.0 pH    Protein Albumin Urine Negative NEG^Negative mg/dL    Urobilinogen Urine 0.2 0.2 - 1.0 EU/dL    Nitrite Urine Negative NEG^Negative    Leukocyte Esterase Urine Trace (A) NEG^Negative    Source Midstream Urine    Urine Microscopic   Result Value Ref Range    WBC Urine 0 - 5 OTO5^0 - 5 /HPF    RBC Urine O - 2 OTO2^O - 2 /HPF    Squamous Epithelial /LPF Urine Few FEW^Few /LPF    Bacteria Urine Few (A) NEG^Negative /HPF       Medications   0.9% sodium chloride BOLUS (0 mLs Intravenous Stopped 12/9/19 1233)     Followed by   sodium chloride 0.9% infusion (1,000 mLs Intravenous New Bag 12/9/19 9287)   famotidine (PEPCID) infusion 20 mg  (0 mg Intravenous Stopped 12/9/19 1123)   HOLD: warfarin (COUMADIN) therapy (has no administration in time range)   phytonadione (AQUA-MEPHYTON) 2 mg in sodium chloride 0.9 % 50 mL intermittent infusion (has no administration in time range)   pantoprazole (PROTONIX) 40 mg IV push injection (40 mg Intravenous Given 12/9/19 1108)       Assessments & Plan (with Medical Decision Making)     I have reviewed the nursing notes.    I have reviewed the findings, diagnosis, plan and need for follow up with the patient.    New Prescriptions    No medications on file       Final diagnoses:   Gastrointestinal hemorrhage, unspecified gastrointestinal hemorrhage type     Discussed with Dr. sainz and Dr. Ayala.  Patient accepted for admission.  Patient hemodynamically stable.  Coumadin reversal ordered per protocol.  Patient family verbalized understanding of plan.  12/9/2019   [facility]     Martinez Juarez MD  12/09/19 [id number]

## 2019-12-09 NOTE — PHARMACY-ADMISSION MEDICATION HISTORY
Pharmacy -- Admission Medication Reconciliation    Prior to admission (PTA) medications were reviewed and the patient's PTA medication list was updated.    Sources Consulted: [employer], anticoagulation note, patient    The reliability of this Medication Reconciliation is: Reliability: Reliable    The following significant changes were made:  Added times of last doses  Removed excess [facility] notes  Changed ibuprofen to 400 mg ([id number]) - note patient reports taking usually 3 times a day  Left warfarin as how she was taking    In addition, the patient's allergies were reviewed with the patient and left as follows:   Allergies: Methylpar-na bisulfite-pentazocine    The pharmacist has reviewed with the patient that all personal medications should be removed from the building or locked in the belongings safe.  Patient shall only take medications ordered by the physician and administered by the nursing staff.       Medication barriers identified: some confusion with directions; patient reports taking 1 and 1/2 tablet (7.5 mg) of warfarin on Tuesday- [facility] note states 1/2 tablet (2.5 mg) on Tuesdays; patient thought she was not to take acetaminophen while on warfarin, she thought ibuprofen was preferred with warfarin   Medication adherence concerns: see above   Understanding of emergency medications: n/a    Marge Kumar [facility], 12/9/2019,  3:02 PM

## 2019-12-09 NOTE — H&P
[facility]    History and Physical  Hospitalist       Date of Admission:  12/9/2019    Assessment & Plan   Kate Chacon is a 74 year old female who presents with bright red blood per rectum and GI bleed.    Principal Problem:    GI bleed    Assessment: Likely post polypectomy bleeding given time course and no recent evidence of hemorrhoids or other etiologies for bleeding.  Is remained hemodynamically stable and benefit of active Coumadin reversal outweighs risk.    Plan: -2 mg IV vitamin K x1   - if having persistent bleeding consider Kcentra per warfarin reversal policy   - trend hemoglobin   - IV Pepcid   - avoid ibuprofen   - appreciate general surgery consult    Active Problems:    Anticoagulation goal of INR 2 to 3    Assessment: Elect for atrial fibrillation    Plan: Hold Coumadin      Paroxysmal atrial fibrillation (H)    Assessment: Rate controlled    Plan: Continue home beta-blocker      Hypertension    Assessment: Stable    Plan: Hold ACE inhibitor    FEN: clear liquid diet, normal saline at 75 mL/hr, mg/k replacement protocol  PPX: SCD's     Code Status: No Order    Nitin Grant    Primary Care Physician   Asher Campos    Chief Complaint   brbpr    History is obtained from the patient and chart review.    History of Present Illness   Kate Chacon is a 74 year old female who presents with acute onset bright red blood per rectum.  Patient underwent colonoscopy with Dr. Cornelius on 12/2 and had 4 polyps removed by hot snare.  She resumed her Coumadin that night and has returned to her normal state health until this morning.  She was bearing down to have a bowel movement and noted acute bright red blood per rectum.  She had approximately 4 episodes at home, then presented to the ER had another episode of irina clots, received 2 mg of IV vitamin K and subsequently admitted for further management.    Past Medical History    I have reviewed this patient's medical history and updated it  with pertinent information if needed.   Past Medical History:   Diagnosis Date     Encounter for full-term uncomplicated delivery     x3     Ganglion of wrist     right     Gastritis without bleeding     treated and 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       Past Surgical History   I have reviewed this patient's surgical history and updated it with pertinent information if needed.  Past Surgical History:   Procedure Laterality Date     APPENDECTOMY OPEN      No Comments Provided     COLONOSCOPY  08/19/2005 8/19/05,Cecal biopsy with tubular adenoma low grade dysplasia.     COLONOSCOPY  04/04/2011 4/4/11     COLONOSCOPY  05/07/2012    Tubular Adenomas F/U 2017     COLONOSCOPY  12/02/2019    F/U N/A as will be over 78 in 2024. Tubular adenoma     ESOPHAGOSCOPY, GASTROSCOPY, DUODENOSCOPY (EGD), COMBINED      8/19/05     OTHER SURGICAL HISTORY      8/3/05,137759,OTHER,helices on the right ear     TONSILLECTOMY      No Comments Provided       Prior to Admission Medications   Prior to Admission Medications   Prescriptions Last Dose Informant Patient Reported? Taking?   atorvastatin (LIPITOR) 40 MG tablet 12/8/2019 at Unknown time  No Yes   Sig: TAKE 1 TABLET BY MOUTH AT BEDTIME   calcium carbonate 750 MG CHEW Past Month at Unknown time  Yes Yes   Sig: Take 1 tablet by mouth every 8 hours as needed for heartburn   ibuprofen (ADVIL/MOTRIN) 200 MG tablet 12/9/2019 at Unknown time  Yes Yes   Sig: Take 200 mg by mouth 4 times daily as needed for pain or headaches   lisinopril (PRINIVIL/ZESTRIL) 20 MG tablet 12/9/2019 at Unknown time  No Yes   Sig: Take 1 tablet (20 mg) by mouth daily   metoprolol succinate ER (TOPROL-XL) 25 MG 24 hr tablet 12/9/2019 at Unknown time  No Yes   Sig: TAKE 1 TABLET BY MOUTH ONCE DAILY   sertraline (ZOLOFT) 50 MG tablet 12/8/2019 at Unknown time  No Yes   Sig: Take 1 tablet (50 mg) by mouth At Bedtime   warfarin ANTICOAGULANT (COUMADIN) 5 MG tablet 12/8/2019 at Unknown time  No  Yes   Sig: Take 5 mg x 6 days and 7.5 mg x 1 day/week OR AS DIRECTED BY [facility]      Facility-Administered Medications: None     Allergies   Allergies   Allergen Reactions     Methylpar-Na Bisulfite-Pentazocine Unknown     jittery       Social History   I have reviewed this patient's social history and updated it with pertinent information if needed. Kate Chacon  reports that she quit smoking about 3 years ago. Her smoking use included cigarettes. She has a 24.50 pack-year smoking history. She has never used smokeless tobacco. She reports that she does not drink alcohol or use drugs.    Family History   I have reviewed this patient's family history and updated it with pertinent information if needed.   Family History   Problem Relation Age of Onset     Diabetes Father         Diabetes     Hypertension Father         Hypertension     Other - See Comments Mother         h/o coronary artery disease/dementia     Asthma Mother         Asthma     Diabetes Maternal Grandmother         Diabetes     Cancer Maternal Aunt         Cancer,Uterine     Breast Cancer No family hx of         Cancer-breast       Review of Systems     REVIEW OF SYSTEMS:    Constitutional: normal energy and appetite, no recent sick contacts  Eyes: no changes in vision  Ears, nose, mouth, throat, and face: no mouth sores, dysphagia, or odynophagia  Respiratory: no shortness of breath, cough, or wheezing. No aspiration symptoms.   Cardiovascular: no chest pain, palpitations, orthopnea, increased lower extremity edema, or syncope.   Gastrointestinal: no other recent constipation, diarrhea, nausea, vomiting or abdominal pain.  Genitourinary: no dysuria, hematuria, urgency or frequency.   Hematologic/lymphatic: no unintentional weight loss or night sweats.  Musculoskeletal: no pain to extremities or falls.   Endocrine: not a known diabetic.     Physical Exam   Temp: 96.8  F (36  C) Temp src: Tympanic BP: 126/81 Pulse: 106 Heart Rate: 100 Resp: 16  SpO2: 96 % O2 Device: None (Room air)    Vital Signs with Ranges  Temp:  [96.8  F (36  C)] 96.8  F (36  C)  Pulse:  [] 106  Heart Rate:  [100] 100  Resp:  [16] 16  BP: (107-147)/() 126/81  SpO2:  [93 %-98 %] 96 %  200 lbs 0 oz    Exam:  GENERAL: Talkative, in no apparent distress.  Head: normocephalic and atraumatic  Eyes: anicteric and non-injected sclera  Nose: no rhinorrhea or epistaxis.   Throat: moist mucous membranes with no active oral lesions.  NECK: Supple, jugular venous distension not present.  CARDIOVASCULAR: Irregularly irregular rate and rhythm, no murmurs, rubs, or gallops. Normal S1/S2. No lower extremity edema.   RESPIRATORY: clear to auscultation bilaterally, no wheezes, no crackles.    GI: soft, non-tender, non-distended, normoactive bowel sounds.  No suprapubic pain with palpation.  MUSCULOSKELETAL: warm and well perfused, 2+ dorsalis pedis pulses.    SKIN: no pallor, jaundice or rashes.  NEUROLOGY: AAOx3, follows commands, speech and language without focal deficits.        Data   Data reviewed today:  I personally reviewed no images or EKG's today.  Recent Labs   Lab 12/09/19  1108   WBC 7.6   HGB 13.5   MCV 94      INR 2.28*      POTASSIUM 3.6   CHLORIDE 108*   CO2 24   BUN 17   CR 0.93   ANIONGAP 8   MASHA 9.3   *   ALBUMIN 3.2*   PROTTOTAL 5.7*   BILITOTAL 0.5   ALKPHOS 84   ALT 25   AST 21       No results found for this or any previous visit (from the past 24 hour(s)).

## 2019-12-09 NOTE — CONSULTS
GENERAL SURGERY CONSULTATION NOTE    Kate Chacon   [address] [facility] [id number]  74 year old  female    Primary Care Provider:  Asher Campos      HPI: Kate Chacno presents to the emergency department with bright red blood per rectum.  Patient had several bloody bowel movements at home and presented to the ER and continued to have more bloody bowel movements while here.  Patient notes bright red blood and some blood clots.  Patient has a history of take Coumadin for atrial fibrillation.  Last INR is 2.3.  Patient underwent colonoscopy on 12/2/2019.  Several small polyps were found biopsied with hot forceps.  Patient was taken off her Coumadin for this exam.  This was restarted immediately afterwards.  Patient notes she had a very hard, difficult to pass stool yesterday.  Patient denies lightheaded or dizziness.  She denies chest pain or heart palpitations.  Patient does have a pacemaker.  Patient denies abdominal pain or distention.      REVIEW OF SYSTEMS:    GENERAL: No fevers or chills. Denies fatigue, recent weight loss.  HEENT: No sinus drainage. No changes with vision or hearing. No difficulty swallowing.   LYMPHATICS:  No swollen nodes in axilla, neck or groin.  CARDIOVASCULAR: Denies chest pain, palpitations and dyspnea on exertion.  PULMONARY: No shortness of breath or cough. No increase in sputum production.  GI: Positive bright red blood per stools, positive constipation  : No dysuria or hematuria.  SKIN: No recent rashes or ulcers.   HEMATOLOGY:  No history of easy bruising or bleeding.  ENDOCRINE:  No history of diabetes or thyroid problems.  NEUROLOGY:  No history of seizures or headaches. No motor or sensory changes.        Patient Active Problem List   Diagnosis     Anticoagulation goal of INR 2 to 3     Anxiety     Paroxysmal atrial fibrillation (H)     H/O adenomatous polyp of colon     Diverticulosis of large intestine     Former smoker     Hypertension     Ischemic  stroke (H)     MDD (recurrent major depressive disorder) in remission (H)     Osteoporosis     Urge incontinence     Cardiac pacemaker     Sinoatrial node dysfunction (H)     GI bleed       Past Medical History:   Diagnosis Date     Encounter for full-term uncomplicated delivery     x3     Ganglion of wrist     right     Gastritis without bleeding     treated and 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       Past Surgical History:   Procedure Laterality Date     APPENDECTOMY OPEN      No Comments Provided     COLONOSCOPY  08/19/2005 8/19/05,Cecal biopsy with tubular adenoma low grade dysplasia.     COLONOSCOPY  04/04/2011 4/4/11     COLONOSCOPY  05/07/2012    Tubular Adenomas F/U 2017     COLONOSCOPY  12/02/2019    F/U N/A as will be over 78 in 2024. Tubular adenoma     ESOPHAGOSCOPY, GASTROSCOPY, DUODENOSCOPY (EGD), COMBINED      8/19/05     OTHER SURGICAL HISTORY      8/3/05,217452,OTHER,helices on the right ear     TONSILLECTOMY      No Comments Provided       Family History   Problem Relation Age of Onset     Diabetes Father         Diabetes     Hypertension Father         Hypertension     Other - See Comments Mother         h/o coronary artery disease/dementia     Asthma Mother         Asthma     Diabetes Maternal Grandmother         Diabetes     Cancer Maternal Aunt         Cancer,Uterine     Breast Cancer No family hx of         Cancer-breast       Social History     Social History Narrative    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  She enjoys gardening, bowling and going to stock car races that her son participates in.   to her  Maco.  They run [employer].  Live in town independently.       Social History     Socioeconomic History     Marital status:      Spouse name: Not on file     Number of children: Not on file     Years of education: Not on file     Highest education level: Not on file   Occupational History     Not on file   Social Needs     Financial  resource strain: Not on file     Food insecurity:     Worry: Not on file     Inability: Not on file     Transportation needs:     Medical: Not on file     Non-medical: Not on file   Tobacco Use     Smoking status: Former Smoker     Packs/day: 0.50     Years: 49.00     Pack years: 24.50     Types: Cigarettes     Last attempt to quit: 10/14/2016     Years since quitting: 3.1     Smokeless tobacco: Never Used   Substance and Sexual Activity     Alcohol use: No     Drug use: No     Comment: Drug use: No     Sexual activity: Not Currently   Lifestyle     Physical activity:     Days per week: Not on file     Minutes per session: Not on file     Stress: Not on file   Relationships     Social connections:     Talks on phone: Not on file     Gets together: Not on file     Attends [employer] service: Not on file     Active member of club or organization: Not on file     Attends meetings of clubs or organizations: Not on file     Relationship status: Not on file     Intimate partner violence:     Fear of current or ex partner: Not on file     Emotionally abused: Not on file     Physically abused: Not on file     Forced sexual activity: Not on file   Other Topics Concern     Parent/sibling w/ CABG, MI or angioplasty before 65F 55M? 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  She enjoys gardening, [place] and going to stock car races that her son participates in.   to her  Maco.  They run [employer].  Live in town independently.       No current facility-administered medications on file prior to encounter.   atorvastatin (LIPITOR) 40 MG tablet, TAKE 1 TABLET BY MOUTH AT BEDTIME  calcium carbonate 750 MG CHEW, Take 1 tablet by mouth every 8 hours as needed for heartburn  ibuprofen (ADVIL/MOTRIN) 200 MG tablet, Take 200 mg by mouth 4 times daily as needed for pain or headaches  lisinopril (PRINIVIL/ZESTRIL) 20 MG tablet, Take 1 tablet (20 mg) by mouth daily  metoprolol  "succinate ER (TOPROL-XL) 25 MG 24 hr tablet, TAKE 1 TABLET BY MOUTH ONCE DAILY  sertraline (ZOLOFT) 50 MG tablet, Take 1 tablet (50 mg) by mouth At Bedtime  warfarin ANTICOAGULANT (COUMADIN) 5 MG tablet, Take 5 mg x 6 days and 7.5 mg x 1 day/week OR [facility]          ALLERGIES/SENSITIVITIES:   Allergies   Allergen Reactions     Methylpar-Na Bisulfite-Pentazocine Unknown     jittery       PHYSICAL EXAM:     /81   Pulse 106   Temp 96.8  F (36  C) (Tympanic)   Resp 16   Ht 1.702 m (5' 7\")   Wt 90.7 kg (200 lb)   SpO2 96%   BMI 31.32 kg/m      General Appearance:   Sitting up in the chair, no apparent distress  HEENT: Pupils are equal and reactive, no scleral icterus,   Heart & CV: Regular, no murmur  LUNGS: No increased work of breathing.Lugns are CTA B/L, no wheezing or crackles.  Abd: Obese abdomen, soft, nontender, nondistended  Ext: No lower extremity edema, normal bulk and tone  Neuro: Alert and oriented, normal speech and mentation    Labs reviewed and are significant for sodium 140, potassium 3.6, creatinine 0.93, albumin 3.2, WBC 7.6, hemoglobin 13.5, INR 2.28        CONSULTATION ASSESSMENT AND PLAN:    74 year old female with bright red blood per rectum status post colonoscopy with polypectomy.  Likely scenario is that hard stool dislodged eschar on the biopsy site.  This biopsy site and we do not believe this patient is on therapeutic anticoagulation.  Patient's anticoagulation should be recent bleeding will likely stop.  At present the patient is hemodynamically stable with normal hemoglobin.  Recommend trending hemoglobins and INR to ensure this is normalized and her hemoglobin is stable.  Surgery will follow.      Ramiro Diego MD on 12/9/2019 at 2:02 PM      "

## 2019-12-10 LAB
ANION GAP SERPL CALCULATED.3IONS-SCNC: 4 MMOL/L (ref 3–14)
BUN SERPL-MCNC: 12 MG/DL (ref 7–25)
CALCIUM SERPL-MCNC: 8.5 MG/DL (ref 8.6–10.3)
CHLORIDE SERPL-SCNC: 110 MMOL/L (ref 98–107)
CO2 SERPL-SCNC: 28 MMOL/L (ref 21–31)
CREAT SERPL-MCNC: 0.91 MG/DL (ref 0.6–1.2)
ERYTHROCYTE [DISTWIDTH] IN BLOOD BY AUTOMATED COUNT: 13 % (ref 10–15)
GFR SERPL CREATININE-BSD FRML MDRD: 60 ML/MIN/{1.73_M2}
GLUCOSE SERPL-MCNC: 118 MG/DL (ref 70–105)
HCT VFR BLD AUTO: 31.4 % (ref 35–47)
HGB BLD-MCNC: 10.2 G/DL (ref 11.7–15.7)
INR PPP: 1.48 (ref 0–1.3)
MAGNESIUM SERPL-MCNC: 1.7 MG/DL (ref 1.9–2.7)
MCH RBC QN AUTO: 31 PG (ref 26.5–33)
MCHC RBC AUTO-ENTMCNC: 32.5 G/DL (ref 31.5–36.5)
MCV RBC AUTO: 95 FL (ref 78–100)
PLATELET # BLD AUTO: 158 10E9/L (ref 150–450)
POTASSIUM SERPL-SCNC: 3.5 MMOL/L (ref 3.5–5.1)
RBC # BLD AUTO: 3.29 10E12/L (ref 3.8–5.2)
SODIUM SERPL-SCNC: 142 MMOL/L (ref 134–144)
WBC # BLD AUTO: 7.9 10E9/L (ref 4–11)

## 2019-12-10 PROCEDURE — 25000132 ZZH RX MED GY IP 250 OP 250 PS 637: Performed by: INTERNAL MEDICINE

## 2019-12-10 PROCEDURE — 80048 BASIC METABOLIC PNL TOTAL CA: CPT | Performed by: INTERNAL MEDICINE

## 2019-12-10 PROCEDURE — 85610 PROTHROMBIN TIME: CPT | Performed by: INTERNAL MEDICINE

## 2019-12-10 PROCEDURE — 25000125 ZZHC RX 250: Performed by: INTERNAL MEDICINE

## 2019-12-10 PROCEDURE — 85027 COMPLETE CBC AUTOMATED: CPT | Performed by: INTERNAL MEDICINE

## 2019-12-10 PROCEDURE — 99232 SBSQ HOSP IP/OBS MODERATE 35: CPT | Performed by: INTERNAL MEDICINE

## 2019-12-10 PROCEDURE — 12000000 ZZH R&B MED SURG/OB

## 2019-12-10 PROCEDURE — 83735 ASSAY OF MAGNESIUM: CPT | Performed by: INTERNAL MEDICINE

## 2019-12-10 PROCEDURE — 36415 COLL VENOUS BLD VENIPUNCTURE: CPT | Performed by: INTERNAL MEDICINE

## 2019-12-10 RX ADMIN — FAMOTIDINE 20 MG: 10 INJECTION, SOLUTION INTRAVENOUS at 09:39

## 2019-12-10 RX ADMIN — FAMOTIDINE 20 MG: 10 INJECTION, SOLUTION INTRAVENOUS at 21:22

## 2019-12-10 RX ADMIN — METOPROLOL SUCCINATE 25 MG: 25 TABLET, EXTENDED RELEASE ORAL at 09:39

## 2019-12-10 ASSESSMENT — ACTIVITIES OF DAILY LIVING (ADL)
ADLS_ACUITY_SCORE: 16
ADLS_ACUITY_SCORE: 17
ADLS_ACUITY_SCORE: 16

## 2019-12-10 NOTE — PROGRESS NOTES
GENERAL SURGERY PROGRESS NOTE  12/10/2019      Interval history:   No acute events overnight. One small maroon stool yesterday. Denies abdominal pain, nausea or vomiting. She is tolerating liquid diet. Denies fevers or chills.     Physical Exam:   Vitals are reviewed and she is mildly tachycardic.     General: laying in bed, appears comfortable   Abdomen: obese abdomen, soft, non-tender    hgb 10.2  INR 1.48    No new imaging       Assessment / Plan:   Kate Chacon is a 73 y/o female status post colonoscopy that was complicated by post op bleeding. Hemoglobin continues to decline, 10.2 from 11.0, but blood per rectum has slowed. She is tachycardic.     - follow hgb  - follow INR, goal 1-1.2  - regular diet OK  - no plan for intervention at this time        FELICITAS Diego MD   12/10/2019

## 2019-12-10 NOTE — PROGRESS NOTES
"0313/0313-01  Kate Chacon 74 year old female   Admission date:12/9/2019   Residence: Home  Code status: Full Code   Isolation: No active isolations   Principal Problem:GI bleed   Post Op Day #: NA  Peds:No  [place]: NA  Diet: Regular  Mobility Status: Independent  Discharge timeline & plan: Progressing to discharge. Possible discharge tomorrow.   Vital signs:  Temp: 98.9  F (37.2  C) Temp src: Tympanic BP: (!) 146/85   Heart Rate: 107 Resp: 16 SpO2: 98 % O2 Device: None (Room air)   Height: 170.2 cm (5' 7\") Weight: 90 kg (198 lb 6.6 oz)  Estimated body mass index is 31.08 kg/m  as calculated from the following:    Height as of this encounter: 1.702 m (5' 7\").    Weight as of this encounter: 90 kg (198 lb 6.6 oz).  Abnormal Physical Assessment: Heart rhythm irregular. +1 trace edema to bilateral ankles. 1 dime sized clot passed.   Last Pain/PRN Medication given:NA  Finger stick POCT blood sugars: NA  Labs: Hemoglobin 10.2  Telemetry: No  Pending tests/procedures planned: AM labs- BMP, CBC, INR, Magnesium  Comments:      SAFETY CHECKLIST  Arm Bands/Risk clasps correct and in place (DNR, Fall risk, Allergy, Latex, Limb):  Yes  IVF and rate ordered correct: Yes  Physical assessment verification(IV site, wounds, dressing intact, incisions, LDA's, neuro, CIWA...): Yes  Environmental assessment (bed/chair alarm on, call light, side rails, restraints, sitter....): Yes  Whiteboard updated by oncoming RN:Yes    Cristina Saleem RN on 12/10/2019 at 5:50 PM        Bedside Handoff complete, safety checks verified by writer and are correct.    Isa Loera RN on 12/10/2019 at 7:05 PM    "

## 2019-12-10 NOTE — PROGRESS NOTES
"Patient passed a blood clot in the bathroom. UTV due to pt flushing- was \"dime sized\" per pt. Ines VALENTINE, notified. Cristina Saleem RN on 12/10/2019 at 4:01 PM    "

## 2019-12-10 NOTE — PLAN OF CARE
"Ambulated to bathroom, no bleeding noted, urine clear yellow, adequate fluid intake, denies any pain or discomfort, HR tachy and irregular.     /75   Pulse 106   Temp 98.8  F (37.1  C) (Tympanic)   Resp 16   Ht 1.702 m (5' 7\")   Wt 90 kg (198 lb 6.6 oz)   SpO2 97%   BMI 31.08 kg/m      Tiffany Crump RN on 12/9/2019 at 8:02 PM    "

## 2019-12-10 NOTE — PROGRESS NOTES
Follow up with Dr. Wilson in 6-8 weeks   Call me if ?'s arise [phone]  Aleshia Osborn RN   [facility]    Hospitalist Progress Note      Assessment & Plan   Kaet Chacon is a 74 year old female who was admitted on 12/9/2019.     Principal Problem:    GI bleed    Assessment: improved and no further significant bloody bowel movements. Likely post polypectomy bleeding given time course and no recent evidence of hemorrhoids or other etiologies for bleeding.     Plan: -2 mg IV vitamin K x1 on admit              - if having persistent bleeding consider Kcentra per warfarin reversal policy              - trend hemoglobin              - IV Pepcid              - avoid ibuprofen              - appreciate general surgery consult     Active Problems:    Anticoagulation goal of INR 2 to 3    Assessment: Elect for atrial fibrillation    Plan: Hold Coumadin   inr daily       Paroxysmal atrial fibrillation (H)    Assessment: Rate controlled    Plan: Continue home beta-blocker       Hypertension    Assessment: Stable    Plan: Hold ACE inhibitor     FEN: regular diet, discontinue normal saline, mg/k replacement protocol  PPX: SCD's      Code Status: Full Code    Nitin Grant    Interval History   Overnight no acute events, one very small bloody stool last night but none since, no abdominal pain, no shortness of breath palpitations chest pain or other new complaints.    -Data reviewed today: I reviewed all new labs and imaging results over the last 24 hours. I personally reviewed no images or EKG's today.    Physical Exam   Temp: 98.8  F (37.1  C) Temp src: Tympanic BP: 123/87   Heart Rate: 105 Resp: 16 SpO2: 97 % O2 Device: None (Room air)    Vitals:    12/09/19 1037 12/09/19 1419   Weight: 90.7 kg (200 lb) 90 kg (198 lb 6.6 oz)     Vital Signs with Ranges  Temp:  [97.9  F (36.6  C)-98.8  F (37.1  C)] 98.8  F (37.1  C)  Heart Rate:  [101-110] 105  Resp:  [16] 16  BP: (123-131)/(75-87) 123/87  SpO2:  [96 %-97 %] 97 %  I/O last 3 completed shifts:  In: 1708 [P.O.:1160; I.V.:548]  Out: 800  [Urine:800]    Exam:  GENERAL: Talkative, in no apparent distress.  CARDIOVASCULAR: Irregularly irregular rate and rhythm, no murmurs, rubs, or gallops. Normal S1/S2. No lower extremity edema.   RESPIRATORY: clear to auscultation bilaterally, no wheezes, no crackles.    GI: soft, non-tender, non-distended, normoactive bowel sounds.   MUSCULOSKELETAL: warm and well perfused, 2+ dorsalis pedis pulses.    SKIN: no pallor, jaundice or rashes.  NEUROLOGY: AAOx3, follows commands, speech and language without focal deficits.     Medications       famotidine  20 mg Intravenous Q12H     metoprolol succinate ER  25 mg Oral Daily     sodium chloride (PF)  3 mL Intracatheter Q8H       Data   Recent Labs   Lab 12/10/19  0440 12/09/19  1756 12/09/19  1108   WBC 7.9 8.5 7.6   HGB 10.2* 11.0* 13.5   MCV 95 96 94    157 176   INR 1.48*  --  2.28*     --  140   POTASSIUM 3.5  --  3.6   CHLORIDE 110*  --  108*   CO2 28  --  24   BUN 12  --  17   CR 0.91  --  0.93   ANIONGAP 4  --  8   MASHA 8.5*  --  9.3   *  --  124*   ALBUMIN  --   --  3.2*   PROTTOTAL  --   --  5.7*   BILITOTAL  --   --  0.5   ALKPHOS  --   --  84   ALT  --   --  25   AST  --   --  21       No results found for this or any previous visit (from the past 24 hour(s)).

## 2019-12-10 NOTE — PROGRESS NOTES
"0313/0313-01  Kate Chacon 74 year old female   Admission date:12/9/2019   Residence: Home  Code status: Full Code   Isolation:No active isolations   Principal Problem:GI bleed     Diet: clear liquid  Mobility Status: SBA  Discharge timeline & plan: unsure of discharge date and/or discharge needs at this time.  Vital signs:  Temp: 97.9  F (36.6  C) Temp src: Tympanic BP: 131/75 Pulse: 106 Heart Rate: 121 Resp: 16 SpO2: 96 % O2 Device: None (Room air)   Height: 170.2 cm (5' 7\") Weight: 90 kg (198 lb 6.6 oz)  Estimated body mass index is 31.08 kg/m  as calculated from the following:    Height as of this encounter: 1.702 m (5' 7\").    Weight as of this encounter: 90 kg (198 lb 6.6 oz).  Abnormal Physical Assessment:see notes   Last Pain/PRN Medication given:NA  Finger stick POCT blood sugars:NA  Labs: see labs  Telemetry: No  Pending tests/procedures planned:None  Comments:      SAFETY CHECKLIST  Arm Bands/Risk clasps correct and in place (DNR, Fall risk, Allergy, Latex, Limb):  Yes  IVF and rate ordered correct: Yes  Physical assessment verification(IV site, wounds, dressing intact, incisions, LDA's, neuro, CIWA...): Yes  Environmental assessment (bed/chair alarm on, call light, side rails, restraints, sitter....): Yes  Whiteboard updated by oncoming RN:Yes    Tiffany Crump RN on 12/10/2019 at 5:52 AM    Bedside Handoff complete, safety checks verified by writer and are correct. Cristina Saleem RN on 12/10/2019 at 7:13 AM        "

## 2019-12-10 NOTE — PLAN OF CARE
Pt VSS: Temp: 98.9  F (37.2  C) Temp src: Tympanic BP: (!) 146/85   Heart Rate: 107 Resp: 16 SpO2: 98 % O2 Device: None (Room air)    No complaints of pain. Lung sounds clear. Heart rhythm irregular. Bowel sounds active and abdomen non-tender. Trace edema to ankles. 1 small dime sized clot was passed (MD aware). Was switched to regular diet today and has tolerated well. Independent in room. Cristina Saleem RN on 12/10/2019 at 5:51 PM

## 2019-12-10 NOTE — PLAN OF CARE
"Ambulated to bathroom, urine is clear, one large blood clot in toilet, small amount of bright red blood on toilet paper from wiping but no active bleeding. VSS, denies weakness or fatigue, no shortness of breath or chest pain.    /75   Pulse 106   Temp 97.9  F (36.6  C) (Tympanic)   Resp 16   Ht 1.702 m (5' 7\")   Wt 90 kg (198 lb 6.6 oz)   SpO2 96%   BMI 31.08 kg/m    Tiffany Crump RN on 12/10/2019 at 5:47 AM    "

## 2019-12-11 VITALS
HEIGHT: 67 IN | OXYGEN SATURATION: 98 % | WEIGHT: 198.63 LBS | DIASTOLIC BLOOD PRESSURE: 86 MMHG | SYSTOLIC BLOOD PRESSURE: 140 MMHG | BODY MASS INDEX: 31.18 KG/M2 | RESPIRATION RATE: 18 BRPM | TEMPERATURE: 98.7 F | HEART RATE: 106 BPM

## 2019-12-11 LAB
ANION GAP SERPL CALCULATED.3IONS-SCNC: 5 MMOL/L (ref 3–14)
BUN SERPL-MCNC: 10 MG/DL (ref 7–25)
CALCIUM SERPL-MCNC: 9.2 MG/DL (ref 8.6–10.3)
CHLORIDE SERPL-SCNC: 108 MMOL/L (ref 98–107)
CO2 SERPL-SCNC: 30 MMOL/L (ref 21–31)
CREAT SERPL-MCNC: 0.99 MG/DL (ref 0.6–1.2)
ERYTHROCYTE [DISTWIDTH] IN BLOOD BY AUTOMATED COUNT: 13.2 % (ref 10–15)
GFR SERPL CREATININE-BSD FRML MDRD: 55 ML/MIN/{1.73_M2}
GLUCOSE SERPL-MCNC: 116 MG/DL (ref 70–105)
HCT VFR BLD AUTO: 31.2 % (ref 35–47)
HGB BLD-MCNC: 10.2 G/DL (ref 11.7–15.7)
INR PPP: 1.24 (ref 0–1.3)
MAGNESIUM SERPL-MCNC: 1.9 MG/DL (ref 1.9–2.7)
MCH RBC QN AUTO: 31.5 PG (ref 26.5–33)
MCHC RBC AUTO-ENTMCNC: 32.7 G/DL (ref 31.5–36.5)
MCV RBC AUTO: 96 FL (ref 78–100)
PLATELET # BLD AUTO: 172 10E9/L (ref 150–450)
POTASSIUM SERPL-SCNC: 3.7 MMOL/L (ref 3.5–5.1)
RBC # BLD AUTO: 3.24 10E12/L (ref 3.8–5.2)
SODIUM SERPL-SCNC: 143 MMOL/L (ref 134–144)
WBC # BLD AUTO: 7.9 10E9/L (ref 4–11)

## 2019-12-11 PROCEDURE — 85610 PROTHROMBIN TIME: CPT | Performed by: INTERNAL MEDICINE

## 2019-12-11 PROCEDURE — 80048 BASIC METABOLIC PNL TOTAL CA: CPT | Performed by: INTERNAL MEDICINE

## 2019-12-11 PROCEDURE — 85027 COMPLETE CBC AUTOMATED: CPT | Performed by: INTERNAL MEDICINE

## 2019-12-11 PROCEDURE — 83735 ASSAY OF MAGNESIUM: CPT | Performed by: INTERNAL MEDICINE

## 2019-12-11 PROCEDURE — 99239 HOSP IP/OBS DSCHRG MGMT >30: CPT | Performed by: INTERNAL MEDICINE

## 2019-12-11 PROCEDURE — 25000125 ZZHC RX 250: Performed by: INTERNAL MEDICINE

## 2019-12-11 PROCEDURE — 36415 COLL VENOUS BLD VENIPUNCTURE: CPT | Performed by: INTERNAL MEDICINE

## 2019-12-11 PROCEDURE — 25000132 ZZH RX MED GY IP 250 OP 250 PS 637: Performed by: INTERNAL MEDICINE

## 2019-12-11 RX ORDER — WARFARIN SODIUM 5 MG/1
TABLET ORAL
COMMUNITY
End: 2020-06-02

## 2019-12-11 RX ADMIN — FAMOTIDINE 20 MG: 10 INJECTION, SOLUTION INTRAVENOUS at 09:13

## 2019-12-11 RX ADMIN — METOPROLOL SUCCINATE 25 MG: 25 TABLET, EXTENDED RELEASE ORAL at 09:13

## 2019-12-11 ASSESSMENT — ACTIVITIES OF DAILY LIVING (ADL)
ADLS_ACUITY_SCORE: 16

## 2019-12-11 ASSESSMENT — MIFFLIN-ST. JEOR: SCORE: 1423.63

## 2019-12-11 NOTE — PLAN OF CARE
Patient A&O times 4, LS clear, HR irreg, BS active. Passed two dime size clots on this shift. No complaints of pain. VSS. Resting well between cares. Will cont to monitor.     Isa Loera RN on 12/11/2019 at 2:53 AM

## 2019-12-11 NOTE — DISCHARGE SUMMARY
[facility]    Discharge Summary  Hospitalist    Date of Admission:  12/9/2019  Date of Discharge:  12/11/2019 10:30 AM  Discharging Provider: Nitin Grant  Date of Service (when I saw the patient): 12/11/19    Discharge Diagnoses   Principal Problem:    GI bleed (12/9/2019)  Active Problems:    Anticoagulation goal of INR 2 to 3 (10/28/2016)    Paroxysmal atrial fibrillation (H) (11/2/2016)    Hypertension (12/8/2016)      History of Present Illness   Kate Chacon is an 74 year old female who presented with the above.    74 year old female who presents with acute onset bright red blood per rectum.  Patient underwent colonoscopy with Dr. Matthews on 12/2 and had 4 polyps removed by hot snare.  She resumed her Coumadin that night and has returned to her normal state health until this morning.  She was bearing down to have a bowel movement and noted acute bright red blood per rectum.  She had approximately 4 episodes at home, then presented to the ER had another episode of irina clots, received 2 mg of IV vitamin K and subsequently admitted for further management.    Hospital Course   Kate Chacon was admitted on 12/9/2019.  The following problems were addressed during her hospitalization:    GI bleed: Likely due to post polypectomy bleeding in the setting of chronic anticoagulation for atrial fibrillation.  She was managed conservatively throughout her stay, was initially continued on clear liquids, received IV vitamin K as noted above in the ER, ibuprofen was held and she was evaluated by general surgery.  BRBPR resolved spontaneously and she had only 2 dime size clots with loose brown stool night prior to discharge.  Hemoglobin remained stable and these last small amounts of blood were thought to be prior residual blood loss without evidence of ongoing active bleeding.  She was able to tolerate a regular diet without difficulty and was discharged with routine post surgical follow-up.    She  will continue to hold NSAIDs and use Tylenol as needed.  She will be able to restart her home Coumadin on day of discharge with repeat INR in the coming days as already scheduled.  She was counseled at length regarding signs and symptoms warranting more urgent/emergent evaluation.  She had no other medication changes during her stay and she will resume her other home meds.    Nitin Grant MD    Significant Results and Procedures   none    Pending Results   These results will be followed up by NA  Unresulted Labs Ordered in the Past 30 Days of this Admission     No orders found from 11/9/2019 to 12/10/2019.          Code Status   Full Code       Primary Care Physician   Asher Campos    Physical Exam   Temp: 98.7  F (37.1  C) Temp src: Tympanic BP: (!) 140/86   Heart Rate: 105 Resp: 18 SpO2: 98 % O2 Device: None (Room air)    Vitals:    12/09/19 1037 12/09/19 1419 12/11/19 0241   Weight: 90.7 kg (200 lb) 90 kg (198 lb 6.6 oz) 90.1 kg (198 lb 10.2 oz)     Vital Signs with Ranges  Temp:  [97.8  F (36.6  C)-98.9  F (37.2  C)] 98.7  F (37.1  C)  Heart Rate:  [] 105  Resp:  [16-20] 18  BP: (127-146)/(81-92) 140/86  SpO2:  [96 %-98 %] 98 %  I/O last 3 completed shifts:  In: 575 [P.O.:565; I.V.:10]  Out: -     Exam:  GENERAL: Talkative, in no apparent distress.  CARDIOVASCULAR: Irregularly irregular rate and rhythm, no murmurs, rubs, or gallops. Normal S1/S2. No lower extremity edema.   RESPIRATORY: clear to auscultation bilaterally, no wheezes, no crackles.    GI: soft, non-tender, non-distended, normoactive bowel sounds.   MUSCULOSKELETAL: warm and well perfused, 2+ dorsalis pedis pulses.    SKIN: no pallor, jaundice or rashes.  NEUROLOGY: AAOx3, follows commands, speech and language without focal deficits.      Discharge Disposition   Discharged to home  Condition at discharge: Stable    Consultations This Hospital Stay   SURGERY GENERAL IP CONSULT    Time Spent on this Encounter   I, Nitin Grant MD, personally  saw the patient today and spent greater than 30 minutes discharging this patient.    Discharge Orders      Reason for your hospital stay    Post colonoscopy bleeding     Follow-up and recommended labs and tests     12/20/2019 at 8:50 AM with Dr. Matthews    [facility] clinic as scheduled on Friday to check your INR     Activity    Your activity upon discharge: activity as tolerated     Discharge Instructions    -Restart your Coumadin tonight as you usually do and have your INR rechecked in 2 days  -Stop taking ibuprofen for generalized aches and pains and instead use Tylenol as the ibuprofen can increase your risk for bleeding  -There are no other changes to your medications at this time  Use any over-the-counter stool softener such as MiraLAX once daily to have 1 soft formed stool daily.  Also start taking a fiber supplement daily with a goal of 30 g of fiber daily     When to contact your care team    Call your primary doctor if you have any of the following: temperature greater than 101,  increased shortness of breath, increased drainage, increased swelling or increased pain.    Call Dr. Matthews's clinic if you have any more large amounts of bleeding with moving your bowels     Diet    Follow this diet upon discharge: Orders Placed This Encounter      Regular Diet Adult     Discharge Medications   Discharge Medication List as of 12/11/2019 10:13 AM      CONTINUE these medications which have NOT CHANGED    Details   atorvastatin (LIPITOR) 40 MG tablet TAKE 1 TABLET BY MOUTH AT BEDTIME, Disp-90 tablet, R-3, E-Prescribe      calcium carbonate 750 MG CHEW Take 1 tablet by mouth every 8 hours as needed for heartburn, Historical      lisinopril (PRINIVIL/ZESTRIL) 20 MG tablet Take 1 tablet (20 mg) by mouth daily, Disp-90 tablet, R-11, E-Prescribe      metoprolol succinate ER (TOPROL-XL) 25 MG 24 hr tablet TAKE 1 TABLET BY MOUTH ONCE DAILY, Disp-90 tablet, R-3, E-Prescribe      sertraline (ZOLOFT) 50 MG tablet Take 1 tablet (50  mg) by mouth At Bedtime, Disp-90 tablet, R-11, E-Prescribe      warfarin ANTICOAGULANT (COUMADIN) 5 MG tablet Take 5 mg by mouth once daily x 6 days/week and take 2.5 mg x 1 day/week (Tuesday) [facility]         STOP taking these medications       ibuprofen (ADVIL/MOTRIN) 200 MG tablet Comments:   Reason for Stopping:             Allergies   Allergies   Allergen Reactions     Methylpar-Na Bisulfite-Pentazocine Unknown     jittery     Data   Most Recent 3 CBC's:  Recent Labs   Lab Test 12/11/19  0424 12/10/19  0440 12/09/19  1756   WBC 7.9 7.9 8.5   HGB 10.2* 10.2* 11.0*   MCV 96 95 96    158 157      Most Recent 3 BMP's:  Recent Labs   Lab Test 12/11/19  0424 12/10/19  0440 12/09/19  1108    142 140   POTASSIUM 3.7 3.5 3.6   CHLORIDE 108* 110* 108*   CO2 30 28 24   BUN 10 12 17   CR 0.99 0.91 0.93   ANIONGAP 5 4 8   MASHA 9.2 8.5* 9.3   * 118* 124*     Most Recent 2 LFT's:  Recent Labs   Lab Test 12/09/19  1108 10/29/19  1434   AST 21 24   ALT 25 26   ALKPHOS 84 78   BILITOTAL 0.5 0.5     Most Recent INR's and Anticoagulation Dosing History:  Anticoagulation Dose History     Recent Dosing and Labs Latest Ref Rng & Units 10/8/2019 10/29/2019 11/12/2019 12/2/2019 12/9/2019 12/10/2019 12/11/2019    INR 0 - 1.3 - - - 1.26 2.28(H) 1.48(H) 1.24    INR 0.86 - 1.14 2.6(A) 3.3(A) 2.3(A) - - - -        Most Recent 3 Troponin's:No lab results found.  Most Recent Cholesterol Panel:  Recent Labs   Lab Test 08/30/17  0949   LDL 39   HDL 40   TRIG 55     Most Recent 6 Bacteria Isolates From Any Culture (See EPIC Reports for Culture Details):  Recent Labs   Lab Test 08/08/19  1419   CULT >100,000 colonies/mL  Streptococcus agalactiae sero group B  *     Most Recent TSH, T4 and A1c Labs:  Recent Labs   Lab Test 11/01/17  1225   T4 1.09     Results for orders placed or performed during the hospital encounter of 11/12/19   MA Screen Bilateral w/Portillo    Narrative    EXAM: MA SCREENING  BILATERAL W/ PARISA, 11/12/2019 11:09 AM    COMPARISONS: 2/6/2017, 5/2/2012, 3/2/2011    HISTORY: Visit for screening mammogram    BREAST DENSITY: Scattered fibroglandular densities.   There is no dominant mass, developing asymmetry, or suspicious  clusters of microcalcifications.      Impression    IMPRESSION: BI-RADS CATEGORY: 2 - Benign.    RECOMMENDED FOLLOW-UP: Annual Mammography.      JULIAN STODDARD MD

## 2019-12-11 NOTE — PLAN OF CARE
Pt VSS: Temp: 98.7  F (37.1  C) Temp src: Tympanic BP: (!) 140/86   Heart Rate: 105 Resp: 18 SpO2: 98 % O2 Device: None (Room air)    No complaints of pain. Independent in room and pleasant. Lung sounds are clear and heart rhythm irregular. Skin intact except +2 edema to bilateral ankles/feet and +1 edema to legs. Blood noted on toilet paper by patient , MD aware. Cristina Saleem RN on 12/11/2019 at 10:32 AM

## 2019-12-11 NOTE — PROGRESS NOTES
Discharge Note    Data:  Kate Chacon discharged to home at 1030 via wheel chair. Accompanied by spouse and staff.    Action:  Written discharge/follow-up instructions were provided to patient and spouse. Prescriptions - None ordered for discharge. All belongings sent with patient.  Educational handouts provided on GI bleeding and fiber intake. No further questions by patient.     Response:  Patient and spouse verbalized understanding of discharge instructions, reason for discharge, and necessary follow-up appointments.    Cristina Saleem RN on 12/11/2019 at 10:33 AM

## 2019-12-11 NOTE — PHARMACY - DISCHARGE MEDICATION RECONCILIATION AND EDUCATION
Pharmacy:  Discharge Counseling and Medication Reconciliation    Kate Chacon  [address] [address]  [phone] (home)   74 year old female  PCP: Asher Campos    Allergies: Methylpar-na bisulfite-pentazocine    Discharge Counseling:    Pharmacist met with patient (and/or family) today to review the medication portion of the After Visit Summary (with an emphasis on NEW medications) and to address patient's questions/concerns.    Summary of Education: Met with patient at time of discharge to discuss new Miralax as directed by Dr. Grant and fiber guidance. Advised to discuss with retail pharmacist to pick out OTC medications to reflect instructions provided by Dr. Grant. Also discussed discrepancy in warfarin dose per med rec. Updated discharge to reflect [facility] instructions of 2.5 mg on Tuesdays vs how patient reported as 7.5 mg on Tuesdays. Advised to take warfarin 5 mg by mouth once daily on 12/11/19 and 12/12/19. She will then discuss further dosing based on recommendations from [facility] on 12/13/19.     Materials Provided:  MedCounselor sheets printed from Clinical Pharmacology on: none    Discharge Medication Reconciliation:    It has been determined that the patient has an adequate supply of medications available or which can be obtained from the patient's preferred pharmacy, which HE/SHE has confirmed as: no new prescriptions sent     Thank you for the consult.    Ping Mercer [facility]........December 11, 2019 10:12 AM

## 2019-12-11 NOTE — PROGRESS NOTES
"0313/0313-01  Kate Chacon 74 year old female   Admission date:12/9/2019   Residence: Home  Code status: Full Code   Isolation:No active isolations   Principal Problem:GI bleed   Post Op Day #: NA  Peds:YES, No  [place]:    Diet: regular  Mobility Status: ind  Discharge timeline & plan: progressing to discharge. No additional resources anticipated.  Vital signs:  Temp: 98.1  F (36.7  C) Temp src: Tympanic BP: (!) 127/92   Heart Rate: 101 Resp: 20 SpO2: 98 % O2 Device: None (Room air)   Height: 170.2 cm (5' 7\") Weight: 90.1 kg (198 lb 10.2 oz)  Estimated body mass index is 31.11 kg/m  as calculated from the following:    Height as of this encounter: 1.702 m (5' 7\").    Weight as of this encounter: 90.1 kg (198 lb 10.2 oz).  Abnormal Physical Assessment:HR irreg, +1 edema to ankles and feet   Last Pain/PRN Medication given:   Finger stick POCT blood sugars:   Labs:    Telemetry: No  Pending tests/procedures planned:   Comments: two dime size clots passed this shift.      SAFETY CHECKLIST  Arm Bands/Risk clasps correct and in place (DNR, Fall risk, Allergy, Latex, Limb):  Yes  IVF and rate ordered correct: Yes  Physical assessment verification(IV site, wounds, dressing intact, incisions, LDA's, neuro, CIWA...): Yes  Environmental assessment (bed/chair alarm on, call light, side rails, restraints, sitter....): Yes  Whiteboard updated by oncoming RN:Yes    Bedside Handoff complete, safety checks verified by writer and are correct. Cristina Saleem RN on 12/11/2019 at 7:05 AM              "

## 2019-12-13 ENCOUNTER — ANTICOAGULATION THERAPY VISIT (OUTPATIENT)
Dept: ANTICOAGULATION | Facility: OTHER | Age: 74
End: 2019-12-13
Attending: FAMILY MEDICINE
Payer: MEDICARE

## 2019-12-13 DIAGNOSIS — Z51.81 ANTICOAGULATION GOAL OF INR 2 TO 3: ICD-10-CM

## 2019-12-13 DIAGNOSIS — I48.0 PAROXYSMAL ATRIAL FIBRILLATION (H): ICD-10-CM

## 2019-12-13 DIAGNOSIS — Z79.01 ANTICOAGULATION GOAL OF INR 2 TO 3: ICD-10-CM

## 2019-12-13 LAB — INR POINT OF CARE: 1.3 (ref 0.86–1.14)

## 2019-12-13 PROCEDURE — 85610 PROTHROMBIN TIME: CPT | Mod: QW,ZL

## 2019-12-13 NOTE — PROGRESS NOTES
ANTICOAGULATION FOLLOW-UP CLINIC VISIT    Patient Name:  Kate Chacon  Date:  2019  Contact Type:  Face to Face    SUBJECTIVE:  Patient Findings     Positives:   Signs/symptoms of bleeding (Rectal bleeding due to having polyps removed.  No further bleeding since hospital discharge), Missed doses (doses held in hospital), Extra doses (May have taken 7.5 mg instead of instructed 2.5 mg on Tuesday.), Change in medications (had Vit K 19, stop NSAIDS, use Tylenol prn), Hospital admission (19 to 19 for rectal bleeding)        Clinical Outcomes     Negatives:   Major bleeding event, Thromboembolic event, Anticoagulation-related hospital admission, Anticoagulation-related ED visit, Anticoagulation-related fatality           OBJECTIVE    INR Protime   Date Value Ref Range Status   2019 1.3 (A) 0.86 - 1.14 Final       ASSESSMENT / PLAN  INR assessment SUB held doses   Recheck INR In: 1 WEEK    INR Location Clinic      Anticoagulation Summary  As of 2019    INR goal:   2.0-3.0   TTR:   50.1 % (1 y)   INR used for dosin.3! (2019)   Warfarin maintenance plan:   5 mg (5 mg x 1) every day   Full warfarin instructions:   : 7.5 mg; : 7.5 mg; Otherwise 5 mg every day   Weekly warfarin total:   35 mg   Plan last modified:   Sara Rodriguez RN (2019)   Next INR check:   2019   Priority:   INR   Target end date:   Indefinite    Indications    Paroxysmal atrial fibrillation (H) [I48.0]  Anticoagulation goal of INR 2 to 3 [Z51.81  Z79.01]             Anticoagulation Episode Summary     INR check location:       Preferred lab:       Send INR reminders to:    INR    Comments:         Anticoagulation Care Providers     Provider Role Specialty Phone number    Asher Campos MD [facility] [phone]            See the Encounter Report to view Anticoagulation Flowsheet and Dosing Calendar (Go to Encounters tab in chart review, and find the  Anticoagulation Therapy Visit)        Sara Rodriguez RN

## 2019-12-20 ENCOUNTER — ANTICOAGULATION THERAPY VISIT (OUTPATIENT)
Dept: ANTICOAGULATION | Facility: OTHER | Age: 74
End: 2019-12-20
Attending: FAMILY MEDICINE
Payer: MEDICARE

## 2019-12-20 ENCOUNTER — OFFICE VISIT (OUTPATIENT)
Dept: SURGERY | Facility: OTHER | Age: 74
End: 2019-12-20
Attending: SURGERY
Payer: MEDICARE

## 2019-12-20 VITALS
SYSTOLIC BLOOD PRESSURE: 110 MMHG | BODY MASS INDEX: 31.48 KG/M2 | RESPIRATION RATE: 18 BRPM | TEMPERATURE: 97.4 F | HEART RATE: 72 BPM | WEIGHT: 201 LBS | DIASTOLIC BLOOD PRESSURE: 80 MMHG

## 2019-12-20 DIAGNOSIS — Z98.890 POSTOPERATIVE STATE: Primary | ICD-10-CM

## 2019-12-20 DIAGNOSIS — I48.0 PAROXYSMAL ATRIAL FIBRILLATION (H): ICD-10-CM

## 2019-12-20 DIAGNOSIS — Z79.01 ANTICOAGULATION GOAL OF INR 2 TO 3: ICD-10-CM

## 2019-12-20 DIAGNOSIS — Z51.81 ANTICOAGULATION GOAL OF INR 2 TO 3: ICD-10-CM

## 2019-12-20 DIAGNOSIS — Z86.0101 H/O ADENOMATOUS POLYP OF COLON: ICD-10-CM

## 2019-12-20 DIAGNOSIS — K57.30 DIVERTICULOSIS OF LARGE INTESTINE WITHOUT HEMORRHAGE: ICD-10-CM

## 2019-12-20 PROBLEM — K92.2 GI BLEED: Status: RESOLVED | Noted: 2019-12-09 | Resolved: 2019-12-20

## 2019-12-20 LAB — INR POINT OF CARE: 2.6 (ref 0.86–1.14)

## 2019-12-20 PROCEDURE — 99212 OFFICE O/P EST SF 10 MIN: CPT | Performed by: SURGERY

## 2019-12-20 PROCEDURE — 85610 PROTHROMBIN TIME: CPT | Mod: QW,ZL

## 2019-12-20 PROCEDURE — G0463 HOSPITAL OUTPT CLINIC VISIT: HCPCS

## 2019-12-20 ASSESSMENT — PAIN SCALES - GENERAL: PAINLEVEL: NO PAIN (0)

## 2019-12-20 NOTE — PROGRESS NOTES
ANTICOAGULATION FOLLOW-UP CLINIC VISIT    Patient Name:  Kate Chacon  Date:  2019  Contact Type:  Face to Face    SUBJECTIVE:  Patient Findings         Clinical Outcomes     Negatives:   Major bleeding event, Thromboembolic event, Anticoagulation-related hospital admission, Anticoagulation-related ED visit, Anticoagulation-related fatality           OBJECTIVE    INR Protime   Date Value Ref Range Status   2019 2.6 (A) 0.86 - 1.14 Final       ASSESSMENT / PLAN  INR assessment THER    Recheck INR In: 2 WEEKS    INR Location Clinic      Anticoagulation Summary  As of 2019    INR goal:   2.0-3.0   TTR:   49.1 % (1 y)   INR used for dosin.6 (2019)   Warfarin maintenance plan:   5 mg (5 mg x 1) every day   Full warfarin instructions:   5 mg every day   Weekly warfarin total:   35 mg   No change documented:   Radha Burns RN   Plan last modified:   Sara Rodriguez RN (2019)   Next INR check:   1/3/2020   Priority:   INR   Target end date:   Indefinite    Indications    Paroxysmal atrial fibrillation (H) [I48.0]  Anticoagulation goal of INR 2 to 3 [Z51.81  Z79.01]             Anticoagulation Episode Summary     INR check location:       Preferred lab:       Send INR reminders to:    INR    Comments:         Anticoagulation Care Providers     Provider Role Specialty Phone number    Asher Campos MD [facility] [phone]            See the Encounter Report to view Anticoagulation Flowsheet and Dosing Calendar (Go to Encounters tab in chart review, and find the Anticoagulation Therapy Visit)        Radha Burns RN

## 2019-12-20 NOTE — NURSING NOTE
"Chief Complaint   Patient presents with     Clinic Care Coordination - Follow-up     hospital follow up       Initial /80 (BP Location: Right arm, Patient Position: Sitting, Cuff Size: Adult Large)   Pulse 72   Temp 97.4  F (36.3  C) (Tympanic)   Resp 18   Wt 91.2 kg (201 lb)   Breastfeeding No   BMI 31.48 kg/m   Estimated body mass index is 31.48 kg/m  as calculated from the following:    Height as of 12/9/19: 1.702 m (5' 7\").    Weight as of this encounter: 91.2 kg (201 lb).  Medication Reconciliation: complete    Ira Curtis LPN  "

## 2019-12-20 NOTE — PROGRESS NOTES
Subjective:  This is a follow up after colonoscopy  on 12/2/19, complicated by lower GI bleeding. The patient has no complaints. Stools are soft and formed and no further bleeding. INR back to therapeutic at 2.6.     Objective:/80 (BP Location: Right arm, Patient Position: Sitting, Cuff Size: Adult Large)   Pulse 72   Temp 97.4  F (36.3  C) (Tympanic)   Resp 18   Wt 91.2 kg (201 lb)   Breastfeeding No   BMI 31.48 kg/m        Assessment:   Doing well after colonoscopy.  H/o adenomatous polyps  Diverticulosis    Plan:  Follow-up as needed  No further colonoscopy needed as would be over 78.

## 2019-12-26 ENCOUNTER — OFFICE VISIT (OUTPATIENT)
Dept: FAMILY MEDICINE | Facility: OTHER | Age: 74
End: 2019-12-26
Attending: FAMILY MEDICINE
Payer: COMMERCIAL

## 2019-12-26 VITALS
OXYGEN SATURATION: 99 % | HEART RATE: 96 BPM | WEIGHT: 205 LBS | TEMPERATURE: 97.4 F | DIASTOLIC BLOOD PRESSURE: 88 MMHG | RESPIRATION RATE: 16 BRPM | SYSTOLIC BLOOD PRESSURE: 120 MMHG | BODY MASS INDEX: 32.11 KG/M2

## 2019-12-26 DIAGNOSIS — M54.41 ACUTE RIGHT-SIDED LOW BACK PAIN WITH RIGHT-SIDED SCIATICA: ICD-10-CM

## 2019-12-26 DIAGNOSIS — M54.16 LUMBAR RADICULOPATHY: Primary | ICD-10-CM

## 2019-12-26 PROCEDURE — G0463 HOSPITAL OUTPT CLINIC VISIT: HCPCS

## 2019-12-26 PROCEDURE — 99213 OFFICE O/P EST LOW 20 MIN: CPT | Performed by: FAMILY MEDICINE

## 2019-12-26 ASSESSMENT — PAIN SCALES - GENERAL: PAINLEVEL: MODERATE PAIN (4)

## 2019-12-26 NOTE — PROGRESS NOTES
"Nursing Notes:   Tiffany White LPN  12/26/2019  2:29 PM  Signed  Chief Complaint   Patient presents with     RECHECK     back pain       Initial /88   Pulse 96   Temp 97.4  F (36.3  C) (Temporal)   Resp 16   Wt 93 kg (205 lb)   SpO2 99%   Breastfeeding No   BMI 32.11 kg/m    Estimated body mass index is 32.11 kg/m  as calculated from the following:    Height as of 12/9/19: 1.702 m (5' 7\").    Weight as of this encounter: 93 kg (205 lb).  Medication Reconciliation: complete    Tiffany White LPN    SUBJECTIVE:  Kate Chacon  is a 74 year old female who comes in today because of ongoing pain in her back.  She is been going to physical therapy.  I saw her in mid November and she going down the front of her right leg and thigh.  She was better when in bed and worse when up and around. We felt she probably had significant facet arthropathy but had some radicular findings and so we gave her a burst of steroid and continued physical therapy.  We discussed that if she was not getting better she probably should have MRI scan and the physical therapist is now in agreement with that.    She had recent colonoscopy with polypectomy and had some bleeding afterwards.  She was admitted to the hospital, given vitamin K and monitored.  She did not require blood transfusion.  She had follow-up last week with the surgeon and was doing well.    Past Medical, Family, and Social History reviewed and updated as noted below.   ROS is negative except as noted above       Allergies   Allergen Reactions     Methylpar-Na Bisulfite-Pentazocine Unknown     jittery   ,   Family History   Problem Relation Age of Onset     Diabetes Father         Diabetes     Hypertension Father         Hypertension     Other - See Comments Mother         h/o coronary artery disease/dementia     Asthma Mother         Asthma     Diabetes Maternal Grandmother         Diabetes     Cancer Maternal Aunt         Cancer,Uterine     Breast Cancer No " family hx of         Cancer-breast   ,   Current Outpatient Medications   Medication     atorvastatin (LIPITOR) 40 MG tablet     calcium carbonate 750 MG CHEW     lisinopril (PRINIVIL/ZESTRIL) 20 MG tablet     metoprolol succinate ER (TOPROL-XL) 25 MG 24 hr tablet     sertraline (ZOLOFT) 50 MG tablet     warfarin ANTICOAGULANT (COUMADIN) 5 MG tablet     No current facility-administered medications for this visit.    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   ,   Patient Active Problem List    Diagnosis Date Noted     Sinoatrial node dysfunction (H) 05/01/2018     Priority: Medium     Cardiac pacemaker 02/19/2018     Priority: Medium     Urge incontinence 10/05/2017     Priority: Medium     Former smoker 08/30/2017     Priority: Medium     MDD (recurrent major depressive disorder) in remission (H) 08/30/2017     Priority: Medium     Anxiety 01/05/2017     Priority: Medium     Hypertension 12/08/2016     Priority: Medium     Paroxysmal atrial fibrillation (H) 11/02/2016     Priority: Medium     Anticoagulation goal of INR 2 to 3 10/28/2016     Priority: Medium     Ischemic stroke (H) 10/15/2016     Priority: Medium     Diverticulosis of large intestine 04/04/2011     Priority: Medium     H/O adenomatous polyp of colon 03/02/2011     Priority: Medium     Osteoporosis 02/08/2006     Priority: Medium   ,   Past Surgical History:   Procedure Laterality Date     APPENDECTOMY OPEN      No Comments Provided     COLONOSCOPY  08/19/2005 8/19/05,Cecal biopsy with tubular adenoma low grade dysplasia.     COLONOSCOPY  04/04/2011 4/4/11     COLONOSCOPY  05/07/2012    Tubular Adenomas F/U 2017     COLONOSCOPY  12/02/2019    F/U N/A as will be over 78 in 2024. Tubular adenoma     ESOPHAGOSCOPY, GASTROSCOPY, DUODENOSCOPY (EGD), COMBINED       8/19/05     OTHER SURGICAL HISTORY      8/3/05,[id number],OTHER,helices on the right ear     TONSILLECTOMY      No Comments Provided    and   Social History     Tobacco Use     Smoking status: Former Smoker     Packs/day: 0.50     Years: 49.00     Pack years: 24.50     Types: Cigarettes     Last attempt to quit: 10/14/2016     Years since quitting: 3.2     Smokeless tobacco: Never Used   Substance Use Topics     Alcohol use: No     OBJECTIVE:  /88   Pulse 96   Temp 97.4  F (36.3  C) (Temporal)   Resp 16   Wt 93 kg (205 lb)   SpO2 99%   Breastfeeding No   BMI 32.11 kg/m     EXAM:  Alert and cooperative, no distress.  Examination of her low back reveals improved range of motion but still discomfort in the lumbosacral region primarily on the right.  No radicular findings on exam today.  No sciatic notch or SI joint tenderness.  ASSESSMENT/Plan :    Kate was seen today for recheck.    Diagnoses and all orders for this visit:    Lumbar radiculopathy  -     CT Lumbar Spine w/o Contrast; Future    Acute right-sided low back pain with right-sided sciatica  -     CT Lumbar Spine w/o Contrast; Future      She has a pacemaker so cannot do an MRI so we will do CT of the lumbar spine without contrast.  Then we discussed options as far as image guided injections and likely would start with a epidural steroid injection interlaminar.  She will follow-up 2 weeks after the injection and continue with physical therapy.    A total of 15 minutes was spent with the patient, greater than 50% of the time was spent in counseling/discussion of the aforementioned concerns.     Asher Campos MD

## 2019-12-26 NOTE — NURSING NOTE
"Chief Complaint   Patient presents with     RECHECK     back pain       Initial /88   Pulse 96   Temp 97.4  F (36.3  C) (Temporal)   Resp 16   Wt 93 kg (205 lb)   SpO2 99%   Breastfeeding No   BMI 32.11 kg/m   Estimated body mass index is 32.11 kg/m  as calculated from the following:    Height as of 12/9/19: 1.702 m (5' 7\").    Weight as of this encounter: 93 kg (205 lb).  Medication Reconciliation: complete    Tiffany White LPN  "

## 2019-12-27 ENCOUNTER — HOSPITAL ENCOUNTER (OUTPATIENT)
Dept: CT IMAGING | Facility: OTHER | Age: 74
Discharge: HOME | End: 2019-12-27
Attending: FAMILY MEDICINE | Admitting: FAMILY MEDICINE
Payer: MEDICARE

## 2019-12-27 DIAGNOSIS — M54.41 ACUTE RIGHT-SIDED LOW BACK PAIN WITH RIGHT-SIDED SCIATICA: ICD-10-CM

## 2019-12-27 DIAGNOSIS — M54.16 LUMBAR RADICULOPATHY: ICD-10-CM

## 2019-12-27 PROCEDURE — 72131 CT LUMBAR SPINE W/O DYE: CPT

## 2019-12-30 DIAGNOSIS — M54.16 LUMBAR RADICULOPATHY: Primary | ICD-10-CM

## 2019-12-30 DIAGNOSIS — M54.41 ACUTE RIGHT-SIDED LOW BACK PAIN WITH RIGHT-SIDED SCIATICA: ICD-10-CM

## 2019-12-31 ENCOUNTER — TELEPHONE (OUTPATIENT)
Dept: FAMILY MEDICINE | Facility: OTHER | Age: 74
End: 2019-12-31

## 2019-12-31 NOTE — TELEPHONE ENCOUNTER
Talked to patient and she is having a neck injection with [facility] on January 6 th . She is to hold her warfarin for 5 days . She needs the okay from Dr. Campos . She also is having her INR done that morning. Abbey Edmondson LPN ....................12/31/2019  11:57 AM

## 2019-12-31 NOTE — TELEPHONE ENCOUNTER
JAMESVC-Patient stated she was returning a call from Tiffany regarding holding warfarin before back injection.   Marcel Yuan on 12/31/2019 at 10:43 AM

## 2020-01-06 ENCOUNTER — ANTICOAGULATION THERAPY VISIT (OUTPATIENT)
Dept: ANTICOAGULATION | Facility: OTHER | Age: 75
End: 2020-01-06
Attending: FAMILY MEDICINE
Payer: MEDICARE

## 2020-01-06 ENCOUNTER — HOSPITAL ENCOUNTER (OUTPATIENT)
Dept: GENERAL RADIOLOGY | Facility: OTHER | Age: 75
Discharge: HOME | End: 2020-01-06
Attending: FAMILY MEDICINE | Admitting: FAMILY MEDICINE
Payer: MEDICARE

## 2020-01-06 DIAGNOSIS — Z51.81 ANTICOAGULATION GOAL OF INR 2 TO 3: ICD-10-CM

## 2020-01-06 DIAGNOSIS — I48.0 PAROXYSMAL ATRIAL FIBRILLATION (H): ICD-10-CM

## 2020-01-06 DIAGNOSIS — Z79.01 ANTICOAGULATION GOAL OF INR 2 TO 3: ICD-10-CM

## 2020-01-06 DIAGNOSIS — M54.16 LUMBAR RADICULOPATHY: ICD-10-CM

## 2020-01-06 DIAGNOSIS — M54.41 ACUTE RIGHT-SIDED LOW BACK PAIN WITH RIGHT-SIDED SCIATICA: ICD-10-CM

## 2020-01-06 LAB — INR POINT OF CARE: 1.2 (ref 0.86–1.14)

## 2020-01-06 PROCEDURE — 25000125 ZZHC RX 250: Performed by: RADIOLOGY

## 2020-01-06 PROCEDURE — 25500064 ZZH RX 255 OP 636: Performed by: RADIOLOGY

## 2020-01-06 PROCEDURE — 36416 COLLJ CAPILLARY BLOOD SPEC: CPT | Mod: ZL

## 2020-01-06 PROCEDURE — 62323 NJX INTERLAMINAR LMBR/SAC: CPT

## 2020-01-06 PROCEDURE — 25000128 H RX IP 250 OP 636: Performed by: RADIOLOGY

## 2020-01-06 RX ORDER — LIDOCAINE HYDROCHLORIDE 10 MG/ML
2 INJECTION, SOLUTION INFILTRATION; PERINEURAL ONCE
Status: COMPLETED | OUTPATIENT
Start: 2020-01-06 | End: 2020-01-06

## 2020-01-06 RX ORDER — LIDOCAINE HYDROCHLORIDE 10 MG/ML
2 INJECTION, SOLUTION EPIDURAL; INFILTRATION; INTRACAUDAL; PERINEURAL ONCE
Status: COMPLETED | OUTPATIENT
Start: 2020-01-06 | End: 2020-01-06

## 2020-01-06 RX ORDER — METHYLPREDNISOLONE ACETATE 80 MG/ML
80 INJECTION, SUSPENSION INTRA-ARTICULAR; INTRALESIONAL; INTRAMUSCULAR; SOFT TISSUE ONCE
Status: COMPLETED | OUTPATIENT
Start: 2020-01-06 | End: 2020-01-06

## 2020-01-06 RX ADMIN — METHYLPREDNISOLONE ACETATE 80 MG: 80 INJECTION, SUSPENSION INTRA-ARTICULAR; INTRALESIONAL; INTRAMUSCULAR; SOFT TISSUE at 13:39

## 2020-01-06 RX ADMIN — LIDOCAINE HYDROCHLORIDE 2 ML: 10 INJECTION, SOLUTION EPIDURAL; INFILTRATION; INTRACAUDAL; PERINEURAL at 13:39

## 2020-01-06 RX ADMIN — LIDOCAINE HYDROCHLORIDE 2 ML: 10 INJECTION, SOLUTION INFILTRATION; PERINEURAL at 13:39

## 2020-01-06 RX ADMIN — IOHEXOL 2 ML: 240 INJECTION, SOLUTION INTRATHECAL; INTRAVASCULAR; INTRAVENOUS; ORAL at 13:39

## 2020-01-06 NOTE — PROGRESS NOTES
ANTICOAGULATION FOLLOW-UP CLINIC VISIT    Patient Name:  Kate Chacon  Date:  2020  Contact Type:  Face to Face    SUBJECTIVE:  Patient Findings     Positives:   Missed doses (having injection today she has beenholding her warfarin)             OBJECTIVE    INR Protime   Date Value Ref Range Status   2020 1.2 (A) 0.86 - 1.14 Final       ASSESSMENT / PLAN  INR assessment SUB    Recheck INR In: 1 WEEK    INR Location Clinic      Anticoagulation Summary  As of 2020    INR goal:   2.0-3.0   TTR:   46.4 % (1 y)   INR used for dosin.2! (2020)   Warfarin maintenance plan:   5 mg (5 mg x 1) every day   Full warfarin instructions:   5 mg every day   Weekly warfarin total:   35 mg   No change documented:   Radha Burns RN   Plan last modified:   Sara Rodriguez RN (2019)   Next INR check:   2020   Priority:   High   Target end date:   Indefinite    Indications    Paroxysmal atrial fibrillation (H) [I48.0]  Anticoagulation goal of INR 2 to 3 [Z51.81  Z79.01]             Anticoagulation Episode Summary     INR check location:       Preferred lab:       Send INR reminders to:    INR    Comments:         Anticoagulation Care Providers     Provider Role Specialty Phone number    Asher Campos MD [facility] [phone]            See the Encounter Report to view Anticoagulation Flowsheet and Dosing Calendar (Go to Encounters tab in chart review, and find the Anticoagulation Therapy Visit)        Radha Burns, RN

## 2020-01-09 ENCOUNTER — OFFICE VISIT (OUTPATIENT)
Dept: FAMILY MEDICINE | Facility: OTHER | Age: 75
End: 2020-01-09
Attending: FAMILY MEDICINE
Payer: COMMERCIAL

## 2020-01-09 VITALS
SYSTOLIC BLOOD PRESSURE: 138 MMHG | HEART RATE: 67 BPM | TEMPERATURE: 98.2 F | WEIGHT: 199.2 LBS | DIASTOLIC BLOOD PRESSURE: 92 MMHG | BODY MASS INDEX: 31.2 KG/M2 | OXYGEN SATURATION: 97 % | RESPIRATION RATE: 16 BRPM

## 2020-01-09 DIAGNOSIS — M54.16 LUMBAR RADICULOPATHY: Primary | ICD-10-CM

## 2020-01-09 PROCEDURE — 99213 OFFICE O/P EST LOW 20 MIN: CPT | Performed by: FAMILY MEDICINE

## 2020-01-09 PROCEDURE — G0463 HOSPITAL OUTPT CLINIC VISIT: HCPCS

## 2020-01-09 ASSESSMENT — PAIN SCALES - GENERAL: PAINLEVEL: NO PAIN (0)

## 2020-01-09 NOTE — NURSING NOTE
"Chief Complaint   Patient presents with     Follow Up     back injections     Here for follow up from back injections. Does state that she is having \" a tinge of pain every once in a while but nothing like it was.\"    Initial There were no vitals taken for this visit. Estimated body mass index is 32.11 kg/m  as calculated from the following:    Height as of 12/9/19: 1.702 m (5' 7\").    Weight as of 12/26/19: 93 kg (205 lb).    Medication Reconciliation: complete      Edu Hodge LPN  "

## 2020-01-09 NOTE — PROGRESS NOTES
"Nursing Notes:   Edu Hodge LPN  1/9/2020 11:22 AM  Signed  Chief Complaint   Patient presents with     Follow Up     back injections     Here for follow up from back injections. Does state that she is having \" a tinge of pain every once in a while but nothing like it was.\"    Initial There were no vitals taken for this visit. Estimated body mass index is 32.11 kg/m  as calculated from the following:    Height as of 12/9/19: 1.702 m (5' 7\").    Weight as of 12/26/19: 93 kg (205 lb).    Medication Reconciliation: complete      Edu Hodge LPN      SUBJECTIVE:  Kate Chacon  is a 74 year old female who comes in today for follow-up after injection in her back.  She had interlaminar epidural steroid injection done on Monday this week and her pain is significantly improved.  No longer has any leg pain. Slight twinge of pain but not like it was. She has to get up every 2 hours to void. She follows with Dr. Fox with botox injections.    Past Medical, Family, and Social History reviewed and updated as noted below.   ROS is negative except as noted above       Allergies   Allergen Reactions     Methylpar-Na Bisulfite-Pentazocine Unknown     jittery   ,   Family History   Problem Relation Age of Onset     Diabetes Father         Diabetes     Hypertension Father         Hypertension     Other - See Comments Mother         h/o coronary artery disease/dementia     Asthma Mother         Asthma     Diabetes Maternal Grandmother         Diabetes     Cancer Maternal Aunt         Cancer,Uterine     Breast Cancer No family hx of         Cancer-breast   ,   Current Outpatient Medications   Medication     atorvastatin (LIPITOR) 40 MG tablet     calcium carbonate 750 MG CHEW     lisinopril (PRINIVIL/ZESTRIL) 20 MG tablet     metoprolol succinate ER (TOPROL-XL) 25 MG 24 hr tablet     sertraline (ZOLOFT) 50 MG tablet     warfarin ANTICOAGULANT (COUMADIN) 5 MG tablet     No current facility-administered medications for " this visit.    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   ,   Patient Active Problem List    Diagnosis Date Noted     Sinoatrial node dysfunction (H) 05/01/2018     Priority: Medium     Cardiac pacemaker 02/19/2018     Priority: Medium     Urge incontinence 10/05/2017     Priority: Medium     Former smoker 08/30/2017     Priority: Medium     MDD (recurrent major depressive disorder) in remission (H) 08/30/2017     Priority: Medium     Anxiety 01/05/2017     Priority: Medium     Hypertension 12/08/2016     Priority: Medium     Paroxysmal atrial fibrillation (H) 11/02/2016     Priority: Medium     Anticoagulation goal of INR 2 to 3 10/28/2016     Priority: Medium     Ischemic stroke (H) 10/15/2016     Priority: Medium     Diverticulosis of large intestine 04/04/2011     Priority: Medium     H/O adenomatous polyp of colon 03/02/2011     Priority: Medium     Osteoporosis 02/08/2006     Priority: Medium   ,   Past Surgical History:   Procedure Laterality Date     APPENDECTOMY OPEN      No Comments Provided     COLONOSCOPY  08/19/2005 8/19/05,Cecal biopsy with tubular adenoma low grade dysplasia.     COLONOSCOPY  04/04/2011 4/4/11     COLONOSCOPY  05/07/2012    Tubular Adenomas F/U 2017     COLONOSCOPY  12/02/2019    F/U N/A as will be over 78 in 2024. Tubular adenoma     ESOPHAGOSCOPY, GASTROSCOPY, DUODENOSCOPY (EGD), COMBINED      8/19/05     OTHER SURGICAL HISTORY      8/3/05,239991,OTHER,helices on the right ear     TONSILLECTOMY      No Comments Provided    and   Social History     Tobacco Use     Smoking status: Former Smoker     Packs/day: 0.50     Years: 49.00     Pack years: 24.50     Types: Cigarettes     Last attempt to quit: 10/14/2016     Years since quitting: 3.2     Smokeless tobacco: Never Used   Substance Use  Topics     Alcohol use: No     OBJECTIVE:  BP (!) 138/92   Pulse 67   Temp 98.2  F (36.8  C) (Tympanic)   Resp 16   Wt 90.4 kg (199 lb 3.2 oz)   SpO2 97%   BMI 31.20 kg/m     EXAM:  Alert and cooperative, no distress.  No significant bruising in the lumbar area.  Range of motion and ease of movement is markedly improved.  ASSESSMENT/Plan :    Kate was seen today for follow up.    Diagnoses and all orders for this visit:    Lumbar radiculopathy      Symptoms are significantly improved.  Recommended she continue with physical therapy exercises.  Advised follow-up if symptoms worsen or recur and discussed that the shots could be done up to 3 times a year if needed.    A total of 15 minutes was spent with the patient, greater than 50% of the time was spent in counseling/discussion of the aforementioned concerns.     Asher Campos MD

## 2020-01-15 ENCOUNTER — ANTICOAGULATION THERAPY VISIT (OUTPATIENT)
Dept: ANTICOAGULATION | Facility: OTHER | Age: 75
End: 2020-01-15
Attending: FAMILY MEDICINE
Payer: MEDICARE

## 2020-01-15 DIAGNOSIS — Z51.81 ANTICOAGULATION GOAL OF INR 2 TO 3: ICD-10-CM

## 2020-01-15 DIAGNOSIS — I48.0 PAROXYSMAL ATRIAL FIBRILLATION (H): ICD-10-CM

## 2020-01-15 DIAGNOSIS — Z79.01 ANTICOAGULATION GOAL OF INR 2 TO 3: ICD-10-CM

## 2020-01-15 LAB — INR POINT OF CARE: 2.2 (ref 0.86–1.14)

## 2020-01-15 PROCEDURE — 85610 PROTHROMBIN TIME: CPT | Mod: QW,ZL

## 2020-01-15 NOTE — PROGRESS NOTES
ANTICOAGULATION FOLLOW-UP CLINIC VISIT    Patient Name:  Kate Chacon  Date:  1/15/2020  Contact Type:  Face to Face    SUBJECTIVE:  Patient Findings         Clinical Outcomes     Negatives:   Major bleeding event, Thromboembolic event, Anticoagulation-related hospital admission, Anticoagulation-related ED visit, Anticoagulation-related fatality           OBJECTIVE    INR Protime   Date Value Ref Range Status   01/15/2020 2.2 (A) 0.86 - 1.14 Final       ASSESSMENT / PLAN  INR assessment THER    Recheck INR In: 3 WEEKS    INR Location Clinic      Anticoagulation Summary  As of 1/15/2020    INR goal:   2.0-3.0   TTR:   44.4 % (1 y)   INR used for dosin.2 (1/15/2020)   Warfarin maintenance plan:   5 mg (5 mg x 1) every day   Full warfarin instructions:   5 mg every day   Weekly warfarin total:   35 mg   No change documented:   Radha Burns RN   Plan last modified:   Sara Rodriguez RN (2019)   Next INR check:   2020   Priority:   High   Target end date:   Indefinite    Indications    Paroxysmal atrial fibrillation (H) [I48.0]  Anticoagulation goal of INR 2 to 3 [Z51.81  Z79.01]             Anticoagulation Episode Summary     INR check location:       Preferred lab:       Send INR reminders to:    INR    Comments:         Anticoagulation Care Providers     Provider Role Specialty Phone number    Asher Campos MD [facility] [phone]            See the Encounter Report to view Anticoagulation Flowsheet and Dosing Calendar (Go to Encounters tab in chart review, and find the Anticoagulation Therapy Visit)        Radha uBrns RN

## 2020-01-21 NOTE — PROGRESS NOTES
Outpatient Physical Therapy Discharge Note     Patient: Kate Chacon  : 1945    Beginning/End Dates of Reporting Period:  19 to 19    Referring Provider: Asher Campos MD    Therapy Diagnosis: low back pain, lumbar segmental dysfunction, pelvis dysfunction, muscle weakness     Client Self Report: Ache in right leg continues; wakes around 5-6 am with it. If she takes ibuprofen before bed, it's a little better.     Objective Measurements:  Objective Measure: postural loading  Details: limited loading through right and left shoulder; general listeing to right lower lumbar spine; local listening to right L5  Objective Measure: myofascial  Details: mesenteric root  Objective Measure: joint mobility  Details: ERS right L5, ERS left L1, FRS left L2,        Goals:  Goal Identifier joint mechanics   Goal Description Patient to display normal joint mechanics and equal postural loading to have pain free ability to complete sit to stand.    Target Date 20   Date Met      Progress: not met     Goal Identifier walking   Goal Description Patient to report ability to walk to complete household tasks and grocery shopping without back pain.    Target Date 20   Date Met      Progress:not met     Goal Identifier HEP   Goal Description Patient to be compliant with HEP for self management of symptoms.    Target Date 20   Date Met   19   Progress:       Progress Toward Goals:   Progress this reporting period: despite intervention and HEP, pain in back continued. Referred back to PCP.           Plan:  Discharge from therapy.    Discharge:    Reason for Discharge: No further progress anticipated    Equipment Issued: NA    Discharge Plan: Patient to continue home program.

## 2020-02-04 ENCOUNTER — ANTICOAGULATION THERAPY VISIT (OUTPATIENT)
Dept: ANTICOAGULATION | Facility: OTHER | Age: 75
End: 2020-02-04
Attending: FAMILY MEDICINE
Payer: COMMERCIAL

## 2020-02-04 ENCOUNTER — HOSPITAL ENCOUNTER (OUTPATIENT)
Dept: CARDIOLOGY | Facility: OTHER | Age: 75
Discharge: HOME | End: 2020-02-04
Attending: INTERNAL MEDICINE | Admitting: INTERNAL MEDICINE
Payer: MEDICARE

## 2020-02-04 DIAGNOSIS — I48.0 PAROXYSMAL ATRIAL FIBRILLATION (H): ICD-10-CM

## 2020-02-04 DIAGNOSIS — Z79.01 ANTICOAGULATION GOAL OF INR 2 TO 3: ICD-10-CM

## 2020-02-04 DIAGNOSIS — Z51.81 ANTICOAGULATION GOAL OF INR 2 TO 3: ICD-10-CM

## 2020-02-04 DIAGNOSIS — I48.20 CHRONIC A-FIB (H): ICD-10-CM

## 2020-02-04 LAB — INR POINT OF CARE: 3.2 (ref 0.86–1.14)

## 2020-02-04 PROCEDURE — 36416 COLLJ CAPILLARY BLOOD SPEC: CPT | Mod: ZL

## 2020-02-04 PROCEDURE — 93280 PM DEVICE PROGR EVAL DUAL: CPT | Performed by: INTERNAL MEDICINE

## 2020-02-04 NOTE — PATIENT INSTRUCTIONS
It was a pleasure to see you in clinic today.  Please do not hesitate to call with any questions or concerns.  You are scheduled for a remote transmission on 5/5/20.  We look forward to seeing you in clinic at your next device check in 6 months.    Anamaria Garcia RN, BSN  Electrophysiology Nurse Clinician  [facility]    During Business Hours Please Call:  [phone]  After Hours Please Call:  [phone] - select option #4 and ask for job code [phone]

## 2020-02-04 NOTE — PROGRESS NOTES
ANTICOAGULATION FOLLOW-UP CLINIC VISIT    Patient Name:  Kate Chacon  Date:  2/4/2020  Contact Type:  Face to Face    SUBJECTIVE:  Patient Findings     Comments:   Patient denies any identifiable changes that caused the supratherapeutic INR.           Clinical Outcomes     Negatives:   Major bleeding event, Thromboembolic event, Anticoagulation-related hospital admission, Anticoagulation-related ED visit, Anticoagulation-related fatality    Comments:   Patient denies any identifiable changes that caused the supratherapeutic INR.              OBJECTIVE    INR Protime   Date Value Ref Range Status   02/04/2020 3.2 (A) 0.86 - 1.14 Final       ASSESSMENT / PLAN  INR assessment SUPRA    Recheck INR In: 3 WEEKS    INR Location Clinic      Anticoagulation Summary  As of 2/4/2020    INR goal:   2.0-3.0   TTR:   43.3 % (1 y)   INR used for dosing:   3.2! (2/4/2020)   Warfarin maintenance plan:   2.5 mg (5 mg x 0.5) every Tue; 5 mg (5 mg x 1) all other days   Full warfarin instructions:   2.5 mg every Tue; 5 mg all other days   Weekly warfarin total:   32.5 mg   Plan last modified:   Radha Burns RN (2/4/2020)   Next INR check:   2/25/2020   Target end date:   Indefinite    Indications    Paroxysmal atrial fibrillation (H) [I48.0]  Anticoagulation goal of INR 2 to 3 [Z51.81  Z79.01]             Anticoagulation Episode Summary     INR check location:       Preferred lab:       Send INR reminders to:    INR    Comments:         Anticoagulation Care Providers     Provider Role Specialty Phone number    Asher Campos MD [facility] [phone]            See the Encounter Report to view Anticoagulation Flowsheet and Dosing Calendar (Go to Encounters tab in chart review, and find the Anticoagulation Therapy Visit)        Radha Burns RN

## 2020-02-06 LAB
MDC_IDC_EPISODE_DTM: NORMAL
MDC_IDC_EPISODE_ID: NORMAL
MDC_IDC_EPISODE_TYPE: NORMAL
MDC_IDC_LEAD_IMPLANT_DT: NORMAL
MDC_IDC_LEAD_IMPLANT_DT: NORMAL
MDC_IDC_LEAD_LOCATION: NORMAL
MDC_IDC_LEAD_LOCATION: NORMAL
MDC_IDC_LEAD_LOCATION_DETAIL_1: NORMAL
MDC_IDC_LEAD_LOCATION_DETAIL_1: NORMAL
MDC_IDC_LEAD_MFG: NORMAL
MDC_IDC_LEAD_MFG: NORMAL
MDC_IDC_LEAD_MODEL: NORMAL
MDC_IDC_LEAD_MODEL: NORMAL
MDC_IDC_LEAD_POLARITY_TYPE: NORMAL
MDC_IDC_LEAD_POLARITY_TYPE: NORMAL
MDC_IDC_LEAD_SERIAL: NORMAL
MDC_IDC_LEAD_SERIAL: NORMAL
MDC_IDC_MSMT_BATTERY_DTM: NORMAL
MDC_IDC_MSMT_BATTERY_REMAINING_LONGEVITY: 66 MO
MDC_IDC_MSMT_BATTERY_STATUS: NORMAL
MDC_IDC_MSMT_LEADCHNL_RA_IMPEDANCE_VALUE: 746 OHM
MDC_IDC_MSMT_LEADCHNL_RA_SENSING_INTR_AMPL: 1.2 MV
MDC_IDC_MSMT_LEADCHNL_RV_IMPEDANCE_VALUE: 798 OHM
MDC_IDC_MSMT_LEADCHNL_RV_PACING_THRESHOLD_AMPLITUDE: 0.7 V
MDC_IDC_MSMT_LEADCHNL_RV_PACING_THRESHOLD_PULSEWIDTH: 0.4 MS
MDC_IDC_MSMT_LEADCHNL_RV_SENSING_INTR_AMPL: 14.3 MV
MDC_IDC_PG_IMPLANT_DTM: NORMAL
MDC_IDC_PG_MFG: NORMAL
MDC_IDC_PG_MODEL: NORMAL
MDC_IDC_PG_SERIAL: NORMAL
MDC_IDC_PG_TYPE: NORMAL
MDC_IDC_SESS_CLINIC_NAME: NORMAL
MDC_IDC_SESS_DTM: NORMAL
MDC_IDC_SESS_TYPE: NORMAL
MDC_IDC_SET_BRADY_AT_MODE_SWITCH_MODE: NORMAL
MDC_IDC_SET_BRADY_AT_MODE_SWITCH_RATE: 170 {BEATS}/MIN
MDC_IDC_SET_BRADY_LOWRATE: 60 {BEATS}/MIN
MDC_IDC_SET_BRADY_MAX_SENSOR_RATE: 130 {BEATS}/MIN
MDC_IDC_SET_BRADY_MAX_TRACKING_RATE: 130 {BEATS}/MIN
MDC_IDC_SET_BRADY_MODE: NORMAL
MDC_IDC_SET_BRADY_PAV_DELAY_HIGH: 250 MS
MDC_IDC_SET_BRADY_PAV_DELAY_LOW: 250 MS
MDC_IDC_SET_BRADY_SAV_DELAY_HIGH: 220 MS
MDC_IDC_SET_BRADY_SAV_DELAY_LOW: 220 MS
MDC_IDC_SET_LEADCHNL_RA_PACING_AMPLITUDE: 2.5 V
MDC_IDC_SET_LEADCHNL_RA_PACING_ANODE_ELECTRODE_1: NORMAL
MDC_IDC_SET_LEADCHNL_RA_PACING_ANODE_LOCATION_1: NORMAL
MDC_IDC_SET_LEADCHNL_RA_PACING_CAPTURE_MODE: NORMAL
MDC_IDC_SET_LEADCHNL_RA_PACING_CATHODE_ELECTRODE_1: NORMAL
MDC_IDC_SET_LEADCHNL_RA_PACING_CATHODE_LOCATION_1: NORMAL
MDC_IDC_SET_LEADCHNL_RA_PACING_POLARITY: NORMAL
MDC_IDC_SET_LEADCHNL_RA_PACING_PULSEWIDTH: 0.4 MS
MDC_IDC_SET_LEADCHNL_RA_SENSING_ADAPTATION_MODE: NORMAL
MDC_IDC_SET_LEADCHNL_RA_SENSING_ANODE_ELECTRODE_1: NORMAL
MDC_IDC_SET_LEADCHNL_RA_SENSING_ANODE_LOCATION_1: NORMAL
MDC_IDC_SET_LEADCHNL_RA_SENSING_CATHODE_ELECTRODE_1: NORMAL
MDC_IDC_SET_LEADCHNL_RA_SENSING_CATHODE_LOCATION_1: NORMAL
MDC_IDC_SET_LEADCHNL_RA_SENSING_POLARITY: NORMAL
MDC_IDC_SET_LEADCHNL_RA_SENSING_SENSITIVITY: 0.25 MV
MDC_IDC_SET_LEADCHNL_RV_PACING_AMPLITUDE: 1.3 V
MDC_IDC_SET_LEADCHNL_RV_PACING_ANODE_ELECTRODE_1: NORMAL
MDC_IDC_SET_LEADCHNL_RV_PACING_ANODE_LOCATION_1: NORMAL
MDC_IDC_SET_LEADCHNL_RV_PACING_CAPTURE_MODE: NORMAL
MDC_IDC_SET_LEADCHNL_RV_PACING_CATHODE_ELECTRODE_1: NORMAL
MDC_IDC_SET_LEADCHNL_RV_PACING_CATHODE_LOCATION_1: NORMAL
MDC_IDC_SET_LEADCHNL_RV_PACING_POLARITY: NORMAL
MDC_IDC_SET_LEADCHNL_RV_PACING_PULSEWIDTH: 0.4 MS
MDC_IDC_SET_LEADCHNL_RV_SENSING_ADAPTATION_MODE: NORMAL
MDC_IDC_SET_LEADCHNL_RV_SENSING_ANODE_ELECTRODE_1: NORMAL
MDC_IDC_SET_LEADCHNL_RV_SENSING_ANODE_LOCATION_1: NORMAL
MDC_IDC_SET_LEADCHNL_RV_SENSING_CATHODE_ELECTRODE_1: NORMAL
MDC_IDC_SET_LEADCHNL_RV_SENSING_CATHODE_LOCATION_1: NORMAL
MDC_IDC_SET_LEADCHNL_RV_SENSING_POLARITY: NORMAL
MDC_IDC_SET_LEADCHNL_RV_SENSING_SENSITIVITY: 1.5 MV
MDC_IDC_SET_ZONE_DETECTION_INTERVAL: 375 MS
MDC_IDC_SET_ZONE_TYPE: NORMAL
MDC_IDC_SET_ZONE_VENDOR_TYPE: NORMAL
MDC_IDC_STAT_BRADY_RA_PERCENT_PACED: 1 %
MDC_IDC_STAT_BRADY_RV_PERCENT_PACED: 28 %
MDC_IDC_STAT_EPISODE_RECENT_COUNT: 221
MDC_IDC_STAT_EPISODE_RECENT_COUNT_DTM_END: NORMAL
MDC_IDC_STAT_EPISODE_RECENT_COUNT_DTM_START: NORMAL
MDC_IDC_STAT_EPISODE_TOTAL_COUNT: 236
MDC_IDC_STAT_EPISODE_TOTAL_COUNT_DTM_END: NORMAL
MDC_IDC_STAT_EPISODE_TYPE: NORMAL
MDC_IDC_STAT_EPISODE_TYPE: NORMAL
MDC_IDC_STAT_EPISODE_VENDOR_TYPE: NORMAL
MDC_IDC_STAT_EPISODE_VENDOR_TYPE: NORMAL

## 2020-02-25 ENCOUNTER — ANTICOAGULATION THERAPY VISIT (OUTPATIENT)
Dept: ANTICOAGULATION | Facility: OTHER | Age: 75
End: 2020-02-25
Attending: FAMILY MEDICINE
Payer: MEDICARE

## 2020-02-25 DIAGNOSIS — I48.0 PAROXYSMAL ATRIAL FIBRILLATION (H): ICD-10-CM

## 2020-02-25 DIAGNOSIS — Z51.81 ANTICOAGULATION GOAL OF INR 2 TO 3: ICD-10-CM

## 2020-02-25 DIAGNOSIS — Z79.01 ANTICOAGULATION GOAL OF INR 2 TO 3: ICD-10-CM

## 2020-02-25 LAB — INR POINT OF CARE: 2.3 (ref 0.86–1.14)

## 2020-02-25 PROCEDURE — 85610 PROTHROMBIN TIME: CPT | Mod: QW,ZL

## 2020-02-25 NOTE — PROGRESS NOTES
ANTICOAGULATION FOLLOW-UP CLINIC VISIT    Patient Name:  Kate Chacon  Date:  2020  Contact Type:  Face to Face    SUBJECTIVE:  Patient Findings         Clinical Outcomes     Negatives:   Major bleeding event, Thromboembolic event, Anticoagulation-related hospital admission, Anticoagulation-related ED visit, Anticoagulation-related fatality           OBJECTIVE    INR Protime   Date Value Ref Range Status   2020 2.3 (A) 0.86 - 1.14 Final       ASSESSMENT / PLAN  INR assessment THER    Recheck INR In: 4 WEEKS    INR Location Clinic      Anticoagulation Summary  As of 2020    INR goal:   2.0-3.0   TTR:   42.0 % (1 y)   INR used for dosin.3 (2020)   Warfarin maintenance plan:   2.5 mg (5 mg x 0.5) every Tue; 5 mg (5 mg x 1) all other days   Full warfarin instructions:   2.5 mg every Tue; 5 mg all other days   Weekly warfarin total:   32.5 mg   No change documented:   Radha Burns RN   Plan last modified:   Radha Burns RN (2020)   Next INR check:   3/24/2020   Priority:   Maintenance   Target end date:   Indefinite    Indications    Paroxysmal atrial fibrillation (H) [I48.0]  Anticoagulation goal of INR 2 to 3 [Z51.81  Z79.01]             Anticoagulation Episode Summary     INR check location:       Preferred lab:       Send INR reminders to:    INR    Comments:         Anticoagulation Care Providers     Provider Role Specialty Phone number    Asher Campos MD [facility] [phone]            See the Encounter Report to view Anticoagulation Flowsheet and Dosing Calendar (Go to Encounters tab in chart review, and find the Anticoagulation Therapy Visit)        Radha Burns RN

## 2020-03-05 ENCOUNTER — TELEPHONE (OUTPATIENT)
Dept: FAMILY MEDICINE | Facility: OTHER | Age: 75
End: 2020-03-05

## 2020-03-05 DIAGNOSIS — M54.16 LUMBAR RADICULOPATHY: Primary | ICD-10-CM

## 2020-03-05 NOTE — TELEPHONE ENCOUNTER
She has the back injections and they worked for a week. She stated if those did not work you were going to try something else. She is wondering what else can be done for her?  Tiffany White LPN..................3/5/2020   5:06 PM

## 2020-03-06 NOTE — TELEPHONE ENCOUNTER
After the patient's name and date of birth was verified, the patient was told the below information.  Tiffany White LPN..................3/6/2020   3:52 PM

## 2020-03-06 NOTE — TELEPHONE ENCOUNTER
After the patient's name and date of birth was verified, the patient was told the below information. [facility] already called her and she was told to stop her Coumadin for 5 days before. She is wondering if you think it is ok to do that?  Tiffany White LPN..................3/6/2020   3:17 PM

## 2020-03-16 ENCOUNTER — HOSPITAL ENCOUNTER (OUTPATIENT)
Dept: GENERAL RADIOLOGY | Facility: OTHER | Age: 75
End: 2020-03-16
Attending: FAMILY MEDICINE
Payer: MEDICARE

## 2020-03-16 ENCOUNTER — ANTICOAGULATION THERAPY VISIT (OUTPATIENT)
Dept: ANTICOAGULATION | Facility: OTHER | Age: 75
End: 2020-03-16
Attending: FAMILY MEDICINE
Payer: MEDICARE

## 2020-03-16 DIAGNOSIS — Z51.81 ANTICOAGULATION GOAL OF INR 2 TO 3: ICD-10-CM

## 2020-03-16 DIAGNOSIS — M54.16 LUMBAR RADICULOPATHY: ICD-10-CM

## 2020-03-16 DIAGNOSIS — I48.0 PAROXYSMAL ATRIAL FIBRILLATION (H): Primary | ICD-10-CM

## 2020-03-16 DIAGNOSIS — Z79.01 ANTICOAGULATION GOAL OF INR 2 TO 3: ICD-10-CM

## 2020-03-16 LAB — INR POINT OF CARE: 1.2 (ref 0.86–1.14)

## 2020-03-16 PROCEDURE — 25000125 ZZHC RX 250: Performed by: RADIOLOGY

## 2020-03-16 PROCEDURE — 64483 NJX AA&/STRD TFRM EPI L/S 1: CPT

## 2020-03-16 PROCEDURE — 85610 PROTHROMBIN TIME: CPT | Mod: QW,ZL

## 2020-03-16 PROCEDURE — 25000128 H RX IP 250 OP 636: Performed by: RADIOLOGY

## 2020-03-16 PROCEDURE — 25500064 ZZH RX 255 OP 636: Performed by: RADIOLOGY

## 2020-03-16 RX ORDER — LIDOCAINE HYDROCHLORIDE 10 MG/ML
2 INJECTION, SOLUTION EPIDURAL; INFILTRATION; INTRACAUDAL; PERINEURAL ONCE
Status: COMPLETED | OUTPATIENT
Start: 2020-03-16 | End: 2020-03-16

## 2020-03-16 RX ORDER — LIDOCAINE HYDROCHLORIDE 10 MG/ML
2 INJECTION, SOLUTION INFILTRATION; PERINEURAL ONCE
Status: COMPLETED | OUTPATIENT
Start: 2020-03-16 | End: 2020-03-16

## 2020-03-16 RX ORDER — DEXAMETHASONE SODIUM PHOSPHATE 10 MG/ML
10 INJECTION, SOLUTION INTRAMUSCULAR; INTRAVENOUS ONCE
Status: COMPLETED | OUTPATIENT
Start: 2020-03-16 | End: 2020-03-16

## 2020-03-16 RX ADMIN — DEXAMETHASONE SODIUM PHOSPHATE 10 MG: 10 INJECTION, SOLUTION INTRAMUSCULAR; INTRAVENOUS at 11:57

## 2020-03-16 RX ADMIN — IOHEXOL 2 ML: 240 INJECTION, SOLUTION INTRATHECAL; INTRAVASCULAR; INTRAVENOUS; ORAL at 11:57

## 2020-03-16 RX ADMIN — LIDOCAINE HYDROCHLORIDE 2 ML: 10 INJECTION, SOLUTION EPIDURAL; INFILTRATION; INTRACAUDAL; PERINEURAL at 11:57

## 2020-03-16 RX ADMIN — LIDOCAINE HYDROCHLORIDE 2 ML: 10 INJECTION, SOLUTION INFILTRATION; PERINEURAL at 11:57

## 2020-03-27 ENCOUNTER — ANTICOAGULATION THERAPY VISIT (OUTPATIENT)
Dept: ANTICOAGULATION | Facility: OTHER | Age: 75
End: 2020-03-27
Attending: FAMILY MEDICINE
Payer: MEDICARE

## 2020-03-27 DIAGNOSIS — Z79.01 ANTICOAGULATION GOAL OF INR 2 TO 3: ICD-10-CM

## 2020-03-27 DIAGNOSIS — I48.0 PAROXYSMAL ATRIAL FIBRILLATION (H): ICD-10-CM

## 2020-03-27 DIAGNOSIS — Z51.81 ANTICOAGULATION GOAL OF INR 2 TO 3: ICD-10-CM

## 2020-03-27 LAB — INR POINT OF CARE: 2.4 (ref 0.86–1.14)

## 2020-03-27 PROCEDURE — 85610 PROTHROMBIN TIME: CPT | Mod: QW,ZL

## 2020-03-27 NOTE — PROGRESS NOTES
ANTICOAGULATION FOLLOW-UP CLINIC VISIT    Patient Name:  Kate Chacon  Date:  3/27/2020  Contact Type:  Face to Face    SUBJECTIVE:  Patient Findings     Positives:   Other complaints (had a fall earlir this week)        Clinical Outcomes     Negatives:   Major bleeding event, Thromboembolic event, Anticoagulation-related hospital admission, Anticoagulation-related ED visit, Anticoagulation-related fatality           OBJECTIVE    INR Protime   Date Value Ref Range Status   2020 2.4 (A) 0.86 - 1.14 Final       ASSESSMENT / PLAN  INR assessment THER    Recheck INR In: 2 WEEKS    INR Location Clinic      Anticoagulation Summary  As of 3/27/2020    INR goal:   2.0-3.0   TTR:   38.2 % (1 y)   INR used for dosin.4 (3/27/2020)   Warfarin maintenance plan:   5 mg (5 mg x 1) every day   Full warfarin instructions:   5 mg every day   Weekly warfarin total:   35 mg   Plan last modified:   Radha Burns RN (3/27/2020)   Next INR check:   4/10/2020   Priority:   Maintenance   Target end date:   Indefinite    Indications    Paroxysmal atrial fibrillation (H) [I48.0]  Anticoagulation goal of INR 2 to 3 [Z51.81  Z79.01]             Anticoagulation Episode Summary     INR check location:       Preferred lab:       Send INR reminders to:    INR    Comments:         Anticoagulation Care Providers     Provider Role Specialty Phone number    Asher Campos MD Referring [facility] [phone]            See the Encounter Report to view Anticoagulation Flowsheet and Dosing Calendar (Go to Encounters tab in chart review, and find the Anticoagulation Therapy Visit)        Radha Burns RN

## 2020-04-10 ENCOUNTER — ANTICOAGULATION THERAPY VISIT (OUTPATIENT)
Dept: ANTICOAGULATION | Facility: OTHER | Age: 75
End: 2020-04-10
Attending: FAMILY MEDICINE
Payer: MEDICARE

## 2020-04-10 DIAGNOSIS — Z79.01 ANTICOAGULATION GOAL OF INR 2 TO 3: ICD-10-CM

## 2020-04-10 DIAGNOSIS — I48.0 PAROXYSMAL ATRIAL FIBRILLATION (H): ICD-10-CM

## 2020-04-10 DIAGNOSIS — Z51.81 ANTICOAGULATION GOAL OF INR 2 TO 3: ICD-10-CM

## 2020-04-10 LAB — INR POINT OF CARE: 2.6 (ref 0.86–1.14)

## 2020-04-10 PROCEDURE — 85610 PROTHROMBIN TIME: CPT | Mod: QW,ZL

## 2020-04-10 NOTE — PROGRESS NOTES
ANTICOAGULATION FOLLOW-UP CLINIC VISIT    Patient Name:  Kate Chacon  Date:  4/10/2020  Contact Type:  Face to Face    SUBJECTIVE:  Patient Findings         Clinical Outcomes     Negatives:   Major bleeding event, Thromboembolic event, Anticoagulation-related hospital admission, Anticoagulation-related ED visit, Anticoagulation-related fatality           OBJECTIVE    INR Protime   Date Value Ref Range Status   04/10/2020 2.6 (A) 0.86 - 1.14 Final       ASSESSMENT / PLAN  INR assessment THER    Recheck INR In: 4 WEEKS    INR Location Clinic      Anticoagulation Summary  As of 4/10/2020    INR goal:   2.0-3.0   TTR:   42.0 % (1 y)   INR used for dosin.6 (4/10/2020)   Warfarin maintenance plan:   5 mg (5 mg x 1) every day   Full warfarin instructions:   5 mg every day   Weekly warfarin total:   35 mg   No change documented:   Radha Burns RN   Plan last modified:   Radha Burns RN (3/27/2020)   Next INR check:   2020   Priority:   Maintenance   Target end date:   Indefinite    Indications    Paroxysmal atrial fibrillation (H) [I48.0]  Anticoagulation goal of INR 2 to 3 [Z51.81  Z79.01]             Anticoagulation Episode Summary     INR check location:       Preferred lab:       Send INR reminders to:    INR    Comments:         Anticoagulation Care Providers     Provider Role Specialty Phone number    Asher Campos MD Referring [facility] [phone]            See the Encounter Report to view Anticoagulation Flowsheet and Dosing Calendar (Go to Encounters tab in chart review, and find the Anticoagulation Therapy Visit)        Radha Burns RN

## 2020-05-05 ENCOUNTER — ANCILLARY PROCEDURE (OUTPATIENT)
Dept: CARDIOLOGY | Facility: CLINIC | Age: 75
End: 2020-05-05
Attending: INTERNAL MEDICINE
Payer: MEDICARE

## 2020-05-05 DIAGNOSIS — I48.20 CHRONIC A-FIB (H): ICD-10-CM

## 2020-05-05 PROCEDURE — 93296 REM INTERROG EVL PM/IDS: CPT | Mod: ZF

## 2020-05-05 PROCEDURE — 93294 REM INTERROG EVL PM/LDLS PM: CPT | Mod: ZP | Performed by: INTERNAL MEDICINE

## 2020-05-07 LAB
MDC_IDC_EPISODE_DTM: NORMAL
MDC_IDC_EPISODE_DURATION: 12 S
MDC_IDC_EPISODE_DURATION: 12 S
MDC_IDC_EPISODE_DURATION: 13 S
MDC_IDC_EPISODE_DURATION: 14 S
MDC_IDC_EPISODE_DURATION: 16 S
MDC_IDC_EPISODE_DURATION: 30 S
MDC_IDC_EPISODE_ID: NORMAL
MDC_IDC_EPISODE_TYPE: NORMAL
MDC_IDC_LEAD_IMPLANT_DT: NORMAL
MDC_IDC_LEAD_IMPLANT_DT: NORMAL
MDC_IDC_LEAD_LOCATION: NORMAL
MDC_IDC_LEAD_LOCATION: NORMAL
MDC_IDC_LEAD_LOCATION_DETAIL_1: NORMAL
MDC_IDC_LEAD_LOCATION_DETAIL_1: NORMAL
MDC_IDC_LEAD_MFG: NORMAL
MDC_IDC_LEAD_MFG: NORMAL
MDC_IDC_LEAD_MODEL: NORMAL
MDC_IDC_LEAD_MODEL: NORMAL
MDC_IDC_LEAD_POLARITY_TYPE: NORMAL
MDC_IDC_LEAD_POLARITY_TYPE: NORMAL
MDC_IDC_LEAD_SERIAL: NORMAL
MDC_IDC_LEAD_SERIAL: NORMAL
MDC_IDC_MSMT_BATTERY_DTM: NORMAL
MDC_IDC_MSMT_BATTERY_REMAINING_LONGEVITY: 60 MO
MDC_IDC_MSMT_BATTERY_REMAINING_PERCENTAGE: 100 %
MDC_IDC_MSMT_BATTERY_STATUS: NORMAL
MDC_IDC_MSMT_LEADCHNL_RA_IMPEDANCE_VALUE: 804 OHM
MDC_IDC_MSMT_LEADCHNL_RV_IMPEDANCE_VALUE: 846 OHM
MDC_IDC_MSMT_LEADCHNL_RV_PACING_THRESHOLD_AMPLITUDE: 0.9 V
MDC_IDC_MSMT_LEADCHNL_RV_PACING_THRESHOLD_PULSEWIDTH: 0.4 MS
MDC_IDC_PG_IMPLANT_DTM: NORMAL
MDC_IDC_PG_MFG: NORMAL
MDC_IDC_PG_MODEL: NORMAL
MDC_IDC_PG_SERIAL: NORMAL
MDC_IDC_PG_TYPE: NORMAL
MDC_IDC_SESS_CLINIC_NAME: NORMAL
MDC_IDC_SESS_DTM: NORMAL
MDC_IDC_SESS_TYPE: NORMAL
MDC_IDC_SET_BRADY_AT_MODE_SWITCH_MODE: NORMAL
MDC_IDC_SET_BRADY_AT_MODE_SWITCH_RATE: 170 {BEATS}/MIN
MDC_IDC_SET_BRADY_LOWRATE: 60 {BEATS}/MIN
MDC_IDC_SET_BRADY_MAX_SENSOR_RATE: 130 {BEATS}/MIN
MDC_IDC_SET_BRADY_MAX_TRACKING_RATE: 130 {BEATS}/MIN
MDC_IDC_SET_BRADY_MODE: NORMAL
MDC_IDC_SET_BRADY_PAV_DELAY_LOW: 250 MS
MDC_IDC_SET_BRADY_SAV_DELAY_LOW: 220 MS
MDC_IDC_SET_LEADCHNL_RA_PACING_AMPLITUDE: 2.5 V
MDC_IDC_SET_LEADCHNL_RA_PACING_CAPTURE_MODE: NORMAL
MDC_IDC_SET_LEADCHNL_RA_PACING_POLARITY: NORMAL
MDC_IDC_SET_LEADCHNL_RA_PACING_PULSEWIDTH: 0.4 MS
MDC_IDC_SET_LEADCHNL_RA_SENSING_ADAPTATION_MODE: NORMAL
MDC_IDC_SET_LEADCHNL_RA_SENSING_POLARITY: NORMAL
MDC_IDC_SET_LEADCHNL_RA_SENSING_SENSITIVITY: 0.25 MV
MDC_IDC_SET_LEADCHNL_RV_PACING_AMPLITUDE: 3.5 V
MDC_IDC_SET_LEADCHNL_RV_PACING_CAPTURE_MODE: NORMAL
MDC_IDC_SET_LEADCHNL_RV_PACING_POLARITY: NORMAL
MDC_IDC_SET_LEADCHNL_RV_PACING_PULSEWIDTH: 0.4 MS
MDC_IDC_SET_LEADCHNL_RV_SENSING_ADAPTATION_MODE: NORMAL
MDC_IDC_SET_LEADCHNL_RV_SENSING_POLARITY: NORMAL
MDC_IDC_SET_LEADCHNL_RV_SENSING_SENSITIVITY: 1.5 MV
MDC_IDC_SET_ZONE_DETECTION_INTERVAL: 375 MS
MDC_IDC_SET_ZONE_TYPE: NORMAL
MDC_IDC_SET_ZONE_VENDOR_TYPE: NORMAL
MDC_IDC_STAT_AT_BURDEN_PERCENT: 100 %
MDC_IDC_STAT_AT_DTM_END: NORMAL
MDC_IDC_STAT_AT_DTM_START: NORMAL
MDC_IDC_STAT_BRADY_DTM_END: NORMAL
MDC_IDC_STAT_BRADY_DTM_START: NORMAL
MDC_IDC_STAT_BRADY_RA_PERCENT_PACED: 0 %
MDC_IDC_STAT_BRADY_RV_PERCENT_PACED: 27 %
MDC_IDC_STAT_EPISODE_RECENT_COUNT: 0
MDC_IDC_STAT_EPISODE_RECENT_COUNT: 16
MDC_IDC_STAT_EPISODE_RECENT_COUNT_DTM_END: NORMAL
MDC_IDC_STAT_EPISODE_RECENT_COUNT_DTM_START: NORMAL
MDC_IDC_STAT_EPISODE_TYPE: NORMAL
MDC_IDC_STAT_EPISODE_VENDOR_TYPE: NORMAL

## 2020-05-11 ENCOUNTER — TELEPHONE (OUTPATIENT)
Dept: FAMILY MEDICINE | Facility: OTHER | Age: 75
End: 2020-05-11

## 2020-05-11 ENCOUNTER — ANTICOAGULATION THERAPY VISIT (OUTPATIENT)
Dept: ANTICOAGULATION | Facility: OTHER | Age: 75
End: 2020-05-11
Attending: FAMILY MEDICINE
Payer: MEDICARE

## 2020-05-11 DIAGNOSIS — M54.16 LUMBAR RADICULOPATHY: Primary | ICD-10-CM

## 2020-05-11 DIAGNOSIS — I48.0 PAROXYSMAL ATRIAL FIBRILLATION (H): ICD-10-CM

## 2020-05-11 DIAGNOSIS — M54.41 ACUTE RIGHT-SIDED LOW BACK PAIN WITH RIGHT-SIDED SCIATICA: ICD-10-CM

## 2020-05-11 DIAGNOSIS — Z79.01 ANTICOAGULATION GOAL OF INR 2 TO 3: ICD-10-CM

## 2020-05-11 DIAGNOSIS — Z51.81 ANTICOAGULATION GOAL OF INR 2 TO 3: ICD-10-CM

## 2020-05-11 LAB — INR POINT OF CARE: 2.7 (ref 0.86–1.14)

## 2020-05-11 PROCEDURE — 36416 COLLJ CAPILLARY BLOOD SPEC: CPT | Mod: ZL

## 2020-05-11 NOTE — PROGRESS NOTES
ANTICOAGULATION FOLLOW-UP CLINIC VISIT    Patient Name:  Kate Chacon  Date:  2020  Contact Type:  Face to Face    SUBJECTIVE:  Patient Findings         Clinical Outcomes     Negatives:   Major bleeding event, Thromboembolic event, Anticoagulation-related hospital admission, Anticoagulation-related ED visit, Anticoagulation-related fatality           OBJECTIVE    Recent labs: (last 7 days)     20   INR 2.7*       ASSESSMENT / PLAN  INR assessment THER    Recheck INR In: 4 WEEKS    INR Location Clinic      Anticoagulation Summary  As of 2020    INR goal:   2.0-3.0   TTR:   46.9 % (1 y)   INR used for dosin.7 (2020)   Warfarin maintenance plan:   5 mg (5 mg x 1) every day   Full warfarin instructions:   5 mg every day   Weekly warfarin total:   35 mg   No change documented:   Radha Burns RN   Plan last modified:   Radha Burns RN (3/27/2020)   Next INR check:   2020   Priority:   Maintenance   Target end date:   Indefinite    Indications    Paroxysmal atrial fibrillation (H) [I48.0]  Anticoagulation goal of INR 2 to 3 [Z51.81  Z79.01]             Anticoagulation Episode Summary     INR check location:       Preferred lab:       Send INR reminders to:   [facility]    Comments:         Anticoagulation Care Providers     Provider Role Specialty Phone number    Asher Campos MD Referring [facility] [phone]            See the Encounter Report to view Anticoagulation Flowsheet and Dosing Calendar (Go to Encounters tab in chart review, and find the Anticoagulation Therapy Visit)        Radha Burns RN

## 2020-05-11 NOTE — TELEPHONE ENCOUNTER
Wants to find out if [facility] will approve another back injection    Please call to advise    Thank you

## 2020-05-21 ENCOUNTER — HOSPITAL ENCOUNTER (OUTPATIENT)
Dept: GENERAL RADIOLOGY | Facility: OTHER | Age: 75
End: 2020-05-21
Attending: FAMILY MEDICINE
Payer: MEDICARE

## 2020-05-21 ENCOUNTER — ANTICOAGULATION THERAPY VISIT (OUTPATIENT)
Dept: ANTICOAGULATION | Facility: OTHER | Age: 75
End: 2020-05-21
Attending: FAMILY MEDICINE
Payer: MEDICARE

## 2020-05-21 DIAGNOSIS — M54.16 LUMBAR RADICULOPATHY: ICD-10-CM

## 2020-05-21 DIAGNOSIS — I48.0 PAROXYSMAL ATRIAL FIBRILLATION (H): ICD-10-CM

## 2020-05-21 DIAGNOSIS — Z51.81 ANTICOAGULATION GOAL OF INR 2 TO 3: ICD-10-CM

## 2020-05-21 DIAGNOSIS — Z79.01 ANTICOAGULATION GOAL OF INR 2 TO 3: ICD-10-CM

## 2020-05-21 DIAGNOSIS — M54.41 ACUTE RIGHT-SIDED LOW BACK PAIN WITH RIGHT-SIDED SCIATICA: ICD-10-CM

## 2020-05-21 LAB — INR POINT OF CARE: 1.2 (ref 0.86–1.14)

## 2020-05-21 PROCEDURE — 25000128 H RX IP 250 OP 636: Performed by: RADIOLOGY

## 2020-05-21 PROCEDURE — 25500064 ZZH RX 255 OP 636: Performed by: RADIOLOGY

## 2020-05-21 PROCEDURE — 25000125 ZZHC RX 250: Performed by: RADIOLOGY

## 2020-05-21 PROCEDURE — 64483 NJX AA&/STRD TFRM EPI L/S 1: CPT

## 2020-05-21 PROCEDURE — 36416 COLLJ CAPILLARY BLOOD SPEC: CPT | Mod: ZL

## 2020-05-21 RX ORDER — LIDOCAINE HYDROCHLORIDE 10 MG/ML
10 INJECTION, SOLUTION INFILTRATION; PERINEURAL ONCE
Status: COMPLETED | OUTPATIENT
Start: 2020-05-21 | End: 2020-05-21

## 2020-05-21 RX ORDER — LIDOCAINE HYDROCHLORIDE 10 MG/ML
2 INJECTION, SOLUTION INFILTRATION; PERINEURAL ONCE
Status: COMPLETED | OUTPATIENT
Start: 2020-05-21 | End: 2020-05-21

## 2020-05-21 RX ORDER — DEXAMETHASONE SODIUM PHOSPHATE 10 MG/ML
10 INJECTION, SOLUTION INTRAMUSCULAR; INTRAVENOUS ONCE
Status: COMPLETED | OUTPATIENT
Start: 2020-05-21 | End: 2020-05-21

## 2020-05-21 RX ADMIN — IOHEXOL 4 ML: 240 INJECTION, SOLUTION INTRATHECAL; INTRAVASCULAR; INTRAVENOUS; ORAL at 11:09

## 2020-05-21 RX ADMIN — DEXAMETHASONE SODIUM PHOSPHATE 10 MG: 10 INJECTION, SOLUTION INTRAMUSCULAR; INTRAVENOUS at 11:08

## 2020-05-21 RX ADMIN — LIDOCAINE HYDROCHLORIDE 2 ML: 10 INJECTION, SOLUTION INFILTRATION; PERINEURAL at 11:09

## 2020-05-21 RX ADMIN — LIDOCAINE HYDROCHLORIDE 2 ML: 10 INJECTION, SOLUTION INFILTRATION; PERINEURAL at 11:08

## 2020-05-22 DIAGNOSIS — I48.0 PAROXYSMAL ATRIAL FIBRILLATION (H): ICD-10-CM

## 2020-05-22 NOTE — TELEPHONE ENCOUNTER
Toprol      Last Written Prescription Date:  06/05/19  Last Fill Quantity: 90,   # refills: 3  Last Office Visit: 05/29/19  Future Office visit:

## 2020-05-26 RX ORDER — METOPROLOL SUCCINATE 25 MG/1
TABLET, EXTENDED RELEASE ORAL
Qty: 90 TABLET | Refills: 0 | Status: SHIPPED | OUTPATIENT
Start: 2020-05-26 | End: 2020-08-13

## 2020-05-26 NOTE — TELEPHONE ENCOUNTER
"Requested Prescriptions   Pending Prescriptions Disp Refills     metoprolol succinate ER (TOPROL-XL) 25 MG 24 hr tablet [Pharmacy Med Name: Metoprolol Succinate ER 25 MG Oral Tablet Extended Release 24 Hour] 90 tablet 0     Sig: Take 1 tablet by mouth once daily       Beta-Blockers Protocol Failed - 5/26/2020  8:24 AM        Failed - Blood pressure under 140/90 in past 12 months     BP Readings from Last 3 Encounters:   01/09/20 (!) 138/92   12/26/19 120/88   12/20/19 110/80                 Passed - Patient is age 6 or older        Passed - Recent (12 mo) or future (30 days) visit within the authorizing provider's specialty     Patient has had an office visit with the authorizing provider or a provider within the authorizing providers department within the previous 12 mos or has a future within next 30 days. See \"Patient Info\" tab in inbasket, or \"Choose Columns\" in Meds & Orders section of the refill encounter.              Passed - Medication is active on med list            Last Written Prescription Date:  06/05/2019  Last Fill Quantity: 90,   # refills: 3  Last Office Visit: 05/31/2019 with JANAE Collado CNP Cardiology  Future Office visit:  None noted.  Unable to complete prescription refill per RN medication refill policy. Will route to provider for review and consideration.  Rhiannon Quinn RN on 5/26/2020 at 8:34 AM             "

## 2020-06-02 DIAGNOSIS — Z79.01 ANTICOAGULATION GOAL OF INR 2 TO 3: Primary | ICD-10-CM

## 2020-06-02 DIAGNOSIS — Z51.81 ANTICOAGULATION GOAL OF INR 2 TO 3: Primary | ICD-10-CM

## 2020-06-03 RX ORDER — WARFARIN SODIUM 5 MG/1
5 TABLET ORAL DAILY
Qty: 90 TABLET | Refills: 1 | Status: SHIPPED | OUTPATIENT
Start: 2020-06-03 | End: 2020-07-27

## 2020-06-03 NOTE — TELEPHONE ENCOUNTER
"Requested Prescriptions   Pending Prescriptions Disp Refills     warfarin ANTICOAGULANT (COUMADIN) 5 MG tablet       Sig: Take 5 mg x 6 days and 2.5 mg x 1 day/week OR AS DIRECTED BY [facility]       Vitamin K Antagonists Failed - 6/2/2020  2:55 PM        Failed - INR is within goal in the past 6 weeks     Confirm INR is within goal in the past 6 weeks.     Recent Labs   Lab Test 05/21/20   INR 1.2*                       Passed - Recent (12 mo) or future (30 days) visit within the authorizing provider's specialty     Patient has had an office visit with the authorizing provider or a provider within the authorizing providers department within the previous 12 mos or has a future within next 30 days. See \"Patient Info\" tab in inbasket, or \"Choose Columns\" in Meds & Orders section of the refill encounter.              Passed - Medication is active on med list        Passed - Patient is 18 years of age or older        Passed - Patient is not pregnant        Passed - No positive pregnancy on file in past 12 months           Prescription approved per [facility] Ref[facility].    "

## 2020-06-08 ENCOUNTER — ANTICOAGULATION THERAPY VISIT (OUTPATIENT)
Dept: ANTICOAGULATION | Facility: OTHER | Age: 75
End: 2020-06-08
Payer: MEDICARE

## 2020-06-08 DIAGNOSIS — Z79.01 ANTICOAGULATION GOAL OF INR 2 TO 3: ICD-10-CM

## 2020-06-08 DIAGNOSIS — Z51.81 ANTICOAGULATION GOAL OF INR 2 TO 3: ICD-10-CM

## 2020-06-08 DIAGNOSIS — I48.0 PAROXYSMAL ATRIAL FIBRILLATION (H): ICD-10-CM

## 2020-06-08 LAB — INR POINT OF CARE: 2.1 (ref 0.86–1.14)

## 2020-06-08 PROCEDURE — 36416 COLLJ CAPILLARY BLOOD SPEC: CPT | Mod: ZL

## 2020-06-08 NOTE — PROGRESS NOTES
ANTICOAGULATION FOLLOW-UP CLINIC VISIT    Patient Name:  Kate Chacon  Date:  2020  Contact Type:  Face to Face    SUBJECTIVE:  Patient Findings         Clinical Outcomes     Negatives:   Major bleeding event, Thromboembolic event, Anticoagulation-related hospital admission, Anticoagulation-related ED visit, Anticoagulation-related fatality           OBJECTIVE    Recent labs: (last 7 days)     20   INR 2.1*       ASSESSMENT / PLAN  INR assessment THER    Recheck INR In: 4 WEEKS    INR Location Clinic      Anticoagulation Summary  As of 2020    INR goal:   2.0-3.0   TTR:   48.3 % (1 y)   INR used for dosin.1 (2020)   Warfarin maintenance plan:   5 mg (5 mg x 1) every day   Full warfarin instructions:   5 mg every day   Weekly warfarin total:   35 mg   No change documented:   Sara Rodriguez RN   Plan last modified:   Radha Burns RN (3/27/2020)   Next INR check:   2020   Priority:   Maintenance   Target end date:   Indefinite    Indications    Paroxysmal atrial fibrillation (H) [I48.0]  Anticoagulation goal of INR 2 to 3 [Z51.81  Z79.01]             Anticoagulation Episode Summary     INR check location:       Preferred lab:       Send INR reminders to:   [facility]    Comments:         Anticoagulation Care Providers     Provider Role Specialty Phone number    Asher Campos MD [facility] [phone]            See the Encounter Report to view Anticoagulation Flowsheet and Dosing Calendar (Go to Encounters tab in chart review, and find the Anticoagulation Therapy Visit)        Sara Rodriguez RN

## 2020-06-12 ENCOUNTER — APPOINTMENT (OUTPATIENT)
Dept: GENERAL RADIOLOGY | Facility: OTHER | Age: 75
End: 2020-06-12
Attending: EMERGENCY MEDICINE
Payer: MEDICARE

## 2020-06-12 ENCOUNTER — HOSPITAL ENCOUNTER (EMERGENCY)
Facility: OTHER | Age: 75
Discharge: HOME | End: 2020-06-12
Attending: EMERGENCY MEDICINE | Admitting: EMERGENCY MEDICINE
Payer: MEDICARE

## 2020-06-12 VITALS
RESPIRATION RATE: 16 BRPM | HEART RATE: 100 BPM | BODY MASS INDEX: 31.23 KG/M2 | DIASTOLIC BLOOD PRESSURE: 92 MMHG | WEIGHT: 199 LBS | HEIGHT: 67 IN | OXYGEN SATURATION: 95 % | SYSTOLIC BLOOD PRESSURE: 129 MMHG | TEMPERATURE: 98.5 F

## 2020-06-12 DIAGNOSIS — M54.50 ACUTE RIGHT-SIDED LOW BACK PAIN WITHOUT SCIATICA: ICD-10-CM

## 2020-06-12 PROCEDURE — 96372 THER/PROPH/DIAG INJ SC/IM: CPT | Performed by: EMERGENCY MEDICINE

## 2020-06-12 PROCEDURE — 72100 X-RAY EXAM L-S SPINE 2/3 VWS: CPT

## 2020-06-12 PROCEDURE — 99284 EMERGENCY DEPT VISIT MOD MDM: CPT | Performed by: EMERGENCY MEDICINE

## 2020-06-12 PROCEDURE — 25000128 H RX IP 250 OP 636: Performed by: EMERGENCY MEDICINE

## 2020-06-12 PROCEDURE — 99284 EMERGENCY DEPT VISIT MOD MDM: CPT | Mod: Z6 | Performed by: EMERGENCY MEDICINE

## 2020-06-12 PROCEDURE — 25000132 ZZH RX MED GY IP 250 OP 250 PS 637: Performed by: EMERGENCY MEDICINE

## 2020-06-12 RX ORDER — CYCLOBENZAPRINE HCL 10 MG
10 TABLET ORAL 3 TIMES DAILY PRN
Qty: 15 TABLET | Refills: 0 | Status: SHIPPED | OUTPATIENT
Start: 2020-06-12 | End: 2020-06-13

## 2020-06-12 RX ORDER — VALACYCLOVIR HYDROCHLORIDE 1 G/1
1000 TABLET, FILM COATED ORAL 3 TIMES DAILY
Qty: 21 TABLET | Refills: 0 | Status: SHIPPED | OUTPATIENT
Start: 2020-06-12 | End: 2020-06-17

## 2020-06-12 RX ORDER — KETOROLAC TROMETHAMINE 30 MG/ML
30 INJECTION, SOLUTION INTRAMUSCULAR; INTRAVENOUS ONCE
Status: COMPLETED | OUTPATIENT
Start: 2020-06-12 | End: 2020-06-12

## 2020-06-12 RX ORDER — CYCLOBENZAPRINE HCL 10 MG
10 TABLET ORAL ONCE
Status: COMPLETED | OUTPATIENT
Start: 2020-06-12 | End: 2020-06-12

## 2020-06-12 RX ADMIN — KETOROLAC TROMETHAMINE 30 MG: 30 INJECTION, SOLUTION INTRAMUSCULAR at 10:08

## 2020-06-12 RX ADMIN — CYCLOBENZAPRINE HYDROCHLORIDE 10 MG: 10 TABLET, FILM COATED ORAL at 10:07

## 2020-06-12 ASSESSMENT — ENCOUNTER SYMPTOMS
SHORTNESS OF BREATH: 0
NAUSEA: 0
DYSURIA: 0
FEVER: 0
CHILLS: 0
BACK PAIN: 1
CHEST TIGHTNESS: 0
COLOR CHANGE: 0
VOMITING: 0
LIGHT-HEADEDNESS: 0
AGITATION: 0

## 2020-06-12 ASSESSMENT — MIFFLIN-ST. JEOR: SCORE: 1425.29

## 2020-06-12 NOTE — DISCHARGE INSTRUCTIONS
Your back pain could be a muscle spasm, however I cannot rule out ability of shingles.  I have included a handout discussing shingles.  If you notice that the rash is spreading, or if you get any more pain wrapping around to your front down towards your groin, you should start the valacyclovir that I prescribed for you.  If this is shingles, the sooner you start the medicine the better.  Otherwise you can take your acetaminophen and the cyclobenzaprine as needed for pain.  Return if worse

## 2020-06-12 NOTE — ED TRIAGE NOTES
Pt here with ;, pt reports tripping and falling in kitchen on Tuesday and landing on back and buttocks, pt reports no other injuries, pt c/o continued low back pain since then, VSS, pt into [facility] via w.c

## 2020-06-12 NOTE — ED AVS SNAPSHOT
[facility]  [address]  [place] [address]  Phone:  [phone]  Fax:  [phone]                                    Kate Chacon   MRN: [id number]    Department:  [facility]   Date of Visit:  6/12/2020           After Visit Summary Signature Page    I have received my discharge instructions, and my questions have been answered. I have discussed any challenges I see with this plan with the nurse or doctor.    ..........................................................................................................................................  Patient/Patient Representative Signature      ..........................................................................................................................................  Patient Representative Print Name and Relationship to Patient    ..................................................               ................................................  Date                                   Time    ..........................................................................................................................................  Reviewed by Signature/Title    ...................................................              ..............................................  Date                                               Time          22EPIC Rev 08/18

## 2020-06-12 NOTE — ED PROVIDER NOTES
History     Chief Complaint   Patient presents with     Back Pain     HPI  Kate Chacon is a 74 year old female who is here with back pain.  She states that 3 days ago she was in the kitchen when the rug slipped out from under her.  She sat down hard and fast.  Had some pain in her buttocks and her very low back for couple days and that seems to have resolved.  She is here now however with pain little bit higher in her back.  It is worse on the left.  She does feel like it is better with rest and worse with movement.  She also says that yesterday she was laying on a heating pad and fell asleep and she thinks he may have burned herself.  Not feeling ill otherwise.    Allergies:  Allergies   Allergen Reactions     Methylpar-Na Bisulfite-Pentazocine Unknown     jittery       Problem List:    Patient Active Problem List    Diagnosis Date Noted     Sinoatrial node dysfunction (H) 05/01/2018     Priority: Medium     Cardiac pacemaker 02/19/2018     Priority: Medium     Urge incontinence 10/05/2017     Priority: Medium     Former smoker 08/30/2017     Priority: Medium     MDD (recurrent major depressive disorder) in remission (H) 08/30/2017     Priority: Medium     Anxiety 01/05/2017     Priority: Medium     Hypertension 12/08/2016     Priority: Medium     Paroxysmal atrial fibrillation (H) 11/02/2016     Priority: Medium     Anticoagulation goal of INR 2 to 3 10/28/2016     Priority: Medium     Ischemic stroke (H) 10/15/2016     Priority: Medium     Diverticulosis of large intestine 04/04/2011     Priority: Medium     H/O adenomatous polyp of colon 03/02/2011     Priority: Medium     Osteoporosis 02/08/2006     Priority: Medium        Past Medical History:    Past Medical History:   Diagnosis Date     Encounter for full-term uncomplicated delivery      Ganglion of wrist      Gastritis without bleeding        Past Surgical History:    Past Surgical History:   Procedure Laterality Date     APPENDECTOMY OPEN      No  Comments Provided     COLONOSCOPY  08/19/2005 8/19/05,Cecal biopsy with tubular adenoma low grade dysplasia.     COLONOSCOPY  04/04/2011 4/4/11     COLONOSCOPY  05/07/2012    Tubular Adenomas F/U 2017     COLONOSCOPY  12/02/2019    F/U N/A as will be over 78 in 2024. Tubular adenoma     ESOPHAGOSCOPY, GASTROSCOPY, DUODENOSCOPY (EGD), COMBINED      8/19/05     OTHER SURGICAL HISTORY      8/3/05,[id number],OTHER,helices on the right ear     TONSILLECTOMY      No Comments Provided       Family History:    Family History   Problem Relation Age of Onset     Diabetes Father         Diabetes     Hypertension Father         Hypertension     Other - See Comments Mother         h/o coronary artery disease/dementia     Asthma Mother         Asthma     Diabetes Maternal Grandmother         Diabetes     Cancer Maternal Aunt         Cancer,Uterine     Breast Cancer No family hx of         Cancer-breast       Social History:  Marital Status:   [2]  Social History     Tobacco Use     Smoking status: Former Smoker     Packs/day: 0.50     Years: 49.00     Pack years: 24.50     Types: Cigarettes     Last attempt to quit: 10/14/2016     Years since quitting: 3.6     Smokeless tobacco: Never Used   Substance Use Topics     Alcohol use: No     Drug use: No     Comment: Drug use: No        Medications:    atorvastatin (LIPITOR) 40 MG tablet  calcium carbonate 750 MG CHEW  cyclobenzaprine (FLEXERIL) 10 MG tablet  lisinopril (PRINIVIL/ZESTRIL) 20 MG tablet  metoprolol succinate ER (TOPROL-XL) 25 MG 24 hr tablet  sertraline (ZOLOFT) 50 MG tablet  valACYclovir (VALTREX) 1000 mg tablet  warfarin ANTICOAGULANT (COUMADIN) 5 MG tablet          Review of Systems   Constitutional: Negative for chills and fever.   HENT: Negative for congestion.    Eyes: Negative for visual disturbance.   Respiratory: Negative for chest tightness and shortness of breath.    Cardiovascular: Negative for chest pain.   Gastrointestinal: Negative for nausea and  "vomiting.   Genitourinary: Negative for dysuria.   Musculoskeletal: Positive for back pain.   Skin: Negative for color change and rash.   Neurological: Negative for light-headedness.   Psychiatric/Behavioral: Negative for agitation.       Physical Exam   BP: (!) 136/120  Pulse: 74  Temp: 98.5  F (36.9  C)  Resp: 16  Height: 170.2 cm (5' 7\")  Weight: 90.3 kg (199 lb)  SpO2: 95 %      Physical Exam  Vitals signs and nursing note reviewed.   Constitutional:       Appearance: Normal appearance.   HENT:      Head: Normocephalic and atraumatic.      Mouth/Throat:      Mouth: Mucous membranes are moist.   Eyes:      Conjunctiva/sclera: Conjunctivae normal.   Cardiovascular:      Rate and Rhythm: Normal rate.   Pulmonary:      Effort: Pulmonary effort is normal.   Musculoskeletal:      Comments: No spinous process tenderness.  She is a little bit tender in the left paraspinous musculature.  There is a rash in the area that she states that she was laying on a heating pad and that it is somewhat tender under this area as well, but more so a little bit medial to this area.   Skin:     General: Skin is warm and dry.      Comments: There is an area in her left lumbar/flank area with some erythema.  There do appear to be some discrete lesions there.  No vesicular lesions.   Neurological:      Mental Status: She is alert and oriented to person, place, and time.   Psychiatric:         Mood and Affect: Mood normal.         Behavior: Behavior normal.         ED Course        Procedures                   Results for orders placed or performed during the hospital encounter of 06/12/20 (from the past 24 hour(s))   XR Lumbar Spine 2/3 Views    Narrative    PROCEDURE: XR LUMBAR SPINE 2-3 VIEWS 6/12/2020 10:36 AM    HISTORY: fall    COMPARISONS: 10/26/2019.    TECHNIQUE: 3 views.    FINDINGS: There is a left convex scoliosis. There is grade 1  anterolisthesis of L4 on L5, stable when compared to the prior exam.    No acute compression " fracture is seen.    There is degenerative change with narrowing of the disc spaces at  L5-S1 and L4-L5. There is some degenerative change in the upper lumbar  spine as well.         Impression    IMPRESSION: Degenerative change with scoliosis and grade 1  anterolisthesis of L4 on L5, stable. No acute abnormality.    ELMER BYERS MD       Medications   ketorolac (TORADOL) injection 30 mg (30 mg Intramuscular Given 6/12/20 1008)   cyclobenzaprine (FLEXERIL) tablet 10 mg (10 mg Oral Given 6/12/20 1007)       Assessments & Plan (with Medical Decision Making)     I have reviewed the nursing notes.    I have reviewed the findings, diagnosis, plan and need for follow up with the patient.  Low back pain, I cannot say that this is not really shingles.  She does have a rash suspicious for shingles, however she also states that this is where she was on a heating pad and she fell asleep with it on think she may have burned herself.  For her pain she can do her acetaminophen and she is given a prescription for some Flexeril.  I did write a prescription for some valacyclovir that she can fill right away if she feels like the pain or the rash is spreading in any way.  I did tell her that if this is shingles, the sooner you start the medicine the better.  Return if worse.    New Prescriptions    CYCLOBENZAPRINE (FLEXERIL) 10 MG TABLET    Take 1 tablet (10 mg) by mouth 3 times daily as needed for muscle spasms    VALACYCLOVIR (VALTREX) 1000 MG TABLET    Take 1 tablet (1,000 mg) by mouth 3 times daily for 7 days       Final diagnoses:   Acute right-sided low back pain without sciatica       6/12/2020   [facility]""     Inder Melendrez MD  06/12/20 1126       Inder Melendrez MD  06/12/20 1139

## 2020-06-13 ENCOUNTER — HOSPITAL ENCOUNTER (EMERGENCY)
Facility: OTHER | Age: 75
Discharge: HOME | End: 2020-06-13
Attending: FAMILY MEDICINE | Admitting: FAMILY MEDICINE
Payer: MEDICARE

## 2020-06-13 ENCOUNTER — APPOINTMENT (OUTPATIENT)
Dept: CT IMAGING | Facility: OTHER | Age: 75
End: 2020-06-13
Attending: FAMILY MEDICINE
Payer: MEDICARE

## 2020-06-13 VITALS
BODY MASS INDEX: 31.23 KG/M2 | OXYGEN SATURATION: 94 % | RESPIRATION RATE: 16 BRPM | HEART RATE: 101 BPM | SYSTOLIC BLOOD PRESSURE: 137 MMHG | WEIGHT: 199 LBS | DIASTOLIC BLOOD PRESSURE: 97 MMHG | TEMPERATURE: 96.5 F | HEIGHT: 67 IN

## 2020-06-13 DIAGNOSIS — S39.012A STRAIN OF LUMBAR REGION, INITIAL ENCOUNTER: ICD-10-CM

## 2020-06-13 PROCEDURE — 72192 CT PELVIS W/O DYE: CPT

## 2020-06-13 PROCEDURE — 25000128 H RX IP 250 OP 636: Performed by: FAMILY MEDICINE

## 2020-06-13 PROCEDURE — 99284 EMERGENCY DEPT VISIT MOD MDM: CPT | Mod: 25 | Performed by: FAMILY MEDICINE

## 2020-06-13 PROCEDURE — 72131 CT LUMBAR SPINE W/O DYE: CPT

## 2020-06-13 PROCEDURE — 99284 EMERGENCY DEPT VISIT MOD MDM: CPT | Mod: Z6 | Performed by: FAMILY MEDICINE

## 2020-06-13 RX ORDER — MORPHINE SULFATE 4 MG/ML
4 INJECTION, SOLUTION INTRAMUSCULAR; INTRAVENOUS ONCE
Status: COMPLETED | OUTPATIENT
Start: 2020-06-13 | End: 2020-06-13

## 2020-06-13 RX ORDER — ONDANSETRON 2 MG/ML
8 INJECTION INTRAMUSCULAR; INTRAVENOUS ONCE
Status: COMPLETED | OUTPATIENT
Start: 2020-06-13 | End: 2020-06-13

## 2020-06-13 RX ORDER — DEXAMETHASONE SODIUM PHOSPHATE 10 MG/ML
10 INJECTION, SOLUTION INTRAMUSCULAR; INTRAVENOUS ONCE
Status: COMPLETED | OUTPATIENT
Start: 2020-06-13 | End: 2020-06-13

## 2020-06-13 RX ADMIN — ONDANSETRON 8 MG: 2 INJECTION INTRAMUSCULAR; INTRAVENOUS at 20:37

## 2020-06-13 RX ADMIN — DEXAMETHASONE SODIUM PHOSPHATE 10 MG: 10 INJECTION, SOLUTION INTRAMUSCULAR; INTRAVENOUS at 20:37

## 2020-06-13 RX ADMIN — MORPHINE SULFATE 4 MG: 4 INJECTION, SOLUTION INTRAMUSCULAR; INTRAVENOUS at 20:37

## 2020-06-13 ASSESSMENT — ENCOUNTER SYMPTOMS
HEMATURIA: 0
WOUND: 0
WEAKNESS: 0
FLANK PAIN: 0
COLOR CHANGE: 0
FEVER: 0
DIARRHEA: 0
VOMITING: 0
NECK PAIN: 0
CHILLS: 0
ARTHRALGIAS: 1
NUMBNESS: 0
SHORTNESS OF BREATH: 0
DIFFICULTY URINATING: 0
NAUSEA: 1
COUGH: 0
SORE THROAT: 0
ABDOMINAL PAIN: 0
BACK PAIN: 1
BRUISES/BLEEDS EASILY: 1

## 2020-06-13 ASSESSMENT — MIFFLIN-ST. JEOR: SCORE: 1425.29

## 2020-06-13 NOTE — ED AVS SNAPSHOT
[facility]  [address]  [place] [address]  Phone:  [phone]  Fax:  [phone]                                    Kate Chaocn   MRN: [id number]    Department:  [facility]   Date of Visit:  6/13/2020           After Visit Summary Signature Page    I have received my discharge instructions, and my questions have been answered. I have discussed any challenges I see with this plan with the nurse or doctor.    ..........................................................................................................................................  Patient/Patient Representative Signature      ..........................................................................................................................................  Patient Representative Print Name and Relationship to Patient    ..................................................               ................................................  Date                                   Time    ..........................................................................................................................................  Reviewed by Signature/Title    ...................................................              ..............................................  Date                                               Time          22EPIC Rev 08/18

## 2020-06-14 NOTE — ED PROVIDER NOTES
History     Chief Complaint   Patient presents with     Back Pain     HPI  Kate Chacon is a 74 year old female who presents to ED for evaluation of back pain. Patient reports the pain began approximately 5 days after falling in her kitchen.  The patient states that she slipped on a kitchen rug and fell backward onto her buttock and lower back. The pain is located in the left lumbar area and radiates around into the left side, bilateral buttocks worse on the left, and described as sharp, 8 out of 10 for severity. The pain is exacerbated by movement, walking, weightbearing and relieved by nothing. The patient complains of associated nausea. Denies associated fever, chills, URI type symptoms, sore throat, cough, chest pain, shortness of breath, lower extremity pain, weakness, loss of sensation, bowel or bladder dysfunction, abdominal pain, vomiting, diarrhea, dysuria, hematuria, rash she has a.  She has a she was just seen yesterday history of similar pain. Patient denies other complaints  In the emergency room by Dr. Melendrez and had x-rays that were negative.  Patient was given a prescription for Flexeril and states that it is not helping.  She is on Coumadin.  She also reports that she took ibuprofen not too long ago.  She did not take any Tylenol.         Allergies:  Allergies   Allergen Reactions     Methylpar-Na Bisulfite-Pentazocine Unknown     jittery       Problem List:    Patient Active Problem List    Diagnosis Date Noted     Sinoatrial node dysfunction (H) 05/01/2018     Priority: Medium     Cardiac pacemaker 02/19/2018     Priority: Medium     Urge incontinence 10/05/2017     Priority: Medium     Former smoker 08/30/2017     Priority: Medium     MDD (recurrent major depressive disorder) in remission (H) 08/30/2017     Priority: Medium     Anxiety 01/05/2017     Priority: Medium     Hypertension 12/08/2016     Priority: Medium     Paroxysmal atrial fibrillation (H) 11/02/2016     Priority: Medium      Anticoagulation goal of INR 2 to 3 10/28/2016     Priority: Medium     Ischemic stroke (H) 10/15/2016     Priority: Medium     Diverticulosis of large intestine 04/04/2011     Priority: Medium     H/O adenomatous polyp of colon 03/02/2011     Priority: Medium     Osteoporosis 02/08/2006     Priority: Medium        Past Medical History:    Past Medical History:   Diagnosis Date     Encounter for full-term uncomplicated delivery      Ganglion of wrist      Gastritis without bleeding        Past Surgical History:    Past Surgical History:   Procedure Laterality Date     APPENDECTOMY OPEN      No Comments Provided     COLONOSCOPY  08/19/2005 8/19/05,Cecal biopsy with tubular adenoma low grade dysplasia.     COLONOSCOPY  04/04/2011 4/4/11     COLONOSCOPY  05/07/2012    Tubular Adenomas F/U 2017     COLONOSCOPY  12/02/2019    F/U N/A as will be over 78 in 2024. Tubular adenoma     ESOPHAGOSCOPY, GASTROSCOPY, DUODENOSCOPY (EGD), COMBINED      8/19/05     OTHER SURGICAL HISTORY      8/3/05,807089,OTHER,helices on the right ear     TONSILLECTOMY      No Comments Provided       Family History:    Family History   Problem Relation Age of Onset     Diabetes Father         Diabetes     Hypertension Father         Hypertension     Other - See Comments Mother         h/o coronary artery disease/dementia     Asthma Mother         Asthma     Diabetes Maternal Grandmother         Diabetes     Cancer Maternal Aunt         Cancer,Uterine     Breast Cancer No family hx of         Cancer-breast       Social History:  Marital Status:   [2]  Social History     Tobacco Use     Smoking status: Former Smoker     Packs/day: 0.50     Years: 49.00     Pack years: 24.50     Types: Cigarettes     Last attempt to quit: 10/14/2016     Years since quitting: 3.6     Smokeless tobacco: Never Used   Substance Use Topics     Alcohol use: No     Drug use: No     Comment: Drug use: No        Medications:    atorvastatin (LIPITOR) 40 MG  "tablet  calcium carbonate 750 MG CHEW  lisinopril (PRINIVIL/ZESTRIL) 20 MG tablet  metoprolol succinate ER (TOPROL-XL) 25 MG 24 hr tablet  sertraline (ZOLOFT) 50 MG tablet  tiZANidine (ZANAFLEX) 4 MG tablet  warfarin ANTICOAGULANT (COUMADIN) 5 MG tablet  valACYclovir (VALTREX) 1000 mg tablet          Review of Systems   Constitutional: Negative for chills and fever.   HENT: Negative for congestion and sore throat.    Eyes: Negative for visual disturbance.   Respiratory: Negative for cough and shortness of breath.    Cardiovascular: Negative for chest pain and leg swelling.   Gastrointestinal: Positive for nausea. Negative for abdominal pain, diarrhea and vomiting.   Genitourinary: Negative for difficulty urinating, flank pain and hematuria.   Musculoskeletal: Positive for arthralgias and back pain. Negative for neck pain.   Skin: Negative for color change and wound.   Neurological: Negative for weakness and numbness.   Hematological: Bruises/bleeds easily.   All other systems reviewed and are negative.      Physical Exam   BP: (!) 168/141  Pulse: 86  Temp: 96.5  F (35.8  C)  Resp: 16  Height: 170.2 cm (5' 7\")  Weight: 90.3 kg (199 lb)  SpO2: 97 %      Physical Exam  Vitals signs and nursing note reviewed.   Constitutional:       General: She is not in acute distress.     Appearance: She is well-developed and overweight. She is not ill-appearing or diaphoretic.   HENT:      Head: Normocephalic and atraumatic.      Right Ear: External ear normal.      Left Ear: External ear normal.      Nose: Nose normal.      Mouth/Throat:      Lips: Pink.      Mouth: Mucous membranes are moist.      Pharynx: Oropharynx is clear. Uvula midline.   Eyes:      Extraocular Movements: Extraocular movements intact.      Conjunctiva/sclera: Conjunctivae normal.      Pupils: Pupils are equal, round, and reactive to light.   Neck:      Musculoskeletal: Normal range of motion and neck supple.      Trachea: Trachea and phonation normal. "   Cardiovascular:      Rate and Rhythm: Normal rate and regular rhythm.      Pulses: Normal pulses.      Heart sounds: Normal heart sounds. No murmur.   Pulmonary:      Effort: Pulmonary effort is normal.      Breath sounds: Normal breath sounds. No decreased breath sounds, wheezing, rhonchi or rales.   Abdominal:      General: Bowel sounds are normal.      Palpations: Abdomen is soft.      Tenderness: There is no abdominal tenderness. There is no guarding or rebound. Negative signs include Bowen's sign and McBurney's sign.   Musculoskeletal:      Right hip: Normal.      Left hip: Normal.      Cervical back: Normal.      Thoracic back: Normal.      Lumbar back: She exhibits decreased range of motion, tenderness, pain and spasm. She exhibits no bony tenderness, no swelling, no edema, no deformity, no laceration and normal pulse.        Arms:       Right lower leg: No edema.      Left lower leg: No edema.   Lymphadenopathy:      Cervical: No cervical adenopathy.   Skin:     General: Skin is warm.      Capillary Refill: Capillary refill takes less than 2 seconds.      Coloration: Skin is not pale.   Neurological:      Mental Status: She is alert and oriented to person, place, and time.      GCS: GCS eye subscore is 4. GCS verbal subscore is 5. GCS motor subscore is 6.      Cranial Nerves: Cranial nerves are intact.      Sensory: Sensation is intact.      Motor: Motor function is intact.   Psychiatric:         Mood and Affect: Mood normal.         Behavior: Behavior normal. Behavior is cooperative.         ED Course     Procedures       Critical Care time:  none    Results for orders placed or performed during the hospital encounter of 06/13/20 (from the past 24 hour(s))   CT Lumbar Spine w/o Contrast    Narrative    PROCEDURE: CT LUMBAR SPINE W/O CONTRAST 6/13/2020 9:19 PM    HISTORY: Back pain, minor trauma; Radiculopathy, minor trauma;  T/L-spine trauma, minor-mod, low back pain    COMPARISONS: None.    Meds/Dose  Given:    TECHNIQUE: CT scan lumbar spine with sagittal coronal reconstructions    FINDINGS: There are no acute fractures of the lumbar vertebral bodies  or arches. The T12 L1 L1 L2 L2 L3 and L3-L4 disks are normal in height  without evidence of disc herniation or protrusion. At L4-L5 there is  nonspondylolytic spondylolisthesis. The degree of forward slippage has  remained stable as compared to 2019. There is decrease in height in  the L4-L5 disc. There is broad-based annular bulging which together  with developmentally short pedicles results in moderate central spinal  stenosis. There is compression of both L5 nerve roots in the lateral  recesses. There is neural foraminal narrowing encroaching on the  L4  nerve roots bilaterally.    There is decrease in height in the L5-S1 disc with posterior lateral  disc protrusion on the left mildly compressing the left L5 nerve root  in the neural foramina. Severe facet joint degenerative changes are  noted.    No intradural abnormalities are seen. The paravertebral soft tissues  appear normal.         Impression    IMPRESSION: Advanced degenerative changes in the lumbar spine with  spondylolisthesis of L4 on L5    HILLARY WILLOUGHBY MD   CT Pelvis Bone wo Contrast    Narrative    PROCEDURE: CT PELVIS BONE WO CONTRAST 6/13/2020 9:19 PM    HISTORY: Pelvis trauma, fx suspected, initial exam    COMPARISONS: None.    Meds/Dose Given:    TECHNIQUE: CT scan of the pelvis with sagittal coronal reconstructions    FINDINGS: the bony pelvis is intact. The sacrum and sacroiliac joints  appear normal. Both proximal femurs are intact. The muscles of the  pelvic girdle appear normal. The bladder and rectum are normal. Some  small calcified uterine fibroids are noted.      Impression    Impression: No pelvic or proximal hip fracture         HILLARY WILLOUGHBY MD       Medications   ondansetron (ZOFRAN) injection 8 mg (8 mg Intravenous Given 6/13/20 2037)   dexamethasone PF (DECADRON) injection  10 mg (10 mg Intravenous Given 6/13/20 2037)   morphine (PF) injection 4 mg (4 mg Intravenous Given 6/13/20 2037)       Assessments & Plan (with Medical Decision Making)     I have reviewed the nursing notes.    CT scan of the lumbar spine and pelvis are obtained and reviewed as above.  No acute findings are present.  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  I had a discussion with the patient and the family regarding the symptoms, exam, laboratory, CT Scan results, diagnosis, and plan.    I answered all questions to the best of my ability.  The patient is discharged home in good condition.  Follow-up with primary care physician on Monday for further management of her pain.  Patient is switched to Zanaflex since the Flexeril is making her nauseated.  Patient is given a prescription from the instrument machine for Norco number dispensed is 6.  She may use Tylenol when she is not using Norco.  Ice or heat to the area.  Over-the-counter lidocaine patches are recommended.  The patient voiced understanding of the plan, was agreeable, and had no further questions or concerns. Advised to return for any worsening symptoms.      New Prescriptions    TIZANIDINE (ZANAFLEX) 4 MG TABLET    Take 1 tablet (4 mg) by mouth 3 times daily as needed for muscle spasms (MUSCLE SPASM)       Final diagnoses:   Strain of lumbar region, initial encounter       6/13/2020   [facility]     Singh Tinsley MD  06/13/20 [id number]

## 2020-06-14 NOTE — ED TRIAGE NOTES
Pt reports lt sided low back pain, pt reports that she fell in her kitchen on Tuesday and has been having pain since then, pt was in ER yesterday for the same problem and was given flexeril, pt reports that the flexeril made her nauseated, VSS. Pt into [facility] via [facility]

## 2020-06-14 NOTE — DISCHARGE INSTRUCTIONS
Heat or ice to the area.  Over-the-counter remedies such as lidocaine patches, Aspercreme can also be used.    Stop Flexeril and start Zanaflex.    Recommend physical therapy through your primary care physician on Monday.

## 2020-06-17 ENCOUNTER — OFFICE VISIT (OUTPATIENT)
Dept: INTERNAL MEDICINE | Facility: OTHER | Age: 75
End: 2020-06-17
Attending: NURSE PRACTITIONER
Payer: COMMERCIAL

## 2020-06-17 ENCOUNTER — TELEPHONE (OUTPATIENT)
Dept: FAMILY MEDICINE | Facility: OTHER | Age: 75
End: 2020-06-17

## 2020-06-17 VITALS
BODY MASS INDEX: 30.17 KG/M2 | WEIGHT: 192.6 LBS | HEART RATE: 66 BPM | DIASTOLIC BLOOD PRESSURE: 78 MMHG | SYSTOLIC BLOOD PRESSURE: 134 MMHG | TEMPERATURE: 97.5 F | RESPIRATION RATE: 16 BRPM | OXYGEN SATURATION: 96 %

## 2020-06-17 DIAGNOSIS — S39.012D STRAIN OF LUMBAR REGION, SUBSEQUENT ENCOUNTER: Primary | ICD-10-CM

## 2020-06-17 DIAGNOSIS — I63.9 ISCHEMIC STROKE (H): ICD-10-CM

## 2020-06-17 PROCEDURE — G0463 HOSPITAL OUTPT CLINIC VISIT: HCPCS | Mod: 25

## 2020-06-17 PROCEDURE — 99214 OFFICE O/P EST MOD 30 MIN: CPT | Performed by: NURSE PRACTITIONER

## 2020-06-17 PROCEDURE — G0463 HOSPITAL OUTPT CLINIC VISIT: HCPCS

## 2020-06-17 RX ORDER — HYDROCODONE BITARTRATE AND ACETAMINOPHEN 5; 325 MG/1; MG/1
1 TABLET ORAL 3 TIMES DAILY PRN
Qty: 15 TABLET | Refills: 0 | Status: SHIPPED | OUTPATIENT
Start: 2020-06-17 | End: 2020-07-02

## 2020-06-17 ASSESSMENT — ENCOUNTER SYMPTOMS
BACK PAIN: 1
LIGHT-HEADEDNESS: 0
FEVER: 0
NAUSEA: 1
FATIGUE: 0
ACTIVITY CHANGE: 1

## 2020-06-17 ASSESSMENT — ANXIETY QUESTIONNAIRES
6. BECOMING EASILY ANNOYED OR IRRITABLE: NOT AT ALL
2. NOT BEING ABLE TO STOP OR CONTROL WORRYING: SEVERAL DAYS
GAD7 TOTAL SCORE: 3
7. FEELING AFRAID AS IF SOMETHING AWFUL MIGHT HAPPEN: NOT AT ALL
3. WORRYING TOO MUCH ABOUT DIFFERENT THINGS: SEVERAL DAYS
1. FEELING NERVOUS, ANXIOUS, OR ON EDGE: SEVERAL DAYS
IF YOU CHECKED OFF ANY PROBLEMS ON THIS QUESTIONNAIRE, HOW DIFFICULT HAVE THESE PROBLEMS MADE IT FOR YOU TO DO YOUR WORK, TAKE CARE OF THINGS AT HOME, OR GET ALONG WITH OTHER PEOPLE: SOMEWHAT DIFFICULT
5. BEING SO RESTLESS THAT IT IS HARD TO SIT STILL: NOT AT ALL

## 2020-06-17 ASSESSMENT — PATIENT HEALTH QUESTIONNAIRE - PHQ9
5. POOR APPETITE OR OVEREATING: NOT AT ALL
SUM OF ALL RESPONSES TO PHQ QUESTIONS 1-9: 11

## 2020-06-17 ASSESSMENT — PAIN SCALES - GENERAL: PAINLEVEL: EXTREME PAIN (8)

## 2020-06-17 NOTE — TELEPHONE ENCOUNTER
Patient is scheduled for 07/06/20 with [facility] for a follow up on her back strain. She is wondering if [facility] is able to see her sooner than this appointment. Please advise.    Wen Robins on 6/17/2020 at 9:46 AM

## 2020-06-17 NOTE — PROGRESS NOTES
Subjective:  She is here today for emergency department follow-up.  She had a fall on June 12 where she landed on her buttocks.  She hurt her back and her buttocks.  She was seen in the emergency department and had negative lumbar x-ray for fracture.  She did have significant degenerative joint disease.  She has had problems with arthritis of the spine for quite some time now and has received epidural injections.  She states that the injections seem to help with her pain but she always seems to have a fall a few days after she has the injections.  She was prescribed Flexeril but did not seem to think it helped.  By the following day she was having more pain and was seen in the emergency department again.  At that time she had CT of lumbar spine and pelvis with no identified fractures.  She was then prescribed hydrocodone acetaminophen 1 tablet 3 times daily as needed and also Zanaflex.  She was only given enough hydrocodone acetaminophen for about 3 days.  She took that as prescribed and then started taking the tizanidine but has not felt that has been effective.  She would like to have a few more hydrocodone acetaminophen as this seems to help the pain.  She has tried Tylenol Extra Strength with no improvement in her symptoms.  She denies new weakness of her legs.  Couple years ago she had a stroke and has had some problems with ambulation since then.  She has not done physical therapy in quite some time.  She would consider returning back to physical therapy but does not want to do so just yet with the coronavirus.    Patient Active Problem List   Diagnosis     Anticoagulation goal of INR 2 to 3     Anxiety     Paroxysmal atrial fibrillation (H)     H/O adenomatous polyp of colon     Diverticulosis of large intestine     Former smoker     Hypertension     Ischemic stroke (H)     MDD (recurrent major depressive disorder) in remission (H)     Osteoporosis     Urge incontinence     Cardiac pacemaker     Sinoatrial  node dysfunction (H)     Past Medical History:   Diagnosis Date     Encounter for full-term uncomplicated delivery     x3     Ganglion of wrist     right     Gastritis without bleeding     treated and 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     Past Surgical History:   Procedure Laterality Date     APPENDECTOMY OPEN      No Comments Provided     COLONOSCOPY  08/19/2005 8/19/05,Cecal biopsy with tubular adenoma low grade dysplasia.     COLONOSCOPY  04/04/2011 4/4/11     COLONOSCOPY  05/07/2012    Tubular Adenomas F/U 2017     COLONOSCOPY  12/02/2019    F/U N/A as will be over 78 in 2024. Tubular adenoma     ESOPHAGOSCOPY, GASTROSCOPY, DUODENOSCOPY (EGD), COMBINED      8/19/05     OTHER SURGICAL HISTORY      8/3/05,104092,OTHER,helices on the right ear     TONSILLECTOMY      No Comments Provided     Social History     Socioeconomic History     Marital status:      Spouse name: Not on file     Number of children: Not on file     Years of education: Not on file     Highest education level: Not on file   Occupational History     Not on file   Social Needs     Financial resource strain: Not on file     Food insecurity     Worry: Not on file     Inability: Not on file     Transportation needs     Medical: Not on file     Non-medical: Not on file   Tobacco Use     Smoking status: Former Smoker     Packs/day: 0.50     Years: 49.00     Pack years: 24.50     Types: Cigarettes     Last attempt to quit: 10/14/2016     Years since quitting: 3.6     Smokeless tobacco: Never Used   Substance and Sexual Activity     Alcohol use: No     Drug use: No     Sexual activity: Not Currently   Lifestyle     Physical activity     Days per week: Not on file     Minutes per session: Not on file     Stress: Not on file   Relationships     Social connections     Talks on phone: Not on file     Gets together: Not on file     Attends Gnosticism service: Not on file     Active member of club or organization: Not on file      Attends meetings of clubs or organizations: Not on file     Relationship status: Not on file     Intimate partner violence     Fear of current or ex partner: Not on file     Emotionally abused: Not on file     Physically abused: Not on file     Forced sexual activity: Not on file   Other Topics Concern     Parent/sibling w/ CABG, MI or angioplasty before 65F 55M? 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  She enjoys gardening, Tangled[place] and going to stock car races that her son participates in.   to her  [place].  They run [employer].  Live in town independently.     Family History   Problem Relation Age of Onset     Diabetes Father         Diabetes     Hypertension Father         Hypertension     Other - See Comments Mother         h/o coronary artery disease/dementia     Asthma Mother         Asthma     Diabetes Maternal Grandmother         Diabetes     Cancer Maternal Aunt         Cancer,Uterine     Breast Cancer No family hx of         Cancer-breast     Current Outpatient Medications   Medication Sig Dispense Refill     HYDROcodone-acetaminophen (NORCO) 5-325 MG tablet Take 1 tablet by mouth 3 times daily as needed for pain 15 tablet 0     atorvastatin (LIPITOR) 40 MG tablet TAKE 1 TABLET BY MOUTH AT BEDTIME 90 tablet 3     calcium carbonate 750 MG CHEW Take 1 tablet by mouth every 8 hours as needed for heartburn       lisinopril (PRINIVIL/ZESTRIL) 20 MG tablet Take 1 tablet (20 mg) by mouth daily 90 tablet 11     metoprolol succinate ER (TOPROL-XL) 25 MG 24 hr tablet Take 1 tablet by mouth once daily 90 tablet 0     sertraline (ZOLOFT) 50 MG tablet Take 1 tablet (50 mg) by mouth At Bedtime 90 tablet 11     tiZANidine (ZANAFLEX) 4 MG tablet Take 1 tablet (4 mg) by mouth 3 times daily as needed for muscle spasms (MUSCLE SPASM) 20 tablet 0     warfarin ANTICOAGULANT (COUMADIN) 5 MG tablet Take 1 tablet (5 mg) by mouth daily OR AS DIRECTED BY [facility] 90  tablet 1     Methylpar-na bisulfite-pentazocine      Review of Systems:  Review of Systems   Constitutional: Positive for activity change. Negative for fatigue and fever.   Gastrointestinal: Positive for nausea.        She states she seemed to have nausea with the muscle relaxer, she believes that was the tizanidine and not the Flexeril   Musculoskeletal: Positive for back pain.        Pain at both buttocks   Neurological: Negative for syncope and light-headedness.       Objective:   /78 (BP Location: Right arm, Patient Position: Sitting, Cuff Size: Adult Regular)   Pulse 66   Temp 97.5  F (36.4  C) (Tympanic)   Resp 16   Wt 87.4 kg (192 lb 9.6 oz)   SpO2 96%   Breastfeeding No   BMI 30.17 kg/m    Physical Exam  Pleasant female in no acute distress.  Affect normal.  Alert and pleasant.  Skin color pink.  Mucous membranes moist.  Lungs clear to auscultation throughout.  Cardiovascular irregular, rate controlled.  No tenderness with palpation to the lumbosacral spine.  She does have discomfort along the paraspinal musculature at the left mid to lower spine.  Increased discomfort with twisting torso from side to side and bending forward.    Assessment:    ICD-10-CM    1. Strain of lumbar region, subsequent encounter  S39.012D HYDROcodone-acetaminophen (NORCO) 5-325 MG tablet   2. Ischemic stroke (H)  I63.9        Plan:   Patient has recent lumbar strain secondary to a fall.  She has gait impairment due to an ischemic stroke.  We will do a short-term refill of Lortab 5/325 mg 1 tablet up to 3 times daily as needed.  She should either take the Flexeril or the tizanidine 4 muscle spasms if needed but should not take them those.  She is having some nausea and she relates it to the tizanidine so if that is the case she may want to use the Flexeril instead.  We will give her a follow-up appointment with her primary doctor within the next 1 to 2 weeks to make sure she is improving, follow-up sooner with  additional falls or worsening of pain.  Discussed returning to physical therapy and at this time she wants to hold off but will discuss further with her PCP.    PATRICK Leos   6/17/2020  10:20 AM

## 2020-06-17 NOTE — TELEPHONE ENCOUNTER
I can see her earlier the week of June 29, but I'm gone until then.  Asher Campos MD on 6/17/2020 at 10:41 AM

## 2020-06-17 NOTE — NURSING NOTE
Chief Complaint   Patient presents with     Follow Up     ER back pain       Medication Reconciliation complete.   Dinorah Cheney LPN  ..................6/17/2020   9:08 AM

## 2020-06-18 ASSESSMENT — ANXIETY QUESTIONNAIRES: GAD7 TOTAL SCORE: 3

## 2020-06-21 ENCOUNTER — APPOINTMENT (OUTPATIENT)
Dept: CT IMAGING | Facility: OTHER | Age: 75
End: 2020-06-21
Attending: FAMILY MEDICINE
Payer: MEDICARE

## 2020-06-21 ENCOUNTER — HOSPITAL ENCOUNTER (EMERGENCY)
Facility: OTHER | Age: 75
Discharge: HOME | End: 2020-06-22
Attending: FAMILY MEDICINE | Admitting: FAMILY MEDICINE
Payer: MEDICARE

## 2020-06-21 VITALS
HEART RATE: 108 BPM | DIASTOLIC BLOOD PRESSURE: 94 MMHG | OXYGEN SATURATION: 95 % | HEIGHT: 67 IN | BODY MASS INDEX: 30.13 KG/M2 | WEIGHT: 192 LBS | SYSTOLIC BLOOD PRESSURE: 139 MMHG | TEMPERATURE: 97.9 F

## 2020-06-21 DIAGNOSIS — S22.070A CLOSED WEDGE COMPRESSION FRACTURE OF TENTH THORACIC VERTEBRA, INITIAL ENCOUNTER: ICD-10-CM

## 2020-06-21 LAB
ANION GAP SERPL CALCULATED.3IONS-SCNC: 6 MMOL/L (ref 3–14)
BASOPHILS # BLD AUTO: 0.1 10E9/L (ref 0–0.2)
BASOPHILS NFR BLD AUTO: 0.5 %
BUN SERPL-MCNC: 23 MG/DL (ref 7–25)
CALCIUM SERPL-MCNC: 10 MG/DL (ref 8.6–10.3)
CHLORIDE SERPL-SCNC: 102 MMOL/L (ref 98–107)
CO2 SERPL-SCNC: 30 MMOL/L (ref 21–31)
CREAT SERPL-MCNC: 1.1 MG/DL (ref 0.6–1.2)
CRP SERPL-MCNC: 1.5 MG/L
DIFFERENTIAL METHOD BLD: ABNORMAL
EOSINOPHIL # BLD AUTO: 0.3 10E9/L (ref 0–0.7)
EOSINOPHIL NFR BLD AUTO: 2.7 %
ERYTHROCYTE [DISTWIDTH] IN BLOOD BY AUTOMATED COUNT: 18.6 % (ref 10–15)
GFR SERPL CREATININE-BSD FRML MDRD: 48 ML/MIN/{1.73_M2}
GLUCOSE SERPL-MCNC: 102 MG/DL (ref 70–105)
HCT VFR BLD AUTO: 45.4 % (ref 35–47)
HGB BLD-MCNC: 13.9 G/DL (ref 11.7–15.7)
IMM GRANULOCYTES # BLD: 0 10E9/L (ref 0–0.4)
IMM GRANULOCYTES NFR BLD: 0.3 %
LYMPHOCYTES # BLD AUTO: 2.1 10E9/L (ref 0.8–5.3)
LYMPHOCYTES NFR BLD AUTO: 22.3 %
MCH RBC QN AUTO: 26.8 PG (ref 26.5–33)
MCHC RBC AUTO-ENTMCNC: 30.6 G/DL (ref 31.5–36.5)
MCV RBC AUTO: 88 FL (ref 78–100)
MONOCYTES # BLD AUTO: 1.1 10E9/L (ref 0–1.3)
MONOCYTES NFR BLD AUTO: 11.1 %
NEUTROPHILS # BLD AUTO: 6 10E9/L (ref 1.6–8.3)
NEUTROPHILS NFR BLD AUTO: 63.1 %
PLATELET # BLD AUTO: 272 10E9/L (ref 150–450)
POTASSIUM SERPL-SCNC: 3.3 MMOL/L (ref 3.5–5.1)
RBC # BLD AUTO: 5.18 10E12/L (ref 3.8–5.2)
SODIUM SERPL-SCNC: 138 MMOL/L (ref 134–144)
WBC # BLD AUTO: 9.6 10E9/L (ref 4–11)

## 2020-06-21 PROCEDURE — 25500064 ZZH RX 255 OP 636: Performed by: FAMILY MEDICINE

## 2020-06-21 PROCEDURE — 25000132 ZZH RX MED GY IP 250 OP 250 PS 637: Performed by: FAMILY MEDICINE

## 2020-06-21 PROCEDURE — 80048 BASIC METABOLIC PNL TOTAL CA: CPT | Performed by: FAMILY MEDICINE

## 2020-06-21 PROCEDURE — 99284 EMERGENCY DEPT VISIT MOD MDM: CPT | Mod: Z6 | Performed by: FAMILY MEDICINE

## 2020-06-21 PROCEDURE — 25800030 ZZH RX IP 258 OP 636: Performed by: FAMILY MEDICINE

## 2020-06-21 PROCEDURE — 36415 COLL VENOUS BLD VENIPUNCTURE: CPT | Performed by: FAMILY MEDICINE

## 2020-06-21 PROCEDURE — 86140 C-REACTIVE PROTEIN: CPT | Performed by: FAMILY MEDICINE

## 2020-06-21 PROCEDURE — 71260 CT THORAX DX C+: CPT

## 2020-06-21 PROCEDURE — 99285 EMERGENCY DEPT VISIT HI MDM: CPT | Mod: 25 | Performed by: FAMILY MEDICINE

## 2020-06-21 PROCEDURE — 85025 COMPLETE CBC W/AUTO DIFF WBC: CPT | Performed by: FAMILY MEDICINE

## 2020-06-21 PROCEDURE — 72128 CT CHEST SPINE W/O DYE: CPT

## 2020-06-21 RX ORDER — HYDROCODONE BITARTRATE AND ACETAMINOPHEN 5; 325 MG/1; MG/1
1 TABLET ORAL ONCE
Status: COMPLETED | OUTPATIENT
Start: 2020-06-21 | End: 2020-06-21

## 2020-06-21 RX ORDER — SODIUM CHLORIDE 9 MG/ML
1000 INJECTION, SOLUTION INTRAVENOUS CONTINUOUS
Status: DISCONTINUED | OUTPATIENT
Start: 2020-06-21 | End: 2020-06-22 | Stop reason: HOSPADM

## 2020-06-21 RX ORDER — HYDROCODONE BITARTRATE AND ACETAMINOPHEN 5; 325 MG/1; MG/1
1 TABLET ORAL EVERY 6 HOURS PRN
Qty: 12 TABLET | Refills: 0 | Status: SHIPPED | OUTPATIENT
Start: 2020-06-21 | End: 2020-07-02

## 2020-06-21 RX ORDER — IODIXANOL 320 MG/ML
100 INJECTION, SOLUTION INTRAVASCULAR ONCE
Status: COMPLETED | OUTPATIENT
Start: 2020-06-21 | End: 2020-06-21

## 2020-06-21 RX ADMIN — IODIXANOL 100 ML: 320 INJECTION, SOLUTION INTRAVASCULAR at 21:40

## 2020-06-21 RX ADMIN — SODIUM CHLORIDE 1000 ML: 9 INJECTION, SOLUTION INTRAVENOUS at 20:45

## 2020-06-21 RX ADMIN — HYDROCODONE BITARTRATE AND ACETAMINOPHEN 1 TABLET: 5; 325 TABLET ORAL at 22:47

## 2020-06-21 ASSESSMENT — MIFFLIN-ST. JEOR: SCORE: 1393.54

## 2020-06-21 NOTE — ED AVS SNAPSHOT
[facility]  [address]  [place] [address]  Phone:  [phone]  Fax:  [phone]                                    Kate Chacon   MRN: [id number]    Department:  [facility]   Date of Visit:  6/21/2020           After Visit Summary Signature Page    I have received my discharge instructions, and my questions have been answered. I have discussed any challenges I see with this plan with the nurse or doctor.    ..........................................................................................................................................  Patient/Patient Representative Signature      ..........................................................................................................................................  Patient Representative Print Name and Relationship to Patient    ..................................................               ................................................  Date                                   Time    ..........................................................................................................................................  Reviewed by Signature/Title    ...................................................              ..............................................  Date                                               Time          22EPIC Rev 08/18

## 2020-06-22 NOTE — ED PROVIDER NOTES
History     Chief Complaint   Patient presents with     Back Pain     HPI  Kate Chacon is a 74 year old female who presents for evaluation of back pain . Was in kitchen 2 weeks ago when she slipped on some rugs and fell onto her buttocks . Has been seen in ER twice and clinic once and has been given several prescriptions which none have been helpful . She states pain increases with deep breath and that it radiates to under her left breast . She has difficulty getting up from seated position due to her pain      Allergies:  Allergies   Allergen Reactions     Methylpar-Na Bisulfite-Pentazocine Unknown     jittery       Problem List:    Patient Active Problem List    Diagnosis Date Noted     Sinoatrial node dysfunction (H) 05/01/2018     Priority: Medium     Cardiac pacemaker 02/19/2018     Priority: Medium     Urge incontinence 10/05/2017     Priority: Medium     Former smoker 08/30/2017     Priority: Medium     MDD (recurrent major depressive disorder) in remission (H) 08/30/2017     Priority: Medium     Anxiety 01/05/2017     Priority: Medium     Hypertension 12/08/2016     Priority: Medium     Paroxysmal atrial fibrillation (H) 11/02/2016     Priority: Medium     Anticoagulation goal of INR 2 to 3 10/28/2016     Priority: Medium     Ischemic stroke (H) 10/15/2016     Priority: Medium     Diverticulosis of large intestine 04/04/2011     Priority: Medium     H/O adenomatous polyp of colon 03/02/2011     Priority: Medium     Osteoporosis 02/08/2006     Priority: Medium        Past Medical History:    Past Medical History:   Diagnosis Date     Encounter for full-term uncomplicated delivery      Ganglion of wrist      Gastritis without bleeding        Past Surgical History:    Past Surgical History:   Procedure Laterality Date     APPENDECTOMY OPEN      No Comments Provided     COLONOSCOPY  08/19/2005 8/19/05,Cecal biopsy with tubular adenoma low grade dysplasia.     COLONOSCOPY  04/04/2011 4/4/11      COLONOSCOPY  05/07/2012    Tubular Adenomas F/U 2017     COLONOSCOPY  12/02/2019    F/U N/A as will be over 78 in 2024. Tubular adenoma     ESOPHAGOSCOPY, GASTROSCOPY, DUODENOSCOPY (EGD), COMBINED      8/19/05     OTHER SURGICAL HISTORY      8/3/05,085559,OTHER,helices on the right ear     TONSILLECTOMY      No Comments Provided       Family History:    Family History   Problem Relation Age of Onset     Diabetes Father         Diabetes     Hypertension Father         Hypertension     Other - See Comments Mother         h/o coronary artery disease/dementia     Asthma Mother         Asthma     Diabetes Maternal Grandmother         Diabetes     Cancer Maternal Aunt         Cancer,Uterine     Breast Cancer No family hx of         Cancer-breast       Social History:  Marital Status:   [2]  Social History     Tobacco Use     Smoking status: Former Smoker     Packs/day: 0.50     Years: 49.00     Pack years: 24.50     Types: Cigarettes     Last attempt to quit: 10/14/2016     Years since quitting: 3.6     Smokeless tobacco: Never Used   Substance Use Topics     Alcohol use: No     Drug use: No        Medications:    atorvastatin (LIPITOR) 40 MG tablet  HYDROcodone-acetaminophen (NORCO) 5-325 MG tablet  lisinopril (PRINIVIL/ZESTRIL) 20 MG tablet  metoprolol succinate ER (TOPROL-XL) 25 MG 24 hr tablet  sertraline (ZOLOFT) 50 MG tablet  tiZANidine (ZANAFLEX) 4 MG tablet  warfarin ANTICOAGULANT (COUMADIN) 5 MG tablet  calcium carbonate 750 MG CHEW          Review of Systems   HENT: Negative.    Respiratory: Negative for apnea, cough, choking, chest tightness, shortness of breath, wheezing and stridor.         Pleuritic chest pain    Cardiovascular: Positive for chest pain. Negative for palpitations and leg swelling.   Gastrointestinal: Negative.    Genitourinary: Negative.    Musculoskeletal: Positive for back pain.   Neurological: Negative.    All other systems reviewed and are negative.      Physical Exam   BP: (!)  "141/97  Heart Rate: 99  Temp: 97.9  F (36.6  C)  Height: 170.2 cm (5' 7\")  Weight: 87.1 kg (192 lb)  SpO2: 96 %      Physical Exam  Vitals signs and nursing note reviewed.   Constitutional:       Comments: Uncomfortable appearing    HENT:      Head: Normocephalic and atraumatic.   Neck:      Musculoskeletal: Normal range of motion and neck supple.   Cardiovascular:      Rate and Rhythm: Normal rate and regular rhythm.      Pulses: Normal pulses.   Pulmonary:      Effort: Pulmonary effort is normal.      Breath sounds: Normal breath sounds.   Abdominal:      General: Abdomen is flat.      Tenderness: There is no abdominal tenderness.   Musculoskeletal: Normal range of motion.   Neurological:      Mental Status: She is alert and oriented to person, place, and time.         ED Course      Patient presents to ER for evaluation of persistent back pain following a fall a couple weeks prior. Patient triaged to exam room. Vital signs reviewed. Medical records reviewed History and exam completed Diagnostic studies done showing T10 cimpression fracture. Discussed diagnosis with patient. Will order TLSO brace for comfort and refer to back specialist She was given norco for discomfort which she tolerated. She will be discharged home with prescripton for small quantity . Return to ER as needed for uncontrolled pain or other concerns   Procedures    Results for orders placed or performed during the hospital encounter of 06/21/20   CT Chest w Contrast     Status: None    Narrative    PROCEDURE INFORMATION:   Exam: CT Chest With Contrast   Exam date and time: 6/21/2020 9:15 PM   Age: 74 years old   Clinical indication: Chest pain; Type not specified; Prior surgery; Surgery   date: 6+ months; Surgery type: Ppm; Additional info: Chest pain or SOB,   pleurisy or effusion suspected     TECHNIQUE:   Imaging protocol: Computed tomography of the chest with intravenous contrast.   3D rendering: MIP and/or 3D reconstructed images were " created by the   technologist.   Radiation optimization: All CT scans at this facility use at least one of these   dose optimization techniques: automated exposure control; mA and/or kV   adjustment per patient size (includes targeted exams where dose is matched to   clinical indication); or iterative reconstruction.   Contrast material: VISIPAQUE 320; Contrast volume: 100 ml; Contrast route:   INTRAVENOUS (IV);      COMPARISON:   No relevant prior studies available.     FINDINGS:   Tubes, catheters and devices: LEFT subclavian cardiac pacer/AICD present.   Lungs: Chronic appearing, multifocal bilateral interstitial lung densities   present. Bibasilar atelectasis and/or scarring. Bilateral air trapping with   associated hazy ground glass lung parenchymal densities consistent with   expiratory phase imaging.Gravitational atelectatic changes are noted within the   lungs bilaterally.   Pleural space: No right pleural effusion. No left pleural effusion. No   pneumothorax.   Heart: No pathologic pericardial effusion. Mild cardiomegaly.  Mediastinal space: No pathologic mediastinal fluid collection or solid mass.   Pulmonary arteries: No central pulmonary embolus.   Aorta: No acute aortic dissection or aortic aneurysm. Atherosclerotic arterial   calcifications present.  Lymph nodes: No pathologically enlarged lymph nodes.   Bones/joints: Multilevel degenerative changes present within spine.   Osteopenia.Age-indeterminate mild T10 inferior endplate compression fracture   which could be acute, subacute, and/or chronic.   Soft tissues: Bilateral adrenal hyperplasia.      Impression    IMPRESSION:   1. Age-indeterminate T10 fracture.   2. No pulmonary embolus.   Remainder of findings described as above.    THIS DOCUMENT HAS BEEN ELECTRONICALLY SIGNED BY BLANK ANTOINE MD   CT Thoracic Spine w/o Contrast     Status: None    Narrative    PROCEDURE INFORMATION:   Exam: CT Thoracic Spine Without Contrast   Exam date and time:  6/21/2020 9:18 PM   Age: 74 years old   Clinical indication: Pain in thoracic spine; Additional info: Chest pain or   SOB, pleurisy or effusion suspected     TECHNIQUE:   Imaging protocol: Computed tomography images of the thoracic spine without   contrast.   Radiation optimization: All CT scans at this facility use at least one of these   dose optimization techniques: automated exposure control; mA and/or kV   adjustment per patient size (includes targeted exams where dose is matched to   clinical indication); or iterative reconstruction.     COMPARISON:   No relevant prior studies available.     FINDINGS:   Vertebrae: Mild acute T10 inferior endplate compression fracture. No acute   posttraumatic subluxation.   Discs/Spinal canal/Neural foramina: No significant focal disc herniation.   Other bones/joints: Osteopenia.Multilevel degenerative changes present within   kyphotic spine. No neoplastic osseous lesion present.   Soft tissues: Unremarkable.   Pleural space: No pneumothorax or pleural effusion.       Impression    IMPRESSION:   1. Acute T10 fracture.   2. Remainder of findings as above.     THIS DOCUMENT HAS BEEN ELECTRONICALLY SIGNED BY BLANK ANTOINE MD   Basic metabolic panel     Status: Abnormal   Result Value Ref Range    Sodium 138 134 - 144 mmol/L    Potassium 3.3 (L) 3.5 - 5.1 mmol/L    Chloride 102 98 - 107 mmol/L    Carbon Dioxide 30 21 - 31 mmol/L    Anion Gap 6 3 - 14 mmol/L    Glucose 102 70 - 105 mg/dL    Urea Nitrogen 23 7 - 25 mg/dL    Creatinine 1.10 0.60 - 1.20 mg/dL    GFR Estimate 48 (L) >60 mL/min/[1.73_m2]    GFR Estimate If Black 58 (L) >60 mL/min/[1.73_m2]    Calcium 10.0 8.6 - 10.3 mg/dL   CBC with platelets differential     Status: Abnormal   Result Value Ref Range    WBC 9.6 4.0 - 11.0 10e9/L    RBC Count 5.18 3.8 - 5.2 10e12/L    Hemoglobin 13.9 11.7 - 15.7 g/dL    Hematocrit 45.4 35.0 - 47.0 %    MCV 88 78 - 100 fl    MCH 26.8 26.5 - 33.0 pg    MCHC 30.6 (L) 31.5 - 36.5 g/dL    RDW  18.6 (H) 10.0 - 15.0 %    Platelet Count 272 150 - 450 10e9/L    Diff Method Automated Method     % Neutrophils 63.1 %    % Lymphocytes 22.3 %    % Monocytes 11.1 %    % Eosinophils 2.7 %    % Basophils 0.5 %    % Immature Granulocytes 0.3 %    Absolute Neutrophil 6.0 1.6 - 8.3 10e9/L    Absolute Lymphocytes 2.1 0.8 - 5.3 10e9/L    Absolute Monocytes 1.1 0.0 - 1.3 10e9/L    Absolute Eosinophils 0.3 0.0 - 0.7 10e9/L    Absolute Basophils 0.1 0.0 - 0.2 10e9/L    Abs Immature Granulocytes 0.0 0 - 0.4 10e9/L   CRP inflammation     Status: Abnormal   Result Value Ref Range    CRP Inflammation 1.5 (H) <0.5 mg/L               No results found for this or any previous visit (from the past 24 hour(s)).    Medications - No data to display    Assessments & Plan (with Medical Decision Making)     I have reviewed the nursing notes.    I have reviewed the findings, diagnosis, plan and need for follow up with the patient.      New Prescriptions    No medications on file       Final diagnoses:   None   (S22.070A) Closed wedge compression fracture of tenth thoracic vertebra, initial encounter (H)  Comment:  Plan: NEUROSURGERY REFERRAL, ORTHOTICS REFERRAL           6/21/2020   [facility] Kathy Turpin MD  07/02/20 [id number]

## 2020-06-22 NOTE — DISCHARGE INSTRUCTIONS
You may take norco as needed for severe pain . YOu may continue your tizanidine for muscle spasm but do not combine with use of norco. Contact [phone] to schedule an outpatient clinic with the spine doctor. You have also been referred for fitting of a back brace. You should be contacted regarding this appointment

## 2020-06-22 NOTE — ED TRIAGE NOTES
"ED Nursing Triage Note (General)   ________________________________    Kate Chacon is a 74 year old Female that presents to triage private car  With history of  A fall two weeks ago and injured her back at the thoracic area and had pain in her back at that time but has now a worsening of her pain especially with a deep breath and with movement under her left breast.  states she has been here x2 and at the [facility] with nothing found on her xrays reported by patient   Significant symptoms had onset 2 week(s) ago.  BP (!) 141/97   Temp 97.9  F (36.6  C) (Tympanic)   Ht 1.702 m (5' 7\")   Wt 87.1 kg (192 lb)   SpO2 96%   Breastfeeding No   BMI 30.07 kg/m  t  Patient appears alert , in moderate distress., and cooperative behavior.    GCS Total = 15  Airway: intact  Breathing noted as Normal.  Circulation Normal  Skin normal  Action taken:  To a room 905      PRE HOSPITAL PRIOR LIVING SITUATION Spouse  "

## 2020-06-24 NOTE — ED NOTES
I have reviewed the chart and evaluation of this patient who has a known T10 compression fracture.  This patient would benefit from a TLSO brace.  Prescription was written and faxed to [facility]  orthotics.  Dr. May was consulted and informed and agrees with this as well.     Zachery Jo, PA-C  06/24/20 [id number]

## 2020-07-02 ENCOUNTER — OFFICE VISIT (OUTPATIENT)
Dept: FAMILY MEDICINE | Facility: OTHER | Age: 75
End: 2020-07-02
Attending: FAMILY MEDICINE
Payer: COMMERCIAL

## 2020-07-02 VITALS
DIASTOLIC BLOOD PRESSURE: 62 MMHG | TEMPERATURE: 96.9 F | RESPIRATION RATE: 20 BRPM | SYSTOLIC BLOOD PRESSURE: 102 MMHG | HEART RATE: 78 BPM

## 2020-07-02 DIAGNOSIS — M80.08XS AGE-RELATED OSTEOPOROSIS WITH CURRENT PATHOLOGICAL FRACTURE, VERTEBRA(E), SEQUELA: ICD-10-CM

## 2020-07-02 DIAGNOSIS — Z51.81 ANTICOAGULATION GOAL OF INR 2 TO 3: ICD-10-CM

## 2020-07-02 DIAGNOSIS — Z95.0 CARDIAC PACEMAKER: ICD-10-CM

## 2020-07-02 DIAGNOSIS — S39.012D STRAIN OF LUMBAR REGION, SUBSEQUENT ENCOUNTER: ICD-10-CM

## 2020-07-02 DIAGNOSIS — Z79.01 ANTICOAGULATION GOAL OF INR 2 TO 3: ICD-10-CM

## 2020-07-02 DIAGNOSIS — I48.0 PAROXYSMAL ATRIAL FIBRILLATION (H): ICD-10-CM

## 2020-07-02 DIAGNOSIS — S22.070D COMPRESSION FRACTURE OF T10 VERTEBRA WITH ROUTINE HEALING, SUBSEQUENT ENCOUNTER: Primary | ICD-10-CM

## 2020-07-02 PROCEDURE — 99213 OFFICE O/P EST LOW 20 MIN: CPT | Performed by: FAMILY MEDICINE

## 2020-07-02 PROCEDURE — G0463 HOSPITAL OUTPT CLINIC VISIT: HCPCS

## 2020-07-02 RX ORDER — HYDROCODONE BITARTRATE AND ACETAMINOPHEN 5; 325 MG/1; MG/1
1-2 TABLET ORAL EVERY 6 HOURS PRN
Qty: 40 TABLET | Refills: 0 | Status: SHIPPED | OUTPATIENT
Start: 2020-07-02 | End: 2020-08-19

## 2020-07-02 ASSESSMENT — ENCOUNTER SYMPTOMS
SHORTNESS OF BREATH: 0
BACK PAIN: 1
STRIDOR: 0
NEUROLOGICAL NEGATIVE: 1
PALPITATIONS: 0
CHEST TIGHTNESS: 0
CHOKING: 0
WHEEZING: 0
COUGH: 0
APNEA: 0
GASTROINTESTINAL NEGATIVE: 1

## 2020-07-02 ASSESSMENT — PAIN SCALES - GENERAL: PAINLEVEL: EXTREME PAIN (8)

## 2020-07-02 NOTE — PROGRESS NOTES
Nursing Notes:   Aurea Jackson LPN  7/2/2020 11:04 AM  Signed  Chief Complaint   Patient presents with     ER F/U     Back         Medication Reconciliation: complete    Aurea Jackson LPN      SUBJECTIVE:  Kate Chacon  is a 74 year old female Who comes in today for follow-up for her back.  She was seen in the emergency room on June 12 with an acute right-sided low back pain without sciatica.  She had a fall on June 12 where she landed on her buttocks.  She had CT of the lumbar spine and pelvis without fracture.  She has been taking hydrocodone and muscle relaxants.  They talked about physical therapy referral but she elected to hold off. When she had persistent symptoms, she went in on June 21 and CT of the thoracic spine was done which showed an acute T10 compression fracture.  She was to be fitted with a TLSO.  She is here today for follow-up.    She is comfortable when lying down but it bothers her when she first gets up.  The hydrocodone does not help a whole lot but she is only been taking 1.  The TLSO is cumbersome and hot and at times makes it feel better and at times not.  She also has an elastic Velcro back support that she has use that sometimes is helpful.  Her  has made making her wear her TLSO most of the time.  We discussed the fact that this is really more for help with symptom control as it appears that this is a stable fracture.    Past Medical, Family, and Social History reviewed and updated as noted below.   ROS is negative except as noted above       Allergies   Allergen Reactions     Methylpar-Na Bisulfite-Pentazocine Unknown     jittery   ,   Family History   Problem Relation Age of Onset     Diabetes Father         Diabetes     Hypertension Father         Hypertension     Other - See Comments Mother         h/o coronary artery disease/dementia     Asthma Mother         Asthma     Diabetes Maternal Grandmother         Diabetes     Cancer Maternal Aunt          Cancer,Uterine     Breast Cancer No family hx of         Cancer-breast   ,   Current Outpatient Medications   Medication     atorvastatin (LIPITOR) 40 MG tablet     calcium carbonate 750 MG CHEW     HYDROcodone-acetaminophen (NORCO) 5-325 MG tablet     lisinopril (PRINIVIL/ZESTRIL) 20 MG tablet     metoprolol succinate ER (TOPROL-XL) 25 MG 24 hr tablet     sertraline (ZOLOFT) 50 MG tablet     tiZANidine (ZANAFLEX) 4 MG tablet     warfarin ANTICOAGULANT (COUMADIN) 5 MG tablet     No current facility-administered medications for this visit.    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   ,   Patient Active Problem List    Diagnosis Date Noted     Sinoatrial node dysfunction (H) 05/01/2018     Priority: Medium     Cardiac pacemaker 02/19/2018     Priority: Medium     Urge incontinence 10/05/2017     Priority: Medium     Former smoker 08/30/2017     Priority: Medium     MDD (recurrent major depressive disorder) in remission (H) 08/30/2017     Priority: Medium     Anxiety 01/05/2017     Priority: Medium     Hypertension 12/08/2016     Priority: Medium     Paroxysmal atrial fibrillation (H) 11/02/2016     Priority: Medium     Anticoagulation goal of INR 2 to 3 10/28/2016     Priority: Medium     Ischemic stroke (H) 10/15/2016     Priority: Medium     Diverticulosis of large intestine 04/04/2011     Priority: Medium     H/O adenomatous polyp of colon 03/02/2011     Priority: Medium     Osteoporosis 02/08/2006     Priority: Medium   ,   Past Surgical History:   Procedure Laterality Date     APPENDECTOMY OPEN      No Comments Provided     COLONOSCOPY  08/19/2005 8/19/05,Cecal biopsy with tubular adenoma low grade dysplasia.     COLONOSCOPY  04/04/2011 4/4/11     COLONOSCOPY  05/07/2012    Tubular Adenomas F/U 2017     COLONOSCOPY   12/02/2019    F/U N/A as will be over 78 in 2024. Tubular adenoma     ESOPHAGOSCOPY, GASTROSCOPY, DUODENOSCOPY (EGD), COMBINED      8/19/05     OTHER SURGICAL HISTORY      8/3/05,047626,OTHER,helices on the right ear     TONSILLECTOMY      No Comments Provided    and   Social History     Tobacco Use     Smoking status: Former Smoker     Packs/day: 0.50     Years: 49.00     Pack years: 24.50     Types: Cigarettes     Last attempt to quit: 10/14/2016     Years since quitting: 3.7     Smokeless tobacco: Never Used   Substance Use Topics     Alcohol use: No     OBJECTIVE:  /62 (BP Location: Right arm, Patient Position: Sitting, Cuff Size: Adult Regular)   Pulse 78   Temp 96.9  F (36.1  C)   Resp 20    EXAM:  Alert cooperative, no distress.  Appears comfortable in her TLSO.  She has no focal neurologic findings.  Imaging studies are reviewed.  ASSESSMENT/Plan :    Kate was seen today for er f/u.    Diagnoses and all orders for this visit:    Compression fracture of T10 vertebra with routine healing, subsequent encounter  -     IR Lumbar Kyphoplasty Vertebrae; Future  -     HYDROcodone-acetaminophen (NORCO) 5-325 MG tablet; Take 1-2 tablets by mouth every 6 hours as needed for pain    Age-related osteoporosis with current pathological fracture, vertebra(e), sequela   -     IR Lumbar Kyphoplasty Vertebrae; Future    Strain of lumbar region, subsequent encounter      Referred for evaluation for possible kyphoplasty.  She would need to be off of her warfarin for a few days beforehand.  Discussed conservative treatment as well.  Okay to rotate her back supports.  Liberalize her use of hydrocodone and try and employ the strategy of taking it about an hour before she gets up to see if that will mitigate her pain when she first gets moving around.  Discussed other thing she can do to adjust her activity.  Advised that this may take up to 12 weeks to heal completely.  She will keep in touch with her progress.  She is  concerned about use of the opiates that she does not want to get addicted.  I assured her that we would use these short-term.  Her  query is appropriate.  If she is not a candidate for kyphoplasty, it is likely that she may need a refill on her opiates.  She will keep in touch with her progress.    A total of 15 minutes was spent with the patient, greater than 50% of the time was spent in counseling/discussion of the aforementioned concerns.     Asher Campos MD

## 2020-07-02 NOTE — NURSING NOTE
Chief Complaint   Patient presents with     ER F/U     Back         Medication Reconciliation: complete    Aurea Jackson LPN

## 2020-07-13 ENCOUNTER — ANTICOAGULATION THERAPY VISIT (OUTPATIENT)
Dept: ANTICOAGULATION | Facility: OTHER | Age: 75
End: 2020-07-13
Attending: FAMILY MEDICINE
Payer: MEDICARE

## 2020-07-13 ENCOUNTER — TELEPHONE (OUTPATIENT)
Dept: FAMILY MEDICINE | Facility: OTHER | Age: 75
End: 2020-07-13

## 2020-07-13 DIAGNOSIS — Z79.01 ANTICOAGULATION GOAL OF INR 2 TO 3: ICD-10-CM

## 2020-07-13 DIAGNOSIS — I48.0 PAROXYSMAL ATRIAL FIBRILLATION (H): ICD-10-CM

## 2020-07-13 DIAGNOSIS — Z51.81 ANTICOAGULATION GOAL OF INR 2 TO 3: ICD-10-CM

## 2020-07-13 LAB — INR POINT OF CARE: 4.9 (ref 0.86–1.14)

## 2020-07-13 PROCEDURE — 85610 PROTHROMBIN TIME: CPT | Mod: QW,ZL

## 2020-07-13 NOTE — TELEPHONE ENCOUNTER
Patient had not been called to schedule Kyphoplasty yet, spoke with [facility] and they never received the referral, per their  patient will need to have an MRI prior to the injection, then a new referral placed.  Tori Jackson LPN ...... 7/13/2020 1:12 PM

## 2020-07-13 NOTE — TELEPHONE ENCOUNTER
Patient would like a call back to ask a few questions, to see if Dr. Andrade was able to talk to Dr. Ryle the radiologist.

## 2020-07-13 NOTE — TELEPHONE ENCOUNTER
She has a pacemaker and can't have MRI.  What do I need to do to place the order so they get it?  Asher Campos MD on 7/13/2020 at 1:58 PM

## 2020-07-13 NOTE — TELEPHONE ENCOUNTER
They will work on this referral, should be able to have with this contraindication, [facility] will let patient know if patient can have it.  Tori Jackson LPN ...... 7/13/2020 2:17 PM

## 2020-07-13 NOTE — PROGRESS NOTES
ANTICOAGULATION FOLLOW-UP CLINIC VISIT    Patient Name:  Kate Chacon  Date:  2020  Contact Type:  Face to Face    SUBJECTIVE:  Patient Findings     Positives:   Emergency department visit (She fell has a compression fracture. she is looking at having the cement injections), Change in medications (taking norco for back pain)        Clinical Outcomes     Negatives:   Major bleeding event, Thromboembolic event, Anticoagulation-related hospital admission, Anticoagulation-related ED visit, Anticoagulation-related fatality           OBJECTIVE    Recent labs: (last 7 days)     20   INR 4.9*       ASSESSMENT / PLAN  INR assessment SUPRA    Recheck INR In: 1 WEEK    INR Location Clinic      Anticoagulation Summary  As of 2020    INR goal:   2.0-3.0   TTR:   50.9 % (1 y)   INR used for dosin.9! (2020)   Warfarin maintenance plan:   2.5 mg (5 mg x 0.5) every Wed; 5 mg (5 mg x 1) all other days   Full warfarin instructions:   : Hold; Otherwise 2.5 mg every Wed; 5 mg all other days   Weekly warfarin total:   32.5 mg   Plan last modified:   Radha Burns RN (2020)   Next INR check:   2020   Priority:   High   Target end date:   Indefinite    Indications    Paroxysmal atrial fibrillation (H) [I48.0]  Anticoagulation goal of INR 2 to 3 [Z51.81  Z79.01]             Anticoagulation Episode Summary     INR check location:       Preferred lab:       Send INR reminders to:   [facility]    Comments:         Anticoagulation Care Providers     Provider Role Specialty Phone number    Asher Campos MD [facility] [phone]            See the Encounter Report to view Anticoagulation Flowsheet and Dosing Calendar (Go to Encounters tab in chart review, and find the Anticoagulation Therapy Visit)        Radha Burns, RN

## 2020-07-20 ENCOUNTER — ANTICOAGULATION THERAPY VISIT (OUTPATIENT)
Dept: ANTICOAGULATION | Facility: OTHER | Age: 75
End: 2020-07-20
Attending: FAMILY MEDICINE
Payer: MEDICARE

## 2020-07-20 DIAGNOSIS — Z51.81 ANTICOAGULATION GOAL OF INR 2 TO 3: ICD-10-CM

## 2020-07-20 DIAGNOSIS — I48.0 PAROXYSMAL ATRIAL FIBRILLATION (H): ICD-10-CM

## 2020-07-20 DIAGNOSIS — Z79.01 ANTICOAGULATION GOAL OF INR 2 TO 3: ICD-10-CM

## 2020-07-20 LAB — INR POINT OF CARE: 2.7 (ref 0.86–1.14)

## 2020-07-20 PROCEDURE — 36416 COLLJ CAPILLARY BLOOD SPEC: CPT | Mod: ZL

## 2020-07-20 NOTE — PROGRESS NOTES
ANTICOAGULATION FOLLOW-UP CLINIC VISIT    Patient Name:  Kate Chacon  Date:  2020  Contact Type:  Face to Face    SUBJECTIVE:  Patient Findings         Clinical Outcomes     Negatives:   Major bleeding event, Thromboembolic event, Anticoagulation-related hospital admission, Anticoagulation-related ED visit, Anticoagulation-related fatality           OBJECTIVE    Recent labs: (last 7 days)     20   INR 2.7*       ASSESSMENT / PLAN  INR assessment THER    Recheck INR In: 1 WEEK    INR Location Clinic      Anticoagulation Summary  As of 2020    INR goal:   2.0-3.0   TTR:   49.2 % (1 y)   INR used for dosin.7 (2020)   Warfarin maintenance plan:   2.5 mg (5 mg x 0.5) every Wed; 5 mg (5 mg x 1) all other days   Full warfarin instructions:   2.5 mg every Wed; 5 mg all other days   Weekly warfarin total:   32.5 mg   No change documented:   Radha Burns RN   Plan last modified:   Radha Burns RN (2020)   Next INR check:   2020   Priority:   High   Target end date:   Indefinite    Indications    Paroxysmal atrial fibrillation (H) [I48.0]  Anticoagulation goal of INR 2 to 3 [Z51.81  Z79.01]             Anticoagulation Episode Summary     INR check location:       Preferred lab:       Send INR reminders to:   [facility]    Comments:         Anticoagulation Care Providers     Provider Role Specialty Phone number    Asher Campos MD [facility] [phone]            See the Encounter Report to view Anticoagulation Flowsheet and Dosing Calendar (Go to Encounters tab in chart review, and find the Anticoagulation Therapy Visit)        Radha Burns RN

## 2020-07-27 ENCOUNTER — ANTICOAGULATION THERAPY VISIT (OUTPATIENT)
Dept: ANTICOAGULATION | Facility: OTHER | Age: 75
End: 2020-07-27
Attending: FAMILY MEDICINE
Payer: MEDICARE

## 2020-07-27 DIAGNOSIS — I48.0 PAROXYSMAL ATRIAL FIBRILLATION (H): ICD-10-CM

## 2020-07-27 DIAGNOSIS — Z79.01 ANTICOAGULATION GOAL OF INR 2 TO 3: ICD-10-CM

## 2020-07-27 DIAGNOSIS — Z51.81 ANTICOAGULATION GOAL OF INR 2 TO 3: ICD-10-CM

## 2020-07-27 LAB — INR POINT OF CARE: 2.8 (ref 0.86–1.14)

## 2020-07-27 PROCEDURE — 85610 PROTHROMBIN TIME: CPT | Mod: QW,ZL

## 2020-07-27 RX ORDER — WARFARIN SODIUM 5 MG/1
TABLET ORAL
Qty: 90 TABLET | Refills: 1 | COMMUNITY
Start: 2020-07-27 | End: 2021-01-11

## 2020-07-27 NOTE — PROGRESS NOTES
ANTICOAGULATION FOLLOW-UP CLINIC VISIT    Patient Name:  Kate Chacon  Date:  2020  Contact Type:  Face to Face    SUBJECTIVE:  Patient Findings         Clinical Outcomes     Negatives:   Major bleeding event, Thromboembolic event, Anticoagulation-related hospital admission, Anticoagulation-related ED visit, Anticoagulation-related fatality           OBJECTIVE    Recent labs: (last 7 days)     20   INR 2.8*       ASSESSMENT / PLAN  INR assessment THER    Recheck INR In: 1 WEEK    INR Location Clinic      Anticoagulation Summary  As of 2020    INR goal:   2.0-3.0   TTR:   49.2 % (1 y)   INR used for dosin.8 (2020)   Warfarin maintenance plan:   2.5 mg (5 mg x 0.5) every Wed; 5 mg (5 mg x 1) all other days   Full warfarin instructions:   2.5 mg every Wed; 5 mg all other days   Weekly warfarin total:   32.5 mg   Plan last modified:   Radha Burns RN (2020)   Next INR check:   8/3/2020   Priority:   High   Target end date:   Indefinite    Indications    Paroxysmal atrial fibrillation (H) [I48.0]  Anticoagulation goal of INR 2 to 3 [Z51.81  Z79.01]             Anticoagulation Episode Summary     INR check location:       Preferred lab:       Send INR reminders to:   [facility]    Comments:         Anticoagulation Care Providers     Provider Role Specialty Phone number    Asher Campos MD [facility] [phone]            See the Encounter Report to view Anticoagulation Flowsheet and Dosing Calendar (Go to Encounters tab in chart review, and find the Anticoagulation Therapy Visit)        Sara Rodriguez RN

## 2020-08-03 ENCOUNTER — ANTICOAGULATION THERAPY VISIT (OUTPATIENT)
Dept: ANTICOAGULATION | Facility: OTHER | Age: 75
End: 2020-08-03
Attending: FAMILY MEDICINE
Payer: MEDICARE

## 2020-08-03 DIAGNOSIS — Z79.01 ANTICOAGULATION GOAL OF INR 2 TO 3: ICD-10-CM

## 2020-08-03 DIAGNOSIS — E78.5 HYPERLIPIDEMIA, UNSPECIFIED HYPERLIPIDEMIA TYPE: ICD-10-CM

## 2020-08-03 DIAGNOSIS — Z51.81 ANTICOAGULATION GOAL OF INR 2 TO 3: ICD-10-CM

## 2020-08-03 DIAGNOSIS — I48.0 PAROXYSMAL ATRIAL FIBRILLATION (H): ICD-10-CM

## 2020-08-03 LAB — INR POINT OF CARE: 3 (ref 0.86–1.14)

## 2020-08-03 PROCEDURE — 85610 PROTHROMBIN TIME: CPT | Mod: QW,ZL

## 2020-08-04 ENCOUNTER — HOSPITAL ENCOUNTER (OUTPATIENT)
Dept: CARDIOLOGY | Facility: OTHER | Age: 75
Discharge: HOME | End: 2020-08-04
Attending: INTERNAL MEDICINE | Admitting: INTERNAL MEDICINE
Payer: COMMERCIAL

## 2020-08-04 DIAGNOSIS — I48.20 CHRONIC A-FIB (H): ICD-10-CM

## 2020-08-04 DIAGNOSIS — Z95.0 CARDIAC PACEMAKER: ICD-10-CM

## 2020-08-04 DIAGNOSIS — I48.0 PAROXYSMAL ATRIAL FIBRILLATION (H): Primary | ICD-10-CM

## 2020-08-04 DIAGNOSIS — I49.5 SINOATRIAL NODE DYSFUNCTION (H): ICD-10-CM

## 2020-08-04 PROCEDURE — 93280 PM DEVICE PROGR EVAL DUAL: CPT | Performed by: INTERNAL MEDICINE

## 2020-08-04 NOTE — PATIENT INSTRUCTIONS
It was a pleasure to see you in clinic today.  Please do not hesitate to call with any questions or concerns.  You are scheduled for a remote transmission on 11/4/2020.  We look forward to seeing you in clinic at your next device check in 6 months.    Zachery Sorenson, RN  Electrophysiology Nurse Clinician  [facility]    During Business Hours Please Call:  [phone]  After Hours Please Call:  [phone] - select option #[phone] and ask for job code [phone]

## 2020-08-05 RX ORDER — ATORVASTATIN CALCIUM 40 MG/1
TABLET, FILM COATED ORAL
Qty: 90 TABLET | Refills: 3 | Status: SHIPPED | OUTPATIENT
Start: 2020-08-05 | End: 2021-08-04

## 2020-08-05 NOTE — TELEPHONE ENCOUNTER
" Disp  Refills  Start  End  YOGESH    atorvastatin (LIPITOR) 40 MG tablet  90 tablet  3  7/29/2019   No    Sig: TAKE 1 TABLET BY MOUTH AT BEDTIME        LOV: 7/2/2020  Future Office visit:    Next 5 appointments (look out 90 days)    Aug 13, 2020  2:15 PM CDT  Return Visit with Óscar Dong DO  [facility] ([facility]) [address]  [place] [id number] [phone]        Routing refill request to provider for review/approval because:  Failed protocol    Requested Prescriptions   Pending Prescriptions Disp Refills     atorvastatin (LIPITOR) 40 MG tablet [Pharmacy Med Name: Atorvastatin Calcium 40 MG Oral Tablet] 90 tablet 0     Sig: TAKE 1 TABLET BY MOUTH AT BEDTIME       Statins Protocol Failed - 8/3/2020  9:34 AM        Failed - LDL on file in past 12 months     Recent Labs   Lab Test 08/30/17  0949   LDL 39             Passed - No abnormal creatine kinase in past 12 months     No lab results found.             Passed - Recent (12 mo) or future (30 days) visit within the authorizing provider's specialty     Patient has had an office visit with the authorizing provider or a provider within the authorizing providers department within the previous 12 mos or has a future within next 30 days. See \"Patient Info\" tab in inbasket, or \"Choose Columns\" in Meds & Orders section of the refill encounter.              Passed - Medication is active on med list        Passed - Patient is age 18 or older        Passed - No active pregnancy on record        Passed - No positive pregnancy test in past 12 months         Unable to complete prescription refill per RN Medication Refill Policy.................... Lillian Dodd RN ....................  8/5/2020   1:11 PM        "

## 2020-08-10 LAB
MDC_IDC_LEAD_IMPLANT_DT: NORMAL
MDC_IDC_LEAD_IMPLANT_DT: NORMAL
MDC_IDC_LEAD_LOCATION: NORMAL
MDC_IDC_LEAD_LOCATION: NORMAL
MDC_IDC_LEAD_LOCATION_DETAIL_1: NORMAL
MDC_IDC_LEAD_LOCATION_DETAIL_1: NORMAL
MDC_IDC_LEAD_MFG: NORMAL
MDC_IDC_LEAD_MFG: NORMAL
MDC_IDC_LEAD_MODEL: NORMAL
MDC_IDC_LEAD_MODEL: NORMAL
MDC_IDC_LEAD_POLARITY_TYPE: NORMAL
MDC_IDC_LEAD_POLARITY_TYPE: NORMAL
MDC_IDC_LEAD_SERIAL: NORMAL
MDC_IDC_LEAD_SERIAL: NORMAL
MDC_IDC_MSMT_BATTERY_DTM: NORMAL
MDC_IDC_MSMT_BATTERY_REMAINING_LONGEVITY: 60 MO
MDC_IDC_MSMT_BATTERY_STATUS: NORMAL
MDC_IDC_MSMT_LEADCHNL_RA_IMPEDANCE_VALUE: 781 OHM
MDC_IDC_MSMT_LEADCHNL_RA_SENSING_INTR_AMPL: 1.2 MV
MDC_IDC_MSMT_LEADCHNL_RV_IMPEDANCE_VALUE: 765 OHM
MDC_IDC_MSMT_LEADCHNL_RV_PACING_THRESHOLD_AMPLITUDE: 0.6 V
MDC_IDC_MSMT_LEADCHNL_RV_PACING_THRESHOLD_PULSEWIDTH: 0.4 MS
MDC_IDC_MSMT_LEADCHNL_RV_SENSING_INTR_AMPL: 19.1 MV
MDC_IDC_PG_IMPLANT_DTM: NORMAL
MDC_IDC_PG_MFG: NORMAL
MDC_IDC_PG_MODEL: NORMAL
MDC_IDC_PG_SERIAL: NORMAL
MDC_IDC_PG_TYPE: NORMAL
MDC_IDC_SESS_CLINIC_NAME: NORMAL
MDC_IDC_SESS_DTM: NORMAL
MDC_IDC_SESS_TYPE: NORMAL
MDC_IDC_SET_BRADY_AT_MODE_SWITCH_MODE: NORMAL
MDC_IDC_SET_BRADY_AT_MODE_SWITCH_RATE: 170 {BEATS}/MIN
MDC_IDC_SET_BRADY_LOWRATE: 60 {BEATS}/MIN
MDC_IDC_SET_BRADY_MAX_SENSOR_RATE: 130 {BEATS}/MIN
MDC_IDC_SET_BRADY_MAX_TRACKING_RATE: 130 {BEATS}/MIN
MDC_IDC_SET_BRADY_MODE: NORMAL
MDC_IDC_SET_BRADY_PAV_DELAY_HIGH: 250 MS
MDC_IDC_SET_BRADY_PAV_DELAY_LOW: 250 MS
MDC_IDC_SET_BRADY_SAV_DELAY_HIGH: 220 MS
MDC_IDC_SET_BRADY_SAV_DELAY_LOW: 220 MS
MDC_IDC_SET_LEADCHNL_RA_PACING_AMPLITUDE: 2.5 V
MDC_IDC_SET_LEADCHNL_RA_PACING_CAPTURE_MODE: NORMAL
MDC_IDC_SET_LEADCHNL_RA_PACING_POLARITY: NORMAL
MDC_IDC_SET_LEADCHNL_RA_PACING_PULSEWIDTH: 0.4 MS
MDC_IDC_SET_LEADCHNL_RA_SENSING_ADAPTATION_MODE: NORMAL
MDC_IDC_SET_LEADCHNL_RA_SENSING_POLARITY: NORMAL
MDC_IDC_SET_LEADCHNL_RA_SENSING_SENSITIVITY: 0.25 MV
MDC_IDC_SET_LEADCHNL_RV_PACING_AMPLITUDE: 1.3 V
MDC_IDC_SET_LEADCHNL_RV_PACING_CAPTURE_MODE: NORMAL
MDC_IDC_SET_LEADCHNL_RV_PACING_POLARITY: NORMAL
MDC_IDC_SET_LEADCHNL_RV_PACING_PULSEWIDTH: 0.4 MS
MDC_IDC_SET_LEADCHNL_RV_SENSING_ADAPTATION_MODE: NORMAL
MDC_IDC_SET_LEADCHNL_RV_SENSING_POLARITY: NORMAL
MDC_IDC_SET_LEADCHNL_RV_SENSING_SENSITIVITY: 1.5 MV
MDC_IDC_SET_ZONE_DETECTION_INTERVAL: 375 MS
MDC_IDC_SET_ZONE_TYPE: NORMAL
MDC_IDC_SET_ZONE_VENDOR_TYPE: NORMAL
MDC_IDC_STAT_BRADY_DTM_END: NORMAL
MDC_IDC_STAT_BRADY_DTM_START: NORMAL
MDC_IDC_STAT_BRADY_RA_PERCENT_PACED: 0 %
MDC_IDC_STAT_BRADY_RV_PERCENT_PACED: 26 %
MDC_IDC_STAT_EPISODE_RECENT_COUNT: 345
MDC_IDC_STAT_EPISODE_RECENT_COUNT_DTM_END: NORMAL
MDC_IDC_STAT_EPISODE_RECENT_COUNT_DTM_START: NORMAL
MDC_IDC_STAT_EPISODE_TOTAL_COUNT: 581
MDC_IDC_STAT_EPISODE_TOTAL_COUNT_DTM_END: NORMAL
MDC_IDC_STAT_EPISODE_TYPE: NORMAL
MDC_IDC_STAT_EPISODE_TYPE: NORMAL
MDC_IDC_STAT_EPISODE_VENDOR_TYPE: NORMAL
MDC_IDC_STAT_EPISODE_VENDOR_TYPE: NORMAL

## 2020-08-13 ENCOUNTER — OFFICE VISIT (OUTPATIENT)
Dept: CARDIOLOGY | Facility: OTHER | Age: 75
End: 2020-08-13
Attending: INTERNAL MEDICINE
Payer: COMMERCIAL

## 2020-08-13 VITALS
WEIGHT: 187 LBS | SYSTOLIC BLOOD PRESSURE: 130 MMHG | RESPIRATION RATE: 18 BRPM | HEIGHT: 65 IN | OXYGEN SATURATION: 97 % | DIASTOLIC BLOOD PRESSURE: 94 MMHG | BODY MASS INDEX: 31.16 KG/M2 | TEMPERATURE: 97.7 F | HEART RATE: 82 BPM

## 2020-08-13 DIAGNOSIS — Z51.81 ANTICOAGULATION GOAL OF INR 2 TO 3: ICD-10-CM

## 2020-08-13 DIAGNOSIS — N18.30 CKD (CHRONIC KIDNEY DISEASE) STAGE 3, GFR 30-59 ML/MIN (H): ICD-10-CM

## 2020-08-13 DIAGNOSIS — I49.5 SA NODE DYSFUNCTION (H): ICD-10-CM

## 2020-08-13 DIAGNOSIS — I48.91 ON COUMADIN FOR ATRIAL FIBRILLATION (H): ICD-10-CM

## 2020-08-13 DIAGNOSIS — M54.50 CHRONIC MIDLINE LOW BACK PAIN WITHOUT SCIATICA: ICD-10-CM

## 2020-08-13 DIAGNOSIS — I45.5 SINUS PAUSE: ICD-10-CM

## 2020-08-13 DIAGNOSIS — R06.09 DYSPNEA ON EXERTION: ICD-10-CM

## 2020-08-13 DIAGNOSIS — G89.29 CHRONIC MIDLINE LOW BACK PAIN WITHOUT SCIATICA: ICD-10-CM

## 2020-08-13 DIAGNOSIS — I49.5 SINOATRIAL NODE DYSFUNCTION (H): ICD-10-CM

## 2020-08-13 DIAGNOSIS — I10 ESSENTIAL HYPERTENSION: ICD-10-CM

## 2020-08-13 DIAGNOSIS — Z87.891 HISTORY OF TOBACCO ABUSE: ICD-10-CM

## 2020-08-13 DIAGNOSIS — Z95.0 CARDIAC PACEMAKER: ICD-10-CM

## 2020-08-13 DIAGNOSIS — Z79.01 ON COUMADIN FOR ATRIAL FIBRILLATION (H): ICD-10-CM

## 2020-08-13 DIAGNOSIS — I48.0 PAROXYSMAL ATRIAL FIBRILLATION (H): Primary | ICD-10-CM

## 2020-08-13 DIAGNOSIS — E78.2 MIXED HYPERLIPIDEMIA: ICD-10-CM

## 2020-08-13 DIAGNOSIS — Z79.01 ANTICOAGULATION GOAL OF INR 2 TO 3: ICD-10-CM

## 2020-08-13 DIAGNOSIS — Z86.73 HISTORY OF ISCHEMIC STROKE: ICD-10-CM

## 2020-08-13 LAB — INTERPRETATION ECG - MUSE: NORMAL

## 2020-08-13 PROCEDURE — G0463 HOSPITAL OUTPT CLINIC VISIT: HCPCS | Mod: 25

## 2020-08-13 PROCEDURE — 93005 ELECTROCARDIOGRAM TRACING: CPT

## 2020-08-13 PROCEDURE — 93010 ELECTROCARDIOGRAM REPORT: CPT | Performed by: INTERNAL MEDICINE

## 2020-08-13 PROCEDURE — G0463 HOSPITAL OUTPT CLINIC VISIT: HCPCS

## 2020-08-13 PROCEDURE — 99215 OFFICE O/P EST HI 40 MIN: CPT | Performed by: INTERNAL MEDICINE

## 2020-08-13 RX ORDER — METOPROLOL SUCCINATE 50 MG/1
50 TABLET, EXTENDED RELEASE ORAL DAILY
Qty: 90 TABLET | Refills: 3 | Status: SHIPPED | OUTPATIENT
Start: 2020-08-13 | End: 2021-08-04

## 2020-08-13 ASSESSMENT — ANXIETY QUESTIONNAIRES
6. BECOMING EASILY ANNOYED OR IRRITABLE: NOT AT ALL
IF YOU CHECKED OFF ANY PROBLEMS ON THIS QUESTIONNAIRE, HOW DIFFICULT HAVE THESE PROBLEMS MADE IT FOR YOU TO DO YOUR WORK, TAKE CARE OF THINGS AT HOME, OR GET ALONG WITH OTHER PEOPLE: NOT DIFFICULT AT ALL
3. WORRYING TOO MUCH ABOUT DIFFERENT THINGS: NOT AT ALL
GAD7 TOTAL SCORE: 0
1. FEELING NERVOUS, ANXIOUS, OR ON EDGE: NOT AT ALL
5. BEING SO RESTLESS THAT IT IS HARD TO SIT STILL: NOT AT ALL
2. NOT BEING ABLE TO STOP OR CONTROL WORRYING: NOT AT ALL
7. FEELING AFRAID AS IF SOMETHING AWFUL MIGHT HAPPEN: NOT AT ALL

## 2020-08-13 ASSESSMENT — MIFFLIN-ST. JEOR: SCORE: 1339.72

## 2020-08-13 ASSESSMENT — PATIENT HEALTH QUESTIONNAIRE - PHQ9: 5. POOR APPETITE OR OVEREATING: NOT AT ALL

## 2020-08-13 ASSESSMENT — PAIN SCALES - GENERAL: PAINLEVEL: NO PAIN (0)

## 2020-08-13 NOTE — PROGRESS NOTES
[facility]   CARDIOLOGY PROGRESS NOTE     Chief Complaint   Patient presents with     Consult For     AFib          Diagnosis:  1.  Persistent atrial fib.  2.  H/O ischemic stroke on 10/15/16.  3.  HTN-controlled.  4.  SA node dysfunction s/p pacemaker on 11/2/17 at [facility].  5.  On Coumadin for atrial fib.   6.  H/O tobacco abuse quitting on 10/14/16.   7.  Anxiety.  8.  CKD-3.  9.  MARSHALL.  10.  CHADSVASC of 6.  11.  Hyperlipidemia-controlled.     Assessment/Plan:    1.  Reviewed her pacemaker interrogation from 8/4/2020.  Showed A. fib at 100% of the time with average heart rate of 91 bpm.  Heart rate has been running high at times.  We will plan to increase metoprolol from 25 mg XL daily to 50 mg XL daily.  2.  Continue on Coumadin for atrial fibrillation.  Briefly, we did discuss the DOAC's.  Patient states she has been relatively stable on Coumadin and did not want to make any changes presently.  3.  She complains of fatigue.  Denies having YAAKOV with snoring or stopping breathing.  She does get up approximately every 1-1.5 hours to urinate.  I suspect this is reason for her fatigue.  She has seen urology in the past.  Referral was offered but declined.  4.  Continue to have pacemaker checked here locally.  5.  Follow-up with Dr. Campos for low back pain.  6.  Follow-up in 6 months or sooner with issues.      Interval history:  Kate is being seen after interrogation of her pacemaker on 8/4/2020.  She has a history of a cryptogenic stroke suspected to be secondary to unknown/untreated A. fib on 10/15/2016.  She states she had gotten up in the night and had numbness to her leg.  She thought she had slept on it wrong.  It continued into the next day.  She was evaluated in the ER, I believe she was found to have A. fib.  She has since been on Coumadin.  She states her INR's are stable.  We did discuss the DOAC's.  She was not willing to change as she has been stable and secondary to the higher cost.   Related to her elevated heart rates, with average heart rate of 91 bpm on her pacemaker, we discussed increasing metoprolol to 50 mg XL daily which she was agreeable.  She is fatigued.  Certainly could be related to atrial fibrillation.  Also, she gets up every 1 to 1.5 hours a night to urinate.  I suspect her fatigue is secondary to frequent sleep interruption.  She has no other issues.      HPI:    Kate Chacon  is being seen by cardiology for a history of persistent atrial fibrillation.        She was seen in clinic on 11/1/17 for hospital follow-up with Afib and bradycardia. She was found to be in AF at the time with RVR, rates in 90's to 170 with beat to beat variability. She was largely asymptomatic and considered likely rebound tachycardia since d/c from beta blockade with severe bradycardia. She was hospitalized for rate control with RVR. During hospitalization, she developed significant bradycardia with pauses of greater then 2 seconds. Patient was transferred to the care of Dr. Amanda at [facility] for pacer placement on 11/2/17.      Patient reports increased MARSHALL and increased fatigue since her last visit with cardiology. Occasional palpitations with AF which she does not describe as bothersome, rate has been well controlled and she is doing good on coumadin oral anticoagulation without complication. We did discuss that her MARSHALL and fatigue may be likely associated to her atrial fib. She denies any chest pain or pressure. No n/v or diaphoresis episodes reported. No lightheadedness or syncope. No increased edema. She has not been taking her Lasix, not needed.      Relevant testing:  Stress test on 6/7/19:  Moderate-sized anterior wall infarct.    ECHO on 10/31/18:  Left ventricular function, chamber size, wall motion, and wall thickness are normal.The EF is 60-65%.  Left ventricular diastolic function is indeterminate.  Right ventricular function, chamber size, wall motion, and  thickness are normal.  No significant valvular abnormalities noted.  Previous study not available for comparison.      Past Medical History:   Diagnosis Date     Encounter for full-term uncomplicated delivery     x3     Ganglion of wrist     right     Gastritis without bleeding     treated and 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       Past Surgical History:   Procedure Laterality Date     APPENDECTOMY OPEN      No Comments Provided     COLONOSCOPY  08/19/2005 8/19/05,Cecal biopsy with tubular adenoma low grade dysplasia.     COLONOSCOPY  04/04/2011 4/4/11     COLONOSCOPY  05/07/2012    Tubular Adenomas F/U 2017     COLONOSCOPY  12/02/2019    F/U N/A as will be over 78 in 2024. Tubular adenoma     ESOPHAGOSCOPY, GASTROSCOPY, DUODENOSCOPY (EGD), COMBINED      8/19/05     OTHER SURGICAL HISTORY      8/3/05,564199,OTHER,helices on the right ear     TONSILLECTOMY      No Comments Provided       Allergies   Allergen Reactions     Methylpar-Na Bisulfite-Pentazocine Unknown     jittery       Current Outpatient Medications   Medication Sig Dispense Refill     metoprolol succinate ER (TOPROL-XL) 50 MG 24 hr tablet Take 1 tablet (50 mg) by mouth daily 90 tablet 3     atorvastatin (LIPITOR) 40 MG tablet TAKE 1 TABLET BY MOUTH AT BEDTIME 90 tablet 3     calcium carbonate 750 MG CHEW Take 1 tablet by mouth every 8 hours as needed for heartburn       HYDROcodone-acetaminophen (NORCO) 5-325 MG tablet Take 1-2 tablets by mouth every 6 hours as needed for pain 40 tablet 0     lisinopril (PRINIVIL/ZESTRIL) 20 MG tablet Take 1 tablet (20 mg) by mouth daily 90 tablet 11     sertraline (ZOLOFT) 50 MG tablet Take 1 tablet (50 mg) by mouth At Bedtime 90 tablet 11     warfarin ANTICOAGULANT (COUMADIN) 5 MG tablet Take 5 mg x six days/week and 2.5 mg x one day/week.  OR AS DIRECTED BY PROTIME CLINIC 90 tablet 1       Social History     Socioeconomic History     Marital status:      Spouse name: Not on file      Number of children: Not on file     Years of education: Not on file     Highest education level: Not on file   Occupational History     Not on file   Social Needs     Financial resource strain: Not on file     Food insecurity     Worry: Not on file     Inability: Not on file     Transportation needs     Medical: Not on file     Non-medical: Not on file   Tobacco Use     Smoking status: Former Smoker     Packs/day: 0.50     Years: 49.00     Pack years: 24.50     Types: Cigarettes     Last attempt to quit: 10/14/2016     Years since quitting: 3.8     Smokeless tobacco: Never Used   Substance and Sexual Activity     Alcohol use: No     Drug use: No     Sexual activity: Not Currently   Lifestyle     Physical activity     Days per week: Not on file     Minutes per session: Not on file     Stress: Not on file   Relationships     Social connections     Talks on phone: Not on file     Gets together: Not on file     Attends [employer] service: Not on file     Active member of club or organization: Not on file     Attends meetings of clubs or organizations: Not on file     Relationship status: Not on file     Intimate partner violence     Fear of current or ex partner: Not on file     Emotionally abused: Not on file     Physically abused: Not on file     Forced sexual activity: Not on file   Other Topics Concern     Parent/sibling w/ CABG, MI or angioplasty before 65F 55M? 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  She enjoys gardening, bowling and going to stock car races that her son participates in.   to her  Maco.  They run [employer].  Live in town independently.       LAB RESULTS:   Anticoagulation Therapy Visit on 08/03/2020   Component Date Value Ref Range Status     INR Protime 08/03/2020 3.0* 0.86 - 1.14 Final   Anticoagulation Therapy Visit on 07/27/2020   Component Date Value Ref Range Status     INR Protime 07/27/2020 2.8* 0.86 - 1.14 Final  "  Anticoagulation Therapy Visit on 07/20/2020   Component Date Value Ref Range Status     INR Protime 07/20/2020 2.7* 0.86 - 1.14 Final   Anticoagulation Therapy Visit on 07/13/2020   Component Date Value Ref Range Status     INR Protime 07/13/2020 4.9* 0.86 - 1.14 Final        Review of systems: Negative except that which was noted in the HPI.    Physical examination:  BP (!) 130/94 (BP Location: Right arm, Patient Position: Sitting, Cuff Size: Adult Large)   Pulse 82   Temp 97.7  F (36.5  C) (Tympanic)   Resp 18   Ht 1.66 m (5' 5.35\")   Wt 84.8 kg (187 lb)   SpO2 97%   BMI 30.78 kg/m      GENERAL APPEARANCE: healthy, alert and no distress  HEENT: no icterus, no xanthelasmas, normal pupil size and reaction, no cyanosis.  NECK: no adenopathy, no asymmetry, masses, or scars.  CHEST: lungs clear to auscultation - no rales, rhonchi or wheezes, no use of accessory muscles, no retractions, respirations are unlabored, normal respiratory rate.  Distant breath sounds.  CARDIOVASCULAR: Irregular rate irregular rhythm, normal S1 with physiologic split S2, no S3 or S4 and no murmur, click or rub  ABDOMEN: soft, non tender, bowel sounds normal  EXTREMITIES: no clubbing, cyanosis but with mild lower extremity edema  NEURO: alert and oriented normal speech, and affect  VASC: No vascular bruits heard.  SKIN: no ecchymoses, no rashes        Thank you for allowing me to participate in the care of your patient. Please do not hesitate to contact me if you have any questions.     Óscar Dong, DO          "

## 2020-08-13 NOTE — PATIENT INSTRUCTIONS
You were seen by  Óscar Dong, DO       1. Increase Metoprolol from 25mg to 50mg daily.     2. Continue Warfarin.    3. Keep appointment with Asher Campos MD      4. No other changes at this time.              You will follow up with [facility] in 6 months, sooner if needed.       Please call the cardiology office with problems, questions, or concerns at [phone].    If you experience chest pain, chest pressure, chest tightness, shortness of breath, fainting, lightheadedness, nausea, vomiting, or other concerning symptoms, please report to the Emergency Department or call 911. These symptoms may be emergent, and best treated in the Emergency Department.     Cardiology Nurses  GLENN Ward LPN Amy M., LPN  [facility] (Unit 3C)  [phone]

## 2020-08-13 NOTE — NURSING NOTE
"Patient comes in for consult on AFib.  Pennie Chester LPN ....................8/13/2020   2:47 PM  Chief Complaint   Patient presents with     Consult For     AFib       Initial BP (!) 130/94 (BP Location: Right arm, Patient Position: Sitting, Cuff Size: Adult Large)   Pulse 82   Temp 97.7  F (36.5  C) (Tympanic)   Resp 18   Ht 1.66 m (5' 5.35\")   Wt 84.8 kg (187 lb)   SpO2 97%   BMI 30.78 kg/m   Estimated body mass index is 30.78 kg/m  as calculated from the following:    Height as of this encounter: 1.66 m (5' 5.35\").    Weight as of this encounter: 84.8 kg (187 lb).  Meds Reconciled: complete  Pt is not on Aspirin  Pt is on a Statin  PHQ and/or ANETTE reviewed. Pt referred to PCP/MH Provider as appropriate.    Pennie Chester LPN      "

## 2020-08-14 ASSESSMENT — ANXIETY QUESTIONNAIRES: GAD7 TOTAL SCORE: 0

## 2020-08-17 ENCOUNTER — ANTICOAGULATION THERAPY VISIT (OUTPATIENT)
Dept: ANTICOAGULATION | Facility: OTHER | Age: 75
End: 2020-08-17
Attending: FAMILY MEDICINE
Payer: MEDICARE

## 2020-08-17 DIAGNOSIS — Z51.81 ANTICOAGULATION GOAL OF INR 2 TO 3: ICD-10-CM

## 2020-08-17 DIAGNOSIS — I48.0 PAROXYSMAL ATRIAL FIBRILLATION (H): ICD-10-CM

## 2020-08-17 DIAGNOSIS — Z79.01 ANTICOAGULATION GOAL OF INR 2 TO 3: ICD-10-CM

## 2020-08-17 LAB — INR POINT OF CARE: 2.4 (ref 0.86–1.14)

## 2020-08-17 PROCEDURE — 36416 COLLJ CAPILLARY BLOOD SPEC: CPT | Mod: ZL

## 2020-08-17 NOTE — PROGRESS NOTES
ANTICOAGULATION FOLLOW-UP CLINIC VISIT    Patient Name:  Kate Chacon  Date:  2020  Contact Type:  Face to Face    SUBJECTIVE:         OBJECTIVE    Recent labs: (last 7 days)     20   INR 2.4*       ASSESSMENT / PLAN  INR assessment THER    Recheck INR In: 4 WEEKS    INR Location Clinic      Anticoagulation Summary  As of 2020    INR goal:   2.0-3.0   TTR:   53.9 % (1 y)   INR used for dosin.4 (2020)   Warfarin maintenance plan:   2.5 mg (5 mg x 0.5) every Wed; 5 mg (5 mg x 1) all other days   Full warfarin instructions:   2.5 mg every Wed; 5 mg all other days   Weekly warfarin total:   32.5 mg   No change documented:   Radha Burns RN   Plan last modified:   Radha Burns RN (2020)   Next INR check:   2020   Priority:   Maintenance   Target end date:   Indefinite    Indications    Paroxysmal atrial fibrillation (H) [I48.0]  Anticoagulation goal of INR 2 to 3 [Z51.81  Z79.01]             Anticoagulation Episode Summary     INR check location:       Preferred lab:       Send INR reminders to:   [facility]    Comments:         Anticoagulation Care Providers     Provider Role Specialty Phone number    Asher Campos MD [facility] [phone]            See the Encounter Report to view Anticoagulation Flowsheet and Dosing Calendar (Go to Encounters tab in chart review, and find the Anticoagulation Therapy Visit)        Radha Burns RN

## 2020-08-19 ENCOUNTER — OFFICE VISIT (OUTPATIENT)
Dept: FAMILY MEDICINE | Facility: OTHER | Age: 75
End: 2020-08-19
Attending: FAMILY MEDICINE
Payer: MEDICARE

## 2020-08-19 VITALS
WEIGHT: 186 LBS | TEMPERATURE: 97.7 F | SYSTOLIC BLOOD PRESSURE: 132 MMHG | HEART RATE: 80 BPM | DIASTOLIC BLOOD PRESSURE: 88 MMHG | RESPIRATION RATE: 18 BRPM | OXYGEN SATURATION: 97 % | BODY MASS INDEX: 30.62 KG/M2

## 2020-08-19 DIAGNOSIS — G89.29 CHRONIC MIDLINE LOW BACK PAIN WITHOUT SCIATICA: Primary | ICD-10-CM

## 2020-08-19 DIAGNOSIS — I48.91 ON COUMADIN FOR ATRIAL FIBRILLATION (H): ICD-10-CM

## 2020-08-19 DIAGNOSIS — Z95.0 CARDIAC PACEMAKER: ICD-10-CM

## 2020-08-19 DIAGNOSIS — M54.50 CHRONIC MIDLINE LOW BACK PAIN WITHOUT SCIATICA: Primary | ICD-10-CM

## 2020-08-19 DIAGNOSIS — N39.41 URGE INCONTINENCE: ICD-10-CM

## 2020-08-19 DIAGNOSIS — N18.30 CKD (CHRONIC KIDNEY DISEASE) STAGE 3, GFR 30-59 ML/MIN (H): ICD-10-CM

## 2020-08-19 DIAGNOSIS — Z79.01 ON COUMADIN FOR ATRIAL FIBRILLATION (H): ICD-10-CM

## 2020-08-19 DIAGNOSIS — S22.070D COMPRESSION FRACTURE OF T10 VERTEBRA WITH ROUTINE HEALING, SUBSEQUENT ENCOUNTER: ICD-10-CM

## 2020-08-19 DIAGNOSIS — E53.8 VITAMIN B12 DEFICIENCY (NON ANEMIC): ICD-10-CM

## 2020-08-19 DIAGNOSIS — L82.0 INFLAMED SEBORRHEIC KERATOSIS: ICD-10-CM

## 2020-08-19 DIAGNOSIS — I48.0 PAROXYSMAL ATRIAL FIBRILLATION (H): ICD-10-CM

## 2020-08-19 DIAGNOSIS — I10 ESSENTIAL HYPERTENSION: ICD-10-CM

## 2020-08-19 PROCEDURE — 25000128 H RX IP 250 OP 636: Performed by: FAMILY MEDICINE

## 2020-08-19 PROCEDURE — G0463 HOSPITAL OUTPT CLINIC VISIT: HCPCS

## 2020-08-19 PROCEDURE — 99214 OFFICE O/P EST MOD 30 MIN: CPT | Mod: 25 | Performed by: FAMILY MEDICINE

## 2020-08-19 PROCEDURE — 17110 DESTRUCTION B9 LES UP TO 14: CPT | Performed by: FAMILY MEDICINE

## 2020-08-19 PROCEDURE — G0463 HOSPITAL OUTPT CLINIC VISIT: HCPCS | Mod: 25

## 2020-08-19 PROCEDURE — 96372 THER/PROPH/DIAG INJ SC/IM: CPT

## 2020-08-19 RX ORDER — CYANOCOBALAMIN 1000 UG/ML
1000 INJECTION, SOLUTION INTRAMUSCULAR; SUBCUTANEOUS
Status: DISCONTINUED | OUTPATIENT
Start: 2020-08-19 | End: 2021-02-19

## 2020-08-19 RX ORDER — HYDROCODONE BITARTRATE AND ACETAMINOPHEN 5; 325 MG/1; MG/1
1-2 TABLET ORAL EVERY 6 HOURS PRN
Qty: 20 TABLET | Refills: 0 | Status: SHIPPED | OUTPATIENT
Start: 2020-08-19 | End: 2020-09-10

## 2020-08-19 RX ADMIN — CYANOCOBALAMIN 1000 MCG: 1000 INJECTION, SOLUTION INTRAMUSCULAR at 15:41

## 2020-08-19 ASSESSMENT — PAIN SCALES - GENERAL: PAINLEVEL: SEVERE PAIN (7)

## 2020-08-19 ASSESSMENT — ANXIETY QUESTIONNAIRES
IF YOU CHECKED OFF ANY PROBLEMS ON THIS QUESTIONNAIRE, HOW DIFFICULT HAVE THESE PROBLEMS MADE IT FOR YOU TO DO YOUR WORK, TAKE CARE OF THINGS AT HOME, OR GET ALONG WITH OTHER PEOPLE: NOT DIFFICULT AT ALL
GAD7 TOTAL SCORE: 2
2. NOT BEING ABLE TO STOP OR CONTROL WORRYING: NOT AT ALL
3. WORRYING TOO MUCH ABOUT DIFFERENT THINGS: NOT AT ALL
1. FEELING NERVOUS, ANXIOUS, OR ON EDGE: NOT AT ALL
5. BEING SO RESTLESS THAT IT IS HARD TO SIT STILL: NOT AT ALL
6. BECOMING EASILY ANNOYED OR IRRITABLE: NOT AT ALL
7. FEELING AFRAID AS IF SOMETHING AWFUL MIGHT HAPPEN: SEVERAL DAYS

## 2020-08-19 ASSESSMENT — PATIENT HEALTH QUESTIONNAIRE - PHQ9: 5. POOR APPETITE OR OVEREATING: SEVERAL DAYS

## 2020-08-19 NOTE — NURSING NOTE
Chief Complaint   Patient presents with     RECHECK     back pain       Clinic Administered Medication Documentation      Injectable Medication Documentation    Patient was given Cyanocobalamin (B-12). Prior to medication administration, verified patients identity using patient s name and date of birth. Please see MAR and medication order for additional information. Patient instructed to remain in clinic for 15 minutes.      Was entire vial of medication used? Yes  Vial/Syringe: Single dose vial  Expiration Date:  08/2021  Was this medication supplied by the patient? No   Tiffany White LPN..................8/19/2020   3:43 PM

## 2020-08-19 NOTE — PATIENT INSTRUCTIONS
Increase the metoprolol to 50 mg daily.     I sent a referral to see Dr. Fox about the bladder.    We gave you a B12 shot today.  You should get this monthly and you don't have to see me to get the shot.

## 2020-08-19 NOTE — PROGRESS NOTES
"Nursing Notes:   Tiffany White LPN  8/19/2020  2:27 PM  Signed  Chief Complaint   Patient presents with     RECHECK     back pain       Initial /88   Pulse 80   Temp 97.7  F (36.5  C) (Temporal)   Resp 18   Wt 84.4 kg (186 lb)   SpO2 97%   Breastfeeding No   BMI 30.62 kg/m   Estimated body mass index is 30.62 kg/m  as calculated from the following:    Height as of 8/13/20: 1.66 m (5' 5.35\").    Weight as of this encounter: 84.4 kg (186 lb).  Medication Reconciliation: complete    JOHN Gonzalez Kelly K., LPN  8/19/2020  3:43 PM  Signed  Chief Complaint   Patient presents with     RECHECK     back pain       Clinic Administered Medication Documentation      Injectable Medication Documentation    Patient was given Cyanocobalamin (B-12). Prior to medication administration, verified patients identity using patient s name and date of birth. Please see MAR and medication order for additional information. Patient instructed to remain in clinic for 15 minutes.      Was entire vial of medication used? Yes  Vial/Syringe: Single dose vial  Expiration Date:  08/2021  Was this medication supplied by the patient? No   Tiffany White LPN..................8/19/2020   3:43 PM        SUBJECTIVE:  Kate Chacon  is a 75 year old female who comes in today for follow-up of her back pain.  There was concerned that she had a compression fracture.  We had looked into possibly getting her a kyphoplasty injection and on review with Dr. Aceves, he did not see any indication for it as he did not feel that she had a fracture.  She was managed with low-dose hydrocodone.  She is comfortable lying down or on the couch. She is more bothered when up and trying to do things.  She has 2 hydrocodone left but gets by mostly with Tylenol.  We discussed giving her another small prescription to use from time to time.    She has chronic atrial fibrillation and her metoprolol succinate was increased to 50 mg daily by " Dr. Dong.  She did not really increase the dose until after she talked to me.  She did take 1 dose of 50 mg the day after she saw the cardiologist.  She continues on warfarin.  She is having more fatigue but probably because of nocturia.  She was offered a urology referral and she declined, but after we discussed that she is more willing to consider that..  She previously had Botox injections but her last one was about a year ago.  Prior to this, she failed anticholinergic medications.  They talked about the possibility of posterior tibial nerve stimulation and/or InterStim implantable device.    She has a seborrheic keratosis on her left anterior shoulder that gets irritated and she would like to have it removed.    She has been taking a B complex vitamin but was diagnosed with B12 deficiency and did not continue to get injections.  We discussed the importance of that and then she could do oral to maintain.        Past Medical, Family, and Social History reviewed and updated as noted below.   ROS is negative except as noted above       Allergies   Allergen Reactions     Methylpar-Na Bisulfite-Pentazocine Unknown     jittery   ,   Family History   Problem Relation Age of Onset     Diabetes Father         Diabetes     Hypertension Father         Hypertension     Other - See Comments Mother         h/o coronary artery disease/dementia     Asthma Mother         Asthma     Diabetes Maternal Grandmother         Diabetes     Cancer Maternal Aunt         Cancer,Uterine     Breast Cancer No family hx of         Cancer-breast   ,   Current Outpatient Medications   Medication     HYDROcodone-acetaminophen (NORCO) 5-325 MG tablet     atorvastatin (LIPITOR) 40 MG tablet     calcium carbonate 750 MG CHEW     lisinopril (PRINIVIL/ZESTRIL) 20 MG tablet     metoprolol succinate ER (TOPROL-XL) 50 MG 24 hr tablet     sertraline (ZOLOFT) 50 MG tablet     warfarin ANTICOAGULANT (COUMADIN) 5 MG tablet     Current Facility-Administered  Medications   Medication     cyanocobalamin injection 1,000 mcg   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   ,   Patient Active Problem List    Diagnosis Date Noted     SA node dysfunction s/p pacer on 11/2/2017 at [facility] 08/13/2020     Priority: Medium     H/O sinus pause 08/13/2020     Priority: Medium     History of tobacco abuse quitting on 10/14/16 08/13/2020     Priority: Medium     Dyspnea on exertion 08/13/2020     Priority: Medium     On Coumadin for atrial fibrillation (H) 08/13/2020     Priority: Medium     Mixed hyperlipidemia 08/13/2020     Priority: Medium     CKD (chronic kidney disease) stage 3, GFR 30-59 ml/min (H) 08/13/2020     Priority: Medium     Chronic midline low back pain without sciatica 08/13/2020     Priority: Medium     Sinoatrial node dysfunction (H) 05/01/2018     Priority: Medium     Cardiac pacemaker 02/19/2018     Priority: Medium     Urge incontinence 10/05/2017     Priority: Medium     Former smoker 08/30/2017     Priority: Medium     MDD (recurrent major depressive disorder) in remission (H) 08/30/2017     Priority: Medium     Anxiety 01/05/2017     Priority: Medium     Essential hypertension 12/08/2016     Priority: Medium     Paroxysmal atrial fibrillation (H) 11/02/2016     Priority: Medium     Anticoagulation goal of INR 2 to 3 10/28/2016     Priority: Medium     History of ischemic stroke on 10/15/2016 secondary to embolism 10/15/2016     Priority: Medium     Diverticulosis of large intestine 04/04/2011     Priority: Medium     H/O adenomatous polyp of colon 03/02/2011     Priority: Medium     Osteoporosis 02/08/2006     Priority: Medium   ,   Past Surgical History:   Procedure Laterality Date     APPENDECTOMY OPEN      No Comments Provided     COLONOSCOPY  08/19/2005     8/19/05,Cecal biopsy with tubular adenoma low grade dysplasia.     COLONOSCOPY  04/04/2011 4/4/11     COLONOSCOPY  05/07/2012    Tubular Adenomas F/U 2017     COLONOSCOPY  12/02/2019    F/U N/A as will be over 78 in 2024. Tubular adenoma     ESOPHAGOSCOPY, GASTROSCOPY, DUODENOSCOPY (EGD), COMBINED      8/19/05     OTHER SURGICAL HISTORY      8/3/05,[id number],OTHER,helices on the right ear     TONSILLECTOMY      No Comments Provided    and   Social History     Tobacco Use     Smoking status: Former Smoker     Packs/day: 0.50     Years: 49.00     Pack years: 24.50     Types: Cigarettes     Last attempt to quit: 10/14/2016     Years since quitting: 3.8     Smokeless tobacco: Never Used   Substance Use Topics     Alcohol use: No     OBJECTIVE:  /88   Pulse 80   Temp 97.7  F (36.5  C) (Temporal)   Resp 18   Wt 84.4 kg (186 lb)   SpO2 97%   Breastfeeding No   BMI 30.62 kg/m     EXAM:  Alert and cooperative, no distress.  She walks without a cane or a walker.  She has some tenderness to palpation over the lower thoracic vertebrae with some mild paraspinal muscle spasm.  Cardiac irregularly irregular.  Lungs are clear.  She has an inflamed seborrheic keratosis on her left anterior shoulder that is dark in color.  This is frozen serially x2 with liquid nitrogen.  ASSESSMENT/Plan :    Kate was seen today for recheck.    Diagnoses and all orders for this visit:    Chronic midline low back pain without sciatica    Essential hypertension    Urge incontinence  -     UROLOGY ADULT REFERRAL; Future    CKD (chronic kidney disease) stage 3, GFR 30-59 ml/min (H)    Paroxysmal atrial fibrillation (H)    Cardiac pacemaker    On Coumadin for atrial fibrillation (H)    Vitamin B12 deficiency (non anemic)  -     cyanocobalamin injection 1,000 mcg    Compression fracture of T10 vertebra with routine healing, subsequent encounter  -     HYDROcodone-acetaminophen (NORCO) 5-325 MG tablet; Take 1-2 tablets by mouth every 6 hours  as needed for pain    Inflamed seborrheic keratosis  -     DESTRUCT BENIGN LESION, UP TO 14      Advised regarding the pain and vesiculation reaction that could be expected from cyrotherapy.  [place] if not gone in a few weeks    Vitamin B12 injection today.    Discussion with regard to her urge incontinence and recommended that she touch base again with Dr. Fox.  She seemed to have a good results with Botox previously.  I think this is probably responsible for her increased fatigue as she is not likely sleeping through the night because of frequent voiding.  She might also be a candidate for PTNS.     query is appropriate and we gave her another small prescription for hydrocodone for more severe pain.  Reviewed her scans with her and her back is gradually getting better and will continue to do so and she was reassured that this should continue to improve.    Discussed her atrial fibrillation and recommended that she continue with the warfarin as she seems to get along fine with that.  I did agree that we should increase her metoprolol succinate to 50 mg daily and she will do that.    A total of 25 minutes was spent with the patient, greater than 50% of the time was spent in counseling/discussion of the aforementioned concerns.     Asher Campso MD

## 2020-08-20 ENCOUNTER — VIRTUAL VISIT (OUTPATIENT)
Dept: FAMILY MEDICINE | Facility: OTHER | Age: 75
End: 2020-08-20
Attending: PHYSICIAN ASSISTANT
Payer: COMMERCIAL

## 2020-08-20 ENCOUNTER — TELEPHONE (OUTPATIENT)
Dept: UROLOGY | Facility: OTHER | Age: 75
End: 2020-08-20

## 2020-08-20 ENCOUNTER — ALLIED HEALTH/NURSE VISIT (OUTPATIENT)
Dept: FAMILY MEDICINE | Facility: OTHER | Age: 75
End: 2020-08-20
Attending: PHYSICIAN ASSISTANT
Payer: MEDICARE

## 2020-08-20 DIAGNOSIS — Z20.822 EXPOSURE TO COVID-19 VIRUS: Primary | ICD-10-CM

## 2020-08-20 DIAGNOSIS — Z20.822 ENCOUNTER FOR LABORATORY TESTING FOR COVID-19 VIRUS: Primary | ICD-10-CM

## 2020-08-20 DIAGNOSIS — Z20.822 EXPOSURE TO COVID-19 VIRUS: ICD-10-CM

## 2020-08-20 PROCEDURE — U0003 INFECTIOUS AGENT DETECTION BY NUCLEIC ACID (DNA OR RNA); SEVERE ACUTE RESPIRATORY SYNDROME CORONAVIRUS 2 (SARS-COV-2) (CORONAVIRUS DISEASE [COVID-19]), AMPLIFIED PROBE TECHNIQUE, MAKING USE OF HIGH THROUGHPUT TECHNOLOGIES AS DESCRIBED BY CMS-2020-01-R: HCPCS | Mod: ZL | Performed by: PHYSICIAN ASSISTANT

## 2020-08-20 PROCEDURE — 99207 ZZC NO CHARGE NURSE ONLY: CPT

## 2020-08-20 PROCEDURE — 99212 OFFICE O/P EST SF 10 MIN: CPT | Mod: TEL | Performed by: PHYSICIAN ASSISTANT

## 2020-08-20 PROCEDURE — C9803 HOPD COVID-19 SPEC COLLECT: HCPCS

## 2020-08-20 ASSESSMENT — ANXIETY QUESTIONNAIRES: GAD7 TOTAL SCORE: 2

## 2020-08-20 NOTE — PROGRESS NOTES
"Kate Chacon is a 75 year old female who is being evaluated via a billable telephone visit.      The patient has been notified of following:     \"This telephone visit will be conducted via a call between you and your physician/provider. We have found that certain health care needs can be provided without the need for a physical exam.  This service lets us provide the care you need with a short phone conversation.  If a prescription is necessary we can send it directly to your pharmacy.  If lab work is needed we can place an order for that and you can then stop by our lab to have the test done at a later time.    Telephone visits are billed at different rates depending on your insurance coverage. During this emergency period, for some insurers they may be billed the same as an in-person visit.  Please reach out to your insurance provider with any questions.    If during the course of the call the physician/provider feels a telephone visit is not appropriate, you will not be charged for this service.\"    Patient has given verbal consent for Telephone visit?  Yes    What phone number would you like to be contacted at? [phone]    How would you like to obtain your AVS? Mail a copy    Subjective     Kate Chacon is a 75 year old female who presents via phone visit today for the following health issues:    HPI    Patient is interested in getting screened for COVID-19.  She states that her 2 granddaughters visited this past week.  They play on the basketball team from the [employer].  Approximately 4-5 girls on the team have COVID-19.  One of the granddaughters tested positive yesterday.  Patient feels currently fine.  No acute concerns.  Asymptomatic.  The younger granddaughter feels fine as well.  His granddaughter lost her sense of taste and smell.  The patient has not had any cough or cold symptoms.  No fevers, chills, sore throat, sinus pain or pressure, wheezing, rattling, GI or urinary symptoms, body " aches, muscle aches, loss of taste or smell.    Review of Systems   Constitutional, HEENT, cardiovascular, pulmonary, gi and gu systems are negative, except as otherwise noted.       Objective            healthy, alert and no distress  PSYCH: Alert and oriented times 3; coherent speech, normal   rate and volume, able to articulate logical thoughts, able   to abstract reason, no tangential thoughts, no hallucinations   or delusions  Her affect is normal  RESP: No cough, no audible wheezing, able to talk in full sentences  Remainder of exam unable to be completed due to telephone visits        Assessment/Plan:      ICD-10-CM    1. Exposure to COVID-19 virus  Z20.828 Asymptomatic COVID-19 Virus (Coronavirus) by PCR     Ordered COVID-19 test to be completed via curbside testing.  Encourage close monitoring.  Continue fluids and rest.  Recheck as needed if she develops symptoms.  Encouraged quarantining.  Gave warning signs and symptoms.    Phone call duration:  8 minutes

## 2020-08-22 LAB
SARS-COV-2 RNA SPEC QL NAA+PROBE: NOT DETECTED
SPECIMEN SOURCE: NORMAL

## 2020-09-10 ENCOUNTER — OFFICE VISIT (OUTPATIENT)
Dept: FAMILY MEDICINE | Facility: OTHER | Age: 75
End: 2020-09-10
Attending: FAMILY MEDICINE
Payer: MEDICARE

## 2020-09-10 ENCOUNTER — HOSPITAL ENCOUNTER (OUTPATIENT)
Dept: GENERAL RADIOLOGY | Facility: OTHER | Age: 75
End: 2020-09-10
Attending: FAMILY MEDICINE
Payer: MEDICARE

## 2020-09-10 VITALS
BODY MASS INDEX: 31.21 KG/M2 | HEART RATE: 85 BPM | TEMPERATURE: 98.6 F | WEIGHT: 189.6 LBS | DIASTOLIC BLOOD PRESSURE: 90 MMHG | SYSTOLIC BLOOD PRESSURE: 138 MMHG | RESPIRATION RATE: 16 BRPM | OXYGEN SATURATION: 97 %

## 2020-09-10 DIAGNOSIS — S43.101A AC SEPARATION, RIGHT, INITIAL ENCOUNTER: ICD-10-CM

## 2020-09-10 DIAGNOSIS — M25.511 ACUTE PAIN OF RIGHT SHOULDER: ICD-10-CM

## 2020-09-10 DIAGNOSIS — M25.511 ACUTE PAIN OF RIGHT SHOULDER: Primary | ICD-10-CM

## 2020-09-10 PROCEDURE — G0463 HOSPITAL OUTPT CLINIC VISIT: HCPCS

## 2020-09-10 PROCEDURE — 99213 OFFICE O/P EST LOW 20 MIN: CPT | Performed by: FAMILY MEDICINE

## 2020-09-10 PROCEDURE — 73030 X-RAY EXAM OF SHOULDER: CPT | Mod: RT

## 2020-09-10 RX ORDER — HYDROCODONE BITARTRATE AND ACETAMINOPHEN 5; 325 MG/1; MG/1
1-2 TABLET ORAL EVERY 6 HOURS PRN
Qty: 10 TABLET | Refills: 0 | Status: SHIPPED | OUTPATIENT
Start: 2020-09-10 | End: 2020-09-17

## 2020-09-10 ASSESSMENT — PAIN SCALES - GENERAL: PAINLEVEL: EXTREME PAIN (9)

## 2020-09-10 NOTE — PROGRESS NOTES
SUBJECTIVE:   Kate Chacon is a 75 year old female who presents to clinic today for the following health issues:    Nursing Notes:   Yee Villagomez LPN  9/10/2020  3:20 PM  Signed  Patient is here for right shoulder pain. States fell on Sunday and has been hurting since.     No LMP recorded (lmp unknown). Patient is postmenopausal.  Medication Reconciliation: complete    Yee Villagomez LPN  9/10/2020 3:04 PM    HPI    74 yo female presents with right shoulder injury after falling 5 days.  She lost her footing and landed on her right side.  She denies immediate pain but the next morning started to experience right anterior shoulder pain.  It is worse with abduction and forward flexion of the shoulder.  Denies bruising or swelling.  No numbness tingling or weakness in the arm.  She is been using Tylenol but this has not controlled her pain.  She is unable to use NSAIDs due to being on Coumadin.  Denies any other injury sustained in the fall.  No LOC or head trauma.  Patient Active Problem List    Diagnosis Date Noted     SA node dysfunction s/p pacer on 11/2/2017 at [facility] 08/13/2020     Priority: Medium     H/O sinus pause 08/13/2020     Priority: Medium     History of tobacco abuse quitting on 10/14/16 08/13/2020     Priority: Medium     Dyspnea on exertion 08/13/2020     Priority: Medium     On Coumadin for atrial fibrillation (H) 08/13/2020     Priority: Medium     Mixed hyperlipidemia 08/13/2020     Priority: Medium     CKD (chronic kidney disease) stage 3, GFR 30-59 ml/min (H) 08/13/2020     Priority: Medium     Chronic midline low back pain without sciatica 08/13/2020     Priority: Medium     Sinoatrial node dysfunction (H) 05/01/2018     Priority: Medium     Cardiac pacemaker 02/19/2018     Priority: Medium     Urge incontinence 10/05/2017     Priority: Medium     Former smoker 08/30/2017     Priority: Medium     MDD (recurrent major depressive disorder) in remission (H) 08/30/2017      Priority: Medium     Anxiety 01/05/2017     Priority: Medium     Essential hypertension 12/08/2016     Priority: Medium     Paroxysmal atrial fibrillation (H) 11/02/2016     Priority: Medium     Anticoagulation goal of INR 2 to 3 10/28/2016     Priority: Medium     History of ischemic stroke on 10/15/2016 secondary to embolism 10/15/2016     Priority: Medium     Diverticulosis of large intestine 04/04/2011     Priority: Medium     H/O adenomatous polyp of colon 03/02/2011     Priority: Medium     Osteoporosis 02/08/2006     Priority: Medium     Past Medical History:   Diagnosis Date     Encounter for full-term uncomplicated delivery     x3     Ganglion of wrist     right     Gastritis without bleeding     treated and 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      Current Outpatient Medications   Medication Sig Dispense Refill     atorvastatin (LIPITOR) 40 MG tablet TAKE 1 TABLET BY MOUTH AT BEDTIME 90 tablet 3     calcium carbonate 750 MG CHEW Take 1 tablet by mouth every 8 hours as needed for heartburn       HYDROcodone-acetaminophen (NORCO) 5-325 MG tablet Take 1-2 tablets by mouth every 6 hours as needed for pain 10 tablet 0     lisinopril (PRINIVIL/ZESTRIL) 20 MG tablet Take 1 tablet (20 mg) by mouth daily 90 tablet 11     metoprolol succinate ER (TOPROL-XL) 50 MG 24 hr tablet Take 1 tablet (50 mg) by mouth daily 90 tablet 3     sertraline (ZOLOFT) 50 MG tablet Take 1 tablet (50 mg) by mouth At Bedtime 90 tablet 11     warfarin ANTICOAGULANT (COUMADIN) 5 MG tablet Take 5 mg x six days/week and 2.5 mg x one day/week.  OR AS DIRECTED BY PROTIME CLINIC 90 tablet 1       Review of Systems   All other systems reviewed and are negative.       OBJECTIVE:     BP (!) 138/90 (BP Location: Left arm, Patient Position: Sitting, Cuff Size: Adult Large)   Pulse 85   Temp 98.6  F (37  C) (Tympanic)   Resp 16   Wt 86 kg (189 lb 9.6 oz)   LMP  (LMP Unknown)   SpO2 97%   Breastfeeding No   BMI 31.21 kg/m     Body mass index is 31.21 kg/m .  Physical Exam  Musculoskeletal:      Right shoulder: She exhibits tenderness, bony tenderness and pain. She exhibits normal range of motion, no swelling, no effusion and no crepitus.        Arms:       Comments: AC joint tenderness, no bruising    Decreased ROM with abd/flex    Strength intact   Neurological:      Mental Status: She is alert.         Diagnostic Test Results:  Results for orders placed or performed during the hospital encounter of 09/10/20   XR Shoulder Right G/E 3 Views     Status: None    Narrative    PROCEDURE:  XR SHOULDER RT G/E 3 VW    HISTORY: Acute pain of right shoulder    COMPARISON:  None.    TECHNIQUE:  3 views of the right shoulder were obtained.    FINDINGS:  There is a narrowed subacromial distance. There is a  partial AC joint separation. The scapula and distal clavicle and  proximal humerus are intact.       Impression    IMPRESSION: Partial AC joint separation. Narrowed subacromial distance  suspicious for rotator cuff tear      HILLARY WILLOUGHBY MD       ASSESSMENT/PLAN:           ICD-10-CM    1. Acute pain of right shoulder  M25.511 ARM SLING, M     CANCELED: XR Shoulder Right 2 Views     CANCELED: XR Clavicle Right 2 Views   2. AC separation, right, initial encounter  S43.101A      Placed in sling for comfort, recommend ice    Start ROM in 7-10 days    Follow-up if no improvement    Short-term refill provided for Hydrocodone    Patient Instructions     Right AC joint separation:    Should heal with rest and time    Wear sling for comfort the next 7-10 days    Ice 15 minutes 2 x daily        Nova Aponte MD  [facility] AND [facility]

## 2020-09-10 NOTE — PATIENT INSTRUCTIONS
Right AC joint separation:    Should heal with rest and time    Wear sling for comfort the next 7-10 days    Ice 15 minutes 2 x daily

## 2020-09-10 NOTE — NURSING NOTE
Patient is here for right shoulder pain. States fell on Sunday and has been hurting since.     No LMP recorded (lmp unknown). Patient is postmenopausal.  Medication Reconciliation: joanna Villagomez LPN  9/10/2020 3:04 PM

## 2020-09-14 ENCOUNTER — ANTICOAGULATION THERAPY VISIT (OUTPATIENT)
Dept: ANTICOAGULATION | Facility: OTHER | Age: 75
End: 2020-09-14
Attending: FAMILY MEDICINE
Payer: MEDICARE

## 2020-09-14 DIAGNOSIS — Z51.81 ANTICOAGULATION GOAL OF INR 2 TO 3: ICD-10-CM

## 2020-09-14 DIAGNOSIS — Z79.01 ANTICOAGULATION GOAL OF INR 2 TO 3: ICD-10-CM

## 2020-09-14 DIAGNOSIS — I48.0 PAROXYSMAL ATRIAL FIBRILLATION (H): ICD-10-CM

## 2020-09-14 LAB — INR POINT OF CARE: 3.1 (ref 0.86–1.14)

## 2020-09-14 PROCEDURE — 36416 COLLJ CAPILLARY BLOOD SPEC: CPT | Mod: ZL

## 2020-09-14 NOTE — PROGRESS NOTES
ANTICOAGULATION FOLLOW-UP CLINIC VISIT    Patient Name:  Kate Chacon  Date:  9/14/2020  Contact Type:  Face to Face    SUBJECTIVE:  Patient Findings     Positives:   Change in medications (taking increased tylenol for shoulder pain. ), Other complaints (had a fall and dislocated shoulder)        Clinical Outcomes     Negatives:   Major bleeding event, Thromboembolic event, Anticoagulation-related hospital admission, Anticoagulation-related ED visit, Anticoagulation-related fatality           OBJECTIVE    Recent labs: (last 7 days)     09/14/20   INR 3.1*       ASSESSMENT / PLAN  INR assessment SUPRA    Recheck INR In: 2 WEEKS    INR Location Clinic      Anticoagulation Summary  As of 9/14/2020    INR goal:   2.0-3.0   TTR:   57.3 % (1 y)   INR used for dosing:   3.1! (9/14/2020)   Warfarin maintenance plan:   2.5 mg (5 mg x 0.5) every Wed; 5 mg (5 mg x 1) all other days   Full warfarin instructions:   2.5 mg every Wed; 5 mg all other days   Weekly warfarin total:   32.5 mg   Plan last modified:   Radha Burns RN (7/13/2020)   Next INR check:   9/28/2020   Priority:   Maintenance   Target end date:   Indefinite    Indications    Paroxysmal atrial fibrillation (H) [I48.0]  Anticoagulation goal of INR 2 to 3 [Z51.81  Z79.01]             Anticoagulation Episode Summary     INR check location:       Preferred lab:       Send INR reminders to:   [facility]    Comments:         Anticoagulation Care Providers     Provider Role Specialty Phone number    Asher Campos MD [facility] [phone]            See the Encounter Report to view Anticoagulation Flowsheet and Dosing Calendar (Go to Encounters tab in chart review, and find the Anticoagulation Therapy Visit)        Radha Burns RN

## 2020-09-15 ENCOUNTER — TELEPHONE (OUTPATIENT)
Dept: FAMILY MEDICINE | Facility: OTHER | Age: 75
End: 2020-09-15

## 2020-09-15 NOTE — TELEPHONE ENCOUNTER
The patient saw Dr Urena after she fell and  her shoulder. The pain medication and sling are not helping at all. It is so painful. She is wondering if a cortisone injection would help or is there anything else she can do for the pain. She does not want to keep taking pain pills as she does not want to become addicted to them.  Tiffany White LPN..................9/15/2020   4:53 PM

## 2020-09-15 NOTE — TELEPHONE ENCOUNTER
Would recommend she come in and see me. She may also have a rotator cuff tendonitis and we might be able to inject her shoulder and improve her symptoms.  She should ice for 20-30 minutes as often as she can.    Asher Campos MD on 9/15/2020 at 5:11 PM

## 2020-09-15 NOTE — TELEPHONE ENCOUNTER
Patient want to discuss her fall appt from 9/10 -  - needs a pain inj - doesn't like taking the pain meds giving by susi.  Please call to discuss.  Linda Gayle on 9/15/2020 at 4:43 PM

## 2020-09-16 NOTE — TELEPHONE ENCOUNTER
After the patient's name and date of birth was verified, the patient was told the below information.  Tiffany White LPN..................9/16/2020   8:38 AM

## 2020-09-17 ENCOUNTER — OFFICE VISIT (OUTPATIENT)
Dept: FAMILY MEDICINE | Facility: OTHER | Age: 75
End: 2020-09-17
Attending: FAMILY MEDICINE
Payer: MEDICARE

## 2020-09-17 VITALS — WEIGHT: 184.6 LBS | HEART RATE: 68 BPM | BODY MASS INDEX: 30.39 KG/M2 | TEMPERATURE: 98 F

## 2020-09-17 DIAGNOSIS — S46.011D TRAUMATIC INCOMPLETE TEAR OF RIGHT ROTATOR CUFF, SUBSEQUENT ENCOUNTER: Primary | ICD-10-CM

## 2020-09-17 DIAGNOSIS — S43.101D AC SEPARATION, RIGHT, SUBSEQUENT ENCOUNTER: ICD-10-CM

## 2020-09-17 PROCEDURE — 20610 DRAIN/INJ JOINT/BURSA W/O US: CPT | Performed by: FAMILY MEDICINE

## 2020-09-17 PROCEDURE — G0463 HOSPITAL OUTPT CLINIC VISIT: HCPCS

## 2020-09-17 PROCEDURE — 25000128 H RX IP 250 OP 636: Performed by: FAMILY MEDICINE

## 2020-09-17 PROCEDURE — G0463 HOSPITAL OUTPT CLINIC VISIT: HCPCS | Mod: 25

## 2020-09-17 PROCEDURE — 99213 OFFICE O/P EST LOW 20 MIN: CPT | Mod: 25 | Performed by: FAMILY MEDICINE

## 2020-09-17 RX ORDER — TRIAMCINOLONE ACETONIDE 40 MG/ML
80 INJECTION, SUSPENSION INTRA-ARTICULAR; INTRAMUSCULAR ONCE
Status: COMPLETED | OUTPATIENT
Start: 2020-09-17 | End: 2020-09-17

## 2020-09-17 RX ORDER — HYDROCODONE BITARTRATE AND ACETAMINOPHEN 5; 325 MG/1; MG/1
1-2 TABLET ORAL EVERY 6 HOURS PRN
Qty: 28 TABLET | Refills: 0 | Status: SHIPPED | OUTPATIENT
Start: 2020-09-17 | End: 2020-11-04

## 2020-09-17 RX ADMIN — TRIAMCINOLONE ACETONIDE 80 MG: 40 INJECTION, SUSPENSION INTRA-ARTICULAR; INTRAMUSCULAR at 11:08

## 2020-09-17 ASSESSMENT — PAIN SCALES - GENERAL: PAINLEVEL: WORST PAIN (10)

## 2020-09-17 NOTE — NURSING NOTE
"Patient here for right shoulder pain. She states she was in on 9/14 after a fall and had an xray done, was given a sling and some pain medication. She says her pain right now when moving it is at \"52/10!\"   She states they hydrocodone the  doc gave her on 9/14 is not helping.     Nj Mosqueda, JOHN on 9/17/2020 at 10:17 AM     "

## 2020-09-17 NOTE — PROGRESS NOTES
"Nursing Notes:   Nj Mosqueda LPN  9/17/2020 10:25 AM  Signed  Patient here for right shoulder pain. She states she was in on 9/14 after a fall and had an xray done, was given a sling and some pain medication. She says her pain right now when moving it is at \"52/10!\"   She states they hydrocodone the  doc gave her on 9/14 is not helping.     Nj Mosqueda LPN on 9/17/2020 at 10:17 AM       SUBJECTIVE:  Kate Chacon  is a 75 year old female who comes in today for follow-up of her right shoulder injury.  She fell last week and landed on her shoulder and suffered an AC separation.  She saw Dr. Aponte and was placed in a sling and given pain medication.  She is on anticoagulants so cannot really take NSAIDs.  She has been quite miserable.  Strays showed an AC separation and possible findings of a rotator cuff injury as well.  He is now almost 2 weeks out from the injury and is still quite uncomfortable.  She does not like to take the pain medication.    She has a big bruise on her right hip.  She has been able to sleep in certain positions.     Past Medical, Family, and Social History reviewed and updated as noted below.   ROS is negative except as noted above       Allergies   Allergen Reactions     Methylpar-Na Bisulfite-Pentazocine Unknown     jittery   ,   Family History   Problem Relation Age of Onset     Diabetes Father         Diabetes     Hypertension Father         Hypertension     Other - See Comments Mother         h/o coronary artery disease/dementia     Asthma Mother         Asthma     Diabetes Maternal Grandmother         Diabetes     Cancer Maternal Aunt         Cancer,Uterine     Breast Cancer No family hx of         Cancer-breast   ,   Current Outpatient Medications   Medication     atorvastatin (LIPITOR) 40 MG tablet     calcium carbonate 750 MG CHEW     HYDROcodone-acetaminophen (NORCO) 5-325 MG tablet     lisinopril (PRINIVIL/ZESTRIL) 20 MG tablet     metoprolol succinate ER " (TOPROL-XL) 50 MG 24 hr tablet     sertraline (ZOLOFT) 50 MG tablet     warfarin ANTICOAGULANT (COUMADIN) 5 MG tablet     Current Facility-Administered Medications   Medication     cyanocobalamin injection 1,000 mcg   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   ,   Patient Active Problem List    Diagnosis Date Noted     SA node dysfunction s/p pacer on 11/2/2017 at [facility] 08/13/2020     Priority: Medium     H/O sinus pause 08/13/2020     Priority: Medium     History of tobacco abuse quitting on 10/14/16 08/13/2020     Priority: Medium     Dyspnea on exertion 08/13/2020     Priority: Medium     On Coumadin for atrial fibrillation (H) 08/13/2020     Priority: Medium     Mixed hyperlipidemia 08/13/2020     Priority: Medium     CKD (chronic kidney disease) stage 3, GFR 30-59 ml/min (H) 08/13/2020     Priority: Medium     Chronic midline low back pain without sciatica 08/13/2020     Priority: Medium     Sinoatrial node dysfunction (H) 05/01/2018     Priority: Medium     Cardiac pacemaker 02/19/2018     Priority: Medium     Urge incontinence 10/05/2017     Priority: Medium     Former smoker 08/30/2017     Priority: Medium     MDD (recurrent major depressive disorder) in remission (H) 08/30/2017     Priority: Medium     Anxiety 01/05/2017     Priority: Medium     Essential hypertension 12/08/2016     Priority: Medium     Paroxysmal atrial fibrillation (H) 11/02/2016     Priority: Medium     Anticoagulation goal of INR 2 to 3 10/28/2016     Priority: Medium     History of ischemic stroke on 10/15/2016 secondary to embolism 10/15/2016     Priority: Medium     Diverticulosis of large intestine 04/04/2011     Priority: Medium     H/O adenomatous polyp of colon 03/02/2011     Priority: Medium     Osteoporosis 02/08/2006     Priority:  Medium   ,   Past Surgical History:   Procedure Laterality Date     APPENDECTOMY OPEN      No Comments Provided     COLONOSCOPY  08/19/2005 8/19/05,Cecal biopsy with tubular adenoma low grade dysplasia.     COLONOSCOPY  04/04/2011 4/4/11     COLONOSCOPY  05/07/2012    Tubular Adenomas F/U 2017     COLONOSCOPY  12/02/2019    F/U N/A as will be over 78 in 2024. Tubular adenoma     ESOPHAGOSCOPY, GASTROSCOPY, DUODENOSCOPY (EGD), COMBINED      8/19/05     OTHER SURGICAL HISTORY      8/3/05,[id number],OTHER,helices on the right ear     TONSILLECTOMY      No Comments Provided    and   Social History     Tobacco Use     Smoking status: Former Smoker     Packs/day: 0.50     Years: 49.00     Pack years: 24.50     Types: Cigarettes     Last attempt to quit: 10/14/2016     Years since quitting: 3.9     Smokeless tobacco: Never Used   Substance Use Topics     Alcohol use: No     OBJECTIVE:  Pulse 68   Temp 98  F (36.7  C) (Tympanic)   Wt 83.7 kg (184 lb 9.6 oz)   LMP  (LMP Unknown)   BMI 30.39 kg/m     EXAM:  Alert and cooperative, no distress.  Shoulder range of motion is diminished on shoulder flexion at about 80 degrees and on shoulder abduction at about 70 degrees.  She has no pain with movement forward and back of her shoulder but some discomfort on external rotation.  She has positive impingement testing and really unable to determine whether she has weakness of her supraspinatus on isolation because of pain.  She has tenderness over the AC joint.  X-ray is reviewed  ASSESSMENT/Plan :    Kate was seen today for pain.    Diagnoses and all orders for this visit:    Traumatic incomplete tear of right rotator cuff, subsequent encounter  -     Large Joint/Bursa injection and/or drainage (Shoulder, Knee)  -     triamcinolone (KENALOG-40) injection 80 mg  -     PHYSICAL THERAPY REFERRAL; Future  -     HYDROcodone-acetaminophen (NORCO) 5-325 MG tablet; Take 1-2 tablets by mouth every 6 hours as needed for pain    AC  separation, right, subsequent encounter  -     PHYSICAL THERAPY REFERRAL; Future  -     HYDROcodone-acetaminophen (NORCO) 5-325 MG tablet; Take 1-2 tablets by mouth every 6 hours as needed for pain      Suspect she may have a rotator cuff tear in addition to her AC joint separation.  We elected to go ahead and do subacromial space injection for both therapeutic and diagnostic reasons.     RIGHT SHOULDER INJECTION:Risks, benefits, alternatives discussed, consent obtained Pt wishes to proceed.  Chloraprep used for sterile prep x2 prior to injection. Timeout is performed. Ethyl Chloride used as skin refrigerant for topical anesthesia. A posterior approach is used.  The subacromial space is injected with a mixture of 2ml each of 1% lidocaine without epinephrine, 0.5% Marcaine, and Kenalog 40mg/ml. After injection, bandaid placed. Patient noted minimal improvement in symptoms.  Ice 20 min tid and prn.  RTC if not improved over next 7 days.     Discussed ongoing icing.  Reviewed shoulder range of motion referred to physical therapy for evaluation and treatment.  Renewed hydrocodone although she does not really find that it is terribly helpful, but she is only taken 1 tablet.  Discussed potentially taking 1-1/2 or 2 but only using it if she needs to try and get comfortable to be able to rest.    PDMP Review       Value Time User    State PDMP site checked  Yes 9/17/2020 11:23 AM Asher Campos MD         Reviewed shoulder range of motion exercises with her.    Call or return if worsening or not improving.   Asher Campos MD

## 2020-09-28 ENCOUNTER — ANTICOAGULATION THERAPY VISIT (OUTPATIENT)
Dept: ANTICOAGULATION | Facility: OTHER | Age: 75
End: 2020-09-28
Attending: FAMILY MEDICINE
Payer: MEDICARE

## 2020-09-28 DIAGNOSIS — Z51.81 ANTICOAGULATION GOAL OF INR 2 TO 3: ICD-10-CM

## 2020-09-28 DIAGNOSIS — I48.0 PAROXYSMAL ATRIAL FIBRILLATION (H): ICD-10-CM

## 2020-09-28 DIAGNOSIS — Z79.01 ANTICOAGULATION GOAL OF INR 2 TO 3: ICD-10-CM

## 2020-09-28 LAB — INR POINT OF CARE: 2.1 (ref 0.86–1.14)

## 2020-09-28 PROCEDURE — 36416 COLLJ CAPILLARY BLOOD SPEC: CPT | Mod: ZL

## 2020-09-28 NOTE — PROGRESS NOTES
ANTICOAGULATION FOLLOW-UP CLINIC VISIT    Patient Name:  Kate Chacon  Date:  2020  Contact Type:  Face to Face    SUBJECTIVE:  Patient Findings         Clinical Outcomes     Negatives:   Major bleeding event, Thromboembolic event, Anticoagulation-related hospital admission, Anticoagulation-related ED visit, Anticoagulation-related fatality           OBJECTIVE    Recent labs: (last 7 days)     20   INR 2.1*       ASSESSMENT / PLAN  INR assessment THER    Recheck INR In: 4 WEEKS    INR Location Clinic      Anticoagulation Summary  As of 2020    INR goal:   2.0-3.0   TTR:   60.8 % (1 y)   INR used for dosin.1 (2020)   Warfarin maintenance plan:   2.5 mg (5 mg x 0.5) every Wed; 5 mg (5 mg x 1) all other days   Full warfarin instructions:   2.5 mg every Wed; 5 mg all other days   Weekly warfarin total:   32.5 mg   No change documented:   Radha Burns RN   Plan last modified:   Radha Burns RN (2020)   Next INR check:   10/26/2020   Priority:   Maintenance   Target end date:   Indefinite    Indications    Paroxysmal atrial fibrillation (H) [I48.0]  Anticoagulation goal of INR 2 to 3 [Z51.81  Z79.01]             Anticoagulation Episode Summary     INR check location:       Preferred lab:       Send INR reminders to:   [facility]    Comments:         Anticoagulation Care Providers     Provider Role Specialty Phone number    Asher Campos MD [facility] [phone]            See the Encounter Report to view Anticoagulation Flowsheet and Dosing Calendar (Go to Encounters tab in chart review, and find the Anticoagulation Therapy Visit)        Radha Burns RN

## 2020-09-29 ENCOUNTER — HOSPITAL ENCOUNTER (OUTPATIENT)
Dept: PHYSICAL THERAPY | Facility: OTHER | Age: 75
Setting detail: THERAPIES SERIES
End: 2020-09-29
Attending: FAMILY MEDICINE
Payer: MEDICARE

## 2020-09-29 DIAGNOSIS — S43.101D AC SEPARATION, RIGHT, SUBSEQUENT ENCOUNTER: ICD-10-CM

## 2020-09-29 DIAGNOSIS — S46.011D TRAUMATIC INCOMPLETE TEAR OF RIGHT ROTATOR CUFF, SUBSEQUENT ENCOUNTER: ICD-10-CM

## 2020-09-29 PROCEDURE — 97110 THERAPEUTIC EXERCISES: CPT | Mod: GP

## 2020-09-29 PROCEDURE — 97161 PT EVAL LOW COMPLEX 20 MIN: CPT | Mod: GP

## 2020-09-29 NOTE — PROGRESS NOTES
"   09/29/20 1000   General Information   Type of Visit Initial OP Ortho PT Evaluation   Start of Care Date 09/29/20   Referring Physician Dr. Campos    Patient/Family Goals Statement decrease pain and improve function right UE    Orders Evaluate and Treat   Date of Order 09/17/20   Certification Required? Yes   Medical Diagnosis AC separation, right, subsequent encounter S43.101D,  Traumatic incomplete tear of right rotator cuff, subsequent encounter S46.011D,    Surgical/Medical history reviewed Yes   Precautions/Limitations   (Cardiac Pacemaker )   Body Part(s)   Body Part(s) Shoulder   Presentation and Etiology   Pertinent history of current problem (include personal factors and/or comorbidities that impact the POC) Patient fell on 9/4/2020 and sustained an AC separation of the right shoulder. Patient received an injection in the right shoulder on 9/17/2020.  States the right shoulder continues to hurt.  She still wears a sling \"most of the day\".  She is not wearing the sling at the time the evaluation today.  She has pain and weakness when trying to raise the right arm. She reports dressing is painful.     Impairments A. Pain;D. Decreased ROM;E. Decreased flexibility;F. Decreased strength and endurance;G. Impaired balance   Functional Limitations perform activities of daily living;perform desired leisure / sports activities   Symptom Location Anterior and superior right shoulder, AC joint   How/Where did it occur With a fall   Onset date of current episode/exacerbation 09/04/20   Chronicity New   Pain rating (0-10 point scale) Best (/10);Worst (/10)   Best (/10) 5/10   Worst (/10) 10/10   Pain quality A. Sharp;C. Aching   Frequency of pain/symptoms C. With activity   Pain/symptoms exacerbated by C. Lifting;D. Carrying;G. Certain positions;H. Overhead reach;J. ADL;K. Home tasks   Pain/symptoms eased by C. Rest;H. Cold;J. Braces/supports  (sling )   Progression of symptoms since onset: Unchanged   Prior Level of " Function   Prior Level of Function-Mobility No limitations    Prior Level of Function-ADLs No limitations    Current Level of Function   Current Community Support Family/friend caregiver   Patient role/employment history F. Retired   Fall Risk Screen   Fall screen completed by PT   Have you fallen 2 or more times in the past year? Yes   Have you fallen and had an injury in the past year? Yes   Is patient a fall risk? Yes;Department fall risk interventions implemented   Shoulder Objective Findings   Side (if bilateral, select both right and left) Right   Posture rounded shoulder posture    Shoulder ROM Comment Shoulder hiking noted with shoulder flexion and abduction    Neer's Test positive    Merino-Negro Test positive    Crossover Test positive    Palpation Pain noted over the AC joint, long head of biceps pathway and RC insertion   Right Shoulder Flexion AROM 85 degrees-- limited by pain and weakness   Right Shoulder Abduction AROM 85 degrees--limited by pain and weakness    Right Shoulder Flexion Strength 4-/5-painful    Right Shoulder Abduction Strength 4-/5-painful    Right Shoulder ER Strength 3/5-painful   Right Shoulder IR Strength 3+/5-painful    Planned Therapy Interventions   Planned Therapy Interventions joint mobilization;manual therapy;neuromuscular re-education;ROM;strengthening;stretching   Planned Modality Interventions   Planned Modality Interventions Cryotherapy   Clinical Impression   Criteria for Skilled Therapeutic Interventions Met yes, treatment indicated   PT Diagnosis AC separation, right, subsequent encounter S43.101D,  Traumatic incomplete tear of right rotator cuff, subsequent encounter S46.011D,    Influenced by the following impairments Pain, impaired ROM, Weakness,    Functional limitations due to impairments Reaching, lifting, carrying, donning/doffing shirts/jackets, household tasks, ADL's,    Clinical Presentation Stable/Uncomplicated   Clinical Presentation Rationale Symptoms  are consistent with the diagnosis    Clinical Decision Making (Complexity) Low complexity   Therapy Frequency   (16 visits )   Predicted Duration of Therapy Intervention (days/wks) 8 weeks    Risk & Benefits of therapy have been explained Yes   Patient, Family & other staff in agreement with plan of care Yes   Education Assessment   Preferred Learning Style Listening;Demonstration;Pictures/video   Barriers to Learning No barriers   ORTHO GOALS   PT Ortho Eval Goals 1;2;3   Ortho Goal 1   Goal Identifier Pain    Goal Description Report right shoulder pain at 2/10 or less to allow donning/doffing a jacket without difficulty    Target Date 11/24/20   Ortho Goal 2   Goal Identifier ROM    Goal Description Imporve right shoulder flexion to 130 degrees or greater to allow reaching to an overhead shelf without difficulty    Target Date 11/24/20   Ortho Goal 3   Goal Identifier Weakness   Goal Description Improve right shoulder strength to 4/5 or greater in all planes to allow lifting a 1# object to an overhead shelf without diffculty or compensation   Target Date 11/24/20   Total Evaluation Time   PT Eval, Low Complexity Minutes (70174) 15   Therapy Certification   Certification date from 09/29/20   Certification date to 11/24/20   Medical Diagnosis AC separation, right, subsequent encounter S43.101D,  Traumatic incomplete tear of right rotator cuff, subsequent encounter S46.011D,

## 2020-09-29 NOTE — PROGRESS NOTES
[facility]          OUTPATIENT PHYSICAL THERAPY ORTHOPEDIC EVALUATION  PLAN OF TREATMENT FOR OUTPATIENT REHABILITATION  (COMPLETE FOR INITIAL CLAIMS ONLY)  Patient's Last Name, First Name, M.I.  YOB: 1945  Kate Chacon    Provider s Name:  [facility]   Medical Record No.  [id number]   Start of Care Date:  09/29/20   Onset Date:  09/04/20   Type:     _X__PT   ___OT   ___SLP Medical Diagnosis:  AC separation, right, subsequent encounter S43.101D,  Traumatic incomplete tear of right rotator cuff, subsequent encounter S46.011D,      PT Diagnosis:  AC separation, right, subsequent encounter S43.101D,  Traumatic incomplete tear of right rotator cuff, subsequent encounter S46.011D,    Visits from SOC:  1      _________________________________________________________________________________  Plan of Treatment/Functional Goals:  joint mobilization, manual therapy, neuromuscular re-education, ROM, strengthening, stretching     Cryotherapy     Goals  Goal Identifier: Pain   Goal Description: Report right shoulder pain at 2/10 or less to allow donning/doffing a jacket without difficulty   Target Date: 11/24/20    Goal Identifier: ROM   Goal Description: Imporve right shoulder flexion to 130 degrees or greater to allow reaching to an overhead shelf without difficulty   Target Date: 11/24/20    Goal Identifier: Weakness  Goal Description: Improve right shoulder strength to 4/5 or greater in all planes to allow lifting a 1# object to an overhead shelf without diffculty or compensation  Target Date: 11/24/20               Therapy Frequency:  (16 visits )  Predicted Duration of Therapy Intervention:  8 weeks     Zachery Sharp, PT                 I CERTIFY THE NEED FOR THESE SERVICES FURNISHED UNDER        THIS PLAN OF TREATMENT AND WHILE UNDER MY CARE     (Physician co-signature of this document indicates review and certification of the therapy plan).                        Certification Date From:  09/29/20   Certification Date To:  11/24/20    Referring Provider:  Dr. Campos     Initial Assessment        See Epic Evaluation Start of Care Date: 09/29/20

## 2020-09-30 ENCOUNTER — OFFICE VISIT (OUTPATIENT)
Dept: UROLOGY | Facility: OTHER | Age: 75
End: 2020-09-30
Attending: UROLOGY
Payer: MEDICARE

## 2020-09-30 DIAGNOSIS — N32.81 OVERACTIVE BLADDER: Primary | ICD-10-CM

## 2020-09-30 PROCEDURE — 52287 CYSTOSCOPY CHEMODENERVATION: CPT | Performed by: UROLOGY

## 2020-09-30 PROCEDURE — 51798 US URINE CAPACITY MEASURE: CPT | Performed by: UROLOGY

## 2020-09-30 PROCEDURE — 99213 OFFICE O/P EST LOW 20 MIN: CPT | Mod: 25 | Performed by: UROLOGY

## 2020-09-30 PROCEDURE — 25000576 ZZH RX IP 250 OP 636 J0585: Performed by: UROLOGY

## 2020-09-30 PROCEDURE — G0463 HOSPITAL OUTPT CLINIC VISIT: HCPCS | Mod: 25

## 2020-09-30 RX ADMIN — ONABOTULINUMTOXINA 100 UNITS: 100 INJECTION, POWDER, LYOPHILIZED, FOR SOLUTION INTRADERMAL; INTRAMUSCULAR at 11:45

## 2020-09-30 NOTE — NURSING NOTE
Post-Void Residual  A post-void residual was measured by ultrasonic bladder scanner.  66 mL    Cefuroxime 500mg tablet (2-250mg tablets with same lot # and expiration date) by mouth one time now ordered by Steve Fox MD.  Medication administered per verbal order   Lot # [id number]  Exp. 9/22    Patient tolerated well.  Lynne Hodge LPN..................9/30/2020  12:03 PM    Botox  Verbal Order per Steve Fox MD Read Back to prep patient.  Perineum area prepped with chlorhexidene Gluconate and patient draped per sterile technique.    Sodium Chloride 0.9%, 10mL mixed with Botox  Lot #: [id number]  Expiration date: 7/21  : Ezekiel Altagraciagloria  NDC: 55118-292-31    [facility] Protocol    A. Pre-procedure verification complete Yes  1-relevant information / documentation available, reviewed and properly matched to the patient; 2-consent accurate and complete, 3-equipment and supplies available    B. Site marking complete N/A  Site marked if not in continuous attendance with patient    C. TIME OUT completed Yes  Time Out was conducted just prior to starting procedure to verify the eight required elements: 1-patient identity, 2-consent accurate and complete, 3-position, 4-correct side/site marked (if applicable), 5-procedure, 6-relevant images / results properly labeled and displayed (if applicable), 7-antibiotics / irrigation fluids (if applicable), 8-safety precautions.    After procedure perineum area rinsed.  Discharge instructions reviewed with patient.  Patient verbalized understanding of discharge instructions and discharged ambulatory.

## 2020-09-30 NOTE — PATIENT INSTRUCTIONS
Home Care after Botox Treatment  Follow these guidelines for your care after your procedure.    Activity  No limitations    Bathing or showering  No limitations    Symptoms  You may notice some burning with urination but this usually resolves after 1-2 days.  You may also notice small amounts of blood in your urine.  Please increase water intake for the next few days to help with these symptoms.    Contacts  General Questions: [phone]  Appointments:  [phone]  Emergencies:  [phone]    When to call the clinic  If you develop any of the following symptoms please call the clinic immediately.  If the clinic is closed please be seen at an urgent care clinic or the Emergency Department.  - Burning with urination that worsens after 2 days  - Unable to urinate causing severe pelvic pain  - Fevers of greater than 101 degrees F  - Flank pain that is not responding to pain medication    Follow up  If you are currently taking an anticholinergic for urgency (such as oxybutynin, Detrol, Toviaz or Sanctura) please continue for 1 week then stop.  Please follow up in 4-6 weeks for a bladder scan.

## 2020-09-30 NOTE — PROGRESS NOTES
Type of Visit  Established    Chief Complaint  Urge incontinence    HPI  Ms. Chacon is a 75 year old female with urge incontinence.  Patient last underwent Botox 100 unit intra-detrusor injections over 1 year ago.  Patient responded well to the treatment and follows up today for retreatment as her symptoms have returned.  She presents today for Botox retreatment.  She denies dysuria or hematuria today.      Review of Systems  I reviewed the ROS the patient today.    Nursing Notes:   Lynne Hodge LPN  9/30/2020 12:06 PM  Addendum  Post-Void Residual  A post-void residual was measured by ultrasonic bladder scanner.  66 mL    Cefuroxime 500mg tablet (2-250mg tablets with same lot # and expiration date) by mouth one time now ordered by Steve Fox MD.  Medication administered per verbal order   Lot # [id number]  Exp. 9/22    Patient tolerated well.  Lynne Hodge LPN..................9/30/2020  12:03 PM    Botox  Verbal Order per Steve Fox MD Read Back to prep patient.  Perineum area prepped with chlorhexidene Gluconate and patient draped per sterile technique.    Sodium Chloride 0.9%, 10mL mixed with Botox  Lot #: [id number]  Expiration date: 7/21  : Jihanus Friasgloria  NDC: 96595-482-33    [facility] Protocol    A. Pre-procedure verification complete Yes  1-relevant information / documentation available, reviewed and properly matched to the patient; 2-consent accurate and complete, 3-equipment and supplies available    B. Site marking complete N/A  Site marked if not in continuous attendance with patient    C. TIME OUT completed Yes  Time Out was conducted just prior to starting procedure to verify the eight required elements: 1-patient identity, 2-consent accurate and complete, 3-position, 4-correct side/site marked (if applicable), 5-procedure, 6-relevant images / results properly labeled and displayed (if applicable), 7-antibiotics / irrigation fluids (if applicable), 8-safety  precautions.    After procedure perineum area rinsed.  Discharge instructions reviewed with patient.  Patient verbalized understanding of discharge instructions and discharged ambulatory.         Physical Exam  Pulse: 64   Resp: 16   Weight: 90.5 kg (199 lb 9.6 oz)     Constitutional: NAD, WDWN.   Cardiovascular: Regular rate.  Pulmonary/Chest: Respirations are even and non-labored bilaterally.  Abdominal: Soft. No distension, tenderness, masses or guarding. No CVA tenderness.  Genitourinary: Nonpalpable bladder.  Extremities: MARI x 4, Warm. No clubbing.  No cyanosis.    Skin: Pink, warm and dry.  No rashes noted.      ^^^^^^^^^^^^^^^^^^^^^^^^^^^^^^^^^^^^^^^^^^^^^^^^^^^^^^^^^^^^^^^  Preprocedure diagnosis  Urge incontinence with hypertonicity of bladder    Postprocedure diagnosis  Urge incontinence with hypertonicity of bladder    Procedure  Cystourethroscopy with intradetrusor injection of Botox, 100 units    Surgeon  Steve Fox MD    Anesthesia  None    Complications  None    Indications  The patient previously failed anticholinergic medication for treatment of the above named indication.    Informed consent was obtained.    We discussed the risks of Botox including, but not limited to, the risk of urinary retention and UTI.    Discussed performing the procedure in the OR versus clinic- risks and benefits.    Findings  Cystoscopy revealed one right and left ureteral orifice in the normal anatomic position, normal bladder mucosa and no tumors, masses or stones.    Procedure  The patient was taken to the procedure room and placed supine on the procedure table.    The patient was positioned in dorsal lithotomy, prepped and draped in a sterile fashion.    A time-out was performed.    The cystoscope was passed through the urethra and into the bladder.    The injection needle was passed through the working port of the cystoscope and 10 units/mL/injection x 10 injections for a total of 100 international units of Botox  was injected submucosally.   I injected 4 doses from the left to right lateral wall just above the trigonal ridge.    Two radial injections of 3 doses were then used rising toward the dome.    I encountered minimal bleeding from the sites and avoided injection of the trigone.   Once the injections were completed, the bladder was emptied and the scope was removed.    ^^^^^^^^^^^^^^^^^^^^^^^^^^^^^^^^^^^^^^^^^^^^^^^^^^^^^^^^^^^^^^^    UA  Unable to void  Asymptomatic today    Post-Void Residual  A post-void residual was measured by ultrasonic bladder scanner.  66 mL today    Assessment & Plan  Ms. Chacon is a 75 year old female with urge incontinence who underwent repeat Botox treatment.  UA and PVR were appropriate for retreatment today.  Follow up when symptoms return for repeat treatment

## 2020-10-06 ENCOUNTER — HOSPITAL ENCOUNTER (OUTPATIENT)
Dept: PHYSICAL THERAPY | Facility: OTHER | Age: 75
Setting detail: THERAPIES SERIES
End: 2020-10-06
Attending: FAMILY MEDICINE
Payer: MEDICARE

## 2020-10-06 PROCEDURE — 97110 THERAPEUTIC EXERCISES: CPT | Mod: GP

## 2020-10-09 ENCOUNTER — OFFICE VISIT (OUTPATIENT)
Dept: SURGERY | Facility: OTHER | Age: 75
End: 2020-10-09
Attending: SURGERY
Payer: MEDICARE

## 2020-10-09 VITALS
TEMPERATURE: 97.2 F | WEIGHT: 181 LBS | DIASTOLIC BLOOD PRESSURE: 84 MMHG | HEART RATE: 66 BPM | RESPIRATION RATE: 18 BRPM | SYSTOLIC BLOOD PRESSURE: 128 MMHG | BODY MASS INDEX: 29.79 KG/M2

## 2020-10-09 DIAGNOSIS — H61.001 CHONDRODERMATITIS NODULARIS CHRONICA HELICIS, RIGHT: Primary | ICD-10-CM

## 2020-10-09 PROCEDURE — 99212 OFFICE O/P EST SF 10 MIN: CPT | Performed by: SURGERY

## 2020-10-09 PROCEDURE — G0463 HOSPITAL OUTPT CLINIC VISIT: HCPCS

## 2020-10-09 ASSESSMENT — PAIN SCALES - GENERAL: PAINLEVEL: NO PAIN (0)

## 2020-10-09 NOTE — NURSING NOTE
"Chief Complaint   Patient presents with     Derm Problem     ear irritation       Initial /84 (BP Location: Left arm, Patient Position: Sitting, Cuff Size: Adult Large)   Pulse 66   Temp 97.2  F (36.2  C) (Tympanic)   Resp 18   Wt 82.1 kg (181 lb)   LMP  (LMP Unknown)   Breastfeeding No   BMI 29.79 kg/m   Estimated body mass index is 29.79 kg/m  as calculated from the following:    Height as of 8/13/20: 1.66 m (5' 5.35\").    Weight as of this encounter: 82.1 kg (181 lb).  Medication Reconciliation: complete    Ira Curtis LPN  "

## 2020-10-09 NOTE — PROGRESS NOTES
Surgical Clinic Visit  Primary physician:     Asher Campos    Chief complaint:   Tender lesion right ear    History of present illness:  This is a 75 year old female I am seeing  for a tender lesion on the right ear.  This is located at a site of her prior excision of a lesion many years ago.  The pathology is not in the chart.  The area is tender.  This is worse when she is sleeping and she tends to sleep on both sides.  She cannot take anti-inflammatories as she is on Coumadin.     Past medical history:   Past Medical History:   Diagnosis Date     Encounter for full-term uncomplicated delivery     x3     Ganglion of wrist     right     Gastritis without bleeding     treated and 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       Pastsurgical history:  Past Surgical History:   Procedure Laterality Date     APPENDECTOMY OPEN      No Comments Provided     COLONOSCOPY  08/19/2005 8/19/05,Cecal biopsy with tubular adenoma low grade dysplasia.     COLONOSCOPY  04/04/2011 4/4/11     COLONOSCOPY  05/07/2012    Tubular Adenomas F/U 2017     COLONOSCOPY  12/02/2019    F/U N/A as will be over 78 in 2024. Tubular adenoma     ESOPHAGOSCOPY, GASTROSCOPY, DUODENOSCOPY (EGD), COMBINED      8/19/05     OTHER SURGICAL HISTORY      8/3/05,[id number],OTHER,helices on the right ear     TONSILLECTOMY      No Comments Provided       Current medications:  Current Outpatient Medications   Medication Sig Dispense Refill     atorvastatin (LIPITOR) 40 MG tablet TAKE 1 TABLET BY MOUTH AT BEDTIME 90 tablet 3     calcium carbonate 750 MG CHEW Take 1 tablet by mouth every 8 hours as needed for heartburn       HYDROcodone-acetaminophen (NORCO) 5-325 MG tablet Take 1-2 tablets by mouth every 6 hours as needed for pain 28 tablet 0     lisinopril (PRINIVIL/ZESTRIL) 20 MG tablet Take 1 tablet (20 mg) by mouth daily 90 tablet 11     metoprolol succinate ER (TOPROL-XL) 50 MG 24 hr tablet Take 1 tablet (50 mg) by mouth daily 90 tablet 3      sertraline (ZOLOFT) 50 MG tablet Take 1 tablet (50 mg) by mouth At Bedtime 90 tablet 11     warfarin ANTICOAGULANT (COUMADIN) 5 MG tablet Take 5 mg x six days/week and 2.5 mg x one day/week.  OR AS DIRECTED BY PROTIME CLINIC 90 tablet 1       Allergies:  Allergies   Allergen Reactions     Methylpar-Na Bisulfite-Pentazocine Unknown     jittery       Family history:  Family History   Problem Relation Age of Onset     Diabetes Father         Diabetes     Hypertension Father         Hypertension     Other - See Comments Mother         h/o coronary artery disease/dementia     Asthma Mother         Asthma     Diabetes Maternal Grandmother         Diabetes     Cancer Maternal Aunt         Cancer,Uterine     Breast Cancer No family hx of         Cancer-breast       Social history:  Social History     Socioeconomic History     Marital status:      Spouse name: Not on file     Number of children: Not on file     Years of education: Not on file     Highest education level: Not on file   Occupational History     Not on file   Social Needs     Financial resource strain: Not on file     Food insecurity     Worry: Not on file     Inability: Not on file     Transportation needs     Medical: Not on file     Non-medical: Not on file   Tobacco Use     Smoking status: Former Smoker     Packs/day: 0.50     Years: 49.00     Pack years: 24.50     Types: Cigarettes     Quit date: 10/14/2016     Years since quitting: 3.9     Smokeless tobacco: Never Used   Substance and Sexual Activity     Alcohol use: No     Drug use: No     Sexual activity: Not Currently   Lifestyle     Physical activity     Days per week: Not on file     Minutes per session: Not on file     Stress: Not on file   Relationships     Social connections     Talks on phone: Not on file     Gets together: Not on file     Attends [employer] service: Not on file     Active member of club or organization: Not on file     Attends meetings of clubs or organizations: Not on  file     Relationship status: Not on file     Intimate partner violence     Fear of current or ex partner: Not on file     Emotionally abused: Not on file     Physically abused: Not on file     Forced sexual activity: Not on file   Other Topics Concern     Parent/sibling w/ CABG, MI or angioplasty before 65F 55M? 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  She enjoys gardening, bowling and going to stock car races that her son participates in.   to her  Maco.  They run [employer].  Live in town independently.       PROBLEM LIST:  Patient Active Problem List   Diagnosis     Anticoagulation goal of INR 2 to 3     Anxiety     Paroxysmal atrial fibrillation (H)     H/O adenomatous polyp of colon     Diverticulosis of large intestine     Former smoker     Essential hypertension     History of ischemic stroke on 10/15/2016 secondary to embolism     MDD (recurrent major depressive disorder) in remission (H)     Osteoporosis     Urge incontinence     Cardiac pacemaker     Sinoatrial node dysfunction (H)     SA node dysfunction s/p pacer on 11/2/2017 at [facility]     H/O sinus pause     History of tobacco abuse quitting on 10/14/16     Dyspnea on exertion     On Coumadin for atrial fibrillation (H)     Mixed hyperlipidemia     CKD (chronic kidney disease) stage 3, GFR 30-59 ml/min     Chronic midline low back pain without sciatica     Chondrodermatitis nodularis chronica helicis, right           Physical exam: /84 (BP Location: Left arm, Patient Position: Sitting, Cuff Size: Adult Large)   Pulse 66   Temp 97.2  F (36.2  C) (Tympanic)   Resp 18   Wt 82.1 kg (181 lb)   LMP  (LMP Unknown)   Breastfeeding No   BMI 29.79 kg/m        General: this is a pleasant female patient in no acute distress.  Patient is awake alert and oriented x3 .   Right ear: On the inner cartilage of the ear there is a defect of the cartilage.  No overlying lesion.  The areas tender  to palpation.     Assessment:   Chondrodermatitis nodularis helices of the right    Plan:    Recommend avoiding pressure on the right ear  Consider topical anti-inflammatory      Easton Matthews MD FACS

## 2020-10-13 ENCOUNTER — HOSPITAL ENCOUNTER (OUTPATIENT)
Dept: PHYSICAL THERAPY | Facility: OTHER | Age: 75
Setting detail: THERAPIES SERIES
End: 2020-10-13
Attending: FAMILY MEDICINE
Payer: MEDICARE

## 2020-10-13 ENCOUNTER — TELEPHONE (OUTPATIENT)
Dept: FAMILY MEDICINE | Facility: OTHER | Age: 75
End: 2020-10-13

## 2020-10-13 PROCEDURE — 97110 THERAPEUTIC EXERCISES: CPT | Mod: GP,CQ

## 2020-10-13 NOTE — TELEPHONE ENCOUNTER
The patient states she feels like something is out in her back. The pain is by her right hip. It is hard to walk on that side. She is wondering if it would be ok to go to the chiropractor. She did fall awhile ago and landed on her butt.  Tiffany White LPN..................10/13/2020   2:57 PM

## 2020-10-14 NOTE — TELEPHONE ENCOUNTER
After the patient's name and date of birth was verified, the patient was told the below information.  Tiffany White LPN..................10/14/2020   9:26 AM

## 2020-10-19 ENCOUNTER — OFFICE VISIT (OUTPATIENT)
Dept: PEDIATRICS | Facility: OTHER | Age: 75
End: 2020-10-19
Attending: INTERNAL MEDICINE
Payer: MEDICARE

## 2020-10-19 VITALS
DIASTOLIC BLOOD PRESSURE: 88 MMHG | BODY MASS INDEX: 29.43 KG/M2 | RESPIRATION RATE: 18 BRPM | WEIGHT: 178.8 LBS | SYSTOLIC BLOOD PRESSURE: 148 MMHG | OXYGEN SATURATION: 97 % | TEMPERATURE: 98.1 F | HEART RATE: 81 BPM

## 2020-10-19 DIAGNOSIS — Z23 NEED FOR PROPHYLACTIC VACCINATION AND INOCULATION AGAINST INFLUENZA: ICD-10-CM

## 2020-10-19 DIAGNOSIS — I69.359 HEMIPARESIS DUE TO OLD STROKE (H): ICD-10-CM

## 2020-10-19 DIAGNOSIS — Z86.73 HISTORY OF CVA (CEREBROVASCULAR ACCIDENT): ICD-10-CM

## 2020-10-19 DIAGNOSIS — R53.81 PHYSICAL DECONDITIONING: ICD-10-CM

## 2020-10-19 DIAGNOSIS — S22.070D COMPRESSION FRACTURE OF T10 VERTEBRA WITH ROUTINE HEALING, SUBSEQUENT ENCOUNTER: ICD-10-CM

## 2020-10-19 DIAGNOSIS — M54.16 LUMBAR RADICULOPATHY: Primary | ICD-10-CM

## 2020-10-19 PROCEDURE — 99213 OFFICE O/P EST LOW 20 MIN: CPT | Performed by: INTERNAL MEDICINE

## 2020-10-19 PROCEDURE — G0463 HOSPITAL OUTPT CLINIC VISIT: HCPCS

## 2020-10-19 PROCEDURE — G0008 ADMIN INFLUENZA VIRUS VAC: HCPCS

## 2020-10-19 PROCEDURE — G0463 HOSPITAL OUTPT CLINIC VISIT: HCPCS | Mod: 25

## 2020-10-19 RX ORDER — IBUPROFEN 200 MG
200 TABLET ORAL EVERY 4 HOURS PRN
COMMUNITY
End: 2022-03-22

## 2020-10-19 RX ORDER — PREGABALIN 50 MG/1
50 CAPSULE ORAL 2 TIMES DAILY
Qty: 60 CAPSULE | Refills: 0 | Status: SHIPPED | OUTPATIENT
Start: 2020-10-19 | End: 2021-02-19

## 2020-10-19 ASSESSMENT — ANXIETY QUESTIONNAIRES
GAD7 TOTAL SCORE: 0
5. BEING SO RESTLESS THAT IT IS HARD TO SIT STILL: NOT AT ALL
2. NOT BEING ABLE TO STOP OR CONTROL WORRYING: NOT AT ALL
1. FEELING NERVOUS, ANXIOUS, OR ON EDGE: NOT AT ALL
3. WORRYING TOO MUCH ABOUT DIFFERENT THINGS: NOT AT ALL
7. FEELING AFRAID AS IF SOMETHING AWFUL MIGHT HAPPEN: NOT AT ALL
6. BECOMING EASILY ANNOYED OR IRRITABLE: NOT AT ALL
IF YOU CHECKED OFF ANY PROBLEMS ON THIS QUESTIONNAIRE, HOW DIFFICULT HAVE THESE PROBLEMS MADE IT FOR YOU TO DO YOUR WORK, TAKE CARE OF THINGS AT HOME, OR GET ALONG WITH OTHER PEOPLE: NOT DIFFICULT AT ALL

## 2020-10-19 ASSESSMENT — PAIN SCALES - GENERAL: PAINLEVEL: WORST PAIN (10)

## 2020-10-19 ASSESSMENT — PATIENT HEALTH QUESTIONNAIRE - PHQ9
5. POOR APPETITE OR OVEREATING: NOT AT ALL
SUM OF ALL RESPONSES TO PHQ QUESTIONS 1-9: 0

## 2020-10-19 NOTE — PROGRESS NOTES
"Subjective  Kate Chacon is a 75 year old female who presents for evaluation of right hip and leg pain.  For the last several weeks to months she has been battling pain in the right leg.  It is making it difficult for her to walk.  She feels like the bones are out in her upper buttocks on the right side.  Its radiating across the hip over to the knee.  When asked if she falls she says \"I am always falling.\"  Ever since she had a stroke in 2016 her right side feels weaker than the left.  Her balance is off.  She had a big fall at the [employer] on Labor Day and thinks she hurt her rotator cuff.  She has been seeing Dr. Parks who is a chiropractic and thought it might be trochanteric bursitis.  Last winter she fell on the ice near the mailbox and could not get up.  She got frostbite on her hands trying to get up.  She was not wearing gloves.  Her back pain is worse with getting out of bed and better with resting.  It does not wake her up at night.  It is worse in the morning.  No foot drop.  No saddle anesthesia.  No fecal incontinence.    Problem List/PMH: reviewed in EMR, and made relevant updates today.  Medications: reviewed in EMR, and made relevant updates today.  Allergies: reviewed in EMR, and made relevant updates today.    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  She enjoys gardening, [employer] and going to stock car races that her son participates in.   to her  Maco.  They run [employer].  Live in town independently.     I reviewed social history and made relevant updates today.    Family Hx:   Family History   Problem Relation Age of " Onset     Diabetes Father         Diabetes     Hypertension Father         Hypertension     Other - See Comments Mother         h/o coronary artery disease/dementia     Asthma Mother         Asthma     Diabetes Maternal Grandmother         Diabetes     Cancer Maternal Aunt         Cancer,Uterine     Breast Cancer No family hx of         Cancer-breast       Objective  Vitals: reviewed in EMR.  BP (!) 148/88 (BP Location: Right arm, Patient Position: Sitting, Cuff Size: Adult Regular)   Pulse 81   Temp 98.1  F (36.7  C) (Tympanic)   Resp 18   Wt 81.1 kg (178 lb 12.8 oz)   LMP  (LMP Unknown)   SpO2 97%   Breastfeeding No   BMI 29.43 kg/m      Gen: Pleasant female, NAD.  HEENT: MMM  Neck: Supple  Pulm: Breathing easily  Neuro: Grossly intact.  Strength is 5/5 to extension at the toes bilaterally.  Skin: No concerning lesions.  Psychiatric: Normal affect and insight. Does not appear anxious or depressed.  Back: Slight midline tenderness to palpation at about the low thoracic back.  No lumbar midline tenderness to palpation.  Mild tenderness in the upper outer buttocks on the right side.  No tenderness over the right trochanteric bursa.  Negative straight leg raise bilaterally.      Assessment    ICD-10-CM    1. Lumbar radiculopathy  M54.16 PHYSICAL THERAPY REFERRAL     pregabalin (LYRICA) 50 MG capsule   2. Need for prophylactic vaccination and inoculation against influenza  Z23 FLUZONE HIGH DOSE 65+  [80335]   3. Physical deconditioning  R53.81 PHYSICAL THERAPY REFERRAL   4. History of CVA (cerebrovascular accident)  Z86.73 PHYSICAL THERAPY REFERRAL   5. Compression fracture of T10 vertebra with routine healing, subsequent encounter  S22.070D PHYSICAL THERAPY REFERRAL   6. Hemiparesis due to old stroke - right side is weaker  I69.359 PHYSICAL THERAPY REFERRAL     Orders Placed This Encounter   Procedures     FLUZONE HIGH DOSE 65+  [[id number]]     PHYSICAL THERAPY REFERRAL       I think the most likely etiology is  lumbar disc disease however differential diagnosis also includes lumbar foraminal stenoses, lumbar facet arthropathy, compression fracture, others.  She does have point tenderness but it is at the site of an old compression fracture from the summer.    Plan   -- Expected clinical course discussed   -- Medications and their side effects discussed  Patient Instructions    -- PT consult   -- Consider CT if not improving   -- Work to prevent falls      Back Pain    Modifiable Risk Factors    Maintain a healthy weight. Losing 5% can be very helpful.  A loss of 10 lbs, will result in 80 lbs of force lifted off of your back.    Core muscle strength. Keeping up with a physical therapy home exercise program, and/or regular yoga practice keeps your back healthy.    Don't use tobacco products.  People that smoke have more bulging discs, and they take longer to heal.    Try not to injure it. Be smart when doing activities that require bending, twisting and lifting such as gardening.     Treatments   -- Ibuprofen 600 mg (3 tablets) 3 times per day for 7 days, then as needed   Shortest duration of NSAIDs is best as they can hurt your stomach and kidneys.   Take ibuprofen with food, as can be hard on the stomach   -- Rest   -- Ice/heat whichever feels better   -- Topicals: Aspercreme/IcyHot, lidocaine, capsaicin   -- Schedule visit for physical therapy   -- Call or come back if concerns, else as needed   -- Monitor for warning signs including foot drop, groin numbness, new incontinence or other concerns and return same day for evaluation   -- Otherwise return if you are not improving        Relieving Back Pain  Back pain is a common problem. You can strain back muscles by lifting too much weight or just by moving the wrong way. Back strain can be uncomfortable, even painful. And it can take weeks or months to improve. To help yourself feel better and prevent future back strains, try these tips.  Important: Don't give aspirin to  children or teens without first discussing it with your child's healthcare provider.  Ice    Ice reduces muscle pain and swelling. It helps most during the first 24 to 48 hours after an injury.    Wrap an ice pack or a bag of frozen peas in a thin towel. Never put ice directly on your skin.    Place the ice where your back hurts the most.    Don t ice for more than 20 minutes at a time.    You can use ice several times a day.  Medicines  Over-the-counter pain relievers include acetaminophen and anti-inflammatory medicines, which includes aspirin, naproxen, or ibuprofen. They can help ease discomfort. Some also reduce swelling.    Tell your healthcare provider about any medicines you are already taking.    Take medicines only as directed.  Manipulation and massage  Having manipulation by an osteopathic doctor or chiropractor may be helpful. Getting a massage also may help.   Heat  After the first 48 hours, heat can relax sore muscles and improve blood flow.    Try a warm bath or shower. Or use a heating pad set on low. To prevent a burn, keep a cloth between you and the heating pad.    Don t use a heating pad for more than 15 minutes at a time. Never sleep on a heating pad.  Date Last Reviewed: 6/1/2018 2000-2019 [employer]. [address] [place] [address], [place] [address]. All rights reserved. This information is not intended as a substitute for professional medical care. Always follow your healthcare professional's instructions.                Return if symptoms worsen or fail to improve.    Easton Rodríguez MD, FAAP, FACP  Internal Medicine & Pediatrics

## 2020-10-19 NOTE — NURSING NOTE
Clinic Administered Medication Documentation    Vaccine given.  Linda Rodgers LPN.........................10/19/2020  4:14 PM

## 2020-10-19 NOTE — PATIENT INSTRUCTIONS
-- PT consult   -- Consider CT if not improving   -- Work to prevent falls      Back Pain    Modifiable Risk Factors    Maintain a healthy weight. Losing 5% can be very helpful.  A loss of 10 lbs, will result in 80 lbs of force lifted off of your back.    Core muscle strength. Keeping up with a physical therapy home exercise program, and/or regular yoga practice keeps your back healthy.    Don't use tobacco products.  People that smoke have more bulging discs, and they take longer to heal.    Try not to injure it. Be smart when doing activities that require bending, twisting and lifting such as gardening.     Treatments   -- Ibuprofen 600 mg (3 tablets) 3 times per day for 7 days, then as needed   Shortest duration of NSAIDs is best as they can hurt your stomach and kidneys.   Take ibuprofen with food, as can be hard on the stomach   -- Rest   -- Ice/heat whichever feels better   -- Topicals: Aspercreme/IcyHot, lidocaine, capsaicin   -- Schedule visit for physical therapy   -- Call or come back if concerns, else as needed   -- Monitor for warning signs including foot drop, groin numbness, new incontinence or other concerns and return same day for evaluation   -- Otherwise return if you are not improving        Relieving Back Pain  Back pain is a common problem. You can strain back muscles by lifting too much weight or just by moving the wrong way. Back strain can be uncomfortable, even painful. And it can take weeks or months to improve. To help yourself feel better and prevent future back strains, try these tips.  Important: Don't give aspirin to children or teens without first discussing it with your child's healthcare provider.  Ice    Ice reduces muscle pain and swelling. It helps most during the first 24 to 48 hours after an injury.    Wrap an ice pack or a bag of frozen peas in a thin towel. Never put ice directly on your skin.    Place the ice where your back hurts the most.    Don t ice for more than 20  minutes at a time.    You can use ice several times a day.  Medicines  Over-the-counter pain relievers include acetaminophen and anti-inflammatory medicines, which includes aspirin, naproxen, or ibuprofen. They can help ease discomfort. Some also reduce swelling.    Tell your healthcare provider about any medicines you are already taking.    Take medicines only as directed.  Manipulation and massage  Having manipulation by an osteopathic doctor or chiropractor may be helpful. Getting a massage also may help.   Heat  After the first 48 hours, heat can relax sore muscles and improve blood flow.    Try a warm bath or shower. Or use a heating pad set on low. To prevent a burn, keep a cloth between you and the heating pad.    Don t use a heating pad for more than 15 minutes at a time. Never sleep on a heating pad.  Date Last Reviewed: 6/1/2018 2000-2019 [employer]. [address] [place] [address], [place] [address]. All rights reserved. This information is not intended as a substitute for professional medical care. Always follow your healthcare professional's instructions.

## 2020-10-19 NOTE — NURSING NOTE
"Chief Complaint   Patient presents with     Back Pain     Into right leg      Patient is here for a follow up on low back pain. Pain radiates down her right leg. She was seen in August by Dr. Campos and he prescribed her with a low dose of Norco. She has 2 tablets left. She has been using Ibuprofen and ice. She states the ice helps a little bit and is not sure of Ibuprofen helps or not.     Initial BP (!) 144/100 (BP Location: Right arm, Patient Position: Sitting, Cuff Size: Adult Regular)   Pulse 81   Temp 98.1  F (36.7  C) (Tympanic)   Resp 18   Wt 81.1 kg (178 lb 12.8 oz)   LMP  (LMP Unknown)   SpO2 97%   Breastfeeding No   BMI 29.43 kg/m   Estimated body mass index is 29.43 kg/m  as calculated from the following:    Height as of 8/13/20: 1.66 m (5' 5.35\").    Weight as of this encounter: 81.1 kg (178 lb 12.8 oz).  Medication Reconciliation: complete    Cristina Bertrand MA  "

## 2020-10-20 ASSESSMENT — ANXIETY QUESTIONNAIRES: GAD7 TOTAL SCORE: 0

## 2020-10-21 ENCOUNTER — HOSPITAL ENCOUNTER (OUTPATIENT)
Dept: PHYSICAL THERAPY | Facility: OTHER | Age: 75
Setting detail: THERAPIES SERIES
End: 2020-10-21
Attending: FAMILY MEDICINE
Payer: MEDICARE

## 2020-10-21 PROCEDURE — 97110 THERAPEUTIC EXERCISES: CPT | Mod: GP,CQ

## 2020-10-23 ENCOUNTER — HOSPITAL ENCOUNTER (OUTPATIENT)
Dept: PHYSICAL THERAPY | Facility: OTHER | Age: 75
Setting detail: THERAPIES SERIES
End: 2020-10-23
Attending: FAMILY MEDICINE
Payer: MEDICARE

## 2020-10-23 DIAGNOSIS — I69.359 HEMIPARESIS DUE TO OLD STROKE (H): ICD-10-CM

## 2020-10-23 DIAGNOSIS — M54.16 LUMBAR RADICULOPATHY: ICD-10-CM

## 2020-10-23 DIAGNOSIS — Z86.73 HISTORY OF CVA (CEREBROVASCULAR ACCIDENT): ICD-10-CM

## 2020-10-23 DIAGNOSIS — R53.81 PHYSICAL DECONDITIONING: ICD-10-CM

## 2020-10-23 DIAGNOSIS — S22.070D COMPRESSION FRACTURE OF T10 VERTEBRA WITH ROUTINE HEALING, SUBSEQUENT ENCOUNTER: ICD-10-CM

## 2020-10-23 PROCEDURE — 97162 PT EVAL MOD COMPLEX 30 MIN: CPT | Mod: GP

## 2020-10-23 PROCEDURE — 97140 MANUAL THERAPY 1/> REGIONS: CPT | Mod: GP,XU

## 2020-10-23 NOTE — PROGRESS NOTES
[facility]          OUTPATIENT PHYSICAL THERAPY ORTHOPEDIC EVALUATION  PLAN OF TREATMENT FOR OUTPATIENT REHABILITATION  (COMPLETE FOR INITIAL CLAIMS ONLY)  Patient's Last Name, First Name, M.I.  YOB: 1945  Kate Chacon    Provider s Name:  [facility]   Medical Record No.  [id number]   Start of Care Date:  10/23/20   Onset Date:  Right leg/low back:10/9/2020,   Right shoulder: 09/04/20     Type:     _X__PT   ___OT   ___SLP Medical Diagnosis:  Lumbar radiculopathy, Compression fracture of T10 vertebra with routine healing,  Continue treatment for: AC separation, right, subsequent encounter S43.101D,  Traumatic incomplete tear of right rotator cuff, subsequent encounter S46.011D,      PT Diagnosis:  Lumbar radiculopathy, Compression fracture of T10 vertebra with routine healing   Visits from SOC:  1      _________________________________________________________________________________  Plan of Treatment/Functional Goals:  joint mobilization, manual therapy, neuromuscular re-education, ROM, strengthening, stretching, gait training, balance training     Cryotherapy     Goals  Goal Identifier: Pain   Goal Description: Report right shoulder pain at 2/10 or less to allow donning/doffing a jacket without difficulty   Target Date: 12/18/20    Goal Identifier: ROM   Goal Description: Imporve right shoulder flexion to 130 degrees or greater to allow reaching to an overhead shelf without difficulty   Target Date: 12/18/20    Goal Identifier: Weakness  Goal Description: Improve right shoulder strength to 4/5 or greater in all planes to allow lifting a 1# object to an overhead shelf without diffculty or compensation  Target Date: 12/18/20    Goal Identifier: Right leg pain  Goal Description: Report 75% or greater reduction in right LE pain to allow walking to complete shopping tasks without difficulty  Target Date: 12/18/20    Goal Identifier: ROM  Goal Description:  Demonstrate pain free Right hip flexion to allow rolling in bed without pain or limitation   Target Date: 12/18/20    Goal Identifier: walking   Goal Description: Return to walking with a FWW safely with no reports of loss of balance.   Target Date: 12/18/20                          Therapy Frequency:  (16 visits)  Predicted Duration of Therapy Intervention:  8 weeks     Zachery Sharp, PT                 I CERTIFY THE NEED FOR THESE SERVICES FURNISHED UNDER        THIS PLAN OF TREATMENT AND WHILE UNDER MY CARE     (Physician co-signature of this document indicates review and certification of the therapy plan).                       Certification Date From:  10/23/20   Certification Date To:  12/18/20    Referring Provider:  Dr. Bowen     Initial Assessment        See Epic Evaluation Start of Care Date: 10/23/20

## 2020-10-23 NOTE — PROGRESS NOTES
Outpatient Physical Therapy Progress Note     Patient: Kate Chacon  : 1945    Beginning/End Dates of Reporting Period:  2020 to 10/23/2020    Referring Provider: Dr. Bowen, Dr. Campos     Therapy Diagnosis: 1) Lumbar radiculopathy, Compression fracture of T10 vertebra with routine healing  2) AC separation, right, subsequent encounter S43.101D,  Traumatic incomplete tear of right rotator cuff, subsequent encounter S46.011D,      Client Self Report: Patient presents today with new orders added by Dr. Bowen to address Lumbar radiculopathy.  Patient reports low back and right LE radicular symptoms have been getting progressively worse the past two weeks. She has been using her walker more at home. She presents today in a manual wheelchair. She reports the the right leg is so painful that walking has become quite difficult.  Reports right leg pain is pain starrts in the buttock and wraps around to the right thigh and down the anterior/lateral right lower leg.  She describes these symptoms as burning.  She rates the right leg pain as 10/10. Right sided low back pain is 10/10.      Objective Measurements:  Objective Measure: Palpation  Details: Pain over the right gluteal, sciatic notch, and IT band regions    Objective Measure: Gait  Details: antalgic gait. able to take a few step from wheelchair to treatment table.     Goals:  Goal Identifier Pain    Goal Description Report right shoulder pain at 2/10 or less to allow donning/doffing a jacket without difficulty    Target Date 20   Date Met      Progress:     Goal Identifier ROM    Goal Description Imporve right shoulder flexion to 130 degrees or greater to allow reaching to an overhead shelf without difficulty    Target Date 20   Date Met      Progress:     Goal Identifier Weakness   Goal Description Improve right shoulder strength to 4/5 or greater in all planes to allow lifting a 1# object to an overhead shelf without diffculty or  compensation   Target Date 12/18/20   Date Met      Progress:     Goal Identifier Right leg pain   Goal Description Report 75% or greater reduction in right LE pain to allow walking to complete shopping tasks without difficulty   Target Date 12/18/20   Date Met      Progress:     Goal Identifier ROM   Goal Description Demonstrate pain free Right hip flexion to allow rolling in bed without pain or limitation    Target Date 12/18/20   Date Met      Progress:     Goal Identifier walking    Goal Description Return to walking with a FWW safely with no reports of loss of balance.    Target Date 12/18/20   Date Met      Progress:       Progress Toward Goals:   Progress this reporting period: Patient will require continued PT to address issues of low back pain with right LE radiculopathy, impaired gait, impaired balance and right shoulder pain.       Plan: Continue PT for: 1) Lumbar radiculopathy, Compression fracture of T10 vertebra with routine healing  2) AC separation, right, subsequent encounter S43.101D,  Traumatic incomplete tear of right rotator cuff, subsequent encounter S46.011D,

## 2020-10-23 NOTE — PROGRESS NOTES
10/23/20 1112   General Information   Type of Visit Initial OP Ortho PT Evaluation   Start of Care Date 10/23/20   Referring Physician Dr. Bowen    Patient/Family Goals Statement decrease right leg pain    Orders Evaluate and Treat   Date of Order 10/19/20   Certification Required? Yes   Medical Diagnosis Lumbar radiculopathy, Compression fracture of T10 vertebra with routine healing   Surgical/Medical history reviewed Yes   Precautions/Limitations   (Cardiac pacemaker )   Body Part(s)   Body Part(s) Lumbar Spine/SI   Presentation and Etiology   Pertinent history of current problem (include personal factors and/or comorbidities that impact the POC) Patient presents today with new orders added by Dr. Bowen to address Lumbar radiculopathy.  Patient reports low back and right LE radicular symptoms have been getting progressively worse the past two weeks. She has been using her walker more at home. She presents today in a manual wheelchair. She reports the the right leg is so painful that walking has become quite difficult.  Reports right leg pain is pain starrts in the buttock and wraps around to the right thigh and down the anterior/lateral right lower leg.  She describes these symptoms as burning.  She rates the right leg pain as 10/10. Right sided low back pain is 10/10.   She would also like to keep working on her right shoulder, but wants to focus on right leg and back today.    Impairments A. Pain;B. Decreased WB tolerance;D. Decreased ROM;E. Decreased flexibility;F. Decreased strength and endurance;G. Impaired balance;H. Impaired gait;J. Burning   Functional Limitations perform activities of daily living;perform required work activities   Chronicity New   Pain rating (0-10 point scale) Best (/10);Worst (/10)   Best (/10) 10/10   Worst (/10) 10/10   Pain quality A. Sharp;B. Dull;D. Burning;C. Aching   Frequency of pain/symptoms A. Constant   Pain/symptoms exacerbated by A. Sitting;B. Walking;C. Lifting;D.  Carrying;G. Certain positions;I. Bending;J. ADL;K. Home tasks   Pain/symptoms eased by C. Rest;E. Changing positions   Progression of symptoms since onset: Unchanged   Prior Level of Function   Prior Level of Function-Mobility No limitations    Prior Level of Function-ADLs No limitations   Current Level of Function   Current Community Support Family/friend caregiver   Patient role/employment history F. Retired   Fall Risk Screen   Fall screen completed by PT   Have you fallen 2 or more times in the past year? Yes   Have you fallen and had an injury in the past year? Yes   Is patient a fall risk? Yes;Department fall risk interventions implemented   Lumbar Spine/SI Objective Findings   Observation Slow, guarded, and painful with all tranisitional movements: sit<>stand, sit<>side lying, side lying<>supine    Gait/Locomotion Antalgic with decreased weightbearing tolerance on the right LE    Hip Screen right hip flexion to 100 degrees, but noted increase right gluteal and anterior thigh pain when bring the right LE straight after the hip flexion motion    Lumbar/Hip/Knee/Foot Strength Comments Not tested due to pain level today    Palpation Pain over the right gluteal, sciatic notch, and IT band regions   Planned Therapy Interventions   Planned Therapy Interventions joint mobilization;manual therapy;neuromuscular re-education;ROM;strengthening;stretching;gait training;balance training   Planned Modality Interventions   Planned Modality Interventions Cryotherapy   Clinical Impression   Criteria for Skilled Therapeutic Interventions Met yes, treatment indicated   PT Diagnosis Lumbar radiculopathy, Compression fracture of T10 vertebra with routine healing   Influenced by the following impairments Pain, impaired ROM, Weakness, impaired gait, impaired balance   Functional limitations due to impairments bending, sitting, walking, ADL's, household tasks, sleep,    Clinical Presentation Evolving/Changing   Clinical Presentation  Rationale Low back and right leg symptoms are highly irritable   Clinical Decision Making (Complexity) Moderate complexity   Therapy Frequency   (16 visits)   Predicted Duration of Therapy Intervention (days/wks) 8 weeks    Risk & Benefits of therapy have been explained Yes   Patient, Family & other staff in agreement with plan of care Yes   Education Assessment   Preferred Learning Style Listening;Demonstration   Barriers to Learning No barriers   ORTHO GOALS   PT Ortho Eval Goals 1;2;3;4;5;6   Ortho Goal 1   Goal Identifier Pain    Goal Description Report right shoulder pain at 2/10 or less to allow donning/doffing a jacket without difficulty    Target Date 12/18/20   Ortho Goal 2   Goal Identifier ROM    Goal Description Imporve right shoulder flexion to 130 degrees or greater to allow reaching to an overhead shelf without difficulty    Target Date 12/18/20   Ortho Goal 3   Goal Identifier Weakness   Goal Description Improve right shoulder strength to 4/5 or greater in all planes to allow lifting a 1# object to an overhead shelf without diffculty or compensation   Target Date 12/18/20   Ortho Goal 4   Goal Identifier Right leg pain   Goal Description Report 75% or greater reduction in right LE pain to allow walking to complete shopping tasks without difficulty   Target Date 12/18/20   Ortho Goal 5   Goal Identifier ROM   Goal Description Demonstrate pain free Right hip flexion to allow rolling in bed without pain or limitation    Target Date 12/18/20   Ortho Goal 6   Goal Identifier walking    Goal Description Return to walking with a FWW safely with no reports of loss of balance.    Target Date 12/18/20   Total Evaluation Time   PT Eval, Moderate Complexity Minutes (43853) 30   Therapy Certification   Certification date from 10/23/20   Certification date to 12/18/20   Medical Diagnosis Lumbar radiculopathy, Compression fracture of T10 vertebra with routine healing,  Continue treatment for: AC separation, right,  subsequent encounter S43.101D,  Traumatic incomplete tear of right rotator cuff, subsequent encounter S46.011D,

## 2020-10-26 ENCOUNTER — ANTICOAGULATION THERAPY VISIT (OUTPATIENT)
Dept: ANTICOAGULATION | Facility: OTHER | Age: 75
End: 2020-10-26
Attending: FAMILY MEDICINE
Payer: MEDICARE

## 2020-10-26 DIAGNOSIS — Z51.81 ANTICOAGULATION GOAL OF INR 2 TO 3: ICD-10-CM

## 2020-10-26 DIAGNOSIS — Z79.01 ANTICOAGULATION GOAL OF INR 2 TO 3: ICD-10-CM

## 2020-10-26 DIAGNOSIS — I48.0 PAROXYSMAL ATRIAL FIBRILLATION (H): ICD-10-CM

## 2020-10-26 LAB — INR POINT OF CARE: 2 (ref 0.86–1.14)

## 2020-10-26 PROCEDURE — 85610 PROTHROMBIN TIME: CPT

## 2020-10-26 NOTE — PROGRESS NOTES
ANTICOAGULATION FOLLOW-UP CLINIC VISIT    Patient Name:  Kate Chacon  Date:  10/26/2020  Contact Type:  Face to Face    SUBJECTIVE:  Patient Findings         Clinical Outcomes     Negatives:  Major bleeding event, Thromboembolic event, Anticoagulation-related hospital admission, Anticoagulation-related ED visit, Anticoagulation-related fatality           OBJECTIVE    Recent labs: (last 7 days)     10/26/20   INR 2.0*       ASSESSMENT / PLAN  INR assessment THER    Recheck INR In: 4 WEEKS    INR Location Clinic      Anticoagulation Summary  As of 10/26/2020    INR goal:  2.0-3.0   TTR:  63.0 % (1 y)   INR used for dosin.0 (10/26/2020)   Warfarin maintenance plan:  2.5 mg (5 mg x 0.5) every Wed; 5 mg (5 mg x 1) all other days   Full warfarin instructions:  2.5 mg every Wed; 5 mg all other days   Weekly warfarin total:  32.5 mg   No change documented:  Boni Freire RN   Plan last modified:  Radha Burns RN (2020)   Next INR check:  2020   Priority:  Maintenance   Target end date:  Indefinite    Indications    Paroxysmal atrial fibrillation (H) [I48.0]  Anticoagulation goal of INR 2 to 3 [Z51.81  Z79.01]             Anticoagulation Episode Summary     INR check location:      Preferred lab:      Send INR reminders to:  [facility]    Comments:        Anticoagulation Care Providers     Provider Role Specialty Phone number    Asher Campos MD [facility] [phone]            See the Encounter Report to view Anticoagulation Flowsheet and Dosing Calendar (Go to Encounters tab in chart review, and find the Anticoagulation Therapy Visit)      Boni Freire RN

## 2020-10-27 ENCOUNTER — HOSPITAL ENCOUNTER (OUTPATIENT)
Dept: PHYSICAL THERAPY | Facility: OTHER | Age: 75
Setting detail: THERAPIES SERIES
End: 2020-10-27
Attending: FAMILY MEDICINE
Payer: MEDICARE

## 2020-10-27 PROCEDURE — 97140 MANUAL THERAPY 1/> REGIONS: CPT | Mod: GP

## 2020-10-27 PROCEDURE — 97110 THERAPEUTIC EXERCISES: CPT | Mod: GP

## 2020-10-28 ENCOUNTER — TELEPHONE (OUTPATIENT)
Dept: FAMILY MEDICINE | Facility: OTHER | Age: 75
End: 2020-10-28

## 2020-10-28 ENCOUNTER — NURSE TRIAGE (OUTPATIENT)
Dept: FAMILY MEDICINE | Facility: OTHER | Age: 75
End: 2020-10-28

## 2020-10-28 ENCOUNTER — OFFICE VISIT (OUTPATIENT)
Dept: FAMILY MEDICINE | Facility: OTHER | Age: 75
End: 2020-10-28
Attending: FAMILY MEDICINE
Payer: COMMERCIAL

## 2020-10-28 VITALS
DIASTOLIC BLOOD PRESSURE: 80 MMHG | RESPIRATION RATE: 24 BRPM | SYSTOLIC BLOOD PRESSURE: 128 MMHG | TEMPERATURE: 98.2 F | HEART RATE: 79 BPM | BODY MASS INDEX: 30.19 KG/M2 | WEIGHT: 183.4 LBS | OXYGEN SATURATION: 97 %

## 2020-10-28 DIAGNOSIS — M79.604 PAIN OF RIGHT LOWER EXTREMITY: Primary | ICD-10-CM

## 2020-10-28 LAB — CK SERPL-CCNC: 32 U/L (ref 30–223)

## 2020-10-28 PROCEDURE — 82550 ASSAY OF CK (CPK): CPT | Mod: ZL | Performed by: FAMILY MEDICINE

## 2020-10-28 PROCEDURE — 99213 OFFICE O/P EST LOW 20 MIN: CPT | Performed by: FAMILY MEDICINE

## 2020-10-28 PROCEDURE — 36415 COLL VENOUS BLD VENIPUNCTURE: CPT | Mod: ZL | Performed by: FAMILY MEDICINE

## 2020-10-28 PROCEDURE — G0463 HOSPITAL OUTPT CLINIC VISIT: HCPCS

## 2020-10-28 ASSESSMENT — PAIN SCALES - GENERAL: PAINLEVEL: WORST PAIN (10)

## 2020-10-28 NOTE — TELEPHONE ENCOUNTER
"S-(situation): Stating her right leg is very painful, can hardly stand or walk.    B-(background): H/o low back pain, ischemic stroke on 10/15/2016 secondary to embolism (right sided weakness), lumbar radiculopathy, osteoporosis, compression fracture of T10 vertebrae.    Per LOV note with Dr. Bowen on 10/19/20:  I think the most likely etiology is lumbar disc disease however differential diagnosis also includes lumbar foraminal stenoses, lumbar facet arthropathy, compression fracture, others.  She does have point tenderness but it is at the site of an old compression fracture from the summer.   -- PT consult   -- Consider CT if not improving   -- Work to prevent falls     A-(assessment): Sharp shooting pain, rated 10/10 starting at \"bones of waist\" (pelvis/hip) and wrapping around front, radiating down front of right leg-persisting since fall on 9/14, has seen several providers and is doing PT. Unable to do normal daily activities (laundry/dishes, etc), unable to walk without use of walker (due to pain when weight applied). Previously prescribed hydrocodone by Dr. Campos, and she doesn't think it helped. Taking Lyrica (prescribed by Dr. Bowen)- doesn't help, only makes her sleepy. No relief from acetaminophen. Can't take ibuprofen (pacemaker/a-fib), Ice is her \"best friend.\"  Denies: crooked or deformed appearance of leg, chest pain, SOB, difference in color or temperature between left and right lower extremities, fever, tenderness to touch, numbness, tingling, foot drop, pain in buttock or back, swelling, rash, muscle cramps, varicose veins, loss of bowel or bladder control    R-(recommendations): Protocol recommends immediate office visit, due to severe pain (excruciating, unable to do any normal activities). Care advice given per protocol. The patient indicates understanding of these issues and agrees with the plan. Pt is interested in pursuing CT or steroid injections. Pt transferred to scheduling line, to " "set up appointment:    Next 5 appointments (look out 90 days)    Oct 28, 2020  2:40 PM  SHORT with Gee Kern MD  [facility] ([facility]) [address]  [place] [id number] [phone]        Rut Wolfe RN .............. 10/28/2020  10:42 AM      Additional Information    Negative: Looks like a broken bone or dislocated joint (e.g., crooked or deformed)    Negative: Sounds like a life-threatening emergency to the triager    Negative: Chest pain    Negative: Difficulty breathing    Negative: Entire foot is cool or blue in comparison to other side    Negative: Fever and red area (or area very tender to touch)    Negative: Fever and swollen joint    SEVERE pain (e.g., excruciating, unable to do any normal activities)    Negative: Thigh or calf pain in only one leg and present > 1 hour    Negative: Thigh, calf, or ankle swelling in only one leg    Negative: Thigh, calf, or ankle swelling in both legs, but one side is definitely more swollen    Negative: History of prior 'blood clot' in leg or lungs (i.e., deep vein thrombosis, pulmonary embolism)    Negative: History of inherited increased risk of blood clots (e.g., factor 5 Leiden, antithrombin 3, protein C or protein S deficiency, prothrombin mutation)    Negative: Recent illness requiring prolonged bed rest (immobilization)    Negative: Hip or leg fracture in past 2 months (e.g., or had cast on leg or ankle)    Negative: Major surgery in the past two months    Negative: Cancer treatment in the past two months (or has cancer now)    Negative: Recent long-distance travel with prolonged time in car, bus, plane, or train (i.e., within past 2 weeks; 6 or more hours duration)    Negative: Patient sounds very sick or weak to the triager    Commented on: Unable to walk     Able to walk with assistance of walker.    Answer Assessment - Initial Assessment Questions  1. ONSET: \"When did the pain start?\"   Pt " "states she had a fall about 1+ month ago, and pain has been persisting since. Pt states she has seen 5 different doctors, and they can't figure out what is wrong.    2. LOCATION: \"Where is the pain located?\"   Starts at \"Bones of waist\" (pelvis/hip) and wraps around to front, radiating down front of right leg.     Denies pain in buttock.    3. PAIN: \"How bad is the pain?\"    (Scale 1-10; or mild, moderate, severe)  SEVERE (8-10): excruciating sharp, shooting pain, unable to do any normal activities ( has taken over laundry, dishes, \"everything\", unable to walk without use of walker (due to pain when weight is applied- able to move leg normally/ not deformed in appearance/no drop foot, etc)    Pt has walker, from when she suffered a stroke in 2016.    4. WORK OR EXERCISE: \"Has there been any recent work or exercise that involved this part of the body?\"   Patient states she had a fracture vertebrae in the past and a couple of falls. She last fell (on her buttocks) about 1 month ago.    5. CAUSE: \"What do you think is causing the leg pain?\"  Unsure.     6. OTHER SYMPTOMS: \"Do you have any other symptoms?\"  Denies chest pain, back pain, breathing difficulty, swelling, rash, fever, numbness, weakness, muscle cramps, varicose veins, loss of bowel or bladder control    7. PREGNANCY: \"Is there any chance you are pregnant?\" \"When was your last menstrual period?\"  N/a    Doing physical therapy- Z[facility] ([facility])    Previously prescribed hydrocodone by Dr. Campos, and she doesn't think it helped. Taking Lyrica (prescribed by Dr. Bowen)- doesn't help, only makes her sleepy. No relief from acetaminophen. Can't take ibuprofen (pacemaker/a-fib), Ice is her \"best friend.\"    Protocols used: LEG PAIN-A-OH          "

## 2020-10-28 NOTE — LETTER
October 29, 2020      Kate Chacon  [address] [facility] [address]        Dear ,    We are writing to inform you of your test results.    Your test results fall within the expected range(s) or remain unchanged from previous results.  Please continue with current treatment plan.    Resulted Orders   CK Total   Result Value Ref Range    CK Total 32 30 - 223 U/L       If you have any questions or concerns, please call the clinic at the number listed above.       Sincerely,        Gee Kern MD

## 2020-10-28 NOTE — NURSING NOTE
Patient here for right hip low back pain that wraps around and down the leg. 10/10 pain scale. Medication Reconciliation: complete.    Deepika Lea LPN  10/28/2020 2:51 PM

## 2020-10-29 ENCOUNTER — HOSPITAL ENCOUNTER (OUTPATIENT)
Dept: PHYSICAL THERAPY | Facility: OTHER | Age: 75
Setting detail: THERAPIES SERIES
End: 2020-10-29
Attending: FAMILY MEDICINE
Payer: MEDICARE

## 2020-10-29 PROCEDURE — 97140 MANUAL THERAPY 1/> REGIONS: CPT | Mod: GP

## 2020-10-29 NOTE — PROGRESS NOTES
"  SUBJECTIVE:   Kate Chacon is a 75 year old female who presents to clinic today for the following health issues: Leg pain    Patient arrives here for ongoing right leg pain.  She reports the pain shoots down her leg.  She is seen \"6 different doctors\" and they can find out what is going on.  It wraps around from the back of her hip down to the front of her leg.  She had fallen about a month and a half ago.  She has been undergoing physical therapy and the physical therapy report indicates nothing else can be done.  She rates the pain as a 10 out of 10.  Pretty much constant.  In the past she has had CT scans of the pelvis lumbar thorax and chest all unremarkable.  No etiology where the pain could be coming from.        Patient Active Problem List    Diagnosis Date Noted     Hemiparesis due to old stroke - right side is weaker 10/19/2020     Priority: Medium     Compression fracture of T10 vertebra with routine healing, subsequent encounter 10/19/2020     Priority: Medium     History of CVA (cerebrovascular accident) 10/19/2020     Priority: Medium     Physical deconditioning 10/19/2020     Priority: Medium     Lumbar radiculopathy 10/19/2020     Priority: Medium     Chondrodermatitis nodularis chronica helicis, right 10/09/2020     Priority: Medium     SA node dysfunction s/p pacer on 11/2/2017 at [facility] 08/13/2020     Priority: Medium     H/O sinus pause 08/13/2020     Priority: Medium     History of tobacco abuse quitting on 10/14/16 08/13/2020     Priority: Medium     Dyspnea on exertion 08/13/2020     Priority: Medium     On Coumadin for atrial fibrillation (H) 08/13/2020     Priority: Medium     Mixed hyperlipidemia 08/13/2020     Priority: Medium     CKD (chronic kidney disease) stage 3, GFR 30-59 ml/min 08/13/2020     Priority: Medium     Chronic midline low back pain without sciatica 08/13/2020     Priority: Medium     Sinoatrial node dysfunction (H) 05/01/2018     Priority: Medium     Cardiac " pacemaker 02/19/2018     Priority: Medium     Urge incontinence 10/05/2017     Priority: Medium     Former smoker 08/30/2017     Priority: Medium     MDD (recurrent major depressive disorder) in remission (H) 08/30/2017     Priority: Medium     Anxiety 01/05/2017     Priority: Medium     Essential hypertension 12/08/2016     Priority: Medium     Paroxysmal atrial fibrillation (H) 11/02/2016     Priority: Medium     Anticoagulation goal of INR 2 to 3 10/28/2016     Priority: Medium     History of ischemic stroke on 10/15/2016 secondary to embolism 10/15/2016     Priority: Medium     Diverticulosis of large intestine 04/04/2011     Priority: Medium     H/O adenomatous polyp of colon 03/02/2011     Priority: Medium     Osteoporosis 02/08/2006     Priority: Medium     Past Medical History:   Diagnosis Date     Encounter for full-term uncomplicated delivery     x3     Ganglion of wrist     right     Gastritis without bleeding     treated and 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      Past Surgical History:   Procedure Laterality Date     APPENDECTOMY OPEN      No Comments Provided     COLONOSCOPY  08/19/2005 8/19/05,Cecal biopsy with tubular adenoma low grade dysplasia.     COLONOSCOPY  04/04/2011 4/4/11     COLONOSCOPY  05/07/2012    Tubular Adenomas F/U 2017     COLONOSCOPY  12/02/2019    F/U N/A as will be over 78 in 2024. Tubular adenoma     ESOPHAGOSCOPY, GASTROSCOPY, DUODENOSCOPY (EGD), COMBINED      8/19/05     OTHER SURGICAL HISTORY      8/3/05,107858,OTHER,helices on the right ear     TONSILLECTOMY      No Comments Provided     Allergies   Allergen Reactions     Methylpar-Na Bisulfite-Pentazocine Unknown     jittery       Review of Systems     OBJECTIVE:     /80   Pulse 79   Temp 98.2  F (36.8  C)   Resp 24   Wt 83.2 kg (183 lb 6.4 oz)   LMP  (LMP Unknown)   SpO2 97%   BMI 30.19 kg/m    Body mass index is 30.19 kg/m .  Physical Exam  Constitutional:       Appearance: Normal  appearance.      Comments: Patient is comfortable in appearance.  She does not exhibit any pain getting up out of the examining table or chair.  She is able to walk on her toes and heels squats without any expressions of pain.  Deep tendon reflexes at about 2+ bilateral   Musculoskeletal:      Comments: Patient has a negative straight leg test on the right and left.  No clonus present.  Pain can mildly be reproduced on palpation to the right leg   Skin:     General: Skin is warm.   Neurological:      General: No focal deficit present.      Mental Status: She is alert.      Motor: No weakness.   Psychiatric:         Mood and Affect: Mood normal.         Diagnostic Test Results:  Results for orders placed or performed in visit on 10/28/20   CK Total     Status: None   Result Value Ref Range    CK Total 32 30 - 223 U/L       ASSESSMENT/PLAN:         1. Pain of right lower extremity  CK unremarkable.  Advised the patient I am not sure the etiology.  She can certainly stop the Lipitor and see if that improves.  Follow-up with her PCP  - CK Total; Future  - CK Total      Gee Kern MD  [facility]

## 2020-11-02 ENCOUNTER — HOSPITAL ENCOUNTER (OUTPATIENT)
Dept: PHYSICAL THERAPY | Facility: OTHER | Age: 75
Setting detail: THERAPIES SERIES
End: 2020-11-02
Attending: FAMILY MEDICINE
Payer: MEDICARE

## 2020-11-02 PROCEDURE — 97140 MANUAL THERAPY 1/> REGIONS: CPT | Mod: GP

## 2020-11-03 ENCOUNTER — TELEPHONE (OUTPATIENT)
Dept: FAMILY MEDICINE | Facility: OTHER | Age: 75
End: 2020-11-03

## 2020-11-03 NOTE — TELEPHONE ENCOUNTER
States she has been having issues with her leg being painful. She said she has seen other providers for it but nothing is helping. Please call

## 2020-11-03 NOTE — TELEPHONE ENCOUNTER
I don't think I have seen her for this so I can't really weigh in.  I'm happy to see her. I last saw her on September 17 when we injected her shoulder.  Asher Campos MD on 11/3/2020 at 5:51 PM

## 2020-11-04 ENCOUNTER — ANCILLARY PROCEDURE (OUTPATIENT)
Dept: CARDIOLOGY | Facility: CLINIC | Age: 75
End: 2020-11-04
Attending: INTERNAL MEDICINE
Payer: MEDICARE

## 2020-11-04 ENCOUNTER — OFFICE VISIT (OUTPATIENT)
Dept: FAMILY MEDICINE | Facility: OTHER | Age: 75
End: 2020-11-04
Attending: FAMILY MEDICINE
Payer: MEDICARE

## 2020-11-04 ENCOUNTER — HOSPITAL ENCOUNTER (OUTPATIENT)
Dept: PHYSICAL THERAPY | Facility: OTHER | Age: 75
Setting detail: THERAPIES SERIES
End: 2020-11-04
Attending: FAMILY MEDICINE
Payer: MEDICARE

## 2020-11-04 VITALS
HEART RATE: 82 BPM | SYSTOLIC BLOOD PRESSURE: 124 MMHG | DIASTOLIC BLOOD PRESSURE: 86 MMHG | RESPIRATION RATE: 18 BRPM | OXYGEN SATURATION: 97 % | TEMPERATURE: 96.8 F

## 2020-11-04 DIAGNOSIS — I48.91 ON COUMADIN FOR ATRIAL FIBRILLATION (H): ICD-10-CM

## 2020-11-04 DIAGNOSIS — Z79.01 ON COUMADIN FOR ATRIAL FIBRILLATION (H): ICD-10-CM

## 2020-11-04 DIAGNOSIS — M54.16 LUMBAR RADICULOPATHY: Primary | ICD-10-CM

## 2020-11-04 DIAGNOSIS — Z95.0 CARDIAC PACEMAKER: ICD-10-CM

## 2020-11-04 DIAGNOSIS — I48.20 CHRONIC A-FIB (H): ICD-10-CM

## 2020-11-04 PROCEDURE — G0463 HOSPITAL OUTPT CLINIC VISIT: HCPCS

## 2020-11-04 PROCEDURE — 96372 THER/PROPH/DIAG INJ SC/IM: CPT | Performed by: FAMILY MEDICINE

## 2020-11-04 PROCEDURE — G0463 HOSPITAL OUTPT CLINIC VISIT: HCPCS | Mod: 25

## 2020-11-04 PROCEDURE — 250N000011 HC RX IP 250 OP 636: Performed by: FAMILY MEDICINE

## 2020-11-04 PROCEDURE — 99213 OFFICE O/P EST LOW 20 MIN: CPT | Performed by: FAMILY MEDICINE

## 2020-11-04 PROCEDURE — 93296 REM INTERROG EVL PM/IDS: CPT

## 2020-11-04 PROCEDURE — 97140 MANUAL THERAPY 1/> REGIONS: CPT | Mod: GP

## 2020-11-04 PROCEDURE — 93294 REM INTERROG EVL PM/LDLS PM: CPT | Performed by: INTERNAL MEDICINE

## 2020-11-04 RX ORDER — HYDROCODONE BITARTRATE AND ACETAMINOPHEN 5; 325 MG/1; MG/1
1-2 TABLET ORAL EVERY 6 HOURS PRN
Qty: 28 TABLET | Refills: 0 | Status: SHIPPED | OUTPATIENT
Start: 2020-11-04 | End: 2021-02-19

## 2020-11-04 RX ADMIN — CYANOCOBALAMIN 1000 MCG: 1000 INJECTION, SOLUTION INTRAMUSCULAR at 13:15

## 2020-11-04 ASSESSMENT — PAIN SCALES - GENERAL: PAINLEVEL: WORST PAIN (10)

## 2020-11-04 NOTE — NURSING NOTE
"Chief Complaint   Patient presents with     RECHECK     back pain       Initial /86   Pulse 82   Temp 96.8  F (36  C) (Temporal)   Resp 18   LMP  (LMP Unknown)   SpO2 97%   Breastfeeding No  Estimated body mass index is 30.19 kg/m  as calculated from the following:    Height as of 8/13/20: 1.66 m (5' 5.35\").    Weight as of 10/28/20: 83.2 kg (183 lb 6.4 oz).  Medication Reconciliation: complete    Tiffany White LPN  "

## 2020-11-04 NOTE — TELEPHONE ENCOUNTER
The patient was given an appointment today at 10 am.  Tiffany White LPN..................11/4/2020   9:34 AM

## 2020-11-04 NOTE — PROGRESS NOTES
"Nursing Notes:   Tiffany White LPN  11/4/2020 10:19 AM  Signed  Chief Complaint   Patient presents with     RECHECK     back pain       Initial /86   Pulse 82   Temp 96.8  F (36  C) (Temporal)   Resp 18   LMP  (LMP Unknown)   SpO2 97%   Breastfeeding No  Estimated body mass index is 30.19 kg/m  as calculated from the following:    Height as of 8/13/20: 1.66 m (5' 5.35\").    Weight as of 10/28/20: 83.2 kg (183 lb 6.4 oz).  Medication Reconciliation: complete    Tiffany White LPN       SUBJECTIVE:  Kate Chacon  is a 75 year old female who comes in today for evaluation of right leg pain.  I have not seen her for this.  She has had issues with her back and we thought she had a compression fracture but there was no fracture.  She has been doing physical therapy for her right shoulder pain.  We injected her right shoulder a couple months ago.  She saw Dr. Bowen about 3 weeks ago.  She has had a few falls.  She has been seeing Dr. Parks for chiropractic and they thought perhaps she might have some trochanteric bursitis.  She was referred to physical therapy for this as well.  She really did not get any better.  She saw Dr. Kern last week and he measured a CK and told her to stop her Lipitor to see if her right leg pain went away.  She called yesterday and was frustrated we asked her to come in for a visit.    It started in June.  She fell in her kitchen and landed on her buttocks.  She [place] the races on [place].  Her shoulder is about the same.  It bothers working the mouse in her shoulder.  For her leg she has pain that is in the buttock and radiates around the lateral and anterior thigh and down to the ankle. She has no pain with cough or sneeze. It is better sitting. It is worse with standing and walking. At night, she will a pillow between her legs. She is more comfortable on the left side. She has a hard time going up stairs. She does one step at a time.  She doesn't think PT is " helping much. She has some relief from ice.      Past Medical, Family, and Social History reviewed and updated as noted below.   ROS is negative except as noted above       Allergies   Allergen Reactions     Methylpar-Na Bisulfite-Pentazocine Unknown     jittery   ,   Family History   Problem Relation Age of Onset     Diabetes Father         Diabetes     Hypertension Father         Hypertension     Other - See Comments Mother         h/o coronary artery disease/dementia     Asthma Mother         Asthma     Diabetes Maternal Grandmother         Diabetes     Cancer Maternal Aunt         Cancer,Uterine     Breast Cancer No family hx of         Cancer-breast   ,   Current Outpatient Medications   Medication     atorvastatin (LIPITOR) 40 MG tablet     calcium carbonate 750 MG CHEW     HYDROcodone-acetaminophen (NORCO) 5-325 MG tablet     ibuprofen (ADVIL/MOTRIN) 200 MG tablet     lisinopril (PRINIVIL/ZESTRIL) 20 MG tablet     metoprolol succinate ER (TOPROL-XL) 50 MG 24 hr tablet     sertraline (ZOLOFT) 50 MG tablet     warfarin ANTICOAGULANT (COUMADIN) 5 MG tablet     pregabalin (LYRICA) 50 MG capsule     Current Facility-Administered Medications   Medication     cyanocobalamin injection 1,000 mcg   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   ,   Patient Active Problem List    Diagnosis Date Noted     Hemiparesis due to old stroke - right side is weaker 10/19/2020     Priority: Medium     Compression fracture of T10 vertebra with routine healing, subsequent encounter 10/19/2020     Priority: Medium     History of CVA (cerebrovascular accident) 10/19/2020     Priority: Medium     Physical deconditioning 10/19/2020     Priority: Medium     Lumbar radiculopathy 10/19/2020     Priority: Medium     Chondrodermatitis nodularis chronica  helicis, right 10/09/2020     Priority: Medium     SA node dysfunction s/p pacer on 11/2/2017 at [facility] 08/13/2020     Priority: Medium     H/O sinus pause 08/13/2020     Priority: Medium     History of tobacco abuse quitting on 10/14/16 08/13/2020     Priority: Medium     Dyspnea on exertion 08/13/2020     Priority: Medium     On Coumadin for atrial fibrillation (H) 08/13/2020     Priority: Medium     Mixed hyperlipidemia 08/13/2020     Priority: Medium     CKD (chronic kidney disease) stage 3, GFR 30-59 ml/min 08/13/2020     Priority: Medium     Chronic midline low back pain without sciatica 08/13/2020     Priority: Medium     Sinoatrial node dysfunction (H) 05/01/2018     Priority: Medium     Cardiac pacemaker 02/19/2018     Priority: Medium     Urge incontinence 10/05/2017     Priority: Medium     Former smoker 08/30/2017     Priority: Medium     MDD (recurrent major depressive disorder) in remission (H) 08/30/2017     Priority: Medium     Anxiety 01/05/2017     Priority: Medium     Essential hypertension 12/08/2016     Priority: Medium     Paroxysmal atrial fibrillation (H) 11/02/2016     Priority: Medium     Anticoagulation goal of INR 2 to 3 10/28/2016     Priority: Medium     History of ischemic stroke on 10/15/2016 secondary to embolism 10/15/2016     Priority: Medium     Diverticulosis of large intestine 04/04/2011     Priority: Medium     H/O adenomatous polyp of colon 03/02/2011     Priority: Medium     Osteoporosis 02/08/2006     Priority: Medium   ,   Past Surgical History:   Procedure Laterality Date     APPENDECTOMY OPEN      No Comments Provided     COLONOSCOPY  08/19/2005 8/19/05,Cecal biopsy with tubular adenoma low grade dysplasia.     COLONOSCOPY  04/04/2011 4/4/11     COLONOSCOPY  05/07/2012    Tubular Adenomas F/U 2017     COLONOSCOPY  12/02/2019    F/U N/A as will be over 78 in 2024. Tubular adenoma     ESOPHAGOSCOPY, GASTROSCOPY, DUODENOSCOPY (EGD), COMBINED      8/19/05     OTHER  SURGICAL HISTORY      8/3/05,866982,OTHER,helices on the right ear     TONSILLECTOMY      No Comments Provided    and   Social History     Tobacco Use     Smoking status: Former Smoker     Packs/day: 0.50     Years: 49.00     Pack years: 24.50     Types: Cigarettes     Quit date: 10/14/2016     Years since quittin.0     Smokeless tobacco: Never Used   Substance Use Topics     Alcohol use: No     OBJECTIVE:  /86   Pulse 82   Temp 96.8  F (36  C) (Temporal)   Resp 18   LMP  (LMP Unknown)   SpO2 97%   Breastfeeding No    EXAM:  Alert cooperative, no distress.  She has some difficulty getting up out of her wheelchair. Examination of the low back reveals no significant paraspinal muscle spasm.  Diminished lumbar range of motion including lateral bending and flexion and extension.  She has more pain with lateral bending to the right.  There is no SI joint tenderness.  There is no sciatic notch tenderness.  The patient is able to stand on each foot alternately and raise on the toes albeit with quite a bit of difficulty on the right.  Is able to stand on heels.  Standing flat-footed on each foot alternately and extending the back does not cause any increased SI joint pain. SLR is negative on the left but positive on the right.  No loss of strength or reflexes in the lower extremities.     CT LUMBAR SPINE W/O CONTRAST 2020 9:19 PM     HISTORY: Back pain, minor trauma; Radiculopathy, minor trauma;  T/L-spine trauma, minor-mod, low back pain     COMPARISONS: None. Meds/Dose Given:  TECHNIQUE: CT scan lumbar spine with sagittal coronal reconstructions     FINDINGS: There are no acute fractures of the lumbar vertebral bodies  or arches. The T12 L1 L1 L2 L2 L3 and L3-L4 disks are normal in height  without evidence of disc herniation or protrusion. At L4-L5 there is  nonspondylolytic spondylolisthesis. The degree of forward slippage has  remained stable as compared to 2019. There is decrease in height in  the  L4-L5 disc. There is broad-based annular bulging which together  with developmentally short pedicles results in moderate central spinal  stenosis. There is compression of both L5 nerve roots in the lateral  recesses. There is neural foraminal narrowing encroaching on the  L4  nerve roots bilaterally.     There is decrease in height in the L5-S1 disc with posterior lateral  disc protrusion on the left mildly compressing the left L5 nerve root  in the neural foramina. Severe facet joint degenerative changes are  noted.     No intradural abnormalities are seen. The paravertebral soft tissues  appear normal.                                                                  IMPRESSION: Advanced degenerative changes in the lumbar spine with  spondylolisthesis of L4 on L5     HILLARY WILLOUGHBY MD  ASSESSMENT/Plan :    Kate was seen today for recheck.    Diagnoses and all orders for this visit:    Lumbar radiculopathy  -     XR Lumbar Epidural Injection Incl Imaging; Future  -     HYDROcodone-acetaminophen (NORCO) 5-325 MG tablet; Take 1-2 tablets by mouth every 6 hours as needed for pain      Think she clearly has a lumbar radiculopathy potentially L4.  She cannot have an MRI because of her pacemaker.  We reviewed her CT scan from June.  I think she would benefit from interlaminar epidural steroid injection and referral sent for same.  If symptoms do not elmo, would then consider transforaminal injection.  She will continue with physical therapy.  Continue with ice.  She will need to hold her warfarin for 3 to 4 days prior to her procedure.    [facility] Review       Value Time User    State PDMP site checked  Yes 11/4/2020 11:48 AM Asher Campos MD         Renewed a small prescription of hydrocodone.  She does have a few left and takes them sparingly.    Recommend follow-up 2 weeks after her injection, sooner if needed    Asher Campos MD

## 2020-11-05 LAB
MDC_IDC_EPISODE_DTM: NORMAL
MDC_IDC_EPISODE_ID: NORMAL
MDC_IDC_EPISODE_TYPE: NORMAL
MDC_IDC_LEAD_IMPLANT_DT: NORMAL
MDC_IDC_LEAD_IMPLANT_DT: NORMAL
MDC_IDC_LEAD_LOCATION: NORMAL
MDC_IDC_LEAD_LOCATION: NORMAL
MDC_IDC_LEAD_LOCATION_DETAIL_1: NORMAL
MDC_IDC_LEAD_LOCATION_DETAIL_1: NORMAL
MDC_IDC_LEAD_MFG: NORMAL
MDC_IDC_LEAD_MFG: NORMAL
MDC_IDC_LEAD_MODEL: NORMAL
MDC_IDC_LEAD_MODEL: NORMAL
MDC_IDC_LEAD_POLARITY_TYPE: NORMAL
MDC_IDC_LEAD_POLARITY_TYPE: NORMAL
MDC_IDC_LEAD_SERIAL: NORMAL
MDC_IDC_LEAD_SERIAL: NORMAL
MDC_IDC_MSMT_BATTERY_DTM: NORMAL
MDC_IDC_MSMT_BATTERY_REMAINING_LONGEVITY: 54 MO
MDC_IDC_MSMT_BATTERY_REMAINING_PERCENTAGE: 88 %
MDC_IDC_MSMT_BATTERY_STATUS: NORMAL
MDC_IDC_MSMT_LEADCHNL_RA_IMPEDANCE_VALUE: 776 OHM
MDC_IDC_MSMT_LEADCHNL_RV_IMPEDANCE_VALUE: 685 OHM
MDC_IDC_MSMT_LEADCHNL_RV_PACING_THRESHOLD_AMPLITUDE: 0.8 V
MDC_IDC_MSMT_LEADCHNL_RV_PACING_THRESHOLD_PULSEWIDTH: 0.4 MS
MDC_IDC_PG_IMPLANT_DTM: NORMAL
MDC_IDC_PG_MFG: NORMAL
MDC_IDC_PG_MODEL: NORMAL
MDC_IDC_PG_SERIAL: NORMAL
MDC_IDC_PG_TYPE: NORMAL
MDC_IDC_SESS_CLINIC_NAME: NORMAL
MDC_IDC_SESS_DTM: NORMAL
MDC_IDC_SESS_TYPE: NORMAL
MDC_IDC_SET_BRADY_AT_MODE_SWITCH_MODE: NORMAL
MDC_IDC_SET_BRADY_AT_MODE_SWITCH_RATE: 170 {BEATS}/MIN
MDC_IDC_SET_BRADY_LOWRATE: 60 {BEATS}/MIN
MDC_IDC_SET_BRADY_MAX_SENSOR_RATE: 130 {BEATS}/MIN
MDC_IDC_SET_BRADY_MAX_TRACKING_RATE: 130 {BEATS}/MIN
MDC_IDC_SET_BRADY_MODE: NORMAL
MDC_IDC_SET_BRADY_PAV_DELAY_LOW: 250 MS
MDC_IDC_SET_BRADY_SAV_DELAY_LOW: 220 MS
MDC_IDC_SET_LEADCHNL_RA_PACING_AMPLITUDE: 2.5 V
MDC_IDC_SET_LEADCHNL_RA_PACING_CAPTURE_MODE: NORMAL
MDC_IDC_SET_LEADCHNL_RA_PACING_POLARITY: NORMAL
MDC_IDC_SET_LEADCHNL_RA_PACING_PULSEWIDTH: 0.4 MS
MDC_IDC_SET_LEADCHNL_RA_SENSING_ADAPTATION_MODE: NORMAL
MDC_IDC_SET_LEADCHNL_RA_SENSING_POLARITY: NORMAL
MDC_IDC_SET_LEADCHNL_RA_SENSING_SENSITIVITY: 0.25 MV
MDC_IDC_SET_LEADCHNL_RV_PACING_AMPLITUDE: 1.2 V
MDC_IDC_SET_LEADCHNL_RV_PACING_CAPTURE_MODE: NORMAL
MDC_IDC_SET_LEADCHNL_RV_PACING_POLARITY: NORMAL
MDC_IDC_SET_LEADCHNL_RV_PACING_PULSEWIDTH: 0.4 MS
MDC_IDC_SET_LEADCHNL_RV_SENSING_ADAPTATION_MODE: NORMAL
MDC_IDC_SET_LEADCHNL_RV_SENSING_POLARITY: NORMAL
MDC_IDC_SET_LEADCHNL_RV_SENSING_SENSITIVITY: 1.5 MV
MDC_IDC_SET_ZONE_DETECTION_INTERVAL: 375 MS
MDC_IDC_SET_ZONE_TYPE: NORMAL
MDC_IDC_SET_ZONE_VENDOR_TYPE: NORMAL
MDC_IDC_STAT_AT_BURDEN_PERCENT: 100 %
MDC_IDC_STAT_AT_DTM_END: NORMAL
MDC_IDC_STAT_AT_DTM_START: NORMAL
MDC_IDC_STAT_BRADY_DTM_END: NORMAL
MDC_IDC_STAT_BRADY_DTM_START: NORMAL
MDC_IDC_STAT_BRADY_RA_PERCENT_PACED: 0 %
MDC_IDC_STAT_BRADY_RV_PERCENT_PACED: 29 %
MDC_IDC_STAT_EPISODE_RECENT_COUNT: 0
MDC_IDC_STAT_EPISODE_RECENT_COUNT_DTM_END: NORMAL
MDC_IDC_STAT_EPISODE_RECENT_COUNT_DTM_START: NORMAL
MDC_IDC_STAT_EPISODE_TYPE: NORMAL
MDC_IDC_STAT_EPISODE_VENDOR_TYPE: NORMAL

## 2020-11-06 ENCOUNTER — HOSPITAL ENCOUNTER (OUTPATIENT)
Dept: PHYSICAL THERAPY | Facility: OTHER | Age: 75
Setting detail: THERAPIES SERIES
End: 2020-11-06
Attending: FAMILY MEDICINE
Payer: MEDICARE

## 2020-11-06 PROCEDURE — 97110 THERAPEUTIC EXERCISES: CPT | Mod: GP

## 2020-11-09 ENCOUNTER — HOSPITAL ENCOUNTER (OUTPATIENT)
Dept: PHYSICAL THERAPY | Facility: OTHER | Age: 75
Setting detail: THERAPIES SERIES
End: 2020-11-09
Attending: FAMILY MEDICINE
Payer: MEDICARE

## 2020-11-09 PROCEDURE — 97110 THERAPEUTIC EXERCISES: CPT | Mod: GP

## 2020-11-12 ENCOUNTER — ANTICOAGULATION THERAPY VISIT (OUTPATIENT)
Dept: ANTICOAGULATION | Facility: OTHER | Age: 75
End: 2020-11-12
Attending: FAMILY MEDICINE
Payer: MEDICARE

## 2020-11-12 ENCOUNTER — HOSPITAL ENCOUNTER (OUTPATIENT)
Dept: GENERAL RADIOLOGY | Facility: OTHER | Age: 75
End: 2020-11-12
Attending: FAMILY MEDICINE
Payer: MEDICARE

## 2020-11-12 DIAGNOSIS — M54.16 LUMBAR RADICULOPATHY: ICD-10-CM

## 2020-11-12 DIAGNOSIS — Z79.01 ANTICOAGULATION GOAL OF INR 2 TO 3: ICD-10-CM

## 2020-11-12 DIAGNOSIS — Z51.81 ANTICOAGULATION GOAL OF INR 2 TO 3: ICD-10-CM

## 2020-11-12 DIAGNOSIS — I48.0 PAROXYSMAL ATRIAL FIBRILLATION (H): ICD-10-CM

## 2020-11-12 DIAGNOSIS — M53.3 SACROILIAC JOINT PAIN: ICD-10-CM

## 2020-11-12 LAB — INR POINT OF CARE: 1.1 (ref 0.86–1.14)

## 2020-11-12 PROCEDURE — 255N000002 HC RX 255 OP 636: Performed by: RADIOLOGY

## 2020-11-12 PROCEDURE — 250N000009 HC RX 250: Performed by: RADIOLOGY

## 2020-11-12 PROCEDURE — 250N000011 HC RX IP 250 OP 636: Performed by: RADIOLOGY

## 2020-11-12 PROCEDURE — 36416 COLLJ CAPILLARY BLOOD SPEC: CPT | Mod: ZL

## 2020-11-12 PROCEDURE — 27096 INJECT SACROILIAC JOINT: CPT | Mod: RT

## 2020-11-12 RX ORDER — TRIAMCINOLONE ACETONIDE 40 MG/ML
40 INJECTION, SUSPENSION INTRA-ARTICULAR; INTRAMUSCULAR ONCE
Status: COMPLETED | OUTPATIENT
Start: 2020-11-12 | End: 2020-11-12

## 2020-11-12 RX ORDER — LIDOCAINE HYDROCHLORIDE 10 MG/ML
2 INJECTION, SOLUTION INFILTRATION; PERINEURAL ONCE
Status: COMPLETED | OUTPATIENT
Start: 2020-11-12 | End: 2020-11-12

## 2020-11-12 RX ORDER — LIDOCAINE HYDROCHLORIDE 10 MG/ML
2 INJECTION, SOLUTION EPIDURAL; INFILTRATION; INTRACAUDAL; PERINEURAL ONCE
Status: DISCONTINUED | OUTPATIENT
Start: 2020-11-12 | End: 2020-11-13 | Stop reason: HOSPADM

## 2020-11-12 RX ORDER — BUPIVACAINE HYDROCHLORIDE 5 MG/ML
3 INJECTION, SOLUTION EPIDURAL; INTRACAUDAL ONCE
Status: COMPLETED | OUTPATIENT
Start: 2020-11-12 | End: 2020-11-12

## 2020-11-12 RX ORDER — METHYLPREDNISOLONE ACETATE 80 MG/ML
80 INJECTION, SUSPENSION INTRA-ARTICULAR; INTRALESIONAL; INTRAMUSCULAR; SOFT TISSUE ONCE
Status: DISCONTINUED | OUTPATIENT
Start: 2020-11-12 | End: 2020-11-13 | Stop reason: HOSPADM

## 2020-11-12 RX ADMIN — BUPIVACAINE HYDROCHLORIDE 3 ML: 5 INJECTION, SOLUTION EPIDURAL; INTRACAUDAL at 12:43

## 2020-11-12 RX ADMIN — TRIAMCINOLONE ACETONIDE 40 MG: 40 INJECTION, SUSPENSION INTRA-ARTICULAR; INTRAMUSCULAR at 12:43

## 2020-11-12 RX ADMIN — LIDOCAINE HYDROCHLORIDE 2 ML: 10 INJECTION, SOLUTION INFILTRATION; PERINEURAL at 11:53

## 2020-11-12 RX ADMIN — IOHEXOL 2 ML: 240 INJECTION, SOLUTION INTRATHECAL; INTRAVASCULAR; INTRAVENOUS; ORAL at 11:53

## 2020-11-12 NOTE — PROGRESS NOTES
ANTICOAGULATION FOLLOW-UP CLINIC VISIT    Patient Name:  Kate Chacon  Date:  2020  Contact Type:  Face to Face    SUBJECTIVE:  Patient Findings     Positives:  Missed doses (having injection today holding warfarin for procedure)        Clinical Outcomes     Negatives:  Major bleeding event, Thromboembolic event, Anticoagulation-related hospital admission, Anticoagulation-related ED visit, Anticoagulation-related fatality           OBJECTIVE    Recent labs: (last 7 days)     20   INR 1.1       ASSESSMENT / PLAN  INR assessment SUB    Recheck INR In: 1 WEEK    INR Location Clinic      Anticoagulation Summary  As of 2020    INR goal:  2.0-3.0   TTR:  60.1 % (1 y)   INR used for dosin.1 (2020)   Warfarin maintenance plan:  2.5 mg (5 mg x 0.5) every Wed; 5 mg (5 mg x 1) all other days   Full warfarin instructions:  2.5 mg every Wed; 5 mg all other days   Weekly warfarin total:  32.5 mg   No change documented:  Radha Burns RN   Plan last modified:  Radha Burns RN (2020)   Next INR check:  2020   Priority:  Maintenance   Target end date:  Indefinite    Indications    Paroxysmal atrial fibrillation (H) [I48.0]  Anticoagulation goal of INR 2 to 3 [Z51.81  Z79.01]             Anticoagulation Episode Summary     INR check location:      Preferred lab:      Send INR reminders to:  [facility]    Comments:        Anticoagulation Care Providers     Provider Role Specialty Phone number    Asher Campos MD [facility] [phone]            See the Encounter Report to view Anticoagulation Flowsheet and Dosing Calendar (Go to Encounters tab in chart review, and find the Anticoagulation Therapy Visit)        Radha Burns RN

## 2020-11-13 ENCOUNTER — HOSPITAL ENCOUNTER (OUTPATIENT)
Dept: PHYSICAL THERAPY | Facility: OTHER | Age: 75
Setting detail: THERAPIES SERIES
End: 2020-11-13
Attending: FAMILY MEDICINE
Payer: MEDICARE

## 2020-11-13 PROCEDURE — 97110 THERAPEUTIC EXERCISES: CPT | Mod: GP

## 2020-11-16 ENCOUNTER — OFFICE VISIT (OUTPATIENT)
Dept: FAMILY MEDICINE | Facility: OTHER | Age: 75
End: 2020-11-16
Attending: FAMILY MEDICINE
Payer: COMMERCIAL

## 2020-11-16 VITALS
HEART RATE: 75 BPM | TEMPERATURE: 97.5 F | RESPIRATION RATE: 16 BRPM | OXYGEN SATURATION: 96 % | DIASTOLIC BLOOD PRESSURE: 104 MMHG | BODY MASS INDEX: 30.45 KG/M2 | WEIGHT: 185 LBS | SYSTOLIC BLOOD PRESSURE: 162 MMHG

## 2020-11-16 DIAGNOSIS — M54.16 LUMBAR RADICULOPATHY: Primary | ICD-10-CM

## 2020-11-16 PROCEDURE — G0463 HOSPITAL OUTPT CLINIC VISIT: HCPCS

## 2020-11-16 PROCEDURE — 99213 OFFICE O/P EST LOW 20 MIN: CPT | Performed by: FAMILY MEDICINE

## 2020-11-16 ASSESSMENT — PAIN SCALES - GENERAL: PAINLEVEL: NO PAIN (0)

## 2020-11-16 NOTE — PROGRESS NOTES
Nursing Notes:   Yee Villagomez LPN  11/16/2020 11:28 AM  Signed  Patient is here to follow up on leg pain and shot in her back.     No LMP recorded (lmp unknown). Patient is postmenopausal.  Medication Reconciliation: complete    Yee MEDINALinsey Villagomez LPN  11/16/2020 10:59 AM        SUBJECTIVE:  Kate Chacon  is a 75 year old female who comes in today for follow-up of right leg pain.  I saw her on November 4.  We thought she had an L4 radiculopathy and ordered an interlaminar epidural steroid injection.  We asked her to follow-up 2 weeks after her shot.  She had the shot on November 12.  The radiologist decided that her symptoms are more consistent with right SI joint pain and she got an injection there instead.  She had 50% improvement in her symptoms.  She had a transforaminal epidural steroid injection at right L4-5 back in May which gave her 100% relief from the lidocaine but the relief did not persist as about 2 weeks after that was when she fell in the kitchen.    We reviewed her CTs x-rays with her again today and demonstrated that we did not see any fracture.    She is continuing to see Zachery in PT.  It is still painful to walk and she is afraid of falling so is in a wheelchair.      Past Medical, Family, and Social History reviewed and updated as noted below.   ROS is negative except as noted above       Allergies   Allergen Reactions     Methylpar-Na Bisulfite-Pentazocine Unknown     jittery   ,   Family History   Problem Relation Age of Onset     Diabetes Father         Diabetes     Hypertension Father         Hypertension     Other - See Comments Mother         h/o coronary artery disease/dementia     Asthma Mother         Asthma     Diabetes Maternal Grandmother         Diabetes     Cancer Maternal Aunt         Cancer,Uterine     Breast Cancer No family hx of         Cancer-breast   ,   Current Outpatient Medications   Medication     atorvastatin (LIPITOR) 40 MG tablet     calcium carbonate 750 MG  CHEW     HYDROcodone-acetaminophen (NORCO) 5-325 MG tablet     ibuprofen (ADVIL/MOTRIN) 200 MG tablet     lisinopril (PRINIVIL/ZESTRIL) 20 MG tablet     metoprolol succinate ER (TOPROL-XL) 50 MG 24 hr tablet     sertraline (ZOLOFT) 50 MG tablet     warfarin ANTICOAGULANT (COUMADIN) 5 MG tablet     pregabalin (LYRICA) 50 MG capsule     Current Facility-Administered Medications   Medication     cyanocobalamin injection 1,000 mcg   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   ,   Patient Active Problem List    Diagnosis Date Noted     Hemiparesis due to old stroke - right side is weaker 10/19/2020     Priority: Medium     Compression fracture of T10 vertebra with routine healing, subsequent encounter 10/19/2020     Priority: Medium     History of CVA (cerebrovascular accident) 10/19/2020     Priority: Medium     Physical deconditioning 10/19/2020     Priority: Medium     Lumbar radiculopathy 10/19/2020     Priority: Medium     Chondrodermatitis nodularis chronica helicis, right 10/09/2020     Priority: Medium     SA node dysfunction s/p pacer on 11/2/2017 at [facility] 08/13/2020     Priority: Medium     H/O sinus pause 08/13/2020     Priority: Medium     History of tobacco abuse quitting on 10/14/16 08/13/2020     Priority: Medium     Dyspnea on exertion 08/13/2020     Priority: Medium     On Coumadin for atrial fibrillation (H) 08/13/2020     Priority: Medium     Mixed hyperlipidemia 08/13/2020     Priority: Medium     CKD (chronic kidney disease) stage 3, GFR 30-59 ml/min 08/13/2020     Priority: Medium     Chronic midline low back pain without sciatica 08/13/2020     Priority: Medium     Sinoatrial node dysfunction (H) 05/01/2018     Priority: Medium     Cardiac pacemaker 02/19/2018     Priority: Medium     Urge incontinence  10/05/2017     Priority: Medium     Former smoker 2017     Priority: Medium     MDD (recurrent major depressive disorder) in remission (H) 2017     Priority: Medium     Anxiety 2017     Priority: Medium     Essential hypertension 2016     Priority: Medium     Paroxysmal atrial fibrillation (H) 2016     Priority: Medium     Anticoagulation goal of INR 2 to 3 10/28/2016     Priority: Medium     History of ischemic stroke on 10/15/2016 secondary to embolism 10/15/2016     Priority: Medium     Diverticulosis of large intestine 2011     Priority: Medium     H/O adenomatous polyp of colon 2011     Priority: Medium     Osteoporosis 2006     Priority: Medium   ,   Past Surgical History:   Procedure Laterality Date     APPENDECTOMY OPEN      No Comments Provided     COLONOSCOPY  2005,Cecal biopsy with tubular adenoma low grade dysplasia.     COLONOSCOPY  2011     COLONOSCOPY  2012    Tubular Adenomas F/U      COLONOSCOPY  2019    F/U N/A as will be over 78 in . Tubular adenoma     ESOPHAGOSCOPY, GASTROSCOPY, DUODENOSCOPY (EGD), COMBINED      05     OTHER SURGICAL HISTORY      8/3/05,331672,OTHER,helices on the right ear     TONSILLECTOMY      No Comments Provided    and   Social History     Tobacco Use     Smoking status: Former Smoker     Packs/day: 0.50     Years: 49.00     Pack years: 24.50     Types: Cigarettes     Quit date: 10/14/2016     Years since quittin.0     Smokeless tobacco: Never Used   Substance Use Topics     Alcohol use: No     OBJECTIVE:  BP (!) 162/104 (BP Location: Right arm, Patient Position: Sitting, Cuff Size: Adult Regular)   Pulse 75   Temp 97.5  F (36.4  C) (Tympanic)   Resp 16   Wt 83.9 kg (185 lb)   LMP  (LMP Unknown)   SpO2 96%   Breastfeeding No   BMI 30.45 kg/m     EXAM:  Pleasant cooperative lady sitting in a wheelchair.  We did not repeat complete exam today.  Leg symptoms  are about the same.  ASSESSMENT/Plan :    Kate was seen today for follow up.    Diagnoses and all orders for this visit:    Lumbar radiculopathy      I am not convinced that this is her SI joint.  She still a 4 days into the shot so we will see what happens over the next 2 weeks.  If symptoms do not significantly improve, would then consider doing right L4-5 transforaminal epidural steroid injection as that gave her good relief in the spring.  If she gets relief from the anesthetic but not the steroid, would then seek an opinion from a spine specialist.  If she gets relief from both, could then repeat those periodically.  Recommend that she continue with physical therapy.    A total of 15 minutes was spent with the patient, greater than 50% of the time was spent in counseling/discussion of the aforementioned concerns.     Asher Campos MD

## 2020-11-16 NOTE — NURSING NOTE
Patient is here to follow up on leg pain and shot in her back.     No LMP recorded (lmp unknown). Patient is postmenopausal.  Medication Reconciliation: complete    Yee Villagomez LPN  11/16/2020 10:59 AM

## 2020-11-16 NOTE — PATIENT INSTRUCTIONS
If the shot in the sacroiliac joint is going to help, it will have maximum effect 2 weeks after the injection (Thanksgiving Day).  If it is not better, let us know and we will repeat the shot we did in May before your fall (it seemed to work well initially then).  Then we will go from there.

## 2020-11-18 ENCOUNTER — HOSPITAL ENCOUNTER (OUTPATIENT)
Dept: PHYSICAL THERAPY | Facility: OTHER | Age: 75
Setting detail: THERAPIES SERIES
End: 2020-11-18
Attending: FAMILY MEDICINE
Payer: MEDICARE

## 2020-11-18 PROCEDURE — 97110 THERAPEUTIC EXERCISES: CPT | Mod: GP

## 2020-11-20 ENCOUNTER — HOSPITAL ENCOUNTER (OUTPATIENT)
Dept: PHYSICAL THERAPY | Facility: OTHER | Age: 75
Setting detail: THERAPIES SERIES
End: 2020-11-20
Attending: FAMILY MEDICINE
Payer: MEDICARE

## 2020-11-20 PROCEDURE — 97110 THERAPEUTIC EXERCISES: CPT | Mod: GP,CQ

## 2020-11-23 ENCOUNTER — HOSPITAL ENCOUNTER (OUTPATIENT)
Dept: PHYSICAL THERAPY | Facility: OTHER | Age: 75
Setting detail: THERAPIES SERIES
End: 2020-11-23
Attending: FAMILY MEDICINE
Payer: MEDICARE

## 2020-11-23 ENCOUNTER — ANTICOAGULATION THERAPY VISIT (OUTPATIENT)
Dept: ANTICOAGULATION | Facility: OTHER | Age: 75
End: 2020-11-23
Attending: FAMILY MEDICINE
Payer: MEDICARE

## 2020-11-23 DIAGNOSIS — Z51.81 ANTICOAGULATION GOAL OF INR 2 TO 3: ICD-10-CM

## 2020-11-23 DIAGNOSIS — Z79.01 ANTICOAGULATION GOAL OF INR 2 TO 3: ICD-10-CM

## 2020-11-23 DIAGNOSIS — I48.0 PAROXYSMAL ATRIAL FIBRILLATION (H): ICD-10-CM

## 2020-11-23 LAB — INR POINT OF CARE: 2.4 (ref 0.86–1.14)

## 2020-11-23 PROCEDURE — 97110 THERAPEUTIC EXERCISES: CPT | Mod: GP

## 2020-11-23 PROCEDURE — 36416 COLLJ CAPILLARY BLOOD SPEC: CPT | Mod: ZL

## 2020-11-23 NOTE — PROGRESS NOTES
Outpatient Physical Therapy Progress Note     Patient: Kate Chacon  : 1945    Beginning/End Dates of Reporting Period:  10/23/2020 to 2020    Referring Provider: Dr. Bowen, Dr. Campos      Therapy Diagnosis: 1) Lumbar radiculopathy, Compression fracture of T10 vertebra with routine healing  2) AC separation, right, subsequent encounter S43.101D,  Traumatic incomplete tear of right rotator cuff, subsequent encounter S46.011D,      Client Self Report: Patient states she has been feeling dizzy the past few days. She reports she had had theses symptoms before. States these symptoms come and go.  Today, she does not feel dizzy.  She would like to try the Nustep today. Overall, she continues to report right leg pain.     Objective Measurements:  Objective Measure: Pain  Details: 5/10     Requires CGA when walking with a FWW x 260 feet    Goals:  Goal Identifier Pain    Goal Description Report right shoulder pain at 2/10 or less to allow donning/doffing a jacket without difficulty    Target Date 20   Date Met      Progress: Progressing      Goal Identifier ROM    Goal Description Improve right shoulder flexion to 130 degrees or greater to allow reaching to an overhead shelf without difficulty    Target Date 20   Date Met      Progress: Progressing     Goal Identifier Weakness   Goal Description Improve right shoulder strength to 4/5 or greater in all planes to allow lifting a 1# object to an overhead shelf without diffculty or compensation   Target Date 20   Date Met      Progress:     Goal Identifier Right leg pain   Goal Description Report 75% or greater reduction in right LE pain to allow walking to complete shopping tasks without difficulty   Target Date 20   Date Met      Progress: Progressing     Goal Identifier ROM   Goal Description Demonstrate pain free Right hip flexion to allow rolling in bed without pain or limitation    Target Date 20   Date Met      Progress:  Progress remains limited due to continued pain      Goal Identifier walking    Goal Description Return to walking with a FWW safely with no reports of loss of balance.    Target Date 12/18/20   Date Met      Progress: Progress remains limited due to continued pain     Progress Toward Goals: Patient continues to demonstrate limited right hip ROM, and impaired gait due to right LE pain.     Plan:  Continue therapy per current plan of care.

## 2020-11-23 NOTE — PROGRESS NOTES
ANTICOAGULATION FOLLOW-UP CLINIC VISIT    Patient Name:  Kate Chacon  Date:  2020  Contact Type:  Face to Face    SUBJECTIVE:  Patient Findings         Clinical Outcomes     Negatives:  Major bleeding event, Thromboembolic event, Anticoagulation-related hospital admission, Anticoagulation-related ED visit, Anticoagulation-related fatality           OBJECTIVE    Recent labs: (last 7 days)     20   INR 2.4*       ASSESSMENT / PLAN  INR assessment THER    Recheck INR In: 4 WEEKS    INR Location Clinic      Anticoagulation Summary  As of 2020    INR goal:  2.0-3.0   TTR:  59.6 % (1 y)   INR used for dosin.4 (2020)   Warfarin maintenance plan:  2.5 mg (5 mg x 0.5) every Wed; 5 mg (5 mg x 1) all other days   Full warfarin instructions:  2.5 mg every Wed; 5 mg all other days   Weekly warfarin total:  32.5 mg   No change documented:  Sara Rodriguez RN   Plan last modified:  Radha Burns RN (2020)   Next INR check:  2020   Priority:  Maintenance   Target end date:  Indefinite    Indications    Paroxysmal atrial fibrillation (H) [I48.0]  Anticoagulation goal of INR 2 to 3 [Z51.81  Z79.01]             Anticoagulation Episode Summary     INR check location:      Preferred lab:      Send INR reminders to:  [facility]    Comments:        Anticoagulation Care Providers     Provider Role Specialty Phone number    Asher Campos MD [facility] [phone]            See the Encounter Report to view Anticoagulation Flowsheet and Dosing Calendar (Go to Encounters tab in chart review, and find the Anticoagulation Therapy Visit)        Sara Rodriguez RN

## 2020-11-27 ENCOUNTER — HOSPITAL ENCOUNTER (OUTPATIENT)
Dept: PHYSICAL THERAPY | Facility: OTHER | Age: 75
Setting detail: THERAPIES SERIES
End: 2020-11-27
Attending: FAMILY MEDICINE
Payer: MEDICARE

## 2020-11-27 PROCEDURE — 97110 THERAPEUTIC EXERCISES: CPT | Mod: GP

## 2020-11-30 ENCOUNTER — TELEPHONE (OUTPATIENT)
Dept: FAMILY MEDICINE | Facility: OTHER | Age: 75
End: 2020-11-30

## 2020-11-30 DIAGNOSIS — M54.16 LUMBAR RADICULOPATHY: Primary | ICD-10-CM

## 2020-11-30 NOTE — TELEPHONE ENCOUNTER
She said you said to call if she is not better after her back injections. She is not better and her hips and legs hurt really bad. She is wondering what to do?  Tiffany White LPN..................11/30/2020   4:16 PM

## 2020-11-30 NOTE — TELEPHONE ENCOUNTER
Patient was told to call if the set of shots in her back didn't work.  Questions on what to do now and states that her right leg is really painful.

## 2020-12-01 ENCOUNTER — TELEPHONE (OUTPATIENT)
Dept: FAMILY MEDICINE | Facility: OTHER | Age: 75
End: 2020-12-01

## 2020-12-01 NOTE — TELEPHONE ENCOUNTER
Wanting to know if it's ok for her to stop taking warfarin starting today until 12/7 due to injections

## 2020-12-01 NOTE — TELEPHONE ENCOUNTER
After proper identification writer spoke with patient and let her know to take her warfarin today, discontinue tomorrow, and start taking again on 12/7 the day of her procedure. Per verbal orders from Dr. Campos.  Caryl Pittman LPN on 12/1/2020 at 1:43 PM

## 2020-12-01 NOTE — TELEPHONE ENCOUNTER
After the patient's name and date of birth was verified, the patient was told the below information.  Tiffany White LPN..................12/1/2020   9:01 AM

## 2020-12-03 ENCOUNTER — HOSPITAL ENCOUNTER (OUTPATIENT)
Dept: PHYSICAL THERAPY | Facility: OTHER | Age: 75
Setting detail: THERAPIES SERIES
End: 2020-12-03
Attending: FAMILY MEDICINE
Payer: MEDICARE

## 2020-12-03 PROCEDURE — 97110 THERAPEUTIC EXERCISES: CPT | Mod: GP

## 2020-12-07 ENCOUNTER — HOSPITAL ENCOUNTER (OUTPATIENT)
Dept: GENERAL RADIOLOGY | Facility: OTHER | Age: 75
End: 2020-12-07
Attending: FAMILY MEDICINE
Payer: MEDICARE

## 2020-12-07 ENCOUNTER — ANTICOAGULATION THERAPY VISIT (OUTPATIENT)
Dept: ANTICOAGULATION | Facility: OTHER | Age: 75
End: 2020-12-07
Attending: FAMILY MEDICINE
Payer: MEDICARE

## 2020-12-07 DIAGNOSIS — I10 ESSENTIAL HYPERTENSION: ICD-10-CM

## 2020-12-07 DIAGNOSIS — Z51.81 ANTICOAGULATION GOAL OF INR 2 TO 3: ICD-10-CM

## 2020-12-07 DIAGNOSIS — M54.16 LUMBAR RADICULOPATHY: ICD-10-CM

## 2020-12-07 DIAGNOSIS — Z79.01 ANTICOAGULATION GOAL OF INR 2 TO 3: ICD-10-CM

## 2020-12-07 DIAGNOSIS — I48.0 PAROXYSMAL ATRIAL FIBRILLATION (H): ICD-10-CM

## 2020-12-07 DIAGNOSIS — F33.40 MDD (RECURRENT MAJOR DEPRESSIVE DISORDER) IN REMISSION (H): ICD-10-CM

## 2020-12-07 LAB — INR POINT OF CARE: 1.1 (ref 0.86–1.14)

## 2020-12-07 PROCEDURE — 85610 PROTHROMBIN TIME: CPT | Mod: ZL

## 2020-12-07 PROCEDURE — 250N000009 HC RX 250: Performed by: RADIOLOGY

## 2020-12-07 PROCEDURE — 250N000011 HC RX IP 250 OP 636: Performed by: RADIOLOGY

## 2020-12-07 PROCEDURE — 255N000002 HC RX 255 OP 636: Performed by: RADIOLOGY

## 2020-12-07 PROCEDURE — 64483 NJX AA&/STRD TFRM EPI L/S 1: CPT

## 2020-12-07 RX ORDER — DEXAMETHASONE SODIUM PHOSPHATE 10 MG/ML
10 INJECTION, SOLUTION INTRAMUSCULAR; INTRAVENOUS ONCE
Status: COMPLETED | OUTPATIENT
Start: 2020-12-07 | End: 2020-12-07

## 2020-12-07 RX ORDER — LIDOCAINE HYDROCHLORIDE 10 MG/ML
2 INJECTION, SOLUTION EPIDURAL; INFILTRATION; INTRACAUDAL; PERINEURAL ONCE
Status: COMPLETED | OUTPATIENT
Start: 2020-12-07 | End: 2020-12-07

## 2020-12-07 RX ADMIN — DEXAMETHASONE SODIUM PHOSPHATE 10 MG: 10 INJECTION, SOLUTION INTRAMUSCULAR; INTRAVENOUS at 10:22

## 2020-12-07 RX ADMIN — LIDOCAINE HYDROCHLORIDE 2 ML: 10 INJECTION, SOLUTION INFILTRATION; PERINEURAL at 10:22

## 2020-12-07 RX ADMIN — LIDOCAINE HYDROCHLORIDE 2 ML: 10 INJECTION, SOLUTION EPIDURAL; INFILTRATION; INTRACAUDAL; PERINEURAL at 10:22

## 2020-12-07 RX ADMIN — IOHEXOL 2 ML: 240 INJECTION, SOLUTION INTRATHECAL; INTRAVASCULAR; INTRAVENOUS; ORAL at 10:23

## 2020-12-07 NOTE — PROGRESS NOTES
ANTICOAGULATION FOLLOW-UP CLINIC VISIT    Patient Name:  Kate Chacon  Date:  2020  Contact Type:  Face to Face    SUBJECTIVE:  Patient Findings     Positives:  Upcoming invasive procedure (having injection dosne today holding warfarin for procedure), Missed doses        Clinical Outcomes     Negatives:  Major bleeding event, Thromboembolic event, Anticoagulation-related hospital admission, Anticoagulation-related ED visit, Anticoagulation-related fatality           OBJECTIVE    Recent labs: (last 7 days)     20   INR 1.1       ASSESSMENT / PLAN  INR assessment SUB    Recheck INR In: 2 WEEKS    INR Location Clinic      Anticoagulation Summary  As of 2020    INR goal:  2.0-3.0   TTR:  60.7 % (1 y)   INR used for dosin.1 (2020)   Warfarin maintenance plan:  2.5 mg (5 mg x 0.5) every Wed; 5 mg (5 mg x 1) all other days   Full warfarin instructions:  2.5 mg every Wed; 5 mg all other days   Weekly warfarin total:  32.5 mg   Plan last modified:  Radha Burns RN (2020)   Next INR check:  2020   Priority:  Maintenance   Target end date:  Indefinite    Indications    Paroxysmal atrial fibrillation (H) [I48.0]  Anticoagulation goal of INR 2 to 3 [Z51.81  Z79.01]             Anticoagulation Episode Summary     INR check location:      Preferred lab:      Send INR reminders to:  [facility]    Comments:        Anticoagulation Care Providers     Provider Role Specialty Phone number    Asher Campos MD [facility] [phone]            See the Encounter Report to view Anticoagulation Flowsheet and Dosing Calendar (Go to Encounters tab in chart review, and find the Anticoagulation Therapy Visit)        Radha Burns RN

## 2020-12-08 RX ORDER — LISINOPRIL 20 MG/1
TABLET ORAL
Qty: 90 TABLET | Refills: 3 | Status: SHIPPED | OUTPATIENT
Start: 2020-12-08 | End: 2021-12-08

## 2020-12-08 NOTE — TELEPHONE ENCOUNTER
" Disp Refills Start End YOGESH   sertraline (ZOLOFT) 50 MG tablet 90 tablet 11 10/29/2019  No   Sig - Route: Take 1 tablet (50 mg) by mouth At Bedtime - Oral      Disp Refills Start End YOGESH   lisinopril (PRINIVIL/ZESTRIL) 20 MG tablet 90 tablet 11 10/29/2019  No   Sig - Route: Take 1 tablet (20 mg) by mouth daily - Oral       LOV: 11/16/2020  Future Office visit: No future appointment scheduled at this time.      Routing refill request to provider for review/approval because:  Blood pressure out of range   Failed protocol    Requested Prescriptions   Pending Prescriptions Disp Refills     lisinopril (ZESTRIL) 20 MG tablet [Pharmacy Med Name: Lisinopril 20 MG Oral Tablet] 90 tablet 0     Sig: Take 1 tablet by mouth once daily       ACE Inhibitors (Including Combos) Protocol Failed - 12/7/2020  6:20 PM        Failed - Blood pressure under 140/90 in past 12 months     BP Readings from Last 3 Encounters:   11/16/20 (!) 162/104   11/04/20 124/86   10/28/20 128/80                 Failed - Normal serum potassium on file in past 12 months     Recent Labs   Lab Test 06/21/20 2002   POTASSIUM 3.3*             Passed - Recent (12 mo) or future (30 days) visit within the authorizing provider's specialty     Patient has had an office visit with the authorizing provider or a provider within the authorizing providers department within the previous 12 mos or has a future within next 30 days. See \"Patient Info\" tab in inbasket, or \"Choose Columns\" in Meds & Orders section of the refill encounter.              Passed - Medication is active on med list        Passed - Patient is age 18 or older        Passed - No active pregnancy on record        Passed - Normal serum creatinine on file in past 12 months     Recent Labs   Lab Test 06/21/20 2002   CR 1.10       Ok to refill medication if creatinine is low          Passed - No positive pregnancy test within past 12 months           sertraline (ZOLOFT) 50 MG tablet [Pharmacy Med Name: " "Sertraline HCl 50 MG Oral Tablet] 90 tablet 0     Sig: TAKE 1 TABLET BY MOUTH AT BEDTIME       SSRIs Protocol Passed - 12/7/2020  6:20 PM        Passed - PHQ-9 score less than 5 in past 6 months     Please review last PHQ-9 score.           Passed - Medication is active on med list        Passed - Patient is age 18 or older        Passed - No active pregnancy on record        Passed - No positive pregnancy test in last 12 months        Passed - Recent (6 mo) or future (30 days) visit within the authorizing provider's specialty     Patient had office visit in the last 6 months or has a visit in the next 30 days with authorizing provider or within the authorizing provider's specialty.  See \"Patient Info\" tab in inbasket, or \"Choose Columns\" in Meds & Orders section of the refill encounter.             Unable to complete prescription refill per RN Medication Refill Policy.................... Lillian Dodd RN ....................  12/8/2020   1:12 PM        "

## 2020-12-21 ENCOUNTER — ANTICOAGULATION THERAPY VISIT (OUTPATIENT)
Dept: ANTICOAGULATION | Facility: OTHER | Age: 75
End: 2020-12-21
Attending: FAMILY MEDICINE
Payer: MEDICARE

## 2020-12-21 DIAGNOSIS — I48.0 PAROXYSMAL ATRIAL FIBRILLATION (H): ICD-10-CM

## 2020-12-21 DIAGNOSIS — Z51.81 ANTICOAGULATION GOAL OF INR 2 TO 3: ICD-10-CM

## 2020-12-21 DIAGNOSIS — Z79.01 ANTICOAGULATION GOAL OF INR 2 TO 3: ICD-10-CM

## 2020-12-21 LAB — INR POINT OF CARE: 2.2 (ref 0.86–1.14)

## 2020-12-21 PROCEDURE — 36416 COLLJ CAPILLARY BLOOD SPEC: CPT | Mod: ZL

## 2020-12-21 NOTE — PROGRESS NOTES
ANTICOAGULATION FOLLOW-UP CLINIC VISIT    Patient Name:  Kate Chacon  Date:  2020  Contact Type:  Face to Face    SUBJECTIVE:  Patient Findings         Clinical Outcomes     Negatives:  Major bleeding event, Thromboembolic event, Anticoagulation-related hospital admission, Anticoagulation-related ED visit, Anticoagulation-related fatality           OBJECTIVE    Recent labs: (last 7 days)     20   INR 2.2*       ASSESSMENT / PLAN  INR assessment THER    Recheck INR In: 4 WEEKS    INR Location Clinic      Anticoagulation Summary  As of 2020    INR goal:  2.0-3.0   TTR:  59.0 % (1 y)   INR used for dosin.2 (2020)   Warfarin maintenance plan:  2.5 mg (5 mg x 0.5) every Wed; 5 mg (5 mg x 1) all other days   Full warfarin instructions:  2.5 mg every Wed; 5 mg all other days   Weekly warfarin total:  32.5 mg   No change documented:  Radha Burns RN   Plan last modified:  Radha Burns RN (2020)   Next INR check:  2021   Priority:  Maintenance   Target end date:  Indefinite    Indications    Paroxysmal atrial fibrillation (H) [I48.0]  Anticoagulation goal of INR 2 to 3 [Z51.81  Z79.01]             Anticoagulation Episode Summary     INR check location:      Preferred lab:      Send INR reminders to:  [facility]    Comments:        Anticoagulation Care Providers     Provider Role Specialty Phone number    Asher Campos MD [facility] [phone]            See the Encounter Report to view Anticoagulation Flowsheet and Dosing Calendar (Go to Encounters tab in chart review, and find the Anticoagulation Therapy Visit)        Radha Burns RN

## 2021-01-10 DIAGNOSIS — Z51.81 ANTICOAGULATION GOAL OF INR 2 TO 3: ICD-10-CM

## 2021-01-10 DIAGNOSIS — Z79.01 ANTICOAGULATION GOAL OF INR 2 TO 3: ICD-10-CM

## 2021-01-11 RX ORDER — WARFARIN SODIUM 5 MG/1
TABLET ORAL
Qty: 90 TABLET | Refills: 0 | Status: SHIPPED | OUTPATIENT
Start: 2021-01-11 | End: 2021-04-08

## 2021-01-11 NOTE — TELEPHONE ENCOUNTER
"Requested Prescriptions   Pending Prescriptions Disp Refills     warfarin ANTICOAGULANT (COUMADIN) 5 MG tablet [Pharmacy Med Name: Warfarin Sodium 5 MG Oral Tablet] 90 tablet 0     Sig: TAKE 1 TABLET BY MOUTH ONCE DAILY OR  AS  DIRECTED  BY  [facility]  CLINIC.       Vitamin K Antagonists Failed - 1/10/2021 12:48 PM        Failed - INR is within goal in the past 6 weeks     Confirm INR is within goal in the past 6 weeks.     Recent Labs   Lab Test 12/21/20   INR 2.2*                       Passed - Recent (12 mo) or future (30 days) visit within the authorizing provider's specialty     Patient has had an office visit with the authorizing provider or a provider within the authorizing providers department within the previous 12 mos or has a future within next 30 days. See \"Patient Info\" tab in inbasket, or \"Choose Columns\" in Meds & Orders section of the refill encounter.              Passed - Medication is active on med list        Passed - Patient is 18 years of age or older        Passed - Patient is not pregnant        Passed - No positive pregnancy on file in past 12 months           Prescription approved per [facility] Refill Protocol.    "

## 2021-01-11 NOTE — TELEPHONE ENCOUNTER
Last INR 2.2:  12/21/21  Prescription approved per [facility] Refill Protocol.

## 2021-01-19 ENCOUNTER — ANTICOAGULATION THERAPY VISIT (OUTPATIENT)
Dept: ANTICOAGULATION | Facility: OTHER | Age: 76
End: 2021-01-19
Attending: FAMILY MEDICINE
Payer: MEDICARE

## 2021-01-19 ENCOUNTER — TELEPHONE (OUTPATIENT)
Dept: FAMILY MEDICINE | Facility: OTHER | Age: 76
End: 2021-01-19

## 2021-01-19 DIAGNOSIS — Z51.81 ANTICOAGULATION GOAL OF INR 2 TO 3: ICD-10-CM

## 2021-01-19 DIAGNOSIS — Z79.01 ANTICOAGULATION GOAL OF INR 2 TO 3: ICD-10-CM

## 2021-01-19 DIAGNOSIS — M53.3 SACROILIAC JOINT PAIN: ICD-10-CM

## 2021-01-19 DIAGNOSIS — Z01.812 PRE-PROCEDURE LAB EXAM: ICD-10-CM

## 2021-01-19 DIAGNOSIS — M54.16 LUMBAR RADICULOPATHY: Primary | ICD-10-CM

## 2021-01-19 DIAGNOSIS — I48.0 PAROXYSMAL ATRIAL FIBRILLATION (H): ICD-10-CM

## 2021-01-19 LAB — INR POINT OF CARE: 2.4 (ref 0.86–1.14)

## 2021-01-19 PROCEDURE — 36416 COLLJ CAPILLARY BLOOD SPEC: CPT | Mod: ZL

## 2021-01-19 NOTE — TELEPHONE ENCOUNTER
I can send a referral for Spine Specialist and perhaps they would do a virtual visit with her.  Asher Campos MD on 1/19/2021 at 12:54 PM

## 2021-01-19 NOTE — TELEPHONE ENCOUNTER
After proper verification writer spoke with patient regarding note below. Patient verbalized understanding.  Caryl Pittman LPN on 1/19/2021 at 3:17 PM

## 2021-01-19 NOTE — PROGRESS NOTES
ANTICOAGULATION FOLLOW-UP CLINIC VISIT    Patient Name:  Kate Chacon  Date:  2021  Contact Type:  Face to Face    SUBJECTIVE:  Patient Findings         Clinical Outcomes     Negatives:  Major bleeding event, Thromboembolic event, Anticoagulation-related hospital admission, Anticoagulation-related ED visit, Anticoagulation-related fatality           OBJECTIVE    Recent labs: (last 7 days)     21   INR 2.4*       ASSESSMENT / PLAN  INR assessment THER    Recheck INR In: 4 WEEKS    INR Location Clinic      Anticoagulation Summary  As of 2021    INR goal:  2.0-3.0   TTR:  63.6 % (1 y)   INR used for dosin.4 (2021)   Warfarin maintenance plan:  2.5 mg (5 mg x 0.5) every Wed; 5 mg (5 mg x 1) all other days   Full warfarin instructions:  2.5 mg every Wed; 5 mg all other days   Weekly warfarin total:  32.5 mg   No change documented:  Radha Burns RN   Plan last modified:  Radha Burns RN (2020)   Next INR check:  2021   Priority:  Maintenance   Target end date:  Indefinite    Indications    Paroxysmal atrial fibrillation (H) [I48.0]  Anticoagulation goal of INR 2 to 3 [Z51.81  Z79.01]             Anticoagulation Episode Summary     INR check location:      Preferred lab:      Send INR reminders to:  [facility]    Comments:        Anticoagulation Care Providers     Provider Role Specialty Phone number    Asher Campos MD [facility] [phone]            See the Encounter Report to view Anticoagulation Flowsheet and Dosing Calendar (Go to Encounters tab in chart review, and find the Anticoagulation Therapy Visit)        Radha Burns RN

## 2021-01-19 NOTE — TELEPHONE ENCOUNTER
After verifying patient's name and date of birth. Patient requested phone call about back pain. Patient stated that she is having back pain that is going down the backside of both legs. Patient has had shots before in back but she says that none of them have worked. Patient is wondering what else she can do for her back pain. She did mention something about seeing a Spine Specialist in [place] but she does not want to travel. She is wondering what she can do for relief.    Brooke Smith LPN....1/19/2021 11:37 AM

## 2021-01-25 NOTE — TELEPHONE ENCOUNTER
Patient still has back pain.  Nobody has called her.  She is still in pain.   Also, she will not travel to the cities.  She wants to be seen soon and close to home.      Georgia Hodge on 1/25/2021 at 12:09 PM

## 2021-01-25 NOTE — TELEPHONE ENCOUNTER
I sent a referral to [facility] on Tuesday 1/19/21 at 12:56 pm.  Not sure why she has not gotten a call.  Perhaps scheduling could check and see?  I can send the referral again if needed.  Asher Campos MD on 1/25/2021 at 5:05 PM

## 2021-01-26 NOTE — TELEPHONE ENCOUNTER
Patient would like to know if there is another option for her that does not involve the [facility] spinal specialists. She doesn't drive and wants to stay local.   Caryl Pittman LPN on 1/26/2021 at 10:31 AM

## 2021-01-26 NOTE — TELEPHONE ENCOUNTER
Patient notified of below. She is okay with going to [place]. Will route note to [address], to resend referral to [place].

## 2021-01-26 NOTE — TELEPHONE ENCOUNTER
The closest place is [place]. [facility] may be able to do a virtual visit, but not sure.  Asher Campos MD on 1/26/2021 at 1:37 PM

## 2021-01-29 DIAGNOSIS — M53.3 SACROILIAC JOINT PAIN: ICD-10-CM

## 2021-01-29 DIAGNOSIS — M54.16 LUMBAR RADICULOPATHY: Primary | ICD-10-CM

## 2021-02-01 NOTE — PROGRESS NOTES
"Outpatient Physical Therapy Discharge Note     Patient: Kate Chacon  : 1945    Beginning/End Dates of Reporting Period:  2020 to 12/3/2020    Referring Provider: Dr. Bowen, Dr. Campos      Therapy Diagnosis: 1) Lumbar radiculopathy, Compression fracture of T10 vertebra with routine healing  2) AC separation, right, subsequent encounter S43.101D,  Traumatic incomplete tear of right rotator cuff, subsequent encounter S46.011D,      Client Self Report on 12/3/2020: Patient is scheduled for a lumbar injection on 2020.  She reports the back of her legs \"feel tight\" when she walks.     Patient did not show for her remaining scheduled PT appointments.   No additional follow-ups were scheduled.    Objective Measurements: Not assessed due to unplanned discharge      Goals: Not assessed due to unplanned discharge    Plan:  Discharge from therapy.    Discharge:    Reason for Discharge: Patient chooses to discontinue therapy.      Discharge Plan: Patient to continue home program.  Follow-up with Dr. Campos as indicated.  "

## 2021-02-02 NOTE — TELEPHONE ENCOUNTER
Received call from Neurosurgery in [place], they will not see a pt without an MRI within the last month. Need to know if pt is willing to do MRI or be referred elsewhere.

## 2021-02-02 NOTE — TELEPHONE ENCOUNTER
Can't have MRI. Has a pacemaker.  Had CT lumbar spine and pelvis in June 2020.     Asher Campos MD on 2/2/2021 at 5:39 PM

## 2021-02-04 ENCOUNTER — IMMUNIZATION (OUTPATIENT)
Dept: FAMILY MEDICINE | Facility: OTHER | Age: 76
End: 2021-02-04
Attending: FAMILY MEDICINE
Payer: MEDICARE

## 2021-02-04 PROCEDURE — 91300 PR COVID VAC PFIZER DIL RECON 30 MCG/0.3 ML IM: CPT

## 2021-02-05 ENCOUNTER — TELEPHONE (OUTPATIENT)
Dept: CT IMAGING | Facility: OTHER | Age: 76
End: 2021-02-05

## 2021-02-05 DIAGNOSIS — I48.0 PAROXYSMAL ATRIAL FIBRILLATION (H): Primary | ICD-10-CM

## 2021-02-08 ENCOUNTER — TELEPHONE (OUTPATIENT)
Dept: FAMILY MEDICINE | Facility: OTHER | Age: 76
End: 2021-02-08

## 2021-02-08 NOTE — TELEPHONE ENCOUNTER
Patient is having a procedure for back injections and she needs to know if it will be okay for her to be off her warfarin for 5 days prior.

## 2021-02-08 NOTE — TELEPHONE ENCOUNTER
Dr. Campos gave the OK for this and sent to Radiology, patient also notified.  Tori Jackson LPN ...... 2/8/2021 2:11 PM

## 2021-02-09 ENCOUNTER — HOSPITAL ENCOUNTER (OUTPATIENT)
Dept: CARDIOLOGY | Facility: OTHER | Age: 76
Discharge: HOME | End: 2021-02-09
Attending: INTERNAL MEDICINE | Admitting: INTERNAL MEDICINE
Payer: COMMERCIAL

## 2021-02-09 DIAGNOSIS — I48.20 CHRONIC A-FIB (H): ICD-10-CM

## 2021-02-09 PROCEDURE — 93280 PM DEVICE PROGR EVAL DUAL: CPT | Performed by: INTERNAL MEDICINE

## 2021-02-09 PROCEDURE — 93280 PM DEVICE PROGR EVAL DUAL: CPT | Mod: 26 | Performed by: INTERNAL MEDICINE

## 2021-02-09 NOTE — PATIENT INSTRUCTIONS
It was a pleasure to see you in clinic today.  Please do not hesitate to call with any questions or concerns.  You are scheduled for a remote transmission on 5/12/21.  We look forward to seeing you in clinic at your next device check in 6 months.    Zachery Sorenson, RN  Electrophysiology Nurse Clinician  [facility]    During Business Hours Please Call:  [phone]  After Hours Please Call:  [phone] - select option #4 and ask for job code [id number]

## 2021-02-13 LAB
MDC_IDC_EPISODE_DTM: NORMAL
MDC_IDC_EPISODE_ID: NORMAL
MDC_IDC_EPISODE_TYPE: NORMAL
MDC_IDC_LEAD_IMPLANT_DT: NORMAL
MDC_IDC_LEAD_IMPLANT_DT: NORMAL
MDC_IDC_LEAD_LOCATION: NORMAL
MDC_IDC_LEAD_LOCATION: NORMAL
MDC_IDC_LEAD_LOCATION_DETAIL_1: NORMAL
MDC_IDC_LEAD_LOCATION_DETAIL_1: NORMAL
MDC_IDC_LEAD_MFG: NORMAL
MDC_IDC_LEAD_MFG: NORMAL
MDC_IDC_LEAD_MODEL: NORMAL
MDC_IDC_LEAD_MODEL: NORMAL
MDC_IDC_LEAD_POLARITY_TYPE: NORMAL
MDC_IDC_LEAD_POLARITY_TYPE: NORMAL
MDC_IDC_LEAD_SERIAL: NORMAL
MDC_IDC_LEAD_SERIAL: NORMAL
MDC_IDC_MSMT_BATTERY_DTM: NORMAL
MDC_IDC_MSMT_BATTERY_REMAINING_LONGEVITY: 60 MO
MDC_IDC_MSMT_BATTERY_STATUS: NORMAL
MDC_IDC_MSMT_LEADCHNL_RA_IMPEDANCE_VALUE: 796 OHM
MDC_IDC_MSMT_LEADCHNL_RA_SENSING_INTR_AMPL: 3.4 MV
MDC_IDC_MSMT_LEADCHNL_RV_IMPEDANCE_VALUE: 692 OHM
MDC_IDC_MSMT_LEADCHNL_RV_PACING_THRESHOLD_AMPLITUDE: 0.7 V
MDC_IDC_MSMT_LEADCHNL_RV_PACING_THRESHOLD_PULSEWIDTH: 0.4 MS
MDC_IDC_MSMT_LEADCHNL_RV_SENSING_INTR_AMPL: 13.8 MV
MDC_IDC_PG_IMPLANT_DTM: NORMAL
MDC_IDC_PG_MFG: NORMAL
MDC_IDC_PG_MODEL: NORMAL
MDC_IDC_PG_SERIAL: NORMAL
MDC_IDC_PG_TYPE: NORMAL
MDC_IDC_SESS_CLINIC_NAME: NORMAL
MDC_IDC_SESS_DTM: NORMAL
MDC_IDC_SESS_TYPE: NORMAL
MDC_IDC_SET_BRADY_AT_MODE_SWITCH_MODE: NORMAL
MDC_IDC_SET_BRADY_LOWRATE: 60 {BEATS}/MIN
MDC_IDC_SET_BRADY_MAX_SENSOR_RATE: 130 {BEATS}/MIN
MDC_IDC_SET_BRADY_MODE: NORMAL
MDC_IDC_SET_BRADY_PAV_DELAY_HIGH: 250 MS
MDC_IDC_SET_BRADY_PAV_DELAY_LOW: 250 MS
MDC_IDC_SET_BRADY_SAV_DELAY_LOW: 220 MS
MDC_IDC_SET_LEADCHNL_RA_PACING_CAPTURE_MODE: NORMAL
MDC_IDC_SET_LEADCHNL_RA_PACING_POLARITY: NORMAL
MDC_IDC_SET_LEADCHNL_RA_SENSING_ADAPTATION_MODE: NORMAL
MDC_IDC_SET_LEADCHNL_RA_SENSING_POLARITY: NORMAL
MDC_IDC_SET_LEADCHNL_RA_SENSING_SENSITIVITY: 0.25 MV
MDC_IDC_SET_LEADCHNL_RV_PACING_AMPLITUDE: 1.3 V
MDC_IDC_SET_LEADCHNL_RV_PACING_CAPTURE_MODE: NORMAL
MDC_IDC_SET_LEADCHNL_RV_PACING_POLARITY: NORMAL
MDC_IDC_SET_LEADCHNL_RV_PACING_PULSEWIDTH: 0.4 MS
MDC_IDC_SET_LEADCHNL_RV_SENSING_ADAPTATION_MODE: NORMAL
MDC_IDC_SET_LEADCHNL_RV_SENSING_POLARITY: NORMAL
MDC_IDC_SET_LEADCHNL_RV_SENSING_SENSITIVITY: 1.5 MV
MDC_IDC_SET_ZONE_DETECTION_INTERVAL: 375 MS
MDC_IDC_SET_ZONE_TYPE: NORMAL
MDC_IDC_SET_ZONE_VENDOR_TYPE: NORMAL
MDC_IDC_STAT_BRADY_DTM_END: NORMAL
MDC_IDC_STAT_BRADY_DTM_START: NORMAL
MDC_IDC_STAT_BRADY_RA_PERCENT_PACED: 0 %
MDC_IDC_STAT_BRADY_RV_PERCENT_PACED: 31 %
MDC_IDC_STAT_EPISODE_RECENT_COUNT: 0
MDC_IDC_STAT_EPISODE_RECENT_COUNT_DTM_END: NORMAL
MDC_IDC_STAT_EPISODE_RECENT_COUNT_DTM_START: NORMAL
MDC_IDC_STAT_EPISODE_TOTAL_COUNT: 581
MDC_IDC_STAT_EPISODE_TOTAL_COUNT_DTM_END: NORMAL
MDC_IDC_STAT_EPISODE_TYPE: NORMAL
MDC_IDC_STAT_EPISODE_TYPE: NORMAL
MDC_IDC_STAT_EPISODE_VENDOR_TYPE: NORMAL
MDC_IDC_STAT_EPISODE_VENDOR_TYPE: NORMAL

## 2021-02-16 ENCOUNTER — TELEPHONE (OUTPATIENT)
Dept: GENERAL RADIOLOGY | Facility: OTHER | Age: 76
End: 2021-02-16

## 2021-02-16 NOTE — TELEPHONE ENCOUNTER
Contact made Yes, Spoke to patient.   Which attempt is this? #1.    Name of procedure: lumbar myelogram  Procedure date verified: Yes, 2/23/21.  Arrival time verified: 0720 AM lab  Facility location verified: Yes, instructed to enter through main clinic entrance, wear a mask, and proceed to Diagnostic Registration.  Plan to be here for 2.5-3 hours. Must have . Explain current visitor policy to patient.    Platelet count and INR completed within 30 days: having INR drawn day of procedure. Lab results were reviewed by this writer and are WNL or will be drawn day of procedure.     Are you being treated for an infection (on antibiotics)? No. If yes, discuss with radiologist about when it is appropriate to schedule radiology procedure.     needed? No  Language: N/A    Any anticoagulants or blood thinners? Yes, Coumadin. OK to hold x 5 days per Andres.  If yes, patient was instructed to follow MD orders for stopping anticoagulants or blood thinners.    Any insulin or oral antihyperglycemic meds? No. If so, hold according to policy day of procedure.    Is patient a menstruating female? No If so, place order for [id number], and then call [phone] to make lab appt (must be within 7 days).     No solids after MN AM. (3 hours prior to start of procedure.)  Clear liquids (preferably caffeinated - black coffee, soda, energy drink) are ok up until time of procedure.    Instructions given:     For LP: fluid will be taken from the area around the spinal cord and sent for testing. The doctor who ordered the test will contact you with results.    For myelogram: contrast solution will be instilled into the area around the spinal cord. Images will be taken to determine the cause of the symptoms you are having. The doctor who ordered the test will contact you with results.    Patient states an understanding of pre-procedure instructions.  Preprocedure teaching completed: Yes  Method: verbal per phone.  People present  for teaching: Patient  Response to teaching: patient demonstrates understanding of pre-procedure instructions.   Transportation after procedure: Yes: family or friend  Any questions or patient concerns? I responded to the patient's questions/concerns.    MYELOGRAM: hold the following medications x 48 hours pre-procedure and x 24 hours post-procedure:      Alprazolam (Xanax)  Aminophylline, Amphetamine (Adderall)  Amitriptyline & Chiordiazepoxide (Limbitrol)  Amitriptyline & Perphenazine (Etrafon, Triavil)  Amitriptyline (Elavil, Endep)   Amoxapine (Asendin)  Aripiprazole (Abilify)  Asenapine (Saphris)  Benzphetamine (Didrex)  Bupropion - (Wellbutrin, Zyban, Budeprion)  Buspirone - (Buspar)  Chlorpromazine (Thorazine)  Citalopram (Celexa)  Clomipramine (Anafranil)  Clozapine (Clozaril, Elozapine, FazaClo, Versacloz)  Cyclobenzaprine (Flexeril)   Desipramine (Norpramin)  Desvenlafaxine (Pristiq)  Dexrnethylphenidate (Focalin)  Dextroamphetamine (Dexedrine, Dextrostat)  Diethylpropion (Tenuate)  Doxapram (Dopram)  Doxepin (Sinequan, Adapine)  Droperidol (Inapsine)  Duloxetine HCl (Cymbalta)  Escitalopram (Lexapro)  Fluoxetine (Prozac)  Fluphenazine (Prolixin, Permitil)  Furazolidone (Furoxone), Isoniazid, Linezolid (Zyvox)   Haloperidol (Haldol)  Hydroxyzine (Vistaril)   Iloperidone (Fanapt)  Imipramine (Tofranil)  Isocarboxazid (Marplan)  Lisdexamfetamine (Vyvanse)  Lithium (Eskalith, Lithane)  Lorcaserin (Belviq)  Loxapine (Loxitane )  Lurasidone (Latuda)  Maprotiline (Ludiomil)  Mazindol (Sanorex)  Meperidine (Demerol)   Meprobamate (Equanil, [place])   Methamphetamine (Desoxyn)  Methylphenidate (Daytrana, Concerta, Metadate, Methylin, Ritalin)  Mirtazapine-(Remeron)  Modafinil (Provigil)  Nortriptyline (Pamelor, Aventyl)  Olanzapine (Zyprexa)  Olanzapine/Fluoxetine (Symbyax)  Paliperidone (Invega)  Paroxetine (Paxil)  Pemoline (Cylert)  Perphenazine (Trilafon)  Perphenazine/amitriptyline (Triavil)  Phendimetrazine  (Bontril, [place]. Obezine, Prelu-2)  Phentermine - [lonamin, Adipex-P, Phentride, Teramine, Phentermine & Topiramate (Qsymia)], Theophylline (Estuardo-Dur)   Pimozide (Orap)  Procarbazine (Matulane)   Prochlorperazine (Compazine)  Promethazine (Phenergan)  Protriptyline,Trimipramine (Surmontil)   Quetiapine (Seroquel)  Risperidone (Risperdal)  Secobarbital (Seconal)   **Sertraline (Zoloft)  Thioridazine (Mellaril)  Thiothixene (Navane)  Tramadol (Ultram,Ultracet,Ryzolt)   Trazodone- (Desyrel)   Trifluoperazine (Stelazine)  Triflupromazine (Vesprin)  Trimethobenzamide (TIGAN)   Venlafaxine (Effexor)   Ziprasidone (Geodon)

## 2021-02-19 ENCOUNTER — OFFICE VISIT (OUTPATIENT)
Dept: FAMILY MEDICINE | Facility: OTHER | Age: 76
End: 2021-02-19
Attending: FAMILY MEDICINE
Payer: COMMERCIAL

## 2021-02-19 VITALS
BODY MASS INDEX: 31.08 KG/M2 | TEMPERATURE: 97.6 F | DIASTOLIC BLOOD PRESSURE: 82 MMHG | HEART RATE: 77 BPM | SYSTOLIC BLOOD PRESSURE: 130 MMHG | WEIGHT: 188.8 LBS | OXYGEN SATURATION: 97 % | RESPIRATION RATE: 16 BRPM

## 2021-02-19 DIAGNOSIS — M54.16 LUMBAR RADICULOPATHY: ICD-10-CM

## 2021-02-19 PROCEDURE — G0463 HOSPITAL OUTPT CLINIC VISIT: HCPCS

## 2021-02-19 PROCEDURE — 99213 OFFICE O/P EST LOW 20 MIN: CPT | Performed by: FAMILY MEDICINE

## 2021-02-19 RX ORDER — HYDROCODONE BITARTRATE AND ACETAMINOPHEN 5; 325 MG/1; MG/1
1-2 TABLET ORAL EVERY 6 HOURS PRN
Qty: 30 TABLET | Refills: 0 | Status: SHIPPED | OUTPATIENT
Start: 2021-02-19 | End: 2021-07-06

## 2021-02-19 ASSESSMENT — PAIN SCALES - GENERAL: PAINLEVEL: EXTREME PAIN (8)

## 2021-02-19 ASSESSMENT — PATIENT HEALTH QUESTIONNAIRE - PHQ9: SUM OF ALL RESPONSES TO PHQ QUESTIONS 1-9: 4

## 2021-02-19 NOTE — NURSING NOTE
Patient is here for medication check to get pain pills.  Patient has back pain, has been on going for a while.     No LMP recorded (lmp unknown). Patient is postmenopausal.  Medication Reconciliation: complete    Yee Villagomez LPN  2/19/2021 10:11 AM

## 2021-02-19 NOTE — PROGRESS NOTES
SUBJECTIVE:   Kate Chacon is a 75 year old female who presents to clinic today for the following health issues:  Nursing Notes:   Yee Villagomez LPN  2/19/2021 10:11 AM  Sign at exiting of workspace  Patient is here for medication check to get pain pills.  Patient has back pain, has been on going for a while.     No LMP recorded (lmp unknown). Patient is postmenopausal.  Medication Reconciliation: joanna Villagomez LPN  2/19/2021 10:11 AM      HPI    76 yo female presents requesting pain medication for low back pain, chronic    Previous CVA in 2017, since then has been struggling with unsteady gait which has contributed to increased falls.    Fell a few months ago, reports increased pain since. EPSI in the fall with minimal improvement. Scheduled for myelogram next week.      Patient Active Problem List    Diagnosis Date Noted     Hemiparesis due to old stroke - right side is weaker 10/19/2020     Priority: Medium     Compression fracture of T10 vertebra with routine healing, subsequent encounter 10/19/2020     Priority: Medium     History of CVA (cerebrovascular accident) 10/19/2020     Priority: Medium     Physical deconditioning 10/19/2020     Priority: Medium     Lumbar radiculopathy 10/19/2020     Priority: Medium     Chondrodermatitis nodularis chronica helicis, right 10/09/2020     Priority: Medium     SA node dysfunction s/p pacer on 11/2/2017 at [facility] 08/13/2020     Priority: Medium     H/O sinus pause 08/13/2020     Priority: Medium     History of tobacco abuse quitting on 10/14/16 08/13/2020     Priority: Medium     Dyspnea on exertion 08/13/2020     Priority: Medium     On Coumadin for atrial fibrillation (H) 08/13/2020     Priority: Medium     Mixed hyperlipidemia 08/13/2020     Priority: Medium     CKD (chronic kidney disease) stage 3, GFR 30-59 ml/min 08/13/2020     Priority: Medium     Chronic midline low back pain without sciatica 08/13/2020     Priority: Medium      Sinoatrial node dysfunction (H) 05/01/2018     Priority: Medium     Cardiac pacemaker 02/19/2018     Priority: Medium     Urge incontinence 10/05/2017     Priority: Medium     Former smoker 08/30/2017     Priority: Medium     MDD (recurrent major depressive disorder) in remission (H) 08/30/2017     Priority: Medium     Anxiety 01/05/2017     Priority: Medium     Essential hypertension 12/08/2016     Priority: Medium     Paroxysmal atrial fibrillation (H) 11/02/2016     Priority: Medium     Anticoagulation goal of INR 2 to 3 10/28/2016     Priority: Medium     History of ischemic stroke on 10/15/2016 secondary to embolism 10/15/2016     Priority: Medium     Diverticulosis of large intestine 04/04/2011     Priority: Medium     H/O adenomatous polyp of colon 03/02/2011     Priority: Medium     Osteoporosis 02/08/2006     Priority: Medium     Past Medical History:   Diagnosis Date     Encounter for full-term uncomplicated delivery     x3     Ganglion of wrist     right     Gastritis without bleeding     treated and 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        Review of Systems   All other systems reviewed and are negative.       OBJECTIVE:     /82 (BP Location: Right arm, Patient Position: Sitting, Cuff Size: Adult Large)   Pulse 77   Temp 97.6  F (36.4  C) (Tympanic)   Resp 16   Wt 85.6 kg (188 lb 12.8 oz)   LMP  (LMP Unknown)   SpO2 97%   Breastfeeding No   BMI 31.08 kg/m    Body mass index is 31.08 kg/m .  Physical Exam  Musculoskeletal: Normal range of motion.   Neurological:      General: No focal deficit present.         Diagnostic Test Results:  none     ASSESSMENT/PLAN:           ICD-10-CM    1. Lumbar radiculopathy  M54.16 HYDROcodone-acetaminophen (NORCO) 5-325 MG tablet       Refilled hydrocodone for short-term use.  Patient is in the midst of a work-up for her back pain and has a myelogram scheduled next week.  She will follow-up with Dr. Campos to determine neck  steps.    PDMP Review       Value Time User    State PDMP site checked  Yes 2/19/2021  1:42 PM Nova Vázquez MD Antoinette Youngdahl, MD  [facility]

## 2021-02-23 ENCOUNTER — ANTICOAGULATION THERAPY VISIT (OUTPATIENT)
Dept: ANTICOAGULATION | Facility: OTHER | Age: 76
End: 2021-02-23
Attending: FAMILY MEDICINE
Payer: MEDICARE

## 2021-02-23 ENCOUNTER — HOSPITAL ENCOUNTER (OUTPATIENT)
Dept: GENERAL RADIOLOGY | Facility: OTHER | Age: 76
End: 2021-02-23
Attending: FAMILY MEDICINE
Payer: MEDICARE

## 2021-02-23 ENCOUNTER — HOSPITAL ENCOUNTER (OUTPATIENT)
Dept: CT IMAGING | Facility: OTHER | Age: 76
End: 2021-02-23
Attending: FAMILY MEDICINE
Payer: MEDICARE

## 2021-02-23 VITALS
TEMPERATURE: 99.7 F | OXYGEN SATURATION: 95 % | HEART RATE: 79 BPM | RESPIRATION RATE: 16 BRPM | SYSTOLIC BLOOD PRESSURE: 179 MMHG | DIASTOLIC BLOOD PRESSURE: 109 MMHG

## 2021-02-23 DIAGNOSIS — M54.16 LUMBAR RADICULOPATHY: ICD-10-CM

## 2021-02-23 DIAGNOSIS — Z51.81 ANTICOAGULATION GOAL OF INR 2 TO 3: ICD-10-CM

## 2021-02-23 DIAGNOSIS — M53.3 SACROILIAC JOINT PAIN: ICD-10-CM

## 2021-02-23 DIAGNOSIS — I48.0 PAROXYSMAL ATRIAL FIBRILLATION (H): Primary | ICD-10-CM

## 2021-02-23 DIAGNOSIS — Z79.01 ANTICOAGULATION GOAL OF INR 2 TO 3: ICD-10-CM

## 2021-02-23 LAB — INR POINT OF CARE: 1.2 (ref 0.86–1.14)

## 2021-02-23 PROCEDURE — 62304 MYELOGRAPHY LUMBAR INJECTION: CPT

## 2021-02-23 PROCEDURE — 255N000002 HC RX 255 OP 636: Performed by: RADIOLOGY

## 2021-02-23 PROCEDURE — 250N000009 HC RX 250: Performed by: RADIOLOGY

## 2021-02-23 PROCEDURE — 72132 CT LUMBAR SPINE W/DYE: CPT

## 2021-02-23 PROCEDURE — 36416 COLLJ CAPILLARY BLOOD SPEC: CPT | Mod: ZL

## 2021-02-23 RX ORDER — LIDOCAINE HYDROCHLORIDE 10 MG/ML
2 INJECTION, SOLUTION INFILTRATION; PERINEURAL ONCE
Status: COMPLETED | OUTPATIENT
Start: 2021-02-23 | End: 2021-02-23

## 2021-02-23 RX ADMIN — IOHEXOL 10 ML: 300 INJECTION, SOLUTION INTRAVENOUS at 09:47

## 2021-02-23 RX ADMIN — LIDOCAINE HYDROCHLORIDE 2 ML: 10 INJECTION, SOLUTION INFILTRATION; PERINEURAL at 09:48

## 2021-02-23 NOTE — DISCHARGE INSTRUCTIONS
Radiology Discharge Instructions After Your Myelogram         AFTER YOU GO HOME      Relax and take it easy for 24 hours    Do not take sertraline until tomorrow. Restart your warfarin this evening.    Keep your head elevated at least 30     You may resume normal activity tomorrow    You may remove the bandage on your back at this time tomorrow    You may resume bathing the next day    Drink at least 4 glasses of extra fluid today if not on fluid restriction    DO NOT drive or operate machinery at home or at work for at least 24 hours    If you develop a headache you can take over the counter medicines and lay down.  Try drinking caffeine - coffee, soda, extra fluids.       Do not submerge injection site in water for 24 hours, (no baths, pools, hot tubs).               CALL YOU PRIMARY PHYSICIAN IF:    If you start to leak a large amount of fluid from the puncture site, lie down flat on your back.     Call your primary physician; they will tell you if you need to return to the hospital.    You develop a severe headache that doesn't resolve after trying over the counter medicines, lying down, and drinking caffeinated beverages     You develop nausea or vomiting that doesn't resolve after lying down    You develop a temperature of 101  or greater    Call 911 if you have a seizure.    The doctor who ordered your test will contact you with results.    You can reach your doctor at [phone]. Tell the person who answers that you had a myelogram or lumbar puncture by Dr. Aceves on 2/23/21.   You may also reach the [facility] Nurse for non-emergent questions at [phone] Monday - Friday from 8:00 am - 4:30 pm.

## 2021-02-23 NOTE — PROGRESS NOTES
Patient appropriately NPO.      Allergies and PTA medications reviewed with patient.      Neuromotor ROM and movement reduced in the on the right side - leg(s) and arm, and sensation reduced in the right arm(s) and right hand, decreased sensation in skin on right foot.      Vital signs Vitals were reviewed  Temp: 99.7  F (37.6  C) Temp src: Tympanic BP: (!) 170/111 Pulse: 80   Resp: 16 SpO2: 96 % O2 Device: None (Room air)  .      Patient is accompanied by no one; here alone,  Maco will pick her up following the procedure.      Anticoagulation status: Coumadin.  INR lab: required, most recent INR value (if required): 1.2.      IV for this procedure is: did not require, per Dr. Aceves.      Patient accompanied to procedural suite with rad tech(s)Iveth.      Post procedure monitoring:    Per report from rad tech(s), the following complications occurred during the procedure: no complications.    Hematoma no.    Bleeding at procedural site no.    CNS leakage no, if so, location N/A, with the following symptoms: none  Patient observed with head of bed at 30 degrees per order for 1 hours after return to day surgery unit.     Patient discharged with  according to order when VSS, tolerating PO, no headache, neuro changes, or abnormalities.      Instructed to follow bedrest and to hold the following medications for 24 hours post procedure (related to seizure risk) sertraline.

## 2021-02-23 NOTE — PROGRESS NOTES
ANTICOAGULATION FOLLOW-UP CLINIC VISIT    Patient Name:  Kate Chacon  Date:  2021  Contact Type:  Face to Face    SUBJECTIVE:  Patient Findings     Positives:  Bruising ((L) side bruising on (L) side due to fall, fading)        Clinical Outcomes     Negatives:  Major bleeding event, Thromboembolic event, Anticoagulation-related hospital admission, Anticoagulation-related ED visit, Anticoagulation-related fatality           OBJECTIVE    Recent labs: (last 7 days)     21   INR 1.2*       ASSESSMENT / PLAN  INR assessment SUB held for procedure   Recheck INR In: 1 WEEK    INR Location Clinic      Anticoagulation Summary  As of 2021    INR goal:  2.0-3.0   TTR:  59.6 % (1 y)   INR used for dosin.2 (2021)   Warfarin maintenance plan:  2.5 mg (5 mg x 0.5) every Wed; 5 mg (5 mg x 1) all other days   Full warfarin instructions:  2.5 mg every Wed; 5 mg all other days   Weekly warfarin total:  32.5 mg   No change documented:  Sara Rodriguez RN   Plan last modified:  Radha Burns RN (2020)   Next INR check:  3/2/2021   Priority:  Maintenance   Target end date:  Indefinite    Indications    Paroxysmal atrial fibrillation (H) [I48.0]  Anticoagulation goal of INR 2 to 3 [Z51.81  Z79.01]             Anticoagulation Episode Summary     INR check location:      Preferred lab:      Send INR reminders to:  [facility]    Comments:        Anticoagulation Care Providers     Provider Role Specialty Phone number    Asher Campos MD Referring Family Medicine [phone]            See the Encounter Report to view Anticoagulation Flowsheet and Dosing Calendar (Go to Encounters tab in chart review, and find the Anticoagulation Therapy Visit)        Sara Rodriguez RN

## 2021-02-23 NOTE — IP AVS SNAPSHOT
[facility] SPECIALTY XRAY  [address]  [place] [address]  Phone: [phone]                                    After Visit Summary   2/23/2021    Kate Chacon    MRN: [id number]           After Visit Summary Signature Page    I have received my discharge instructions, and my questions have been answered. I have discussed any challenges I see with this plan with the nurse or doctor.    ..........................................................................................................................................  Patient/Patient Representative Signature      ..........................................................................................................................................  Patient Representative Print Name and Relationship to Patient    ..................................................               ................................................  Date                                   Time    ..........................................................................................................................................  Reviewed by Signature/Title    ...................................................              ..............................................  Date                                               Time          22EPIC Rev 08/18

## 2021-02-24 ENCOUNTER — OFFICE VISIT (OUTPATIENT)
Dept: FAMILY MEDICINE | Facility: OTHER | Age: 76
End: 2021-02-24
Attending: FAMILY MEDICINE
Payer: COMMERCIAL

## 2021-02-24 VITALS
SYSTOLIC BLOOD PRESSURE: 114 MMHG | DIASTOLIC BLOOD PRESSURE: 84 MMHG | OXYGEN SATURATION: 97 % | TEMPERATURE: 97 F | RESPIRATION RATE: 20 BRPM | HEART RATE: 74 BPM

## 2021-02-24 DIAGNOSIS — Z86.73 HISTORY OF CVA (CEREBROVASCULAR ACCIDENT): ICD-10-CM

## 2021-02-24 DIAGNOSIS — I48.0 PAROXYSMAL ATRIAL FIBRILLATION (H): ICD-10-CM

## 2021-02-24 DIAGNOSIS — I48.91 ON COUMADIN FOR ATRIAL FIBRILLATION (H): ICD-10-CM

## 2021-02-24 DIAGNOSIS — I10 ESSENTIAL HYPERTENSION: ICD-10-CM

## 2021-02-24 DIAGNOSIS — Z95.0 CARDIAC PACEMAKER: ICD-10-CM

## 2021-02-24 DIAGNOSIS — Z79.01 ON COUMADIN FOR ATRIAL FIBRILLATION (H): ICD-10-CM

## 2021-02-24 DIAGNOSIS — N18.31 STAGE 3A CHRONIC KIDNEY DISEASE (H): ICD-10-CM

## 2021-02-24 DIAGNOSIS — M48.062 SPINAL STENOSIS OF LUMBAR REGION WITH NEUROGENIC CLAUDICATION: Primary | ICD-10-CM

## 2021-02-24 DIAGNOSIS — M54.16 LUMBAR RADICULOPATHY: ICD-10-CM

## 2021-02-24 PROCEDURE — G0463 HOSPITAL OUTPT CLINIC VISIT: HCPCS

## 2021-02-24 PROCEDURE — 99214 OFFICE O/P EST MOD 30 MIN: CPT | Performed by: FAMILY MEDICINE

## 2021-02-24 ASSESSMENT — PATIENT HEALTH QUESTIONNAIRE - PHQ9: SUM OF ALL RESPONSES TO PHQ QUESTIONS 1-9: 4

## 2021-02-24 ASSESSMENT — PAIN SCALES - GENERAL: PAINLEVEL: WORST PAIN (10)

## 2021-02-24 NOTE — PROGRESS NOTES
"Nursing Notes:   Tiffany White LPN  2/24/2021  2:47 PM  Signed  Chief Complaint   Patient presents with     RECHECK     after CT scan       Initial /84   Pulse 74   Temp 97  F (36.1  C) (Temporal)   Resp 20   LMP  (LMP Unknown)   SpO2 97%   Breastfeeding No  Estimated body mass index is 31.08 kg/m  as calculated from the following:    Height as of 8/13/20: 1.66 m (5' 5.35\").    Weight as of 2/19/21: 85.6 kg (188 lb 12.8 oz).  Medication Reconciliation: complete    Tiffany White LPN      SUBJECTIVE:  Kate Chacon  is a 75 year old female who comes in today for follow-up after CT myelogram.  This was required prior to see neurosurgery for consideration of other intervention.  Since she she has a pacemaker, she cannot have a MRI scan.  She has significant degenerative facet changes in the lower lumbar spine and severe spinal stenosis at L4-5.  This likely will only respond to surgical intervention as she has exhausted all of her conservative options.    She had right SI joint injection in November.  She had right L5-S1 transforaminal epidural injection on December 7.  These were ineffective.  The only conservative option left would be facet injections at L4-5 and L5-S1, but I am not certain that that would do any good because of her severe spinal stenosis and I suspect that she would benefit from decompression and fusion.    She saw Dr. Aponte last Friday and got 30 tablets of hydrocodone for pain relief.  Her last prescription for this was in November.  She finds that it is only marginally helpful but she has been trying to minimize its use.  PDMP Review       Value Time User    State PDMP site checked  Yes 2/24/2021  8:07 AM Asher Campos MD           Past Medical, Family, and Social History reviewed and updated as noted below.   ROS is negative except as noted above       Allergies   Allergen Reactions     Methylpar-Na Bisulfite-Pentazocine Unknown     jittery   ,   Family History "   Problem Relation Age of Onset     Diabetes Father         Diabetes     Hypertension Father         Hypertension     Other - See Comments Mother         h/o coronary artery disease/dementia     Asthma Mother         Asthma     Diabetes Maternal Grandmother         Diabetes     Cancer Maternal Aunt         Cancer,Uterine     Breast Cancer No family hx of         Cancer-breast   ,   Current Outpatient Medications   Medication     atorvastatin (LIPITOR) 40 MG tablet     calcium carbonate 750 MG CHEW     HYDROcodone-acetaminophen (NORCO) 5-325 MG tablet     ibuprofen (ADVIL/MOTRIN) 200 MG tablet     lisinopril (ZESTRIL) 20 MG tablet     metoprolol succinate ER (TOPROL-XL) 50 MG 24 hr tablet     sertraline (ZOLOFT) 50 MG tablet     warfarin ANTICOAGULANT (COUMADIN) 5 MG tablet     No current facility-administered medications for this visit.    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   ,   Patient Active Problem List    Diagnosis Date Noted     Hemiparesis due to old stroke - right side is weaker 10/19/2020     Priority: Medium     Compression fracture of T10 vertebra with routine healing, subsequent encounter 10/19/2020     Priority: Medium     History of CVA (cerebrovascular accident) 10/19/2020     Priority: Medium     Physical deconditioning 10/19/2020     Priority: Medium     Lumbar radiculopathy 10/19/2020     Priority: Medium     Chondrodermatitis nodularis chronica helicis, right 10/09/2020     Priority: Medium     SA node dysfunction s/p pacer on 11/2/2017 at [facility] 08/13/2020     Priority: Medium     H/O sinus pause 08/13/2020     Priority: Medium     History of tobacco abuse quitting on 10/14/16 08/13/2020     Priority: Medium     Dyspnea on exertion 08/13/2020     Priority: Medium     On Coumadin for atrial  fibrillation (H) 2020     Priority: Medium     Mixed hyperlipidemia 2020     Priority: Medium     CKD (chronic kidney disease) stage 3, GFR 30-59 ml/min 2020     Priority: Medium     Chronic midline low back pain without sciatica 2020     Priority: Medium     Sinoatrial node dysfunction (H) 2018     Priority: Medium     Cardiac pacemaker 2018     Priority: Medium     Urge incontinence 10/05/2017     Priority: Medium     Former smoker 2017     Priority: Medium     MDD (recurrent major depressive disorder) in remission (H) 2017     Priority: Medium     Anxiety 2017     Priority: Medium     Essential hypertension 2016     Priority: Medium     Paroxysmal atrial fibrillation (H) 2016     Priority: Medium     Anticoagulation goal of INR 2 to 3 10/28/2016     Priority: Medium     History of ischemic stroke on 10/15/2016 secondary to embolism 10/15/2016     Priority: Medium     Diverticulosis of large intestine 2011     Priority: Medium     H/O adenomatous polyp of colon 2011     Priority: Medium     Osteoporosis 2006     Priority: Medium   ,   Past Surgical History:   Procedure Laterality Date     APPENDECTOMY OPEN      No Comments Provided     COLONOSCOPY  2005,Cecal biopsy with tubular adenoma low grade dysplasia.     COLONOSCOPY  2011     COLONOSCOPY  2012    Tubular Adenomas F/U      COLONOSCOPY  2019    F/U N/A as will be over 80 in . Tubular adenoma     ESOPHAGOSCOPY, GASTROSCOPY, DUODENOSCOPY (EGD), COMBINED      05     OTHER SURGICAL HISTORY      8/3/05,428531,OTHER,helices on the right ear     TONSILLECTOMY      No Comments Provided    and   Social History     Tobacco Use     Smoking status: Former Smoker     Packs/day: 0.50     Years: 49.00     Pack years: 24.50     Types: Cigarettes     Quit date: 10/14/2016     Years since quittin.3     Smokeless tobacco: Never Used    Substance Use Topics     Alcohol use: No     OBJECTIVE:  /84   Pulse 74   Temp 97  F (36.1  C) (Temporal)   Resp 20   LMP  (LMP Unknown)   SpO2 97%   Breastfeeding No    EXAM:  Kate is here with her  today.  Able to sit in the chair comfortably.  Feels better lying down.  Worse when she is walking.  Better when she is pushing a cart in the grocery store.  Exam is otherwise unchanged.  We reviewed her CT and her CT myelogram with her and her   ASSESSMENT/Plan :    Kate was seen today for recheck.    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  I think referral to the neurosurgeon is appropriate to decide whether she needs any type of surgical intervention.  She may benefit from facet injections/blocks versus rhizotomy, but I suspect with the severity of her lumbar stenosis at L4-5, that she may need a decompression and fusion.  She will follow-up after her neurosurgery appointment.  Discussed obtaining a second opinion if desired and we will try and facilitate and coordinate her care in this regard.    A total of 31 minutes was spent with the patient, reviewing records, tests, ordering medications, tests or procedures and documenting clinical information in the EHR in addition to counseling and discussion with regard to the aforementioned concerns    Asher Campos MD

## 2021-02-24 NOTE — NURSING NOTE
"Chief Complaint   Patient presents with     RECHECK     after CT scan       Initial /84   Pulse 74   Temp 97  F (36.1  C) (Temporal)   Resp 20   LMP  (LMP Unknown)   SpO2 97%   Breastfeeding No  Estimated body mass index is 31.08 kg/m  as calculated from the following:    Height as of 8/13/20: 1.66 m (5' 5.35\").    Weight as of 2/19/21: 85.6 kg (188 lb 12.8 oz).  Medication Reconciliation: complete    Tiffany White LPN  "

## 2021-02-25 ENCOUNTER — IMMUNIZATION (OUTPATIENT)
Dept: FAMILY MEDICINE | Facility: OTHER | Age: 76
End: 2021-02-25
Attending: FAMILY MEDICINE
Payer: MEDICARE

## 2021-02-25 PROCEDURE — 91300 PR COVID VAC PFIZER DIL RECON 30 MCG/0.3 ML IM: CPT

## 2021-03-01 ENCOUNTER — TELEPHONE (OUTPATIENT)
Dept: FAMILY MEDICINE | Facility: OTHER | Age: 76
End: 2021-03-01

## 2021-03-01 NOTE — TELEPHONE ENCOUNTER
The patient was told that a referral to the neurosurgeon was done and the neurosurgeons office will be contacting her to set up an appointment to see them.  Tiffany White LPN..................3/1/2021   5:01 PM

## 2021-03-01 NOTE — TELEPHONE ENCOUNTER
ZARIA-pt is thinking that JAMES is scheduling her for back surgery please call and advise [phone]      Thank You    Tyra Lopez on 3/1/2021 at 3:58 PM

## 2021-03-02 ENCOUNTER — ANTICOAGULATION THERAPY VISIT (OUTPATIENT)
Dept: ANTICOAGULATION | Facility: OTHER | Age: 76
End: 2021-03-02
Attending: FAMILY MEDICINE
Payer: MEDICARE

## 2021-03-02 DIAGNOSIS — Z51.81 ANTICOAGULATION GOAL OF INR 2 TO 3: ICD-10-CM

## 2021-03-02 DIAGNOSIS — Z79.01 ANTICOAGULATION GOAL OF INR 2 TO 3: ICD-10-CM

## 2021-03-02 DIAGNOSIS — I48.0 PAROXYSMAL ATRIAL FIBRILLATION (H): ICD-10-CM

## 2021-03-02 LAB — INR POINT OF CARE: 1.8 (ref 0.86–1.14)

## 2021-03-02 PROCEDURE — 36416 COLLJ CAPILLARY BLOOD SPEC: CPT | Mod: ZL

## 2021-03-02 NOTE — PROGRESS NOTES
ANTICOAGULATION FOLLOW-UP CLINIC VISIT    Patient Name:  Kate Chacon  Date:  3/2/2021  Contact Type:  Face to Face    SUBJECTIVE:  Patient Findings         Clinical Outcomes     Negatives:  Major bleeding event, Thromboembolic event, Anticoagulation-related hospital admission, Anticoagulation-related ED visit, Anticoagulation-related fatality           OBJECTIVE    Recent labs: (last 7 days)     21   INR 1.8*       ASSESSMENT / PLAN  INR assessment SUB    Recheck INR In: 2 WEEKS    INR Location Clinic      Anticoagulation Summary  As of 3/2/2021    INR goal:  2.0-3.0   TTR:  57.8 % (1 y)   INR used for dosin.8 (3/2/2021)   Warfarin maintenance plan:  2.5 mg (5 mg x 0.5) every Wed; 5 mg (5 mg x 1) all other days   Full warfarin instructions:  2.5 mg every Wed; 5 mg all other days   Weekly warfarin total:  32.5 mg   No change documented:  Abhinav Houser RN   Plan last modified:  Radha Burns RN (2020)   Next INR check:  3/16/2021   Priority:  Maintenance   Target end date:  Indefinite    Indications    Paroxysmal atrial fibrillation (H) [I48.0]  Anticoagulation goal of INR 2 to 3 [Z51.81  Z79.01]             Anticoagulation Episode Summary     INR check location:      Preferred lab:      Send INR reminders to:  [facility]    Comments:        Anticoagulation Care Providers     Provider Role Specialty Phone number    Asher Campos MD Referring Family Medicine [phone]            See the Encounter Report to view Anticoagulation Flowsheet and Dosing Calendar (Go to Encounters tab in chart review, and find the Anticoagulation Therapy Visit)      Corrina La RN

## 2021-03-16 ENCOUNTER — ANTICOAGULATION THERAPY VISIT (OUTPATIENT)
Dept: ANTICOAGULATION | Facility: OTHER | Age: 76
End: 2021-03-16
Attending: FAMILY MEDICINE
Payer: MEDICARE

## 2021-03-16 DIAGNOSIS — Z79.01 ANTICOAGULATION GOAL OF INR 2 TO 3: ICD-10-CM

## 2021-03-16 DIAGNOSIS — Z51.81 ANTICOAGULATION GOAL OF INR 2 TO 3: ICD-10-CM

## 2021-03-16 DIAGNOSIS — I48.0 PAROXYSMAL ATRIAL FIBRILLATION (H): ICD-10-CM

## 2021-03-16 LAB — INR POINT OF CARE: 2.9 (ref 0.86–1.14)

## 2021-03-16 PROCEDURE — 36416 COLLJ CAPILLARY BLOOD SPEC: CPT | Mod: ZL

## 2021-03-16 NOTE — PROGRESS NOTES
ANTICOAGULATION FOLLOW-UP CLINIC VISIT    Patient Name:  Kate Chacon  Date:  3/16/2021  Contact Type:  Face to Face    SUBJECTIVE:  Patient Findings         Clinical Outcomes     Negatives:  Major bleeding event, Thromboembolic event, Anticoagulation-related hospital admission, Anticoagulation-related ED visit, Anticoagulation-related fatality           OBJECTIVE    Recent labs: (last 7 days)     21   INR 2.9*       ASSESSMENT / PLAN  INR assessment THER    Recheck INR In: 4 WEEKS    INR Location Clinic      Anticoagulation Summary  As of 3/16/2021    INR goal:  2.0-3.0   TTR:  61.0 % (1 y)   INR used for dosin.9 (3/16/2021)   Warfarin maintenance plan:  2.5 mg (5 mg x 0.5) every Wed; 5 mg (5 mg x 1) all other days   Full warfarin instructions:  2.5 mg every Wed; 5 mg all other days   Weekly warfarin total:  32.5 mg   No change documented:  Sara Rodriguez RN   Plan last modified:  Radha Burns RN (2020)   Next INR check:  2021   Priority:  Maintenance   Target end date:  Indefinite    Indications    Paroxysmal atrial fibrillation (H) [I48.0]  Anticoagulation goal of INR 2 to 3 [Z51.81  Z79.01]             Anticoagulation Episode Summary     INR check location:      Preferred lab:      Send INR reminders to:  [facility]    Comments:        Anticoagulation Care Providers     Provider Role Specialty Phone number    Asher Campos MD Referring Family Medicine [phone]            See the Encounter Report to view Anticoagulation Flowsheet and Dosing Calendar (Go to Encounters tab in chart review, and find the Anticoagulation Therapy Visit)        Sara Rodriguez RN

## 2021-04-02 ENCOUNTER — TRANSFERRED RECORDS (OUTPATIENT)
Dept: HEALTH INFORMATION MANAGEMENT | Facility: OTHER | Age: 76
End: 2021-04-02

## 2021-04-08 DIAGNOSIS — Z79.01 ANTICOAGULATION GOAL OF INR 2 TO 3: ICD-10-CM

## 2021-04-08 DIAGNOSIS — Z51.81 ANTICOAGULATION GOAL OF INR 2 TO 3: ICD-10-CM

## 2021-04-08 RX ORDER — WARFARIN SODIUM 5 MG/1
TABLET ORAL
Qty: 90 TABLET | Refills: 1 | Status: SHIPPED | OUTPATIENT
Start: 2021-04-08 | End: 2021-11-22

## 2021-04-08 NOTE — TELEPHONE ENCOUNTER
"Requested Prescriptions   Pending Prescriptions Disp Refills     warfarin ANTICOAGULANT (COUMADIN) 5 MG tablet 90 tablet 0     Sig: Take 2.5 mg x 1 day and 5 mg x 6 days/week OR  AS  DIRECTED  BY  [facility]  CLINIC.       Vitamin K Antagonists Failed - 4/8/2021 10:05 AM        Failed - INR is within goal in the past 6 weeks     Confirm INR is within goal in the past 6 weeks.     Recent Labs   Lab Test 03/16/21   INR 2.9*                       Passed - Recent (12 mo) or future (30 days) visit within the authorizing provider's specialty     Patient has had an office visit with the authorizing provider or a provider within the authorizing providers department within the previous 12 mos or has a future within next 30 days. See \"Patient Info\" tab in inbasket, or \"Choose Columns\" in Meds & Orders section of the refill encounter.              Passed - Medication is active on med list        Passed - Patient is 18 years of age or older        Passed - Patient is not pregnant        Passed - No positive pregnancy on file in past 12 months           Prescription refilled per RN MedicationRefill Policy.................... Radha Burns RN ....................  4/8/2021   3:40 PM      "

## 2021-04-13 ENCOUNTER — ANTICOAGULATION THERAPY VISIT (OUTPATIENT)
Dept: ANTICOAGULATION | Facility: OTHER | Age: 76
End: 2021-04-13
Attending: FAMILY MEDICINE
Payer: MEDICARE

## 2021-04-13 DIAGNOSIS — Z79.01 ANTICOAGULATION GOAL OF INR 2 TO 3: ICD-10-CM

## 2021-04-13 DIAGNOSIS — I48.0 PAROXYSMAL ATRIAL FIBRILLATION (H): ICD-10-CM

## 2021-04-13 DIAGNOSIS — Z51.81 ANTICOAGULATION GOAL OF INR 2 TO 3: ICD-10-CM

## 2021-04-13 LAB — INR POINT OF CARE: 1.9 (ref 0.86–1.14)

## 2021-04-13 PROCEDURE — 36416 COLLJ CAPILLARY BLOOD SPEC: CPT | Mod: ZL

## 2021-04-13 NOTE — PROGRESS NOTES
ANTICOAGULATION FOLLOW-UP CLINIC VISIT    Patient Name:  Kate Chacon  Date:  2021  Contact Type:  Face to Face    SUBJECTIVE:  Patient Findings     Positives:  Change in medications (Tylenol use.)             OBJECTIVE    Recent labs: (last 7 days)     21   INR 1.9*       ASSESSMENT / PLAN  INR assessment SUB    Recheck INR In: 2 WEEKS    INR Location Clinic      Anticoagulation Summary  As of 2021    INR goal:  2.0-3.0   TTR:  62.2 % (1 y)   INR used for dosin.9 (2021)   Warfarin maintenance plan:  2.5 mg (5 mg x 0.5) every Wed; 5 mg (5 mg x 1) all other days   Full warfarin instructions:  2.5 mg every Wed; 5 mg all other days   Weekly warfarin total:  32.5 mg   Plan last modified:  Radha Burns RN (2020)   Next INR check:  2021   Priority:  Maintenance   Target end date:  Indefinite    Indications    Paroxysmal atrial fibrillation (H) [I48.0]  Anticoagulation goal of INR 2 to 3 [Z51.81  Z79.01]             Anticoagulation Episode Summary     INR check location:      Preferred lab:      Send INR reminders to:  [facility]    Comments:        Anticoagulation Care Providers     Provider Role Specialty Phone number    Asher Campos MD Referring Family Medicine [phone]            See the Encounter Report to view Anticoagulation Flowsheet and Dosing Calendar (Go to Encounters tab in chart review, and find the Anticoagulation Therapy Visit)    Patient educated on taking appropriate amounts of acetaminophen and education that hydrocodone has acetaminophen in it. Discussed that she has an appt on 21 for injections. She is to follow orders from the pain nurse for this and contact us for changes.     Yu Fatima, RN

## 2021-04-14 ENCOUNTER — TRANSFERRED RECORDS (OUTPATIENT)
Dept: HEALTH INFORMATION MANAGEMENT | Facility: OTHER | Age: 76
End: 2021-04-14

## 2021-04-28 ENCOUNTER — TELEPHONE (OUTPATIENT)
Dept: FAMILY MEDICINE | Facility: OTHER | Age: 76
End: 2021-04-28

## 2021-04-28 NOTE — TELEPHONE ENCOUNTER
Call from [facility] states patient to have injection done there. Would like to hold warfarin 5 days prior to injection. Noted on 2/8/21 ok to hold warfarin x 5 days no Lovenox for bridging per Dr Campos.  Radha Burns, RN    4/28/2021  1:25 PM

## 2021-05-12 ENCOUNTER — ANTICOAGULATION THERAPY VISIT (OUTPATIENT)
Dept: ANTICOAGULATION | Facility: OTHER | Age: 76
End: 2021-05-12
Attending: FAMILY MEDICINE
Payer: MEDICARE

## 2021-05-12 ENCOUNTER — ANCILLARY PROCEDURE (OUTPATIENT)
Dept: CARDIOLOGY | Facility: CLINIC | Age: 76
End: 2021-05-12
Attending: INTERNAL MEDICINE
Payer: MEDICARE

## 2021-05-12 DIAGNOSIS — I48.20 CHRONIC A-FIB (H): ICD-10-CM

## 2021-05-12 DIAGNOSIS — I48.0 PAROXYSMAL ATRIAL FIBRILLATION (H): ICD-10-CM

## 2021-05-12 DIAGNOSIS — Z51.81 ANTICOAGULATION GOAL OF INR 2 TO 3: ICD-10-CM

## 2021-05-12 DIAGNOSIS — Z79.01 ANTICOAGULATION GOAL OF INR 2 TO 3: ICD-10-CM

## 2021-05-12 LAB — INR POINT OF CARE: 2.4 (ref 0.86–1.14)

## 2021-05-12 PROCEDURE — 36416 COLLJ CAPILLARY BLOOD SPEC: CPT | Mod: ZL

## 2021-05-12 PROCEDURE — 93294 REM INTERROG EVL PM/LDLS PM: CPT | Performed by: INTERNAL MEDICINE

## 2021-05-12 PROCEDURE — 93296 REM INTERROG EVL PM/IDS: CPT

## 2021-05-12 NOTE — PROGRESS NOTES
ANTICOAGULATION FOLLOW-UP CLINIC VISIT    Patient Name:  Kate Chacon  Date:  2021  Contact Type:  Face to Face    SUBJECTIVE:  Patient Findings     Positives:  Upcoming invasive procedure (having injection next week will been holding warfarin x 5 days)        Clinical Outcomes     Negatives:  Major bleeding event, Thromboembolic event, Anticoagulation-related hospital admission, Anticoagulation-related ED visit, Anticoagulation-related fatality           OBJECTIVE    Recent labs: (last 7 days)     21   INR 2.4*       ASSESSMENT / PLAN  INR assessment THER    Recheck INR In: 2 WEEKS    INR Location Clinic      Anticoagulation Summary  As of 2021    INR goal:  2.0-3.0   TTR:  60.6 % (1 y)   INR used for dosin.4 (2021)   Warfarin maintenance plan:  2.5 mg (5 mg x 0.5) every Wed; 5 mg (5 mg x 1) all other days   Full warfarin instructions:  : Hold; : Hold; 5/15: Hold; : Hold; : Hold; Otherwise 2.5 mg every Wed; 5 mg all other days   Weekly warfarin total:  32.5 mg   Plan last modified:  Radha Burns, RN (2020)   Next INR check:  2021   Priority:  Maintenance   Target end date:  Indefinite    Indications    Paroxysmal atrial fibrillation (H) [I48.0]  Anticoagulation goal of INR 2 to 3 [Z51.81  Z79.01]             Anticoagulation Episode Summary     INR check location:      Preferred lab:      Send INR reminders to:  [facility]    Comments:        Anticoagulation Care Providers     Provider Role Specialty Phone number    Asher Campos MD Referring Family Medicine [phone]            See the Encounter Report to view Anticoagulation Flowsheet and Dosing Calendar (Go to Encounters tab in chart review, and find the Anticoagulation Therapy Visit)        Radha Burns, RN

## 2021-05-13 LAB
MDC_IDC_EPISODE_DTM: NORMAL
MDC_IDC_EPISODE_ID: NORMAL
MDC_IDC_EPISODE_TYPE: NORMAL
MDC_IDC_LEAD_IMPLANT_DT: NORMAL
MDC_IDC_LEAD_IMPLANT_DT: NORMAL
MDC_IDC_LEAD_LOCATION: NORMAL
MDC_IDC_LEAD_LOCATION: NORMAL
MDC_IDC_LEAD_LOCATION_DETAIL_1: NORMAL
MDC_IDC_LEAD_LOCATION_DETAIL_1: NORMAL
MDC_IDC_LEAD_MFG: NORMAL
MDC_IDC_LEAD_MFG: NORMAL
MDC_IDC_LEAD_MODEL: NORMAL
MDC_IDC_LEAD_MODEL: NORMAL
MDC_IDC_LEAD_POLARITY_TYPE: NORMAL
MDC_IDC_LEAD_POLARITY_TYPE: NORMAL
MDC_IDC_LEAD_SERIAL: NORMAL
MDC_IDC_LEAD_SERIAL: NORMAL
MDC_IDC_MSMT_BATTERY_DTM: NORMAL
MDC_IDC_MSMT_BATTERY_REMAINING_LONGEVITY: 72 MO
MDC_IDC_MSMT_BATTERY_REMAINING_PERCENTAGE: 100 %
MDC_IDC_MSMT_BATTERY_STATUS: NORMAL
MDC_IDC_MSMT_LEADCHNL_RA_IMPEDANCE_VALUE: 800 OHM
MDC_IDC_MSMT_LEADCHNL_RV_IMPEDANCE_VALUE: 701 OHM
MDC_IDC_MSMT_LEADCHNL_RV_PACING_THRESHOLD_AMPLITUDE: 0.9 V
MDC_IDC_MSMT_LEADCHNL_RV_PACING_THRESHOLD_PULSEWIDTH: 0.4 MS
MDC_IDC_PG_IMPLANT_DTM: NORMAL
MDC_IDC_PG_MFG: NORMAL
MDC_IDC_PG_MODEL: NORMAL
MDC_IDC_PG_SERIAL: NORMAL
MDC_IDC_PG_TYPE: NORMAL
MDC_IDC_SESS_CLINIC_NAME: NORMAL
MDC_IDC_SESS_DTM: NORMAL
MDC_IDC_SESS_TYPE: NORMAL
MDC_IDC_SET_BRADY_AT_MODE_SWITCH_RATE: 170 {BEATS}/MIN
MDC_IDC_SET_BRADY_LOWRATE: 60 {BEATS}/MIN
MDC_IDC_SET_BRADY_MAX_SENSOR_RATE: 130 {BEATS}/MIN
MDC_IDC_SET_BRADY_MODE: NORMAL
MDC_IDC_SET_LEADCHNL_RA_SENSING_ADAPTATION_MODE: NORMAL
MDC_IDC_SET_LEADCHNL_RA_SENSING_SENSITIVITY: 0.25 MV
MDC_IDC_SET_LEADCHNL_RV_PACING_AMPLITUDE: 3.5 V
MDC_IDC_SET_LEADCHNL_RV_PACING_CAPTURE_MODE: NORMAL
MDC_IDC_SET_LEADCHNL_RV_PACING_POLARITY: NORMAL
MDC_IDC_SET_LEADCHNL_RV_PACING_PULSEWIDTH: 0.4 MS
MDC_IDC_SET_LEADCHNL_RV_SENSING_ADAPTATION_MODE: NORMAL
MDC_IDC_SET_LEADCHNL_RV_SENSING_POLARITY: NORMAL
MDC_IDC_SET_LEADCHNL_RV_SENSING_SENSITIVITY: 1.5 MV
MDC_IDC_SET_ZONE_DETECTION_INTERVAL: 375 MS
MDC_IDC_SET_ZONE_TYPE: NORMAL
MDC_IDC_SET_ZONE_VENDOR_TYPE: NORMAL
MDC_IDC_STAT_BRADY_DTM_END: NORMAL
MDC_IDC_STAT_BRADY_DTM_START: NORMAL
MDC_IDC_STAT_BRADY_RA_PERCENT_PACED: 0 %
MDC_IDC_STAT_BRADY_RV_PERCENT_PACED: 18 %
MDC_IDC_STAT_EPISODE_RECENT_COUNT: 0
MDC_IDC_STAT_EPISODE_RECENT_COUNT_DTM_END: NORMAL
MDC_IDC_STAT_EPISODE_RECENT_COUNT_DTM_START: NORMAL
MDC_IDC_STAT_EPISODE_TYPE: NORMAL
MDC_IDC_STAT_EPISODE_VENDOR_TYPE: NORMAL

## 2021-06-03 ENCOUNTER — ANTICOAGULATION THERAPY VISIT (OUTPATIENT)
Dept: ANTICOAGULATION | Facility: OTHER | Age: 76
End: 2021-06-03
Attending: FAMILY MEDICINE
Payer: MEDICARE

## 2021-06-03 DIAGNOSIS — I48.0 PAROXYSMAL ATRIAL FIBRILLATION (H): ICD-10-CM

## 2021-06-03 DIAGNOSIS — Z51.81 ANTICOAGULATION GOAL OF INR 2 TO 3: ICD-10-CM

## 2021-06-03 DIAGNOSIS — Z79.01 ANTICOAGULATION GOAL OF INR 2 TO 3: ICD-10-CM

## 2021-06-03 LAB — INR POINT OF CARE: 2.9 (ref 0.86–1.14)

## 2021-06-03 PROCEDURE — 36416 COLLJ CAPILLARY BLOOD SPEC: CPT | Mod: ZL

## 2021-06-03 NOTE — PROGRESS NOTES
ANTICOAGULATION FOLLOW-UP CLINIC VISIT    Patient Name:  Kate Chacon  Date:  6/3/2021  Contact Type:  Face to Face    SUBJECTIVE:  Patient Findings         Clinical Outcomes     Negatives:  Major bleeding event, Thromboembolic event, Anticoagulation-related hospital admission, Anticoagulation-related ED visit, Anticoagulation-related fatality           OBJECTIVE    Recent labs: (last 7 days)     21   INR 2.9*       ASSESSMENT / PLAN  INR assessment THER    Recheck INR In: 4 WEEKS    INR Location Clinic      Anticoagulation Summary  As of 6/3/2021    INR goal:  2.0-3.0   TTR:  65.6 % (1 y)   INR used for dosin.9 (6/3/2021)   Warfarin maintenance plan:  2.5 mg (5 mg x 0.5) every Wed; 5 mg (5 mg x 1) all other days   Full warfarin instructions:  2.5 mg every Wed; 5 mg all other days   Weekly warfarin total:  32.5 mg   No change documented:  Radha Burns RN   Plan last modified:  Radha Burns RN (2020)   Next INR check:  2021   Priority:  Maintenance   Target end date:  Indefinite    Indications    Paroxysmal atrial fibrillation (H) [I48.0]  Anticoagulation goal of INR 2 to 3 [Z51.81  Z79.01]             Anticoagulation Episode Summary     INR check location:      Preferred lab:      Send INR reminders to:  [facility]    Comments:        Anticoagulation Care Providers     Provider Role Specialty Phone number    Asher Campos MD Referring Family Medicine [phone]            See the Encounter Report to view Anticoagulation Flowsheet and Dosing Calendar (Go to Encounters tab in chart review, and find the Anticoagulation Therapy Visit)        Radha Burns RN

## 2021-06-21 ENCOUNTER — OFFICE VISIT (OUTPATIENT)
Dept: FAMILY MEDICINE | Facility: OTHER | Age: 76
End: 2021-06-21
Attending: FAMILY MEDICINE
Payer: COMMERCIAL

## 2021-06-21 VITALS
OXYGEN SATURATION: 96 % | TEMPERATURE: 97.4 F | SYSTOLIC BLOOD PRESSURE: 124 MMHG | HEART RATE: 74 BPM | RESPIRATION RATE: 18 BRPM | DIASTOLIC BLOOD PRESSURE: 88 MMHG

## 2021-06-21 DIAGNOSIS — I48.0 PAROXYSMAL ATRIAL FIBRILLATION (H): ICD-10-CM

## 2021-06-21 DIAGNOSIS — Z95.0 CARDIAC PACEMAKER: ICD-10-CM

## 2021-06-21 DIAGNOSIS — Z86.73 HISTORY OF ISCHEMIC STROKE: ICD-10-CM

## 2021-06-21 DIAGNOSIS — M54.16 LUMBAR RADICULOPATHY: Primary | ICD-10-CM

## 2021-06-21 DIAGNOSIS — I48.91 ON COUMADIN FOR ATRIAL FIBRILLATION (H): ICD-10-CM

## 2021-06-21 DIAGNOSIS — M47.816 LUMBAR FACET ARTHROPATHY: ICD-10-CM

## 2021-06-21 DIAGNOSIS — N18.31 STAGE 3A CHRONIC KIDNEY DISEASE (H): ICD-10-CM

## 2021-06-21 DIAGNOSIS — Z79.01 ON COUMADIN FOR ATRIAL FIBRILLATION (H): ICD-10-CM

## 2021-06-21 PROCEDURE — 99214 OFFICE O/P EST MOD 30 MIN: CPT | Performed by: FAMILY MEDICINE

## 2021-06-21 PROCEDURE — G0463 HOSPITAL OUTPT CLINIC VISIT: HCPCS

## 2021-06-21 RX ORDER — GABAPENTIN 300 MG/1
300 CAPSULE ORAL 2 TIMES DAILY
COMMUNITY
Start: 2021-06-15 | End: 2021-08-18

## 2021-06-21 ASSESSMENT — PAIN SCALES - GENERAL: PAINLEVEL: WORST PAIN (10)

## 2021-06-21 NOTE — PROGRESS NOTES
"Nursing Notes:   Tiffany White LPN  6/21/2021 10:13 AM  Signed  Chief Complaint   Patient presents with     RECHECK     back pain   She stated the shots she had for her back pain did not help. She would like to talk about having surgery on her back.  Tiffany White LPN..................6/21/2021   10:01 AM      Initial /88   Pulse 74   Temp 97.4  F (36.3  C) (Temporal)   Resp 18   LMP  (LMP Unknown)   SpO2 96%   Breastfeeding No  Estimated body mass index is 31.08 kg/m  as calculated from the following:    Height as of 8/13/20: 1.66 m (5' 5.35\").    Weight as of 2/19/21: 85.6 kg (188 lb 12.8 oz).  Medication Reconciliation: complete    Tiffany hWite LPN      SUBJECTIVE:  Kate Chacon  is a 75 year old female who comes in today to discuss possible back surgery.  She saw Dr. Carroll on April 14.  She has largely gone through all the conservative therapies.  They discussed doing gabapentin at bedtime.  She was in the process of doing facet injections but was still having some radicular symptoms.  I talked about a transforaminal lumbar interbody fusion but were reluctant because of her age and other medical issues.  She felt that the facet injections helped a little bit as her back pain was not as intense when she did a follow-up last month with neurosurgery.  She was continuing to have bilateral lower extremity pain radiating from her back.  She thinks that the gabapentin is helping some.  She was scheduled for L5-S1 epidural steroid injection a few days after her last appointment which was on May 18.    She only got 2 days of relief with the last shot.     Past Medical, Family, and Social History reviewed and updated as noted below.   ROS is negative except as noted above       Allergies   Allergen Reactions     Methylpar-Na Bisulfite-Pentazocine Unknown     jittery   ,   Family History   Problem Relation Age of Onset     Diabetes Father         Diabetes     Hypertension Father         " Hypertension     Other - See Comments Mother         h/o coronary artery disease/dementia     Asthma Mother         Asthma     Diabetes Maternal Grandmother         Diabetes     Cancer Maternal Aunt         Cancer,Uterine     Breast Cancer No family hx of         Cancer-breast   ,   Current Outpatient Medications   Medication     atorvastatin (LIPITOR) 40 MG tablet     calcium carbonate 750 MG CHEW     gabapentin (NEURONTIN) 300 MG capsule     HYDROcodone-acetaminophen (NORCO) 5-325 MG tablet     ibuprofen (ADVIL/MOTRIN) 200 MG tablet     lisinopril (ZESTRIL) 20 MG tablet     metoprolol succinate ER (TOPROL-XL) 50 MG 24 hr tablet     sertraline (ZOLOFT) 50 MG tablet     warfarin ANTICOAGULANT (COUMADIN) 5 MG tablet     No current facility-administered medications for this visit.    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   ,   Patient Active Problem List    Diagnosis Date Noted     Hemiparesis due to old stroke - right side is weaker 10/19/2020     Priority: Medium     Compression fracture of T10 vertebra with routine healing, subsequent encounter 10/19/2020     Priority: Medium     History of CVA (cerebrovascular accident) 10/19/2020     Priority: Medium     Physical deconditioning 10/19/2020     Priority: Medium     Lumbar radiculopathy 10/19/2020     Priority: Medium     Chondrodermatitis nodularis chronica helicis, right 10/09/2020     Priority: Medium     SA node dysfunction s/p pacer on 11/2/2017 at [facility] 08/13/2020     Priority: Medium     H/O sinus pause 08/13/2020     Priority: Medium     History of tobacco abuse quitting on 10/14/16 08/13/2020     Priority: Medium     Dyspnea on exertion 08/13/2020     Priority: Medium     On Coumadin for atrial fibrillation (H) 08/13/2020     Priority: Medium     Mixed  hyperlipidemia 2020     Priority: Medium     CKD (chronic kidney disease) stage 3, GFR 30-59 ml/min 2020     Priority: Medium     Chronic midline low back pain without sciatica 2020     Priority: Medium     Sinoatrial node dysfunction (H) 2018     Priority: Medium     Cardiac pacemaker 2018     Priority: Medium     Urge incontinence 10/05/2017     Priority: Medium     Former smoker 2017     Priority: Medium     MDD (recurrent major depressive disorder) in remission (H) 2017     Priority: Medium     Anxiety 2017     Priority: Medium     Essential hypertension 2016     Priority: Medium     Paroxysmal atrial fibrillation (H) 2016     Priority: Medium     Anticoagulation goal of INR 2 to 3 10/28/2016     Priority: Medium     History of ischemic stroke on 10/15/2016 secondary to embolism 10/15/2016     Priority: Medium     Diverticulosis of large intestine 2011     Priority: Medium     H/O adenomatous polyp of colon 2011     Priority: Medium     Osteoporosis 2006     Priority: Medium   ,   Past Surgical History:   Procedure Laterality Date     APPENDECTOMY OPEN      No Comments Provided     COLONOSCOPY  2005,Cecal biopsy with tubular adenoma low grade dysplasia.     COLONOSCOPY  2011     COLONOSCOPY  2012    Tubular Adenomas F/U      COLONOSCOPY  2019    F/U N/A as will be over 78 in . Tubular adenoma     ESOPHAGOSCOPY, GASTROSCOPY, DUODENOSCOPY (EGD), COMBINED      05     OTHER SURGICAL HISTORY      8/3/05,[id number],OTHER,helices on the right ear     TONSILLECTOMY      No Comments Provided    and   Social History     Tobacco Use     Smoking status: Former Smoker     Packs/day: 0.50     Years: 49.00     Pack years: 24.50     Types: Cigarettes     Quit date: 10/14/2016     Years since quittin.6     Smokeless tobacco: Never Used   Substance Use Topics     Alcohol use: No      OBJECTIVE:  /88   Pulse 74   Temp 97.4  F (36.3  C) (Temporal)   Resp 18   LMP  (LMP Unknown)   SpO2 96%   Breastfeeding No    EXAM:  Alert cooperative, no distress.  She is here today with her .  She is sitting in the wheelchair and has a hard time walking any distance.  Typically walks with a walker.  We did not repeat her back her neurologic exam today.  ASSESSMENT/Plan :    Kate was seen today for recheck.    Diagnoses and all orders for this visit:    Lumbar radiculopathy    Lumbar facet arthropathy    Cardiac pacemaker    Stage 3a chronic kidney disease    Paroxysmal atrial fibrillation (H)    History of ischemic stroke on 10/15/2016 secondary to embolism    On Coumadin for atrial fibrillation (H)      We reviewed her notes from neurosurgery as well as her interventional radiology notes.  She has basically exhausted conservative measures and is scheduled to meet with Dr. Carroll on June 30 to discuss surgery.  Patient and her  wanted to visit with me about my opinion.  I certainly think that this is a reasonable next step.  We discussed her medical issues and while she would be at high risk because of her previous cardiac issues and history of stroke, she has her medical issues under control and is no longer a smoker so her healing should be more normal.  She will follow through with her appointment and then do a preop for risk assessment.  Support and encouragement offered.    A total of 33 minutes was spent with the patient, reviewing records, tests, ordering medications, tests or procedures and documenting clinical information in the EHR.     Asher Campos MD

## 2021-06-21 NOTE — NURSING NOTE
"Chief Complaint   Patient presents with     RECHECK     back pain   She stated the shots she had for her back pain did not help. She would like to talk about having surgery on her back.  Tiffany White LPN..................6/21/2021   10:01 AM      Initial /88   Pulse 74   Temp 97.4  F (36.3  C) (Temporal)   Resp 18   LMP  (LMP Unknown)   SpO2 96%   Breastfeeding No  Estimated body mass index is 31.08 kg/m  as calculated from the following:    Height as of 8/13/20: 1.66 m (5' 5.35\").    Weight as of 2/19/21: 85.6 kg (188 lb 12.8 oz).  Medication Reconciliation: complete    Tiffany White LPN  "

## 2021-07-01 ENCOUNTER — ANTICOAGULATION THERAPY VISIT (OUTPATIENT)
Dept: ANTICOAGULATION | Facility: OTHER | Age: 76
End: 2021-07-01
Attending: FAMILY MEDICINE
Payer: MEDICARE

## 2021-07-01 ENCOUNTER — TELEPHONE (OUTPATIENT)
Dept: FAMILY MEDICINE | Facility: OTHER | Age: 76
End: 2021-07-01

## 2021-07-01 DIAGNOSIS — Z79.01 ANTICOAGULATION GOAL OF INR 2 TO 3: ICD-10-CM

## 2021-07-01 DIAGNOSIS — I48.0 PAROXYSMAL ATRIAL FIBRILLATION (H): ICD-10-CM

## 2021-07-01 DIAGNOSIS — Z51.81 ANTICOAGULATION GOAL OF INR 2 TO 3: ICD-10-CM

## 2021-07-01 LAB — INR POINT OF CARE: 2 (ref 0.86–1.14)

## 2021-07-01 PROCEDURE — 36416 COLLJ CAPILLARY BLOOD SPEC: CPT | Mod: ZL

## 2021-07-01 NOTE — PROGRESS NOTES
ANTICOAGULATION FOLLOW-UP CLINIC VISIT    Patient Name:  Kate Chacon  Date:  2021  Contact Type:  Face to Face    SUBJECTIVE:  Patient Findings     Positives:  Upcoming invasive procedure (may have surgery on her back currently going through tests), Change in medications (she has been taking quite a few pain pills)        Clinical Outcomes     Negatives:  Major bleeding event, Thromboembolic event, Anticoagulation-related hospital admission, Anticoagulation-related ED visit, Anticoagulation-related fatality           OBJECTIVE    Recent labs: (last 7 days)     21   INR 2.0*       ASSESSMENT / PLAN  INR assessment THER    Recheck INR In: 4 WEEKS    INR Location Clinic      Anticoagulation Summary  As of 2021    INR goal:  2.0-3.0   TTR:  69.7 % (1 y)   INR used for dosin.0 (2021)   Warfarin maintenance plan:  2.5 mg (5 mg x 0.5) every Wed; 5 mg (5 mg x 1) all other days   Full warfarin instructions:  2.5 mg every Wed; 5 mg all other days   Weekly warfarin total:  32.5 mg   No change documented:  Radha Burns RN   Plan last modified:  Radha Burns RN (2020)   Next INR check:  2021   Priority:  Maintenance   Target end date:  Indefinite    Indications    Paroxysmal atrial fibrillation (H) [I48.0]  Anticoagulation goal of INR 2 to 3 [Z51.81  Z79.01]             Anticoagulation Episode Summary     INR check location:      Preferred lab:      Send INR reminders to:  [facility]    Comments:        Anticoagulation Care Providers     Provider Role Specialty Phone number    Asher Campos MD Referring Family Medicine [phone]            See the Encounter Report to view Anticoagulation Flowsheet and Dosing Calendar (Go to Encounters tab in chart review, and find the Anticoagulation Therapy Visit)        Radha Burns RN

## 2021-07-02 NOTE — TELEPHONE ENCOUNTER
"States \"I am getting tired of being pushed around to different doctors\", states in a lot of pain. \"I really want something done.\" \" please relay the message to Dr. Campos that I am really upset because no one is doing anything to help my pain.\" \"I can't hardly even walk.\" \"Why don't they give me something for pain.\"  Scheduled appointment for 7/6 at 2 pm to discuss pain management.    Tori Jackson LPN ...... 7/2/2021 4:20 PM    "

## 2021-07-06 ENCOUNTER — OFFICE VISIT (OUTPATIENT)
Dept: FAMILY MEDICINE | Facility: OTHER | Age: 76
End: 2021-07-06
Attending: FAMILY MEDICINE
Payer: COMMERCIAL

## 2021-07-06 VITALS
SYSTOLIC BLOOD PRESSURE: 126 MMHG | HEART RATE: 75 BPM | RESPIRATION RATE: 20 BRPM | BODY MASS INDEX: 29.57 KG/M2 | HEIGHT: 67 IN | DIASTOLIC BLOOD PRESSURE: 80 MMHG | TEMPERATURE: 96.6 F

## 2021-07-06 DIAGNOSIS — M54.16 LUMBAR RADICULOPATHY: Primary | ICD-10-CM

## 2021-07-06 DIAGNOSIS — M54.50 CHRONIC MIDLINE LOW BACK PAIN WITHOUT SCIATICA: ICD-10-CM

## 2021-07-06 DIAGNOSIS — Z86.73 HISTORY OF ISCHEMIC STROKE: ICD-10-CM

## 2021-07-06 DIAGNOSIS — G89.29 CHRONIC MIDLINE LOW BACK PAIN WITHOUT SCIATICA: ICD-10-CM

## 2021-07-06 DIAGNOSIS — I10 ESSENTIAL HYPERTENSION: ICD-10-CM

## 2021-07-06 DIAGNOSIS — M81.0 OSTEOPOROSIS WITHOUT CURRENT PATHOLOGICAL FRACTURE, UNSPECIFIED OSTEOPOROSIS TYPE: ICD-10-CM

## 2021-07-06 DIAGNOSIS — Z79.01 ON COUMADIN FOR ATRIAL FIBRILLATION (H): ICD-10-CM

## 2021-07-06 DIAGNOSIS — I48.91 ON COUMADIN FOR ATRIAL FIBRILLATION (H): ICD-10-CM

## 2021-07-06 DIAGNOSIS — Z87.891 FORMER SMOKER: ICD-10-CM

## 2021-07-06 DIAGNOSIS — Z95.0 CARDIAC PACEMAKER: ICD-10-CM

## 2021-07-06 DIAGNOSIS — I48.0 PAROXYSMAL ATRIAL FIBRILLATION (H): ICD-10-CM

## 2021-07-06 PROCEDURE — 99213 OFFICE O/P EST LOW 20 MIN: CPT | Performed by: FAMILY MEDICINE

## 2021-07-06 PROCEDURE — G0463 HOSPITAL OUTPT CLINIC VISIT: HCPCS

## 2021-07-06 RX ORDER — HYDROCODONE BITARTRATE AND ACETAMINOPHEN 5; 325 MG/1; MG/1
1-2 TABLET ORAL EVERY 6 HOURS PRN
Qty: 30 TABLET | Refills: 0 | Status: SHIPPED | OUTPATIENT
Start: 2021-07-06 | End: 2021-08-18

## 2021-07-06 ASSESSMENT — PAIN SCALES - GENERAL: PAINLEVEL: WORST PAIN (10)

## 2021-07-06 NOTE — NURSING NOTE
"Chief Complaint   Patient presents with     Back Pain     follow up   Patient reports that she has not heard about the bone specialist and is continuing to have back pain.  FOOD SECURITY SCREENING QUESTIONS  Hunger Vital Signs:  Within the past 12 months we worried whether our food would run out before we got money to buy more. Never  Within the past 12 months the food we bought just didn't last and we didn't have money to get more. Never  Abbey Edmondson LPN 7/6/2021 10:17 AM    Initial /80 (BP Location: Right arm, Patient Position: Sitting, Cuff Size: Adult Large)   Pulse 75   Temp 96.6  F (35.9  C) (Temporal)   Resp 20   Ht 1.702 m (5' 7\")   LMP  (LMP Unknown)   BMI 29.57 kg/m   Estimated body mass index is 29.57 kg/m  as calculated from the following:    Height as of this encounter: 1.702 m (5' 7\").    Weight as of 2/19/21: 85.6 kg (188 lb 12.8 oz).  Medication Reconciliation: complete    Abbey Edmondson LPN  "

## 2021-07-06 NOTE — PROGRESS NOTES
"Nursing Notes:   Abbey Edmondson LPN  7/6/2021 10:26 AM  Signed  Chief Complaint   Patient presents with     Back Pain     follow up   Patient reports that she has not heard about the bone specialist and is continuing to have back pain.  FOOD SECURITY SCREENING QUESTIONS  Hunger Vital Signs:  Within the past 12 months we worried whether our food would run out before we got money to buy more. Never  Within the past 12 months the food we bought just didn't last and we didn't have money to get more. Never  Abbey Edmondson LPN 7/6/2021 10:17 AM    Initial /80 (BP Location: Right arm, Patient Position: Sitting, Cuff Size: Adult Large)   Pulse 75   Temp 96.6  F (35.9  C) (Temporal)   Resp 20   Ht 1.702 m (5' 7\")   LMP  (LMP Unknown)   BMI 29.57 kg/m   Estimated body mass index is 29.57 kg/m  as calculated from the following:    Height as of this encounter: 1.702 m (5' 7\").    Weight as of 2/19/21: 85.6 kg (188 lb 12.8 oz).  Medication Reconciliation: complete    Abbey Edmondson LPN      SUBJECTIVE:  Kate Chacon  is a 76 year old female who comes in today for follow-up on her back.  I saw her on June 21.  She has gone through all the conservative options for her back. She had an appointment with  on June 30 to discuss surgery.  I have not seen that note and it is not available in care everywhere.  We discussed her risk because of previous cardiac issues and her history of stroke but felt that her medical issues were under control and that she really did not have much else for options.  She continues on gabapentin 300 mg twice daily.  She has not been taking any stronger pain medications.  She did get some hydrocodone back in February.  Would like something to just take the edge off.  She has been taking 600 mg of gabapentin twice daily which makes her drowsy and does not help much with her back pain.    Apparently, the surgeon wanted to know more about her bone density and ordered a DEXA " scan.  They have not heard anything about that.  They are a bit frustrated that things have been so slow in developing.    Past Medical, Family, and Social History reviewed and updated as noted below.   ROS is negative except as noted above       Allergies   Allergen Reactions     Methylpar-Na Bisulfite-Pentazocine Unknown     jittery   ,   Family History   Problem Relation Age of Onset     Diabetes Father         Diabetes     Hypertension Father         Hypertension     Other - See Comments Mother         h/o coronary artery disease/dementia     Asthma Mother         Asthma     Diabetes Maternal Grandmother         Diabetes     Cancer Maternal Aunt         Cancer,Uterine     Breast Cancer No family hx of         Cancer-breast   ,   Current Outpatient Medications   Medication     atorvastatin (LIPITOR) 40 MG tablet     calcium carbonate 750 MG CHEW     gabapentin (NEURONTIN) 300 MG capsule     HYDROcodone-acetaminophen (NORCO) 5-325 MG tablet     ibuprofen (ADVIL/MOTRIN) 200 MG tablet     lisinopril (ZESTRIL) 20 MG tablet     metoprolol succinate ER (TOPROL-XL) 50 MG 24 hr tablet     sertraline (ZOLOFT) 50 MG tablet     warfarin ANTICOAGULANT (COUMADIN) 5 MG tablet     No current facility-administered medications for this visit.    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   ,   Patient Active Problem List    Diagnosis Date Noted     Hemiparesis due to old stroke - right side is weaker 10/19/2020     Priority: Medium     Compression fracture of T10 vertebra with routine healing, subsequent encounter 10/19/2020     Priority: Medium     History of CVA (cerebrovascular accident) 10/19/2020     Priority: Medium     Physical deconditioning 10/19/2020     Priority: Medium     Lumbar radiculopathy 10/19/2020     Priority: Medium      Chondrodermatitis nodularis chronica helicis, right 10/09/2020     Priority: Medium     SA node dysfunction s/p pacer on 11/2/2017 at [facility] 08/13/2020     Priority: Medium     H/O sinus pause 08/13/2020     Priority: Medium     History of tobacco abuse quitting on 10/14/16 08/13/2020     Priority: Medium     Dyspnea on exertion 08/13/2020     Priority: Medium     On Coumadin for atrial fibrillation (H) 08/13/2020     Priority: Medium     Mixed hyperlipidemia 08/13/2020     Priority: Medium     CKD (chronic kidney disease) stage 3, GFR 30-59 ml/min 08/13/2020     Priority: Medium     Chronic midline low back pain without sciatica 08/13/2020     Priority: Medium     Sinoatrial node dysfunction (H) 05/01/2018     Priority: Medium     Cardiac pacemaker 02/19/2018     Priority: Medium     Urge incontinence 10/05/2017     Priority: Medium     Former smoker 08/30/2017     Priority: Medium     MDD (recurrent major depressive disorder) in remission (H) 08/30/2017     Priority: Medium     Anxiety 01/05/2017     Priority: Medium     Essential hypertension 12/08/2016     Priority: Medium     Paroxysmal atrial fibrillation (H) 11/02/2016     Priority: Medium     Anticoagulation goal of INR 2 to 3 10/28/2016     Priority: Medium     History of ischemic stroke on 10/15/2016 secondary to embolism 10/15/2016     Priority: Medium     Diverticulosis of large intestine 04/04/2011     Priority: Medium     H/O adenomatous polyp of colon 03/02/2011     Priority: Medium     Osteoporosis 02/08/2006     Priority: Medium   ,   Past Surgical History:   Procedure Laterality Date     APPENDECTOMY OPEN      No Comments Provided     COLONOSCOPY  08/19/2005 8/19/05,Cecal biopsy with tubular adenoma low grade dysplasia.     COLONOSCOPY  04/04/2011 4/4/11     COLONOSCOPY  05/07/2012    Tubular Adenomas F/U 2017     COLONOSCOPY  12/02/2019    F/U N/A as will be over 78 in 2024. Tubular adenoma     ESOPHAGOSCOPY, GASTROSCOPY, DUODENOSCOPY  "(EGD), COMBINED      05     OTHER SURGICAL HISTORY      8/3/05,[id number],OTHER,helices on the right ear     TONSILLECTOMY      No Comments Provided    and   Social History     Tobacco Use     Smoking status: Former Smoker     Packs/day: 0.50     Years: 49.00     Pack years: 24.50     Types: Cigarettes     Quit date: 10/14/2016     Years since quittin.7     Smokeless tobacco: Never Used   Substance Use Topics     Alcohol use: No     OBJECTIVE:  /80 (BP Location: Right arm, Patient Position: Sitting, Cuff Size: Adult Large)   Pulse 75   Temp 96.6  F (35.9  C) (Temporal)   Resp 20   Ht 1.702 m (5' 7\")   LMP  (LMP Unknown)   BMI 29.57 kg/m     EXAM:  Alert and cooperative, no distress but sitting in a wheelchair complaining of her back pain.  We did not repeat an exam today.  ASSESSMENT/Plan :    Kate was seen today for back pain.    Diagnoses and all orders for this visit:    Lumbar radiculopathy  -     HYDROcodone-acetaminophen (NORCO) 5-325 MG tablet; Take 1-2 tablets by mouth every 6 hours as needed for pain    Chronic midline low back pain without sciatica    History of ischemic stroke on 10/15/2016 secondary to embolism    Former smoker    Paroxysmal atrial fibrillation (H)    On Coumadin for atrial fibrillation (H)    Cardiac pacemaker    Essential hypertension    Osteoporosis without current pathological fracture, unspecified osteoporosis type  -     DX Hip/Pelvis/Spine; Future      I ordered a DEXA scan here to try and expedite it.  I will try and see if we can get a hold of 's office to find out some more information for the patient as I cannot see his note in care everywhere.    PDMP Review       Value Time User    State PDMP site checked  Yes 2021 10:09 AM Asher Campos MD         Prescription sent for hydrocodone No. 30.  Continue with gabapentin.    A total of 25 minutes was spent with the patient, reviewing records, tests, ordering medications, tests or procedures and " documenting clinical information in the EHR.    Asher Campos MD

## 2021-07-08 ENCOUNTER — HOSPITAL ENCOUNTER (OUTPATIENT)
Dept: BONE DENSITY | Facility: OTHER | Age: 76
Discharge: HOME | End: 2021-07-08
Attending: FAMILY MEDICINE | Admitting: FAMILY MEDICINE
Payer: MEDICARE

## 2021-07-08 DIAGNOSIS — M81.0 OSTEOPOROSIS WITHOUT CURRENT PATHOLOGICAL FRACTURE, UNSPECIFIED OSTEOPOROSIS TYPE: ICD-10-CM

## 2021-07-08 PROCEDURE — 77080 DXA BONE DENSITY AXIAL: CPT

## 2021-07-22 ENCOUNTER — TELEPHONE (OUTPATIENT)
Dept: FAMILY MEDICINE | Facility: OTHER | Age: 76
End: 2021-07-22

## 2021-07-23 NOTE — TELEPHONE ENCOUNTER
Left message with patient to call [facility] as a request for x rays to be sent to [facility] office.  Yolanda Jo LPN ....................  7/23/2021   7:49 AM

## 2021-07-23 NOTE — TELEPHONE ENCOUNTER
Left message that patient will need to call [facility] to have xrays released to [facility] on detailed voice message system.  Yolanda Jo LPN ....................  7/23/2021   7:47 AM

## 2021-07-28 ENCOUNTER — TELEPHONE (OUTPATIENT)
Dept: FAMILY MEDICINE | Facility: OTHER | Age: 76
End: 2021-07-28

## 2021-07-28 NOTE — TELEPHONE ENCOUNTER
Patient did call down there and they can't do surg on her back as has osteo and they said to get in with her main doctor to make a plan going forward so needs to get in to an appt please call.  Yamileth Gandhi

## 2021-07-28 NOTE — TELEPHONE ENCOUNTER
Patient is in a lot of pain with her legs. She does know that Dr. Barnard office is suppose to be setting up surgery. She hasn't heard anything from them regarding this and is wondering what she should do next? She was going to call down there after we got off the phone but for the amount of pain she is in she is wondering is there is something she could do in the meantime.

## 2021-07-28 NOTE — TELEPHONE ENCOUNTER
Even though she would prefer to not go to the [facility], I would recommend we seek another appointment either at [facility] or [facility] in [place].  Asher Campos MD on 7/28/2021 at 5:05 PM

## 2021-07-30 ENCOUNTER — TELEPHONE (OUTPATIENT)
Dept: FAMILY MEDICINE | Facility: OTHER | Age: 76
End: 2021-07-30

## 2021-07-30 DIAGNOSIS — M48.062 SPINAL STENOSIS OF LUMBAR REGION WITH NEUROGENIC CLAUDICATION: ICD-10-CM

## 2021-07-30 DIAGNOSIS — M54.16 LUMBAR RADICULOPATHY: Primary | ICD-10-CM

## 2021-07-30 NOTE — TELEPHONE ENCOUNTER
[facility]-patient is wondering what [facility] is going to do for her for pain until she can have surgery     Please call and advise    Thank You    Tyra Lopez on 7/30/2021 at 1:39 PM

## 2021-07-30 NOTE — TELEPHONE ENCOUNTER
Patient states pain is in her right side of her back, right leg, and a little in her left leg. States was told she needs surgery but is not able to until her osteoporosis is controled. Patient is wondering if she can get something for pain and for osteoporosis. Said could be at least 4 months and can not wait that long with the pain.    Aware [facility] is not in today. Ok to wait to see if she can get something. Does have hydrocodone but only a few left.   Notified of other message from previous encounter. Patient states she is able to go anyway if [facility] recommends.     Yee Villagomez LPN .............7/30/2021     2:48 PM

## 2021-08-03 NOTE — TELEPHONE ENCOUNTER
After verifying patient's name and date of birth, patient was given the below information. She voiced understanding, transferred patient to scheduling.    Brooke Smith LPN....8/3/2021 10:47 AM

## 2021-08-03 NOTE — TELEPHONE ENCOUNTER
She will need at least a phone visit for refill of the hydrocodone per policy.  I'll send referral to [facility] for her.  Asher Campos MD on 8/3/2021 at 8:22 AM

## 2021-08-04 ENCOUNTER — TELEPHONE (OUTPATIENT)
Dept: FAMILY MEDICINE | Facility: OTHER | Age: 76
End: 2021-08-04

## 2021-08-04 DIAGNOSIS — M54.16 LUMBAR RADICULOPATHY: ICD-10-CM

## 2021-08-04 DIAGNOSIS — E78.5 HYPERLIPIDEMIA, UNSPECIFIED HYPERLIPIDEMIA TYPE: ICD-10-CM

## 2021-08-04 DIAGNOSIS — I49.5 SINOATRIAL NODE DYSFUNCTION (H): ICD-10-CM

## 2021-08-04 DIAGNOSIS — Z95.0 CARDIAC PACEMAKER: ICD-10-CM

## 2021-08-04 DIAGNOSIS — I48.0 PAROXYSMAL ATRIAL FIBRILLATION (H): ICD-10-CM

## 2021-08-04 DIAGNOSIS — I10 ESSENTIAL HYPERTENSION: ICD-10-CM

## 2021-08-04 RX ORDER — METOPROLOL SUCCINATE 50 MG/1
50 TABLET, EXTENDED RELEASE ORAL DAILY
Qty: 90 TABLET | Refills: 3 | Status: SHIPPED | OUTPATIENT
Start: 2021-08-04 | End: 2022-06-28

## 2021-08-04 RX ORDER — ATORVASTATIN CALCIUM 40 MG/1
40 TABLET, FILM COATED ORAL AT BEDTIME
Qty: 90 TABLET | Refills: 3 | Status: SHIPPED | OUTPATIENT
Start: 2021-08-04 | End: 2022-07-20

## 2021-08-04 NOTE — TELEPHONE ENCOUNTER
"Per telephone encounter, dated 7/30/21:    She will need at least a phone visit for refill of the hydrocodone per policy.  I'll send referral to [facility] for her.  Asher Campos MD on 8/3/2021 at 8:22 AM        Called and spoke to Patient after verifying last name and date of birth. Pt reminded of above information, and she refuses to make appointment. She states, \"It (Hydrocodone) doesn't really do anything anyway, so I think I will forgo it, if I have to make and appointment.\"    Pt also confirmed that she was looking for a refill of Levothyroxine (Euthyrox). She receieved it last from [facility] pharmacy, \"in a box.\" She reports that she has \"been getting it a long time.\" Unable to find anywhere in chart.    Routing to Dr. Campos for review. Rut Wolfe RN .............. 8/4/2021  3:47 PM    "

## 2021-08-04 NOTE — TELEPHONE ENCOUNTER
Several encounter were opened. Patient has been notified, see additional encounters.   Yee Villagomez LPN .............8/4/2021     2:45 PM

## 2021-08-04 NOTE — TELEPHONE ENCOUNTER
I don't see that she has ever had thyroid medication.  Looks like metoprolol and Lipitor are due for refill, so will send those.  Asher Campos MD on 8/4/2021 at 6:04 PM

## 2021-08-04 NOTE — TELEPHONE ENCOUNTER
Please call the patient.  She needs an RX refilled.  RX Lebothyroxin.  And  RX Hydrocode.      Georgia Hodge on 8/4/2021 at 3:29 PM

## 2021-08-05 RX ORDER — ATORVASTATIN CALCIUM 40 MG/1
TABLET, FILM COATED ORAL
Qty: 90 TABLET | Refills: 0 | OUTPATIENT
Start: 2021-08-05

## 2021-08-05 NOTE — TELEPHONE ENCOUNTER
Disp Refills Start End YOGESH   atorvastatin (LIPITOR) 40 MG tablet 90 tablet 3 8/4/2021  No   Sig - Route: Take 1 tablet (40 mg) by mouth At Bedtime - Oral     Prescription refused.  This is a duplicate request.  Lillian Dodd RN.......8/5/2021 3:20 PM

## 2021-08-05 NOTE — TELEPHONE ENCOUNTER
After proper verification, patient was relayed message from below. Patient still denying a visit to refill Hydrocodone.  Linda Ocampo LPN on 8/5/2021 at 9:53 AM

## 2021-08-10 ENCOUNTER — ANTICOAGULATION THERAPY VISIT (OUTPATIENT)
Dept: ANTICOAGULATION | Facility: OTHER | Age: 76
End: 2021-08-10
Attending: FAMILY MEDICINE
Payer: MEDICARE

## 2021-08-10 DIAGNOSIS — Z51.81 ANTICOAGULATION GOAL OF INR 2 TO 3: ICD-10-CM

## 2021-08-10 DIAGNOSIS — I48.0 PAROXYSMAL ATRIAL FIBRILLATION (H): Primary | ICD-10-CM

## 2021-08-10 DIAGNOSIS — Z79.01 ANTICOAGULATION GOAL OF INR 2 TO 3: ICD-10-CM

## 2021-08-10 LAB — INR POINT OF CARE: 2.5 (ref 0.9–1.1)

## 2021-08-10 PROCEDURE — 36416 COLLJ CAPILLARY BLOOD SPEC: CPT | Mod: ZL

## 2021-08-18 ENCOUNTER — VIRTUAL VISIT (OUTPATIENT)
Dept: FAMILY MEDICINE | Facility: OTHER | Age: 76
End: 2021-08-18
Attending: FAMILY MEDICINE
Payer: COMMERCIAL

## 2021-08-18 VITALS — WEIGHT: 180 LBS | BODY MASS INDEX: 28.19 KG/M2

## 2021-08-18 DIAGNOSIS — M48.062 SPINAL STENOSIS OF LUMBAR REGION WITH NEUROGENIC CLAUDICATION: ICD-10-CM

## 2021-08-18 DIAGNOSIS — M54.16 LUMBAR RADICULOPATHY: Primary | ICD-10-CM

## 2021-08-18 PROCEDURE — 99212 OFFICE O/P EST SF 10 MIN: CPT | Mod: 95 | Performed by: FAMILY MEDICINE

## 2021-08-18 RX ORDER — PREGABALIN 75 MG/1
75 CAPSULE ORAL 2 TIMES DAILY
Qty: 60 CAPSULE | Refills: 5 | Status: SHIPPED | OUTPATIENT
Start: 2021-08-18 | End: 2022-07-27

## 2021-08-18 RX ORDER — HYDROCODONE BITARTRATE AND ACETAMINOPHEN 7.5; 325 MG/1; MG/1
1-2 TABLET ORAL EVERY 6 HOURS PRN
Qty: 30 TABLET | Refills: 0 | Status: SHIPPED | OUTPATIENT
Start: 2021-08-18 | End: 2022-03-22

## 2021-08-18 ASSESSMENT — PATIENT HEALTH QUESTIONNAIRE - PHQ9: 5. POOR APPETITE OR OVEREATING: NOT AT ALL

## 2021-08-18 ASSESSMENT — ANXIETY QUESTIONNAIRES
1. FEELING NERVOUS, ANXIOUS, OR ON EDGE: NOT AT ALL
2. NOT BEING ABLE TO STOP OR CONTROL WORRYING: NOT AT ALL
7. FEELING AFRAID AS IF SOMETHING AWFUL MIGHT HAPPEN: NOT AT ALL
5. BEING SO RESTLESS THAT IT IS HARD TO SIT STILL: NOT AT ALL
3. WORRYING TOO MUCH ABOUT DIFFERENT THINGS: NOT AT ALL
6. BECOMING EASILY ANNOYED OR IRRITABLE: NOT AT ALL
IF YOU CHECKED OFF ANY PROBLEMS ON THIS QUESTIONNAIRE, HOW DIFFICULT HAVE THESE PROBLEMS MADE IT FOR YOU TO DO YOUR WORK, TAKE CARE OF THINGS AT HOME, OR GET ALONG WITH OTHER PEOPLE: NOT DIFFICULT AT ALL
GAD7 TOTAL SCORE: 0

## 2021-08-18 ASSESSMENT — PAIN SCALES - GENERAL: PAINLEVEL: WORST PAIN (10)

## 2021-08-18 NOTE — NURSING NOTE
"Chief Complaint   Patient presents with     Recheck Medication       Initial Wt 81.6 kg (180 lb)   LMP  (LMP Unknown)   Breastfeeding No   BMI 28.19 kg/m   Estimated body mass index is 28.19 kg/m  as calculated from the following:    Height as of 7/6/21: 1.702 m (5' 7\").    Weight as of this encounter: 81.6 kg (180 lb).  Medication Reconciliation: complete    FOOD SECURITY SCREENING QUESTIONS  Hunger Vital Signs:  Within the past 12 months we worried whether our food would run out before we got money to buy more. Never  Within the past 12 months the food we bought just didn't last and we didn't have money to get more. Never    Tiffany White LPN  "

## 2021-08-18 NOTE — PROGRESS NOTES
Kate is a 76 year old who is being evaluated via a billable telephone visit.      What phone number would you like to be contacted at? [phone]  How would you like to obtain your AVS? Mail a copy      Subjective   Kate is a 76 year old who presents for telephone visit for renewal of medication.    HPI     She has ongoing trouble with her back.  She had evaluation with neurosurgery but because of her osteoporosis, they are reluctant to move ahead.  He sent a referral to [facility] for her. She has an appointment Monday.     She has been using some hydrocodone and got a prescription for 30 tablets nearly 2 months ago.  She tried gabapentin, but has not taken it for a couple months now.   PDMP Review       Value Time User    State PDMP site checked  Yes 8/18/2021  4:48 PM Asher Campos MD         Review of Systems   ROS is negative except as noted above           Objective    Vitals - Patient Reported  Pain Score: Worst Pain (10)  Pain Loc: Low Back        Physical Exam   healthy, alert and no distress  PSYCH: Alert and oriented times 3; coherent speech, normal   rate and volume, able to articulate logical thoughts, able   to abstract reason, no tangential thoughts, no hallucinations   or delusions  Her affect is normal  RESP: No cough, no audible wheezing, able to talk in full sentences  Remainder of exam unable to be completed due to telephone visits        Kate was seen today for recheck medication.    Diagnoses and all orders for this visit:    Lumbar radiculopathy  -     pregabalin (LYRICA) 75 MG capsule; Take 1 capsule (75 mg) by mouth 2 times daily  -     HYDROcodone-acetaminophen (NORCO) 7.5-325 MG per tablet; Take 1-2 tablets by mouth every 6 hours as needed for severe pain    Spinal stenosis of lumbar region with neurogenic claudication  -     pregabalin (LYRICA) 75 MG capsule; Take 1 capsule (75 mg) by mouth 2 times daily  -     HYDROcodone-acetaminophen (NORCO) 7.5-325 MG per  tablet; Take 1-2 tablets by mouth every 6 hours as needed for severe pain             Phone call duration: 11 minutes    Asher Campos MD

## 2021-08-19 ASSESSMENT — ANXIETY QUESTIONNAIRES: GAD7 TOTAL SCORE: 0

## 2021-08-31 DIAGNOSIS — I10 ESSENTIAL HYPERTENSION: ICD-10-CM

## 2021-09-01 RX ORDER — LISINOPRIL 20 MG/1
20 TABLET ORAL DAILY
Qty: 90 TABLET | Refills: 3 | OUTPATIENT
Start: 2021-09-01

## 2021-09-01 NOTE — TELEPHONE ENCOUNTER
Duplicate request. Unable to complete prescription refill per RN Medication Refill Policy. Rut Wolfe RN .............. 9/1/2021  8:23 AM

## 2021-09-07 ENCOUNTER — ANCILLARY PROCEDURE (OUTPATIENT)
Dept: CARDIOLOGY | Facility: CLINIC | Age: 76
End: 2021-09-07
Attending: INTERNAL MEDICINE
Payer: MEDICARE

## 2021-09-07 DIAGNOSIS — I48.20 CHRONIC A-FIB (H): ICD-10-CM

## 2021-09-07 PROCEDURE — 93294 REM INTERROG EVL PM/LDLS PM: CPT | Performed by: INTERNAL MEDICINE

## 2021-09-07 PROCEDURE — 93296 REM INTERROG EVL PM/IDS: CPT

## 2021-09-21 ENCOUNTER — ANTICOAGULATION THERAPY VISIT (OUTPATIENT)
Dept: ANTICOAGULATION | Facility: OTHER | Age: 76
End: 2021-09-21
Attending: FAMILY MEDICINE
Payer: MEDICARE

## 2021-09-21 DIAGNOSIS — I48.0 PAROXYSMAL ATRIAL FIBRILLATION (H): Primary | ICD-10-CM

## 2021-09-21 DIAGNOSIS — Z79.01 ANTICOAGULATION GOAL OF INR 2 TO 3: ICD-10-CM

## 2021-09-21 DIAGNOSIS — Z51.81 ANTICOAGULATION GOAL OF INR 2 TO 3: ICD-10-CM

## 2021-09-21 LAB — INR POINT OF CARE: 2.1 (ref 0.9–1.1)

## 2021-09-21 PROCEDURE — 36416 COLLJ CAPILLARY BLOOD SPEC: CPT | Mod: ZL

## 2021-09-21 NOTE — PROGRESS NOTES
ANTICOAGULATION FOLLOW-UP CLINIC VISIT    Patient Name:  Kate Chacon  Date:  2021  Contact Type:  Face to Face    SUBJECTIVE:  Patient Findings         Clinical Outcomes     Negatives:  Major bleeding event, Thromboembolic event, Anticoagulation-related hospital admission, Anticoagulation-related ED visit, Anticoagulation-related fatality           OBJECTIVE    Recent labs: (last 7 days)     21  1033   INR 2.1*       ASSESSMENT / PLAN  INR assessment THER    Recheck INR In: 6 WEEKS    INR Location Clinic      Anticoagulation Summary  As of 2021    INR goal:  2.0-3.0   TTR:  76.1 % (1 y)   INR used for dosin.1 (2021)   Warfarin maintenance plan:  2.5 mg (5 mg x 0.5) every Wed; 5 mg (5 mg x 1) all other days   Full warfarin instructions:  2.5 mg every Wed; 5 mg all other days   Weekly warfarin total:  32.5 mg   No change documented:  Radha Burns RN   Plan last modified:  Radha Burns RN (2020)   Next INR check:  2021   Priority:  Maintenance   Target end date:  Indefinite    Indications    Paroxysmal atrial fibrillation (H) [I48.0]  Anticoagulation goal of INR 2 to 3 [Z51.81  Z79.01]             Anticoagulation Episode Summary     INR check location:      Preferred lab:      Send INR reminders to:  [facility]    Comments:        Anticoagulation Care Providers     Provider Role Specialty Phone number    Asher Campos MD [facility] [phone]            See the Encounter Report to view Anticoagulation Flowsheet and Dosing Calendar (Go to Encounters tab in chart review, and find the Anticoagulation Therapy Visit)        Radha Burns RN

## 2021-09-27 LAB
MDC_IDC_EPISODE_DTM: NORMAL
MDC_IDC_EPISODE_DURATION: 17 S
MDC_IDC_EPISODE_DURATION: 23 S
MDC_IDC_EPISODE_ID: NORMAL
MDC_IDC_EPISODE_TYPE: NORMAL
MDC_IDC_LEAD_IMPLANT_DT: NORMAL
MDC_IDC_LEAD_IMPLANT_DT: NORMAL
MDC_IDC_LEAD_LOCATION: NORMAL
MDC_IDC_LEAD_LOCATION: NORMAL
MDC_IDC_LEAD_LOCATION_DETAIL_1: NORMAL
MDC_IDC_LEAD_LOCATION_DETAIL_1: NORMAL
MDC_IDC_LEAD_MFG: NORMAL
MDC_IDC_LEAD_MFG: NORMAL
MDC_IDC_LEAD_MODEL: NORMAL
MDC_IDC_LEAD_MODEL: NORMAL
MDC_IDC_LEAD_POLARITY_TYPE: NORMAL
MDC_IDC_LEAD_POLARITY_TYPE: NORMAL
MDC_IDC_LEAD_SERIAL: NORMAL
MDC_IDC_LEAD_SERIAL: NORMAL
MDC_IDC_MSMT_BATTERY_DTM: NORMAL
MDC_IDC_MSMT_BATTERY_REMAINING_LONGEVITY: 66 MO
MDC_IDC_MSMT_BATTERY_REMAINING_PERCENTAGE: 100 %
MDC_IDC_MSMT_BATTERY_STATUS: NORMAL
MDC_IDC_MSMT_LEADCHNL_RA_IMPEDANCE_VALUE: 789 OHM
MDC_IDC_MSMT_LEADCHNL_RV_IMPEDANCE_VALUE: 711 OHM
MDC_IDC_MSMT_LEADCHNL_RV_PACING_THRESHOLD_AMPLITUDE: 2.2 V
MDC_IDC_MSMT_LEADCHNL_RV_PACING_THRESHOLD_PULSEWIDTH: 0.4 MS
MDC_IDC_PG_IMPLANT_DTM: NORMAL
MDC_IDC_PG_MFG: NORMAL
MDC_IDC_PG_MODEL: NORMAL
MDC_IDC_PG_SERIAL: NORMAL
MDC_IDC_PG_TYPE: NORMAL
MDC_IDC_SESS_CLINIC_NAME: NORMAL
MDC_IDC_SESS_DTM: NORMAL
MDC_IDC_SESS_TYPE: NORMAL
MDC_IDC_SET_BRADY_AT_MODE_SWITCH_RATE: 170 {BEATS}/MIN
MDC_IDC_SET_BRADY_LOWRATE: 60 {BEATS}/MIN
MDC_IDC_SET_BRADY_MAX_SENSOR_RATE: 130 {BEATS}/MIN
MDC_IDC_SET_BRADY_MODE: NORMAL
MDC_IDC_SET_LEADCHNL_RA_SENSING_ADAPTATION_MODE: NORMAL
MDC_IDC_SET_LEADCHNL_RA_SENSING_SENSITIVITY: 0.25 MV
MDC_IDC_SET_LEADCHNL_RV_PACING_AMPLITUDE: 3.5 V
MDC_IDC_SET_LEADCHNL_RV_PACING_CAPTURE_MODE: NORMAL
MDC_IDC_SET_LEADCHNL_RV_PACING_POLARITY: NORMAL
MDC_IDC_SET_LEADCHNL_RV_PACING_PULSEWIDTH: 0.4 MS
MDC_IDC_SET_LEADCHNL_RV_SENSING_ADAPTATION_MODE: NORMAL
MDC_IDC_SET_LEADCHNL_RV_SENSING_POLARITY: NORMAL
MDC_IDC_SET_LEADCHNL_RV_SENSING_SENSITIVITY: 1.5 MV
MDC_IDC_SET_ZONE_DETECTION_INTERVAL: 375 MS
MDC_IDC_SET_ZONE_TYPE: NORMAL
MDC_IDC_SET_ZONE_VENDOR_TYPE: NORMAL
MDC_IDC_STAT_BRADY_DTM_END: NORMAL
MDC_IDC_STAT_BRADY_DTM_START: NORMAL
MDC_IDC_STAT_BRADY_RA_PERCENT_PACED: 0 %
MDC_IDC_STAT_BRADY_RV_PERCENT_PACED: 21 %
MDC_IDC_STAT_EPISODE_RECENT_COUNT: 0
MDC_IDC_STAT_EPISODE_RECENT_COUNT: 2
MDC_IDC_STAT_EPISODE_RECENT_COUNT_DTM_END: NORMAL
MDC_IDC_STAT_EPISODE_RECENT_COUNT_DTM_START: NORMAL
MDC_IDC_STAT_EPISODE_TYPE: NORMAL
MDC_IDC_STAT_EPISODE_VENDOR_TYPE: NORMAL

## 2021-11-02 ENCOUNTER — ANTICOAGULATION THERAPY VISIT (OUTPATIENT)
Dept: ANTICOAGULATION | Facility: OTHER | Age: 76
End: 2021-11-02
Attending: FAMILY MEDICINE
Payer: MEDICARE

## 2021-11-02 DIAGNOSIS — Z79.01 ANTICOAGULATION GOAL OF INR 2 TO 3: ICD-10-CM

## 2021-11-02 DIAGNOSIS — I48.0 PAROXYSMAL ATRIAL FIBRILLATION (H): Primary | ICD-10-CM

## 2021-11-02 DIAGNOSIS — Z51.81 ANTICOAGULATION GOAL OF INR 2 TO 3: ICD-10-CM

## 2021-11-02 LAB — INR POINT OF CARE: 2.7 (ref 0.9–1.1)

## 2021-11-02 PROCEDURE — 36416 COLLJ CAPILLARY BLOOD SPEC: CPT | Mod: ZL

## 2021-11-02 NOTE — PROGRESS NOTES
ANTICOAGULATION FOLLOW-UP CLINIC VISIT    Patient Name:  Kate Chacon  Date:  2021  Contact Type:  Face to Face    SUBJECTIVE:  Patient Findings     Positives:  Other complaints    Comments:  Will be having thyroid sometime but unsure of the date        Clinical Outcomes     Negatives:  Major bleeding event, Thromboembolic event, Anticoagulation-related hospital admission, Anticoagulation-related ED visit, Anticoagulation-related fatality    Comments:  Will be having thyroid sometime but unsure of the date           OBJECTIVE    Recent labs: (last 7 days)     21  1344   INR 2.7*       ASSESSMENT / PLAN  INR assessment THER    Recheck INR In: 6 WEEKS    INR Location Clinic      Anticoagulation Summary  As of 2021    INR goal:  2.0-3.0   TTR:  78.1 % (1 y)   INR used for dosin.7 (2021)   Warfarin maintenance plan:  2.5 mg (5 mg x 0.5) every Wed; 5 mg (5 mg x 1) all other days   Full warfarin instructions:  2.5 mg every Wed; 5 mg all other days   Weekly warfarin total:  32.5 mg   No change documented:  Radha Bruns RN   Plan last modified:  Radha Burns RN (2020)   Next INR check:  2021   Priority:  Maintenance   Target end date:  Indefinite    Indications    Paroxysmal atrial fibrillation (H) [I48.0]  Anticoagulation goal of INR 2 to 3 [Z51.81  Z79.01]             Anticoagulation Episode Summary     INR check location:      Preferred lab:      Send INR reminders to:  [facility]    Comments:        Anticoagulation Care Providers     Provider Role Specialty Phone number    Asher Campos MD [facility] [phone]            See the Encounter Report to view Anticoagulation Flowsheet and Dosing Calendar (Go to Encounters tab in chart review, and find the Anticoagulation Therapy Visit)        Radha Burns RN

## 2021-11-20 DIAGNOSIS — Z79.01 ANTICOAGULATION GOAL OF INR 2 TO 3: ICD-10-CM

## 2021-11-20 DIAGNOSIS — Z51.81 ANTICOAGULATION GOAL OF INR 2 TO 3: ICD-10-CM

## 2021-11-22 RX ORDER — WARFARIN SODIUM 5 MG/1
TABLET ORAL
Qty: 90 TABLET | Refills: 1 | Status: SHIPPED | OUTPATIENT
Start: 2021-11-22 | End: 2022-05-17

## 2021-11-22 NOTE — TELEPHONE ENCOUNTER
"Requested Prescriptions   Pending Prescriptions Disp Refills     warfarin ANTICOAGULANT (COUMADIN) 5 MG tablet [Pharmacy Med Name: Warfarin Sodium 5 MG Oral Tablet] 90 tablet 0     Sig: TAKE 1 TABLET (5MG) BY MOUTH ONCE DAILY FOR 6 DAYS PER WEEK AND ONE-HALF TAB (2.5MG) FOR 1 DAY PER WEEK OR AS DIRECTED BY [facility]       Vitamin K Antagonists Failed - 11/20/2021  1:44 PM        Failed - INR is within goal in the past 6 weeks     Confirm INR is within goal in the past 6 weeks.     Recent Labs   Lab Test 11/02/21  1344   INR 2.7*                       Passed - Recent (12 mo) or future (30 days) visit within the authorizing provider's specialty     Patient has had an office visit with the authorizing provider or a provider within the authorizing providers department within the previous 12 mos or has a future within next 30 days. See \"Patient Info\" tab in inbasket, or \"Choose Columns\" in Meds & Orders section of the refill encounter.              Passed - Medication is active on med list        Passed - Patient is 18 years of age or older        Passed - Patient is not pregnant        Passed - No positive pregnancy on file in past 12 months           Prescription refilled per RN MedicationRefill Policy.................... Radha Burns RN ....................  11/22/2021   11:12 AM      "

## 2021-11-30 ENCOUNTER — ANTICOAGULATION THERAPY VISIT (OUTPATIENT)
Dept: ANTICOAGULATION | Facility: OTHER | Age: 76
End: 2021-11-30
Attending: FAMILY MEDICINE
Payer: MEDICARE

## 2021-11-30 DIAGNOSIS — Z79.01 ANTICOAGULATION GOAL OF INR 2 TO 3: ICD-10-CM

## 2021-11-30 DIAGNOSIS — I48.0 PAROXYSMAL ATRIAL FIBRILLATION (H): Primary | ICD-10-CM

## 2021-11-30 DIAGNOSIS — Z51.81 ANTICOAGULATION GOAL OF INR 2 TO 3: ICD-10-CM

## 2021-11-30 LAB — INR POINT OF CARE: 2.2 (ref 0.9–1.1)

## 2021-11-30 PROCEDURE — 36416 COLLJ CAPILLARY BLOOD SPEC: CPT | Mod: ZL

## 2021-12-04 DIAGNOSIS — I10 ESSENTIAL HYPERTENSION: ICD-10-CM

## 2021-12-08 ENCOUNTER — ANCILLARY PROCEDURE (OUTPATIENT)
Dept: CARDIOLOGY | Facility: CLINIC | Age: 76
End: 2021-12-08
Attending: INTERNAL MEDICINE
Payer: MEDICARE

## 2021-12-08 DIAGNOSIS — I48.20 CHRONIC A-FIB (H): ICD-10-CM

## 2021-12-08 PROCEDURE — 93294 REM INTERROG EVL PM/LDLS PM: CPT | Performed by: INTERNAL MEDICINE

## 2021-12-08 PROCEDURE — 93296 REM INTERROG EVL PM/IDS: CPT

## 2021-12-08 RX ORDER — LISINOPRIL 20 MG/1
TABLET ORAL
Qty: 90 TABLET | Refills: 0 | Status: SHIPPED | OUTPATIENT
Start: 2021-12-08 | End: 2022-03-01

## 2021-12-08 NOTE — TELEPHONE ENCOUNTER
" Disp Refills Start End YOGESH   lisinopril (ZESTRIL) 20 MG tablet 90 tablet 3 12/8/2020  No   Sig: Take 1 tablet by mouth once daily       LOV: 8/18/2021  Future Office visit: No future appointment scheduled at this time.      Routing refill request to provider for review/approval because:  Failed protocol    Requested Prescriptions   Pending Prescriptions Disp Refills     lisinopril (ZESTRIL) 20 MG tablet [Pharmacy Med Name: Lisinopril 20 MG Oral Tablet] 90 tablet 0     Sig: Take 1 tablet by mouth once daily       ACE Inhibitors (Including Combos) Protocol Failed - 12/8/2021  7:00 AM        Failed - Normal serum creatinine on file in past 12 months     Recent Labs   Lab Test 06/21/20 2002   CR 1.10       Ok to refill medication if creatinine is low          Failed - Normal serum potassium on file in past 12 months     Recent Labs   Lab Test 06/21/20 2002   POTASSIUM 3.3*             Passed - Blood pressure under 140/90 in past 12 months     BP Readings from Last 3 Encounters:   07/06/21 126/80   06/21/21 124/88   02/24/21 114/84                 Passed - Recent (12 mo) or future (30 days) visit within the authorizing provider's specialty     Patient has had an office visit with the authorizing provider or a provider within the authorizing providers department within the previous 12 mos or has a future within next 30 days. See \"Patient Info\" tab in inbasket, or \"Choose Columns\" in Meds & Orders section of the refill encounter.              Passed - Medication is active on med list        Passed - Patient is age 18 or older        Passed - No active pregnancy on record        Passed - No positive pregnancy test within past 12 months         Unable to complete prescription refill per RN Medication Refill Policy.................... Lillian Dodd RN ....................  12/8/2021   1:31 PM        "

## 2021-12-21 ENCOUNTER — IMMUNIZATION (OUTPATIENT)
Dept: FAMILY MEDICINE | Facility: OTHER | Age: 76
End: 2021-12-21
Attending: FAMILY MEDICINE
Payer: MEDICARE

## 2021-12-21 PROCEDURE — 91300 PR COVID VAC PFIZER DIL RECON 30 MCG/0.3 ML IM: CPT

## 2021-12-23 DIAGNOSIS — F33.40 MDD (RECURRENT MAJOR DEPRESSIVE DISORDER) IN REMISSION (H): ICD-10-CM

## 2021-12-24 NOTE — TELEPHONE ENCOUNTER
Sertraline HCl 50 MG Oral Tablet      Last Written Prescription Date:  12/8/20  Last Fill Quantity: 90,   # refills: 3  Last Office Visit: 8/18/21  Future Office visit:       Routing refill request to provider for review/approval because:  Drug failed the [facility] or [facility] refill protocol.  SSRIs Protocol Failed 12/23/2021 09:48 AM   Protocol Details  PHQ-9 score less than 5 in past 6 months   Unable to complete prescription refill per RNMedication Refill Policy.................... Yoko Huerta RN ....................  12/24/2021   10:18 AM

## 2022-01-27 LAB
MDC_IDC_EPISODE_DTM: NORMAL
MDC_IDC_EPISODE_DURATION: 13 S
MDC_IDC_EPISODE_DURATION: 13 S
MDC_IDC_EPISODE_DURATION: 15 S
MDC_IDC_EPISODE_ID: NORMAL
MDC_IDC_EPISODE_TYPE: NORMAL
MDC_IDC_LEAD_IMPLANT_DT: NORMAL
MDC_IDC_LEAD_IMPLANT_DT: NORMAL
MDC_IDC_LEAD_LOCATION: NORMAL
MDC_IDC_LEAD_LOCATION: NORMAL
MDC_IDC_LEAD_LOCATION_DETAIL_1: NORMAL
MDC_IDC_LEAD_LOCATION_DETAIL_1: NORMAL
MDC_IDC_LEAD_MFG: NORMAL
MDC_IDC_LEAD_MFG: NORMAL
MDC_IDC_LEAD_MODEL: NORMAL
MDC_IDC_LEAD_MODEL: NORMAL
MDC_IDC_LEAD_POLARITY_TYPE: NORMAL
MDC_IDC_LEAD_POLARITY_TYPE: NORMAL
MDC_IDC_LEAD_SERIAL: NORMAL
MDC_IDC_LEAD_SERIAL: NORMAL
MDC_IDC_MSMT_BATTERY_DTM: NORMAL
MDC_IDC_MSMT_BATTERY_REMAINING_LONGEVITY: 66 MO
MDC_IDC_MSMT_BATTERY_REMAINING_PERCENTAGE: 100 %
MDC_IDC_MSMT_BATTERY_STATUS: NORMAL
MDC_IDC_MSMT_LEADCHNL_RA_IMPEDANCE_VALUE: 750 OHM
MDC_IDC_MSMT_LEADCHNL_RV_IMPEDANCE_VALUE: 678 OHM
MDC_IDC_MSMT_LEADCHNL_RV_PACING_THRESHOLD_AMPLITUDE: 0.9 V
MDC_IDC_MSMT_LEADCHNL_RV_PACING_THRESHOLD_PULSEWIDTH: 0.4 MS
MDC_IDC_PG_IMPLANT_DTM: NORMAL
MDC_IDC_PG_MFG: NORMAL
MDC_IDC_PG_MODEL: NORMAL
MDC_IDC_PG_SERIAL: NORMAL
MDC_IDC_PG_TYPE: NORMAL
MDC_IDC_SESS_CLINIC_NAME: NORMAL
MDC_IDC_SESS_DTM: NORMAL
MDC_IDC_SESS_TYPE: NORMAL
MDC_IDC_SET_BRADY_AT_MODE_SWITCH_RATE: 170 {BEATS}/MIN
MDC_IDC_SET_BRADY_LOWRATE: 60 {BEATS}/MIN
MDC_IDC_SET_BRADY_MAX_SENSOR_RATE: 130 {BEATS}/MIN
MDC_IDC_SET_BRADY_MODE: NORMAL
MDC_IDC_SET_LEADCHNL_RA_SENSING_ADAPTATION_MODE: NORMAL
MDC_IDC_SET_LEADCHNL_RA_SENSING_SENSITIVITY: 0.25 MV
MDC_IDC_SET_LEADCHNL_RV_PACING_AMPLITUDE: 1.2 V
MDC_IDC_SET_LEADCHNL_RV_PACING_CAPTURE_MODE: NORMAL
MDC_IDC_SET_LEADCHNL_RV_PACING_POLARITY: NORMAL
MDC_IDC_SET_LEADCHNL_RV_PACING_PULSEWIDTH: 0.4 MS
MDC_IDC_SET_LEADCHNL_RV_SENSING_ADAPTATION_MODE: NORMAL
MDC_IDC_SET_LEADCHNL_RV_SENSING_POLARITY: NORMAL
MDC_IDC_SET_LEADCHNL_RV_SENSING_SENSITIVITY: 1.5 MV
MDC_IDC_SET_ZONE_DETECTION_INTERVAL: 375 MS
MDC_IDC_SET_ZONE_TYPE: NORMAL
MDC_IDC_SET_ZONE_VENDOR_TYPE: NORMAL
MDC_IDC_STAT_BRADY_DTM_END: NORMAL
MDC_IDC_STAT_BRADY_DTM_START: NORMAL
MDC_IDC_STAT_BRADY_RA_PERCENT_PACED: 0 %
MDC_IDC_STAT_BRADY_RV_PERCENT_PACED: 22 %
MDC_IDC_STAT_EPISODE_RECENT_COUNT: 0
MDC_IDC_STAT_EPISODE_RECENT_COUNT: 5
MDC_IDC_STAT_EPISODE_RECENT_COUNT_DTM_END: NORMAL
MDC_IDC_STAT_EPISODE_RECENT_COUNT_DTM_START: NORMAL
MDC_IDC_STAT_EPISODE_TYPE: NORMAL
MDC_IDC_STAT_EPISODE_VENDOR_TYPE: NORMAL

## 2022-02-01 ENCOUNTER — ANTICOAGULATION THERAPY VISIT (OUTPATIENT)
Dept: ANTICOAGULATION | Facility: OTHER | Age: 77
End: 2022-02-01
Attending: FAMILY MEDICINE
Payer: MEDICARE

## 2022-02-01 DIAGNOSIS — M81.0 AGE-RELATED OSTEOPOROSIS WITHOUT CURRENT PATHOLOGICAL FRACTURE: ICD-10-CM

## 2022-02-01 DIAGNOSIS — I48.0 PAROXYSMAL ATRIAL FIBRILLATION (H): Primary | ICD-10-CM

## 2022-02-01 DIAGNOSIS — Z51.81 ANTICOAGULATION GOAL OF INR 2 TO 3: ICD-10-CM

## 2022-02-01 DIAGNOSIS — E21.0 PRIMARY HYPERPARATHYROIDISM (H): ICD-10-CM

## 2022-02-01 DIAGNOSIS — Z79.01 ANTICOAGULATION GOAL OF INR 2 TO 3: ICD-10-CM

## 2022-02-01 LAB
ALBUMIN SERPL-MCNC: 4.1 G/DL (ref 3.5–5.7)
ANION GAP SERPL CALCULATED.3IONS-SCNC: 6 MMOL/L (ref 3–14)
BUN SERPL-MCNC: 13 MG/DL (ref 7–25)
CALCIUM SERPL-MCNC: 10.7 MG/DL (ref 8.6–10.3)
CHLORIDE BLD-SCNC: 104 MMOL/L (ref 98–107)
CO2 SERPL-SCNC: 30 MMOL/L (ref 21–31)
CREAT SERPL-MCNC: 1.03 MG/DL (ref 0.6–1.2)
DEPRECATED CALCIDIOL+CALCIFEROL SERPL-MC: 29 UG/L (ref 30–100)
GFR SERPL CREATININE-BSD FRML MDRD: 55 ML/MIN/1.73M2
GLUCOSE BLD-MCNC: 119 MG/DL (ref 70–105)
INR POINT OF CARE: 1.6 (ref 0.9–1.1)
PHOSPHATE SERPL-MCNC: 3.2 MG/DL (ref 2.5–5)
POTASSIUM BLD-SCNC: 3.9 MMOL/L (ref 3.5–5.1)
PTH-INTACT SERPL-MCNC: 99 PG/ML (ref 12–88)
SODIUM SERPL-SCNC: 140 MMOL/L (ref 134–144)

## 2022-02-01 PROCEDURE — 85610 PROTHROMBIN TIME: CPT | Mod: ZL,QW

## 2022-02-01 PROCEDURE — 83970 ASSAY OF PARATHORMONE: CPT | Mod: ZL

## 2022-02-01 PROCEDURE — 80069 RENAL FUNCTION PANEL: CPT | Mod: ZL

## 2022-02-01 PROCEDURE — 82306 VITAMIN D 25 HYDROXY: CPT | Mod: ZL

## 2022-02-01 PROCEDURE — 36415 COLL VENOUS BLD VENIPUNCTURE: CPT | Mod: ZL

## 2022-02-01 NOTE — PROGRESS NOTES
ANTICOAGULATION FOLLOW-UP CLINIC VISIT    Patient Name:  Kate Chacon  Date:  2022  Contact Type:  Face to Face    SUBJECTIVE:  Patient Findings     Positives:  Upcoming invasive procedure (having thyroid out tomorrow)        Clinical Outcomes     Negatives:  Major bleeding event, Thromboembolic event, Anticoagulation-related hospital admission, Anticoagulation-related ED visit, Anticoagulation-related fatality           OBJECTIVE    Recent labs: (last 7 days)     22  1025   INR 1.6*       ASSESSMENT / PLAN  INR assessment SUB    Recheck INR In: 1 WEEK    INR Location Clinic      Anticoagulation Summary  As of 2022    INR goal:  2.0-3.0   TTR:  77.5 % (1 y)   INR used for dosin.6 (2022)   Warfarin maintenance plan:  5 mg (5 mg x 1) every day   Full warfarin instructions:  5 mg every day   Weekly warfarin total:  35 mg   Plan last modified:  Radha Burns RN (2022)   Next INR check:  2022   Priority:  Maintenance   Target end date:  Indefinite    Indications    Paroxysmal atrial fibrillation (H) [I48.0]  Anticoagulation goal of INR 2 to 3 [Z51.81  Z79.01]             Anticoagulation Episode Summary     INR check location:      Preferred lab:      Send INR reminders to:  [facility]    Comments:        Anticoagulation Care Providers     Provider Role Specialty Phone number    Asher Campos MD Referring Family Medicine [phone]            See the Encounter Report to view Anticoagulation Flowsheet and Dosing Calendar (Go to Encounters tab in chart review, and find the Anticoagulation Therapy Visit)        Radha Burns RN

## 2022-02-08 ENCOUNTER — ANTICOAGULATION THERAPY VISIT (OUTPATIENT)
Dept: ANTICOAGULATION | Facility: OTHER | Age: 77
End: 2022-02-08
Attending: FAMILY MEDICINE
Payer: COMMERCIAL

## 2022-02-08 ENCOUNTER — HOSPITAL ENCOUNTER (OUTPATIENT)
Dept: CARDIOLOGY | Facility: OTHER | Age: 77
End: 2022-02-08
Attending: INTERNAL MEDICINE
Payer: MEDICARE

## 2022-02-08 DIAGNOSIS — Z79.01 ANTICOAGULATION GOAL OF INR 2 TO 3: ICD-10-CM

## 2022-02-08 DIAGNOSIS — I48.0 PAROXYSMAL ATRIAL FIBRILLATION (H): Primary | ICD-10-CM

## 2022-02-08 DIAGNOSIS — I48.20 CHRONIC A-FIB (H): ICD-10-CM

## 2022-02-08 DIAGNOSIS — Z51.81 ANTICOAGULATION GOAL OF INR 2 TO 3: ICD-10-CM

## 2022-02-08 LAB — INR POINT OF CARE: 2 (ref 0.9–1.1)

## 2022-02-08 PROCEDURE — 36416 COLLJ CAPILLARY BLOOD SPEC: CPT | Mod: ZL

## 2022-02-08 PROCEDURE — 85610 PROTHROMBIN TIME: CPT | Mod: ZL,QW

## 2022-02-08 PROCEDURE — 93280 PM DEVICE PROGR EVAL DUAL: CPT | Performed by: INTERNAL MEDICINE

## 2022-02-08 NOTE — PROGRESS NOTES
ANTICOAGULATION FOLLOW-UP CLINIC VISIT    Patient Name:  Kate Chacon  Date:  2022  Contact Type:  Face to Face    SUBJECTIVE:  Patient Findings         Clinical Outcomes     Negatives:  Major bleeding event, Thromboembolic event, Anticoagulation-related hospital admission, Anticoagulation-related ED visit, Anticoagulation-related fatality           OBJECTIVE    Recent labs: (last 7 days)     22  0959   INR 2.0*       ASSESSMENT / PLAN  INR assessment THER    Recheck INR In: 2 WEEKS    INR Location Clinic      Anticoagulation Summary  As of 2022    INR goal:  2.0-3.0   TTR:  77.5 % (1 y)   INR used for dosin.0 (2022)   Warfarin maintenance plan:  2.5 mg (5 mg x 0.5) every Wed; 5 mg (5 mg x 1) all other days   Full warfarin instructions:  2.5 mg every Wed; 5 mg all other days   Weekly warfarin total:  32.5 mg   Plan last modified:  Radha Burns RN (2022)   Next INR check:  2022   Priority:  Maintenance   Target end date:  Indefinite    Indications    Paroxysmal atrial fibrillation (H) [I48.0]  Anticoagulation goal of INR 2 to 3 [Z51.81  Z79.01]             Anticoagulation Episode Summary     INR check location:      Preferred lab:      Send INR reminders to:  [facility]    Comments:        Anticoagulation Care Providers     Provider Role Specialty Phone number    Asher Campos MD Referring Family Medicine [phone]            See the Encounter Report to view Anticoagulation Flowsheet and Dosing Calendar (Go to Encounters tab in chart review, and find the Anticoagulation Therapy Visit)        Radha Burns RN

## 2022-02-08 NOTE — PATIENT INSTRUCTIONS
It was a pleasure to see you in clinic today.  Please do not hesitate to call with any questions or concerns.  You are scheduled for a remote transmission on 5/10/22.  We look forward to seeing you in clinic at your next device check in 6 months.    Zachery Sorenson, RN  Electrophysiology Nurse Clinician  [facility]    During Business Hours Please Call:  [phone]  After Hours Please Call:  [phone] - select option #4 and ask for job code [phone]

## 2022-02-10 LAB
MDC_IDC_EPISODE_DTM: NORMAL
MDC_IDC_EPISODE_ID: NORMAL
MDC_IDC_EPISODE_TYPE: NORMAL
MDC_IDC_LEAD_IMPLANT_DT: NORMAL
MDC_IDC_LEAD_IMPLANT_DT: NORMAL
MDC_IDC_LEAD_LOCATION: NORMAL
MDC_IDC_LEAD_LOCATION: NORMAL
MDC_IDC_LEAD_LOCATION_DETAIL_1: NORMAL
MDC_IDC_LEAD_LOCATION_DETAIL_1: NORMAL
MDC_IDC_LEAD_MFG: NORMAL
MDC_IDC_LEAD_MFG: NORMAL
MDC_IDC_LEAD_MODEL: NORMAL
MDC_IDC_LEAD_MODEL: NORMAL
MDC_IDC_LEAD_POLARITY_TYPE: NORMAL
MDC_IDC_LEAD_POLARITY_TYPE: NORMAL
MDC_IDC_LEAD_SERIAL: NORMAL
MDC_IDC_LEAD_SERIAL: NORMAL
MDC_IDC_MSMT_BATTERY_DTM: NORMAL
MDC_IDC_MSMT_BATTERY_REMAINING_LONGEVITY: 66 MO
MDC_IDC_MSMT_BATTERY_STATUS: NORMAL
MDC_IDC_MSMT_LEADCHNL_RA_IMPEDANCE_VALUE: 783 OHM
MDC_IDC_MSMT_LEADCHNL_RV_IMPEDANCE_VALUE: 706 OHM
MDC_IDC_MSMT_LEADCHNL_RV_PACING_THRESHOLD_AMPLITUDE: 0.7 V
MDC_IDC_MSMT_LEADCHNL_RV_PACING_THRESHOLD_PULSEWIDTH: 0.4 MS
MDC_IDC_MSMT_LEADCHNL_RV_SENSING_INTR_AMPL: 12.5 MV
MDC_IDC_PG_IMPLANT_DTM: NORMAL
MDC_IDC_PG_MFG: NORMAL
MDC_IDC_PG_MODEL: NORMAL
MDC_IDC_PG_SERIAL: NORMAL
MDC_IDC_PG_TYPE: NORMAL
MDC_IDC_SESS_CLINIC_NAME: NORMAL
MDC_IDC_SESS_DTM: NORMAL
MDC_IDC_SESS_TYPE: NORMAL
MDC_IDC_SET_BRADY_AT_MODE_SWITCH_MODE: NORMAL
MDC_IDC_SET_BRADY_LOWRATE: 60 {BEATS}/MIN
MDC_IDC_SET_BRADY_MAX_SENSOR_RATE: 130 {BEATS}/MIN
MDC_IDC_SET_BRADY_MODE: NORMAL
MDC_IDC_SET_BRADY_PAV_DELAY_HIGH: 250 MS
MDC_IDC_SET_BRADY_PAV_DELAY_LOW: 250 MS
MDC_IDC_SET_BRADY_SAV_DELAY_LOW: 220 MS
MDC_IDC_SET_LEADCHNL_RA_PACING_CAPTURE_MODE: NORMAL
MDC_IDC_SET_LEADCHNL_RA_PACING_POLARITY: NORMAL
MDC_IDC_SET_LEADCHNL_RA_SENSING_ADAPTATION_MODE: NORMAL
MDC_IDC_SET_LEADCHNL_RA_SENSING_POLARITY: NORMAL
MDC_IDC_SET_LEADCHNL_RA_SENSING_SENSITIVITY: 0.25 MV
MDC_IDC_SET_LEADCHNL_RV_PACING_AMPLITUDE: 3.5 V
MDC_IDC_SET_LEADCHNL_RV_PACING_CAPTURE_MODE: NORMAL
MDC_IDC_SET_LEADCHNL_RV_PACING_POLARITY: NORMAL
MDC_IDC_SET_LEADCHNL_RV_PACING_PULSEWIDTH: 0.4 MS
MDC_IDC_SET_LEADCHNL_RV_SENSING_ADAPTATION_MODE: NORMAL
MDC_IDC_SET_LEADCHNL_RV_SENSING_POLARITY: NORMAL
MDC_IDC_SET_LEADCHNL_RV_SENSING_SENSITIVITY: 1.5 MV
MDC_IDC_SET_ZONE_DETECTION_INTERVAL: 375 MS
MDC_IDC_SET_ZONE_TYPE: NORMAL
MDC_IDC_SET_ZONE_VENDOR_TYPE: NORMAL
MDC_IDC_STAT_BRADY_DTM_END: NORMAL
MDC_IDC_STAT_BRADY_DTM_START: NORMAL
MDC_IDC_STAT_BRADY_RV_PERCENT_PACED: 23 %
MDC_IDC_STAT_EPISODE_RECENT_COUNT: 6
MDC_IDC_STAT_EPISODE_RECENT_COUNT_DTM_END: NORMAL
MDC_IDC_STAT_EPISODE_RECENT_COUNT_DTM_START: NORMAL
MDC_IDC_STAT_EPISODE_TOTAL_COUNT: 587
MDC_IDC_STAT_EPISODE_TOTAL_COUNT_DTM_END: NORMAL
MDC_IDC_STAT_EPISODE_TYPE: NORMAL
MDC_IDC_STAT_EPISODE_TYPE: NORMAL
MDC_IDC_STAT_EPISODE_VENDOR_TYPE: NORMAL
MDC_IDC_STAT_EPISODE_VENDOR_TYPE: NORMAL

## 2022-02-23 ENCOUNTER — ANTICOAGULATION THERAPY VISIT (OUTPATIENT)
Dept: ANTICOAGULATION | Facility: OTHER | Age: 77
End: 2022-02-23
Attending: FAMILY MEDICINE
Payer: MEDICARE

## 2022-02-23 DIAGNOSIS — I48.0 PAROXYSMAL ATRIAL FIBRILLATION (H): Primary | ICD-10-CM

## 2022-02-23 DIAGNOSIS — Z79.01 ANTICOAGULATION GOAL OF INR 2 TO 3: ICD-10-CM

## 2022-02-23 DIAGNOSIS — Z51.81 ANTICOAGULATION GOAL OF INR 2 TO 3: ICD-10-CM

## 2022-02-23 LAB — INR POINT OF CARE: 1.8 (ref 0.9–1.1)

## 2022-02-23 PROCEDURE — 85610 PROTHROMBIN TIME: CPT | Mod: ZL,QW

## 2022-02-23 PROCEDURE — 36416 COLLJ CAPILLARY BLOOD SPEC: CPT | Mod: ZL

## 2022-02-23 NOTE — PROGRESS NOTES
ANTICOAGULATION FOLLOW-UP CLINIC VISIT    Patient Name:  Kate Chacon  Date:  2022  Contact Type:  Face to Face    SUBJECTIVE:  Patient Findings     Positives:  Upcoming invasive procedure (having thyroid removed 21)        Clinical Outcomes     Negatives:  Major bleeding event, Thromboembolic event, Anticoagulation-related hospital admission, Anticoagulation-related ED visit, Anticoagulation-related fatality           OBJECTIVE    Recent labs: (last 7 days)     22  1050   INR 1.8*       ASSESSMENT / PLAN  INR assessment SUB    Recheck INR In: 2 WEEKS    INR Location Clinic      Anticoagulation Summary  As of 2022    INR goal:  2.0-3.0   TTR:  77.5 % (1 y)   INR used for dosin.8 (2022)   Warfarin maintenance plan:  5 mg (5 mg x 1) every day   Full warfarin instructions:  5 mg every day   Weekly warfarin total:  35 mg   Plan last modified:  Radha Burns RN (2022)   Next INR check:  3/9/2022   Priority:  Maintenance   Target end date:  Indefinite    Indications    Paroxysmal atrial fibrillation (H) [I48.0]  Anticoagulation goal of INR 2 to 3 [Z51.81  Z79.01]             Anticoagulation Episode Summary     INR check location:      Preferred lab:      Send INR reminders to:  [facility]    Comments:        Anticoagulation Care Providers     Provider Role Specialty Phone number    Asher Campos MD Referring Family Medicine [phone]            See the Encounter Report to view Anticoagulation Flowsheet and Dosing Calendar (Go to Encounters tab in chart review, and find the Anticoagulation Therapy Visit)        Radha Burns RN

## 2022-02-28 DIAGNOSIS — I10 ESSENTIAL HYPERTENSION: ICD-10-CM

## 2022-03-01 RX ORDER — LISINOPRIL 20 MG/1
TABLET ORAL
Qty: 90 TABLET | Refills: 11 | Status: SHIPPED | OUTPATIENT
Start: 2022-03-01 | End: 2023-05-15

## 2022-03-01 NOTE — TELEPHONE ENCOUNTER
[facility] sent Rx request for the following:      Requested Prescriptions   Pending Prescriptions Disp Refills     lisinopril (ZESTRIL) 20 MG tablet [Pharmacy Med Name: Lisinopril 20 MG Oral Tablet] 90 tablet 0     Sig: Take 1 tablet by mouth once daily       ACE Inhibitors (Including Combos) Protocol Passed - 2/28/2022  7:25 AM          Last Prescription Date:   12/8/2021  Last Fill Qty/Refills:         90, R-0  Last Office Visit:              8/18/2021 VV with JVC   Future Office visit:             Appointments in Next Year    Mar 09, 2022 10:45 AM  Anticoagulation Visit with  ANTI COAG 1  [facility] ([facility] ) [phone]   Mar 22, 2022 10:20 AM  Pre-Op physical with Asher WILSON MD  [facility] ([facility] ) [phone]   May 10, 2022 12:00 AM  CARDIAC DEVICE CHECK - REMOTE with  ICD REMOTE  [facility] [place] ([facility] ) [phone]   Aug 09, 2022 10:00 AM  CARDIAC DEVICE CHECK - IN CLINIC with  PACEMAKER  [facility] ([facility] ) [phone]   Aug 10, 2022 10:15 AM  Return Visit with JANAE Ferrell [facility] ([facility] ) [phone]        Routing refill request to provider for review/approval.    Pily Sheets RN on 3/1/2022 at 10:57 AM

## 2022-03-09 ENCOUNTER — ANTICOAGULATION THERAPY VISIT (OUTPATIENT)
Dept: ANTICOAGULATION | Facility: OTHER | Age: 77
End: 2022-03-09
Attending: FAMILY MEDICINE
Payer: MEDICARE

## 2022-03-09 DIAGNOSIS — Z79.01 ANTICOAGULATION GOAL OF INR 2 TO 3: ICD-10-CM

## 2022-03-09 DIAGNOSIS — Z51.81 ANTICOAGULATION GOAL OF INR 2 TO 3: ICD-10-CM

## 2022-03-09 DIAGNOSIS — I48.0 PAROXYSMAL ATRIAL FIBRILLATION (H): Primary | ICD-10-CM

## 2022-03-09 LAB — INR POINT OF CARE: 1.7 (ref 0.9–1.1)

## 2022-03-09 PROCEDURE — 36416 COLLJ CAPILLARY BLOOD SPEC: CPT | Mod: ZL

## 2022-03-09 PROCEDURE — 85610 PROTHROMBIN TIME: CPT | Mod: ZL,QW

## 2022-03-09 NOTE — PROGRESS NOTES
ANTICOAGULATION FOLLOW-UP CLINIC VISIT    Patient Name:  Kate Chacon  Date:  3/9/2022  Contact Type:  Face to Face    SUBJECTIVE:  Patient Findings     Positives:  Upcoming invasive procedure (will have thyroid taken out 22)        Clinical Outcomes     Negatives:  Major bleeding event, Thromboembolic event, Anticoagulation-related hospital admission, Anticoagulation-related ED visit, Anticoagulation-related fatality           OBJECTIVE    Recent labs: (last 7 days)     22  1049   INR 1.7*       ASSESSMENT / PLAN  INR assessment SUB    Recheck INR In: 2 WEEKS    INR Location Clinic      Anticoagulation Summary  As of 3/9/2022    INR goal:  2.0-3.0   TTR:  76.3 % (1 y)   INR used for dosin.7 (3/9/2022)   Warfarin maintenance plan:  7.5 mg (5 mg x 1.5) every Wed; 5 mg (5 mg x 1) all other days   Full warfarin instructions:  : Hold; Otherwise 7.5 mg every Wed; 5 mg all other days   Weekly warfarin total:  37.5 mg   Plan last modified:  Radha Burns RN (3/9/2022)   Next INR check:  3/22/2022   Priority:  Maintenance   Target end date:  Indefinite    Indications    Paroxysmal atrial fibrillation (H) [I48.0]  Anticoagulation goal of INR 2 to 3 [Z51.81  Z79.01]             Anticoagulation Episode Summary     INR check location:      Preferred lab:      Send INR reminders to:  [facility]    Comments:        Anticoagulation Care Providers     Provider Role Specialty Phone number    Asher Campos MD Referring Family Medicine [phone]            See the Encounter Report to view Anticoagulation Flowsheet and Dosing Calendar (Go to Encounters tab in chart review, and find the Anticoagulation Therapy Visit)        Radha Burns, RN

## 2022-03-22 ENCOUNTER — ANTICOAGULATION THERAPY VISIT (OUTPATIENT)
Dept: ANTICOAGULATION | Facility: OTHER | Age: 77
End: 2022-03-22
Attending: FAMILY MEDICINE
Payer: MEDICARE

## 2022-03-22 ENCOUNTER — OFFICE VISIT (OUTPATIENT)
Dept: FAMILY MEDICINE | Facility: OTHER | Age: 77
End: 2022-03-22
Attending: FAMILY MEDICINE
Payer: COMMERCIAL

## 2022-03-22 VITALS
BODY MASS INDEX: 29.49 KG/M2 | DIASTOLIC BLOOD PRESSURE: 80 MMHG | WEIGHT: 177 LBS | TEMPERATURE: 97.5 F | HEIGHT: 65 IN | SYSTOLIC BLOOD PRESSURE: 116 MMHG | RESPIRATION RATE: 20 BRPM | HEART RATE: 60 BPM | OXYGEN SATURATION: 96 %

## 2022-03-22 DIAGNOSIS — I10 ESSENTIAL HYPERTENSION: ICD-10-CM

## 2022-03-22 DIAGNOSIS — I48.0 PAROXYSMAL ATRIAL FIBRILLATION (H): Primary | ICD-10-CM

## 2022-03-22 DIAGNOSIS — I48.0 PAROXYSMAL ATRIAL FIBRILLATION (H): ICD-10-CM

## 2022-03-22 DIAGNOSIS — Z86.73 HISTORY OF ISCHEMIC STROKE: ICD-10-CM

## 2022-03-22 DIAGNOSIS — Z51.81 ANTICOAGULATION GOAL OF INR 2 TO 3: ICD-10-CM

## 2022-03-22 DIAGNOSIS — Z01.818 PREOP GENERAL PHYSICAL EXAM: Primary | ICD-10-CM

## 2022-03-22 DIAGNOSIS — I48.91 ON COUMADIN FOR ATRIAL FIBRILLATION (H): ICD-10-CM

## 2022-03-22 DIAGNOSIS — E21.3 HYPERPARATHYROIDISM (H): ICD-10-CM

## 2022-03-22 DIAGNOSIS — Z79.01 ON COUMADIN FOR ATRIAL FIBRILLATION (H): ICD-10-CM

## 2022-03-22 DIAGNOSIS — Z79.01 ANTICOAGULATION GOAL OF INR 2 TO 3: ICD-10-CM

## 2022-03-22 DIAGNOSIS — Z95.0 CARDIAC PACEMAKER: ICD-10-CM

## 2022-03-22 LAB
ALBUMIN SERPL-MCNC: 3.9 G/DL (ref 3.5–5.7)
ALP SERPL-CCNC: 78 U/L (ref 34–104)
ALT SERPL W P-5'-P-CCNC: 39 U/L (ref 7–52)
ANION GAP SERPL CALCULATED.3IONS-SCNC: 6 MMOL/L (ref 3–14)
AST SERPL W P-5'-P-CCNC: 41 U/L (ref 13–39)
BASOPHILS # BLD AUTO: 0 10E3/UL (ref 0–0.2)
BASOPHILS NFR BLD AUTO: 1 %
BILIRUB SERPL-MCNC: 0.6 MG/DL (ref 0.3–1)
BUN SERPL-MCNC: 20 MG/DL (ref 7–25)
CALCIUM SERPL-MCNC: 10.5 MG/DL (ref 8.6–10.3)
CHLORIDE BLD-SCNC: 108 MMOL/L (ref 98–107)
CO2 SERPL-SCNC: 26 MMOL/L (ref 21–31)
CREAT SERPL-MCNC: 1.03 MG/DL (ref 0.6–1.2)
EOSINOPHIL # BLD AUTO: 0.1 10E3/UL (ref 0–0.7)
EOSINOPHIL NFR BLD AUTO: 2 %
ERYTHROCYTE [DISTWIDTH] IN BLOOD BY AUTOMATED COUNT: 12.9 % (ref 10–15)
GFR SERPL CREATININE-BSD FRML MDRD: 55 ML/MIN/1.73M2
GLUCOSE BLD-MCNC: 93 MG/DL (ref 70–105)
HCT VFR BLD AUTO: 48.5 % (ref 35–47)
HGB BLD-MCNC: 16 G/DL (ref 11.7–15.7)
IMM GRANULOCYTES # BLD: 0 10E3/UL
IMM GRANULOCYTES NFR BLD: 0 %
INR POINT OF CARE: 2.3 (ref 0.9–1.1)
LYMPHOCYTES # BLD AUTO: 1.6 10E3/UL (ref 0.8–5.3)
LYMPHOCYTES NFR BLD AUTO: 27 %
MCH RBC QN AUTO: 32.4 PG (ref 26.5–33)
MCHC RBC AUTO-ENTMCNC: 33 G/DL (ref 31.5–36.5)
MCV RBC AUTO: 98 FL (ref 78–100)
MONOCYTES # BLD AUTO: 0.8 10E3/UL (ref 0–1.3)
MONOCYTES NFR BLD AUTO: 13 %
NEUTROPHILS # BLD AUTO: 3.5 10E3/UL (ref 1.6–8.3)
NEUTROPHILS NFR BLD AUTO: 57 %
NRBC # BLD AUTO: 0 10E3/UL
NRBC BLD AUTO-RTO: 0 /100
PLATELET # BLD AUTO: 209 10E3/UL (ref 150–450)
POTASSIUM BLD-SCNC: 4.9 MMOL/L (ref 3.5–5.1)
PROT SERPL-MCNC: 6.5 G/DL (ref 6.4–8.9)
RBC # BLD AUTO: 4.94 10E6/UL (ref 3.8–5.2)
SODIUM SERPL-SCNC: 140 MMOL/L (ref 134–144)
WBC # BLD AUTO: 6.1 10E3/UL (ref 4–11)

## 2022-03-22 PROCEDURE — 93005 ELECTROCARDIOGRAM TRACING: CPT | Performed by: FAMILY MEDICINE

## 2022-03-22 PROCEDURE — 93010 ELECTROCARDIOGRAM REPORT: CPT | Performed by: INTERNAL MEDICINE

## 2022-03-22 PROCEDURE — 80053 COMPREHEN METABOLIC PANEL: CPT | Mod: ZL | Performed by: FAMILY MEDICINE

## 2022-03-22 PROCEDURE — 85025 COMPLETE CBC W/AUTO DIFF WBC: CPT | Mod: ZL | Performed by: FAMILY MEDICINE

## 2022-03-22 PROCEDURE — 36415 COLL VENOUS BLD VENIPUNCTURE: CPT | Mod: ZL | Performed by: FAMILY MEDICINE

## 2022-03-22 PROCEDURE — G0463 HOSPITAL OUTPT CLINIC VISIT: HCPCS | Mod: 25

## 2022-03-22 PROCEDURE — 99214 OFFICE O/P EST MOD 30 MIN: CPT | Performed by: FAMILY MEDICINE

## 2022-03-22 PROCEDURE — 85610 PROTHROMBIN TIME: CPT | Mod: ZL,QW

## 2022-03-22 PROCEDURE — 36416 COLLJ CAPILLARY BLOOD SPEC: CPT | Mod: ZL

## 2022-03-22 PROCEDURE — G0463 HOSPITAL OUTPT CLINIC VISIT: HCPCS

## 2022-03-22 RX ORDER — CHOLECALCIFEROL (VITAMIN D3) 50 MCG
1 TABLET ORAL DAILY
COMMUNITY
End: 2024-09-19

## 2022-03-22 RX ORDER — WARFARIN SODIUM 1 MG/1
1 TABLET ORAL
COMMUNITY
End: 2022-03-22 | Stop reason: DRUGHIGH

## 2022-03-22 ASSESSMENT — ANXIETY QUESTIONNAIRES
GAD7 TOTAL SCORE: 1
4. TROUBLE RELAXING: NOT AT ALL
7. FEELING AFRAID AS IF SOMETHING AWFUL MIGHT HAPPEN: NOT AT ALL
2. NOT BEING ABLE TO STOP OR CONTROL WORRYING: NOT AT ALL
1. FEELING NERVOUS, ANXIOUS, OR ON EDGE: NOT AT ALL
GAD7 TOTAL SCORE: 1
GAD7 TOTAL SCORE: 1
7. FEELING AFRAID AS IF SOMETHING AWFUL MIGHT HAPPEN: NOT AT ALL
6. BECOMING EASILY ANNOYED OR IRRITABLE: SEVERAL DAYS
3. WORRYING TOO MUCH ABOUT DIFFERENT THINGS: NOT AT ALL
5. BEING SO RESTLESS THAT IT IS HARD TO SIT STILL: NOT AT ALL

## 2022-03-22 ASSESSMENT — PATIENT HEALTH QUESTIONNAIRE - PHQ9
SUM OF ALL RESPONSES TO PHQ QUESTIONS 1-9: 5
SUM OF ALL RESPONSES TO PHQ QUESTIONS 1-9: 5
10. IF YOU CHECKED OFF ANY PROBLEMS, HOW DIFFICULT HAVE THESE PROBLEMS MADE IT FOR YOU TO DO YOUR WORK, TAKE CARE OF THINGS AT HOME, OR GET ALONG WITH OTHER PEOPLE: SOMEWHAT DIFFICULT

## 2022-03-22 ASSESSMENT — PAIN SCALES - GENERAL: PAINLEVEL: MODERATE PAIN (5)

## 2022-03-22 NOTE — PROGRESS NOTES
[facility] [place]  [address]  [place] [address]  Phone: [phone]  Fax: [phone]  Primary Provider: Regina Campos  Pre-op Performing Provider: REGINA CAMPOS      PREOPERATIVE EVALUATION:  Today's date: 3/22/2022    Kate Chacon is a 76 year old female who presents for a preoperative evaluation.    Surgical Information:  Surgery/Procedure: Parathyroidectomy  Surgery Location: [facility]  Surgeon: Dr Teran  Surgery Date: 04/06/2022  Time of Surgery: TBD  Where patient plans to recover: At home with family  Fax number for surgical facility: [phone]    Type of Anesthesia Anticipated: to be determined        Subjective     HPI related to upcoming procedure: Parathyroidectomy.    Patient has had significant issues with her back.  During work-up, was found to be extremely osteoporotic and saw endocrinology who identified hyperparathyroidism.  She is now scheduled for parathyroidectomy.    She has a history of atrial fibrillation with an embolic stroke in the past.  She is currently rate controlled and anticoagulated and has done well.  Her biggest limitation is her degenerative lumbar spine disease.    Preop Questions 3/22/2022   1. Have you ever had a heart attack or stroke? YES - stroke   2. Have you ever had surgery on your heart or blood vessels, such as a stent placement, a coronary artery bypass, or surgery on an artery in your head, neck, heart, or legs? No   3. Do you have chest pain with activity? No   4. Do you have a history of  heart failure? No   5. Do you currently have a cold, bronchitis or symptoms of other infection? No   6. Do you have a cough, shortness of breath, or wheezing? No   7. Do you or anyone in your family have previous history of blood clots? Unknown   8. Do you or does anyone in your family have a serious bleeding problem such as prolonged bleeding following surgeries or cuts? No   9. Have you ever had problems with anemia or been told  to take iron pills? No   10. Have you had any abnormal blood loss such as black, tarry or bloody stools, or abnormal vaginal bleeding? No   11. Have you ever had a blood transfusion? No   12. Are you willing to have a blood transfusion if it is medically needed before, during, or after your surgery? Yes   13. Have you or any of your relatives ever had problems with anesthesia? No   14. Do you have sleep apnea, excessive snoring or daytime drowsiness? No   15. Do you have any artifical heart valves or other implanted medical devices like a pacemaker, defibrillator, or continuous glucose monitor? YES - pacemaker   15a. What type of device do you have? Pacemaker   15b. Name of the clinic that manages your device:  [facility]   16. Do you have artificial joints? No   17. Are you allergic to latex? No       Health Care Directive:  Patient does not have a Health Care Directive or Living Will: Discussed advance care planning with patient; however, patient declined at this time.    Preoperative Review of :   reviewed - controlled substances reflected in medication list.  PDMP Review       Value Time User    State PDMP site checked  Yes 3/22/2022 11:38 AM Asher Campos MD           Status of Chronic Conditions:  A-FIB - Patient has a longstanding history of chronic A-fib currently on rate control. Current treatment regimen includes Warfarin for stroke prevention and denies significant symptoms of lightheadedness, palpitations or dyspnea.     DEPRESSION - Patient has a long history of Depression of moderate severity requiring medication for control with recent symptoms being stable..Current symptoms of depression include none.     HYPERTENSION - Patient has longstanding history of HTN , currently denies any symptoms referable to elevated blood pressure. Specifically denies chest pain, palpitations, dyspnea, orthopnea, PND or peripheral edema. Blood pressure readings have been in normal range.  Current medication regimen is as listed below. Patient denies any side effects of medication.       Review of Systems  Constitutional, neuro, ENT, endocrine, pulmonary, cardiac, gastrointestinal, genitourinary, musculoskeletal, integument and psychiatric systems are negative, except as otherwise noted.    Patient Active Problem List    Diagnosis Date Noted     Hemiparesis due to old stroke - right side is weaker 10/19/2020     Priority: Medium     Compression fracture of T10 vertebra with routine healing, subsequent encounter 10/19/2020     Priority: Medium     History of CVA (cerebrovascular accident) 10/19/2020     Priority: Medium     Physical deconditioning 10/19/2020     Priority: Medium     Lumbar radiculopathy 10/19/2020     Priority: Medium     Chondrodermatitis nodularis chronica helicis, right 10/09/2020     Priority: Medium     SA node dysfunction s/p pacer on 11/2/2017 at [facility] 08/13/2020     Priority: Medium     H/O sinus pause 08/13/2020     Priority: Medium     History of tobacco abuse quitting on 10/14/16 08/13/2020     Priority: Medium     Dyspnea on exertion 08/13/2020     Priority: Medium     On Coumadin for atrial fibrillation (H) 08/13/2020     Priority: Medium     Mixed hyperlipidemia 08/13/2020     Priority: Medium     CKD (chronic kidney disease) stage 3, GFR 30-59 ml/min (H) 08/13/2020     Priority: Medium     Chronic midline low back pain without sciatica 08/13/2020     Priority: Medium     Sinoatrial node dysfunction (H) 05/01/2018     Priority: Medium     Cardiac pacemaker 02/19/2018     Priority: Medium     Urge incontinence 10/05/2017     Priority: Medium     Former smoker 08/30/2017     Priority: Medium     MDD (recurrent major depressive disorder) in remission (H) 08/30/2017     Priority: Medium     Anxiety 01/05/2017     Priority: Medium     Essential hypertension 12/08/2016     Priority: Medium     Paroxysmal atrial fibrillation (H) 11/02/2016     Priority: Medium      Anticoagulation goal of INR 2 to 3 10/28/2016     Priority: Medium     History of ischemic stroke on 10/15/2016 secondary to embolism 10/15/2016     Priority: Medium     Diverticulosis of large intestine 04/04/2011     Priority: Medium     H/O adenomatous polyp of colon 03/02/2011     Priority: Medium     Osteoporosis 02/08/2006     Priority: Medium      Past Medical History:   Diagnosis Date     Encounter for full-term uncomplicated delivery     x3     Ganglion of wrist     right     Gastritis without bleeding     treated and 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     Past Surgical History:   Procedure Laterality Date     APPENDECTOMY OPEN      No Comments Provided     COLONOSCOPY  08/19/2005 8/19/05,Cecal biopsy with tubular adenoma low grade dysplasia.     COLONOSCOPY  04/04/2011 4/4/11     COLONOSCOPY  05/07/2012    Tubular Adenomas F/U 2017     COLONOSCOPY  12/02/2019    F/U N/A as will be over 78 in 2024. Tubular adenoma     ESOPHAGOSCOPY, GASTROSCOPY, DUODENOSCOPY (EGD), COMBINED      8/19/05     OTHER SURGICAL HISTORY      8/3/05,560204,OTHER,helices on the right ear     TONSILLECTOMY      No Comments Provided     Current Outpatient Medications   Medication Sig Dispense Refill     atorvastatin (LIPITOR) 40 MG tablet Take 1 tablet (40 mg) by mouth At Bedtime 90 tablet 3     calcium carbonate 750 MG CHEW Take 1 tablet by mouth every 8 hours as needed for heartburn       HYDROcodone-acetaminophen (NORCO) 7.5-325 MG per tablet Take 1-2 tablets by mouth every 6 hours as needed for severe pain 30 tablet 0     lisinopril (ZESTRIL) 20 MG tablet Take 1 tablet by mouth once daily 90 tablet 11     metoprolol succinate ER (TOPROL-XL) 50 MG 24 hr tablet Take 1 tablet (50 mg) by mouth daily 90 tablet 3     pregabalin (LYRICA) 75 MG capsule Take 1 capsule (75 mg) by mouth 2 times daily 60 capsule 5     sertraline (ZOLOFT) 50 MG tablet TAKE 1 TABLET BY MOUTH AT BEDTIME 90 tablet 0     vitamin B complex  "with vitamin C (VITAMIN  B COMPLEX) tablet Take 1 tablet by mouth daily       vitamin D3 (CHOLECALCIFEROL) 50 mcg (2000 units) tablet Take 1 tablet by mouth daily       warfarin ANTICOAGULANT (COUMADIN) 5 MG tablet TAKE 1 TABLET (5MG) BY MOUTH ONCE DAILY FOR 6 DAYS PER WEEK AND ONE-HALF TAB (2.5MG) FOR 1 DAY PER WEEK OR AS DIRECTED BY [facility] 90 tablet 1     ibuprofen (ADVIL/MOTRIN) 200 MG tablet Take 200 mg by mouth every 4 hours as needed for mild pain (Patient not taking: Reported on 3/22/2022)         Allergies   Allergen Reactions     Methylpar-Na Bisulfite-Pentazocine Unknown     jittery        Social History     Tobacco Use     Smoking status: Former Smoker     Packs/day: 0.50     Years: 49.00     Pack years: 24.50     Types: Cigarettes     Quit date: 10/14/2016     Years since quittin.4     Smokeless tobacco: Never Used   Substance Use Topics     Alcohol use: No     Family History   Problem Relation Age of Onset     Diabetes Father         Diabetes     Hypertension Father         Hypertension     Other - See Comments Mother         h/o coronary artery disease/dementia     Asthma Mother         Asthma     Diabetes Maternal Grandmother         Diabetes     Cancer Maternal Aunt         Cancer,Uterine     Breast Cancer No family hx of         Cancer-breast     History   Drug Use No         Objective     /80   Pulse 60   Temp 97.5  F (36.4  C) (Tympanic)   Resp 20   Ht 1.657 m (5' 5.25\")   Wt 80.3 kg (177 lb)   LMP  (LMP Unknown)   SpO2 96%   Breastfeeding No   BMI 29.23 kg/m      Physical Exam  General Appearance: Pleasant, alert, appropriate appearance for age. No acute distress  Head Exam: Normal. Normocephalic, atraumatic.  Eye Exam: PERRLA, EOMI, conjunctivae, sclerae normal.  Ear Exam: Normal TM's bilaterally. Normal auditory canals and external ears. Non-tender.  Nose Exam: Normal external nose, mucus membranes, and septum.  OroPharynx Exam:  Dental hygiene adequate. Normal buccal " mucosa. Normal pharynx.  Neck Exam:  Supple, no masses or nodes. No bruits  Thyroid Exam: No nodules or enlargement.  Chest/Respiratory Exam: Normal chest wall and respirations. Clear to auscultation.  Cardiovascular Exam: Regular rate and rhythm. S1, S2, no murmur, click, gallop, or rubs.  Gastrointestinal Exam: Soft, non-tender, no masses or organomegaly.  Lymphatic Exam: Non-palpable nodes in neck,clavicular, axillary, or inguinal regions.  Musculoskeletal Exam: Back is straight and non-tender, full ROM of upper and lower extremities.  Foot Exam: Left and right foot: good pedal pulses  Skin: no rash or abnormalities  Neurologic Exam:  normal gross motor, tone coordination and no tremor.  Psychiatric Exam: Alert and oriented - appropriate affect.     Recent Labs   Lab Test 03/22/22  0957 03/09/22  1049 02/08/22  0959 02/01/22  1034 07/13/20  0000 06/21/20 2002   HGB  --   --   --   --   --  13.9   PLT  --   --   --   --   --  272   INR 2.3* 1.7*   < >  --    < >  --    NA  --   --   --  140  --  138   POTASSIUM  --   --   --  3.9  --  3.3*   CR  --   --   --  1.03  --  1.10    < > = values in this interval not displayed.        Diagnostics:  Recent Results (from the past 24 hour(s))   INR POCT    Collection Time: 03/22/22  9:57 AM   Result Value Ref Range    INR Protime 2.3 (A) 0.9 - 1.1   Comprehensive metabolic panel    Collection Time: 03/22/22 11:09 AM   Result Value Ref Range    Sodium 140 134 - 144 mmol/L    Potassium 4.9 3.5 - 5.1 mmol/L    Chloride 108 (H) 98 - 107 mmol/L    Carbon Dioxide (CO2) 26 21 - 31 mmol/L    Anion Gap 6 3 - 14 mmol/L    Urea Nitrogen 20 7 - 25 mg/dL    Creatinine 1.03 0.60 - 1.20 mg/dL    Calcium 10.5 (H) 8.6 - 10.3 mg/dL    Glucose 93 70 - 105 mg/dL    Alkaline Phosphatase 78 34 - 104 U/L    AST 41 (H) 13 - 39 U/L    ALT 39 7 - 52 U/L    Protein Total 6.5 6.4 - 8.9 g/dL    Albumin 3.9 3.5 - 5.7 g/dL    Bilirubin Total 0.6 0.3 - 1.0 mg/dL    GFR Estimate 55 (L) >60 mL/min/1.73m2    CBC with platelets and differential    Collection Time: 03/22/22 11:09 AM   Result Value Ref Range    WBC Count 6.1 4.0 - 11.0 10e3/uL    RBC Count 4.94 3.80 - 5.20 10e6/uL    Hemoglobin 16.0 (H) 11.7 - 15.7 g/dL    Hematocrit 48.5 (H) 35.0 - 47.0 %    MCV 98 78 - 100 fL    MCH 32.4 26.5 - 33.0 pg    MCHC 33.0 31.5 - 36.5 g/dL    RDW 12.9 10.0 - 15.0 %    Platelet Count 209 150 - 450 10e3/uL    % Neutrophils 57 %    % Lymphocytes 27 %    % Monocytes 13 %    % Eosinophils 2 %    % Basophils 1 %    % Immature Granulocytes 0 %    NRBCs per 100 WBC 0 <1 /100    Absolute Neutrophils 3.5 1.6 - 8.3 10e3/uL    Absolute Lymphocytes 1.6 0.8 - 5.3 10e3/uL    Absolute Monocytes 0.8 0.0 - 1.3 10e3/uL    Absolute Eosinophils 0.1 0.0 - 0.7 10e3/uL    Absolute Basophils 0.0 0.0 - 0.2 10e3/uL    Absolute Immature Granulocytes 0.0 <=0.4 10e3/uL    Absolute NRBCs 0.0 10e3/uL   EKG 12-lead, tracing only (Same Day)    Collection Time: 03/22/22 12:07 PM   Result Value Ref Range    Systolic Blood Pressure  mmHg    Diastolic Blood Pressure  mmHg    Ventricular Rate 60 BPM    Atrial Rate 60 BPM    NH Interval  ms    QRS Duration 146 ms     ms    QTc 406 ms    P Axis  degrees    R AXIS -70 degrees    T Axis 109 degrees    Interpretation ECG       Ventricular-paced rhythm  Abnormal ECG  When compared with ECG of 13-AUG-2020 14:54,  Premature ventricular complexes are no longer Present  Vent. rate has decreased BY  36 BPM        EKG: Ventricular paced rhythm, rate of 60.  Unchanged from previous, unchanged from previous tracings     Kate was seen today for pre-op exam.    Diagnoses and all orders for this visit:    Preop general physical exam  -     EKG 12-lead, tracing only (Same Day)  -     CBC with Platelets & Differential; Future  -     Comprehensive metabolic panel; Future  -     Comprehensive metabolic panel  -     CBC with Platelets & Differential    Paroxysmal atrial fibrillation (H)  -     EKG 12-lead, tracing only (Same  Day)    Essential hypertension  -     EKG 12-lead, tracing only (Same Day)  -     CBC with Platelets & Differential; Future  -     Comprehensive metabolic panel; Future  -     Comprehensive metabolic panel  -     CBC with Platelets & Differential    Cardiac pacemaker    History of ischemic stroke    On Coumadin for atrial fibrillation (H)    Hyperparathyroidism (H)       Ok to go ahead with planned procedure.  She will stop warfarin 5 days prior to surgery.  No aspirin ibuprofen 5 days prior to surgery.  Okay to take Tylenol. OK to take regular medications with a sip of water the  morning of surgery.     Revised Cardiac Risk Index (RCRI):  The patient has the following serious cardiovascular risks for perioperative complications:   - Cerebrovascular Disease (TIA or CVA) = 1 point     RCRI Interpretation: 1 point: Class II (low risk - 0.9% complication rate)           Signed Electronically by: Asher Campos MD  Copy of this evaluation report is provided to requesting physician.      Answers for HPI/ROS submitted by the patient on 3/22/2022  If you checked off any problems, how difficult have these problems made it for you to do your work, take care of things at home, or get along with other people?: Somewhat difficult  PHQ9 TOTAL SCORE: 5  ANETTE 7 TOTAL SCORE: 1

## 2022-03-22 NOTE — PROGRESS NOTES
ANTICOAGULATION FOLLOW-UP CLINIC VISIT    Patient Name:  Kate Chacon  Date:  3/22/2022  Contact Type:  Face to Face    SUBJECTIVE:  Patient Findings     Positives:  Upcoming invasive procedure (having thyroid removed)        Clinical Outcomes     Negatives:  Major bleeding event, Thromboembolic event, Anticoagulation-related hospital admission, Anticoagulation-related ED visit, Anticoagulation-related fatality           OBJECTIVE    Recent labs: (last 7 days)     22  0957   INR 2.3*       ASSESSMENT / PLAN  INR assessment THER    Recheck INR In: 2 WEEKS    INR Location Clinic      Anticoagulation Summary  As of 3/22/2022    INR goal:  2.0-3.0   TTR:  74.5 % (1 y)   INR used for dosin.3 (3/22/2022)   Warfarin maintenance plan:  7.5 mg (5 mg x 1.5) every Wed; 5 mg (5 mg x 1) all other days   Full warfarin instructions:  4/1: Hold; 4/2: Hold; 4/3: Hold; 4/4: Hold; 4/5: Hold; Otherwise 7.5 mg every Wed; 5 mg all other days   Weekly warfarin total:  37.5 mg   Plan last modified:  Radha uBrns, RN (3/9/2022)   Next INR check:  2022   Priority:  Maintenance   Target end date:  Indefinite    Indications    Paroxysmal atrial fibrillation (H) [I48.0]  Anticoagulation goal of INR 2 to 3 [Z51.81  Z79.01]             Anticoagulation Episode Summary     INR check location:      Preferred lab:      Send INR reminders to:  [facility]    Comments:        Anticoagulation Care Providers     Provider Role Specialty Phone number    Asher Campos MD Referring Family Medicine [phone]            See the Encounter Report to view Anticoagulation Flowsheet and Dosing Calendar (Go to Encounters tab in chart review, and find the Anticoagulation Therapy Visit)        Radha Burns, RN

## 2022-03-22 NOTE — PATIENT INSTRUCTIONS
Stop warfarin April Fool's day.  No aspirin or ibuprofen for 5 days prior to surgery.    Ok to take Tylenol (acetaminophen).    OK to take regular medications with a sip of water the  morning of surgery.

## 2022-03-23 ASSESSMENT — ANXIETY QUESTIONNAIRES: GAD7 TOTAL SCORE: 1

## 2022-03-25 LAB
ATRIAL RATE - MUSE: 60 BPM
DIASTOLIC BLOOD PRESSURE - MUSE: NORMAL MMHG
INTERPRETATION ECG - MUSE: NORMAL
P AXIS - MUSE: NORMAL DEGREES
PR INTERVAL - MUSE: NORMAL MS
QRS DURATION - MUSE: 146 MS
QT - MUSE: 406 MS
QTC - MUSE: 406 MS
R AXIS - MUSE: -70 DEGREES
SYSTOLIC BLOOD PRESSURE - MUSE: NORMAL MMHG
T AXIS - MUSE: 109 DEGREES
VENTRICULAR RATE- MUSE: 60 BPM

## 2022-04-03 DIAGNOSIS — F33.40 MDD (RECURRENT MAJOR DEPRESSIVE DISORDER) IN REMISSION (H): ICD-10-CM

## 2022-04-05 NOTE — TELEPHONE ENCOUNTER
Per quality report this patient is overdue for a medicare annual wellness visit. Will route to provider to review and advise. Lillian Dodd RN  ....................  4/5/2022   3:51 PM

## 2022-04-05 NOTE — TELEPHONE ENCOUNTER
" Disp Refills Start End YOGESH   sertraline (ZOLOFT) 50 MG tablet 90 tablet 0 12/24/2021  No   Sig: TAKE 1 TABLET BY MOUTH AT BEDTIME       LOV: 3/22/2022  Future Office visit: No future appointment scheduled at this time.     Routing refill request to provider for review/approval.    Requested Prescriptions   Pending Prescriptions Disp Refills     sertraline (ZOLOFT) 50 MG tablet [Pharmacy Med Name: Sertraline HCl 50 MG Oral Tablet] 90 tablet 0     Sig: TAKE 1 TABLET BY MOUTH AT BEDTIME       SSRIs Protocol Failed - 4/3/2022  5:12 PM        Failed - PHQ-9 score less than 5 in past 6 months     Please review last PHQ-9 score.   5 3/22/2022        Passed - Medication is active on med list        Passed - Patient is age 18 or older        Passed - No active pregnancy on record        Passed - No positive pregnancy test in last 12 months        Passed - Recent (6 mo) or future (30 days) visit within the authorizing provider's specialty     Patient had office visit in the last 6 months or has a visit in the next 30 days with authorizing provider or within the authorizing provider's specialty.  See \"Patient Info\" tab in inbasket, or \"Choose Columns\" in Meds & Orders section of the refill encounter.             Routed to provider for review and consideration. Lillian Dodd RN  ....................  4/5/2022   3:49 PM      "

## 2022-04-13 ENCOUNTER — ANTICOAGULATION THERAPY VISIT (OUTPATIENT)
Dept: ANTICOAGULATION | Facility: OTHER | Age: 77
End: 2022-04-13
Attending: FAMILY MEDICINE
Payer: MEDICARE

## 2022-04-13 DIAGNOSIS — Z51.81 ANTICOAGULATION GOAL OF INR 2 TO 3: ICD-10-CM

## 2022-04-13 DIAGNOSIS — I48.0 PAROXYSMAL ATRIAL FIBRILLATION (H): Primary | ICD-10-CM

## 2022-04-13 DIAGNOSIS — Z79.01 ANTICOAGULATION GOAL OF INR 2 TO 3: ICD-10-CM

## 2022-04-13 LAB — INR POINT OF CARE: 1.6 (ref 0.9–1.1)

## 2022-04-13 PROCEDURE — 85610 PROTHROMBIN TIME: CPT | Mod: ZL,QW

## 2022-04-13 NOTE — PROGRESS NOTES
ANTICOAGULATION FOLLOW-UP CLINIC VISIT    Patient Name:  Kate Chacon  Date:  2022  Contact Type:  Face to Face    SUBJECTIVE:  Patient Findings     Positives:  Missed doses (had tyroid removed last week was holding warfarin), Change in medications (started calcuim with vitamin D)        Clinical Outcomes     Negatives:  Major bleeding event, Thromboembolic event, Anticoagulation-related hospital admission, Anticoagulation-related ED visit, Anticoagulation-related fatality           OBJECTIVE    Recent labs: (last 7 days)     22  1113   INR 1.6*       ASSESSMENT / PLAN  INR assessment SUB    Recheck INR In: 1 WEEK    INR Location Clinic      Anticoagulation Summary  As of 2022    INR goal:  2.0-3.0   TTR:  71.9 % (1 y)   INR used for dosin.6 (2022)   Warfarin maintenance plan:  7.5 mg (5 mg x 1.5) every Wed; 5 mg (5 mg x 1) all other days   Full warfarin instructions:  7.5 mg every Wed; 5 mg all other days   Weekly warfarin total:  37.5 mg   No change documented:  Radha Burns RN   Plan last modified:  Radha Burns RN (3/9/2022)   Next INR check:  2022   Priority:  High   Target end date:  Indefinite    Indications    Paroxysmal atrial fibrillation (H) [I48.0]  Anticoagulation goal of INR 2 to 3 [Z51.81  Z79.01]             Anticoagulation Episode Summary     INR check location:      Preferred lab:      Send INR reminders to:  [facility]    Comments:        Anticoagulation Care Providers     Provider Role Specialty Phone number    Asher Campos MD Referring Family Medicine [phone]            See the Encounter Report to view Anticoagulation Flowsheet and Dosing Calendar (Go to Encounters tab in chart review, and find the Anticoagulation Therapy Visit)        Radha Burns RN

## 2022-04-20 ENCOUNTER — ANTICOAGULATION THERAPY VISIT (OUTPATIENT)
Dept: ANTICOAGULATION | Facility: OTHER | Age: 77
End: 2022-04-20
Payer: MEDICARE

## 2022-04-20 DIAGNOSIS — Z79.01 ANTICOAGULATION GOAL OF INR 2 TO 3: ICD-10-CM

## 2022-04-20 DIAGNOSIS — I48.0 PAROXYSMAL ATRIAL FIBRILLATION (H): Primary | ICD-10-CM

## 2022-04-20 DIAGNOSIS — Z51.81 ANTICOAGULATION GOAL OF INR 2 TO 3: ICD-10-CM

## 2022-04-20 LAB — INR POINT OF CARE: 3.8 (ref 0.9–1.1)

## 2022-04-20 PROCEDURE — 85610 PROTHROMBIN TIME: CPT | Mod: ZL,QW

## 2022-04-20 NOTE — PROGRESS NOTES
ANTICOAGULATION FOLLOW-UP CLINIC VISIT    Patient Name:  Kaet Chacon  Date:  4/20/2022  Contact Type:  Face to Face    SUBJECTIVE:  Patient Findings         Clinical Outcomes     Negatives:  Major bleeding event, Thromboembolic event, Anticoagulation-related hospital admission, Anticoagulation-related ED visit, Anticoagulation-related fatality           OBJECTIVE    Recent labs: (last 7 days)     04/20/22  0916   INR 3.8*       ASSESSMENT / PLAN  INR assessment SUPRA    Recheck INR In: 1 WEEK    INR Location Clinic      Anticoagulation Summary  As of 4/20/2022    INR goal:  2.0-3.0   TTR:  72.4 % (1 y)   INR used for dosing:  3.8 (4/20/2022)   Warfarin maintenance plan:  5 mg (5 mg x 1) every day   Full warfarin instructions:  4/20: Hold; Otherwise 5 mg every day   Weekly warfarin total:  35 mg   Plan last modified:  Muriel Kramer RN (4/20/2022)   Next INR check:  4/27/2022   Priority:  High   Target end date:  Indefinite    Indications    Paroxysmal atrial fibrillation (H) [I48.0]  Anticoagulation goal of INR 2 to 3 [Z51.81  Z79.01]             Anticoagulation Episode Summary     INR check location:      Preferred lab:      Send INR reminders to:  [facility]    Comments:        Anticoagulation Care Providers     Provider Role Specialty Phone number    Asher Cmapos MD Referring Family Medicine [phone]            See the Encounter Report to view Anticoagulation Flowsheet and Dosing Calendar (Go to Encounters tab in chart review, and find the Anticoagulation Therapy Visit)        Muriel Kramer RN

## 2022-04-27 ENCOUNTER — ANTICOAGULATION THERAPY VISIT (OUTPATIENT)
Dept: ANTICOAGULATION | Facility: OTHER | Age: 77
End: 2022-04-27
Attending: FAMILY MEDICINE
Payer: MEDICARE

## 2022-04-27 DIAGNOSIS — I48.0 PAROXYSMAL ATRIAL FIBRILLATION (H): Primary | ICD-10-CM

## 2022-04-27 DIAGNOSIS — Z79.01 ANTICOAGULATION GOAL OF INR 2 TO 3: ICD-10-CM

## 2022-04-27 DIAGNOSIS — Z51.81 ANTICOAGULATION GOAL OF INR 2 TO 3: ICD-10-CM

## 2022-04-27 LAB — INR POINT OF CARE: 2.7 (ref 0.9–1.1)

## 2022-04-27 PROCEDURE — 85610 PROTHROMBIN TIME: CPT | Mod: ZL,QW

## 2022-04-27 NOTE — PROGRESS NOTES
ANTICOAGULATION FOLLOW-UP CLINIC VISIT    Patient Name:  Kate Chacon  Date:  2022  Contact Type:  Face to Face    SUBJECTIVE:  Patient Findings         Clinical Outcomes     Negatives:  Major bleeding event, Thromboembolic event, Anticoagulation-related hospital admission, Anticoagulation-related ED visit, Anticoagulation-related fatality           OBJECTIVE    Recent labs: (last 7 days)     22  0830   INR 2.7*       ASSESSMENT / PLAN  INR assessment THER    Recheck INR In: 2 WEEKS    INR Location Clinic      Anticoagulation Summary  As of 2022    INR goal:  2.0-3.0   TTR:  71.0 % (1 y)   INR used for dosin.7 (2022)   Warfarin maintenance plan:  5 mg (5 mg x 1) every day   Full warfarin instructions:  5 mg every day   Weekly warfarin total:  35 mg   No change documented:  Boni Freire RN   Plan last modified:  Muriel Kramer RN (2022)   Next INR check:  2022   Priority:  Maintenance   Target end date:  Indefinite    Indications    Paroxysmal atrial fibrillation (H) [I48.0]  Anticoagulation goal of INR 2 to 3 [Z51.81  Z79.01]             Anticoagulation Episode Summary     INR check location:      Preferred lab:      Send INR reminders to:  [facility]    Comments:        Anticoagulation Care Providers     Provider Role Specialty Phone number    Asher Campos MD Referring Family Medicine [phone]            See the Encounter Report to view Anticoagulation Flowsheet and Dosing Calendar (Go to Encounters tab in chart review, and find the Anticoagulation Therapy Visit)    Boni Freire RN

## 2022-05-10 ENCOUNTER — ANCILLARY PROCEDURE (OUTPATIENT)
Dept: CARDIOLOGY | Facility: CLINIC | Age: 77
End: 2022-05-10
Attending: INTERNAL MEDICINE
Payer: MEDICARE

## 2022-05-10 DIAGNOSIS — I48.20 CHRONIC A-FIB (H): ICD-10-CM

## 2022-05-10 PROCEDURE — 93296 REM INTERROG EVL PM/IDS: CPT

## 2022-05-10 PROCEDURE — 93294 REM INTERROG EVL PM/LDLS PM: CPT | Performed by: INTERNAL MEDICINE

## 2022-05-11 ENCOUNTER — ANTICOAGULATION THERAPY VISIT (OUTPATIENT)
Dept: ANTICOAGULATION | Facility: OTHER | Age: 77
End: 2022-05-11
Attending: FAMILY MEDICINE
Payer: MEDICARE

## 2022-05-11 DIAGNOSIS — Z51.81 ANTICOAGULATION GOAL OF INR 2 TO 3: ICD-10-CM

## 2022-05-11 DIAGNOSIS — Z79.01 ANTICOAGULATION GOAL OF INR 2 TO 3: ICD-10-CM

## 2022-05-11 DIAGNOSIS — I48.0 PAROXYSMAL ATRIAL FIBRILLATION (H): Primary | ICD-10-CM

## 2022-05-11 LAB — INR POINT OF CARE: 1.9 (ref 0.9–1.1)

## 2022-05-11 PROCEDURE — 36416 COLLJ CAPILLARY BLOOD SPEC: CPT | Mod: ZL

## 2022-05-11 PROCEDURE — 85610 PROTHROMBIN TIME: CPT | Mod: ZL,QW

## 2022-05-11 NOTE — PROGRESS NOTES
ANTICOAGULATION MANAGEMENT     Kate Chacon 76 year old female is on warfarin with subtherapeutic INR result. (Goal INR 2.0-3.0)    Recent labs: (last 7 days)     05/11/22  0959   INR 1.9*       ASSESSMENT       Source(s): Chart Review and in person       Warfarin doses taken: Warfarin taken as instructed    Diet: No new diet changes identified    New illness, injury, or hospitalization: No    Medication/supplement changes: None noted    Signs or symptoms of bleeding or clotting: No    Previous INR: Therapeutic last visit; previously outside of goal range    Additional findings: None       PLAN     Recommended plan for no diet, medication or health factor changes affecting INR     Dosing Instructions: Increase your warfarin dose (7.1% change) with next INR in 2 weeks       Summary  As of 5/11/2022    Full warfarin instructions:  7.5 mg every Wed; 5 mg all other days   Next INR check:  5/25/2022             in person    Patient elected to schedule next visit 5/25/22    Education provided: Written instructions provided    Plan made per [facility] anticoagulation protocol    Radha Burns RN  Anticoagulation Clinic  5/11/2022    _______________________________________________________________________     Anticoagulation Episode Summary     Current INR goal:  2.0-3.0   TTR:  70.6 % (1 y)   Target end date:  Indefinite   Send INR reminders to:  [facility]    Indications    Paroxysmal atrial fibrillation (H) [I48.0]  Anticoagulation goal of INR 2 to 3 [Z51.81  Z79.01]           Comments:           Anticoagulation Care Providers     Provider Role Specialty Phone number    Asher Campos MD Referring Family Medicine [phone]

## 2022-05-17 DIAGNOSIS — Z51.81 ANTICOAGULATION GOAL OF INR 2 TO 3: ICD-10-CM

## 2022-05-17 DIAGNOSIS — Z79.01 ANTICOAGULATION GOAL OF INR 2 TO 3: ICD-10-CM

## 2022-05-17 RX ORDER — WARFARIN SODIUM 5 MG/1
TABLET ORAL
Qty: 110 TABLET | Refills: 1 | Status: SHIPPED | OUTPATIENT
Start: 2022-05-17 | End: 2022-11-22

## 2022-05-25 ENCOUNTER — ANTICOAGULATION THERAPY VISIT (OUTPATIENT)
Dept: ANTICOAGULATION | Facility: OTHER | Age: 77
End: 2022-05-25
Attending: FAMILY MEDICINE
Payer: MEDICARE

## 2022-05-25 DIAGNOSIS — Z51.81 ANTICOAGULATION GOAL OF INR 2 TO 3: ICD-10-CM

## 2022-05-25 DIAGNOSIS — Z79.01 ANTICOAGULATION GOAL OF INR 2 TO 3: ICD-10-CM

## 2022-05-25 DIAGNOSIS — I48.0 PAROXYSMAL ATRIAL FIBRILLATION (H): Primary | ICD-10-CM

## 2022-05-25 LAB — INR POINT OF CARE: 2 (ref 0.9–1.1)

## 2022-05-25 PROCEDURE — 85610 PROTHROMBIN TIME: CPT | Mod: ZL,QW

## 2022-05-25 NOTE — PROGRESS NOTES
ANTICOAGULATION MANAGEMENT     Kate Chacon 76 year old female is on warfarin with therapeutic INR result. (Goal INR 2.0-3.0)    Recent labs: (last 7 days)     05/25/22  1149   INR 2.0*       ASSESSMENT       Source(s): Chart Review and in person       Warfarin doses taken: Warfarin taken as instructed    Diet: No new diet changes identified    New illness, injury, or hospitalization: No    Medication/supplement changes: None noted    Signs or symptoms of bleeding or clotting: No    Previous INR: Subtherapeutic    Additional findings: None       PLAN     Recommended plan for no diet, medication or health factor changes affecting INR     Dosing Instructions: continue your current warfarin dose with next INR in 4 weeks       Summary  As of 5/25/2022    Full warfarin instructions:  7.5 mg every Wed; 5 mg all other days   Next INR check:  6/22/2022             in person    Patient elected to schedule next visit 6/22/22    Education provided: None required    Plan made per [facility] anticoagulation protocol    Radha Burns, RN  Anticoagulation Clinic  5/25/2022    _______________________________________________________________________     Anticoagulation Episode Summary     Current INR goal:  2.0-3.0   TTR:  66.7 % (1 y)   Target end date:  Indefinite   Send INR reminders to:  [facility]    Indications    Paroxysmal atrial fibrillation (H) [I48.0]  Anticoagulation goal of INR 2 to 3 [Z51.81  Z79.01]           Comments:           Anticoagulation Care Providers     Provider Role Specialty Phone number    Asher Campos MD Referring Family Medicine [phone]

## 2022-06-03 LAB
MDC_IDC_EPISODE_DTM: NORMAL
MDC_IDC_EPISODE_DTM: NORMAL
MDC_IDC_EPISODE_ID: NORMAL
MDC_IDC_EPISODE_ID: NORMAL
MDC_IDC_EPISODE_TYPE: NORMAL
MDC_IDC_EPISODE_TYPE: NORMAL
MDC_IDC_LEAD_IMPLANT_DT: NORMAL
MDC_IDC_LEAD_IMPLANT_DT: NORMAL
MDC_IDC_LEAD_LOCATION: NORMAL
MDC_IDC_LEAD_LOCATION: NORMAL
MDC_IDC_LEAD_LOCATION_DETAIL_1: NORMAL
MDC_IDC_LEAD_LOCATION_DETAIL_1: NORMAL
MDC_IDC_LEAD_MFG: NORMAL
MDC_IDC_LEAD_MFG: NORMAL
MDC_IDC_LEAD_MODEL: NORMAL
MDC_IDC_LEAD_MODEL: NORMAL
MDC_IDC_LEAD_POLARITY_TYPE: NORMAL
MDC_IDC_LEAD_POLARITY_TYPE: NORMAL
MDC_IDC_LEAD_SERIAL: NORMAL
MDC_IDC_LEAD_SERIAL: NORMAL
MDC_IDC_MSMT_BATTERY_DTM: NORMAL
MDC_IDC_MSMT_BATTERY_REMAINING_LONGEVITY: 60 MO
MDC_IDC_MSMT_BATTERY_REMAINING_PERCENTAGE: 98 %
MDC_IDC_MSMT_BATTERY_STATUS: NORMAL
MDC_IDC_MSMT_LEADCHNL_RA_IMPEDANCE_VALUE: 730 OHM
MDC_IDC_MSMT_LEADCHNL_RV_IMPEDANCE_VALUE: 687 OHM
MDC_IDC_MSMT_LEADCHNL_RV_PACING_THRESHOLD_AMPLITUDE: 0.7 V
MDC_IDC_MSMT_LEADCHNL_RV_PACING_THRESHOLD_PULSEWIDTH: 0.4 MS
MDC_IDC_PG_IMPLANT_DTM: NORMAL
MDC_IDC_PG_MFG: NORMAL
MDC_IDC_PG_MODEL: NORMAL
MDC_IDC_PG_SERIAL: NORMAL
MDC_IDC_PG_TYPE: NORMAL
MDC_IDC_SESS_CLINIC_NAME: NORMAL
MDC_IDC_SESS_DTM: NORMAL
MDC_IDC_SESS_TYPE: NORMAL
MDC_IDC_SET_BRADY_AT_MODE_SWITCH_RATE: 170 {BEATS}/MIN
MDC_IDC_SET_BRADY_LOWRATE: 60 {BEATS}/MIN
MDC_IDC_SET_BRADY_MAX_SENSOR_RATE: 130 {BEATS}/MIN
MDC_IDC_SET_BRADY_MODE: NORMAL
MDC_IDC_SET_LEADCHNL_RA_SENSING_ADAPTATION_MODE: NORMAL
MDC_IDC_SET_LEADCHNL_RA_SENSING_SENSITIVITY: 0.25 MV
MDC_IDC_SET_LEADCHNL_RV_PACING_AMPLITUDE: 1.1 V
MDC_IDC_SET_LEADCHNL_RV_PACING_CAPTURE_MODE: NORMAL
MDC_IDC_SET_LEADCHNL_RV_PACING_POLARITY: NORMAL
MDC_IDC_SET_LEADCHNL_RV_PACING_PULSEWIDTH: 0.4 MS
MDC_IDC_SET_LEADCHNL_RV_SENSING_ADAPTATION_MODE: NORMAL
MDC_IDC_SET_LEADCHNL_RV_SENSING_POLARITY: NORMAL
MDC_IDC_SET_LEADCHNL_RV_SENSING_SENSITIVITY: 1.5 MV
MDC_IDC_SET_ZONE_DETECTION_INTERVAL: 375 MS
MDC_IDC_SET_ZONE_TYPE: NORMAL
MDC_IDC_SET_ZONE_VENDOR_TYPE: NORMAL
MDC_IDC_STAT_BRADY_DTM_END: NORMAL
MDC_IDC_STAT_BRADY_DTM_START: NORMAL
MDC_IDC_STAT_BRADY_RA_PERCENT_PACED: 0 %
MDC_IDC_STAT_BRADY_RV_PERCENT_PACED: 91 %
MDC_IDC_STAT_EPISODE_RECENT_COUNT: 0
MDC_IDC_STAT_EPISODE_RECENT_COUNT_DTM_END: NORMAL
MDC_IDC_STAT_EPISODE_RECENT_COUNT_DTM_START: NORMAL
MDC_IDC_STAT_EPISODE_TYPE: NORMAL
MDC_IDC_STAT_EPISODE_VENDOR_TYPE: NORMAL

## 2022-06-08 NOTE — PROGRESS NOTES
ANTICOAGULATION FOLLOW-UP CLINIC VISIT    Patient Name:  Kate Chacon  Date:  2020  Contact Type:  Face to Face    SUBJECTIVE:  Patient Findings     Positives:   Missed doses (having back injection today)             OBJECTIVE    Recent labs: (last 7 days)     20   INR 1.2*       ASSESSMENT / PLAN  INR assessment SUB    Recheck INR In: 1 WEEK    INR Location Clinic      Anticoagulation Summary  As of 2020    INR goal:   2.0-3.0   TTR:   47.8 % (1 y)   INR used for dosin.2! (2020)   Warfarin maintenance plan:   5 mg (5 mg x 1) every day   Full warfarin instructions:   5 mg every day   Weekly warfarin total:   35 mg   No change documented:   Radha Burns RN   Plan last modified:   Radha Burns RN (3/27/2020)   Next INR check:   2020   Priority:   Maintenance   Target end date:   Indefinite    Indications    Paroxysmal atrial fibrillation (H) [I48.0]  Anticoagulation goal of INR 2 to 3 [Z51.81  Z79.01]             Anticoagulation Episode Summary     INR check location:       Preferred lab:       Send INR reminders to:   [facility]    Comments:         Anticoagulation Care Providers     Provider Role Specialty Phone number    Asher Campos MD [facility] [phone]            See the Encounter Report to view Anticoagulation Flowsheet and Dosing Calendar (Go to Encounters tab in chart review, and find the Anticoagulation Therapy Visit)        Radha Burns RN                  Thank you for scheduling your appointment with us today. If you have any questions or concerns related to procedure/medication at today's visit, please contact your primary care provider, or the provider who ordered today's procedure/medication. If you have any questions related to scheduling with our unit, or if you are having trouble getting in contact with your ordering provider, we can be reached at [phone].

## 2022-06-14 DIAGNOSIS — I48.0 PAROXYSMAL ATRIAL FIBRILLATION (H): ICD-10-CM

## 2022-06-14 DIAGNOSIS — I49.5 SINOATRIAL NODE DYSFUNCTION (H): ICD-10-CM

## 2022-06-14 DIAGNOSIS — Z95.0 CARDIAC PACEMAKER: ICD-10-CM

## 2022-06-15 RX ORDER — METOPROLOL SUCCINATE 50 MG/1
TABLET, EXTENDED RELEASE ORAL
Qty: 90 TABLET | Refills: 0 | OUTPATIENT
Start: 2022-06-15

## 2022-06-15 NOTE — TELEPHONE ENCOUNTER
[facility] sent Rx request for the following:      Requested Prescriptions   Pending Prescriptions Disp Refills     metoprolol succinate ER (TOPROL XL) 50 MG 24 hr tablet [Pharmacy Med Name: Metoprolol Succinate ER 50 MG Oral Tablet Extended Release 24 Hour] 90 tablet 0     Sig: Take 1 tablet by mouth once daily          Last Prescription Date:   8/4/2021  Last Fill Qty/Refills:         90, R-3    Last Office Visit:              3/22/2022   Future Office visit:           NONE    Unable to complete prescription refill per RN Medication Refill Policy. Redundant refill request refused: Too soon:      Dunia Turpin, RN on 6/15/2022 at 10:50 AM

## 2022-06-22 ENCOUNTER — ANTICOAGULATION THERAPY VISIT (OUTPATIENT)
Dept: ANTICOAGULATION | Facility: OTHER | Age: 77
End: 2022-06-22
Attending: FAMILY MEDICINE
Payer: MEDICARE

## 2022-06-22 DIAGNOSIS — Z79.01 ANTICOAGULATION GOAL OF INR 2 TO 3: ICD-10-CM

## 2022-06-22 DIAGNOSIS — Z51.81 ANTICOAGULATION GOAL OF INR 2 TO 3: ICD-10-CM

## 2022-06-22 DIAGNOSIS — I48.0 PAROXYSMAL ATRIAL FIBRILLATION (H): Primary | ICD-10-CM

## 2022-06-22 LAB — INR POINT OF CARE: 2.5 (ref 0.9–1.1)

## 2022-06-22 PROCEDURE — 36416 COLLJ CAPILLARY BLOOD SPEC: CPT | Mod: ZL

## 2022-06-22 PROCEDURE — 85610 PROTHROMBIN TIME: CPT | Mod: ZL,QW

## 2022-06-22 NOTE — PROGRESS NOTES
ANTICOAGULATION FOLLOW-UP CLINIC VISIT    Patient Name:  Kate Chacon  Date:  2022  Contact Type:  Face to Face    SUBJECTIVE:  Patient Findings         Clinical Outcomes     Negatives:  Major bleeding event, Thromboembolic event, Anticoagulation-related hospital admission, Anticoagulation-related ED visit, Anticoagulation-related fatality           OBJECTIVE    Recent labs: (last 7 days)     22  1308   INR 2.5*       ASSESSMENT / PLAN  INR assessment THER    Recheck INR In: 4 WEEKS    INR Location Clinic      Anticoagulation Summary  As of 2022    INR goal:  2.0-3.0   TTR:  66.7 % (1 y)   INR used for dosin.5 (2022)   Warfarin maintenance plan:  7.5 mg (5 mg x 1.5) every Wed; 5 mg (5 mg x 1) all other days   Full warfarin instructions:  7.5 mg every Wed; 5 mg all other days   Weekly warfarin total:  37.5 mg   No change documented:  Naida Lea RN   Plan last modified:  Radha Burns RN (2022)   Next INR check:  2022   Priority:  Maintenance   Target end date:  Indefinite    Indications    Paroxysmal atrial fibrillation (H) [I48.0]  Anticoagulation goal of INR 2 to 3 [Z51.81  Z79.01]             Anticoagulation Episode Summary     INR check location:      Preferred lab:      Send INR reminders to:  [facility]    Comments:        Anticoagulation Care Providers     Provider Role Specialty Phone number    Asher Campos MD Referring Family Medicine [phone]            See the Encounter Report to view Anticoagulation Flowsheet and Dosing Calendar (Go to Encounters tab in chart review, and find the Anticoagulation Therapy Visit)      Naida Lea RN

## 2022-06-27 DIAGNOSIS — I48.0 PAROXYSMAL ATRIAL FIBRILLATION (H): ICD-10-CM

## 2022-06-27 DIAGNOSIS — I49.5 SINOATRIAL NODE DYSFUNCTION (H): ICD-10-CM

## 2022-06-27 DIAGNOSIS — Z95.0 CARDIAC PACEMAKER: ICD-10-CM

## 2022-06-27 DIAGNOSIS — F33.40 MDD (RECURRENT MAJOR DEPRESSIVE DISORDER) IN REMISSION (H): ICD-10-CM

## 2022-06-28 RX ORDER — METOPROLOL SUCCINATE 50 MG/1
TABLET, EXTENDED RELEASE ORAL
Qty: 90 TABLET | Refills: 2 | Status: SHIPPED | OUTPATIENT
Start: 2022-06-28 | End: 2023-03-29

## 2022-06-28 NOTE — TELEPHONE ENCOUNTER
[id number] sent Rx request for the following:      Requested Prescriptions   Pending Prescriptions Disp Refills     sertraline (ZOLOFT) 50 MG tablet [Pharmacy Med Name: Sertraline HCl 50 MG Oral Tablet] 90 tablet 0     Sig: TAKE 1 TABLET BY MOUTH AT BEDTIME   Last Prescription Date:   4/5/22  Last Fill Qty/Refills:         90, R-0      SSRIs Protocol Failed - 6/28/2022  1:17 PM        Failed - PHQ-9 score less than 5 in past 6 months     Please review last PHQ-9 score.           metoprolol succinate ER (TOPROL XL) 50 MG 24 hr tablet [Pharmacy Med Name: Metoprolol Succinate ER 50 MG Oral Tablet Extended Release 24 Hour] 90 tablet 0     Sig: Take 1 tablet by mouth once daily   Last Prescription Date:   8/4/21  Last Fill Qty/Refills:         90, R-3      Last Office Visit:              3/22/22   Future Office visit:           None    Rut Wolfe RN .............. 6/28/2022  1:23 PM

## 2022-07-18 DIAGNOSIS — E78.5 HYPERLIPIDEMIA, UNSPECIFIED HYPERLIPIDEMIA TYPE: ICD-10-CM

## 2022-07-19 NOTE — TELEPHONE ENCOUNTER
[facility] sent Rx request for the following:      Atorvastatin Calcium 40 MG Oral Tablet      Last Prescription Date:   8/4/2021  Last Fill Qty/Refills:         90, R-3    Last Office Visit:              3/22/2022   Future Office visit:           none    Boni Freire RN, BSN  ....................  7/19/2022   2:06 PM

## 2022-07-20 ENCOUNTER — ANTICOAGULATION THERAPY VISIT (OUTPATIENT)
Dept: ANTICOAGULATION | Facility: OTHER | Age: 77
End: 2022-07-20
Attending: FAMILY MEDICINE
Payer: MEDICARE

## 2022-07-20 DIAGNOSIS — Z51.81 ANTICOAGULATION GOAL OF INR 2 TO 3: ICD-10-CM

## 2022-07-20 DIAGNOSIS — Z79.01 ANTICOAGULATION GOAL OF INR 2 TO 3: ICD-10-CM

## 2022-07-20 DIAGNOSIS — I48.0 PAROXYSMAL ATRIAL FIBRILLATION (H): Primary | ICD-10-CM

## 2022-07-20 LAB — INR POINT OF CARE: 3.1 (ref 0.9–1.1)

## 2022-07-20 PROCEDURE — 85610 PROTHROMBIN TIME: CPT | Mod: ZL,QW

## 2022-07-20 RX ORDER — ATORVASTATIN CALCIUM 40 MG/1
TABLET, FILM COATED ORAL
Qty: 90 TABLET | Refills: 0 | Status: SHIPPED | OUTPATIENT
Start: 2022-07-20 | End: 2022-07-27

## 2022-07-20 NOTE — PROGRESS NOTES
ANTICOAGULATION MANAGEMENT     Kate Chacon 77 year old female is on warfarin with supratherapeutic INR result. (Goal INR 2.0-3.0)    Recent labs: (last 7 days)     07/20/22  1308   INR 3.1*       ASSESSMENT       Source(s): Chart Review and Patient       Warfarin doses taken: Warfarin taken as instructed    Diet: No new diet changes identified    New illness, injury, or hospitalization: No    Medication/supplement changes: None noted    Signs or symptoms of bleeding or clotting: No    Previous INR: Therapeutic last 2(+) visits    Additional findings: None       PLAN     Recommended plan for no diet, medication or health factor changes affecting INR     Dosing Instructions: continue your current warfarin dose with next INR in 4 weeks       Summary  As of 7/20/2022    Full warfarin instructions:  7.5 mg every Wed; 5 mg all other days   Next INR check:  8/17/2022             Discussed with patient in clinic    Patient elected to schedule next visit     Education provided: None required    Plan made per [facility] anticoagulation protocol    Boni Freire RN  Anticoagulation Clinic  7/20/2022    _______________________________________________________________________     Anticoagulation Episode Summary     Current INR goal:  2.0-3.0   TTR:  65.4 % (1 y)   Target end date:  Indefinite   Send INR reminders to:  [facility]    Indications    Paroxysmal atrial fibrillation (H) [I48.0]  Anticoagulation goal of INR 2 to 3 [Z51.81  Z79.01]           Comments:           Anticoagulation Care Providers     Provider Role Specialty Phone number    Asher Campos MD Referring Family Medicine [phone]

## 2022-07-21 ENCOUNTER — TELEPHONE (OUTPATIENT)
Dept: UROLOGY | Facility: OTHER | Age: 77
End: 2022-07-21

## 2022-07-21 NOTE — TELEPHONE ENCOUNTER
Sent a PA to [facility] for approval for Botox procedure.    Linda Ocampo LPN on 7/21/2022 at 2:16 PM

## 2022-07-21 NOTE — TELEPHONE ENCOUNTER
Patient called and would like to get botox again. Please place prior auth. She is aware I will contact her to schedule once we get the authorization form her insurance.    Yasmeen Ortez on 7/21/2022 at 1:12 PM

## 2022-07-27 ENCOUNTER — OFFICE VISIT (OUTPATIENT)
Dept: FAMILY MEDICINE | Facility: OTHER | Age: 77
End: 2022-07-27
Attending: FAMILY MEDICINE
Payer: COMMERCIAL

## 2022-07-27 VITALS
WEIGHT: 181.6 LBS | OXYGEN SATURATION: 96 % | HEART RATE: 60 BPM | SYSTOLIC BLOOD PRESSURE: 126 MMHG | TEMPERATURE: 97.2 F | BODY MASS INDEX: 29.99 KG/M2 | RESPIRATION RATE: 16 BRPM | DIASTOLIC BLOOD PRESSURE: 84 MMHG

## 2022-07-27 DIAGNOSIS — K14.8 TONGUE LESION: Primary | ICD-10-CM

## 2022-07-27 DIAGNOSIS — H61.001 CHONDRODERMATITIS NODULARIS CHRONICA HELICIS, RIGHT: ICD-10-CM

## 2022-07-27 DIAGNOSIS — N39.41 URGE INCONTINENCE: ICD-10-CM

## 2022-07-27 DIAGNOSIS — H61.91 LESION OF EXTERNAL EAR, RIGHT: ICD-10-CM

## 2022-07-27 DIAGNOSIS — Z86.73 HISTORY OF ISCHEMIC STROKE: ICD-10-CM

## 2022-07-27 DIAGNOSIS — R60.9 DEPENDENT EDEMA: ICD-10-CM

## 2022-07-27 DIAGNOSIS — I48.0 PAROXYSMAL ATRIAL FIBRILLATION (H): ICD-10-CM

## 2022-07-27 DIAGNOSIS — F33.40 MDD (RECURRENT MAJOR DEPRESSIVE DISORDER) IN REMISSION (H): ICD-10-CM

## 2022-07-27 DIAGNOSIS — I48.91 ON COUMADIN FOR ATRIAL FIBRILLATION (H): ICD-10-CM

## 2022-07-27 DIAGNOSIS — M81.0 OSTEOPOROSIS WITHOUT CURRENT PATHOLOGICAL FRACTURE, UNSPECIFIED OSTEOPOROSIS TYPE: ICD-10-CM

## 2022-07-27 DIAGNOSIS — Z79.01 ON COUMADIN FOR ATRIAL FIBRILLATION (H): ICD-10-CM

## 2022-07-27 DIAGNOSIS — I10 ESSENTIAL HYPERTENSION: ICD-10-CM

## 2022-07-27 DIAGNOSIS — E78.5 HYPERLIPIDEMIA, UNSPECIFIED HYPERLIPIDEMIA TYPE: ICD-10-CM

## 2022-07-27 PROCEDURE — G0463 HOSPITAL OUTPT CLINIC VISIT: HCPCS

## 2022-07-27 PROCEDURE — 99214 OFFICE O/P EST MOD 30 MIN: CPT | Performed by: FAMILY MEDICINE

## 2022-07-27 RX ORDER — ATORVASTATIN CALCIUM 40 MG/1
40 TABLET, FILM COATED ORAL AT BEDTIME
Qty: 90 TABLET | Refills: 11 | Status: SHIPPED | OUTPATIENT
Start: 2022-07-27 | End: 2023-10-04

## 2022-07-27 ASSESSMENT — ANXIETY QUESTIONNAIRES
1. FEELING NERVOUS, ANXIOUS, OR ON EDGE: NOT AT ALL
GAD7 TOTAL SCORE: 0
7. FEELING AFRAID AS IF SOMETHING AWFUL MIGHT HAPPEN: NOT AT ALL
6. BECOMING EASILY ANNOYED OR IRRITABLE: NOT AT ALL
2. NOT BEING ABLE TO STOP OR CONTROL WORRYING: NOT AT ALL
3. WORRYING TOO MUCH ABOUT DIFFERENT THINGS: NOT AT ALL
5. BEING SO RESTLESS THAT IT IS HARD TO SIT STILL: NOT AT ALL
8. IF YOU CHECKED OFF ANY PROBLEMS, HOW DIFFICULT HAVE THESE MADE IT FOR YOU TO DO YOUR WORK, TAKE CARE OF THINGS AT HOME, OR GET ALONG WITH OTHER PEOPLE?: NOT DIFFICULT AT ALL
7. FEELING AFRAID AS IF SOMETHING AWFUL MIGHT HAPPEN: NOT AT ALL
GAD7 TOTAL SCORE: 0
GAD7 TOTAL SCORE: 0
IF YOU CHECKED OFF ANY PROBLEMS ON THIS QUESTIONNAIRE, HOW DIFFICULT HAVE THESE PROBLEMS MADE IT FOR YOU TO DO YOUR WORK, TAKE CARE OF THINGS AT HOME, OR GET ALONG WITH OTHER PEOPLE: NOT DIFFICULT AT ALL
4. TROUBLE RELAXING: NOT AT ALL

## 2022-07-27 ASSESSMENT — PATIENT HEALTH QUESTIONNAIRE - PHQ9
10. IF YOU CHECKED OFF ANY PROBLEMS, HOW DIFFICULT HAVE THESE PROBLEMS MADE IT FOR YOU TO DO YOUR WORK, TAKE CARE OF THINGS AT HOME, OR GET ALONG WITH OTHER PEOPLE: NOT DIFFICULT AT ALL
SUM OF ALL RESPONSES TO PHQ QUESTIONS 1-9: 0
SUM OF ALL RESPONSES TO PHQ QUESTIONS 1-9: 0

## 2022-07-27 ASSESSMENT — PAIN SCALES - GENERAL: PAINLEVEL: MODERATE PAIN (5)

## 2022-07-27 NOTE — PROGRESS NOTES
Kate was seen today for tongue lesion(s).    Diagnoses and all orders for this visit:    Tongue lesion  -     Adult ENT  Referral; Future    MDD (recurrent major depressive disorder) in remission (H)  -     sertraline (ZOLOFT) 50 MG tablet; Take 1 tablet (50 mg) by mouth At Bedtime    Hyperlipidemia, unspecified hyperlipidemia type  -     atorvastatin (LIPITOR) 40 MG tablet; Take 1 tablet (40 mg) by mouth At Bedtime    Paroxysmal atrial fibrillation (H)    Essential hypertension    History of ischemic stroke    On Coumadin for atrial fibrillation (H)    Osteoporosis without current pathological fracture, unspecified osteoporosis type    Urge incontinence  -     Adult Urology  Referral; Future    Lesion of external ear, right  -     Adult ENT  Referral; Future    Dependent edema    Chondrodermatitis nodularis chronica helicis, right       For her tongue lesion, I think it probably needs to be excised.  It may be benign but she wants it gone and is quite bothersome.  Referred to Dr. Stout for this as well as the spot on her ear.  It could be an actinic or chondrodermatitis nodularis chronicus helicis.    Discussed that I thought it was okay if she had to have some teeth pulled to go off her warfarin for 3 days.  Similarly if she has to have any surgery on her tongue that would be reasonable.  She is going to follow through with dentist as well.    Discussion today with regard to her ongoing issues with her back.  I think the surgeon wants to make sure that her bones improved to the range of osteopenia prior to attempting any surgery and we discussed this with her.    She will continue her other medications and we reviewed those in detail and what they were for with her today.    Referred to Dr. Fox's office for consideration of Botox in the bladder again.    A total of 35 minutes was spent with the patient, reviewing records, tests, ordering medications, tests or procedures and  documenting clinical information in the EHR.     Subjective   Kate is a 77 year old, presenting for the following health issues:  Tongue Lesion(S) (White bump on end of tongue for 1 month)    Kate comes in today with a white bump on the end of her tongue that has been present for about a month.    She has a dry lesion on her right ear on the pinna that is sore.  Previously had an excision of something there years ago by Dr. Matthews    She follows with endocrinology for osteoporosis and primary hyperparathyroidism.  She had parathyroidectomy done this spring and recovered nicely.  They made a referral back to neurosurgery for consideration of lumbar decompression.  She had been on Lyrica and that was discontinued.    She takes 50 mg of Zoloft at bedtime.    She continues on warfarin for paroxysmal atrial fibrillation.  She is on metoprolol succinate 50 mg daily for rate control.  She continues on a statin after her stroke in addition to 20 mg of lisinopril for hypertensive control.    History of Present Illness       Back Pain:  She presents for follow up of back pain. Patient's back pain is a chronic problem.  Location of back pain:  Right lower back  Description of back pain: sharp  Back pain spreads: right thigh    Since patient first noticed back pain, pain is: unchanged  Does back pain interfere with her job:  Not applicable      Hypothyroidism:     Since last visit, patient describes the following symptoms::  None    She eats 2-3 servings of fruits and vegetables daily.She consumes 2 sweetened beverage(s) daily.She exercises with enough effort to increase her heart rate 9 or less minutes per day.  She exercises with enough effort to increase her heart rate 3 or less days per week.   She is taking medications regularly.    Today's PHQ-9         PHQ-9 Total Score: 0    PHQ-9 Q9 Thoughts of better off dead/self-harm past 2 weeks :   Not at all    How difficult have these problems made it for you to do your work,  take care of things at home, or get along with other people: Not difficult at all  Today's ANETTE-7 Score: 0           Review of Systems   ROS is negative except as noted above                Objective    /84   Pulse 60   Temp 97.2  F (36.2  C) (Temporal)   Resp 16   Wt 82.4 kg (181 lb 9.6 oz)   LMP  (LMP Unknown)   SpO2 96%   Breastfeeding No   BMI 29.99 kg/m    Body mass index is 29.99 kg/m .  Physical Exam   Alert cooperative, no distress.  Lungs are clear, no rales rhonchi or wheezes are heard.  Cardiac irregularly irregular.  On her tongue is a raised pearly lesion that is whitish in color.  No palpable adenopathy.  On her right ear on the pinna is a dry patch that is tender.  No palpable adenopathy.  TM is clear.                    .  ..

## 2022-08-09 ENCOUNTER — HOSPITAL ENCOUNTER (OUTPATIENT)
Dept: CARDIOLOGY | Facility: OTHER | Age: 77
Discharge: HOME | End: 2022-08-09
Attending: INTERNAL MEDICINE | Admitting: INTERNAL MEDICINE
Payer: COMMERCIAL

## 2022-08-09 DIAGNOSIS — I48.20 CHRONIC A-FIB (H): ICD-10-CM

## 2022-08-09 PROCEDURE — 93280 PM DEVICE PROGR EVAL DUAL: CPT | Performed by: INTERNAL MEDICINE

## 2022-08-09 NOTE — PATIENT INSTRUCTIONS
It was a pleasure to see you in clinic today.  Please do not hesitate to call with any questions or concerns.  You are scheduled for a remote transmission on 11/9/22.  We look forward to seeing you in clinic at your next device check in 6 months.    Zachery Sorenson, RN  Electrophysiology Nurse Clinician  [facility]    During Business Hours Please Call:  [phone]  After Hours Please Call:  [phone] - select option #4 and ask for job code [id number]

## 2022-08-10 ENCOUNTER — TELEPHONE (OUTPATIENT)
Dept: FAMILY MEDICINE | Facility: OTHER | Age: 77
End: 2022-08-10

## 2022-08-10 ENCOUNTER — OFFICE VISIT (OUTPATIENT)
Dept: CARDIOLOGY | Facility: OTHER | Age: 77
End: 2022-08-10
Attending: NURSE PRACTITIONER
Payer: COMMERCIAL

## 2022-08-10 VITALS
SYSTOLIC BLOOD PRESSURE: 138 MMHG | OXYGEN SATURATION: 96 % | HEART RATE: 64 BPM | DIASTOLIC BLOOD PRESSURE: 70 MMHG | TEMPERATURE: 97.6 F | RESPIRATION RATE: 16 BRPM | BODY MASS INDEX: 30.39 KG/M2 | WEIGHT: 184 LBS

## 2022-08-10 DIAGNOSIS — E78.2 MIXED HYPERLIPIDEMIA: ICD-10-CM

## 2022-08-10 DIAGNOSIS — Z01.810 PRE-OPERATIVE CARDIOVASCULAR EXAMINATION: ICD-10-CM

## 2022-08-10 DIAGNOSIS — I48.91 ON COUMADIN FOR ATRIAL FIBRILLATION (H): ICD-10-CM

## 2022-08-10 DIAGNOSIS — R60.0 BILATERAL LOWER EXTREMITY EDEMA: ICD-10-CM

## 2022-08-10 DIAGNOSIS — I49.5 SINOATRIAL NODE DYSFUNCTION (H): ICD-10-CM

## 2022-08-10 DIAGNOSIS — Z79.01 ON COUMADIN FOR ATRIAL FIBRILLATION (H): ICD-10-CM

## 2022-08-10 DIAGNOSIS — Z51.81 ANTICOAGULATION GOAL OF INR 2 TO 3: ICD-10-CM

## 2022-08-10 DIAGNOSIS — Z86.73 HISTORY OF CVA (CEREBROVASCULAR ACCIDENT): ICD-10-CM

## 2022-08-10 DIAGNOSIS — N18.31 STAGE 3A CHRONIC KIDNEY DISEASE (H): ICD-10-CM

## 2022-08-10 DIAGNOSIS — I87.2 VENOUS INSUFFICIENCY (CHRONIC) (PERIPHERAL): ICD-10-CM

## 2022-08-10 DIAGNOSIS — I48.21 PERMANENT ATRIAL FIBRILLATION (H): Primary | ICD-10-CM

## 2022-08-10 DIAGNOSIS — Z95.0 CARDIAC PACEMAKER: ICD-10-CM

## 2022-08-10 DIAGNOSIS — I10 ESSENTIAL HYPERTENSION: ICD-10-CM

## 2022-08-10 DIAGNOSIS — Z79.01 ANTICOAGULATION GOAL OF INR 2 TO 3: ICD-10-CM

## 2022-08-10 LAB
ATRIAL RATE - MUSE: 52 BPM
DIASTOLIC BLOOD PRESSURE - MUSE: NORMAL MMHG
INTERPRETATION ECG - MUSE: NORMAL
P AXIS - MUSE: NORMAL DEGREES
PR INTERVAL - MUSE: NORMAL MS
QRS DURATION - MUSE: 150 MS
QT - MUSE: 466 MS
QTC - MUSE: 466 MS
R AXIS - MUSE: -73 DEGREES
SYSTOLIC BLOOD PRESSURE - MUSE: NORMAL MMHG
T AXIS - MUSE: 100 DEGREES
VENTRICULAR RATE- MUSE: 60 BPM

## 2022-08-10 PROCEDURE — 93005 ELECTROCARDIOGRAM TRACING: CPT | Performed by: NURSE PRACTITIONER

## 2022-08-10 PROCEDURE — 99214 OFFICE O/P EST MOD 30 MIN: CPT | Performed by: NURSE PRACTITIONER

## 2022-08-10 PROCEDURE — G0463 HOSPITAL OUTPT CLINIC VISIT: HCPCS | Mod: 25

## 2022-08-10 PROCEDURE — 93010 ELECTROCARDIOGRAM REPORT: CPT | Performed by: INTERNAL MEDICINE

## 2022-08-10 ASSESSMENT — PAIN SCALES - GENERAL: PAINLEVEL: NO PAIN (0)

## 2022-08-10 NOTE — PROGRESS NOTES
[facility]   CARDIOLOGY PROGRESS NOTE    Kate Chacon   [address] [facility] [address]      Asher Campos     Chief Complaint   Patient presents with     Follow Up     Follow up heart health        HPI:   Ms. Chacon is a 77-year-old female who presents for cardiology follow-up to visit on 8/13/2020.  She has a history of permanent atrial fibrillation, ischemic CVA on 10/15/2016, hypertension, SA node dysfunction s/p pacemaker on 11/2/2017 at [facility], chronic Coumadin oral anticoagulation for atrial fibrillation, stage III CKD and hyperlipidemia.    At her last visit in August 2020, her pacemaker interrogation was reviewed from that time revealing 100% AF burden with an average heart rate of 91 bpm.  Therefore, her metoprolol was increased to 50 mg daily for improved rate control.  She was to continue with Coumadin oral anticoagulation without complication, [id number] score of 6.  She had complained of fatigue, denied YAAKOV or snoring.    Last device check on 8/9/2022 revealing 100% AF burden.  Heart rate histograms showing minimal progression of HR.  Her sensor sensitivity was increased to low, response increased to 10.  She will have a remote transmission again in 3 months and return to clinic in 6 months.    Patient admits that she has been feeling well. No chest pain or pressure. No palpitations, largely asymptomatic with AF. No increased dyspnea since last visit. She does have chronic mild LE edema.     She is scheduled for removal of lesion from right ear on 9/1/22, she is requesting cardiac pre-operative clearance.     RELEVANT TESTING:  Stress test on 6/7/19:  Moderate-sized anterior wall infarct.     ECHO on 10/31/18:  Left ventricular function, chamber size, wall motion, and wall thickness are normal.The EF is 60-65%.  Left ventricular diastolic function is indeterminate.  Right ventricular function, chamber size, wall motion, and thickness are normal.  No significant valvular  abnormalities noted.  Previous study not available for comparison.    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          FAMILY HISTORY:   Family History   Problem Relation Age of Onset     Diabetes Father         Diabetes     Hypertension Father         Hypertension     Other - See Comments Mother         h/o coronary artery disease/dementia     Asthma Mother         Asthma     Diabetes Maternal Grandmother         Diabetes     Cancer Maternal Aunt         Cancer,Uterine     Breast Cancer No family hx of         Cancer-breast          PAST SURGICAL HISTORY:   Past Surgical History:   Procedure Laterality Date     APPENDECTOMY OPEN      No Comments Provided     COLONOSCOPY  2005,Cecal biopsy with tubular adenoma low grade dysplasia.     COLONOSCOPY  2011     COLONOSCOPY  2012    Tubular Adenomas F/U      COLONOSCOPY  2019    F/U N/A as will be over 80 in . Tubular adenoma     ESOPHAGOSCOPY, GASTROSCOPY, DUODENOSCOPY (EGD), COMBINED      05     OTHER SURGICAL HISTORY      8/3/05,[id number],OTHER,helices on the right ear     TONSILLECTOMY      No Comments Provided          SOCIAL HISTORY:   Social History     Socioeconomic History     Marital status:      Spouse name: None     Number of children: None     Years of education: None     Highest education level: None   Tobacco Use     Smoking status: Former Smoker     Packs/day: 0.50     Years: 49.00     Pack years: 24.50     Types: Cigarettes     Quit date: 10/14/2016     Years since quittin.8     Smokeless tobacco: Never Used   Vaping Use     Vaping Use: Never used   Substance and Sexual Activity     Alcohol use: No     Drug use: No     Sexual activity: Not Currently   Social History Narrative     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  She enjoys gardening, bowling and going to stock car races that her son participates in.   to her  Mac[place].  They run [employer].  Live in town independently.          CURRENT MEDICATIONS:   Current Outpatient Medications   Medication     atorvastatin (LIPITOR) 40 MG tablet     calcium carbonate 750 MG CHEW     calcium carbonate-vitamin D 600-200 MG-UNIT TABS     lisinopril (ZESTRIL) 20 MG tablet     metoprolol succinate ER (TOPROL XL) 50 MG 24 hr tablet     sertraline (ZOLOFT) 50 MG tablet     vitamin B complex with vitamin C (STRESS TAB) tablet     vitamin D3 (CHOLECALCIFEROL) 50 mcg (2000 units) tablet     warfarin ANTICOAGULANT (COUMADIN) 5 MG tablet     No current facility-administered medications for this visit.        ALLERGIES:   Allergies   Allergen Reactions     Methylpar-Na Bisulfite-Pentazocine Unknown     jittery          ROS:   CONSTITUTIONAL: No reported fever or chills. No changes in weight.  ENT: No visual disturbance, ear ache, epistaxis or sore throat.   CARDIOVASCULAR: No chest pain, chest pressure or chest discomfort. No palpitations or lower extremity edema.   RESPIRATORY: No shortness of breath, dyspnea upon exertion, cough, wheezing or hemoptysis.   GI: No abdominal pain. No nausea, vomiting or diarrhea. No melena or hematochezia. No changes in bowel habits.   : No hematuria or dysuria. No hesitancy or incontinence.   NEUROLOGICAL: No headache, lightheadedness, dizziness, syncope, ataxia, paresthesias or weakness.   HEMATOLOGIC: No history of anemia. No bleeding or excessive bruising. No history of blood clots.   MUSCULOSKELETAL: No joint pain or swelling, no muscle pain.  ENDOCRINOLOGIC: No temperature intolerance. No hair or skin changes.  SKIN: No abnormal rashes or sores, no unusual itching.  PSYCHIATRIC: No history of depression or anxiety. No changes in mood, feeling down or anxious. No changes in sleep.      PHYSICAL EXAM:    /70 (BP Location: Right arm, Patient Position: Sitting, Cuff Size: Adult Regular)   Pulse 64   Temp 97.6  F (36.4  C) (Tympanic)   Resp 16   Wt 83.5 kg (184 lb)   LMP  (LMP Unknown)   SpO2 96%   BMI 30.39 kg/m    GENERAL: The patient is a well-developed, well-nourished, in no apparent distress.  HEENT: Head is normocephalic and atraumatic. Eyes are symmetrical with normal visual tracking. No icterus, no xanthelasmas. Nares appeared normal without nasal drainage. Mucous membranes are moist, no cyanosis.  NECK: Supple. No adenopathy, asymmetry or masses. Carotid upstroke are normal bilaterally, no cervical bruits, JVP not visible.   CHEST/ LUNGS: Lungs clear to auscultation, no rales, rhonchi or wheezes, no use of accessory muscles, no retractions, respirations unlabored and normal respiratory rate.   CARDIO: Regular rate and rhythm normal with S1 and S2, no S3 or S4 and no murmur, click or rub. Precordium quiet with normal PMI.    ABD: Abdomen is soft and nontender, nondistended. Without hepatosplenomegaly, no palpable masses, aorta not enlarged to palpitation and no abdominal bruits heard.   EXTREMITIES: No clubbing, cyanosis or edema present. Peripheral pulses normal and equal bilaterally.  VASC: Radial, femoral, dorsalis pedis and posterior tibialis pulses normal and symmetric with no vascular bruits heard.  MUSCULOSKELETAL: No joint swelling.   NEUROLOGIC: Alert and oriented X3. Normal speech, gait and affect. No focal neurologic deficits.   SKIN: No jaundice. No rashes or visible skin lesions present. No ecchymosis.     EKG: Ventricular paced rhythm, rate 60 bpm    LAB RESULTS:   Anticoagulation Therapy Visit on 07/20/2022   Component Date Value Ref Range Status     INR Point of Care 07/20/2022 3.1 (A) 0.9 - 1.1 Final   Anticoagulation Therapy Visit on 06/22/2022   Component Date Value Ref Range Status     INR Point of Care 06/22/2022 2.5 (A) 0.9 - 1.1 Final   Anticoagulation Therapy Visit on  05/25/2022   Component Date Value Ref Range Status     INR Point of Care 05/25/2022 2.0 (A) 0.9 - 1.1 Final   Anticoagulation Therapy Visit on 05/11/2022   Component Date Value Ref Range Status     INR Point of Care 05/11/2022 1.9 (A) 0.9 - 1.1 Final          ASSESSMENT:   Kate Chacon presents for cardiology follow-up to visit on 8/13/2020.  She has a history of permanent atrial fibrillation, ischemic CVA on 10/15/2016, hypertension, SA node dysfunction s/p pacemaker on 11/2/2017 at [facility], chronic Coumadin oral anticoagulation for atrial fibrillation, stage III CKD and hyperlipidemia.  Patient admits that she has been feeling well. No chest pain or pressure. No palpitations, largely asymptomatic with AF. No increased dyspnea since last visit. She does have chronic mild LE edema.   She is scheduled for removal of lesion from right ear on 9/1/22, she is requesting cardiac pre-operative clearance.     1. Permanent atrial fibrillation (H)  2. On Coumadin for atrial fibrillation (H)  3. Anticoagulation goal of INR 2 to 3  4. Sinoatrial node dysfunction (H)  5. Cardiac pacemaker (11/2/17)  6. History of CVA (cerebrovascular accident)- 2016  7. Essential hypertension  8. Stage 3a chronic kidney disease (H)  9. Mixed hyperlipidemia  10. Pre-operative cardiovascular examination  11. Bilateral lower extremity edema  12. Venous insufficiency (chronic) (peripheral)    PLAN:   1. Patient with permanent atrial fibrillation, largely asymptomatic. Rate controlled on Toprol XL 50 mg daily. She will remain on Coumadin oral anticoagulation without complication, [id number] score of 6.  2. Last device check on 8/9/2022 revealing 100% AF burden.  Heart rate histograms showing minimal progression of HR.  Her sensor sensitivity was increased to low, response increased to 10.  She will have a remote transmission again in 3 months and return to clinic in 6 months.  3. Chronic mild LE edema, order for compression stockings. Has not  required diuretic. Sodium restricted diet.   4. BP stable.   5. She is scheduled for removal of lesion from right ear on 9/1/22, ECG stable, no cardiac concerns. She is optimized and cleared for surgery from a cardiology standpoint. She will require holding of her Coumadin prior to operation, she will work with [facility]. She will schedule a general pre-op with PCP prior to her surgery.     Follow-up with cardiology in one year, sooner if needed.     Orders Placed This Encounter   Procedures     EKG 12-lead, tracing only     Compression Sleeve/Stocking Order for DME - ONLY FOR DME     Thank you for allowing me to participate in the care of your patient. Please do not hesitate to contact me if you have any questions.     Dinora Younger, APRN CNP [facility]

## 2022-08-10 NOTE — TELEPHONE ENCOUNTER
Left message to call back. She may schedule a pre-op appointment with any provider in the clinic.     Cristina Bertrand CMA on 8/10/2022 at 1:50 PM

## 2022-08-10 NOTE — NURSING NOTE
"Chief Complaint   Patient presents with     Follow Up     Follow up heart health       Initial /70 (BP Location: Right arm, Patient Position: Sitting, Cuff Size: Adult Regular)   Pulse 64   Temp 97.6  F (36.4  C) (Tympanic)   Resp 16   Wt 83.5 kg (184 lb)   LMP  (LMP Unknown)   SpO2 96%   BMI 30.39 kg/m   Estimated body mass index is 30.39 kg/m  as calculated from the following:    Height as of 3/22/22: 1.657 m (5' 5.25\").    Weight as of this encounter: 83.5 kg (184 lb).  Meds Reconciled: complete  Pt is not on Aspirin  Pt is on a Statin  PHQ and/or ANETTE reviewed. Pt referred to PCP/MH Provider as appropriate.    Tessa Douglas LPN      "

## 2022-08-10 NOTE — TELEPHONE ENCOUNTER
Kate is scheduled to have surgery on her ears on 9/1 with Dr Stout at [facility] in [place]. She needs a pre-op prior to this.    Would she be able to be worked in to an approval required slot or a same day slot any day between now and then?    Thank you,    Nicol Stein on 8/10/2022 at 11:36 AM

## 2022-08-10 NOTE — PATIENT INSTRUCTIONS
You were seen by  JANAE Collado CNP     1. You will need to schedule a pre-op with your primary care provider or another provider prior to your September 1st surgery.         You will follow up with [facility] in 1 year, sooner if needed.     Please call the cardiology office with problems, questions, or concerns at [phone].    If you experience chest pain, chest pressure, chest tightness, shortness of breath, fainting, lightheadedness, nausea, vomiting, or other concerning symptoms, please report to the Emergency Department or call 911. These symptoms may be emergent, and best treated in the Emergency Department.     Cardiology Nurses  GLENN Camarena, JOHN Zarate LPN  [facility] (Unit [facility])  [phone]

## 2022-08-12 ENCOUNTER — TELEPHONE (OUTPATIENT)
Dept: CARDIOLOGY | Facility: OTHER | Age: 77
End: 2022-08-12

## 2022-08-12 NOTE — TELEPHONE ENCOUNTER
Patient states she was in to see Aren recently and would like to speak to the nurse to see if that is good enough for a pre op.    Melissa Meneses on 8/12/2022 at 12:45 PM

## 2022-08-12 NOTE — TELEPHONE ENCOUNTER
Patient given Dr. Stout's fax number and advised to contact HIM to authorize sending the 8/10/22 visit. TEVIN Younger NP cleared from a cardio standpoint but only Dr. Stout's office would be able to confirm whether patient needs a general pre-op as well. Rivka Guajardo RN on 8/12/2022 at 4:00 PM

## 2022-08-17 ENCOUNTER — TELEPHONE (OUTPATIENT)
Dept: UROLOGY | Facility: OTHER | Age: 77
End: 2022-08-17

## 2022-08-17 ENCOUNTER — ANTICOAGULATION THERAPY VISIT (OUTPATIENT)
Dept: ANTICOAGULATION | Facility: OTHER | Age: 77
End: 2022-08-17
Attending: FAMILY MEDICINE
Payer: MEDICARE

## 2022-08-17 ENCOUNTER — TELEPHONE (OUTPATIENT)
Dept: FAMILY MEDICINE | Facility: OTHER | Age: 77
End: 2022-08-17

## 2022-08-17 DIAGNOSIS — Z79.01 ANTICOAGULATION GOAL OF INR 2 TO 3: ICD-10-CM

## 2022-08-17 DIAGNOSIS — Z51.81 ANTICOAGULATION GOAL OF INR 2 TO 3: ICD-10-CM

## 2022-08-17 DIAGNOSIS — I48.0 PAROXYSMAL ATRIAL FIBRILLATION (H): Primary | ICD-10-CM

## 2022-08-17 LAB — INR POINT OF CARE: 1.6 (ref 0.9–1.1)

## 2022-08-17 PROCEDURE — 85610 PROTHROMBIN TIME: CPT | Mod: ZL,QW

## 2022-08-17 PROCEDURE — 36416 COLLJ CAPILLARY BLOOD SPEC: CPT | Mod: ZL

## 2022-08-17 NOTE — TELEPHONE ENCOUNTER
Patient is scheduled for botox on 09/07/22. She is wondering if she needs to hold her warfarin. Please contact patient.    Yasmeen Ortez on 8/17/2022 at 9:59 AM

## 2022-08-17 NOTE — PROGRESS NOTES
ANTICOAGULATION MANAGEMENT     Kate Chacon 77 year old female is on warfarin with subtherapeutic INR result. (Goal INR 2.0-3.0)    Recent labs: (last 7 days)     08/17/22  1307   INR 1.6*       ASSESSMENT       Source(s): Chart Review and In person       Warfarin doses taken: Warfarin taken as instructed    Diet: Increased greens/vitamin K in diet; ongoing change    New illness, injury, or hospitalization: No    Medication/supplement changes: None noted    Signs or symptoms of bleeding or clotting: No    Previous INR: Therapeutic last 2(+) visits    Additional findings: None       PLAN     Recommended plan for no diet, medication or health factor changes affecting INR     Dosing Instructions: booster dose then continue your current warfarin dose with next INR in 1 week       Summary  As of 8/17/2022    Full warfarin instructions:  8/17: 10 mg; Otherwise 7.5 mg every Wed; 5 mg all other days   Next INR check:  8/24/2022             IN  person contact    Lab visit scheduled    Education provided: None required    Plan made per [facility] anticoagulation protocol    Yu Fatima RN  Anticoagulation Clinic  8/17/2022    _______________________________________________________________________     Anticoagulation Episode Summary     Current INR goal:  2.0-3.0   TTR:  62.9 % (1 y)   Target end date:  Indefinite   Send INR reminders to:  [facility]    Indications    Paroxysmal atrial fibrillation (H) [I48.0]  Anticoagulation goal of INR 2 to 3 [Z51.81  Z79.01]           Comments:           Anticoagulation Care Providers     Provider Role Specialty Phone number    Asher Campos MD Referring Family Medicine [phone]

## 2022-08-17 NOTE — TELEPHONE ENCOUNTER
Patient has a procedure planned with Dr. Stout on her right ear. Has pre-op planned for 8/18/22 with Dr. Monique. Will request orders to hold Coumadin for 3-5 days and no Lovenox bridging prior to procedure. Will route to provider doing pre-op. Yu Fatima RN on 8/17/2022 at 4:20 PM

## 2022-08-19 ENCOUNTER — OFFICE VISIT (OUTPATIENT)
Dept: FAMILY MEDICINE | Facility: OTHER | Age: 77
End: 2022-08-19
Attending: FAMILY MEDICINE
Payer: COMMERCIAL

## 2022-08-19 VITALS
WEIGHT: 184.8 LBS | BODY MASS INDEX: 29.7 KG/M2 | DIASTOLIC BLOOD PRESSURE: 82 MMHG | TEMPERATURE: 98.5 F | OXYGEN SATURATION: 97 % | HEIGHT: 66 IN | SYSTOLIC BLOOD PRESSURE: 142 MMHG | RESPIRATION RATE: 18 BRPM | HEART RATE: 60 BPM

## 2022-08-19 DIAGNOSIS — I48.0 PAROXYSMAL ATRIAL FIBRILLATION (H): ICD-10-CM

## 2022-08-19 DIAGNOSIS — Z01.818 PRE-OP EXAM: Primary | ICD-10-CM

## 2022-08-19 DIAGNOSIS — I10 ESSENTIAL HYPERTENSION: ICD-10-CM

## 2022-08-19 LAB
ANION GAP SERPL CALCULATED.3IONS-SCNC: 7 MMOL/L (ref 3–14)
BUN SERPL-MCNC: 17 MG/DL (ref 7–25)
CALCIUM SERPL-MCNC: 9.1 MG/DL (ref 8.6–10.3)
CHLORIDE BLD-SCNC: 107 MMOL/L (ref 98–107)
CO2 SERPL-SCNC: 27 MMOL/L (ref 21–31)
CREAT SERPL-MCNC: 0.95 MG/DL (ref 0.6–1.2)
ERYTHROCYTE [DISTWIDTH] IN BLOOD BY AUTOMATED COUNT: 13.5 % (ref 10–15)
GFR SERPL CREATININE-BSD FRML MDRD: 61 ML/MIN/1.73M2
GLUCOSE BLD-MCNC: 102 MG/DL (ref 70–105)
HCT VFR BLD AUTO: 45.8 % (ref 35–47)
HGB BLD-MCNC: 15.3 G/DL (ref 11.7–15.7)
MCH RBC QN AUTO: 32.3 PG (ref 26.5–33)
MCHC RBC AUTO-ENTMCNC: 33.4 G/DL (ref 31.5–36.5)
MCV RBC AUTO: 97 FL (ref 78–100)
PLATELET # BLD AUTO: 198 10E3/UL (ref 150–450)
POTASSIUM BLD-SCNC: 4 MMOL/L (ref 3.5–5.1)
RBC # BLD AUTO: 4.73 10E6/UL (ref 3.8–5.2)
SODIUM SERPL-SCNC: 141 MMOL/L (ref 134–144)
WBC # BLD AUTO: 8.4 10E3/UL (ref 4–11)

## 2022-08-19 PROCEDURE — 36415 COLL VENOUS BLD VENIPUNCTURE: CPT | Mod: ZL | Performed by: FAMILY MEDICINE

## 2022-08-19 PROCEDURE — G0463 HOSPITAL OUTPT CLINIC VISIT: HCPCS

## 2022-08-19 PROCEDURE — 80048 BASIC METABOLIC PNL TOTAL CA: CPT | Mod: ZL | Performed by: FAMILY MEDICINE

## 2022-08-19 PROCEDURE — 85027 COMPLETE CBC AUTOMATED: CPT | Mod: ZL | Performed by: FAMILY MEDICINE

## 2022-08-19 PROCEDURE — 99214 OFFICE O/P EST MOD 30 MIN: CPT | Performed by: FAMILY MEDICINE

## 2022-08-19 ASSESSMENT — PAIN SCALES - GENERAL: PAINLEVEL: MODERATE PAIN (5)

## 2022-08-19 NOTE — LETTER
August 19, 2022      Kate Chacon  [address] [facility] [address]        Dear ,    We are writing to inform you of your test results.    Your test results fall within the expected range(s) or remain unchanged from previous results.  Please continue with current treatment plan.    Resulted Orders   CBC W PLT No Diff   Result Value Ref Range    WBC Count 8.4 4.0 - 11.0 10e3/uL    RBC Count 4.73 3.80 - 5.20 10e6/uL    Hemoglobin 15.3 11.7 - 15.7 g/dL    Hematocrit 45.8 35.0 - 47.0 %    MCV 97 78 - 100 fL    MCH 32.3 26.5 - 33.0 pg    MCHC 33.4 31.5 - 36.5 g/dL    RDW 13.5 10.0 - 15.0 %    Platelet Count 198 150 - 450 10e3/uL   Basic Metabolic Panel   Result Value Ref Range    Sodium 141 134 - 144 mmol/L    Potassium 4.0 3.5 - 5.1 mmol/L    Chloride 107 98 - 107 mmol/L    Carbon Dioxide (CO2) 27 21 - 31 mmol/L    Anion Gap 7 3 - 14 mmol/L    Urea Nitrogen 17 7 - 25 mg/dL    Creatinine 0.95 0.60 - 1.20 mg/dL    Calcium 9.1 8.6 - 10.3 mg/dL    Glucose 102 70 - 105 mg/dL    GFR Estimate 61 >60 mL/min/1.73m2      Comment:      Effective December 21, 2021 eGFRcr in adults is calculated using the 2021 CKD-EPI creatinine equation which includes age and gender (Elsie et al., NEJM, DOI: 10.1056/CSDJow3658739)       If you have any questions or concerns, please call the clinic at the number listed above.       Sincerely,      Isela Monique MD

## 2022-08-19 NOTE — PROGRESS NOTES
[facility] AND [facility]  [address]  [place] [address]  Phone: [phone]  Fax: [phone]  Primary Provider: Asher Campos  Pre-op Performing Provider: LEMER ODONNELL      PREOPERATIVE EVALUATION:  Today's date: 8/19/2022    Kate Chacon is a 77 year old female who presents for a preoperative evaluation.    Surgical Information:  Surgery/Procedure: Ears  Surgery Location: [facility]  Surgeon: Dr Stout  Surgery Date: 9/1/22  Time of Surgery: ?  Where patient plans to recover: At home with family  Fax number for surgical facility: [phone]    Type of Anesthesia Anticipated: patient not sure    Assessment & Plan     The proposed surgical procedure is considered LOW risk.      ICD-10-CM    1. Pre-op exam  Z01.818 CBC W PLT No Diff     Basic Metabolic Panel     CBC W PLT No Diff     Basic Metabolic Panel   2. Paroxysmal atrial fibrillation (H)  I48.0 CBC W PLT No Diff     Basic Metabolic Panel     CBC W PLT No Diff     Basic Metabolic Panel   3. Essential hypertension  I10 CBC W PLT No Diff     Basic Metabolic Panel     CBC W PLT No Diff     Basic Metabolic Panel        Implanted Device:   - Type of device: pacemaker Patient advised to bring device information on day of surgery.      Risks and Recommendations:  The patient has the following additional risks and recommendations for perioperative complications:  Cardiovascular:   - Cardiology consulted earlier this month and cleared for surgery.     RECOMMENDATION:  APPROVAL GIVEN to proceed with proposed procedure, without further diagnostic evaluation.    Patient Instructions   Okay to take prescription medications (blood pressure medications) on the morning of surgery but hold all vitamins and supplements.     Hold warfarin starting on starting 8/29 (take last dose on 8/28). You will probably be able to restart it the day after surgery, but double check with Dr. Stout.     We will call [facility] regarding the  compression stockings.     Labs today. Will send you a letter with results. Will call with any concerns or results that need to be addressed prior to surgery.     Keep protime clinic appts as scheduled.       Subjective     HPI related to upcoming procedure: patient with skin changes on cartilage of R ear.       Preop Questions 8/19/2022   1. Have you ever had a heart attack or stroke? YES - 2016   2. Have you ever had surgery on your heart or blood vessels, such as a stent placement, a coronary artery bypass, or surgery on an artery in your head, neck, heart, or legs? No   3. Do you have chest pain with activity? No   4. Do you have a history of  heart failure? No   5. Do you currently have a cold, bronchitis or symptoms of other infection? No   6. Do you have a cough, shortness of breath, or wheezing? No   7. Do you or anyone in your family have previous history of blood clots? No   8. Do you or does anyone in your family have a serious bleeding problem such as prolonged bleeding following surgeries or cuts? No   9. Have you ever had problems with anemia or been told to take iron pills? No   10. Have you had any abnormal blood loss such as black, tarry or bloody stools, or abnormal vaginal bleeding? No   11. Have you ever had a blood transfusion? No   12. Are you willing to have a blood transfusion if it is medically needed before, during, or after your surgery? Yes   13. Have you or any of your relatives ever had problems with anesthesia? No   14. Do you have sleep apnea, excessive snoring or daytime drowsiness? No   15. Do you have any artifical heart valves or other implanted medical devices like a pacemaker, defibrillator, or continuous glucose monitor? YES - pacemaker   15a. What type of device do you have? Pacemaker   15b. Name of the clinic that manages your device:  [facility]   16. Do you have artificial joints? No   17. Are you allergic to latex? No     Health Care Directive:  Patient does not have a  Health Care Directive or Living Will: not discussed.     Preoperative Review of :   reviewed - no record of controlled substances prescribed.      Review of Systems  CONSTITUTIONAL: NEGATIVE for fever, chills, change in weight  ENT/MOUTH: NEGATIVE for ear, mouth and throat problems  RESP: NEGATIVE for significant cough or SOB  CV: NEGATIVE for chest pain, palpitations or peripheral edema    Patient Active Problem List    Diagnosis Date Noted     Hemiparesis due to old stroke - right side is weaker 10/19/2020     Priority: Medium     Compression fracture of T10 vertebra with routine healing, subsequent encounter 10/19/2020     Priority: Medium     History of CVA (cerebrovascular accident) 10/19/2020     Priority: Medium     Physical deconditioning 10/19/2020     Priority: Medium     Lumbar radiculopathy 10/19/2020     Priority: Medium     Chondrodermatitis nodularis chronica helicis, right 10/09/2020     Priority: Medium     SA node dysfunction s/p pacer on 11/2/2017 at [facility] 08/13/2020     Priority: Medium     H/O sinus pause 08/13/2020     Priority: Medium     History of tobacco abuse quitting on 10/14/16 08/13/2020     Priority: Medium     Dyspnea on exertion 08/13/2020     Priority: Medium     On Coumadin for atrial fibrillation (H) 08/13/2020     Priority: Medium     Mixed hyperlipidemia 08/13/2020     Priority: Medium     CKD (chronic kidney disease) stage 3, GFR 30-59 ml/min (H) 08/13/2020     Priority: Medium     Chronic midline low back pain without sciatica 08/13/2020     Priority: Medium     Sinoatrial node dysfunction (H) 05/01/2018     Priority: Medium     Cardiac pacemaker 02/19/2018     Priority: Medium     Urge incontinence 10/05/2017     Priority: Medium     Former smoker 08/30/2017     Priority: Medium     MDD (recurrent major depressive disorder) in remission (H) 08/30/2017     Priority: Medium     Anxiety 01/05/2017     Priority: Medium     Essential hypertension 12/08/2016      Priority: Medium     Paroxysmal atrial fibrillation (H) 11/02/2016     Priority: Medium     Anticoagulation goal of INR 2 to 3 10/28/2016     Priority: Medium     History of ischemic stroke on 10/15/2016 secondary to embolism 10/15/2016     Priority: Medium     Diverticulosis of large intestine 04/04/2011     Priority: Medium     H/O adenomatous polyp of colon 03/02/2011     Priority: Medium     Osteoporosis 02/08/2006     Priority: Medium      Past Medical History:   Diagnosis Date     Encounter for full-term uncomplicated delivery     x3     Ganglion of wrist     right     Gastritis without bleeding     treated and 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     Past Surgical History:   Procedure Laterality Date     APPENDECTOMY OPEN      No Comments Provided     COLONOSCOPY  08/19/2005 8/19/05,Cecal biopsy with tubular adenoma low grade dysplasia.     COLONOSCOPY  04/04/2011 4/4/11     COLONOSCOPY  05/07/2012    Tubular Adenomas F/U 2017     COLONOSCOPY  12/02/2019    F/U N/A as will be over 78 in 2024. Tubular adenoma     ESOPHAGOSCOPY, GASTROSCOPY, DUODENOSCOPY (EGD), COMBINED      8/19/05     OTHER SURGICAL HISTORY      8/3/05,284454,OTHER,helices on the right ear     TONSILLECTOMY      No Comments Provided     Current Outpatient Medications   Medication Sig Dispense Refill     atorvastatin (LIPITOR) 40 MG tablet Take 1 tablet (40 mg) by mouth At Bedtime 90 tablet 11     calcium carbonate 750 MG CHEW Take 1 tablet by mouth every 8 hours as needed for heartburn       calcium carbonate-vitamin D 600-200 MG-UNIT TABS Take 1 tablet by mouth 2 times daily       lisinopril (ZESTRIL) 20 MG tablet Take 1 tablet by mouth once daily 90 tablet 11     metoprolol succinate ER (TOPROL XL) 50 MG 24 hr tablet Take 1 tablet by mouth once daily 90 tablet 2     sertraline (ZOLOFT) 50 MG tablet Take 1 tablet (50 mg) by mouth At Bedtime 90 tablet 4     vitamin B complex with vitamin C (STRESS TAB) tablet Take 1  "tablet by mouth daily       vitamin D3 (CHOLECALCIFEROL) 50 mcg (2000 units) tablet Take 1 tablet by mouth daily       warfarin ANTICOAGULANT (COUMADIN) 5 MG tablet TAKE 1 TABLET (5MG) BY MOUTH ONCE DAILY FOR 6 DAYS PER WEEK AND 1.5 TABLETS(7.5 MG) FOR 1 DAY PER WEEK OR AS DIRECTED BY [facility] 110 tablet 1       Allergies   Allergen Reactions     Methylpar-Na Bisulfite-Pentazocine Unknown     jittery        Social History     Tobacco Use     Smoking status: Former Smoker     Packs/day: 0.50     Years: 49.00     Pack years: 24.50     Types: Cigarettes     Quit date: 10/14/2016     Years since quittin.8     Smokeless tobacco: Never Used   Substance Use Topics     Alcohol use: No       History   Drug Use No         Objective     BP (!) 142/82   Pulse 60   Temp 98.5  F (36.9  C) (Tympanic)   Resp 18   Ht 1.664 m (5' 5.5\")   Wt 83.8 kg (184 lb 12.8 oz)   LMP  (LMP Unknown)   SpO2 97%   BMI 30.28 kg/m      Physical Exam  GENERAL APPEARANCE: healthy, alert and no distress  HENT: ear canals and TM's normal and nose and mouth without ulcers or lesions  RESP: lungs clear to auscultation - no rales, rhonchi or wheezes  CV: regular rate and rhythm, normal S1 S2, no S3 or S4 and no murmur, click or rub   ABDOMEN: soft, nontender, no HSM or masses and bowel sounds normal  NEURO: Normal strength and tone, sensory exam grossly normal, mentation intact and speech normal    Recent Labs   Lab Test 22  1307 22  1308 22  1113 22  1109 22  0959 22  1034   HGB  --   --   --  16.0*  --   --    PLT  --   --   --  209  --   --    INR 1.6* 3.1*   < >  --    < >  --    NA  --   --   --  140  --  140   POTASSIUM  --   --   --  4.9  --  3.9   CR  --   --   --  1.03  --  1.03    < > = values in this interval not displayed.        Diagnostics:  Recent Results (from the past 24 hour(s))   CBC W PLT No Diff    Collection Time: 22 11:48 AM   Result Value Ref Range    WBC Count 8.4 4.0 - 11.0 " 10e3/uL    RBC Count 4.73 3.80 - 5.20 10e6/uL    Hemoglobin 15.3 11.7 - 15.7 g/dL    Hematocrit 45.8 35.0 - 47.0 %    MCV 97 78 - 100 fL    MCH 32.3 26.5 - 33.0 pg    MCHC 33.4 31.5 - 36.5 g/dL    RDW 13.5 10.0 - 15.0 %    Platelet Count 198 150 - 450 10e3/uL   Basic Metabolic Panel    Collection Time: 08/19/22 11:48 AM   Result Value Ref Range    Sodium 141 134 - 144 mmol/L    Potassium 4.0 3.5 - 5.1 mmol/L    Chloride 107 98 - 107 mmol/L    Carbon Dioxide (CO2) 27 21 - 31 mmol/L    Anion Gap 7 3 - 14 mmol/L    Urea Nitrogen 17 7 - 25 mg/dL    Creatinine 0.95 0.60 - 1.20 mg/dL    Calcium 9.1 8.6 - 10.3 mg/dL    Glucose 102 70 - 105 mg/dL    GFR Estimate 61 >60 mL/min/1.73m2      No EKG this visit, completed in the last 90 days.    Revised Cardiac Risk Index (RCRI):  The patient has the following serious cardiovascular risks for perioperative complications:   - No serious cardiac risks = 0 points     RCRI Interpretation: 0 points: Class I (very low risk - 0.4% complication rate)           Signed Electronically by: Isela Monique MD  Copy of this evaluation report is provided to requesting physician.

## 2022-08-19 NOTE — PATIENT INSTRUCTIONS
Okay to take prescription medications (blood pressure medications) on the morning of surgery but hold all vitamins and supplements.     Hold warfarin starting on starting 8/29 (take last dose on 8/28). You will probably be able to restart it the day after surgery, but double check with Dr. Stout.     We will call [facility] regarding the compression stockings.     Labs today. Will send you a letter with results. Will call with any concerns or results that need to be addressed prior to surgery.     Keep protime clinic appts as scheduled.

## 2022-08-24 ENCOUNTER — ANTICOAGULATION THERAPY VISIT (OUTPATIENT)
Dept: ANTICOAGULATION | Facility: OTHER | Age: 77
End: 2022-08-24
Attending: FAMILY MEDICINE
Payer: MEDICARE

## 2022-08-24 DIAGNOSIS — I48.0 PAROXYSMAL ATRIAL FIBRILLATION (H): Primary | ICD-10-CM

## 2022-08-24 DIAGNOSIS — Z51.81 ANTICOAGULATION GOAL OF INR 2 TO 3: ICD-10-CM

## 2022-08-24 DIAGNOSIS — Z79.01 ANTICOAGULATION GOAL OF INR 2 TO 3: ICD-10-CM

## 2022-08-24 LAB — INR POINT OF CARE: 1.8 (ref 0.9–1.1)

## 2022-08-24 PROCEDURE — 36416 COLLJ CAPILLARY BLOOD SPEC: CPT | Mod: ZL

## 2022-08-24 PROCEDURE — 85610 PROTHROMBIN TIME: CPT | Mod: ZL,QW

## 2022-08-24 NOTE — PROGRESS NOTES
ANTICOAGULATION MANAGEMENT     Kate Chacon 77 year old female is on warfarin with subtherapeutic INR result. (Goal INR 2.0-3.0)    Recent labs: (last 7 days)     08/24/22  1442   INR 1.8*       ASSESSMENT       Source(s): Chart Review and patient reviewed       Warfarin doses taken: Warfarin taken as instructed    Diet: No new diet changes identified    New illness, injury, or hospitalization: No    Medication/supplement changes: None noted    Signs or symptoms of bleeding or clotting: No    Previous INR: Subtherapeutic    Additional findings: None       PLAN     Recommended plan for no diet, medication or health factor changes affecting INR     Dosing Instructions: Increase your warfarin dose (6.7% change) with next INR in 2 weeks       Summary  As of 8/24/2022    Full warfarin instructions:  7.5 mg every Tue, Thu; 5 mg all other days   Next INR check:  9/7/2022                 Lab visit scheduled    Education provided: Written instructions provided    Plan made per [facility] anticoagulation protocol    Muriel Kramer RN  Anticoagulation Clinic  8/24/2022    _______________________________________________________________________     Anticoagulation Episode Summary     Current INR goal:  2.0-3.0   TTR:  61.0 % (1 y)   Target end date:  Indefinite   Send INR reminders to:  [facility] [place]    Indications    Paroxysmal atrial fibrillation (H) [I48.0]  Anticoagulation goal of INR 2 to 3 [Z51.81  Z79.01]           Comments:           Anticoagulation Care Providers     Provider Role Specialty Phone number    Asher Campos MD Referring Family Medicine [phone]

## 2022-08-26 LAB
MDC_IDC_LEAD_IMPLANT_DT: NORMAL
MDC_IDC_LEAD_IMPLANT_DT: NORMAL
MDC_IDC_LEAD_LOCATION: NORMAL
MDC_IDC_LEAD_LOCATION: NORMAL
MDC_IDC_LEAD_LOCATION_DETAIL_1: NORMAL
MDC_IDC_LEAD_LOCATION_DETAIL_1: NORMAL
MDC_IDC_LEAD_MFG: NORMAL
MDC_IDC_LEAD_MFG: NORMAL
MDC_IDC_LEAD_MODEL: NORMAL
MDC_IDC_LEAD_MODEL: NORMAL
MDC_IDC_LEAD_POLARITY_TYPE: NORMAL
MDC_IDC_LEAD_POLARITY_TYPE: NORMAL
MDC_IDC_LEAD_SERIAL: NORMAL
MDC_IDC_LEAD_SERIAL: NORMAL
MDC_IDC_MSMT_BATTERY_DTM: NORMAL
MDC_IDC_MSMT_BATTERY_REMAINING_LONGEVITY: 60 MO
MDC_IDC_MSMT_BATTERY_REMAINING_PERCENTAGE: 95 %
MDC_IDC_MSMT_BATTERY_STATUS: NORMAL
MDC_IDC_MSMT_LEADCHNL_RA_IMPEDANCE_VALUE: 727 OHM
MDC_IDC_MSMT_LEADCHNL_RV_IMPEDANCE_VALUE: 681 OHM
MDC_IDC_MSMT_LEADCHNL_RV_PACING_THRESHOLD_AMPLITUDE: 0.6 V
MDC_IDC_MSMT_LEADCHNL_RV_PACING_THRESHOLD_PULSEWIDTH: 0.4 MS
MDC_IDC_PG_IMPLANT_DTM: NORMAL
MDC_IDC_PG_MFG: NORMAL
MDC_IDC_PG_MODEL: NORMAL
MDC_IDC_PG_SERIAL: NORMAL
MDC_IDC_PG_TYPE: NORMAL
MDC_IDC_SESS_CLINIC_NAME: NORMAL
MDC_IDC_SESS_DTM: NORMAL
MDC_IDC_SESS_TYPE: NORMAL
MDC_IDC_SET_BRADY_LOWRATE: 60 {BEATS}/MIN
MDC_IDC_SET_BRADY_MAX_SENSOR_RATE: 130 {BEATS}/MIN
MDC_IDC_SET_BRADY_MODE: NORMAL
MDC_IDC_SET_LEADCHNL_RA_SENSING_ADAPTATION_MODE: NORMAL
MDC_IDC_SET_LEADCHNL_RA_SENSING_SENSITIVITY: 0.25 MV
MDC_IDC_SET_LEADCHNL_RV_PACING_AMPLITUDE: 1.2 V
MDC_IDC_SET_LEADCHNL_RV_PACING_CAPTURE_MODE: NORMAL
MDC_IDC_SET_LEADCHNL_RV_PACING_POLARITY: NORMAL
MDC_IDC_SET_LEADCHNL_RV_PACING_PULSEWIDTH: 0.4 MS
MDC_IDC_SET_LEADCHNL_RV_SENSING_ADAPTATION_MODE: NORMAL
MDC_IDC_SET_LEADCHNL_RV_SENSING_POLARITY: NORMAL
MDC_IDC_SET_LEADCHNL_RV_SENSING_SENSITIVITY: 1.5 MV
MDC_IDC_SET_ZONE_DETECTION_INTERVAL: 375 MS
MDC_IDC_SET_ZONE_TYPE: NORMAL
MDC_IDC_SET_ZONE_VENDOR_TYPE: NORMAL
MDC_IDC_STAT_BRADY_DTM_END: NORMAL
MDC_IDC_STAT_BRADY_DTM_START: NORMAL
MDC_IDC_STAT_BRADY_RA_PERCENT_PACED: 0 %
MDC_IDC_STAT_BRADY_RV_PERCENT_PACED: 97 %
MDC_IDC_STAT_EPISODE_RECENT_COUNT: 0
MDC_IDC_STAT_EPISODE_RECENT_COUNT_DTM_END: NORMAL
MDC_IDC_STAT_EPISODE_RECENT_COUNT_DTM_START: NORMAL
MDC_IDC_STAT_EPISODE_TYPE: NORMAL
MDC_IDC_STAT_EPISODE_VENDOR_TYPE: NORMAL

## 2022-09-01 ENCOUNTER — TRANSFERRED RECORDS (OUTPATIENT)
Dept: HEALTH INFORMATION MANAGEMENT | Facility: OTHER | Age: 77
End: 2022-09-01

## 2022-09-07 ENCOUNTER — ANTICOAGULATION THERAPY VISIT (OUTPATIENT)
Dept: ANTICOAGULATION | Facility: OTHER | Age: 77
End: 2022-09-07
Attending: FAMILY MEDICINE
Payer: MEDICARE

## 2022-09-07 ENCOUNTER — OFFICE VISIT (OUTPATIENT)
Dept: UROLOGY | Facility: OTHER | Age: 77
End: 2022-09-07
Attending: FAMILY MEDICINE
Payer: MEDICARE

## 2022-09-07 VITALS
HEART RATE: 96 BPM | TEMPERATURE: 97.8 F | BODY MASS INDEX: 29.01 KG/M2 | RESPIRATION RATE: 18 BRPM | WEIGHT: 177 LBS | OXYGEN SATURATION: 98 %

## 2022-09-07 DIAGNOSIS — N32.81 OVERACTIVE BLADDER: Primary | ICD-10-CM

## 2022-09-07 DIAGNOSIS — Z79.01 ANTICOAGULATION GOAL OF INR 2 TO 3: ICD-10-CM

## 2022-09-07 DIAGNOSIS — I48.0 PAROXYSMAL ATRIAL FIBRILLATION (H): Primary | ICD-10-CM

## 2022-09-07 DIAGNOSIS — Z51.81 ANTICOAGULATION GOAL OF INR 2 TO 3: ICD-10-CM

## 2022-09-07 LAB
ALBUMIN UR-MCNC: NEGATIVE MG/DL
APPEARANCE UR: CLEAR
BILIRUB UR QL STRIP: NEGATIVE
COLOR UR AUTO: YELLOW
GLUCOSE UR STRIP-MCNC: NEGATIVE MG/DL
HGB UR QL STRIP: NEGATIVE
HYALINE CASTS: 1 /LPF
INR POINT OF CARE: 1.3 (ref 0.9–1.1)
KETONES UR STRIP-MCNC: NEGATIVE MG/DL
LEUKOCYTE ESTERASE UR QL STRIP: NEGATIVE
MUCOUS THREADS #/AREA URNS LPF: PRESENT /LPF
NITRATE UR QL: NEGATIVE
PH UR STRIP: 7 [PH] (ref 5–9)
RBC URINE: 2 /HPF
SP GR UR STRIP: 1.01 (ref 1–1.03)
SQUAMOUS EPITHELIAL: 7 /HPF
UROBILINOGEN UR STRIP-MCNC: NORMAL MG/DL
WBC URINE: 1 /HPF

## 2022-09-07 PROCEDURE — 96372 THER/PROPH/DIAG INJ SC/IM: CPT | Performed by: UROLOGY

## 2022-09-07 PROCEDURE — 250N000020 HC RX IP 250 OP 636 J0585: Performed by: UROLOGY

## 2022-09-07 PROCEDURE — 51798 US URINE CAPACITY MEASURE: CPT | Performed by: UROLOGY

## 2022-09-07 PROCEDURE — G0463 HOSPITAL OUTPT CLINIC VISIT: HCPCS | Mod: 25 | Performed by: UROLOGY

## 2022-09-07 PROCEDURE — 81003 URINALYSIS AUTO W/O SCOPE: CPT | Mod: ZL | Performed by: UROLOGY

## 2022-09-07 PROCEDURE — 52287 CYSTOSCOPY CHEMODENERVATION: CPT | Performed by: UROLOGY

## 2022-09-07 PROCEDURE — 85610 PROTHROMBIN TIME: CPT | Mod: ZL,QW

## 2022-09-07 RX ORDER — CEFUROXIME AXETIL 250 MG/1
500 TABLET ORAL ONCE
Status: DISCONTINUED | OUTPATIENT
Start: 2022-09-07 | End: 2022-09-07 | Stop reason: CLARIF

## 2022-09-07 RX ADMIN — ONABOTULINUMTOXINA 100 UNITS: 100 INJECTION, POWDER, LYOPHILIZED, FOR SOLUTION INTRADERMAL; INTRAMUSCULAR at 08:27

## 2022-09-07 ASSESSMENT — PAIN SCALES - GENERAL: PAINLEVEL: NO PAIN (0)

## 2022-09-07 NOTE — PROGRESS NOTES
ANTICOAGULATION MANAGEMENT     Kate Chacon 77 year old female is on warfarin with subtherapeutic INR result. (Goal INR 2.0-3.0)    Recent labs: (last 7 days)     09/07/22  1443   INR 1.3*       ASSESSMENT       Source(s): Chart Review       Warfarin doses taken: Missed dose(s) may be affecting INR    Diet: No new diet changes identified    New illness, injury, or hospitalization: No    Medication/supplement changes: Started an antibiotic  started on 09/01/22.  Pt unsure what the antibiotic was.     Signs or symptoms of bleeding or clotting: No    Previous INR: Subtherapeutic    Additional findings: None       PLAN     Recommended plan for temporary change(s) affecting INR     Dosing Instructions: booster dose then continue your current warfarin dose with next INR in 5 days       Summary  As of 9/7/2022    Full warfarin instructions:  9/7: 10 mg; Otherwise 7.5 mg every Tue, Thu; 5 mg all other days   Next INR check:  9/12/2022             In person     Lab visit scheduled    Education provided: Written instructions provided    Plan made per [facility] anticoagulation protocol    Naida Lea RN  Anticoagulation Clinic  9/7/2022    _______________________________________________________________________     Anticoagulation Episode Summary     Current INR goal:  2.0-3.0   TTR:  57.1 % (1 y)   Target end date:  Indefinite   Send INR reminders to:  [facility] [place]    Indications    Paroxysmal atrial fibrillation (H) [I48.0]  Anticoagulation goal of INR 2 to 3 [Z51.81  Z79.01]           Comments:           Anticoagulation Care Providers     Provider Role Specialty Phone number    Asher Campos MD Referring Family Medicine [phone]

## 2022-09-07 NOTE — PATIENT INSTRUCTIONS
Home Care after Cystoscopy  Follow these guidelines for your care after your procedure.    Activity  No limitations    Bathing or showering  No limitations    Symptoms  You may notice some burning with urination but this usually resolves after 1-2 days.  You may also notice small amounts of blood in your urine.  Please increase water intake for the next few days to help with these symptoms.    Contacts  General Questions: [phone]  Appointments:  [phone]  Emergencies:  [phone]    When to call the clinic  If you develop any of the following symptoms please call the clinic immediately.  If the clinic is closed please be seen at an urgent care clinic or the Emergency Department.  - Burning with urination that worsens after 2 days  - Unable to urinate causing severe pelvic pain  - Fevers of greater than 101 degrees F  - Flank pain that is not responding to pain medication    Follow up  Please follow up as discussed at the appointment.    Home Care after Botox Treatment  Follow these guidelines for your care after your procedure.    Activity  No limitations    Bathing or showering  No limitations    Symptoms  You may notice some burning with urination but this usually resolves after 1-2 days.  You may also notice small amounts of blood in your urine.  Please increase water intake for the next few days to help with these symptoms.    Contacts  General Questions: [phone]  Appointments:  [phone]  Emergencies:  [phone]    When to call the clinic  If you develop any of the following symptoms please call the clinic immediately.  If the clinic is closed please be seen at an urgent care clinic or the Emergency Department.  - Burning with urination that worsens after 2 days  - Unable to urinate causing severe pelvic pain  - Fevers of greater than 101 degrees F  - Flank pain that is not responding to pain medication    Follow up  If you are currently taking an anticholinergic for urgency (such as oxybutynin,  Detrol, Toviaz or Sanctura) please continue for 1 week then stop.  Please follow up in 4-6 weeks for a bladder scan.

## 2022-09-07 NOTE — NURSING NOTE
Post-Void Residual  A post-void residual was measured by ultrasonic bladder scanner.  0 mL      Botox  Verbal Order per Steve Fox MD Read Back to prep patient.  Perineum area prepped with chlorhexidene Gluconate and patient draped per sterile technique.    Sodium Chloride 0.9%, 10mL mixed with Botox  Lot #: -[id number]  Expiration date: 5/1/23  : [facility]  NDC: 0409-488-02    [place] Protocol    A. Pre-procedure verification complete Yes  1-relevant information / documentation available, reviewed and properly matched to the patient; 2-consent accurate and complete, 3-equipment and supplies available    B. Site marking complete N/A  Site marked if not in continuous attendance with patient    C. TIME OUT completed Yes  Time Out was conducted just prior to starting procedure to verify the eight required elements: 1-patient identity, 2-consent accurate and complete, 3-position, 4-correct side/site marked (if applicable), 5-procedure, 6-relevant images / results properly labeled and displayed (if applicable), 7-antibiotics / irrigation fluids (if applicable), 8-safety precautions.    After procedure perineum area rinsed.  Discharge instructions reviewed with patient.  Patient verbalized understanding of discharge instructions and discharged ambulatory.

## 2022-09-07 NOTE — PROGRESS NOTES
Type of Visit  Established    Chief Complaint  Urge incontinence    HPI  Ms. Chacon is a 77 year old female with urge incontinence.  Patient last underwent Botox 100 unit intra-detrusor injections 2 years ago.  Patient responded well to the treatment and follows up today for retreatment as her symptoms have returned.  She presents today for Botox retreatment.  She denies dysuria or hematuria today.      Review of Systems  I reviewed the ROS the patient today.    Unintentional weight loss:  No  Recent fever/chills: No  Night sweats: No   Current skin rash: No   Recent hair loss: No   Heat intolerance: No   Cold intolerance: No   Chest pain: No   Palpitations: No   Shortness of breath: No  Wheezing: No   Constipation: No   Diarrhea: No   Nausea: No    Vomiting: No   Kidney/side pain: No    Back pain: No  Frequent headaches: No  Dizziness: No   Leg swelling: No   Calf pain: No    Nursing Notes:   Lynne Hodge LPN  9/7/2022  7:57 AM  Addendum  Post-Void Residual  A post-void residual was measured by ultrasonic bladder scanner.  0 mL      Botox  Verbal Order per Steve Fox MD Read Back to prep patient.  Perineum area prepped with chlorhexidene Gluconate and patient draped per sterile technique.    Sodium Chloride 0.9%, 10mL mixed with Botox  Lot #: -[id number]  Expiration date: 5/1/23  : [facility]  NDC: 0409-488-02    [place] Protocol    A. Pre-procedure verification complete Yes  1-relevant information / documentation available, reviewed and properly matched to the patient; 2-consent accurate and complete, 3-equipment and supplies available    B. Site marking complete N/A  Site marked if not in continuous attendance with patient    C. TIME OUT completed Yes  Time Out was conducted just prior to starting procedure to verify the eight required elements: 1-patient identity, 2-consent accurate and complete, 3-position, 4-correct side/site marked (if applicable), 5-procedure, 6-relevant images / results  properly labeled and displayed (if applicable), 7-antibiotics / irrigation fluids (if applicable), 8-safety precautions.    After procedure perineum area rinsed.  Discharge instructions reviewed with patient.  Patient verbalized understanding of discharge instructions and discharged ambulatory.         Physical Exam  Vitals:    09/07/22 0754   Pulse: 96   Resp: 18   Temp: 97.8  F (36.6  C)   TempSrc: Tympanic   SpO2: 98%   Weight: 80.3 kg (177 lb)     Constitutional: NAD, WDWN.   Cardiovascular: Regular rate.  Pulmonary/Chest: Respirations are even and non-labored bilaterally.  Abdominal: Soft. No distension, tenderness, masses or guarding. No CVA tenderness.  Genitourinary: Nonpalpable bladder.  Extremities: MARI x 4, Warm. No clubbing.  No cyanosis.    Skin: Pink, warm and dry.  No rashes noted.    ^^^^^^^^^^^^^^^^^^^^^^^^^^^^^^^^^^^^^^^^^^^^^^^^^^^^^^^^^^^^^^^    Preprocedure diagnosis  Urge incontinence with hypertonicity of bladder    Postprocedure diagnosis  Urge incontinence with hypertonicity of bladder    Procedure  Cystourethroscopy with intradetrusor injection of Botox, 100 units    Surgeon  Steve Fox MD    Anesthesia  None    Complications  None    Indications  The patient previously failed anticholinergic medication for treatment of the above named indication.    Informed consent was obtained.    We discussed the risks of Botox including, but not limited to, the risk of urinary retention and UTI.    Discussed performing the procedure in the OR versus clinic- risks and benefits.    Findings  Cystoscopy revealed one right and left ureteral orifice in the normal anatomic position, normal bladder mucosa and no tumors, masses or stones.    Procedure  The patient was taken to the procedure room and placed supine on the procedure table.    The patient was positioned in dorsal lithotomy, prepped and draped in a sterile fashion.    A time-out was performed.    The cystoscope was passed through the urethra and  into the bladder.    The injection needle was passed through the working port of the cystoscope and 10 units/mL/injection x 10 injections for a total of 100 international units of Botox was injected submucosally.   I injected 4 doses from the left to right lateral wall just above the trigonal ridge.    Two radial injections of 3 doses were then used rising toward the dome.    I encountered minimal bleeding from the sites and avoided injection of the trigone.   Once the injections were completed, the bladder was emptied and the scope was removed.    ^^^^^^^^^^^^^^^^^^^^^^^^^^^^^^^^^^^^^^^^^^^^^^^^^^^^^^^^^^^^^^^    UA  Results for orders placed or performed in visit on 09/07/22   UA reflex to Microscopic     Status: Abnormal   Result Value Ref Range    Color Urine Yellow Colorless, Straw, Light Yellow, Yellow    Appearance Urine Clear Clear    Glucose Urine Negative Negative mg/dL    Bilirubin Urine Negative Negative    Ketones Urine Negative Negative mg/dL    Specific Gravity Urine 1.014 1.000 - 1.030    Blood Urine Negative Negative    pH Urine 7.0 5.0 - 9.0    Protein Albumin Urine Negative Negative mg/dL    Urobilinogen Urine Normal Normal, 2.0 mg/dL    Nitrite Urine Negative Negative    Leukocyte Esterase Urine Negative Negative    RBC Urine 2 <=2 /HPF    WBC Urine 1 <=5 /HPF    Squamous Epithelials Urine 7 (H) <=1 /HPF    Mucus Urine Present (A) None Seen /LPF    Hyaline Casts Urine 1 <=2 /LPF    Narrative    Microscopic not indicated       Post-Void Residual  A post-void residual was measured by ultrasonic bladder scanner.  0 mL today    Assessment & Plan  Ms. Chacon is a 77 year old female with urge incontinence who underwent repeat Botox treatment.  UA and PVR were appropriate for retreatment today.  Follow up when symptoms return for repeat treatment

## 2022-09-12 ENCOUNTER — ANTICOAGULATION THERAPY VISIT (OUTPATIENT)
Dept: ANTICOAGULATION | Facility: OTHER | Age: 77
End: 2022-09-12
Attending: FAMILY MEDICINE
Payer: MEDICARE

## 2022-09-12 DIAGNOSIS — Z51.81 ANTICOAGULATION GOAL OF INR 2 TO 3: ICD-10-CM

## 2022-09-12 DIAGNOSIS — I48.0 PAROXYSMAL ATRIAL FIBRILLATION (H): Primary | ICD-10-CM

## 2022-09-12 DIAGNOSIS — Z79.01 ANTICOAGULATION GOAL OF INR 2 TO 3: ICD-10-CM

## 2022-09-12 LAB — INR POINT OF CARE: 1.7 (ref 0.9–1.1)

## 2022-09-12 PROCEDURE — 85610 PROTHROMBIN TIME: CPT | Mod: ZL,QW

## 2022-09-12 PROCEDURE — 36416 COLLJ CAPILLARY BLOOD SPEC: CPT | Mod: ZL

## 2022-09-12 NOTE — PROGRESS NOTES
ANTICOAGULATION MANAGEMENT     Kate Chacon 77 year old female is on warfarin with therapeutic INR result. (Goal INR 2.0-3.0)    Recent labs: (last 7 days)     09/12/22  1338   INR 1.7*       ASSESSMENT       Source(s): Chart Review and In person       Warfarin doses taken: Warfarin taken as instructed    Diet: No new diet changes identified    New illness, injury, or hospitalization: No    Medication/supplement changes: None noted    Signs or symptoms of bleeding or clotting: No    Previous INR: Subtherapeutic    Additional findings: None       PLAN     Recommended plan for no diet, medication or health factor changes affecting INR     Dosing Instructions: booster dose then continue your current warfarin dose with next INR in 1 week       Summary  As of 9/12/2022    Full warfarin instructions:  9/12: 7.5 mg; Otherwise 7.5 mg every Tue, Thu; 5 mg all other days   Next INR check:  9/19/2022             In person    Lab visit scheduled    Education provided: Please call back if any changes to your diet, medications or how you've been taking warfarin    Plan made per [facility] anticoagulation protocol    Rose Henao RN  Anticoagulation Clinic  9/12/2022    _______________________________________________________________________     Anticoagulation Episode Summary     Current INR goal:  2.0-3.0   TTR:  55.7 % (1 y)   Target end date:  Indefinite   Send INR reminders to:  [facility]    Indications    Paroxysmal atrial fibrillation (H) [I48.0]  Anticoagulation goal of INR 2 to 3 [Z51.81  Z79.01]           Comments:           Anticoagulation Care Providers     Provider Role Specialty Phone number    Asher Campos MD Referring Family Medicine [phone]

## 2022-09-13 ENCOUNTER — OFFICE VISIT (OUTPATIENT)
Dept: OTOLARYNGOLOGY | Facility: OTHER | Age: 77
End: 2022-09-13
Attending: OTOLARYNGOLOGY
Payer: MEDICARE

## 2022-09-13 DIAGNOSIS — Z09 POSTOP CHECK: Primary | ICD-10-CM

## 2022-09-13 PROCEDURE — G0463 HOSPITAL OUTPT CLINIC VISIT: HCPCS

## 2022-09-13 NOTE — NURSING NOTE
Pt is here for post-opt wedge excision of right pinna lesion.  Rylie Jo LPN on 9/13/2022 at 12:04 PM

## 2022-09-19 ENCOUNTER — ANTICOAGULATION THERAPY VISIT (OUTPATIENT)
Dept: ANTICOAGULATION | Facility: OTHER | Age: 77
End: 2022-09-19
Attending: FAMILY MEDICINE
Payer: MEDICARE

## 2022-09-19 DIAGNOSIS — Z51.81 ANTICOAGULATION GOAL OF INR 2 TO 3: ICD-10-CM

## 2022-09-19 DIAGNOSIS — I48.0 PAROXYSMAL ATRIAL FIBRILLATION (H): Primary | ICD-10-CM

## 2022-09-19 DIAGNOSIS — Z79.01 ANTICOAGULATION GOAL OF INR 2 TO 3: ICD-10-CM

## 2022-09-19 LAB — INR POINT OF CARE: 3.8 (ref 0.9–1.1)

## 2022-09-19 PROCEDURE — 36416 COLLJ CAPILLARY BLOOD SPEC: CPT | Mod: ZL

## 2022-09-19 NOTE — PROGRESS NOTES
ANTICOAGULATION MANAGEMENT     Kate Chacon 77 year old female is on warfarin with supratherapeutic INR result. (Goal INR 2.0-3.0)    Recent labs: (last 7 days)     09/19/22  1106   INR 3.8*       ASSESSMENT       Source(s): Chart Review and In person       Warfarin doses taken: Warfarin taken as instructed    Diet: No new diet changes identified    New illness, injury, or hospitalization: No    Medication/supplement changes: Increased tylenol use for leg pain    Signs or symptoms of bleeding or clotting: No    Previous INR: Subtherapeutic    Additional findings: None       PLAN     Recommended plan for temporary change(s) affecting INR     Dosing Instructions: hold dose then continue your current warfarin dose with next INR in 1 week       Summary  As of 9/19/2022    Full warfarin instructions:  9/19: Hold; Otherwise 7.5 mg every Tue, Thu; 5 mg all other days   Next INR check:  9/26/2022             IN PERSON    Lab visit scheduled    Education provided: Please call back if any changes to your diet, medications or how you've been taking warfarin    Plan made per [facility] anticoagulation protocol    Rose Henao RN  Anticoagulation Clinic  9/19/2022    _______________________________________________________________________     Anticoagulation Episode Summary     Current INR goal:  2.0-3.0   TTR:  54.7 % (1 y)   Target end date:  Indefinite   Send INR reminders to:  [facility]    Indications    Paroxysmal atrial fibrillation (H) [I48.0]  Anticoagulation goal of INR 2 to 3 [Z51.81  Z79.01]           Comments:           Anticoagulation Care Providers     Provider Role Specialty Phone number    Asher Campos MD Referring Family Medicine [phone]

## 2022-09-26 ENCOUNTER — ANTICOAGULATION THERAPY VISIT (OUTPATIENT)
Dept: ANTICOAGULATION | Facility: OTHER | Age: 77
End: 2022-09-26
Attending: FAMILY MEDICINE
Payer: MEDICARE

## 2022-09-26 DIAGNOSIS — I48.0 PAROXYSMAL ATRIAL FIBRILLATION (H): Primary | ICD-10-CM

## 2022-09-26 DIAGNOSIS — Z51.81 ANTICOAGULATION GOAL OF INR 2 TO 3: ICD-10-CM

## 2022-09-26 DIAGNOSIS — Z79.01 ANTICOAGULATION GOAL OF INR 2 TO 3: ICD-10-CM

## 2022-09-26 LAB — INR POINT OF CARE: 3.5 (ref 0.9–1.1)

## 2022-09-26 PROCEDURE — 36416 COLLJ CAPILLARY BLOOD SPEC: CPT | Mod: ZL

## 2022-09-26 NOTE — PROGRESS NOTES
ANTICOAGULATION MANAGEMENT     Kate Chacon 77 year old female is on warfarin with therapeutic INR result. (Goal INR 2.0-3.0)    Recent labs: (last 7 days)     09/26/22  1238   INR 3.5*       ASSESSMENT       Source(s): Chart Review       Warfarin doses taken: Warfarin taken as instructed    Diet: No new diet changes identified    New illness, injury, or hospitalization: No    Medication/supplement changes: tylenol  dose increased on 09/23/22 not expected to affect INR, but may increase risk of bleeding    Signs or symptoms of bleeding or clotting: No    Previous INR: Supratherapeutic    Additional findings: None       PLAN     Recommended plan for temporary change(s) affecting INR     Dosing Instructions: hold dose then continue your current warfarin dose with next INR in 1 week       Summary  As of 9/26/2022    Full warfarin instructions:  9/26: Hold; Otherwise 7.5 mg every Tue, Thu; 5 mg all other days   Next INR check:  10/3/2022             In person     Lab visit scheduled    Education provided: None required    Plan made per [facility] anticoagulation protocol    Naida Lea RN  Anticoagulation Clinic  9/26/2022    _______________________________________________________________________     Anticoagulation Episode Summary     Current INR goal:  2.0-3.0   TTR:  52.8 % (1 y)   Target end date:  Indefinite   Send INR reminders to:  [facility]    Indications    Paroxysmal atrial fibrillation (H) [I48.0]  Anticoagulation goal of INR 2 to 3 [Z51.81  Z79.01]           Comments:           Anticoagulation Care Providers     Provider Role Specialty Phone number    Asher Campos MD Referring Family Medicine [phone]

## 2022-09-26 NOTE — PROGRESS NOTES
document embedded image  Patient Name: Kate Chacon    Address: [address] [place] [address]     YOB: 1945    [place] [address]    MR Number: [id number]    Phone: [phone]  PCP: Asher Campos MD            Appointment Date: 09/13/22   Visit Provider: Temo Stout MD    cc: Asher Campos MD; ~    ENT Progress Note  Intake  Visit Reasons: PO Wedge excision right pinna lesion    HPI  History of Present Illness  Chief complaint:  Postop check    History  The patient is a 77-year-old female who recently underwent wedge excision of her right pinna in effort to resolve chronic dermatitis nodularis helicis.  She is having some lingering discomfort at the excision site.  Sutures have not resolved yet.    Exam  She appears to be healing well.  There is no evidence of infection or significant inflammation.  She does have residual suture material present.    Allergies    No Known Allergies Allergy (Unknown, Verified 09/01/22 08:26)    PFSH  PFSH:   Past Medical History: (Reviewed 09/01/22 @ 11:09 by Byron Temple MD)    Stroke  Thyroid disorder     Social History: (Reviewed 09/01/22 @ 11:09 by Byron Temple MD)  Smoking Status:  Former smoker  second hand exposure:  No  alcohol intake:  never  substance use type:  does not use       A&P  Assessment & Plan  (1) Postop check:        Status: Acute        Code(s):  Z09 - Encounter for follow-up examination after completed treatment for conditions other than malignant neoplasm  She was counseled to clean the wound to 3 times daily with peroxide on a cotton tip swab.  I asked that she see me in 2 or 3 weeks for final wound check.      Temo Stout MD    09/13/22 2739    <Electronically signed by Temo Stout MD> 09/14/22 0417

## 2022-10-03 ENCOUNTER — ANTICOAGULATION THERAPY VISIT (OUTPATIENT)
Dept: ANTICOAGULATION | Facility: OTHER | Age: 77
End: 2022-10-03
Attending: FAMILY MEDICINE
Payer: MEDICARE

## 2022-10-03 DIAGNOSIS — I48.0 PAROXYSMAL ATRIAL FIBRILLATION (H): Primary | ICD-10-CM

## 2022-10-03 DIAGNOSIS — Z79.01 ANTICOAGULATION GOAL OF INR 2 TO 3: ICD-10-CM

## 2022-10-03 DIAGNOSIS — Z51.81 ANTICOAGULATION GOAL OF INR 2 TO 3: ICD-10-CM

## 2022-10-03 LAB — INR POINT OF CARE: 3 (ref 0.9–1.1)

## 2022-10-03 PROCEDURE — 36416 COLLJ CAPILLARY BLOOD SPEC: CPT | Mod: ZL

## 2022-10-03 PROCEDURE — 85610 PROTHROMBIN TIME: CPT | Mod: ZL,QW

## 2022-10-03 NOTE — PROGRESS NOTES
ANTICOAGULATION MANAGEMENT     Kate Chacon 77 year old female is on warfarin with therapeutic INR result. (Goal INR 2.0-3.0)    Recent labs: (last 7 days)     10/03/22  1447   INR 3.0*       ASSESSMENT       Source(s): Chart Review and In person       Warfarin doses taken: Warfarin taken as instructed    Diet: No new diet changes identified    New illness, injury, or hospitalization: No    Medication/supplement changes: None noted    Signs or symptoms of bleeding or clotting: No    Previous INR: Supratherapeutic    Additional findings: None       PLAN     Recommended plan for no diet, medication or health factor changes affecting INR     Dosing Instructions: decrease your warfarin dose (12.5% change) with next INR in 1 week       Summary  As of 10/3/2022    Full warfarin instructions:  5 mg every day   Next INR check:  10/10/2022             In person    Lab visit scheduled    Education provided: Please call back if any changes to your diet, medications or how you've been taking warfarin    Plan made per [facility] anticoagulation protocol    Rose Henao, RN  Anticoagulation Clinic  10/3/2022    _______________________________________________________________________     Anticoagulation Episode Summary     Current INR goal:  2.0-3.0   TTR:  50.9 % (1 y)   Target end date:  Indefinite   Send INR reminders to:  [facility]    Indications    Paroxysmal atrial fibrillation (H) [I48.0]  Anticoagulation goal of INR 2 to 3 [Z51.81  Z79.01]           Comments:           Anticoagulation Care Providers     Provider Role Specialty Phone number    Asher Campos MD Referring Family Medicine [phone]

## 2022-10-04 ENCOUNTER — OFFICE VISIT (OUTPATIENT)
Dept: OTOLARYNGOLOGY | Facility: OTHER | Age: 77
End: 2022-10-04
Attending: OTOLARYNGOLOGY
Payer: MEDICARE

## 2022-10-04 DIAGNOSIS — Z09 POSTOP CHECK: Primary | ICD-10-CM

## 2022-10-04 DIAGNOSIS — L91.8 OTHER HYPERTROPHIC DISORDERS OF THE SKIN: ICD-10-CM

## 2022-10-04 PROCEDURE — G0463 HOSPITAL OUTPT CLINIC VISIT: HCPCS

## 2022-10-10 ENCOUNTER — ANTICOAGULATION THERAPY VISIT (OUTPATIENT)
Dept: ANTICOAGULATION | Facility: OTHER | Age: 77
End: 2022-10-10
Attending: FAMILY MEDICINE
Payer: MEDICARE

## 2022-10-10 DIAGNOSIS — I48.0 PAROXYSMAL ATRIAL FIBRILLATION (H): Primary | ICD-10-CM

## 2022-10-10 DIAGNOSIS — Z51.81 ANTICOAGULATION GOAL OF INR 2 TO 3: ICD-10-CM

## 2022-10-10 DIAGNOSIS — Z79.01 ANTICOAGULATION GOAL OF INR 2 TO 3: ICD-10-CM

## 2022-10-10 LAB — INR POINT OF CARE: 3.1 (ref 0.9–1.1)

## 2022-10-10 PROCEDURE — 36416 COLLJ CAPILLARY BLOOD SPEC: CPT | Mod: ZL

## 2022-10-10 NOTE — PROGRESS NOTES
ANTICOAGULATION MANAGEMENT     Kate Chacon 77 year old female is on warfarin with supratherapeutic INR result. (Goal INR 2.0-3.0)    Recent labs: (last 7 days)     10/10/22  1239   INR 3.1*       ASSESSMENT       Source(s): Chart Review and In person       Warfarin doses taken: Warfarin taken as instructed    Diet: No new diet changes identified    New illness, injury, or hospitalization: No    Medication/supplement changes: Melatonin started on Couple weeks ago not expected to affect INR, but may increase risk of bleeding    Signs or symptoms of bleeding or clotting: No    Previous INR: Supratherapeutic    Additional findings: None       PLAN     Recommended plan for no diet, medication or health factor changes affecting INR     Dosing Instructions: decrease your warfarin dose (7.1% change) with next INR in 2 weeks       Summary  As of 10/10/2022    Full warfarin instructions:  2.5 mg every Wed; 5 mg all other days   Next INR check:  10/24/2022             In person    Lab visit scheduled    Education provided: Please call back if any changes to your diet, medications or how you've been taking warfarin    Plan made per [facility]agulation protocol    Rose Henao RN  Anticoagulation Clinic  10/10/2022    _______________________________________________________________________     Anticoagulation Episode Summary     Current INR goal:  2.0-3.0   TTR:  49.0 % (1 y)   Target end date:  Indefinite   Send INR reminders to:  [facility]    Indications    Paroxysmal atrial fibrillation (H) [I48.0]  Anticoagulation goal of INR 2 to 3 [Z51.81  Z79.01]           Comments:           Anticoagulation Care Providers     Provider Role Specialty Phone number    Asher Campos MD Referring Family Medicine [phone]

## 2022-10-18 NOTE — PROGRESS NOTES
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  She does have less pain.    Exam  A couple of residual chromic sutures were removed.  The wound appears to be healing well.  Several exophytic skin tags the base of her neck that are itchy and bothersome to her.  She would like to have these removed.    Allergies    No Known Allergies Allergy (Unknown, Verified 09/01/22 08:26)    PFSH  PFSH:   Past Medical History: (Reviewed 09/01/22 @ 11:09 by Byron Temple MD)    Stroke  Thyroid disorder     Social History: (Reviewed 09/01/22 @ 11:09 by Byron Temple MD)  Smoking Status:  Former smoker  second hand exposure:  No  alcohol intake:  never  substance use type:  does not use       A&P  Assessment & Plan  (1) Postop check:        Status: Acute        Code(s):  Z09 - Encounter for follow-up examination after completed treatment for conditions other than malignant neoplasm  (2) Skin tag:        Status: Acute        Code(s):  L91.8 - Other hypertrophic disorders of the skin  She is several skin tags around the base of her neck that are itchy and bothersome to her.  She would like to have these removed.  She was told this can be done without difficulty during an office visit in [place].      Temo Stout MD    10/03/22 1611    <Electronically signed by Temo Stout MD> 10/13/22 1320

## 2022-10-24 ENCOUNTER — ANTICOAGULATION THERAPY VISIT (OUTPATIENT)
Dept: ANTICOAGULATION | Facility: OTHER | Age: 77
End: 2022-10-24
Payer: MEDICARE

## 2022-10-24 DIAGNOSIS — Z51.81 ANTICOAGULATION GOAL OF INR 2 TO 3: ICD-10-CM

## 2022-10-24 DIAGNOSIS — Z79.01 ANTICOAGULATION GOAL OF INR 2 TO 3: ICD-10-CM

## 2022-10-24 DIAGNOSIS — I48.0 PAROXYSMAL ATRIAL FIBRILLATION (H): Primary | ICD-10-CM

## 2022-10-24 LAB — INR POINT OF CARE: 2.2 (ref 0.9–1.1)

## 2022-10-24 PROCEDURE — 36416 COLLJ CAPILLARY BLOOD SPEC: CPT | Mod: ZL

## 2022-10-24 PROCEDURE — 85610 PROTHROMBIN TIME: CPT | Mod: ZL,QW

## 2022-10-24 NOTE — PROGRESS NOTES
ANTICOAGULATION MANAGEMENT     Kate Chacon 77 year old female is on warfarin with therapeutic INR result. (Goal INR 2.0-3.0)    Recent labs: (last 7 days)     10/24/22  1323   INR 2.2*       ASSESSMENT       Source(s): Chart Review and In person        Warfarin doses taken: Warfarin taken as instructed    Diet: No new diet changes identified    New illness, injury, or hospitalization: No    Medication/supplement changes: None noted    Signs or symptoms of bleeding or clotting: No    Previous INR: Supratherapeutic    Additional findings: None       PLAN     Recommended plan for no diet, medication or health factor changes affecting INR     Dosing Instructions: Continue your current warfarin dose with next INR in 2 weeks       Summary  As of 10/24/2022    Full warfarin instructions:  2.5 mg every Wed; 5 mg all other days; Starting 10/24/2022   Next INR check:  11/7/2022             In person contact    Lab visit scheduled    Education provided: Please call back if any changes to your diet, medications or how you've been taking warfarin    Plan made per [facility]agulation protocol    Yu Fatima RN  Anticoagulation Clinic  10/24/2022    _______________________________________________________________________     Anticoagulation Episode Summary     Current INR goal:  2.0-3.0   TTR:  48.6 % (1 y)   Target end date:  Indefinite   Send INR reminders to:  [facility]    Indications    Paroxysmal atrial fibrillation (H) [I48.0]  Anticoagulation goal of INR 2 to 3 [Z51.81  Z79.01]           Comments:           Anticoagulation Care Providers     Provider Role Specialty Phone number    Asher Campos MD Referring Family Medicine [phone]

## 2022-11-07 ENCOUNTER — ANTICOAGULATION THERAPY VISIT (OUTPATIENT)
Dept: ANTICOAGULATION | Facility: OTHER | Age: 77
End: 2022-11-07
Attending: FAMILY MEDICINE
Payer: MEDICARE

## 2022-11-07 DIAGNOSIS — Z79.01 ANTICOAGULATION GOAL OF INR 2 TO 3: ICD-10-CM

## 2022-11-07 DIAGNOSIS — I48.0 PAROXYSMAL ATRIAL FIBRILLATION (H): Primary | ICD-10-CM

## 2022-11-07 DIAGNOSIS — Z51.81 ANTICOAGULATION GOAL OF INR 2 TO 3: ICD-10-CM

## 2022-11-07 LAB — INR POINT OF CARE: 2.2 (ref 0.9–1.1)

## 2022-11-07 PROCEDURE — 85610 PROTHROMBIN TIME: CPT | Mod: ZL,QW

## 2022-11-07 PROCEDURE — 36416 COLLJ CAPILLARY BLOOD SPEC: CPT | Mod: ZL

## 2022-11-07 NOTE — PROGRESS NOTES
ANTICOAGULATION MANAGEMENT      Kate Chacon 77 year old female is on warfarin with therapeutic INR result. (Goal INR 2.0-3.0)    Recent labs: (last 7 days)     11/07/22  1328   INR 2.2*       ASSESSMENT       Source(s): Chart Review and in person       Warfarin doses taken: Warfarin taken as instructed    Diet: No new diet changes identified    New illness, injury, or hospitalization: No    Medication/supplement changes: None noted    Signs or symptoms of bleeding or clotting: No    Previous INR: Therapeutic last visit; previously outside of goal range    Additional findings: None       PLAN     Recommended plan for no diet, medication or health factor changes affecting INR     Dosing Instructions: Continue your current warfarin dose with next INR in 4 weeks       Summary  As of 11/7/2022    Full warfarin instructions:  2.5 mg every Wed; 5 mg all other days; Starting 11/7/2022   Next INR check:  12/5/2022             in person    Patient elected to schedule next visit 12/5/22    Education provided:     Written instructions provided    Plan made per [facility] anticoagulation protocol    Radha Burns RN  Anticoagulation Clinic  11/7/2022    _______________________________________________________________________     Anticoagulation Episode Summary     Current INR goal:  2.0-3.0   TTR:  48.6 % (1 y)   Target end date:  Indefinite   Send INR reminders to:  [facility]    Indications    Paroxysmal atrial fibrillation (H) [I48.0]  Anticoagulation goal of INR 2 to 3 [Z51.81  Z79.01]           Comments:           Anticoagulation Care Providers     Provider Role Specialty Phone number    Asher Campos MD Referring Family Medicine [phone]

## 2022-11-09 ENCOUNTER — ANCILLARY PROCEDURE (OUTPATIENT)
Dept: CARDIOLOGY | Facility: CLINIC | Age: 77
End: 2022-11-09
Attending: INTERNAL MEDICINE
Payer: MEDICARE

## 2022-11-09 DIAGNOSIS — I48.20 CHRONIC A-FIB (H): ICD-10-CM

## 2022-11-09 LAB
MDC_IDC_EPISODE_DTM: NORMAL
MDC_IDC_EPISODE_DTM: NORMAL
MDC_IDC_EPISODE_DURATION: 16 S
MDC_IDC_EPISODE_ID: NORMAL
MDC_IDC_EPISODE_ID: NORMAL
MDC_IDC_EPISODE_TYPE: NORMAL
MDC_IDC_EPISODE_TYPE: NORMAL
MDC_IDC_LEAD_IMPLANT_DT: NORMAL
MDC_IDC_LEAD_IMPLANT_DT: NORMAL
MDC_IDC_LEAD_LOCATION: NORMAL
MDC_IDC_LEAD_LOCATION: NORMAL
MDC_IDC_LEAD_LOCATION_DETAIL_1: NORMAL
MDC_IDC_LEAD_LOCATION_DETAIL_1: NORMAL
MDC_IDC_LEAD_MFG: NORMAL
MDC_IDC_LEAD_MFG: NORMAL
MDC_IDC_LEAD_MODEL: NORMAL
MDC_IDC_LEAD_MODEL: NORMAL
MDC_IDC_LEAD_POLARITY_TYPE: NORMAL
MDC_IDC_LEAD_POLARITY_TYPE: NORMAL
MDC_IDC_LEAD_SERIAL: NORMAL
MDC_IDC_LEAD_SERIAL: NORMAL
MDC_IDC_MSMT_BATTERY_DTM: NORMAL
MDC_IDC_MSMT_BATTERY_REMAINING_LONGEVITY: 54 MO
MDC_IDC_MSMT_BATTERY_REMAINING_PERCENTAGE: 91 %
MDC_IDC_MSMT_BATTERY_STATUS: NORMAL
MDC_IDC_MSMT_LEADCHNL_RA_IMPEDANCE_VALUE: 734 OHM
MDC_IDC_MSMT_LEADCHNL_RV_IMPEDANCE_VALUE: 695 OHM
MDC_IDC_MSMT_LEADCHNL_RV_PACING_THRESHOLD_AMPLITUDE: 0.7 V
MDC_IDC_MSMT_LEADCHNL_RV_PACING_THRESHOLD_PULSEWIDTH: 0.4 MS
MDC_IDC_PG_IMPLANT_DTM: NORMAL
MDC_IDC_PG_MFG: NORMAL
MDC_IDC_PG_MODEL: NORMAL
MDC_IDC_PG_SERIAL: NORMAL
MDC_IDC_PG_TYPE: NORMAL
MDC_IDC_SESS_CLINIC_NAME: NORMAL
MDC_IDC_SESS_DTM: NORMAL
MDC_IDC_SESS_TYPE: NORMAL
MDC_IDC_SET_BRADY_AT_MODE_SWITCH_RATE: 170 {BEATS}/MIN
MDC_IDC_SET_BRADY_LOWRATE: 60 {BEATS}/MIN
MDC_IDC_SET_BRADY_MAX_SENSOR_RATE: 130 {BEATS}/MIN
MDC_IDC_SET_BRADY_MODE: NORMAL
MDC_IDC_SET_LEADCHNL_RA_SENSING_ADAPTATION_MODE: NORMAL
MDC_IDC_SET_LEADCHNL_RA_SENSING_SENSITIVITY: 0.25 MV
MDC_IDC_SET_LEADCHNL_RV_PACING_AMPLITUDE: 1.2 V
MDC_IDC_SET_LEADCHNL_RV_PACING_CAPTURE_MODE: NORMAL
MDC_IDC_SET_LEADCHNL_RV_PACING_POLARITY: NORMAL
MDC_IDC_SET_LEADCHNL_RV_PACING_PULSEWIDTH: 0.4 MS
MDC_IDC_SET_LEADCHNL_RV_SENSING_ADAPTATION_MODE: NORMAL
MDC_IDC_SET_LEADCHNL_RV_SENSING_POLARITY: NORMAL
MDC_IDC_SET_LEADCHNL_RV_SENSING_SENSITIVITY: 1.5 MV
MDC_IDC_SET_ZONE_DETECTION_INTERVAL: 375 MS
MDC_IDC_SET_ZONE_TYPE: NORMAL
MDC_IDC_SET_ZONE_VENDOR_TYPE: NORMAL
MDC_IDC_STAT_BRADY_DTM_END: NORMAL
MDC_IDC_STAT_BRADY_DTM_START: NORMAL
MDC_IDC_STAT_BRADY_RA_PERCENT_PACED: 0 %
MDC_IDC_STAT_BRADY_RV_PERCENT_PACED: 79 %
MDC_IDC_STAT_EPISODE_RECENT_COUNT: 0
MDC_IDC_STAT_EPISODE_RECENT_COUNT: 0
MDC_IDC_STAT_EPISODE_RECENT_COUNT: 1
MDC_IDC_STAT_EPISODE_RECENT_COUNT_DTM_END: NORMAL
MDC_IDC_STAT_EPISODE_RECENT_COUNT_DTM_START: NORMAL
MDC_IDC_STAT_EPISODE_TYPE: NORMAL
MDC_IDC_STAT_EPISODE_VENDOR_TYPE: NORMAL

## 2022-11-09 PROCEDURE — 93294 REM INTERROG EVL PM/LDLS PM: CPT | Performed by: INTERNAL MEDICINE

## 2022-11-09 PROCEDURE — 93296 REM INTERROG EVL PM/IDS: CPT

## 2022-11-21 DIAGNOSIS — Z79.01 ANTICOAGULATION GOAL OF INR 2 TO 3: ICD-10-CM

## 2022-11-21 DIAGNOSIS — Z51.81 ANTICOAGULATION GOAL OF INR 2 TO 3: ICD-10-CM

## 2022-11-22 RX ORDER — WARFARIN SODIUM 5 MG/1
TABLET ORAL
Qty: 100 TABLET | Refills: 1 | Status: SHIPPED | OUTPATIENT
Start: 2022-11-22 | End: 2023-06-28

## 2022-11-22 NOTE — TELEPHONE ENCOUNTER
ANTICOAGULATION MANAGEMENT:  Medication Refill    Anticoagulation Summary  As of 11/7/2022    Warfarin maintenance plan:  2.5 mg (5 mg x 0.5) every Wed; 5 mg (5 mg x 1) all other days; Starting 11/7/2022   Next INR check:  12/5/2022   Target end date:  Indefinite    Indications    Paroxysmal atrial fibrillation (H) [I48.0]  Anticoagulation goal of INR 2 to 3 [Z51.81  Z79.01]             Anticoagulation Care Providers     Provider Role Specialty Phone number    Asher Campos MD Referring Family Medicine [phone]          Visit with referring provider/group within last year: Yes    [facility] referral signed within last year: Yes    Kate meets all criteria for refill (current [facility] referral, office visit with referring provider/group in last year, lab monitoring up to date or not exceeding 2 weeks overdue). Rx instructions and quantity supplied updated to match patient's current dosing plan per [facility] protocol     Yu Fatima RN  Anticoagulation Clinic

## 2022-12-05 ENCOUNTER — ANTICOAGULATION THERAPY VISIT (OUTPATIENT)
Dept: ANTICOAGULATION | Facility: OTHER | Age: 77
End: 2022-12-05
Attending: FAMILY MEDICINE
Payer: MEDICARE

## 2022-12-05 DIAGNOSIS — I48.0 PAROXYSMAL ATRIAL FIBRILLATION (H): Primary | ICD-10-CM

## 2022-12-05 DIAGNOSIS — Z51.81 ANTICOAGULATION GOAL OF INR 2 TO 3: ICD-10-CM

## 2022-12-05 DIAGNOSIS — Z79.01 ANTICOAGULATION GOAL OF INR 2 TO 3: ICD-10-CM

## 2022-12-05 LAB — INR POINT OF CARE: 1.8 (ref 0.9–1.1)

## 2022-12-05 PROCEDURE — 36416 COLLJ CAPILLARY BLOOD SPEC: CPT | Mod: ZL

## 2022-12-05 NOTE — PROGRESS NOTES
ANTICOAGULATION MANAGEMENT     Kate Chacon 77 year old female is on warfarin with subtherapeutic INR result. (Goal INR 2.0-3.0)    Recent labs: (last 7 days)     12/05/22  1315   INR 1.8*       ASSESSMENT       Source(s): Chart Review and In person       Warfarin doses taken: Warfarin taken as instructed    Diet: No new diet changes identified    New illness, injury, or hospitalization: No    Medication/supplement changes: None noted    Signs or symptoms of bleeding or clotting: No    Previous INR: Therapeutic last 2(+) visits    Additional findings: Lots of nerve pain within the last week-taking tylenol PRN for that.        PLAN     Recommended plan for no diet, medication or health factor changes affecting INR     Dosing Instructions: Continue your current warfarin dose with next INR in 2 weeks       Summary  As of 12/5/2022    Full warfarin instructions:  2.5 mg every Wed; 5 mg all other days; Starting 12/5/2022   Next INR check:  12/19/2022             In person    Patient offered & declined to schedule next visit    Education provided: Please call back if any changes to your diet, medications or how you've been taking warfarin    Plan made per [facility] anticoagulation protocol    Rose Henao RN  Anticoagulation Clinic  12/5/2022    _______________________________________________________________________     Anticoagulation Episode Summary     Current INR goal:  2.0-3.0   TTR:  44.8 % (1 y)   Target end date:  Indefinite   Send INR reminders to:  [facility]    Indications    Paroxysmal atrial fibrillation (H) [I48.0]  Anticoagulation goal of INR 2 to 3 [Z51.81  Z79.01]           Comments:           Anticoagulation Care Providers     Provider Role Specialty Phone number    Asher Campos MD Referring Family Medicine [phone]

## 2022-12-19 ENCOUNTER — ANTICOAGULATION THERAPY VISIT (OUTPATIENT)
Dept: ANTICOAGULATION | Facility: OTHER | Age: 77
End: 2022-12-19
Attending: FAMILY MEDICINE
Payer: MEDICARE

## 2022-12-19 DIAGNOSIS — I48.0 PAROXYSMAL ATRIAL FIBRILLATION (H): Primary | ICD-10-CM

## 2022-12-19 DIAGNOSIS — Z79.01 ANTICOAGULATION GOAL OF INR 2 TO 3: ICD-10-CM

## 2022-12-19 DIAGNOSIS — Z51.81 ANTICOAGULATION GOAL OF INR 2 TO 3: ICD-10-CM

## 2022-12-19 LAB — INR POINT OF CARE: 2.1 (ref 0.9–1.1)

## 2022-12-19 PROCEDURE — 36416 COLLJ CAPILLARY BLOOD SPEC: CPT | Mod: ZL

## 2022-12-19 PROCEDURE — 85610 PROTHROMBIN TIME: CPT | Mod: ZL,QW

## 2022-12-19 NOTE — PROGRESS NOTES
ANTICOAGULATION MANAGEMENT     Kate Chacon 77 year old female is on warfarin with therapeutic INR result. (Goal INR 2.0-3.0)    Recent labs: (last 7 days)     12/19/22  1237   INR 2.1*       ASSESSMENT       Source(s): Chart Review and In person       Warfarin doses taken: Warfarin taken as instructed    Diet: No new diet changes identified    New illness, injury, or hospitalization: No    Medication/supplement changes: None noted    Signs or symptoms of bleeding or clotting: No    Previous INR: Subtherapeutic    Additional findings: None       PLAN     Recommended plan for no diet, medication or health factor changes affecting INR     Dosing Instructions: Continue your current warfarin dose with next INR in 4 weeks       Summary  As of 12/19/2022    Full warfarin instructions:  2.5 mg every Wed; 5 mg all other days; Starting 12/19/2022   Next INR check:  1/16/2023             In person    Lab visit scheduled    Education provided: Please call back if any changes to your diet, medications or how you've been taking warfarin    Plan made per [facility] anticoagulation protocol    Rose Henao RN  Anticoagulation Clinic  12/19/2022    _______________________________________________________________________     Anticoagulation Episode Summary     Current INR goal:  2.0-3.0   TTR:  42.2 % (1 y)   Target end date:  Indefinite   Send INR reminders to:  [facility]    Indications    Paroxysmal atrial fibrillation (H) [I48.0]  Anticoagulation goal of INR 2 to 3 [Z51.81  Z79.01]           Comments:           Anticoagulation Care Providers     Provider Role Specialty Phone number    Asher Campos MD Referring Family Medicine [phone]

## 2023-01-16 ENCOUNTER — ANTICOAGULATION THERAPY VISIT (OUTPATIENT)
Dept: ANTICOAGULATION | Facility: OTHER | Age: 78
End: 2023-01-16
Attending: FAMILY MEDICINE
Payer: MEDICARE

## 2023-01-16 DIAGNOSIS — Z79.01 ANTICOAGULATION GOAL OF INR 2 TO 3: ICD-10-CM

## 2023-01-16 DIAGNOSIS — I48.0 PAROXYSMAL ATRIAL FIBRILLATION (H): Primary | ICD-10-CM

## 2023-01-16 DIAGNOSIS — Z51.81 ANTICOAGULATION GOAL OF INR 2 TO 3: ICD-10-CM

## 2023-01-16 LAB — INR POINT OF CARE: 2.1 (ref 0.9–1.1)

## 2023-01-16 PROCEDURE — 85610 PROTHROMBIN TIME: CPT | Mod: ZL,QW

## 2023-01-16 NOTE — PROGRESS NOTES
ANTICOAGULATION MANAGEMENT     Kate Chacon 77 year old female is on warfarin with therapeutic INR result. (Goal INR 2.0-3.0)    Recent labs: (last 7 days)     01/16/23  1047   INR 2.1*       ASSESSMENT       Source(s): Chart Review and In person       Warfarin doses taken: Warfarin taken as instructed    Diet: No new diet changes identified    New illness, injury, or hospitalization: No    Medication/supplement changes: Taken tylenol for pain    Signs or symptoms of bleeding or clotting: No    Previous INR: Therapeutic last visit; previously outside of goal range    Additional findings: None       PLAN     Recommended plan for no diet, medication or health factor changes affecting INR     Dosing Instructions: Continue your current warfarin dose with next INR in 6 weeks       Summary  As of 1/16/2023    Full warfarin instructions:  2.5 mg every Wed; 5 mg all other days   Next INR check:  2/27/2023             In person    Lab visit scheduled    Education provided: Please call back if any changes to your diet, medications or how you've been taking warfarin    Plan made per [facility] anticoagulation protocol    Rose Henao RN  Anticoagulation Clinic  1/16/2023    _______________________________________________________________________     Anticoagulation Episode Summary     Current INR goal:  2.0-3.0   TTR:  49.2 % (1 y)   Target end date:  Indefinite   Send INR reminders to:  [facility]    Indications    Paroxysmal atrial fibrillation (H) [I48.0]  Anticoagulation goal of INR 2 to 3 [Z51.81  Z79.01]           Comments:           Anticoagulation Care Providers     Provider Role Specialty Phone number    Asher Campos MD Referring Family Medicine [phone]

## 2023-02-27 ENCOUNTER — ANTICOAGULATION THERAPY VISIT (OUTPATIENT)
Dept: ANTICOAGULATION | Facility: OTHER | Age: 78
End: 2023-02-27
Attending: FAMILY MEDICINE
Payer: MEDICARE

## 2023-02-27 DIAGNOSIS — I48.0 PAROXYSMAL ATRIAL FIBRILLATION (H): Primary | ICD-10-CM

## 2023-02-27 DIAGNOSIS — Z79.01 ANTICOAGULATION GOAL OF INR 2 TO 3: ICD-10-CM

## 2023-02-27 DIAGNOSIS — Z51.81 ANTICOAGULATION GOAL OF INR 2 TO 3: ICD-10-CM

## 2023-02-27 LAB — INR POINT OF CARE: 2 (ref 0.9–1.1)

## 2023-02-27 PROCEDURE — 85610 PROTHROMBIN TIME: CPT | Mod: ZL,QW

## 2023-02-27 NOTE — PROGRESS NOTES
ANTICOAGULATION MANAGEMENT     Kate Chacon 77 year old female is on warfarin with therapeutic INR result. (Goal INR 2.0-3.0)    Recent labs: (last 7 days)     02/27/23  0950   INR 2.0*       ASSESSMENT       Source(s): Chart Review and In person       Warfarin doses taken: Warfarin taken as instructed    Diet: No new diet changes identified    New illness, injury, or hospitalization: No    Medication/supplement changes: None noted    Signs or symptoms of bleeding or clotting: No    Previous INR: Therapeutic last 2(+) visits    Additional findings: None       PLAN     Recommended plan for no diet, medication or health factor changes affecting INR     Dosing Instructions: Continue your current warfarin dose with next INR in 6 weeks       Summary  As of 2/27/2023    Full warfarin instructions:  2.5 mg every Wed; 5 mg all other days   Next INR check:  4/10/2023             In person    Lab visit scheduled    Education provided: Please call back if any changes to your diet, medications or how you've been taking warfarin    Plan made per [facility] anticoagulation protocol    Rose Henao RN  Anticoagulation Clinic  2/27/2023    _______________________________________________________________________     Anticoagulation Episode Summary     Current INR goal:  2.0-3.0   TTR:  60.7 % (1 y)   Target end date:  Indefinite   Send INR reminders to:  [facility]    Indications    Paroxysmal atrial fibrillation (H) [I48.0]  Anticoagulation goal of INR 2 to 3 [Z51.81  Z79.01]           Comments:           Anticoagulation Care Providers     Provider Role Specialty Phone number    Asher Campos MD Referring Family Medicine [phone]

## 2023-03-29 DIAGNOSIS — I49.5 SINOATRIAL NODE DYSFUNCTION (H): ICD-10-CM

## 2023-03-29 DIAGNOSIS — I48.0 PAROXYSMAL ATRIAL FIBRILLATION (H): ICD-10-CM

## 2023-03-29 DIAGNOSIS — Z95.0 CARDIAC PACEMAKER: ICD-10-CM

## 2023-03-29 RX ORDER — METOPROLOL SUCCINATE 50 MG/1
TABLET, EXTENDED RELEASE ORAL
Qty: 90 TABLET | Refills: 0 | Status: SHIPPED | OUTPATIENT
Start: 2023-03-29 | End: 2023-06-28

## 2023-03-29 NOTE — TELEPHONE ENCOUNTER
[id number] sent Rx request for the following:      Requested Prescriptions   Pending Prescriptions Disp Refills     metoprolol succinate ER (TOPROL XL) 50 MG 24 hr tablet [Pharmacy Med Name: Metoprolol Succinate ER 50 MG Oral Tablet Extended Release 24 Hour] 90 tablet 0     Sig: Take 1 tablet by mouth once daily       Beta-Blockers Protocol Failed - 3/29/2023 10:39 AM        Failed - Blood pressure under 140/90 in past 12 months     BP Readings from Last 3 Encounters:   08/19/22 (!) 142/82   08/10/22 138/70   07/27/22 126/84        Last Prescription Date:   6/28/22  Last Fill Qty/Refills:         90, R-2    Last Office Visit:              8/19/22   Future Office visit:           None    Rut Wolfe RN .............. 3/29/2023  10:41 AM

## 2023-04-10 ENCOUNTER — ANCILLARY PROCEDURE (OUTPATIENT)
Dept: CARDIOLOGY | Facility: CLINIC | Age: 78
End: 2023-04-10
Attending: INTERNAL MEDICINE
Payer: MEDICARE

## 2023-04-10 ENCOUNTER — ANTICOAGULATION THERAPY VISIT (OUTPATIENT)
Dept: ANTICOAGULATION | Facility: OTHER | Age: 78
End: 2023-04-10
Attending: FAMILY MEDICINE
Payer: COMMERCIAL

## 2023-04-10 DIAGNOSIS — I48.20 CHRONIC A-FIB (H): ICD-10-CM

## 2023-04-10 DIAGNOSIS — Z51.81 ANTICOAGULATION GOAL OF INR 2 TO 3: ICD-10-CM

## 2023-04-10 DIAGNOSIS — Z79.01 ANTICOAGULATION GOAL OF INR 2 TO 3: ICD-10-CM

## 2023-04-10 DIAGNOSIS — I48.0 PAROXYSMAL ATRIAL FIBRILLATION (H): Primary | ICD-10-CM

## 2023-04-10 LAB — INR POINT OF CARE: 1.8 (ref 0.9–1.1)

## 2023-04-10 PROCEDURE — 36416 COLLJ CAPILLARY BLOOD SPEC: CPT | Mod: ZL

## 2023-04-10 PROCEDURE — 93296 REM INTERROG EVL PM/IDS: CPT

## 2023-04-10 PROCEDURE — 93294 REM INTERROG EVL PM/LDLS PM: CPT | Performed by: INTERNAL MEDICINE

## 2023-04-10 PROCEDURE — 85610 PROTHROMBIN TIME: CPT | Mod: ZL,QW

## 2023-04-10 NOTE — PROGRESS NOTES
ANTICOAGULATION MANAGEMENT     Kate Chacon 77 year old female is on warfarin with subtherapeutic INR result. (Goal INR 2.0-3.0)    Recent labs: (last 7 days)     04/10/23  0948   INR 1.8*       ASSESSMENT       Source(s): Chart Review and In person       Warfarin doses taken: Warfarin taken as instructed    Diet: No new diet changes identified    New illness, injury, or hospitalization: No    Medication/supplement changes: None noted    Signs or symptoms of bleeding or clotting: No    Previous INR: Therapeutic last 2(+) visits    Additional findings: None       PLAN     Recommended plan for no diet, medication or health factor changes affecting INR     Dosing Instructions: Continue your current warfarin dose with next INR in 4 weeks       Summary  As of 4/10/2023    Full warfarin instructions:  2.5 mg every Wed; 5 mg all other days   Next INR check:  5/8/2023             In person    Lab visit scheduled    Education provided: Please call back if any changes to your diet, medications or how you've been taking warfarin    Plan made per [facility] anticoagulation protocol    Rose Henao RN  Anticoagulation Clinic  4/10/2023    _______________________________________________________________________     Anticoagulation Episode Summary     Current INR goal:  2.0-3.0   TTR:  56.3 % (1 y)   Target end date:  Indefinite   Send INR reminders to:  [facility]    Indications    Paroxysmal atrial fibrillation (H) [I48.0]  Anticoagulation goal of INR 2 to 3 [Z51.81  Z79.01]           Comments:           Anticoagulation Care Providers     Provider Role Specialty Phone number    Asher Campos MD Referring Family Medicine [phone]

## 2023-04-12 LAB
MDC_IDC_EPISODE_DTM: NORMAL
MDC_IDC_EPISODE_ID: NORMAL
MDC_IDC_EPISODE_TYPE: NORMAL
MDC_IDC_LEAD_IMPLANT_DT: NORMAL
MDC_IDC_LEAD_IMPLANT_DT: NORMAL
MDC_IDC_LEAD_LOCATION: NORMAL
MDC_IDC_LEAD_LOCATION: NORMAL
MDC_IDC_LEAD_LOCATION_DETAIL_1: NORMAL
MDC_IDC_LEAD_LOCATION_DETAIL_1: NORMAL
MDC_IDC_LEAD_MFG: NORMAL
MDC_IDC_LEAD_MFG: NORMAL
MDC_IDC_LEAD_MODEL: NORMAL
MDC_IDC_LEAD_MODEL: NORMAL
MDC_IDC_LEAD_POLARITY_TYPE: NORMAL
MDC_IDC_LEAD_POLARITY_TYPE: NORMAL
MDC_IDC_LEAD_SERIAL: NORMAL
MDC_IDC_LEAD_SERIAL: NORMAL
MDC_IDC_MSMT_BATTERY_DTM: NORMAL
MDC_IDC_MSMT_BATTERY_REMAINING_LONGEVITY: 48 MO
MDC_IDC_MSMT_BATTERY_REMAINING_PERCENTAGE: 80 %
MDC_IDC_MSMT_BATTERY_STATUS: NORMAL
MDC_IDC_MSMT_LEADCHNL_RA_IMPEDANCE_VALUE: 733 OHM
MDC_IDC_MSMT_LEADCHNL_RV_IMPEDANCE_VALUE: 756 OHM
MDC_IDC_MSMT_LEADCHNL_RV_PACING_THRESHOLD_AMPLITUDE: 0.6 V
MDC_IDC_MSMT_LEADCHNL_RV_PACING_THRESHOLD_PULSEWIDTH: 0.4 MS
MDC_IDC_PG_IMPLANT_DTM: NORMAL
MDC_IDC_PG_MFG: NORMAL
MDC_IDC_PG_MODEL: NORMAL
MDC_IDC_PG_SERIAL: NORMAL
MDC_IDC_PG_TYPE: NORMAL
MDC_IDC_SESS_CLINIC_NAME: NORMAL
MDC_IDC_SESS_DTM: NORMAL
MDC_IDC_SESS_TYPE: NORMAL
MDC_IDC_SET_BRADY_AT_MODE_SWITCH_RATE: 170 {BEATS}/MIN
MDC_IDC_SET_BRADY_LOWRATE: 60 {BEATS}/MIN
MDC_IDC_SET_BRADY_MAX_SENSOR_RATE: 130 {BEATS}/MIN
MDC_IDC_SET_BRADY_MODE: NORMAL
MDC_IDC_SET_LEADCHNL_RA_SENSING_ADAPTATION_MODE: NORMAL
MDC_IDC_SET_LEADCHNL_RA_SENSING_SENSITIVITY: 0.25 MV
MDC_IDC_SET_LEADCHNL_RV_PACING_AMPLITUDE: 1 V
MDC_IDC_SET_LEADCHNL_RV_PACING_CAPTURE_MODE: NORMAL
MDC_IDC_SET_LEADCHNL_RV_PACING_POLARITY: NORMAL
MDC_IDC_SET_LEADCHNL_RV_PACING_PULSEWIDTH: 0.4 MS
MDC_IDC_SET_LEADCHNL_RV_SENSING_ADAPTATION_MODE: NORMAL
MDC_IDC_SET_LEADCHNL_RV_SENSING_POLARITY: NORMAL
MDC_IDC_SET_LEADCHNL_RV_SENSING_SENSITIVITY: 1.5 MV
MDC_IDC_SET_ZONE_DETECTION_INTERVAL: 375 MS
MDC_IDC_SET_ZONE_TYPE: NORMAL
MDC_IDC_SET_ZONE_VENDOR_TYPE: NORMAL
MDC_IDC_STAT_BRADY_DTM_END: NORMAL
MDC_IDC_STAT_BRADY_DTM_START: NORMAL
MDC_IDC_STAT_BRADY_RA_PERCENT_PACED: 0 %
MDC_IDC_STAT_BRADY_RV_PERCENT_PACED: 29 %
MDC_IDC_STAT_EPISODE_RECENT_COUNT: 0
MDC_IDC_STAT_EPISODE_RECENT_COUNT_DTM_END: NORMAL
MDC_IDC_STAT_EPISODE_RECENT_COUNT_DTM_START: NORMAL
MDC_IDC_STAT_EPISODE_TYPE: NORMAL
MDC_IDC_STAT_EPISODE_VENDOR_TYPE: NORMAL

## 2023-05-08 ENCOUNTER — ANTICOAGULATION THERAPY VISIT (OUTPATIENT)
Dept: ANTICOAGULATION | Facility: OTHER | Age: 78
End: 2023-05-08
Attending: FAMILY MEDICINE
Payer: MEDICARE

## 2023-05-08 DIAGNOSIS — Z79.01 ANTICOAGULATION GOAL OF INR 2 TO 3: ICD-10-CM

## 2023-05-08 DIAGNOSIS — Z51.81 ANTICOAGULATION GOAL OF INR 2 TO 3: ICD-10-CM

## 2023-05-08 DIAGNOSIS — I48.0 PAROXYSMAL ATRIAL FIBRILLATION (H): Primary | ICD-10-CM

## 2023-05-08 LAB — INR POINT OF CARE: 1.8 (ref 0.9–1.1)

## 2023-05-08 PROCEDURE — 85610 PROTHROMBIN TIME: CPT | Mod: ZL,QW

## 2023-05-08 NOTE — PROGRESS NOTES
ANTICOAGULATION MANAGEMENT     Kate Chacon 77 year old female is on warfarin with subtherapeutic INR result. (Goal INR 2.0-3.0)    Recent labs: (last 7 days)     05/08/23  0955   INR 1.8*       ASSESSMENT       Source(s): Chart Review and In person       Warfarin doses taken: Warfarin taken as instructed    Diet: No new diet changes identified    New illness, injury, or hospitalization: No    Medication/supplement changes: None noted    Signs or symptoms of bleeding or clotting: No    Previous INR: Subtherapeutic    Additional findings: None       PLAN     Recommended plan for no diet, medication or health factor changes affecting INR     Dosing Instructions: Increase your warfarin dose (7.7% change) with next INR in 2 weeks       Summary  As of 5/8/2023    Full warfarin instructions:  5 mg every day   Next INR check:  5/22/2023             In person    Lab visit scheduled    Education provided: Please call back if any changes to your diet, medications or how you've been taking warfarin    Plan made per [facility] anticoagulation protocol    Yu Fatima RN  Anticoagulation Clinic  5/8/2023    _______________________________________________________________________     Anticoagulation Episode Summary     Current INR goal:  2.0-3.0   TTR:  52.0 % (1 y)   Target end date:  Indefinite   Send INR reminders to:  [facility]    Indications    Paroxysmal atrial fibrillation (H) [I48.0]  Anticoagulation goal of INR 2 to 3 [Z51.81  Z79.01]           Comments:           Anticoagulation Care Providers     Provider Role Specialty Phone number    Asher Campos MD Referring Family Medicine [phone]

## 2023-05-09 ENCOUNTER — OFFICE VISIT (OUTPATIENT)
Dept: FAMILY MEDICINE | Facility: OTHER | Age: 78
End: 2023-05-09
Attending: FAMILY MEDICINE
Payer: COMMERCIAL

## 2023-05-09 VITALS
HEIGHT: 65 IN | TEMPERATURE: 97.2 F | DIASTOLIC BLOOD PRESSURE: 82 MMHG | OXYGEN SATURATION: 96 % | BODY MASS INDEX: 32.82 KG/M2 | RESPIRATION RATE: 18 BRPM | WEIGHT: 197 LBS | SYSTOLIC BLOOD PRESSURE: 126 MMHG | HEART RATE: 94 BPM

## 2023-05-09 DIAGNOSIS — I48.0 PAROXYSMAL ATRIAL FIBRILLATION (H): ICD-10-CM

## 2023-05-09 DIAGNOSIS — M81.0 OSTEOPOROSIS WITHOUT CURRENT PATHOLOGICAL FRACTURE, UNSPECIFIED OSTEOPOROSIS TYPE: Primary | ICD-10-CM

## 2023-05-09 DIAGNOSIS — M47.816 LUMBAR FACET ARTHROPATHY: ICD-10-CM

## 2023-05-09 DIAGNOSIS — I48.91 ON COUMADIN FOR ATRIAL FIBRILLATION (H): ICD-10-CM

## 2023-05-09 DIAGNOSIS — E78.5 HYPERLIPIDEMIA, UNSPECIFIED HYPERLIPIDEMIA TYPE: ICD-10-CM

## 2023-05-09 DIAGNOSIS — Z79.01 ON COUMADIN FOR ATRIAL FIBRILLATION (H): ICD-10-CM

## 2023-05-09 DIAGNOSIS — I10 ESSENTIAL HYPERTENSION: ICD-10-CM

## 2023-05-09 DIAGNOSIS — L82.0 INFLAMED SEBORRHEIC KERATOSIS: ICD-10-CM

## 2023-05-09 DIAGNOSIS — M48.062 SPINAL STENOSIS OF LUMBAR REGION WITH NEUROGENIC CLAUDICATION: ICD-10-CM

## 2023-05-09 PROCEDURE — G0463 HOSPITAL OUTPT CLINIC VISIT: HCPCS

## 2023-05-09 PROCEDURE — 99214 OFFICE O/P EST MOD 30 MIN: CPT | Mod: 25 | Performed by: FAMILY MEDICINE

## 2023-05-09 PROCEDURE — G0463 HOSPITAL OUTPT CLINIC VISIT: HCPCS | Mod: 25

## 2023-05-09 PROCEDURE — 17110 DESTRUCTION B9 LES UP TO 14: CPT | Performed by: FAMILY MEDICINE

## 2023-05-09 ASSESSMENT — ANXIETY QUESTIONNAIRES
2. NOT BEING ABLE TO STOP OR CONTROL WORRYING: NOT AT ALL
1. FEELING NERVOUS, ANXIOUS, OR ON EDGE: NOT AT ALL
IF YOU CHECKED OFF ANY PROBLEMS ON THIS QUESTIONNAIRE, HOW DIFFICULT HAVE THESE PROBLEMS MADE IT FOR YOU TO DO YOUR WORK, TAKE CARE OF THINGS AT HOME, OR GET ALONG WITH OTHER PEOPLE: NOT DIFFICULT AT ALL
GAD7 TOTAL SCORE: 0
GAD7 TOTAL SCORE: 0
7. FEELING AFRAID AS IF SOMETHING AWFUL MIGHT HAPPEN: NOT AT ALL
3. WORRYING TOO MUCH ABOUT DIFFERENT THINGS: NOT AT ALL
7. FEELING AFRAID AS IF SOMETHING AWFUL MIGHT HAPPEN: NOT AT ALL
GAD7 TOTAL SCORE: 0
6. BECOMING EASILY ANNOYED OR IRRITABLE: NOT AT ALL
5. BEING SO RESTLESS THAT IT IS HARD TO SIT STILL: NOT AT ALL
8. IF YOU CHECKED OFF ANY PROBLEMS, HOW DIFFICULT HAVE THESE MADE IT FOR YOU TO DO YOUR WORK, TAKE CARE OF THINGS AT HOME, OR GET ALONG WITH OTHER PEOPLE?: NOT DIFFICULT AT ALL
4. TROUBLE RELAXING: NOT AT ALL

## 2023-05-09 ASSESSMENT — PAIN SCALES - GENERAL: PAINLEVEL: NO PAIN (0)

## 2023-05-09 ASSESSMENT — PATIENT HEALTH QUESTIONNAIRE - PHQ9
SUM OF ALL RESPONSES TO PHQ QUESTIONS 1-9: 0
SUM OF ALL RESPONSES TO PHQ QUESTIONS 1-9: 0
10. IF YOU CHECKED OFF ANY PROBLEMS, HOW DIFFICULT HAVE THESE PROBLEMS MADE IT FOR YOU TO DO YOUR WORK, TAKE CARE OF THINGS AT HOME, OR GET ALONG WITH OTHER PEOPLE: NOT DIFFICULT AT ALL

## 2023-05-09 NOTE — PROGRESS NOTES
Diagnoses and associated orders for this visit:  Osteoporosis without current pathological fracture, unspecified osteoporosis type    Lumbar facet arthropathy  -     Neurosurgery Referral; Future    Spinal stenosis of lumbar region with neurogenic claudication  -     Neurosurgery Referral; Future    Essential hypertension    Hyperlipidemia, unspecified hyperlipidemia type    Paroxysmal atrial fibrillation (H)    On Coumadin for atrial fibrillation (H)    Inflamed seborrheic keratosis  -     DESTRUCT BENIGN LESION, UP TO 14      Lengthy discussion with Kate about where we are.  Explained what the specialists have said in the past.  At this point, it appears that we have stemmed the loss of bone.  She will now regenerate this with starting on the Reclast and continuing with some weightbearing exercise and calcium and vitamin D.  Referred back to neurosurgery for an opinion from Dr. Carroll regarding possible surgery.    An irritated mole/seborrheic keratosis on her right neck was frozen with liquid nitrogen. Advised regarding the pain and vesiculation reaction that could be expected from cyrotherapy.     A total of 35 minutes was spent with the patient, reviewing records, tests, ordering medications, tests or procedures and documenting clinical information in the EHR.     Asher Campos MD     Subjective   Kate is a 77 year old, presenting for the following health issues:  RECHECK (After seeing an endocrinologist)         View : No data to display.              History of Present Illness       Back Pain:  She presents for follow up of back pain. Patient's back pain is a chronic problem.  Location of back pain:  Right lower back and right middle of back  Description of back pain: dull ache  Back pain spreads: right thigh and right knee    Since patient first noticed back pain, pain is: unchanged  Does back pain interfere with her job:  No      Reason for visit:  A consultation    She eats 2-3 servings of fruits  and vegetables daily.She consumes 1 sweetened beverage(s) daily.She exercises with enough effort to increase her heart rate 9 or less minutes per day.  She exercises with enough effort to increase her heart rate 3 or less days per week.   She is taking medications regularly.    Today's PHQ-9         PHQ-9 Total Score: 0    PHQ-9 Q9 Thoughts of better off dead/self-harm past 2 weeks :   Not at all    How difficult have these problems made it for you to do your work, take care of things at home, or get along with other people: Not difficult at all  Today's ANETTE-7 Score: 0     Lorna comes in for follow-up of osteoporosis.  She has been seeing Dr. Garcia at [facility] for endocrinology.  Her lowest T score was -3.7 in the lumbar spine in July 2021 and was diagnosed with primary hyperparathyroidism and underwent right superior and left inferior parathyroidectomy on 4/6/2022.  Pathology was consistent with hypercellular parathyroid glands.  Repeat DEXA scan a year after surgery was consistent with osteoporosis, but since it was done in a different machine she did not feel that the T-scores work comparable.  She ordered a Reclast infusion for her 5 mg IV once a year for a total of 3 years.  She is continue vitamin D and calcium and then follow-up in a year for labs and a DEXA scan.    She has continued to have low back issues.  Neurosurgery was reluctant to do anything because of her osteoporosis.  It was recommended that she contact them again at this point in time.    She last saw neurosurgery in June 2021.  CT myelogram showed spondylolisthesis of L4 on L5 with severe stenosis centrally and bilateral foraminal stenosis at that level.  Right is more severe than the left.  She has had multiple epidural steroid injections including several right-sided transforaminal injections at L4-5 and L5-S1.  SI joint injection and right L4-5 interlaminar injection.  On May 18, 2021, she had 2 days of relief from a right L5-S1 injection but  "it did not last.  She had facet injections at L4-5 and L5-S1 that only provided limited relief.  She was on gabapentin for period of time but now has not taken it for a while.    She continues on warfarin and metoprolol for anticoagulation rate control with regard to atrial fibrillation.  She is on 20 mg of lisinopril and 40 mg Lipitor.  She takes 50 mg of sertraline at bedtime.    She is walking with the walker or on her 's arm.        Review of Systems   ROS is negative except as noted above       Objective    /82   Pulse 94   Temp 97.2  F (36.2  C) (Temporal)   Resp 18   Ht 1.651 m (5' 5\")   Wt 89.4 kg (197 lb)   LMP  (LMP Unknown)   SpO2 96%   Breastfeeding No   BMI 32.78 kg/m    Body mass index is 32.78 kg/m .  Physical Exam   Alert and cooperative, no distress.  Affect is broad ranging and appropriate.  Moves slowly with some pain behavior.  Lungs are clear, no rales rhonchi or wheezes are heard.  Cardiac irregularly irregular.    DEXA scan from previously and this week was reviewed.                "

## 2023-05-09 NOTE — NURSING NOTE
"Chief Complaint   Patient presents with     RECHECK     After seeing an endocrinologist       Initial BP (!) 146/86   Pulse 94   Temp 97.2  F (36.2  C) (Temporal)   Resp 18   Ht 1.651 m (5' 5\")   Wt 89.4 kg (197 lb)   LMP  (LMP Unknown)   SpO2 96%   Breastfeeding No   BMI 32.78 kg/m   Estimated body mass index is 32.78 kg/m  as calculated from the following:    Height as of this encounter: 1.651 m (5' 5\").    Weight as of this encounter: 89.4 kg (197 lb).  Medication Reconciliation: complete    FOOD SECURITY SCREENING QUESTIONS  Hunger Vital Signs:  Within the past 12 months we worried whether our food would run out before we got money to buy more. Never  Within the past 12 months the food we bought just didn't last and we didn't have money to get more. Never        Advance care directive on file? no  Advance care directive provided to patient? declined     Tiffany White LPN  "

## 2023-05-09 NOTE — PATIENT INSTRUCTIONS
We sent the referral to Dr. Carroll at [facility] about possible back surgery. They should call you to set up an appointment. If you don't hear back in the next 2 weeks, let us know.    Now that your bone loss is stopped, the bone density will begin to increase with the Reclast infusion over the next few years. Keep taking your vitamin D and calcium. Do some walking/weight bearing exercise regularly.

## 2023-05-12 DIAGNOSIS — I10 ESSENTIAL HYPERTENSION: ICD-10-CM

## 2023-05-15 RX ORDER — LISINOPRIL 20 MG/1
TABLET ORAL
Qty: 90 TABLET | Refills: 1 | Status: SHIPPED | OUTPATIENT
Start: 2023-05-15 | End: 2023-11-22

## 2023-05-15 NOTE — TELEPHONE ENCOUNTER
"PCP is out of clinic. Will route to covering Provider for consideration.      Disp Refills Start End YOGESH   lisinopril (ZESTRIL) 20 MG tablet 90 tablet 11 3/1/2022  No   Sig: Take 1 tablet by mouth once daily   Sent to pharmacy as: Lisinopril 20 MG Oral Tablet (ZESTRIL)   Class: E-Prescribe       Last Office Visit: 05/09/2023  Future Office visit:         Requested Prescriptions   Pending Prescriptions Disp Refills     lisinopril (ZESTRIL) 20 MG tablet [Pharmacy Med Name: Lisinopril 20 MG Oral Tablet] 90 tablet 0     Sig: Take 1 tablet by mouth once daily       ACE Inhibitors (Including Combos) Protocol Passed - 5/12/2023  4:02 PM        Passed - Blood pressure under 140/90 in past 12 months     BP Readings from Last 3 Encounters:   05/09/23 126/82   08/19/22 (!) 142/82   08/10/22 138/70                 Passed - Recent (12 mo) or future (30 days) visit within the authorizing provider's specialty     Patient has had an office visit with the authorizing provider or a provider within the authorizing providers department within the previous 12 mos or has a future within next 30 days. See \"Patient Info\" tab in inbasket, or \"Choose Columns\" in Meds & Orders section of the refill encounter.              Passed - Medication is active on med list        Passed - Patient is age 18 or older        Passed - No active pregnancy on record        Passed - Normal serum creatinine on file in past 12 months     Recent Labs   Lab Test 08/19/22  1148   CR 0.95       Ok to refill medication if creatinine is low          Passed - Normal serum potassium on file in past 12 months     Recent Labs   Lab Test 08/19/22  1148   POTASSIUM 4.0             Passed - No positive pregnancy test within past 12 months             Routing refill request to provider for review/approval.     Unable to complete prescription refill per RNMedication Refill Policy.................... Giovanna Barton RN ....................  5/15/2023   11:50 AM            "

## 2023-05-22 ENCOUNTER — ANTICOAGULATION THERAPY VISIT (OUTPATIENT)
Dept: ANTICOAGULATION | Facility: OTHER | Age: 78
End: 2023-05-22
Attending: FAMILY MEDICINE
Payer: MEDICARE

## 2023-05-22 DIAGNOSIS — Z51.81 ANTICOAGULATION GOAL OF INR 2 TO 3: ICD-10-CM

## 2023-05-22 DIAGNOSIS — I48.0 PAROXYSMAL ATRIAL FIBRILLATION (H): Primary | ICD-10-CM

## 2023-05-22 DIAGNOSIS — Z79.01 ANTICOAGULATION GOAL OF INR 2 TO 3: ICD-10-CM

## 2023-05-22 LAB — INR POINT OF CARE: 2.5 (ref 0.9–1.1)

## 2023-05-22 PROCEDURE — 85610 PROTHROMBIN TIME: CPT | Mod: ZL,QW

## 2023-05-22 NOTE — PROGRESS NOTES
ANTICOAGULATION MANAGEMENT     Kate Chacon 77 year old female is on warfarin with therapeutic INR result. (Goal INR 2.0-3.0)    Recent labs: (last 7 days)     05/22/23  1018   INR 2.5*       ASSESSMENT       Source(s): Chart Review and In person       Warfarin doses taken: Warfarin taken as instructed    Diet: No new diet changes identified    New illness, injury, or hospitalization: No    Medication/supplement changes: None noted    Signs or symptoms of bleeding or clotting: No    Previous INR: Subtherapeutic    Additional findings: Possibly having a back surgery in the future       PLAN     Recommended plan for no diet, medication or health factor changes affecting INR     Dosing Instructions: Continue your current warfarin dose with next INR in 4 weeks       Summary  As of 5/22/2023    Full warfarin instructions:  5 mg every day   Next INR check:  6/21/2023             In person    Lab visit scheduled    Education provided: Please call back if any changes to your diet, medications or how you've been taking warfarin    Plan made per [facility] anticoagulation protocol    Rose Henao RN  Anticoagulation Clinic  5/22/2023    _______________________________________________________________________     Anticoagulation Episode Summary     Current INR goal:  2.0-3.0   TTR:  54.3 % (1 y)   Target end date:  Indefinite   Send INR reminders to:  [facility]    Indications    Paroxysmal atrial fibrillation (H) [I48.0]  Anticoagulation goal of INR 2 to 3 [Z51.81  Z79.01]           Comments:           Anticoagulation Care Providers     Provider Role Specialty Phone number    Asher Campos MD Referring Family Medicine [phone]

## 2023-05-24 ENCOUNTER — TELEPHONE (OUTPATIENT)
Dept: FAMILY MEDICINE | Facility: OTHER | Age: 78
End: 2023-05-24
Payer: COMMERCIAL

## 2023-05-24 NOTE — TELEPHONE ENCOUNTER
Kate states she does not know of her bones are strong enough to go through surgery. Writer asked if she has a surgery scheduled. She stated no she does not. Writer informed her that if surgery is scheduled she will need to have a pre-op exam within 30 days of surgery and it would be determined at her pre-op if she is healthy enough to undergo surgery.     Cristina Bertrand, CMA on 5/24/2023 at 2:35 PM

## 2023-05-24 NOTE — TELEPHONE ENCOUNTER
Patient called and requested call back regarding discussing the plan for her possible back surgery.    Okay to leave detailed message.    Tanesha Gibson on 5/24/2023 at 1:50 PM

## 2023-06-08 ENCOUNTER — TELEPHONE (OUTPATIENT)
Dept: FAMILY MEDICINE | Facility: OTHER | Age: 78
End: 2023-06-08
Payer: COMMERCIAL

## 2023-06-08 ENCOUNTER — TELEPHONE (OUTPATIENT)
Dept: GENERAL RADIOLOGY | Facility: OTHER | Age: 78
End: 2023-06-08
Payer: COMMERCIAL

## 2023-06-08 DIAGNOSIS — Z79.01 ANTICOAGULATION GOAL OF INR 2 TO 3: ICD-10-CM

## 2023-06-08 DIAGNOSIS — M43.16 SPONDYLOLISTHESIS AT L4-L5 LEVEL: Primary | ICD-10-CM

## 2023-06-08 DIAGNOSIS — Z01.812 PRE-PROCEDURE LAB EXAM: ICD-10-CM

## 2023-06-08 DIAGNOSIS — Z51.81 ANTICOAGULATION GOAL OF INR 2 TO 3: ICD-10-CM

## 2023-06-08 NOTE — TELEPHONE ENCOUNTER
Message was left with Ana Paula BAZAN nurse  As Dr Aceves of radiology  Assoc feels patient would benefit from and CT or MRI prior to myelogram to see if a myelogram would still be needed.  Awaiting  Call back

## 2023-06-08 NOTE — TELEPHONE ENCOUNTER
Pt is having a myelogram on 7/6 and she needs to stop her warfarin 5 days before. Would like to know if JAMES is ok with that

## 2023-06-21 ENCOUNTER — ANTICOAGULATION THERAPY VISIT (OUTPATIENT)
Dept: ANTICOAGULATION | Facility: OTHER | Age: 78
End: 2023-06-21
Attending: FAMILY MEDICINE
Payer: MEDICARE

## 2023-06-21 DIAGNOSIS — Z79.01 ANTICOAGULATION GOAL OF INR 2 TO 3: ICD-10-CM

## 2023-06-21 DIAGNOSIS — Z51.81 ANTICOAGULATION GOAL OF INR 2 TO 3: ICD-10-CM

## 2023-06-21 DIAGNOSIS — I48.0 PAROXYSMAL ATRIAL FIBRILLATION (H): Primary | ICD-10-CM

## 2023-06-21 LAB — INR POINT OF CARE: 2.4 (ref 0.9–1.1)

## 2023-06-21 PROCEDURE — 36416 COLLJ CAPILLARY BLOOD SPEC: CPT | Mod: ZL

## 2023-06-21 PROCEDURE — 85610 PROTHROMBIN TIME: CPT | Mod: ZL,QW

## 2023-06-21 NOTE — PROGRESS NOTES
ANTICOAGULATION MANAGEMENT     Kate Chacon 77 year old female is on warfarin with therapeutic INR result. (Goal INR 2.0-3.0)    Recent labs: (last 7 days)     06/21/23  1003   INR 2.4*       ASSESSMENT       Source(s): Chart Review and In person       Warfarin doses taken: Warfarin taken as instructed    Diet: No new diet changes identified    New illness, injury, or hospitalization: No    Medication/supplement changes: None noted    Signs or symptoms of bleeding or clotting: No    Previous INR: Therapeutic last visit; previously outside of goal range    Additional findings: Upcoming surgery/procedure 7/6/23-myelagram hold warfarin X 5 days       PLAN     Recommended plan for no diet, medication or health factor changes affecting INR     Dosing Instructions: Continue your current warfarin dose with next INR in 2 weeks       Summary  As of 6/21/2023    Full warfarin instructions:  7/1: Hold; 7/2: Hold; 7/3: Hold; 7/4: Hold; 7/5: Hold; 7/6: 10 mg; Otherwise 5 mg every day   Next INR check:  7/6/2023             In person    Lab visit scheduled    Education provided: Please call back if any changes to your diet, medications or how you've been taking warfarin    Plan made per [facility] anticoagulation protocol    Rose Henao, RN  Anticoagulation Clinic  6/21/2023    _______________________________________________________________________     Anticoagulation Episode Summary     Current INR goal:  2.0-3.0   TTR:  55.2 % (1 y)   Target end date:  Indefinite   Send INR reminders to:  [facility]    Indications    Paroxysmal atrial fibrillation (H) [I48.0]  Anticoagulation goal of INR 2 to 3 [Z51.81  Z79.01]           Comments:           Anticoagulation Care Providers     Provider Role Specialty Phone number    Asher Campos MD Referring Family Medicine [phone]

## 2023-06-23 DIAGNOSIS — I49.5 SINOATRIAL NODE DYSFUNCTION (H): ICD-10-CM

## 2023-06-23 DIAGNOSIS — I48.0 PAROXYSMAL ATRIAL FIBRILLATION (H): ICD-10-CM

## 2023-06-23 DIAGNOSIS — Z95.0 CARDIAC PACEMAKER: ICD-10-CM

## 2023-06-27 DIAGNOSIS — Z79.01 ANTICOAGULATION GOAL OF INR 2 TO 3: ICD-10-CM

## 2023-06-27 DIAGNOSIS — Z51.81 ANTICOAGULATION GOAL OF INR 2 TO 3: ICD-10-CM

## 2023-06-28 RX ORDER — WARFARIN SODIUM 5 MG/1
TABLET ORAL
Qty: 100 TABLET | Refills: 1 | Status: SHIPPED | OUTPATIENT
Start: 2023-06-28 | End: 2023-12-30

## 2023-06-28 RX ORDER — METOPROLOL SUCCINATE 50 MG/1
TABLET, EXTENDED RELEASE ORAL
Qty: 90 TABLET | Refills: 3 | Status: SHIPPED | OUTPATIENT
Start: 2023-06-28 | End: 2024-01-09

## 2023-06-28 NOTE — TELEPHONE ENCOUNTER
Last Prescription Date: 3-29-23  Last Qty/Refills: 90 / 0  Last Office Visit: 5-9-23  Future Office Visit: none scheduled      Requested Prescriptions   Pending Prescriptions Disp Refills     metoprolol succinate ER (TOPROL XL) 50 MG 24 hr tablet [Pharmacy Med Name: Metoprolol Succinate ER 50 MG Oral Tablet Extended Release 24 Hour] 90 tablet 0     Sig: Take 1 tablet by mouth once daily       Beta-Blockers Protocol Passed - 6/23/2023 11:42 AM        Routing to PCP/provider for refill consideration. Blanquita Carpio RN ....................  6/28/2023   8:53 AM

## 2023-06-28 NOTE — TELEPHONE ENCOUNTER
[id number] sent Rx request for the following:      Requested Prescriptions   Pending Prescriptions Disp Refills     warfarin ANTICOAGULANT (COUMADIN) 5 MG tablet 100 tablet 1     Sig: TAKE 1 TABLET (5MG) BY MOUTH ONCE DAILY FOR 6 DAYS PER WEEK AND 0.5 TABLETS(2.5 MG) FOR 1 DAY PER WEEK OR AS DIRECTED BY [facility]       Vitamin K Antagonists Failed - 6/28/2023  9:16 AM        Failed - INR is within goal in the past 6 weeks     Confirm INR is within goal in the past 6 weeks.     Recent Labs   Lab Test 06/21/23  1003   INR 2.4*            Last Prescription Date:   11/22/22  Last Fill Qty/Refills:         100, R-1    Last Office Visit:              5/9/23   Future Office visit:           None  Rose Henao RN on 6/28/2023 at 10:20 AM

## 2023-06-29 ENCOUNTER — TELEPHONE (OUTPATIENT)
Dept: GENERAL RADIOLOGY | Facility: OTHER | Age: 78
End: 2023-06-29
Payer: COMMERCIAL

## 2023-06-29 NOTE — TELEPHONE ENCOUNTER
Contact made Yes, Spoke to patient.   Which attempt is this? #1.    Name of procedure: Myelography lumbar spine  Procedure date verified: Yes, 07/06/2023.  Arrival time verified: 0845 AM   Facility location verified: Yes, instructed to enter through main clinic entrance, wear a mask, and proceed to Diagnostic Registration.  Plan to be here for 2.5-3 hours. Must have .     Platelet count and INR completed within 30 days: to be done morning of procedure.     Are you being treated for an infection (on antibiotics)? No. If yes, discuss with radiologist about when it is appropriate to schedule radiology procedure.     needed? No  Language: english    Any anticoagulants or blood thinners? coumadin.  If yes, patient was instructed to follow MD orders for stopping anticoagulants or blood thinners, which includes discontinuing coumadin for 5 days before the procedure.    Any insulin or oral antihyperglycemic meds? No. If so, hold according to policy day of procedure.    Is patient a menstruating female? No If so, place order for [id number], and then call [phone] to make lab appt (must be within 7 days).     No solids after 0545 AM. (3 hours prior to start of procedure.)  Clear liquids (preferably caffeinated - black coffee, soda, energy drink) are ok up until time of procedure.    For myelogram: contrast solution will be instilled into the area around the spinal cord. Images will be taken to determine the cause of the symptoms you are having. The doctor who ordered the test will contact you with results.    Patient states an understanding of pre-procedure instructions.  Preprocedure teaching completed: Yes  Method: verbal per phone.  People present for teaching: Patient  Response to teaching: patient demonstrates understanding of pre-procedure instructions.   Transportation after procedure: No  Any questions or patient concerns? I responded to the patient's questions/concerns.    MYELOGRAM: hold the  following medications x 48 hours pre-procedure and x 24 hours post-procedure:      Patient to hold sertraline per instructions.      Alprazolam (Xanax)  Aminophylline, Amphetamine (Adderall)  Amitriptyline & Chiordiazepoxide (Limbitrol)  Amitriptyline & Perphenazine (Etrafon, Triavil)  Amitriptyline (Elavil, Endep)   Amoxapine (Asendin)  Aripiprazole (Abilify)  Asenapine (Saphris)  Benzphetamine (Didrex)  Bupropion - (Wellbutrin, Zyban, Budeprion)  Buspirone - (Buspar)  Chlorpromazine (Thorazine)  Citalopram (Celexa)  Clomipramine (Anafranil)  Clozapine (Clozaril, Elozapine, FazaClo, Versacloz)  Cyclobenzaprine (Flexeril)   Desipramine (Norpramin)  Desvenlafaxine (Pristiq)  Dexrnethylphenidate (Focalin)  Dextroamphetamine (Dexedrine, Dextrostat)  Diethylpropion (Tenuate)  Doxapram (Dopram)  Doxepin (Sinequan, Adapine)  Droperidol (Inapsine)  Duloxetine HCl (Cymbalta)  Escitalopram (Lexapro)  Fluoxetine (Prozac)  Fluphenazine (Prolixin, Permitil)  Furazolidone (Furoxone), Isoniazid, Linezolid (Zyvox)   Haloperidol (Haldol)  Hydroxyzine (Vistaril)   Iloperidone (Fanapt)  Imipramine (Tofranil)  Isocarboxazid (Marplan)  Lisdexamfetamine (Vyvanse)  Lithium (Eskalith, Lithane)  Lorcaserin (Belviq)  Loxapine (Loxitane )  Lurasidone (Latuda)  Maprotiline (Ludiomil)  Mazindol (Sanorex)  Meperidine (Demerol)   Meprobamate (Equanil, [place])   Methamphetamine (Desoxyn)  Methylphenidate (Daytrana, Concerta, Metadate, Methylin, Ritalin)  Mirtazapine-(Remeron)  Modafinil (Provigil)  Nortriptyline (Pamelor, Aventyl)  Olanzapine (Zyprexa)  Olanzapine/Fluoxetine (Symbyax)  Paliperidone (Invega)  Paroxetine (Paxil)  Pemoline (Cylert)  Perphenazine (Trilafon)  Perphenazine/amitriptyline (Triavil)  Phendimetrazine (Bontril, [place]. Obezine, Prelu-2)  Phentermine - [lonamin, Adipex-P, Phentride, Teramine, Phentermine & Topiramate (Qsymia)], Theophylline (Estuardo-Dur)   Pimozide (Orap)  Procarbazine (Matulane)   Prochlorperazine  (Compazine)  Promethazine (Phenergan)  Protriptyline,Trimipramine (Surmontil)   Quetiapine (Seroquel)  Risperidone (Risperdal)  Secobarbital (Seconal)   Sertraline (Zoloft)  Thioridazine (Mellaril)  Thiothixene (Navane)  Tramadol (Ultram,Ultracet,Ryzolt)   Trazodone- (Desyrel)   Trifluoperazine (Stelazine)  Triflupromazine (Vesprin)  Trimethobenzamide (TIGAN)   Venlafaxine (Effexor)   Ziprasidone (Geodon)

## 2023-07-05 ENCOUNTER — TELEPHONE (OUTPATIENT)
Dept: FAMILY MEDICINE | Facility: OTHER | Age: 78
End: 2023-07-05
Payer: COMMERCIAL

## 2023-07-05 NOTE — TELEPHONE ENCOUNTER
Patient is having lumbar myelogram on 7/17. Ok for her to hold warfarin x 5 days prior? Please let me know and I will call the patient with instructions. Thanks!    Renee CARRENO RN  Imaging Department  Office: [phone]  Cell: [phone]   M-LILLI [id number]  Imaging Schedulers: [phone]

## 2023-07-10 ENCOUNTER — TELEPHONE (OUTPATIENT)
Dept: GENERAL RADIOLOGY | Facility: OTHER | Age: 78
End: 2023-07-10
Payer: COMMERCIAL

## 2023-07-10 NOTE — TELEPHONE ENCOUNTER
Contact made Yes, Spoke to patient.   Which attempt is this? #1.    Name of procedure: lumbar myelogram  Procedure date verified: Yes, 07/17/2023.  Arrival time verified: 1120 AM labs first  Facility location verified: Yes, instructed to enter through main clinic entrance, wear a mask, and proceed to Diagnostic Registration.  Plan to be here for 2.5-3 hours. Must have . Explain current visitor policy to patient.    Platelet count and INR completed within 30 days: Same day. Lab results were reviewed by this writer and are WNL or will be drawn day of procedure.     Are you being treated for an infection (on antibiotics)? No. If yes, discuss with radiologist about when it is appropriate to schedule radiology procedure.      Any anticoagulants or blood thinners? Yes, Warfarin.  If yes, patient was instructed to follow MD orders for stopping anticoagulants or blood thinners. Hold X5 days.     Hold Sertraline X48hrs prior and 24hr post procedure.    Any insulin or oral antihyperglycemic meds? No. If so, hold according to policy day of procedure.    No solids after 1000 AM. (3 hours prior to start of procedure.)  Clear liquids (preferably caffeinated - black coffee, soda, energy drink) are ok up until time of procedure.    Instructions given:       For myelogram: contrast solution will be instilled into the area around the spinal cord. Images will be taken to determine the cause of the symptoms you are having. The doctor who ordered the test will contact you with results.    Patient states an understanding of pre-procedure instructions.  Preprocedure teaching completed: Yes  Method: verbal per phone.  People present for teaching: Patient  Response to teaching: patient demonstrates understanding of pre-procedure instructions.   Transportation after procedure: Yes: will have a   Any questions or patient concerns? The patient had no questions or concerns.    MYELOGRAM: hold the following medications x 48 hours  pre-procedure and x 24 hours post-procedure:      Alprazolam (Xanax)  Aminophylline, Amphetamine (Adderall)  Amitriptyline & Chiordiazepoxide (Limbitrol)  Amitriptyline & Perphenazine (Etrafon, Triavil)  Amitriptyline (Elavil, Endep)   Amoxapine (Asendin)  Aripiprazole (Abilify)  Asenapine (Saphris)  Benzphetamine (Didrex)  Bupropion - (Wellbutrin, Zyban, Budeprion)  Buspirone - (Buspar)  Chlorpromazine (Thorazine)  Citalopram (Celexa)  Clomipramine (Anafranil)  Clozapine (Clozaril, Elozapine, FazaClo, Versacloz)  Cyclobenzaprine (Flexeril)   Desipramine (Norpramin)  Desvenlafaxine (Pristiq)  Dexrnethylphenidate (Focalin)  Dextroamphetamine (Dexedrine, Dextrostat)  Diethylpropion (Tenuate)  Doxapram (Dopram)  Doxepin (Sinequan, Adapine)  Droperidol (Inapsine)  Duloxetine HCl (Cymbalta)  Escitalopram (Lexapro)  Fluoxetine (Prozac)  Fluphenazine (Prolixin, Permitil)  Furazolidone (Furoxone), Isoniazid, Linezolid (Zyvox)   Haloperidol (Haldol)  Hydroxyzine (Vistaril)   Iloperidone (Fanapt)  Imipramine (Tofranil)  Isocarboxazid (Marplan)  Lisdexamfetamine (Vyvanse)  Lithium (Eskalith, Lithane)  Lorcaserin (Belviq)  Loxapine (Loxitane )  Lurasidone (Latuda)  Maprotiline (Ludiomil)  Mazindol (Sanorex)  Meperidine (Demerol)   Meprobamate (Equanil, [place])   Methamphetamine (Desoxyn)  Methylphenidate (Daytrana, Concerta, Metadate, Methylin, Ritalin)  Mirtazapine-(Remeron)  Modafinil (Provigil)  Nortriptyline (Pamelor, Aventyl)  Olanzapine (Zyprexa)  Olanzapine/Fluoxetine (Symbyax)  Paliperidone (Invega)  Paroxetine (Paxil)  Pemoline (Cylert)  Perphenazine (Trilafon)  Perphenazine/amitriptyline (Triavil)  Phendimetrazine (Bontril, [place]. Obezine, Prelu-2)  Phentermine - [lonamin, Adipex-P, Phentride, Teramine, Phentermine & Topiramate (Qsymia)], Theophylline (Estuardo-Dur)   Pimozide (Orap)  Procarbazine (Matulane)   Prochlorperazine (Compazine)  Promethazine (Phenergan)  Protriptyline,Trimipramine (Surmontil)   Quetiapine  (Seroquel)  Risperidone (Risperdal)  Secobarbital (Seconal)   Sertraline (Zoloft)  Thioridazine (Mellaril)  Thiothixene (Navane)  Tramadol (Ultram,Ultracet,Ryzolt)   Trazodone- (Desyrel)   Trifluoperazine (Stelazine)  Triflupromazine (Vesprin)  Trimethobenzamide (TIGAN)   Venlafaxine (Effexor)   Ziprasidone (Geodon)

## 2023-07-17 ENCOUNTER — HOSPITAL ENCOUNTER (OUTPATIENT)
Dept: CT IMAGING | Facility: OTHER | Age: 78
Discharge: HOME | End: 2023-07-17
Attending: PHYSICIAN ASSISTANT
Payer: MEDICARE

## 2023-07-17 ENCOUNTER — HOSPITAL ENCOUNTER (OUTPATIENT)
Dept: GENERAL RADIOLOGY | Facility: OTHER | Age: 78
Discharge: HOME | End: 2023-07-17
Attending: PHYSICIAN ASSISTANT
Payer: MEDICARE

## 2023-07-17 ENCOUNTER — ANTICOAGULATION THERAPY VISIT (OUTPATIENT)
Dept: ANTICOAGULATION | Facility: OTHER | Age: 78
End: 2023-07-17
Attending: FAMILY MEDICINE
Payer: MEDICARE

## 2023-07-17 VITALS
SYSTOLIC BLOOD PRESSURE: 122 MMHG | RESPIRATION RATE: 18 BRPM | DIASTOLIC BLOOD PRESSURE: 102 MMHG | HEART RATE: 62 BPM | OXYGEN SATURATION: 93 % | TEMPERATURE: 97.9 F

## 2023-07-17 DIAGNOSIS — M43.16 SPONDYLOLISTHESIS AT L4-L5 LEVEL: ICD-10-CM

## 2023-07-17 DIAGNOSIS — Z51.81 ANTICOAGULATION GOAL OF INR 2 TO 3: ICD-10-CM

## 2023-07-17 DIAGNOSIS — Z79.01 ANTICOAGULATION GOAL OF INR 2 TO 3: ICD-10-CM

## 2023-07-17 DIAGNOSIS — I48.0 PAROXYSMAL ATRIAL FIBRILLATION (H): Primary | ICD-10-CM

## 2023-07-17 LAB — INR POINT OF CARE: 1.1 (ref 0.9–1.1)

## 2023-07-17 PROCEDURE — 85610 PROTHROMBIN TIME: CPT | Mod: ZL,QW

## 2023-07-17 PROCEDURE — 62304 MYELOGRAPHY LUMBAR INJECTION: CPT

## 2023-07-17 PROCEDURE — 250N000011 HC RX IP 250 OP 636: Mod: JW | Performed by: RADIOLOGY

## 2023-07-17 PROCEDURE — 250N000011 HC RX IP 250 OP 636: Performed by: RADIOLOGY

## 2023-07-17 PROCEDURE — 250N000009 HC RX 250: Performed by: RADIOLOGY

## 2023-07-17 PROCEDURE — 72132 CT LUMBAR SPINE W/DYE: CPT

## 2023-07-17 RX ORDER — IOPAMIDOL 612 MG/ML
9 INJECTION, SOLUTION INTRATHECAL ONCE
Status: COMPLETED | OUTPATIENT
Start: 2023-07-17 | End: 2023-07-17

## 2023-07-17 RX ORDER — LIDOCAINE HYDROCHLORIDE 10 MG/ML
2 INJECTION, SOLUTION INFILTRATION; PERINEURAL ONCE
Status: COMPLETED | OUTPATIENT
Start: 2023-07-17 | End: 2023-07-17

## 2023-07-17 RX ADMIN — IOPAMIDOL 9 ML: 612 INJECTION, SOLUTION INTRATHECAL at 13:53

## 2023-07-17 RX ADMIN — LIDOCAINE HYDROCHLORIDE 2 ML: 10 INJECTION, SOLUTION INFILTRATION; PERINEURAL at 13:53

## 2023-07-17 NOTE — PROGRESS NOTES
Patient appropriately NPO.      Allergies and PTA medications reviewed with patient.      Neuromotor ROM and movement normal and sensation reduced on the right side hand (tingles).      Vital signs Vitals were reviewed  All vitals stable.      Patient is accompanied by spouse.      Anticoagulation status: none.  INR lab: required, most recent INR value (if required): 1.1.  Platelet lab: did not require.  Most recent platelet value (if required): N/A.      IV for this procedure is: did not require, per Dr. Aceves.      Patient accompanied to procedural suite with rad tech(sLeeann Calvo.      Post procedure monitoring:    Per report from rad tech(s), the following complications occurred during the procedure: no complications.    Hematoma no.    Bleeding at procedural site no.    CNS leakage no, if so, location N/A, with the following symptoms nausea.  Patient believes it is d/t rolling around post myelogram and on an empty stomach.     Patient observed with head of bed at 30 degrees per order for 1 hours after contrast injection.    Patient discharged with  according to order when VSS, tolerating PO, no headache, neuro changes, or abnormalities.      Instructed to follow bedrest and to hold the following medications for 24 hours post procedure (related to seizure risk) Sertraline.  \

## 2023-07-17 NOTE — PROGRESS NOTES
ANTICOAGULATION MANAGEMENT     Kate Chacon 78 year old female is on warfarin with subtherapeutic INR result. (Goal INR 2.0-3.0)    Recent labs: (last 7 days)     07/17/23  1100   INR 1.1       ASSESSMENT       Source(s): Chart Review and In person       Warfarin doses taken: Held for back injection  recently which may be affecting INR    Diet: No new diet changes identified    New illness, injury, or hospitalization: No    Medication/supplement changes: None noted    Signs or symptoms of bleeding or clotting: No    Previous INR: Therapeutic last 2(+) visits    Additional findings: None       PLAN     Recommended plan for no diet, medication or health factor changes affecting INR     Dosing Instructions: booster dose then continue your current warfarin dose with next INR in 1 week       Summary  As of 7/17/2023    Full warfarin instructions:  7/17: 10 mg; Otherwise 5 mg every day   Next INR check:  7/24/2023             In person    Lab visit scheduled    Education provided: Please call back if any changes to your diet, medications or how you've been taking warfarin    Plan made per [facility] anticoagulation protocol    Rose Henao RN  Anticoagulation Clinic  7/17/2023    _______________________________________________________________________     Anticoagulation Episode Summary     Current INR goal:  2.0-3.0   TTR:  50.6 % (1 y)   Target end date:  Indefinite   Send INR reminders to:  [facility]    Indications    Paroxysmal atrial fibrillation (H) [I48.0]  Anticoagulation goal of INR 2 to 3 [Z51.81  Z79.01]           Comments:           Anticoagulation Care Providers     Provider Role Specialty Phone number    Asher Campos MD Referring Family Medicine [phone]

## 2023-07-17 NOTE — PROGRESS NOTES
Kate has been discharged at 1505 via wheelchair.  She is having no pain, slight nausea, no drainage on bandaide medial side of back. She ambulated to the bathroom per her baseline without difficulty.  Patient discharged home with .

## 2023-07-17 NOTE — DISCHARGE INSTRUCTIONS
Radiology Discharge Instructions After Your Myelogram or lumbar puncture         AFTER YOU GO HOME    Relax and take it easy for 24 hours  Keep your head elevated at 30  (about 2 pillows under the head) or lie flat (nurse [place] one)  You may resume normal activity tomorrow  You may remove the bandage on your back at this time tomorrow  You may resume bathing the next day  Drink at least 4 glasses of extra fluid today if not on fluid restriction  DO NOT drive or operate machinery at home or at work for at least 24 hours  If you develop a headache you can take over the counter medicines and lay down.  Try drinking caffeine - coffee, soda.     Do not submerge injection site in water for 24 hours, (no baths. pools, hot tubs).               CALL YOU PRIMARY PHYSICIAN IF:  If you start to leak a large amount of fluid from the puncture site, lie down flat on your back.   Call your primary physician; they will tell you if you need to return to the hospital.  You develop a severe headache that doesn't resolve after trying over the counter medicines, lying down, and drinking caffeinated beverages   You develop nausea or vomiting that doesn't resolve after lying down  You develop a temperature of 101  or greater  Call 911 if you have a seizure.      You can reach a doctor at [phone]. Tell the person who answers that you had a myelogram or lumbar puncture by Dr. Aceves on 7/17/2023.

## 2023-07-17 NOTE — PROGRESS NOTES
Patient having toast, 7up and coffee at bedside.  HOB lowered as much as possible. No c/o pain.  Patient does have some nausea d/t rolling on the cart post myelogram.

## 2023-07-17 NOTE — PROGRESS NOTES
"Dr. Aceves contacted about patient's persistent slight nausea.  It has not gotten better with food, however patient is still lying fairly flat after eating.  She states \"it hasn't gotten worse, I just feel blah\".  No new orders and ok to discharge per Dr. Aceves.    "

## 2023-07-24 ENCOUNTER — ANTICOAGULATION THERAPY VISIT (OUTPATIENT)
Dept: ANTICOAGULATION | Facility: OTHER | Age: 78
End: 2023-07-24
Attending: FAMILY MEDICINE
Payer: MEDICARE

## 2023-07-24 DIAGNOSIS — I48.0 PAROXYSMAL ATRIAL FIBRILLATION (H): Primary | ICD-10-CM

## 2023-07-24 DIAGNOSIS — Z79.01 ANTICOAGULATION GOAL OF INR 2 TO 3: ICD-10-CM

## 2023-07-24 DIAGNOSIS — Z51.81 ANTICOAGULATION GOAL OF INR 2 TO 3: ICD-10-CM

## 2023-07-24 LAB — INR POINT OF CARE: 2 (ref 0.9–1.1)

## 2023-07-24 PROCEDURE — 85610 PROTHROMBIN TIME: CPT | Mod: ZL,QW

## 2023-07-24 NOTE — PROGRESS NOTES
ANTICOAGULATION MANAGEMENT     Kate Chacon 78 year old female is on warfarin with therapeutic INR result. (Goal INR 2.0-3.0)    Recent labs: (last 7 days)     07/24/23  1416   INR 2.0*       ASSESSMENT     Source(s): Chart Review and In person      Warfarin doses taken: Warfarin taken as instructed  Diet: No new diet changes identified  New illness, injury, or hospitalization: No  Medication/supplement changes: None noted  Signs or symptoms of bleeding or clotting: No  Previous INR: Subtherapeutic  Additional findings: None     PLAN     Recommended plan for no diet, medication or health factor changes affecting INR     Dosing Instructions: Continue your current warfarin dose with next INR in 3 weeks       Summary  As of 7/24/2023      Full warfarin instructions:  5 mg every day   Next INR check:  8/14/2023               In person    Lab visit scheduled    Education provided: Please call back if any changes to your diet, medications or how you've been taking warfarin    Plan made per [facility] anticoagulation protocol    Rose Henao RN  Anticoagulation Clinic  7/24/2023    _______________________________________________________________________     Anticoagulation Episode Summary       Current INR goal:  2.0-3.0   TTR:  50.2 % (1 y)   Target end date:  Indefinite   Send INR reminders to:  [facility]    Indications    Paroxysmal atrial fibrillation (H) [I48.0]  Anticoagulation goal of INR 2 to 3 [Z51.81  Z79.01]             Comments:               Anticoagulation Care Providers       Provider Role Specialty Phone number    Asher Campos MD Referring Family Medicine [phone]

## 2023-07-25 ENCOUNTER — ANCILLARY PROCEDURE (OUTPATIENT)
Dept: CARDIOLOGY | Facility: CLINIC | Age: 78
End: 2023-07-25
Attending: INTERNAL MEDICINE
Payer: MEDICARE

## 2023-07-25 DIAGNOSIS — I48.20 CHRONIC A-FIB (H): ICD-10-CM

## 2023-07-25 PROCEDURE — 93296 REM INTERROG EVL PM/IDS: CPT

## 2023-07-25 PROCEDURE — 93294 REM INTERROG EVL PM/LDLS PM: CPT | Performed by: INTERNAL MEDICINE

## 2023-08-14 ENCOUNTER — ANTICOAGULATION THERAPY VISIT (OUTPATIENT)
Dept: ANTICOAGULATION | Facility: OTHER | Age: 78
End: 2023-08-14
Attending: FAMILY MEDICINE
Payer: MEDICARE

## 2023-08-14 DIAGNOSIS — I48.0 PAROXYSMAL ATRIAL FIBRILLATION (H): Primary | ICD-10-CM

## 2023-08-14 DIAGNOSIS — Z79.01 ANTICOAGULATION GOAL OF INR 2 TO 3: ICD-10-CM

## 2023-08-14 DIAGNOSIS — Z51.81 ANTICOAGULATION GOAL OF INR 2 TO 3: ICD-10-CM

## 2023-08-14 LAB — INR POINT OF CARE: 2.4 (ref 0.9–1.1)

## 2023-08-14 PROCEDURE — 85610 PROTHROMBIN TIME: CPT | Mod: ZL,QW

## 2023-08-14 NOTE — PROGRESS NOTES
ANTICOAGULATION MANAGEMENT     Kate Chacon 78 year old female is on warfarin with therapeutic INR result. (Goal INR 2.0-3.0)    Recent labs: (last 7 days)     08/14/23  1123   INR 2.4*       ASSESSMENT     Source(s): Chart Review and In person      Warfarin doses taken: Warfarin taken as instructed  Diet: No new diet changes identified  New illness, injury, or hospitalization: No  Medication/supplement changes: None noted  Signs or symptoms of bleeding or clotting: No  Previous INR: Therapeutic last visit; previously outside of goal range  Additional findings: None     PLAN     Recommended plan for no diet, medication or health factor changes affecting INR     Dosing Instructions: Continue your current warfarin dose with next INR in 6 weeks       Summary  As of 8/14/2023      Full warfarin instructions:  5 mg every day   Next INR check:  9/25/2023               In person    Lab visit scheduled    Education provided: Please call back if any changes to your diet, medications or how you've been taking warfarin    Plan made per [facility] anticoagulation protocol    Rose Henao RN  Anticoagulation Clinic  8/14/2023    _______________________________________________________________________     Anticoagulation Episode Summary       Current INR goal:  2.0-3.0   TTR:  51.2 % (1 y)   Target end date:  Indefinite   Send INR reminders to:  [facility]    Indications    Paroxysmal atrial fibrillation (H) [I48.0]  Anticoagulation goal of INR 2 to 3 [Z51.81  Z79.01]             Comments:               Anticoagulation Care Providers       Provider Role Specialty Phone number    Asher Campos MD Referring Family Medicine [phone]

## 2023-08-17 ENCOUNTER — TELEPHONE (OUTPATIENT)
Dept: FAMILY MEDICINE | Facility: OTHER | Age: 78
End: 2023-08-17
Payer: COMMERCIAL

## 2023-08-17 DIAGNOSIS — Z51.81 ANTICOAGULATION GOAL OF INR 2 TO 3: ICD-10-CM

## 2023-08-17 DIAGNOSIS — Z79.01 ANTICOAGULATION GOAL OF INR 2 TO 3: ICD-10-CM

## 2023-08-17 DIAGNOSIS — I48.0 PAROXYSMAL ATRIAL FIBRILLATION (H): Primary | ICD-10-CM

## 2023-08-17 NOTE — TELEPHONE ENCOUNTER
Call from Ira nurse with [facility] patient to have procedure on 9/19. She will need INR below 1.2 for the procedure. Patient ok to hold warfarin for 5 days per Dr Campos.    Called patient she will start holding warfarin on 9/14 x 5 days and restart on 9/19 recheck INR 1 week after resuming warfarin.

## 2023-08-22 NOTE — NURSING NOTE
"Chief Complaint   Patient presents with     RECHECK     back pain       Initial /88   Pulse 80   Temp 97.7  F (36.5  C) (Temporal)   Resp 18   Wt 84.4 kg (186 lb)   SpO2 97%   Breastfeeding No   BMI 30.62 kg/m   Estimated body mass index is 30.62 kg/m  as calculated from the following:    Height as of 8/13/20: 1.66 m (5' 5.35\").    Weight as of this encounter: 84.4 kg (186 lb).  Medication Reconciliation: complete    Tiffany White LPN  " Bactrim Pregnancy And Lactation Text: This medication is Pregnancy Category D and is known to cause fetal risk.  It is also excreted in breast milk.

## 2023-09-02 LAB
MDC_IDC_EPISODE_DTM: NORMAL
MDC_IDC_EPISODE_DTM: NORMAL
MDC_IDC_EPISODE_ID: NORMAL
MDC_IDC_EPISODE_ID: NORMAL
MDC_IDC_EPISODE_TYPE: NORMAL
MDC_IDC_EPISODE_TYPE: NORMAL
MDC_IDC_LEAD_IMPLANT_DT: NORMAL
MDC_IDC_LEAD_IMPLANT_DT: NORMAL
MDC_IDC_LEAD_LOCATION: NORMAL
MDC_IDC_LEAD_LOCATION: NORMAL
MDC_IDC_LEAD_LOCATION_DETAIL_1: NORMAL
MDC_IDC_LEAD_LOCATION_DETAIL_1: NORMAL
MDC_IDC_LEAD_MFG: NORMAL
MDC_IDC_LEAD_MFG: NORMAL
MDC_IDC_LEAD_MODEL: NORMAL
MDC_IDC_LEAD_MODEL: NORMAL
MDC_IDC_LEAD_POLARITY_TYPE: NORMAL
MDC_IDC_LEAD_POLARITY_TYPE: NORMAL
MDC_IDC_LEAD_SERIAL: NORMAL
MDC_IDC_LEAD_SERIAL: NORMAL
MDC_IDC_MSMT_BATTERY_DTM: NORMAL
MDC_IDC_MSMT_BATTERY_REMAINING_LONGEVITY: 42 MO
MDC_IDC_MSMT_BATTERY_REMAINING_PERCENTAGE: 68 %
MDC_IDC_MSMT_BATTERY_STATUS: NORMAL
MDC_IDC_MSMT_LEADCHNL_RV_IMPEDANCE_VALUE: 680 OHM
MDC_IDC_MSMT_LEADCHNL_RV_PACING_THRESHOLD_AMPLITUDE: 0.7 V
MDC_IDC_MSMT_LEADCHNL_RV_PACING_THRESHOLD_PULSEWIDTH: 0.4 MS
MDC_IDC_PG_IMPLANT_DTM: NORMAL
MDC_IDC_PG_MFG: NORMAL
MDC_IDC_PG_MODEL: NORMAL
MDC_IDC_PG_SERIAL: NORMAL
MDC_IDC_PG_TYPE: NORMAL
MDC_IDC_SESS_CLINIC_NAME: NORMAL
MDC_IDC_SESS_DTM: NORMAL
MDC_IDC_SESS_TYPE: NORMAL
MDC_IDC_SET_BRADY_AT_MODE_SWITCH_RATE: 170 {BEATS}/MIN
MDC_IDC_SET_BRADY_LOWRATE: 60 {BEATS}/MIN
MDC_IDC_SET_BRADY_MAX_SENSOR_RATE: 130 {BEATS}/MIN
MDC_IDC_SET_BRADY_MODE: NORMAL
MDC_IDC_SET_LEADCHNL_RA_SENSING_ADAPTATION_MODE: NORMAL
MDC_IDC_SET_LEADCHNL_RA_SENSING_SENSITIVITY: 0.25 MV
MDC_IDC_SET_LEADCHNL_RV_PACING_AMPLITUDE: 1.3 V
MDC_IDC_SET_LEADCHNL_RV_PACING_CAPTURE_MODE: NORMAL
MDC_IDC_SET_LEADCHNL_RV_PACING_POLARITY: NORMAL
MDC_IDC_SET_LEADCHNL_RV_PACING_PULSEWIDTH: 0.4 MS
MDC_IDC_SET_LEADCHNL_RV_SENSING_ADAPTATION_MODE: NORMAL
MDC_IDC_SET_LEADCHNL_RV_SENSING_POLARITY: NORMAL
MDC_IDC_SET_LEADCHNL_RV_SENSING_SENSITIVITY: 1.5 MV
MDC_IDC_SET_ZONE_DETECTION_INTERVAL: 375 MS
MDC_IDC_SET_ZONE_TYPE: NORMAL
MDC_IDC_SET_ZONE_VENDOR_TYPE: NORMAL
MDC_IDC_STAT_BRADY_DTM_END: NORMAL
MDC_IDC_STAT_BRADY_DTM_START: NORMAL
MDC_IDC_STAT_BRADY_RA_PERCENT_PACED: 0 %
MDC_IDC_STAT_BRADY_RV_PERCENT_PACED: 22 %
MDC_IDC_STAT_EPISODE_RECENT_COUNT: 0
MDC_IDC_STAT_EPISODE_RECENT_COUNT_DTM_END: NORMAL
MDC_IDC_STAT_EPISODE_RECENT_COUNT_DTM_START: NORMAL
MDC_IDC_STAT_EPISODE_TYPE: NORMAL
MDC_IDC_STAT_EPISODE_VENDOR_TYPE: NORMAL

## 2023-09-18 ENCOUNTER — ANTICOAGULATION THERAPY VISIT (OUTPATIENT)
Dept: ANTICOAGULATION | Facility: OTHER | Age: 78
End: 2023-09-18
Attending: FAMILY MEDICINE
Payer: MEDICARE

## 2023-09-18 DIAGNOSIS — Z51.81 ANTICOAGULATION GOAL OF INR 2 TO 3: ICD-10-CM

## 2023-09-18 DIAGNOSIS — Z79.01 ANTICOAGULATION GOAL OF INR 2 TO 3: ICD-10-CM

## 2023-09-18 DIAGNOSIS — I48.0 PAROXYSMAL ATRIAL FIBRILLATION (H): Primary | ICD-10-CM

## 2023-09-18 LAB — INR POINT OF CARE: 1.2 (ref 0.9–1.1)

## 2023-09-18 PROCEDURE — 85610 PROTHROMBIN TIME: CPT | Mod: ZL,QW

## 2023-09-18 NOTE — PROGRESS NOTES
ANTICOAGULATION MANAGEMENT     Kate Chacon 78 year old female is on warfarin with subtherapeutic INR result. (Goal INR 2.0-3.0)    Recent labs: (last 7 days)     09/18/23  1359   INR 1.2*       ASSESSMENT     Source(s): Chart Review and In person      Warfarin doses taken: Held for injection tomorrow  recently which may be affecting INR  Diet: No new diet changes identified  New illness, injury, or hospitalization: No  Medication/supplement changes: None noted  Signs or symptoms of bleeding or clotting: No  Previous INR: Therapeutic last 2(+) visits  Additional findings: None     PLAN     Recommended plan for temporary change(s) affecting INR     Dosing Instructions: Continue your current warfarin dose with next INR in 1 week       Summary  As of 9/18/2023      Full warfarin instructions:  9/18: Hold; 9/19: 10 mg; Otherwise 5 mg every day   Next INR check:  9/26/2023               In person    Lab visit scheduled    Education provided: Please call back if any changes to your diet, medications or how you've been taking warfarin    Plan made per [facility] anticoagulation protocol    Rose Henao RN  Anticoagulation Clinic  9/18/2023    _______________________________________________________________________     Anticoagulation Episode Summary       Current INR goal:  2.0-3.0   TTR:  53.5 % (1 y)   Target end date:  Indefinite   Send INR reminders to:  [facility]    Indications    Paroxysmal atrial fibrillation (H) [I48.0]  Anticoagulation goal of INR 2 to 3 [Z51.81  Z79.01]             Comments:               Anticoagulation Care Providers       Provider Role Specialty Phone number    Asher Campos MD Referring Family Medicine [phone]

## 2023-09-27 ENCOUNTER — ANTICOAGULATION THERAPY VISIT (OUTPATIENT)
Dept: ANTICOAGULATION | Facility: OTHER | Age: 78
End: 2023-09-27
Payer: MEDICARE

## 2023-09-27 DIAGNOSIS — I48.0 PAROXYSMAL ATRIAL FIBRILLATION (H): Primary | ICD-10-CM

## 2023-09-27 DIAGNOSIS — Z79.01 ANTICOAGULATION GOAL OF INR 2 TO 3: ICD-10-CM

## 2023-09-27 DIAGNOSIS — Z51.81 ANTICOAGULATION GOAL OF INR 2 TO 3: ICD-10-CM

## 2023-09-27 LAB — INR POINT OF CARE: 2.1 (ref 0.9–1.1)

## 2023-09-27 PROCEDURE — 85610 PROTHROMBIN TIME: CPT | Mod: ZL,QW

## 2023-09-27 NOTE — PROGRESS NOTES
ANTICOAGULATION MANAGEMENT     Kate Chacon 78 year old female is on warfarin with therapeutic INR result. (Goal INR 2.0-3.0)    Recent labs: (last 7 days)     09/27/23  0849   INR 2.1*       ASSESSMENT     Source(s): Chart Review and In person      Warfarin doses taken: Warfarin taken as instructed  Diet: No new diet changes identified  New illness, injury, or hospitalization: No  Medication/supplement changes: None noted  Signs or symptoms of bleeding or clotting: No  Previous INR: Subtherapeutic  Additional findings: None     PLAN     Recommended plan for no diet, medication or health factor changes affecting INR     Dosing Instructions: Continue your current warfarin dose with next INR in 3 weeks       Summary  As of 9/27/2023      Full warfarin instructions:  5 mg every day   Next INR check:  10/18/2023               In person    Lab visit scheduled    Education provided: Please call back if any changes to your diet, medications or how you've been taking warfarin    Plan made per [facility] anticoagulation protocol    Rose Henao RN  Anticoagulation Clinic  9/27/2023    _______________________________________________________________________     Anticoagulation Episode Summary       Current INR goal:  2.0-3.0   TTR:  53.8 % (1 y)   Target end date:  Indefinite   Send INR reminders to:  [facility]    Indications    Paroxysmal atrial fibrillation (H) [I48.0]  Anticoagulation goal of INR 2 to 3 [Z51.81  Z79.01]             Comments:               Anticoagulation Care Providers       Provider Role Specialty Phone number    Asher Campos MD Referring Family Medicine [phone]

## 2023-09-29 DIAGNOSIS — E78.5 HYPERLIPIDEMIA, UNSPECIFIED HYPERLIPIDEMIA TYPE: ICD-10-CM

## 2023-10-04 RX ORDER — ATORVASTATIN CALCIUM 40 MG/1
40 TABLET, FILM COATED ORAL AT BEDTIME
Qty: 90 TABLET | Refills: 3 | Status: SHIPPED | OUTPATIENT
Start: 2023-10-04 | End: 2024-01-09

## 2023-10-04 NOTE — TELEPHONE ENCOUNTER
[facility] sent Rx request for the following:      Requested Prescriptions   Pending Prescriptions Disp Refills    atorvastatin (LIPITOR) 40 MG tablet [Pharmacy Med Name: Atorvastatin Calcium 40 MG Oral Tablet] 90 tablet 0     Sig: TAKE 1 TABLET BY MOUTH AT BEDTIME       Statins Protocol Failed - 9/29/2023 12:37 PM     Last Prescription Date:   7/27/22  Last Fill Qty/Refills:         90, R-11    Last Office Visit:              5/9/23   Future Office visit:           none     Muriel Karmer RN on 10/4/2023 at 3:41 PM

## 2023-10-12 DIAGNOSIS — F33.40 MDD (RECURRENT MAJOR DEPRESSIVE DISORDER) IN REMISSION (H): ICD-10-CM

## 2023-10-18 ENCOUNTER — ANTICOAGULATION THERAPY VISIT (OUTPATIENT)
Dept: ANTICOAGULATION | Facility: OTHER | Age: 78
End: 2023-10-18
Attending: FAMILY MEDICINE
Payer: MEDICARE

## 2023-10-18 DIAGNOSIS — Z51.81 ANTICOAGULATION GOAL OF INR 2 TO 3: ICD-10-CM

## 2023-10-18 DIAGNOSIS — Z79.01 ANTICOAGULATION GOAL OF INR 2 TO 3: ICD-10-CM

## 2023-10-18 DIAGNOSIS — I48.0 PAROXYSMAL ATRIAL FIBRILLATION (H): Primary | ICD-10-CM

## 2023-10-18 LAB — INR POINT OF CARE: 2 (ref 0.9–1.1)

## 2023-10-18 PROCEDURE — 85610 PROTHROMBIN TIME: CPT | Mod: ZL,QW

## 2023-10-18 NOTE — PROGRESS NOTES
ANTICOAGULATION MANAGEMENT     Kate Chacon 78 year old female is on warfarin with therapeutic INR result. (Goal INR 2.0-3.0)    Recent labs: (last 7 days)     10/18/23  1330   INR 2.0*       ASSESSMENT     Source(s): Chart Review and In person      Warfarin doses taken: Warfarin taken as instructed  Diet: No new diet changes identified  Medication/supplement changes: None noted  New illness, injury, or hospitalization: No  Signs or symptoms of bleeding or clotting: No  Previous result: Therapeutic last visit; previously outside of goal range  Additional findings: None     PLAN     Recommended plan for no diet, medication or health factor changes affecting INR     Dosing Instructions: Continue your current warfarin dose with next INR in 3 weeks       Summary  As of 10/18/2023      Full warfarin instructions:  5 mg every day   Next INR check:  11/8/2023               In person    Lab visit scheduled    Education provided:   Please call back if any changes to your diet, medications or how you've been taking warfarin    Plan made per [facility] anticoagulation protocol    Erica Gibbs RN  Anticoagulation Clinic  10/18/2023    _______________________________________________________________________     Anticoagulation Episode Summary       Current INR goal:  2.0-3.0   TTR:  58.0% (1 y)   Target end date:  Indefinite   Send INR reminders to:  [facility]    Indications    Paroxysmal atrial fibrillation (H) [I48.0]  Anticoagulation goal of INR 2 to 3 [Z51.81  Z79.01]             Comments:               Anticoagulation Care Providers       Provider Role Specialty Phone number    Asher Campos MD Referring Family Medicine [phone]

## 2023-10-18 NOTE — TELEPHONE ENCOUNTER
"[facility] sent Rx request for the following:      Requested Prescriptions   Pending Prescriptions Disp Refills    sertraline (ZOLOFT) 50 MG tablet [Pharmacy Med Name: Sertraline HCl 50 MG Oral Tablet] 90 tablet 0     Sig: TAKE 1 TABLET BY MOUTH AT BEDTIME       [facility] Protocol Passed - 10/12/2023  4:21 PM        Passed - PHQ-9 score less than 5 in past 6 months     Please review last PHQ-9 score.           Passed - Medication is active on med list        Passed - Patient is age 18 or older        Passed - No active pregnancy on record        Passed - No positive pregnancy test in last 12 months        Passed - Recent (6 mo) or future (30 days) visit within the authorizing provider's specialty     Patient had office visit in the last 6 months or has a visit in the next 30 days with authorizing provider or within the authorizing provider's specialty.  See \"Patient Info\" tab in inbasket, or \"Choose Columns\" in Meds & Orders section of the refill encounter.                 Last Prescription Date:   7/27/2022  Last Fill Qty/Refills:         90, R-4    Last Office Visit:              5/9/2023   Future Office visit:           None noted      Pily Sheets RN on 10/18/2023 at 11:39 AM      "

## 2023-11-08 ENCOUNTER — ANTICOAGULATION THERAPY VISIT (OUTPATIENT)
Dept: ANTICOAGULATION | Facility: OTHER | Age: 78
End: 2023-11-08
Payer: MEDICARE

## 2023-11-08 DIAGNOSIS — Z51.81 ANTICOAGULATION GOAL OF INR 2 TO 3: ICD-10-CM

## 2023-11-08 DIAGNOSIS — Z79.01 ANTICOAGULATION GOAL OF INR 2 TO 3: ICD-10-CM

## 2023-11-08 DIAGNOSIS — I48.0 PAROXYSMAL ATRIAL FIBRILLATION (H): Primary | ICD-10-CM

## 2023-11-08 LAB — INR POINT OF CARE: 2 (ref 0.9–1.1)

## 2023-11-08 PROCEDURE — 85610 PROTHROMBIN TIME: CPT | Mod: ZL,QW

## 2023-11-08 PROCEDURE — 36416 COLLJ CAPILLARY BLOOD SPEC: CPT | Mod: ZL

## 2023-11-08 NOTE — PROGRESS NOTES
ANTICOAGULATION MANAGEMENT     Kate Chacon 78 year old female is on warfarin with subtherapeutic INR result. (Goal INR 2.0-3.0)    Recent labs: (last 7 days)     11/08/23  1053   INR 2.0*       ASSESSMENT     Source(s): In person     Warfarin doses taken: Warfarin taken as instructed  Diet: No new diet changes identified  New illness, injury, or hospitalization: No  Medication/supplement changes: None noted  Signs or symptoms of bleeding or clotting: No  Previous INR: Therapeutic last 2(+) visits  Additional findings: None     PLAN     Recommended plan for no diet, medication or health factor changes affecting INR     Dosing Instructions: Continue your current warfarin dose with next INR in 4 weeks       Summary  As of 11/8/2023      Full warfarin instructions:  5 mg every day   Next INR check:  12/6/2023               In person    Lab visit scheduled    Education provided: Please call back if any changes to your diet, medications or how you've been taking warfarin    Plan made per [facility] anticoagulation protocol    Rose Henao RN  Anticoagulation Clinic  11/8/2023    _______________________________________________________________________     Anticoagulation Episode Summary       Current INR goal:  2.0-3.0   TTR:  57.9% (1 y)   Target end date:  Indefinite   Send INR reminders to:  [facility]    Indications    Paroxysmal atrial fibrillation (H) [I48.0]  Anticoagulation goal of INR 2 to 3 [Z51.81  Z79.01]             Comments:               Anticoagulation Care Providers       Provider Role Specialty Phone number    Asher Campos MD Referring Family Medicine [phone]    No Ref-Primary, Physician Referring

## 2023-11-15 NOTE — PROGRESS NOTES
ANTICOAGULATION FOLLOW-UP CLINIC VISIT    Patient Name:  Kate Chacon  Date:  2019  Contact Type:  Face to Face    SUBJECTIVE:     Patient Findings     Positives:   No Problem Findings           OBJECTIVE    INR Protime   Date Value Ref Range Status   2019 2.6 (A) 0.86 - 1.14 Final       ASSESSMENT / PLAN  INR assessment THER    Recheck INR In: 6 WEEKS    INR Location Clinic      Anticoagulation Summary  As of 2019    INR goal:   2.0-3.0   TTR:   90.6 % (2.3 y)   INR used for dosin.6 (2019)   Warfarin maintenance plan:   7.5 mg (5 mg x 1.5) every Mon; 5 mg (5 mg x 1) all other days   Full warfarin instructions:   7.5 mg every Mon; 5 mg all other days   Weekly warfarin total:   37.5 mg   No change documented:   Radha Burns, GLENN   Next INR check:   2019   Priority:   INR   Target end date:   Indefinite    Indications    Paroxysmal atrial fibrillation (H) [I48.0]  Anticoagulation goal of INR 2 to 3 [Z51.81  Z79.01]             Anticoagulation Episode Summary     INR check location:       Preferred lab:       Send INR reminders to:    INR    Comments:         Anticoagulation Care Providers     Provider Role Specialty Phone number    Asher Campos MD [facility] [phone]            See the Encounter Report to view Anticoagulation Flowsheet and Dosing Calendar (Go to Encounters tab in chart review, and find the Anticoagulation Therapy Visit)        Radha Burns RN                  Cephalexin Counseling: I counseled the patient regarding use of cephalexin as an antibiotic for prophylactic and/or therapeutic purposes. Cephalexin (commonly prescribed under brand name Keflex) is a cephalosporin antibiotic which is active against numerous classes of bacteria, including most skin bacteria. Side effects may include nausea, diarrhea, gastrointestinal upset, rash, hives, yeast infections, and in rare cases, hepatitis, kidney disease, seizures, fever, confusion, neurologic symptoms, and others. Patients with severe allergies to penicillin medications are cautioned that there is about a 10% incidence of cross-reactivity with cephalosporins. When possible, patients with penicillin allergies should use alternatives to cephalosporins for antibiotic therapy.

## 2023-11-21 DIAGNOSIS — I10 ESSENTIAL HYPERTENSION: ICD-10-CM

## 2023-11-22 RX ORDER — LISINOPRIL 20 MG/1
TABLET ORAL
Qty: 90 TABLET | Refills: 11 | Status: SHIPPED | OUTPATIENT
Start: 2023-11-22 | End: 2024-09-19

## 2023-11-22 NOTE — TELEPHONE ENCOUNTER
[id number] sent Rx request for the following:      Requested Prescriptions   Pending Prescriptions Disp Refills    lisinopril (ZESTRIL) 20 MG tablet [Pharmacy Med Name: Lisinopril 20 MG Oral Tablet] 90 tablet 0     Sig: Take 1 tablet by mouth once daily       ACE Inhibitors (Including Combos) Protocol Failed - 11/21/2023 12:26 PM        Failed - Blood pressure under 140/90 in past 12 months     BP Readings from Last 3 Encounters:   07/17/23 (!) 122/102   05/09/23 126/82   08/19/22 (!) 142/82                 Failed - Normal serum creatinine on file in past 12 months     Recent Labs   Lab Test 08/19/22  1148   CR 0.95       Ok to refill medication if creatinine is low          Failed - Normal serum potassium on file in past 12 months     Recent Labs   Lab Test 08/19/22  1148   POTASSIUM 4.0            Last Prescription Date:   5/15/23  Last Fill Qty/Refills:         90, R-1    Last Office Visit:              5/9/23   Future Office visit:           none    Routing refill request to provider for review/approval because:  Drug not on the G refill protocol     Svetlana Montejo RN on 11/22/2023 at 9:57 AM

## 2023-12-06 ENCOUNTER — ANTICOAGULATION THERAPY VISIT (OUTPATIENT)
Dept: ANTICOAGULATION | Facility: OTHER | Age: 78
End: 2023-12-06
Attending: FAMILY MEDICINE
Payer: MEDICARE

## 2023-12-06 DIAGNOSIS — I48.0 PAROXYSMAL ATRIAL FIBRILLATION (H): Primary | ICD-10-CM

## 2023-12-06 DIAGNOSIS — Z51.81 ANTICOAGULATION GOAL OF INR 2 TO 3: ICD-10-CM

## 2023-12-06 DIAGNOSIS — Z79.01 ANTICOAGULATION GOAL OF INR 2 TO 3: ICD-10-CM

## 2023-12-06 LAB — INR POINT OF CARE: 2.3 (ref 0.9–1.1)

## 2023-12-06 PROCEDURE — 36416 COLLJ CAPILLARY BLOOD SPEC: CPT | Mod: ZL

## 2023-12-06 PROCEDURE — 85610 PROTHROMBIN TIME: CPT | Mod: ZL,QW

## 2023-12-06 NOTE — PROGRESS NOTES
ANTICOAGULATION MANAGEMENT     Kate Chacon 78 year old female is on warfarin with therapeutic INR result. (Goal INR 2.0-3.0)    Recent labs: (last 7 days)     12/06/23  1020   INR 2.3*       ASSESSMENT     Source(s): In person     Warfarin doses taken: Warfarin taken as instructed  Diet: No new diet changes identified  New illness, injury, or hospitalization: No  Medication/supplement changes: None noted  Signs or symptoms of bleeding or clotting: No  Previous INR: Therapeutic last 2(+) visits  Additional findings: None     PLAN     Recommended plan for no diet, medication or health factor changes affecting INR     Dosing Instructions: Continue your current warfarin dose with next INR in 5 weeks       Summary  As of 12/6/2023      Full warfarin instructions:  5 mg every day   Next INR check:  1/10/2024               In person    Lab visit scheduled    Education provided: Please call back if any changes to your diet, medications or how you've been taking warfarin    Plan made per [facility] anticoagulation protocol    Polly Sidhu RN  Anticoagulation Clinic  12/6/2023    _______________________________________________________________________     Anticoagulation Episode Summary       Current INR goal:  2.0-3.0   TTR:  61.9% (1 y)   Target end date:  Indefinite   Send INR reminders to:  [facility]    Indications    Paroxysmal atrial fibrillation (H) [I48.0]  Anticoagulation goal of INR 2 to 3 [Z51.81  Z79.01]             Comments:               Anticoagulation Care Providers       Provider Role Specialty Phone number    Asher Campos MD Referring Family Medicine [phone]    No Ref-Primary, Physician Referring

## 2023-12-18 ENCOUNTER — TELEPHONE (OUTPATIENT)
Dept: FAMILY MEDICINE | Facility: OTHER | Age: 78
End: 2023-12-18
Payer: COMMERCIAL

## 2023-12-18 NOTE — TELEPHONE ENCOUNTER
The patient was calling about her  so this message will be filed as it should of gone into his chart.  Tiffany White LPN..................12/18/2023   1:26 PM

## 2023-12-30 DIAGNOSIS — Z79.01 ANTICOAGULATION GOAL OF INR 2 TO 3: ICD-10-CM

## 2023-12-30 DIAGNOSIS — Z51.81 ANTICOAGULATION GOAL OF INR 2 TO 3: ICD-10-CM

## 2023-12-30 RX ORDER — WARFARIN SODIUM 5 MG/1
TABLET ORAL
Qty: 100 TABLET | Refills: 0 | Status: SHIPPED | OUTPATIENT
Start: 2023-12-30 | End: 2024-01-09

## 2024-01-09 ENCOUNTER — ANTICOAGULATION THERAPY VISIT (OUTPATIENT)
Dept: ANTICOAGULATION | Facility: OTHER | Age: 79
End: 2024-01-09
Attending: FAMILY MEDICINE
Payer: MEDICARE

## 2024-01-09 ENCOUNTER — HOSPITAL ENCOUNTER (OUTPATIENT)
Dept: GENERAL RADIOLOGY | Facility: OTHER | Age: 79
Discharge: HOME | End: 2024-01-09
Attending: FAMILY MEDICINE
Payer: MEDICARE

## 2024-01-09 ENCOUNTER — OFFICE VISIT (OUTPATIENT)
Dept: FAMILY MEDICINE | Facility: OTHER | Age: 79
End: 2024-01-09
Attending: FAMILY MEDICINE
Payer: MEDICARE

## 2024-01-09 VITALS
HEIGHT: 65 IN | RESPIRATION RATE: 18 BRPM | SYSTOLIC BLOOD PRESSURE: 136 MMHG | WEIGHT: 194 LBS | BODY MASS INDEX: 32.32 KG/M2 | HEART RATE: 65 BPM | TEMPERATURE: 96.9 F | OXYGEN SATURATION: 96 % | DIASTOLIC BLOOD PRESSURE: 88 MMHG

## 2024-01-09 DIAGNOSIS — N18.31 STAGE 3A CHRONIC KIDNEY DISEASE (H): ICD-10-CM

## 2024-01-09 DIAGNOSIS — G89.29 CHRONIC PAIN OF RIGHT KNEE: ICD-10-CM

## 2024-01-09 DIAGNOSIS — E21.0 HYPERPARATHYROIDISM, PRIMARY (H): ICD-10-CM

## 2024-01-09 DIAGNOSIS — I48.0 PAROXYSMAL ATRIAL FIBRILLATION (H): ICD-10-CM

## 2024-01-09 DIAGNOSIS — N39.41 URGE INCONTINENCE: ICD-10-CM

## 2024-01-09 DIAGNOSIS — I69.359 HEMIPARESIS DUE TO OLD STROKE (H): ICD-10-CM

## 2024-01-09 DIAGNOSIS — M25.561 CHRONIC PAIN OF RIGHT KNEE: ICD-10-CM

## 2024-01-09 DIAGNOSIS — E78.5 HYPERLIPIDEMIA, UNSPECIFIED HYPERLIPIDEMIA TYPE: ICD-10-CM

## 2024-01-09 DIAGNOSIS — M81.0 OSTEOPOROSIS WITHOUT CURRENT PATHOLOGICAL FRACTURE, UNSPECIFIED OSTEOPOROSIS TYPE: ICD-10-CM

## 2024-01-09 DIAGNOSIS — F33.40 MDD (RECURRENT MAJOR DEPRESSIVE DISORDER) IN REMISSION (H): ICD-10-CM

## 2024-01-09 DIAGNOSIS — M48.062 SPINAL STENOSIS OF LUMBAR REGION WITH NEUROGENIC CLAUDICATION: ICD-10-CM

## 2024-01-09 DIAGNOSIS — R73.09 ELEVATED GLUCOSE: ICD-10-CM

## 2024-01-09 DIAGNOSIS — Z79.01 ANTICOAGULATION GOAL OF INR 2 TO 3: ICD-10-CM

## 2024-01-09 DIAGNOSIS — Z12.31 VISIT FOR SCREENING MAMMOGRAM: ICD-10-CM

## 2024-01-09 DIAGNOSIS — I49.5 SINOATRIAL NODE DYSFUNCTION (H): ICD-10-CM

## 2024-01-09 DIAGNOSIS — Z51.81 ANTICOAGULATION GOAL OF INR 2 TO 3: ICD-10-CM

## 2024-01-09 DIAGNOSIS — I48.0 PAROXYSMAL ATRIAL FIBRILLATION (H): Primary | ICD-10-CM

## 2024-01-09 DIAGNOSIS — Z95.0 CARDIAC PACEMAKER: ICD-10-CM

## 2024-01-09 DIAGNOSIS — Z00.00 MEDICARE ANNUAL WELLNESS VISIT, SUBSEQUENT: Primary | ICD-10-CM

## 2024-01-09 DIAGNOSIS — I10 ESSENTIAL HYPERTENSION: ICD-10-CM

## 2024-01-09 LAB
ALBUMIN SERPL BCG-MCNC: 3.9 G/DL (ref 3.5–5.2)
ALP SERPL-CCNC: 88 U/L (ref 40–150)
ALT SERPL W P-5'-P-CCNC: 32 U/L (ref 0–50)
ANION GAP SERPL CALCULATED.3IONS-SCNC: 10 MMOL/L (ref 7–15)
AST SERPL W P-5'-P-CCNC: 31 U/L (ref 0–45)
BASOPHILS # BLD AUTO: 0 10E3/UL (ref 0–0.2)
BASOPHILS NFR BLD AUTO: 0 %
BILIRUB SERPL-MCNC: 0.6 MG/DL
BUN SERPL-MCNC: 13 MG/DL (ref 8–23)
CALCIUM SERPL-MCNC: 9.7 MG/DL (ref 8.8–10.2)
CHLORIDE SERPL-SCNC: 106 MMOL/L (ref 98–107)
CHOLEST SERPL-MCNC: 109 MG/DL
CREAT SERPL-MCNC: 0.91 MG/DL (ref 0.51–0.95)
DEPRECATED HCO3 PLAS-SCNC: 25 MMOL/L (ref 22–29)
EGFRCR SERPLBLD CKD-EPI 2021: 63 ML/MIN/1.73M2
EOSINOPHIL # BLD AUTO: 0.2 10E3/UL (ref 0–0.7)
EOSINOPHIL NFR BLD AUTO: 2 %
ERYTHROCYTE [DISTWIDTH] IN BLOOD BY AUTOMATED COUNT: 13.1 % (ref 10–15)
FASTING STATUS PATIENT QL REPORTED: NO
GLUCOSE SERPL-MCNC: 91 MG/DL (ref 70–99)
HBA1C MFR BLD: 5.7 % (ref 4–6.2)
HCT VFR BLD AUTO: 45.7 % (ref 35–47)
HDLC SERPL-MCNC: 43 MG/DL
HGB BLD-MCNC: 15.7 G/DL (ref 11.7–15.7)
HOLD SPECIMEN: NORMAL
IMM GRANULOCYTES # BLD: 0 10E3/UL
IMM GRANULOCYTES NFR BLD: 0 %
INR POINT OF CARE: 2 (ref 0.9–1.1)
INR PPP: 1.98 (ref 0.85–1.15)
LDLC SERPL CALC-MCNC: 45 MG/DL
LYMPHOCYTES # BLD AUTO: 2 10E3/UL (ref 0.8–5.3)
LYMPHOCYTES NFR BLD AUTO: 25 %
MCH RBC QN AUTO: 33.4 PG (ref 26.5–33)
MCHC RBC AUTO-ENTMCNC: 34.4 G/DL (ref 31.5–36.5)
MCV RBC AUTO: 97 FL (ref 78–100)
MONOCYTES # BLD AUTO: 1 10E3/UL (ref 0–1.3)
MONOCYTES NFR BLD AUTO: 12 %
NEUTROPHILS # BLD AUTO: 4.8 10E3/UL (ref 1.6–8.3)
NEUTROPHILS NFR BLD AUTO: 61 %
NONHDLC SERPL-MCNC: 66 MG/DL
NRBC # BLD AUTO: 0 10E3/UL
NRBC BLD AUTO-RTO: 0 /100
PLATELET # BLD AUTO: 196 10E3/UL (ref 150–450)
POTASSIUM SERPL-SCNC: 4.2 MMOL/L (ref 3.4–5.3)
PROT SERPL-MCNC: 7 G/DL (ref 6.4–8.3)
RBC # BLD AUTO: 4.7 10E6/UL (ref 3.8–5.2)
SODIUM SERPL-SCNC: 141 MMOL/L (ref 135–145)
TRIGL SERPL-MCNC: 103 MG/DL
TSH SERPL DL<=0.005 MIU/L-ACNC: 2.16 UIU/ML (ref 0.3–4.2)
WBC # BLD AUTO: 7.9 10E3/UL (ref 4–11)

## 2024-01-09 PROCEDURE — 73560 X-RAY EXAM OF KNEE 1 OR 2: CPT | Mod: LT

## 2024-01-09 PROCEDURE — 90662 IIV NO PRSV INCREASED AG IM: CPT

## 2024-01-09 PROCEDURE — 99215 OFFICE O/P EST HI 40 MIN: CPT | Mod: 25 | Performed by: FAMILY MEDICINE

## 2024-01-09 PROCEDURE — 83036 HEMOGLOBIN GLYCOSYLATED A1C: CPT | Mod: ZL | Performed by: FAMILY MEDICINE

## 2024-01-09 PROCEDURE — 36416 COLLJ CAPILLARY BLOOD SPEC: CPT | Mod: ZL

## 2024-01-09 PROCEDURE — 85610 PROTHROMBIN TIME: CPT | Mod: ZL | Performed by: FAMILY MEDICINE

## 2024-01-09 PROCEDURE — 85025 COMPLETE CBC W/AUTO DIFF WBC: CPT | Mod: ZL | Performed by: FAMILY MEDICINE

## 2024-01-09 PROCEDURE — 90480 ADMN SARSCOV2 VAC 1/ONLY CMP: CPT

## 2024-01-09 PROCEDURE — 84443 ASSAY THYROID STIM HORMONE: CPT | Mod: ZL | Performed by: FAMILY MEDICINE

## 2024-01-09 PROCEDURE — 80061 LIPID PANEL: CPT | Mod: ZL | Performed by: FAMILY MEDICINE

## 2024-01-09 PROCEDURE — G0439 PPPS, SUBSEQ VISIT: HCPCS | Performed by: FAMILY MEDICINE

## 2024-01-09 PROCEDURE — 80053 COMPREHEN METABOLIC PANEL: CPT | Mod: ZL | Performed by: FAMILY MEDICINE

## 2024-01-09 PROCEDURE — G0463 HOSPITAL OUTPT CLINIC VISIT: HCPCS | Mod: 25

## 2024-01-09 PROCEDURE — 36415 COLL VENOUS BLD VENIPUNCTURE: CPT | Mod: ZL | Performed by: FAMILY MEDICINE

## 2024-01-09 PROCEDURE — 73564 X-RAY EXAM KNEE 4 OR MORE: CPT | Mod: RT

## 2024-01-09 RX ORDER — ATORVASTATIN CALCIUM 40 MG/1
40 TABLET, FILM COATED ORAL AT BEDTIME
Qty: 90 TABLET | Refills: 3 | Status: SHIPPED | OUTPATIENT
Start: 2024-01-09

## 2024-01-09 RX ORDER — WARFARIN SODIUM 5 MG/1
TABLET ORAL
Qty: 100 TABLET | Refills: 4 | Status: SHIPPED | OUTPATIENT
Start: 2024-01-09

## 2024-01-09 RX ORDER — METOPROLOL SUCCINATE 50 MG/1
50 TABLET, EXTENDED RELEASE ORAL DAILY
Qty: 90 TABLET | Refills: 11 | Status: SHIPPED | OUTPATIENT
Start: 2024-01-09

## 2024-01-09 ASSESSMENT — ENCOUNTER SYMPTOMS
EYE PAIN: 0
PALPITATIONS: 0
SORE THROAT: 0
CONSTIPATION: 0
HEARTBURN: 0
SHORTNESS OF BREATH: 0
NAUSEA: 0
DIZZINESS: 0
BREAST MASS: 0
ARTHRALGIAS: 1
HEMATOCHEZIA: 0
JOINT SWELLING: 0
HEMATURIA: 0
ABDOMINAL PAIN: 0
CHILLS: 0
PARESTHESIAS: 0
DIARRHEA: 0
HEADACHES: 0
COUGH: 0
FREQUENCY: 1
MYALGIAS: 0
DYSURIA: 0
WEAKNESS: 0
NERVOUS/ANXIOUS: 0
FEVER: 0

## 2024-01-09 ASSESSMENT — PATIENT HEALTH QUESTIONNAIRE - PHQ9
SUM OF ALL RESPONSES TO PHQ QUESTIONS 1-9: 3
SUM OF ALL RESPONSES TO PHQ QUESTIONS 1-9: 3
10. IF YOU CHECKED OFF ANY PROBLEMS, HOW DIFFICULT HAVE THESE PROBLEMS MADE IT FOR YOU TO DO YOUR WORK, TAKE CARE OF THINGS AT HOME, OR GET ALONG WITH OTHER PEOPLE: NOT DIFFICULT AT ALL

## 2024-01-09 ASSESSMENT — ACTIVITIES OF DAILY LIVING (ADL): CURRENT_FUNCTION: LAUNDRY REQUIRES ASSISTANCE

## 2024-01-09 ASSESSMENT — PAIN SCALES - GENERAL: PAINLEVEL: NO PAIN (0)

## 2024-01-09 NOTE — NURSING NOTE
"Chief Complaint   Patient presents with    Medicare Visit     annual       Initial /88   Pulse 65   Temp 96.9  F (36.1  C) (Temporal)   Resp 18   Ht 1.651 m (5' 5\")   Wt 88 kg (194 lb)   LMP  (LMP Unknown)   SpO2 96%   Breastfeeding No   BMI 32.28 kg/m   Estimated body mass index is 32.28 kg/m  as calculated from the following:    Height as of this encounter: 1.651 m (5' 5\").    Weight as of this encounter: 88 kg (194 lb).  Medication Review: complete    The next two questions are to help us understand your food security.  If you are feeling you need any assistance in this area, we have resources available to support you today.          1/9/2024   SDOH- Food Insecurity   Within the past 12 months, did you worry that your food would run out before you got money to buy more? N   Within the past 12 months, did the food you bought just not last and you didn t have money to get more? N         Health Care Directive:  Patient does not have a Health Care Directive or Living Will: Discussed advance care planning with patient; information given to patient to review.    Tiffany White LPN      "

## 2024-01-09 NOTE — PROGRESS NOTES
ANTICOAGULATION MANAGEMENT     Kate Chacon 78 year old female is on warfarin with therapeutic INR result. (Goal INR 2.0-3.0)    Recent labs: (last 7 days)     01/09/24  1308   INR 2.0*       ASSESSMENT     Source(s): In person     Warfarin doses taken: Warfarin taken as instructed  Diet: No new diet changes identified  New illness, injury, or hospitalization: No  Medication/supplement changes: None noted  Signs or symptoms of bleeding or clotting: No  Previous INR: Therapeutic last 2(+) visits  Additional findings: None     PLAN     Recommended plan for no diet, medication or health factor changes affecting INR     Dosing Instructions: Continue your current warfarin dose with next INR in 6 weeks       Summary  As of 1/9/2024      Full warfarin instructions:  5 mg every day   Next INR check:  2/20/2024               In person    Lab visit scheduled    Education provided: Please call back if any changes to your diet, medications or how you've been taking warfarin    Plan made per [facility] anticoagulation protocol    Rose Silverio RN  Anticoagulation Clinic  1/9/2024    _______________________________________________________________________     Anticoagulation Episode Summary       Current INR goal:  2.0-3.0   TTR:  64.3% (1 y)   Target end date:  Indefinite   Send INR reminders to:  [facility]    Indications    Paroxysmal atrial fibrillation (H) [I48.0]  Anticoagulation goal of INR 2 to 3 [Z51.81  Z79.01]             Comments:               Anticoagulation Care Providers       Provider Role Specialty Phone number    Asher Campos MD Referring Family Medicine [phone]    No Ref-Primary, Physician Referring

## 2024-01-09 NOTE — PATIENT INSTRUCTIONS
Ask at the pharmacy about the TDAP (tetanus shot) and the RSV shot.    Your blood tests are all normal.     Your knee xray looks good. Very mild arthritis.  Try doing ice to the right knee 20 minutes 2-3 times per day for one week. If the swelling subsides and it feels better, great.  If not, we can do a cortisone shot into the knee.    They will call you about the urology appointment about the Botox.

## 2024-01-09 NOTE — PROGRESS NOTES
"Nursing Notes:   Tiffany White LPN  1/9/2024  1:48 PM  Sign at exiting of workspace  Chief Complaint   Patient presents with    Medicare Visit     annual       Initial /88   Pulse 65   Temp 96.9  F (36.1  C) (Temporal)   Resp 18   Ht 1.651 m (5' 5\")   Wt 88 kg (194 lb)   LMP  (LMP Unknown)   SpO2 96%   Breastfeeding No   BMI 32.28 kg/m   Estimated body mass index is 32.28 kg/m  as calculated from the following:    Height as of this encounter: 1.651 m (5' 5\").    Weight as of this encounter: 88 kg (194 lb).  Medication Review: complete    The next two questions are to help us understand your food security.  If you are feeling you need any assistance in this area, we have resources available to support you today.          1/9/2024   SDOH- Food Insecurity   Within the past 12 months, did you worry that your food would run out before you got money to buy more? N   Within the past 12 months, did the food you bought just not last and you didn t have money to get more? N         Health Care Directive:  Patient does not have a Health Care Directive or Living Will: Discussed advance care planning with patient; information given to patient to review.    Tiffany White LPN      SUBJECTIVE:  Kate Chacon  is a 78 year old female who comes in today for Medicare wellness and follow-up of her other medical problems.    She has a history of lumbar radiculopathy.  She has been followed by neurosurgery at [facility].  She is felt to likely benefit from an L4-S1 TLIF but they have been reluctant to proceed that way because of her bone strength.  She has been doing Reclast injections and the plan is to have another DEXA scan after her next infusion.  Her last 1 was in April 2023.  This fall, she had an L4-5 right DOROTHY, which she did on 9/19/2023.  She takes vitamin D and calcium regularly.    She has paroxysmal atrial fibrillation and is anticoagulated.  I last saw her in May.  She is on atorvastatin for her " lipids, on lisinopril 20 mg daily for blood pressure metoprolol succinate 50 mg daily for rate control.  She is anticoagulated with warfarin.  She gets her pacemaker checked at home remotely and it is good.     She is on 50 mg of Zoloft for depression.          7/27/2022     1:28 PM 5/9/2023     2:03 PM 1/9/2024     1:36 PM   PHQ   PHQ-9 Total Score 0 0 3   Q9: Thoughts of better off dead/self-harm past 2 weeks Not at all Not at all Not at all     She has some trouble with her right knee.  It will swell at times. It bothers when she is walking.  She can step wrong and it will be painful.     She had tubular adenoma at her last colonoscopy in 2019 and would not be due until late 2024 but she is currently 78 so would have aged out.    Her  is still having trouble with his knee.     She has not had her flu shot or COVID booster this fall.  She is due for Boostrix and has not had Shingrix.  She has not had RSV vaccine.        Past Medical, Family, and Social History reviewed and updated as noted below.   ROS is negative except as noted above       Allergies   Allergen Reactions    Methylpar-Na Bisulfite-Pentazocine Unknown     jittery   ,   Family History   Problem Relation Age of Onset    Diabetes Father         Diabetes    Hypertension Father         Hypertension    Other - See Comments Mother         h/o coronary artery disease/dementia    Asthma Mother         Asthma    Diabetes Maternal Grandmother         Diabetes    Cancer Maternal Aunt         Cancer,Uterine    Breast Cancer No family hx of         Cancer-breast   ,   Current Outpatient Medications   Medication    atorvastatin (LIPITOR) 40 MG tablet    calcium carbonate 750 MG CHEW    calcium carbonate-vitamin D 600-200 MG-UNIT TABS    lisinopril (ZESTRIL) 20 MG tablet    metoprolol succinate ER (TOPROL XL) 50 MG 24 hr tablet    sertraline (ZOLOFT) 50 MG tablet    vitamin B complex with vitamin C (STRESS TAB) tablet    vitamin D3 (CHOLECALCIFEROL) 50 mcg  (2000 units) tablet    warfarin ANTICOAGULANT (COUMADIN) 5 MG tablet     No current facility-administered medications for this visit.   ,   Past Medical History:   Diagnosis Date    Encounter for full-term uncomplicated delivery     x3    Ganglion of wrist     right    Gastritis without bleeding     treated and 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   ,   Patient Active Problem List    Diagnosis Date Noted    Hyperparathyroidism, primary (H24) 01/09/2024     Priority: Medium    Hemiparesis due to old stroke - right side is weaker 10/19/2020     Priority: Medium    Compression fracture of T10 vertebra with routine healing, subsequent encounter 10/19/2020     Priority: Medium    History of CVA (cerebrovascular accident) 10/19/2020     Priority: Medium    Physical deconditioning 10/19/2020     Priority: Medium    Lumbar radiculopathy 10/19/2020     Priority: Medium    Chondrodermatitis nodularis chronica helicis, right 10/09/2020     Priority: Medium    SA node dysfunction [facility] 08/13/2020     Priority: Medium    H/O sinus pause 08/13/2020     Priority: Medium    History of tobacco abuse quitting on 10/14/16 08/13/2020     Priority: Medium    Dyspnea on exertion 08/13/2020     Priority: Medium    On Coumadin for atrial fibrillation (H) 08/13/2020     Priority: Medium    Mixed hyperlipidemia 08/13/2020     Priority: Medium    CKD (chronic kidney disease) stage 3, GFR 30-59 ml/min (H) 08/13/2020     Priority: Medium    Chronic midline low back pain without sciatica 08/13/2020     Priority: Medium    Sinoatrial node dysfunction (H) 05/01/2018     Priority: Medium    Cardiac pacemaker 02/19/2018     Priority: Medium    Urge incontinence 10/05/2017     Priority: Medium    Former smoker 08/30/2017     Priority: Medium    MDD (recurrent major depressive disorder) in remission (H24) 08/30/2017     Priority: Medium    Anxiety 01/05/2017     Priority: Medium    Essential hypertension  "2016     Priority: Medium    Paroxysmal atrial fibrillation (H) 2016     Priority: Medium    Anticoagulation goal of INR 2 to 3 10/28/2016     Priority: Medium    History of ischemic stroke on 10/15/2016 secondary to embolism 10/15/2016     Priority: Medium    Diverticulosis of large intestine 2011     Priority: Medium    H/O adenomatous polyp of colon 2011     Priority: Medium    Osteoporosis 2006     Priority: Medium   ,   Past Surgical History:   Procedure Laterality Date    APPENDECTOMY OPEN      No Comments Provided    COLONOSCOPY  2005,Cecal biopsy with tubular adenoma low grade dysplasia.    COLONOSCOPY  2011    COLONOSCOPY  2012    Tubular Adenomas F/U     COLONOSCOPY  2019    F/U N/A as will be over 78 in . Tubular adenoma    ESOPHAGOSCOPY, GASTROSCOPY, DUODENOSCOPY (EGD), COMBINED      05    OTHER SURGICAL HISTORY      8/3/05,067002,OTHER,helices on the right ear    TONSILLECTOMY      No Comments Provided    and   Social History     Tobacco Use    Smoking status: Former     Packs/day: 0.50     Years: 49.00     Additional pack years: 0.00     Total pack years: 24.50     Types: Cigarettes     Quit date: 10/14/2016     Years since quittin.2     Passive exposure: Past    Smokeless tobacco: Never   Substance Use Topics    Alcohol use: No     OBJECTIVE:  /88   Pulse 65   Temp 96.9  F (36.1  C) (Temporal)   Resp 18   Ht 1.651 m (5' 5\")   Wt 88 kg (194 lb)   LMP  (LMP Unknown)   SpO2 96%   Breastfeeding No   BMI 32.28 kg/m     EXAM:  General Appearance: Pleasant, alert, appropriate appearance for age. No acute distress  Head Exam: Normal. Normocephalic, atraumatic.  Eye Exam: PERRLA, EOMI, conjunctivae, sclerae normal.  Ear Exam: Normal TM's bilaterally. Normal auditory canals and external ears. Non-tender.  Nose Exam: Normal external nose, mucus membranes, and septum.  OroPharynx Exam:  Dental hygiene " adequate. Normal buccal mucosa. Normal pharynx.  Neck Exam:  Supple, no masses or nodes. No bruits  Thyroid Exam: No nodules or enlargement.  Chest/Respiratory Exam: Normal chest wall and respirations. Clear to auscultation.  Breast Exam: No dimpling, nipple retraction or discharge. No masses or nodes.  Cardiovascular Exam: Regular rate and rhythm. S1, S2, no murmur, click, gallop, or rubs.  Gastrointestinal Exam: Soft, non-tender, no masses or organomegaly.  Lymphatic Exam: Non-palpable nodes in neck,clavicular, axillary, or inguinal regions.  Musculoskeletal Exam: Back is straight and non-tender, full ROM of upper and lower extremities.  Some swelling noted of her right knee with some medial lateral joint line tenderness more so laterally.  Foot Exam: Left and right foot: good pedal pulses  Skin: no rash or abnormalities  Neurologic Exam:  normal gross motor, tone coordination and no tremor.  Psychiatric Exam: Alert and oriented - appropriate affect.    Results for orders placed or performed during the hospital encounter of 01/09/24   XR Knee Standing 2v  Bilateral & 2v Right     Status: None    Narrative    PROCEDURE: XR KNEE STANDING 2 VW BILAT AND 2 VW RIGHT 1/9/2024 3:05 PM    HISTORY: Chronic pain of right knee; Chronic pain of right knee    COMPARISONS: None.    TECHNIQUE: 4 views.     FINDINGS: There is moderate narrowing of the medial joint space on the  left.    On the right there is very mild narrowing of the lateral joint space  with small lateral tibial spurs. There is moderately severe narrowing  of the lateral patellofemoral joint on the right with subchondral  sclerosis and spur formation.    No fracture or dislocation is seen. There is no joint effusion. There  is some generalized osteopenia.           Impression    IMPRESSION: Degenerative change which is most severe in the lateral  patellofemoral joint.    ELMER BYERS MD         SYSTEM ID:  [id number]   Results for orders placed or performed  in visit on 01/09/24   INR     Status: Abnormal   Result Value Ref Range    INR 1.98 (H) 0.85 - 1.15   Comprehensive metabolic panel     Status: Normal   Result Value Ref Range    Sodium 141 135 - 145 mmol/L    Potassium 4.2 3.4 - 5.3 mmol/L    Carbon Dioxide (CO2) 25 22 - 29 mmol/L    Anion Gap 10 7 - 15 mmol/L    Urea Nitrogen 13.0 8.0 - 23.0 mg/dL    Creatinine 0.91 0.51 - 0.95 mg/dL    GFR Estimate 63 >60 mL/min/1.73m2    Calcium 9.7 8.8 - 10.2 mg/dL    Chloride 106 98 - 107 mmol/L    Glucose 91 70 - 99 mg/dL    Alkaline Phosphatase 88 40 - 150 U/L    AST 31 0 - 45 U/L    ALT 32 0 - 50 U/L    Protein Total 7.0 6.4 - 8.3 g/dL    Albumin 3.9 3.5 - 5.2 g/dL    Bilirubin Total 0.6 <=1.2 mg/dL   Lipid Profile     Status: Abnormal   Result Value Ref Range    Cholesterol 109 <200 mg/dL    Triglycerides 103 <150 mg/dL    Direct Measure HDL 43 (L) >=50 mg/dL    LDL Cholesterol Calculated 45 <=100 mg/dL    Non HDL Cholesterol 66 <130 mg/dL    Patient Fasting > 8hrs? No     Narrative    Cholesterol  Desirable:  <200 mg/dL    Triglycerides  Normal:  Less than 150 mg/dL  Borderline High:  150-199 mg/dL  High:  200-499 mg/dL  Very High:  Greater than or equal to 500 mg/dL    Direct Measure HDL  Female:  Greater than or equal to 50 mg/dL   Male:  Greater than or equal to 40 mg/dL    LDL Cholesterol  Desirable:  <100mg/dL  Above Desirable:  100-129 mg/dL   Borderline High:  130-159 mg/dL   High:  160-189 mg/dL   Very High:  >= 190 mg/dL    Non HDL Cholesterol  Desirable:  130 mg/dL  Above Desirable:  130-159 mg/dL  Borderline High:  160-189 mg/dL  High:  190-219 mg/dL  Very High:  Greater than or equal to 220 mg/dL   TSH with free T4 reflex     Status: Normal   Result Value Ref Range    TSH 2.16 0.30 - 4.20 uIU/mL   Hemoglobin A1c     Status: Normal   Result Value Ref Range    Hemoglobin A1C 5.7 4.0 - 6.2 %   CBC with platelets and differential     Status: Abnormal   Result Value Ref Range    WBC Count 7.9 4.0 - 11.0 10e3/uL     RBC Count 4.70 3.80 - 5.20 10e6/uL    Hemoglobin 15.7 11.7 - 15.7 g/dL    Hematocrit 45.7 35.0 - 47.0 %    MCV 97 78 - 100 fL    MCH 33.4 (H) 26.5 - 33.0 pg    MCHC 34.4 31.5 - 36.5 g/dL    RDW 13.1 10.0 - 15.0 %    Platelet Count 196 150 - 450 10e3/uL    % Neutrophils 61 %    % Lymphocytes 25 %    % Monocytes 12 %    % Eosinophils 2 %    % Basophils 0 %    % Immature Granulocytes 0 %    NRBCs per 100 WBC 0 <1 /100    Absolute Neutrophils 4.8 1.6 - 8.3 10e3/uL    Absolute Lymphocytes 2.0 0.8 - 5.3 10e3/uL    Absolute Monocytes 1.0 0.0 - 1.3 10e3/uL    Absolute Eosinophils 0.2 0.0 - 0.7 10e3/uL    Absolute Basophils 0.0 0.0 - 0.2 10e3/uL    Absolute Immature Granulocytes 0.0 <=0.4 10e3/uL    Absolute NRBCs 0.0 10e3/uL   Extra Tube     Status: None    Narrative    The following orders were created for panel order Extra Tube.  Procedure                               Abnormality         Status                     ---------                               -----------         ------                     Extra Green Top (Lithium...[[id number]]                      Final result                 Please view results for these tests on the individual orders.   Extra Green Top (Lithium Heparin) Tube     Status: None   Result Value Ref Range    Hold Specimen [facility]    CBC with Platelets & Differential     Status: Abnormal    Narrative    The following orders were created for panel order CBC with Platelets & Differential.  Procedure                               Abnormality         Status                     ---------                               -----------         ------                     CBC with platelets and d...[[id number]]  Abnormal            Final result                 Please view results for these tests on the individual orders.   Results for orders placed or performed in visit on 01/09/24   INR POCT     Status: Abnormal   Result Value Ref Range    INR Point of Care 2.0 (A) 0.9 - 1.1      ASSESSMENT/Plan :    Kate was seen  today for medicare visit.    Diagnoses and all orders for this visit:    Medicare annual wellness visit, subsequent    MDD (recurrent major depressive disorder) in remission (H24)  -     sertraline (ZOLOFT) 50 MG tablet; Take 1 tablet (50 mg) by mouth at bedtime    Essential hypertension  -     CBC with Platelets & Differential; Future  -     Comprehensive metabolic panel; Future  -     CBC with Platelets & Differential  -     Comprehensive metabolic panel    Anticoagulation goal of INR 2 to 3  -     CBC with Platelets & Differential; Future  -     warfarin ANTICOAGULANT (COUMADIN) 5 MG tablet; TAKE 1 TABLET BY MOUTH ONCE DAILY OR  [facility]  [facility]  -     INR  -     CBC with Platelets & Differential    Hyperlipidemia, unspecified hyperlipidemia type  -     Comprehensive metabolic panel; Future  -     Lipid Profile; Future  -     atorvastatin (LIPITOR) 40 MG tablet; Take 1 tablet (40 mg) by mouth at bedtime  -     Comprehensive metabolic panel  -     Lipid Profile    Paroxysmal atrial fibrillation (H)  -     TSH with free T4 reflex; Future  -     metoprolol succinate ER (TOPROL XL) 50 MG 24 hr tablet; Take 1 tablet (50 mg) by mouth daily  -     TSH with free T4 reflex    Cardiac pacemaker  -     metoprolol succinate ER (TOPROL XL) 50 MG 24 hr tablet; Take 1 tablet (50 mg) by mouth daily    Osteoporosis without current pathological fracture, unspecified osteoporosis type  -     Comprehensive metabolic panel; Future  -     Comprehensive metabolic panel    Spinal stenosis of lumbar region with neurogenic claudication    Sinoatrial node dysfunction (H)  -     Comprehensive metabolic panel; Future  -     metoprolol succinate ER (TOPROL XL) 50 MG 24 hr tablet; Take 1 tablet (50 mg) by mouth daily  -     Comprehensive metabolic panel    Elevated glucose  -     Hemoglobin A1c; Future  -     Hemoglobin A1c    Visit for screening mammogram  -     MA Screening Bilateral w/ Portillo; Future    Hyperparathyroidism,  "primary (H24)    Hemiparesis due to old stroke (H)    Stage 3a chronic kidney disease (H)    Chronic pain of right knee  -     XR Knee Standing 2v  Bilateral & 2v Right; Future    Urge incontinence  -     Adult Urology  Referral; Future    Other orders  -     COVID-19 12+ (2023-24) (PFIZER)  -     INFLUENZA VACCINE 65+ (FLUZONE HD)  -     Extra Tube; Future  -     Extra Tube      Reviewed labs with her.  Continue with her current medications.  COVID and flu vaccine today.  She will ask about Boostrix and RSV at the pharmacy.    Referral back to urology for consideration of Botox in the bladder as she had good results in the past.    Mammogram is ordered.    Reviewed x-ray of her knee.  Some arthritic changes are seen.  Recommended that she try scheduled dose icing for a week or so.  If her knee is still bothering her we could consider steroid shot.    A total of 53 minutes was spent with the patient, reviewing records, tests, ordering medications, tests or procedures and documenting clinical information in the EHR.       Asher Campos MD      Answers submitted by the patient for this visit:  Patient Health Questionnaire (Submitted on 1/9/2024)  If you checked off any problems, how difficult have these problems made it for you to do your work, take care of things at home, or get along with other people?: Not difficult at all  PHQ9 TOTAL SCORE: 3  Annual Preventive Visit (Submitted on 1/9/2024)  Chief Complaint: Annual Exam:  In general, how would you rate your overall physical health?: good  Frequency of exercise:: None  Do you usually eat at least 4 servings of fruit and vegetables a day, include whole grains & fiber, and avoid regularly eating high fat or \"junk\" foods? : No  Taking medications regularly:: Yes  Medication side effects:: None  Activities of Daily Living: laundry requires assistance  Home safety: no safety concerns identified  Hearing Impairment:: no hearing concerns  In the past 6 months, " have you been bothered by leaking of urine?: Yes  abdominal pain: No  Blood in stool: No  Blood in urine: No  chest pain: No  chills: No  congestion: No  constipation: No  cough: No  diarrhea: No  dizziness: No  ear pain: No  eye pain: No  nervous/anxious: No  fever: No  frequency: Yes  genital sores: No  headaches: No  hearing loss: No  heartburn: No  arthralgias: Yes  joint swelling: No  peripheral edema: Yes  mood changes: No  myalgias: No  nausea: No  dysuria: No  palpitations: No  Skin sensation changes: No  sore throat: No  urgency: Yes  rash: No  shortness of breath: No  visual disturbance: No  weakness: No  pelvic pain: No  vaginal bleeding: No  vaginal discharge: No  tenderness: No  breast mass: No  breast discharge: No  In general, how would you rate your overall mental or emotional health?: fair  Additional concerns today:: No

## 2024-01-09 NOTE — PROGRESS NOTES
"SUBJECTIVE:   Kate is a 78 year old, presenting for the following:  Medicare Visit (annual)        1/9/2024     1:43 PM   Additional Questions   Roomed by Tiffany White LPN       Are you in the first 12 months of your Medicare coverage?  No    Healthy Habits:     In general, how would you rate your overall health?  Good    Frequency of exercise:  None    Do you usually eat at least 4 servings of fruit and vegetables a day, include whole grains    & fiber and avoid regularly eating high fat or \"junk\" foods?  No    Taking medications regularly:  Yes    Medication side effects:  None    Ability to successfully perform activities of daily living:  Laundry requires assistance    Home Safety:  No safety concerns identified    Hearing Impairment:  No hearing concerns    In the past 6 months, have you been bothered by leaking of urine? Yes    In general, how would you rate your overall mental or emotional health?  Fair    Additional concerns today:  No      Today's PHQ-9 Score:       1/9/2024     1:36 PM   PHQ-9 SCORE   PHQ-9 Total Score MyChart 3 (Minimal depression)   PHQ-9 Total Score 3           Have you ever done Advance Care Planning? (For example, a Health Directive, POLST, or a discussion with a medical provider or your loved ones about your wishes): No, advance care planning information given to patient to review.  Patient plans to discuss their wishes with loved ones or provider.         Fall risk  Fallen 2 or more times in the past year?: No  Any fall with injury in the past year?: No    Cognitive Screening   1) Repeat 3 items (Leader, Season, Table)    2) Clock draw: ABNORMAL one of the hands is in the wrong place  and some of the numbers were in the wrong place  3) 3 item recall: Recalls 3 objects  Results: 3 items recalled: COGNITIVE IMPAIRMENT LESS LIKELY    Mini-CogTM Copyright S Jose. Licensed by the author for use in [facility]; reprinted with permission ([email or web address]). All rights reserved. "      Do you have sleep apnea, excessive snoring or daytime drowsiness? : no    Reviewed and updated as needed this visit by clinical staff   Tobacco  Allergies  Meds   Med Hx  Surg Hx  Fam Hx          Reviewed and updated as needed this visit by Provider                 Social History     Tobacco Use    Smoking status: Former     Packs/day: 0.50     Years: 49.00     Additional pack years: 0.00     Total pack years: 24.50     Types: Cigarettes     Quit date: 10/14/2016     Years since quittin.2     Passive exposure: Past    Smokeless tobacco: Never   Substance Use Topics    Alcohol use: No             2024     1:41 PM   Alcohol Use   Prescreen: >3 drinks/day or >7 drinks/week? Not Applicable     Do you have a current opioid prescription? No  Do you use any other controlled substances or medications that are not prescribed by a provider? None              Current providers sharing in care for this patient include:   Patient Care Team:  Asher Campos MD as PCP - General (Family Practice)  Asher Campos MD as Assigned PCP  Dinora Younger APRN CNP as Assigned Heart and Vascular Provider  Steve Fox MD as Assigned Surgical Provider    The following health maintenance items are reviewed in Epic and correct as of today:  Health Maintenance   Topic Date Due    MICROALBUMIN  Never done    URINE DRUG SCREEN  Never done    ZOSTER IMMUNIZATION (1 of 2) Never done    RSV VACCINE (Pregnancy & 60+) (1 - 1-dose 60+ series) Never done    LIPID  2018    MEDICARE ANNUAL WELLNESS VISIT  10/29/2020    DTAP/TDAP/TD IMMUNIZATION (5 - Td or Tdap) 2021    BMP  2023    INFLUENZA VACCINE (1) 2023    COVID-19 Vaccine (5 - -24 season) 2023    HEMOGLOBIN  2023    PHQ-9  2024    ADVANCE CARE PLANNING  10/30/2024    FALL RISK ASSESSMENT  2025    DEXA  2036    DEPRESSION ACTION PLAN  Completed    Pneumococcal Vaccine: 65+ Years  Completed    URINALYSIS   "Completed    IPV IMMUNIZATION  Aged Out    HPV IMMUNIZATION  Aged Out    MENINGITIS IMMUNIZATION  Aged Out    RSV MONOCLONAL ANTIBODY  Aged Out    MAMMO SCREENING  Discontinued    COLORECTAL CANCER SCREENING  Discontinued    LUNG CANCER SCREENING  Discontinued     Labs reviewed in EPIC        Pertinent mammograms are reviewed under the imaging tab.    Review of Systems   Constitutional:  Negative for chills and fever.   HENT:  Negative for congestion, ear pain, hearing loss and sore throat.    Eyes:  Negative for pain and visual disturbance.   Respiratory:  Negative for cough and shortness of breath.    Cardiovascular:  Positive for peripheral edema. Negative for chest pain and palpitations.   Gastrointestinal:  Negative for abdominal pain, constipation, diarrhea, heartburn, hematochezia and nausea.   Breasts:  Negative for tenderness, breast mass and discharge.   Genitourinary:  Positive for frequency and urgency. Negative for dysuria, genital sores, hematuria, pelvic pain, vaginal bleeding and vaginal discharge.   Musculoskeletal:  Positive for arthralgias. Negative for joint swelling and myalgias.   Skin:  Negative for rash.   Neurological:  Negative for dizziness, weakness, headaches and paresthesias.   Psychiatric/Behavioral:  Negative for mood changes. The patient is not nervous/anxious.          OBJECTIVE:   /88   Pulse 65   Temp 96.9  F (36.1  C) (Temporal)   Resp 18   Ht 1.651 m (5' 5\")   Wt 88 kg (194 lb)   LMP  (LMP Unknown)   SpO2 96%   Breastfeeding No   BMI 32.28 kg/m   Estimated body mass index is 32.28 kg/m  as calculated from the following:    Height as of this encounter: 1.651 m (5' 5\").    Weight as of this encounter: 88 kg (194 lb).  Physical Exam          ASSESSMENT / PLAN:   Kate was seen today for medicare visit.    Diagnoses and all orders for this visit:    Medicare annual wellness visit, subsequent    MDD (recurrent major depressive disorder) in remission (H24)  -     " sertraline (ZOLOFT) 50 MG tablet; Take 1 tablet (50 mg) by mouth at bedtime    Essential hypertension  -     CBC with Platelets & Differential; Future  -     Comprehensive metabolic panel; Future  -     CBC with Platelets & Differential  -     Comprehensive metabolic panel    Anticoagulation goal of INR 2 to 3  -     CBC with Platelets & Differential; Future  -     warfarin ANTICOAGULANT (COUMADIN) 5 MG tablet; TAKE 1 TABLET BY MOUTH ONCE DAILY OR  AS  DIRECTED  BY  PROTIME  CLINIC  -     INR  -     CBC with Platelets & Differential    Hyperlipidemia, unspecified hyperlipidemia type  -     Comprehensive metabolic panel; Future  -     Lipid Profile; Future  -     atorvastatin (LIPITOR) 40 MG tablet; Take 1 tablet (40 mg) by mouth at bedtime  -     Comprehensive metabolic panel  -     Lipid Profile    Paroxysmal atrial fibrillation (H)  -     TSH with free T4 reflex; Future  -     metoprolol succinate ER (TOPROL XL) 50 MG 24 hr tablet; Take 1 tablet (50 mg) by mouth daily  -     TSH with free T4 reflex    Cardiac pacemaker  -     metoprolol succinate ER (TOPROL XL) 50 MG 24 hr tablet; Take 1 tablet (50 mg) by mouth daily    Osteoporosis without current pathological fracture, unspecified osteoporosis type  -     Comprehensive metabolic panel; Future  -     Comprehensive metabolic panel    Spinal stenosis of lumbar region with neurogenic claudication    Sinoatrial node dysfunction (H)  -     Comprehensive metabolic panel; Future  -     metoprolol succinate ER (TOPROL XL) 50 MG 24 hr tablet; Take 1 tablet (50 mg) by mouth daily  -     Comprehensive metabolic panel    Elevated glucose  -     Hemoglobin A1c; Future  -     Hemoglobin A1c    Visit for screening mammogram  -     MA Screening Bilateral w/ Portillo; Future    Hyperparathyroidism, primary (H24)    Hemiparesis due to old stroke (H)    Stage 3a chronic kidney disease (H)    Chronic pain of right knee  -     XR Knee Standing 2v  Bilateral & 2v Right; Future    Urge  "incontinence  -     Adult Urology  Referral; Future    Other orders  -     COVID-19 12+ (2023-24) (PFIZER)  -     INFLUENZA VACCINE 65+ (FLUZONE HD)  -     Extra Tube; Future  -     Extra Tube        Patient has been advised of split billing requirements and indicates understanding: Yes      COUNSELING:  Reviewed preventive health counseling, as reflected in patient instructions       Regular exercise       Healthy diet/nutrition       Bladder control       Fall risk prevention       Osteoporosis prevention/bone health      BMI:   Estimated body mass index is 32.28 kg/m  as calculated from the following:    Height as of this encounter: 1.651 m (5' 5\").    Weight as of this encounter: 88 kg (194 lb).   Weight management plan: Discussed healthy diet and exercise guidelines      She reports that she quit smoking about 7 years ago. Her smoking use included cigarettes. She has a 24.5 pack-year smoking history. She has been exposed to tobacco smoke. She has never used smokeless tobacco.      Appropriate preventive services were discussed with this patient, including applicable screening as appropriate for fall prevention, nutrition, physical activity, Tobacco-use cessation, weight loss and cognition.  Checklist reviewing preventive services available has been given to the patient.    Reviewed patients plan of care and provided an AVS. The Basic Care Plan (routine screening as documented in Health Maintenance) for Kate meets the Care Plan requirement. This Care Plan has been established and reviewed with the Patient.          Asher Campos MD  [facility]    Identified Health Risks:  I have reviewed Opioid Use Disorder and Substance Use Disorder risk factors and made any needed referrals.   "

## 2024-01-19 ENCOUNTER — HOSPITAL ENCOUNTER (OUTPATIENT)
Dept: MAMMOGRAPHY | Facility: OTHER | Age: 79
Discharge: HOME | End: 2024-01-19
Attending: FAMILY MEDICINE | Admitting: FAMILY MEDICINE
Payer: MEDICARE

## 2024-01-19 DIAGNOSIS — Z12.31 VISIT FOR SCREENING MAMMOGRAM: ICD-10-CM

## 2024-01-19 PROCEDURE — 77063 BREAST TOMOSYNTHESIS BI: CPT

## 2024-02-06 ENCOUNTER — ANCILLARY PROCEDURE (OUTPATIENT)
Dept: CARDIOLOGY | Facility: CLINIC | Age: 79
End: 2024-02-06
Attending: INTERNAL MEDICINE
Payer: MEDICARE

## 2024-02-06 DIAGNOSIS — I48.20 CHRONIC A-FIB (H): ICD-10-CM

## 2024-02-06 PROCEDURE — 93294 REM INTERROG EVL PM/LDLS PM: CPT | Performed by: INTERNAL MEDICINE

## 2024-02-06 PROCEDURE — 93296 REM INTERROG EVL PM/IDS: CPT

## 2024-02-28 ENCOUNTER — ANTICOAGULATION THERAPY VISIT (OUTPATIENT)
Dept: ANTICOAGULATION | Facility: OTHER | Age: 79
End: 2024-02-28
Attending: FAMILY MEDICINE
Payer: MEDICARE

## 2024-02-28 DIAGNOSIS — Z79.01 ANTICOAGULATION GOAL OF INR 2 TO 3: ICD-10-CM

## 2024-02-28 DIAGNOSIS — I48.0 PAROXYSMAL ATRIAL FIBRILLATION (H): Primary | ICD-10-CM

## 2024-02-28 DIAGNOSIS — Z51.81 ANTICOAGULATION GOAL OF INR 2 TO 3: ICD-10-CM

## 2024-02-28 LAB — INR POINT OF CARE: 2.7 (ref 0.9–1.1)

## 2024-02-28 PROCEDURE — 36416 COLLJ CAPILLARY BLOOD SPEC: CPT | Mod: ZL

## 2024-02-28 NOTE — PROGRESS NOTES
ANTICOAGULATION MANAGEMENT     Kate Chacon 78 year old female is on warfarin with therapeutic INR result. (Goal INR 2.0-3.0)    Recent labs: (last 7 days)     02/28/24  1338   INR 2.7*       ASSESSMENT     Source(s): In person     Warfarin doses taken: Warfarin taken as instructed  Diet: No new diet changes identified  New illness, injury, or hospitalization: No  Medication/supplement changes: None noted  Signs or symptoms of bleeding or clotting: No  Previous INR: Therapeutic last 2(+) visits  Additional findings: None     PLAN     Recommended plan for no diet, medication or health factor changes affecting INR     Dosing Instructions: Continue your current warfarin dose with next INR in 6 weeks       Summary  As of 2/28/2024      Full warfarin instructions:  5 mg every day   Next INR check:  4/10/2024               In person    Lab visit scheduled    Education provided: Please call back if any changes to your diet, medications or how you've been taking warfarin    Plan made per [facility] anticoagulation protocol    Yu Fatima RN  Anticoagulation Clinic  2/28/2024    _______________________________________________________________________     Anticoagulation Episode Summary       Current INR goal:  2.0-3.0   TTR:  64.5% (1 y)   Target end date:  Indefinite   Send INR reminders to:  [facility]    Indications    Paroxysmal atrial fibrillation (H) [I48.0]  Anticoagulation goal of INR 2 to 3 [Z51.81  Z79.01]             Comments:               Anticoagulation Care Providers       Provider Role Specialty Phone number    Asher Campos MD Referring Family Medicine [phone]    No Ref-Primary, Physician Referring

## 2024-02-29 LAB
MDC_IDC_EPISODE_DTM: NORMAL
MDC_IDC_EPISODE_DTM: NORMAL
MDC_IDC_EPISODE_DURATION: 15 S
MDC_IDC_EPISODE_ID: NORMAL
MDC_IDC_EPISODE_ID: NORMAL
MDC_IDC_EPISODE_TYPE: NORMAL
MDC_IDC_EPISODE_TYPE: NORMAL
MDC_IDC_EPISODE_TYPE_INDUCED: NO
MDC_IDC_LEAD_CONNECTION_STATUS: NORMAL
MDC_IDC_LEAD_CONNECTION_STATUS: NORMAL
MDC_IDC_LEAD_IMPLANT_DT: NORMAL
MDC_IDC_LEAD_IMPLANT_DT: NORMAL
MDC_IDC_LEAD_LOCATION: NORMAL
MDC_IDC_LEAD_LOCATION: NORMAL
MDC_IDC_LEAD_LOCATION_DETAIL_1: NORMAL
MDC_IDC_LEAD_LOCATION_DETAIL_1: NORMAL
MDC_IDC_LEAD_MFG: NORMAL
MDC_IDC_LEAD_MFG: NORMAL
MDC_IDC_LEAD_MODEL: NORMAL
MDC_IDC_LEAD_MODEL: NORMAL
MDC_IDC_LEAD_POLARITY_TYPE: NORMAL
MDC_IDC_LEAD_POLARITY_TYPE: NORMAL
MDC_IDC_LEAD_SERIAL: NORMAL
MDC_IDC_LEAD_SERIAL: NORMAL
MDC_IDC_MSMT_BATTERY_DTM: NORMAL
MDC_IDC_MSMT_BATTERY_REMAINING_LONGEVITY: 36 MO
MDC_IDC_MSMT_BATTERY_REMAINING_PERCENTAGE: 55 %
MDC_IDC_MSMT_BATTERY_STATUS: NORMAL
MDC_IDC_MSMT_LEADCHNL_RA_IMPEDANCE_VALUE: 713 OHM
MDC_IDC_MSMT_LEADCHNL_RV_IMPEDANCE_VALUE: 660 OHM
MDC_IDC_MSMT_LEADCHNL_RV_PACING_THRESHOLD_AMPLITUDE: 0.7 V
MDC_IDC_MSMT_LEADCHNL_RV_PACING_THRESHOLD_PULSEWIDTH: 0.4 MS
MDC_IDC_PG_IMPLANT_DTM: NORMAL
MDC_IDC_PG_MFG: NORMAL
MDC_IDC_PG_MODEL: NORMAL
MDC_IDC_PG_SERIAL: NORMAL
MDC_IDC_PG_TYPE: NORMAL
MDC_IDC_SESS_CLINIC_NAME: NORMAL
MDC_IDC_SESS_DTM: NORMAL
MDC_IDC_SESS_TYPE: NORMAL
MDC_IDC_SET_BRADY_AT_MODE_SWITCH_RATE: 170 {BEATS}/MIN
MDC_IDC_SET_BRADY_LOWRATE: 60 {BEATS}/MIN
MDC_IDC_SET_BRADY_MAX_SENSOR_RATE: 130 {BEATS}/MIN
MDC_IDC_SET_BRADY_MODE: NORMAL
MDC_IDC_SET_LEADCHNL_RA_SENSING_ADAPTATION_MODE: NORMAL
MDC_IDC_SET_LEADCHNL_RA_SENSING_SENSITIVITY: 0.25 MV
MDC_IDC_SET_LEADCHNL_RV_PACING_AMPLITUDE: 1.2 V
MDC_IDC_SET_LEADCHNL_RV_PACING_CAPTURE_MODE: NORMAL
MDC_IDC_SET_LEADCHNL_RV_PACING_POLARITY: NORMAL
MDC_IDC_SET_LEADCHNL_RV_PACING_PULSEWIDTH: 0.4 MS
MDC_IDC_SET_LEADCHNL_RV_SENSING_ADAPTATION_MODE: NORMAL
MDC_IDC_SET_LEADCHNL_RV_SENSING_POLARITY: NORMAL
MDC_IDC_SET_LEADCHNL_RV_SENSING_SENSITIVITY: 1.5 MV
MDC_IDC_SET_ZONE_DETECTION_INTERVAL: 375 MS
MDC_IDC_SET_ZONE_STATUS: NORMAL
MDC_IDC_SET_ZONE_TYPE: NORMAL
MDC_IDC_SET_ZONE_VENDOR_TYPE: NORMAL
MDC_IDC_STAT_BRADY_DTM_END: NORMAL
MDC_IDC_STAT_BRADY_DTM_START: NORMAL
MDC_IDC_STAT_BRADY_RA_PERCENT_PACED: 0 %
MDC_IDC_STAT_BRADY_RV_PERCENT_PACED: 27 %
MDC_IDC_STAT_EPISODE_RECENT_COUNT: 0
MDC_IDC_STAT_EPISODE_RECENT_COUNT: 2
MDC_IDC_STAT_EPISODE_RECENT_COUNT_DTM_END: NORMAL
MDC_IDC_STAT_EPISODE_RECENT_COUNT_DTM_END: NORMAL
MDC_IDC_STAT_EPISODE_RECENT_COUNT_DTM_START: NORMAL
MDC_IDC_STAT_EPISODE_RECENT_COUNT_DTM_START: NORMAL
MDC_IDC_STAT_EPISODE_TYPE: NORMAL
MDC_IDC_STAT_EPISODE_TYPE: NORMAL
MDC_IDC_STAT_EPISODE_VENDOR_TYPE: NORMAL
MDC_IDC_STAT_EPISODE_VENDOR_TYPE: NORMAL

## 2024-03-07 ENCOUNTER — OFFICE VISIT (OUTPATIENT)
Dept: FAMILY MEDICINE | Facility: OTHER | Age: 79
End: 2024-03-07
Attending: FAMILY MEDICINE
Payer: MEDICARE

## 2024-03-07 VITALS
BODY MASS INDEX: 31.82 KG/M2 | TEMPERATURE: 97.1 F | SYSTOLIC BLOOD PRESSURE: 128 MMHG | HEART RATE: 95 BPM | DIASTOLIC BLOOD PRESSURE: 86 MMHG | OXYGEN SATURATION: 92 % | RESPIRATION RATE: 18 BRPM | WEIGHT: 191.2 LBS

## 2024-03-07 DIAGNOSIS — G89.29 CHRONIC PAIN OF RIGHT KNEE: Primary | ICD-10-CM

## 2024-03-07 DIAGNOSIS — L84 HELOMA DURUM: ICD-10-CM

## 2024-03-07 DIAGNOSIS — M25.561 CHRONIC PAIN OF RIGHT KNEE: Primary | ICD-10-CM

## 2024-03-07 PROCEDURE — 20610 DRAIN/INJ JOINT/BURSA W/O US: CPT | Performed by: FAMILY MEDICINE

## 2024-03-07 PROCEDURE — G0463 HOSPITAL OUTPT CLINIC VISIT: HCPCS | Mod: 25

## 2024-03-07 PROCEDURE — 250N000009 HC RX 250: Performed by: FAMILY MEDICINE

## 2024-03-07 PROCEDURE — 99213 OFFICE O/P EST LOW 20 MIN: CPT | Mod: 25 | Performed by: FAMILY MEDICINE

## 2024-03-07 PROCEDURE — 250N000011 HC RX IP 250 OP 636: Performed by: FAMILY MEDICINE

## 2024-03-07 RX ORDER — LIDOCAINE HYDROCHLORIDE 10 MG/ML
2 INJECTION, SOLUTION EPIDURAL; INFILTRATION; INTRACAUDAL; PERINEURAL ONCE
Status: COMPLETED | OUTPATIENT
Start: 2024-03-07 | End: 2024-03-07

## 2024-03-07 RX ORDER — BUPIVACAINE HYDROCHLORIDE 5 MG/ML
2 INJECTION, SOLUTION EPIDURAL; INTRACAUDAL ONCE
Status: COMPLETED | OUTPATIENT
Start: 2024-03-07 | End: 2024-03-07

## 2024-03-07 RX ORDER — TRIAMCINOLONE ACETONIDE 40 MG/ML
80 INJECTION, SUSPENSION INTRA-ARTICULAR; INTRAMUSCULAR ONCE
Status: COMPLETED | OUTPATIENT
Start: 2024-03-07 | End: 2024-03-07

## 2024-03-07 RX ADMIN — LIDOCAINE HYDROCHLORIDE 2 ML: 10 INJECTION, SOLUTION INFILTRATION; PERINEURAL at 14:46

## 2024-03-07 RX ADMIN — TRIAMCINOLONE ACETONIDE 80 MG: 40 INJECTION, SUSPENSION INTRA-ARTICULAR; INTRAMUSCULAR at 14:47

## 2024-03-07 RX ADMIN — BUPIVACAINE HYDROCHLORIDE 10 MG: 5 INJECTION, SOLUTION EPIDURAL; INTRACAUDAL; PERINEURAL at 14:46

## 2024-03-07 ASSESSMENT — PAIN SCALES - GENERAL: PAINLEVEL: MODERATE PAIN (5)

## 2024-03-07 NOTE — PROGRESS NOTES
"Nursing Notes:   Tiffany White LPN  3/7/2024  8:26 AM  Sign at exiting of workspace  Chief Complaint   Patient presents with    RECHECK     Right knee pain       Initial /86   Pulse 95   Temp 97.1  F (36.2  C) (Temporal)   Resp 18   Wt 86.7 kg (191 lb 3.2 oz)   LMP  (LMP Unknown)   SpO2 92%   Breastfeeding No   BMI 31.82 kg/m   Estimated body mass index is 31.82 kg/m  as calculated from the following:    Height as of 1/9/24: 1.651 m (5' 5\").    Weight as of this encounter: 86.7 kg (191 lb 3.2 oz).  Medication Review: complete    The next two questions are to help us understand your food security.  If you are feeling you need any assistance in this area, we have resources available to support you today.          1/9/2024   SDOH- Food Insecurity   Within the past 12 months, did you worry that your food would run out before you got money to buy more? N   Within the past 12 months, did the food you bought just not last and you didn t have money to get more? N         Health Care Directive:  Patient does not have a Health Care Directive or Living Will: Discussed advance care planning with patient; however, patient declined at this time.    Tiffany White LPN      SUBJECTIVE:  Kate Chacon  is a 78 year old female who comes in today for consideration of a shot in her right knee.  We saw her for her Medicare wellness in January.  X-rays show degenerative change more severe in the lateral patellofemoral joint.  We suggested that she try scheduled icing for a week or so and if her knee continue to bother her that we would consider doing an intra-articular injection.  Is still bothers her significantly and she like to try a shot.    We referred her back to urology for consideration of Botox that she had good results in the past.  Her appointment is not until May.    She has what she thinks is a wart on the bottom of her left foot.    Past Medical, Family, and Social History reviewed and updated as " noted below.   ROS is negative except as noted above       Allergies   Allergen Reactions    Methylpar-Na Bisulfite-Pentazocine Unknown     jittery   ,   Family History   Problem Relation Age of Onset    Diabetes Father         Diabetes    Hypertension Father         Hypertension    Other - See Comments Mother         h/o coronary artery disease/dementia    Asthma Mother         Asthma    Diabetes Maternal Grandmother         Diabetes    Cancer Maternal Aunt         Cancer,Uterine    Breast Cancer No family hx of         Cancer-breast   ,   Current Outpatient Medications   Medication    atorvastatin (LIPITOR) 40 MG tablet    calcium carbonate 750 MG CHEW    calcium carbonate-vitamin D 600-200 MG-UNIT TABS    lisinopril (ZESTRIL) 20 MG tablet    metoprolol succinate ER (TOPROL XL) 50 MG 24 hr tablet    sertraline (ZOLOFT) 50 MG tablet    vitamin B complex with vitamin C (STRESS TAB) tablet    vitamin D3 (CHOLECALCIFEROL) 50 mcg (2000 units) tablet    warfarin ANTICOAGULANT (COUMADIN) 5 MG tablet     Current Facility-Administered Medications   Medication    lidocaine (PF) (XYLOCAINE) 1 % injection 2 mL    OMNICELL bupivacaine (MARCAINE) preservative free injection 0.5%    triamcinolone (KENALOG-40) injection 80 mg   ,   Past Medical History:   Diagnosis Date    Encounter for full-term uncomplicated delivery     x3    Ganglion of wrist     right    Gastritis without bleeding     treated and 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   ,   Patient Active Problem List    Diagnosis Date Noted    Hyperparathyroidism, primary (H24) 01/09/2024     Priority: Medium    Hemiparesis due to old stroke - right side is weaker 10/19/2020     Priority: Medium    Compression fracture of T10 vertebra with routine healing, subsequent encounter 10/19/2020     Priority: Medium    History of CVA (cerebrovascular accident) 10/19/2020     Priority: Medium    Physical deconditioning 10/19/2020     Priority: Medium    Lumbar  radiculopathy 10/19/2020     Priority: Medium    Chondrodermatitis nodularis chronica helicis, right 10/09/2020     Priority: Medium    SA node dysfunction s/p pacer on 11/2/2017 at [facility] 08/13/2020     Priority: Medium    H/O sinus pause 08/13/2020     Priority: Medium    History of tobacco abuse quitting on 10/14/16 08/13/2020     Priority: Medium    Dyspnea on exertion 08/13/2020     Priority: Medium    On Coumadin for atrial fibrillation (H) 08/13/2020     Priority: Medium    Mixed hyperlipidemia 08/13/2020     Priority: Medium    CKD (chronic kidney disease) stage 3, GFR 30-59 ml/min (H) 08/13/2020     Priority: Medium    Chronic midline low back pain without sciatica 08/13/2020     Priority: Medium    Sinoatrial node dysfunction (H) 05/01/2018     Priority: Medium    Cardiac pacemaker 02/19/2018     Priority: Medium    Urge incontinence 10/05/2017     Priority: Medium    Former smoker 08/30/2017     Priority: Medium    MDD (recurrent major depressive disorder) in remission (H24) 08/30/2017     Priority: Medium    Anxiety 01/05/2017     Priority: Medium    Essential hypertension 12/08/2016     Priority: Medium    Paroxysmal atrial fibrillation (H) 11/02/2016     Priority: Medium    Anticoagulation goal of INR 2 to 3 10/28/2016     Priority: Medium    History of ischemic stroke on 10/15/2016 secondary to embolism 10/15/2016     Priority: Medium    Diverticulosis of large intestine 04/04/2011     Priority: Medium    H/O adenomatous polyp of colon 03/02/2011     Priority: Medium    Osteoporosis 02/08/2006     Priority: Medium   ,   Past Surgical History:   Procedure Laterality Date    APPENDECTOMY OPEN      No Comments Provided    COLONOSCOPY  08/19/2005 8/19/05,Cecal biopsy with tubular adenoma low grade dysplasia.    COLONOSCOPY  04/04/2011 4/4/11    COLONOSCOPY  05/07/2012    Tubular Adenomas F/U 2017    COLONOSCOPY  12/02/2019    F/U N/A as will be over 78 in 2024. Tubular adenoma    ESOPHAGOSCOPY,  GASTROSCOPY, DUODENOSCOPY (EGD), COMBINED      05    OTHER SURGICAL HISTORY      8/3/05,[id number],OTHER,helices on the right ear    TONSILLECTOMY      No Comments Provided    and   Social History     Tobacco Use    Smoking status: Former     Packs/day: 0.50     Years: 49.00     Additional pack years: 0.00     Total pack years: 24.50     Types: Cigarettes     Quit date: 10/14/2016     Years since quittin.4     Passive exposure: Past    Smokeless tobacco: Never   Substance Use Topics    Alcohol use: No     OBJECTIVE:  /86   Pulse 95   Temp 97.1  F (36.2  C) (Temporal)   Resp 18   Wt 86.7 kg (191 lb 3.2 oz)   LMP  (LMP Unknown)   SpO2 92%   Breastfeeding No   BMI 31.82 kg/m     EXAM:  Alert and cooperative, no distress.  She has degenerative change seen on exam of the right knee.  Range of motion is preserved.  On the bottom of her left foot is a callus over the metatarsal head consistent with heloma durum  ASSESSMENT/Plan :    Kate was seen today for recheck.    Diagnoses and all orders for this visit:    Chronic pain of right knee  -     triamcinolone (KENALOG-40) injection 80 mg  -     lidocaine (PF) (XYLOCAINE) 1 % injection 2 mL  -     OMNICELL bupivacaine (MARCAINE) preservative free injection 0.5%  -     Large Joint/Bursa injection and/or drainage (Shoulder, Knee)    Heloma durum  -     Miscellaneous Order for DME - ONLY FOR DME      For her foot, I think she would benefit from metatarsal pads and referred [facility] to be fitted for same.  We gave her the printed order to take.    Discussed injecting her knee and she would like to go ahead.    RIGHT KNEE INJECTION:  Risks, benefits,alternatives discussed, consent obtained and pt wishes to proceed. Chloraprep used for sterile prep x2 prior to injection. Timeout is performed. Ethyl Chloride used as skin refrigerant for topical anesthesia. A lateral approach is used.  The knee joint is injected with a mixture of 2ml each of 1%  lidocaine without epinephrine, 0.5% Marcaine, and  2ml Kenalog 40mg/ml.  Patient noted improvement in symptoms.  Ice 20 min tid and prn.  RTC if not improved over next 7 days.       Asher Campos MD

## 2024-03-07 NOTE — PROGRESS NOTES
Subjective   Kate is a 78 year old, presenting for the following health issues:  RECHECK (Right knee pain)      3/7/2024     8:22 AM   Additional Questions   Roomed by Tiffany White LPN     HPI                   Objective    /86   Pulse 95   Temp 97.1  F (36.2  C) (Temporal)   Resp 18   Wt 86.7 kg (191 lb 3.2 oz)   LMP  (LMP Unknown)   SpO2 92%   Breastfeeding No   BMI 31.82 kg/m    Body mass index is 31.82 kg/m .  Physical Exam               Signed Electronically by: Asher Campos MD

## 2024-03-07 NOTE — NURSING NOTE
"Chief Complaint   Patient presents with    RECHECK     Right knee pain       Initial /86   Pulse 95   Temp 97.1  F (36.2  C) (Temporal)   Resp 18   Wt 86.7 kg (191 lb 3.2 oz)   LMP  (LMP Unknown)   SpO2 92%   Breastfeeding No   BMI 31.82 kg/m   Estimated body mass index is 31.82 kg/m  as calculated from the following:    Height as of 1/9/24: 1.651 m (5' 5\").    Weight as of this encounter: 86.7 kg (191 lb 3.2 oz).  Medication Review: complete    The next two questions are to help us understand your food security.  If you are feeling you need any assistance in this area, we have resources available to support you today.          1/9/2024   SDOH- Food Insecurity   Within the past 12 months, did you worry that your food would run out before you got money to buy more? N   Within the past 12 months, did the food you bought just not last and you didn t have money to get more? N         Health Care Directive:  Patient does not have a Health Care Directive or Living Will: Discussed advance care planning with patient; however, patient declined at this time.    Tiffany White LPN      "

## 2024-04-10 ENCOUNTER — ANTICOAGULATION THERAPY VISIT (OUTPATIENT)
Dept: ANTICOAGULATION | Facility: OTHER | Age: 79
End: 2024-04-10
Attending: FAMILY MEDICINE
Payer: MEDICARE

## 2024-04-10 DIAGNOSIS — Z79.01 ANTICOAGULATION GOAL OF INR 2 TO 3: ICD-10-CM

## 2024-04-10 DIAGNOSIS — I48.0 PAROXYSMAL ATRIAL FIBRILLATION (H): Primary | ICD-10-CM

## 2024-04-10 DIAGNOSIS — Z51.81 ANTICOAGULATION GOAL OF INR 2 TO 3: ICD-10-CM

## 2024-04-10 LAB — INR POINT OF CARE: 2.9 (ref 0.9–1.1)

## 2024-04-10 PROCEDURE — 36416 COLLJ CAPILLARY BLOOD SPEC: CPT | Mod: ZL

## 2024-04-10 NOTE — PROGRESS NOTES
ANTICOAGULATION MANAGEMENT     Kate Chacon 78 year old female is on warfarin with therapeutic INR result. (Goal INR 2.0-3.0)    Recent labs: (last 7 days)     04/10/24  0941   INR 2.9*       ASSESSMENT     Source(s): In person     Warfarin doses taken: Warfarin taken as instructed  Diet: No new diet changes identified  New illness, injury, or hospitalization: No  Medication/supplement changes: None noted  Signs or symptoms of bleeding or clotting: No  Previous INR: Therapeutic last 2(+) visits  Additional findings: None     PLAN     Recommended plan for no diet, medication or health factor changes affecting INR     Dosing Instructions: Continue your current warfarin dose with next INR in 6 weeks       Summary  As of 4/10/2024      Full warfarin instructions:  5 mg every day   Next INR check:  5/22/2024               In person    Lab visit scheduled    Education provided: Please call back if any changes to your diet, medications or how you've been taking warfarin    Plan made per [facility] anticoagulation protocol    Naida Lea RN  Anticoagulation Clinic  4/10/2024    _______________________________________________________________________     Anticoagulation Episode Summary       Current INR goal:  2.0-3.0   TTR:  76.0% (1 y)   Target end date:  Indefinite   Send INR reminders to:  [facility]    Indications    Paroxysmal atrial fibrillation (H) [I48.0]  Anticoagulation goal of INR 2 to 3 [Z51.81  Z79.01]             Comments:               Anticoagulation Care Providers       Provider Role Specialty Phone number    Asher Campos MD Referring Family Medicine [phone]    No Ref-Primary, Physician Referring

## 2024-05-09 ENCOUNTER — TELEPHONE (OUTPATIENT)
Dept: FAMILY MEDICINE | Facility: OTHER | Age: 79
End: 2024-05-09
Payer: COMMERCIAL

## 2024-05-09 ENCOUNTER — ANCILLARY PROCEDURE (OUTPATIENT)
Dept: CARDIOLOGY | Facility: CLINIC | Age: 79
End: 2024-05-09
Attending: INTERNAL MEDICINE
Payer: MEDICARE

## 2024-05-09 DIAGNOSIS — I48.20 CHRONIC A-FIB (H): ICD-10-CM

## 2024-05-09 PROCEDURE — 93294 REM INTERROG EVL PM/LDLS PM: CPT | Performed by: INTERNAL MEDICINE

## 2024-05-09 PROCEDURE — 93296 REM INTERROG EVL PM/IDS: CPT

## 2024-05-09 NOTE — TELEPHONE ENCOUNTER
Still shows osteoporosis, but has had increase in bone mass since the last scan.    Asher Campos MD on 5/9/2024 at 2:35 PM

## 2024-05-09 NOTE — TELEPHONE ENCOUNTER
The patient had a dexa scan done. She has not heard the results and is wondering if she can get them from you ?  Tiffany White LPN..................5/9/2024   10:35 AM

## 2024-05-09 NOTE — TELEPHONE ENCOUNTER
After the patient's name and date of birth was verified, the patient was told the below information.  Tiffany White LPN..................5/9/2024   6:00 PM

## 2024-05-17 LAB
MDC_IDC_EPISODE_DTM: NORMAL
MDC_IDC_EPISODE_ID: NORMAL
MDC_IDC_EPISODE_TYPE: NORMAL
MDC_IDC_LEAD_CONNECTION_STATUS: NORMAL
MDC_IDC_LEAD_CONNECTION_STATUS: NORMAL
MDC_IDC_LEAD_IMPLANT_DT: NORMAL
MDC_IDC_LEAD_IMPLANT_DT: NORMAL
MDC_IDC_LEAD_LOCATION: NORMAL
MDC_IDC_LEAD_LOCATION: NORMAL
MDC_IDC_LEAD_LOCATION_DETAIL_1: NORMAL
MDC_IDC_LEAD_LOCATION_DETAIL_1: NORMAL
MDC_IDC_LEAD_MFG: NORMAL
MDC_IDC_LEAD_MFG: NORMAL
MDC_IDC_LEAD_MODEL: NORMAL
MDC_IDC_LEAD_MODEL: NORMAL
MDC_IDC_LEAD_POLARITY_TYPE: NORMAL
MDC_IDC_LEAD_POLARITY_TYPE: NORMAL
MDC_IDC_LEAD_SERIAL: NORMAL
MDC_IDC_LEAD_SERIAL: NORMAL
MDC_IDC_MSMT_BATTERY_DTM: NORMAL
MDC_IDC_MSMT_BATTERY_REMAINING_LONGEVITY: 30 MO
MDC_IDC_MSMT_BATTERY_REMAINING_PERCENTAGE: 45 %
MDC_IDC_MSMT_BATTERY_STATUS: NORMAL
MDC_IDC_MSMT_LEADCHNL_RV_IMPEDANCE_VALUE: 712 OHM
MDC_IDC_MSMT_LEADCHNL_RV_PACING_THRESHOLD_AMPLITUDE: 0.6 V
MDC_IDC_MSMT_LEADCHNL_RV_PACING_THRESHOLD_PULSEWIDTH: 0.4 MS
MDC_IDC_PG_IMPLANT_DTM: NORMAL
MDC_IDC_PG_MFG: NORMAL
MDC_IDC_PG_MODEL: NORMAL
MDC_IDC_PG_SERIAL: NORMAL
MDC_IDC_PG_TYPE: NORMAL
MDC_IDC_SESS_CLINIC_NAME: NORMAL
MDC_IDC_SESS_DTM: NORMAL
MDC_IDC_SESS_TYPE: NORMAL
MDC_IDC_SET_BRADY_AT_MODE_SWITCH_RATE: 170 {BEATS}/MIN
MDC_IDC_SET_BRADY_LOWRATE: 60 {BEATS}/MIN
MDC_IDC_SET_BRADY_MAX_SENSOR_RATE: 130 {BEATS}/MIN
MDC_IDC_SET_BRADY_MODE: NORMAL
MDC_IDC_SET_LEADCHNL_RA_SENSING_ADAPTATION_MODE: NORMAL
MDC_IDC_SET_LEADCHNL_RA_SENSING_SENSITIVITY: 0.25 MV
MDC_IDC_SET_LEADCHNL_RV_PACING_AMPLITUDE: 1 V
MDC_IDC_SET_LEADCHNL_RV_PACING_CAPTURE_MODE: NORMAL
MDC_IDC_SET_LEADCHNL_RV_PACING_POLARITY: NORMAL
MDC_IDC_SET_LEADCHNL_RV_PACING_PULSEWIDTH: 0.4 MS
MDC_IDC_SET_LEADCHNL_RV_SENSING_ADAPTATION_MODE: NORMAL
MDC_IDC_SET_LEADCHNL_RV_SENSING_POLARITY: NORMAL
MDC_IDC_SET_LEADCHNL_RV_SENSING_SENSITIVITY: 1.5 MV
MDC_IDC_SET_ZONE_DETECTION_INTERVAL: 375 MS
MDC_IDC_SET_ZONE_STATUS: NORMAL
MDC_IDC_SET_ZONE_TYPE: NORMAL
MDC_IDC_SET_ZONE_VENDOR_TYPE: NORMAL
MDC_IDC_STAT_BRADY_DTM_END: NORMAL
MDC_IDC_STAT_BRADY_DTM_START: NORMAL
MDC_IDC_STAT_BRADY_RA_PERCENT_PACED: 0 %
MDC_IDC_STAT_BRADY_RV_PERCENT_PACED: 27 %
MDC_IDC_STAT_EPISODE_RECENT_COUNT: 0
MDC_IDC_STAT_EPISODE_RECENT_COUNT: 0
MDC_IDC_STAT_EPISODE_RECENT_COUNT_DTM_END: NORMAL
MDC_IDC_STAT_EPISODE_RECENT_COUNT_DTM_END: NORMAL
MDC_IDC_STAT_EPISODE_RECENT_COUNT_DTM_START: NORMAL
MDC_IDC_STAT_EPISODE_RECENT_COUNT_DTM_START: NORMAL
MDC_IDC_STAT_EPISODE_TYPE: NORMAL
MDC_IDC_STAT_EPISODE_TYPE: NORMAL
MDC_IDC_STAT_EPISODE_VENDOR_TYPE: NORMAL
MDC_IDC_STAT_EPISODE_VENDOR_TYPE: NORMAL

## 2024-05-22 ENCOUNTER — ANTICOAGULATION THERAPY VISIT (OUTPATIENT)
Dept: ANTICOAGULATION | Facility: OTHER | Age: 79
End: 2024-05-22
Payer: MEDICARE

## 2024-05-22 DIAGNOSIS — Z51.81 ANTICOAGULATION GOAL OF INR 2 TO 3: ICD-10-CM

## 2024-05-22 DIAGNOSIS — Z79.01 ANTICOAGULATION GOAL OF INR 2 TO 3: ICD-10-CM

## 2024-05-22 DIAGNOSIS — I48.0 PAROXYSMAL ATRIAL FIBRILLATION (H): Primary | ICD-10-CM

## 2024-05-22 LAB — INR POINT OF CARE: 2.7 (ref 0.9–1.1)

## 2024-05-22 PROCEDURE — 36416 COLLJ CAPILLARY BLOOD SPEC: CPT | Mod: ZL

## 2024-05-22 NOTE — PROGRESS NOTES
ANTICOAGULATION MANAGEMENT     Kate Chacon 78 year old female is on warfarin with therapeutic INR result. (Goal INR 2.0-3.0)    Recent labs: (last 7 days)     05/22/24  1107   INR 2.7*       ASSESSMENT     Source(s): In person     Warfarin doses taken: Warfarin taken as instructed  Diet: No new diet changes identified  New illness, injury, or hospitalization: No  Medication/supplement changes: None noted  Signs or symptoms of bleeding or clotting: No  Previous INR: Therapeutic last 2(+) visits  Additional findings: None     PLAN     Recommended plan for no diet, medication or health factor changes affecting INR     Dosing Instructions: Continue your current warfarin dose with next INR in 6 weeks       Summary  As of 5/22/2024      Full warfarin instructions:  5 mg every day   Next INR check:  7/3/2024               In person    Patient elected to schedule next visit on her own    Education provided: Please call back if any changes to your diet, medications or how you've been taking warfarin    Plan made per [facility] anticoagulation protocol    Yasmeen Lange RN  Anticoagulation Clinic  5/22/2024    _______________________________________________________________________     Anticoagulation Episode Summary       Current INR goal:  2.0-3.0   TTR:  84.6% (1 y)   Target end date:  Indefinite   Send INR reminders to:  [facility]    Indications    Paroxysmal atrial fibrillation (H) [I48.0]  Anticoagulation goal of INR 2 to 3 [Z51.81  Z79.01]             Comments:               Anticoagulation Care Providers       Provider Role Specialty Phone number    Asher Campos MD Referring Family Medicine [phone]    No Ref-Primary, Physician Referring

## 2024-05-30 ENCOUNTER — DOCUMENTATION ONLY (OUTPATIENT)
Dept: LAB | Facility: OTHER | Age: 79
End: 2024-05-30
Payer: COMMERCIAL

## 2024-05-30 DIAGNOSIS — N39.41 URGE INCONTINENCE: Primary | ICD-10-CM

## 2024-06-25 ENCOUNTER — OFFICE VISIT (OUTPATIENT)
Dept: OBGYN | Facility: OTHER | Age: 79
End: 2024-06-25
Attending: NURSE PRACTITIONER
Payer: COMMERCIAL

## 2024-06-25 VITALS
WEIGHT: 185.5 LBS | BODY MASS INDEX: 30.87 KG/M2 | SYSTOLIC BLOOD PRESSURE: 112 MMHG | HEART RATE: 63 BPM | DIASTOLIC BLOOD PRESSURE: 78 MMHG

## 2024-06-25 DIAGNOSIS — I69.359 HEMIPARESIS DUE TO OLD STROKE (H): ICD-10-CM

## 2024-06-25 DIAGNOSIS — N39.46 MIXED INCONTINENCE URGE AND STRESS: Primary | ICD-10-CM

## 2024-06-25 DIAGNOSIS — I48.0 PAROXYSMAL ATRIAL FIBRILLATION (H): ICD-10-CM

## 2024-06-25 DIAGNOSIS — N30.00 ACUTE CYSTITIS WITHOUT HEMATURIA: ICD-10-CM

## 2024-06-25 DIAGNOSIS — N89.8 VAGINAL IRRITATION: ICD-10-CM

## 2024-06-25 DIAGNOSIS — N89.8 VAGINAL DISCHARGE: ICD-10-CM

## 2024-06-25 DIAGNOSIS — N18.31 STAGE 3A CHRONIC KIDNEY DISEASE (H): ICD-10-CM

## 2024-06-25 LAB
ALBUMIN UR-MCNC: NEGATIVE MG/DL
APPEARANCE UR: ABNORMAL
BACTERIA #/AREA URNS HPF: ABNORMAL /HPF
BACTERIAL VAGINOSIS VAG-IMP: NEGATIVE
BILIRUB UR QL STRIP: NEGATIVE
CANDIDA DNA VAG QL NAA+PROBE: NOT DETECTED
CANDIDA GLABRATA / CANDIDA KRUSEI DNA: NOT DETECTED
COLOR UR AUTO: YELLOW
GLUCOSE UR STRIP-MCNC: NEGATIVE MG/DL
HGB UR QL STRIP: NEGATIVE
HYALINE CASTS: 3 /LPF
KETONES UR STRIP-MCNC: NEGATIVE MG/DL
LEUKOCYTE ESTERASE UR QL STRIP: ABNORMAL
MUCOUS THREADS #/AREA URNS LPF: PRESENT /LPF
NITRATE UR QL: POSITIVE
PH UR STRIP: 6.5 [PH] (ref 5–9)
RBC URINE: 1 /HPF
SP GR UR STRIP: 1.01 (ref 1–1.03)
SQUAMOUS EPITHELIAL: 9 /HPF
T VAGINALIS DNA VAG QL NAA+PROBE: NOT DETECTED
UROBILINOGEN UR STRIP-MCNC: NORMAL MG/DL
WBC URINE: 25 /HPF

## 2024-06-25 PROCEDURE — 87086 URINE CULTURE/COLONY COUNT: CPT | Mod: ZL | Performed by: NURSE PRACTITIONER

## 2024-06-25 PROCEDURE — 99214 OFFICE O/P EST MOD 30 MIN: CPT | Performed by: NURSE PRACTITIONER

## 2024-06-25 PROCEDURE — 81001 URINALYSIS AUTO W/SCOPE: CPT | Mod: ZL | Performed by: NURSE PRACTITIONER

## 2024-06-25 PROCEDURE — G0463 HOSPITAL OUTPT CLINIC VISIT: HCPCS | Mod: 25

## 2024-06-25 PROCEDURE — 0352U MULTIPLEX VAGINAL PANEL BY PCR: CPT | Mod: ZL | Performed by: NURSE PRACTITIONER

## 2024-06-25 PROCEDURE — 51798 US URINE CAPACITY MEASURE: CPT | Performed by: NURSE PRACTITIONER

## 2024-06-25 PROCEDURE — 87186 SC STD MICRODIL/AGAR DIL: CPT | Mod: ZL | Performed by: NURSE PRACTITIONER

## 2024-06-25 RX ORDER — CLOBETASOL PROPIONATE 0.5 MG/G
OINTMENT TOPICAL 2 TIMES DAILY
Qty: 45 G | Refills: 0 | Status: SHIPPED | OUTPATIENT
Start: 2024-06-25 | End: 2024-07-09

## 2024-06-25 RX ORDER — CEPHALEXIN 500 MG/1
500 CAPSULE ORAL 2 TIMES DAILY
Qty: 14 CAPSULE | Refills: 0 | Status: SHIPPED | OUTPATIENT
Start: 2024-06-25 | End: 2024-07-02

## 2024-06-25 ASSESSMENT — PATIENT HEALTH QUESTIONNAIRE - PHQ9
SUM OF ALL RESPONSES TO PHQ QUESTIONS 1-9: 3
10. IF YOU CHECKED OFF ANY PROBLEMS, HOW DIFFICULT HAVE THESE PROBLEMS MADE IT FOR YOU TO DO YOUR WORK, TAKE CARE OF THINGS AT HOME, OR GET ALONG WITH OTHER PEOPLE: NOT DIFFICULT AT ALL
SUM OF ALL RESPONSES TO PHQ QUESTIONS 1-9: 3

## 2024-06-25 ASSESSMENT — PAIN SCALES - GENERAL: PAINLEVEL: NO PAIN (0)

## 2024-06-25 NOTE — Clinical Note
Hi Dr. Candelario,  I saw this patient for her mixed urinary incontinence.  She has had Botox therapy in the past with Dr. Fox and is interested in resuming this.  Would you like me to place a referral for her to meet you to discuss Botox therapy or would you like to just schedule her for Botox without a consult prior?  Thank you!  Erendira Marrero NP

## 2024-06-25 NOTE — NURSING NOTE
"Chief Complaint   Patient presents with    Incontinence   PATIENT IS HERE FOR INCONTINENCE FOLLOW UP. PATIENT HAS HAD BOTOX IN THE PAST. PATIENT IS HAVING TROUBLE WITH COUGHING, WHEN SLEEPING UP EVERY HOUR AT NIGHT. PATIENT DOES FEEL ThAT SHE DOES EMPTY HER BLADDER.      Initial /78   Pulse 63   Wt 84.1 kg (185 lb 8 oz)   LMP  (LMP Unknown)   BMI 30.87 kg/m   Estimated body mass index is 30.87 kg/m  as calculated from the following:    Height as of 1/9/24: 1.651 m (5' 5\").    Weight as of this encounter: 84.1 kg (185 lb 8 oz).  Medication Reconciliation: complete      Post void residual 17 ml    Deepika Langley LPN    "

## 2024-06-25 NOTE — PATIENT INSTRUCTIONS
Drink plenty of fluids, try to increase water intake to four 8oz glasses of water.  Avoid bladder irritants like sugary beverages, caffeine and chocolate.  Spicy foods can also be a bladder irritant.  I recommend avoiding beverages such as coffee, tea, pop and juices.  Try to drink mostly water to prevent further irritation to your bladder.  Irritation to your bladder will cause more urinary incontinence.    Maintain good bowel regimen with MiraLAX or Metamucil.  Miralax 1 capful in tall glass of water 2 to 3 times/week.  Avoid or slow down if diarrhea happens  Importance of probiotics:  Yogurts that contain good bacteria.  Kefir 1% milkfat (plain)  I do think he would benefit from pelvic floor therapy.  This is something that I would place an order for and they would call you to schedule.  Strengthening your pelvic floor muscles will help with urinary incontinence.  I will talk to Dr. Candelario about your desire to trial Botox again as this has worked in the past.  Scheduling team will contact you to schedule this.  You do have some external vaginal irritation on your labia.  I will send in a ointments for you to use twice daily for the next 2 weeks to help with inflammation and itching.  Apply this to the outside of your vaginal area, do not use this internally.  This medication is called clobetasol.

## 2024-06-25 NOTE — PROGRESS NOTES
Incontinence Consult    Chief Complaint: Incontinence  .    HPI: Ms. Kate Chcaon is a 78 year old year old female with a history of stroke 2016, chronic back pain, hyperlipidemia, cardiac pacemaker, atrial fibrillation, osteoporosis, chronic kidney disease who presents today June 25, 2024 for further evaluation of urge incontinence.  Patient has followed with urology in the past with Dr. Fox at [facility].  She has had several appointments scheduled with Dr. Candelario in urology however did not make it to those appointments.    Patient lives at home with her  and is independent there.  She does use a walker.    Patient has history of chronic urge incontinence and has had success with Botox in the past.  From chart review she had Botox in 2018, 2019, 2020 and 2022 with Dr. Fox.  She denies any previous pelvic therapy or medications to help with incontinence.    Patient states her incontinence has worsened again over the past year.  She states she does not constantly leak but if she is standing at the kitchen for a while or with certain movements she will leak urine.  She does leak urine with coughing and sneezing.  She states she has to get up to pee every hour.  Throughout the night she wakes up 4-5 times a night to urinate.  She does wear an incontinence pad throughout the day and changes 3-4 times a day.    Patient has had 3 vaginal deliveries, largest baby was 8 pounds 2 ounces.  She denies any complications with delivery.    Patient reports regular bowel movements every day or every other day.  Sometimes they are hard to pass.  She does not take any medications to help with constipation.    Patient drinks one 12 ounce Pepsi a day, 1 cup of coffee in the morning and milk with dinner.  She does have sips of water with her pills otherwise does not drink much water.      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    COLONOSCOPY  04/04/2011 4/4/11    COLONOSCOPY  05/07/2012    Tubular Adenomas F/U 2017    COLONOSCOPY  12/02/2019    F/U N/A as will be over 78 in 2024. Tubular adenoma    ESOPHAGOSCOPY, GASTROSCOPY, DUODENOSCOPY (EGD), COMBINED      8/19/05    OTHER SURGICAL HISTORY      8/3/05,[id number],OTHER,helices on the right ear    TONSILLECTOMY      No Comments Provided       FAMILY HISTORY: Denies a family history of breast cancer or endometrial cancer.    SOCIAL HISTORY:    reports that she quit smoking about 7 years ago. Her smoking use included cigarettes. She started smoking about 56 years ago. She has a 24.5 pack-year smoking history. She has been exposed to tobacco smoke. She has never used smokeless tobacco.    Current Outpatient Medications   Medication Sig Dispense Refill    atorvastatin (LIPITOR) 40 MG tablet Take 1 tablet (40 mg) by mouth at bedtime 90 tablet 3    calcium carbonate 750 MG CHEW Take 1 tablet by mouth every 8 hours as needed for heartburn      calcium carbonate-vitamin D 600-200 MG-UNIT TABS Take 1 tablet by mouth 2 times daily      cephALEXin (KEFLEX) 500 MG capsule Take 1 capsule (500 mg) by mouth 2 times daily for 7 days 14 capsule 0    clobetasol (TEMOVATE) 0.05 % external ointment Apply topically 2 times daily for 14 days External vaginal 45 g 0    lisinopril (ZESTRIL) 20 MG tablet Take 1 tablet by mouth once daily 90 tablet 11    metoprolol succinate ER (TOPROL XL) 50 MG 24 hr tablet Take 1 tablet (50 mg) by mouth daily 90 tablet 11    sertraline (ZOLOFT) 50 MG tablet Take 1 tablet (50 mg) by mouth at bedtime 90 tablet 1    vitamin B complex with vitamin C (STRESS TAB) tablet Take 1 tablet by mouth  daily      vitamin D3 (CHOLECALCIFEROL) 50 mcg (2000 units) tablet Take 1 tablet by mouth daily      warfarin ANTICOAGULANT (COUMADIN) 5 MG tablet TAKE 1 TABLET BY MOUTH ONCE DAILY OR  AS  DIRECTED  BY  [facility]  CLINIC 100 tablet 4       ALLERGIES: Methylpar-na bisulfite-pentazocine     GENERAL PHYSICAL EXAM:   Vitals: /78   Pulse 63   Wt 84.1 kg (185 lb 8 oz)   LMP  (LMP Unknown)   BMI 30.87 kg/m    Body mass index is 30.87 kg/m .    GENERAL: Well groomed, well developed, well nourished female in NAD.  CV:  Warm extremities   RESPIRATORY: Normal respiratory effort.    GI:  Soft, NT, ND, no flank pain   MS: Moving without difficulty  NEURO: Alert and oriented x 3.  PSYCH: Normal mood and affect, pleasant and agreeable during interview and exam.    Pelvic: Skin warm dry and intact.   Bilateral labia erythema, redness, excoriation and edema.  No visible concerning external vaginal or anal lesions.Cervix visible and non friable. white vaginal discharge . No Vaginal odor. No visible foreign body.   Slight urethra mobility, minimal prolapse, decreased pelvic strength.       PVR: Residual urine by ultrasound was 17 ml.        LABS: The last test results for Ms. Kate Chacon were reviewed.       BMP -   Recent Labs   Lab Test 01/09/24  1402 08/19/22  1148 03/22/22  1109 02/01/22  1034 06/21/20 2002 12/11/19  0424 12/10/19  0440 04/23/18  1453 11/02/17  0519    141 140 140   < > 143 142   < > 141   POTASSIUM 4.2 4.0 4.9 3.9   < > 3.7 3.5   < > 4.0   CHLORIDE 106 107 108* 104   < > 108* 110*   < > 107   CO2 25 27 26 30   < > 30 28   < > 27   BUN 13.0 17 20 13   < > 10 12   < > 13   CR 0.91 0.95 1.03 1.03   < > 0.99 0.91   < > 0.92   GLC 91 102 93 119*   < > 116* 118*   < > 101   MASHA 9.7 9.1 10.5* 10.7*   < > 9.2 8.5*   < > 10.3   MAG  --   --   --   --   --  1.9 1.7*  --  1.9   PHOS  --   --   --  3.2  --   --   --   --   --     < > = values in this interval not displayed.       CBC -   Recent Labs   Lab  Test 01/09/24  1402 08/19/22  1148 03/22/22  1109   WBC 7.9 8.4 6.1   HGB 15.7 15.3 16.0*    198 209       ASSESSMENT/PLAN:     1.  Mixed incontinence urge and stress  - UA with Microscopic reflex to Culture  - MEASURE POST-VOID RESIDUAL URINE/BLADDER CAPACITY, US NON-IMAGING  - Urine Culture    Patient has a longstanding history of mixed incontinence, urge and stress.  She has followed with urology in the past and has been successful with Botox.  Her last Botox injection from chart review was September 2022.  She states over the past year her incontinence has significantly worsened.  She is having external vaginal irritation which I suspect is secondary to her incontinence and sanitary pads.    Plan of care:   Drink plenty of fluids, try to increase water intake to four 8oz glasses of water.  Avoid bladder irritants like sugary beverages, caffeine and chocolate.  Spicy foods can also be a bladder irritant.  I recommend avoiding beverages such as coffee, tea, pop and juices.  Try to drink mostly water to prevent further irritation to your bladder.  Irritation to your bladder will cause more urinary incontinence.    Patient does struggle with some constipation.  Maintain good bowel regimen with MiraLAX or Metamucil.  Miralax 1 capful in tall glass of water 2 to 3 times/week.  Avoid or slow down if diarrhea happens  Importance of probiotics:  Yogurts that contain good bacteria.  Kefir 1% milkfat (plain)  I do think he would benefit from pelvic floor therapy.  Patient will let me know if she will consider pelvic floor rehab.  I recommend patient follow-up with Dr. Candelario to discuss resuming Botox therapy for mixed urinary incontinence.    2. Vaginal discharge  - Multiplex Vaginal Panel by PCR  Testing returned negative for Bacterial Vaginosis, yeast infection.    3. Vaginal irritation  - clobetasol (TEMOVATE) 0.05 % external ointment; Apply topically 2 times daily for 14 days External vaginal  Dispense: 45 g;  Refill: 0    Patient does have bilateral labia irritation and redness.  She has had persistent vaginal itching and irritation for several months.  Discussed with patient this is likely secondary to her incontinence and use of sanitary pads.  Advised patient to change sanitary pads frequently.  Due to inflammation recommend clobetasol external ointment to bilateral labia twice daily for the next 14 days.  She will let us know if this does not improve symptoms.    4. Acute cystitis without hematuria  - cephALEXin (KEFLEX) 500 MG capsule; Take 1 capsule (500 mg) by mouth 2 times daily for 7 days  Dispense: 14 capsule; Refill: 0  Urinalysis returned positive for nitrates and leukocyte esterase.  Recommend urine culture.  This is processing.  Will start patient on cephalexin twice daily for 7 days for treatment of acute cystitis.  She will be notified if antibiotic changes indicated.    5. Hemiparesis due to old stroke - right side is weaker  He can inconsideration throughout our visit.  Patient does have some mobility and functional impairment which does impact her ability to get to the bathroom with her urge incontinence.      6. Paroxysmal atrial fibrillation (H)  Stable.  On anticoagulation with warfarin.  Patient is on metoprolol and atorvastatin.    7. Stage 3a chronic kidney disease (H)  Stable.  Creatinine 0.87, GFR 70    45 minutes spent on the date of this encounter doing chart review, history and exam, documentation and further activities as noted above.        LALITHA BolanosP., R.N., APRN [facility]

## 2024-06-26 ENCOUNTER — ANTICOAGULATION THERAPY VISIT (OUTPATIENT)
Dept: ANTICOAGULATION | Facility: OTHER | Age: 79
End: 2024-06-26
Attending: FAMILY MEDICINE
Payer: COMMERCIAL

## 2024-06-26 ENCOUNTER — ANCILLARY PROCEDURE (OUTPATIENT)
Dept: CARDIOLOGY | Facility: CLINIC | Age: 79
End: 2024-06-26
Attending: INTERNAL MEDICINE
Payer: MEDICARE

## 2024-06-26 DIAGNOSIS — I48.0 PAROXYSMAL ATRIAL FIBRILLATION (H): Primary | ICD-10-CM

## 2024-06-26 DIAGNOSIS — Z51.81 ANTICOAGULATION GOAL OF INR 2 TO 3: ICD-10-CM

## 2024-06-26 DIAGNOSIS — I48.20 CHRONIC A-FIB (H): ICD-10-CM

## 2024-06-26 DIAGNOSIS — Z79.01 ANTICOAGULATION GOAL OF INR 2 TO 3: ICD-10-CM

## 2024-06-26 LAB — INR POINT OF CARE: 3.5 (ref 0.9–1.1)

## 2024-06-26 PROCEDURE — 93294 REM INTERROG EVL PM/LDLS PM: CPT | Performed by: INTERNAL MEDICINE

## 2024-06-26 PROCEDURE — 36416 COLLJ CAPILLARY BLOOD SPEC: CPT | Mod: ZL
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
## 2024-06-27 LAB
BACTERIA UR CULT: ABNORMAL
MDC_IDC_EPISODE_DTM: NORMAL
MDC_IDC_EPISODE_DURATION: 11 S
MDC_IDC_EPISODE_DURATION: 13 S
MDC_IDC_EPISODE_DURATION: 14 S
MDC_IDC_EPISODE_DURATION: 14 S
MDC_IDC_EPISODE_DURATION: 18 S
MDC_IDC_EPISODE_DURATION: 20 S
MDC_IDC_EPISODE_DURATION: 21 S
MDC_IDC_EPISODE_DURATION: 21 S
MDC_IDC_EPISODE_DURATION: 24 S
MDC_IDC_EPISODE_DURATION: 26 S
MDC_IDC_EPISODE_DURATION: 27 S
MDC_IDC_EPISODE_DURATION: 28 S
MDC_IDC_EPISODE_DURATION: 34 S
MDC_IDC_EPISODE_DURATION: 36 S
MDC_IDC_EPISODE_DURATION: 38 S
MDC_IDC_EPISODE_DURATION: 43 S
MDC_IDC_EPISODE_DURATION: 44 S
MDC_IDC_EPISODE_DURATION: 49 S
MDC_IDC_EPISODE_DURATION: 57 S
MDC_IDC_EPISODE_DURATION: 77 S
MDC_IDC_EPISODE_DURATION: 79 S
MDC_IDC_EPISODE_ID: NORMAL
MDC_IDC_EPISODE_TYPE: NORMAL
MDC_IDC_EPISODE_TYPE_INDUCED: NO
MDC_IDC_EPISODE_VENDOR_TYPE: NORMAL
MDC_IDC_LEAD_CONNECTION_STATUS: NORMAL
MDC_IDC_LEAD_CONNECTION_STATUS: NORMAL
MDC_IDC_LEAD_IMPLANT_DT: NORMAL
MDC_IDC_LEAD_IMPLANT_DT: NORMAL
MDC_IDC_LEAD_LOCATION: NORMAL
MDC_IDC_LEAD_LOCATION: NORMAL
MDC_IDC_LEAD_LOCATION_DETAIL_1: NORMAL
MDC_IDC_LEAD_LOCATION_DETAIL_1: NORMAL
MDC_IDC_LEAD_MFG: NORMAL
MDC_IDC_LEAD_MFG: NORMAL
MDC_IDC_LEAD_MODEL: NORMAL
MDC_IDC_LEAD_MODEL: NORMAL
MDC_IDC_LEAD_POLARITY_TYPE: NORMAL
MDC_IDC_LEAD_POLARITY_TYPE: NORMAL
MDC_IDC_LEAD_SERIAL: NORMAL
MDC_IDC_LEAD_SERIAL: NORMAL
MDC_IDC_MSMT_BATTERY_DTM: NORMAL
MDC_IDC_MSMT_BATTERY_REMAINING_LONGEVITY: 24 MO
MDC_IDC_MSMT_BATTERY_REMAINING_PERCENTAGE: 40 %
MDC_IDC_MSMT_BATTERY_STATUS: NORMAL
MDC_IDC_MSMT_LEADCHNL_RA_IMPEDANCE_VALUE: 690 OHM
MDC_IDC_MSMT_LEADCHNL_RV_IMPEDANCE_VALUE: 691 OHM
MDC_IDC_MSMT_LEADCHNL_RV_PACING_THRESHOLD_AMPLITUDE: 0.7 V
MDC_IDC_MSMT_LEADCHNL_RV_PACING_THRESHOLD_PULSEWIDTH: 0.4 MS
MDC_IDC_PG_IMPLANT_DTM: NORMAL
MDC_IDC_PG_MFG: NORMAL
MDC_IDC_PG_MODEL: NORMAL
MDC_IDC_PG_SERIAL: NORMAL
MDC_IDC_PG_TYPE: NORMAL
MDC_IDC_SESS_CLINIC_NAME: NORMAL
MDC_IDC_SESS_DTM: NORMAL
MDC_IDC_SESS_TYPE: NORMAL
MDC_IDC_SET_BRADY_AT_MODE_SWITCH_RATE: 170 {BEATS}/MIN
MDC_IDC_SET_BRADY_LOWRATE: 60 {BEATS}/MIN
MDC_IDC_SET_BRADY_MAX_SENSOR_RATE: 130 {BEATS}/MIN
MDC_IDC_SET_BRADY_MODE: NORMAL
MDC_IDC_SET_LEADCHNL_RA_SENSING_ADAPTATION_MODE: NORMAL
MDC_IDC_SET_LEADCHNL_RA_SENSING_SENSITIVITY: 0.25 MV
MDC_IDC_SET_LEADCHNL_RV_PACING_AMPLITUDE: 1.2 V
MDC_IDC_SET_LEADCHNL_RV_PACING_CAPTURE_MODE: NORMAL
MDC_IDC_SET_LEADCHNL_RV_PACING_POLARITY: NORMAL
MDC_IDC_SET_LEADCHNL_RV_PACING_PULSEWIDTH: 0.4 MS
MDC_IDC_SET_LEADCHNL_RV_SENSING_ADAPTATION_MODE: NORMAL
MDC_IDC_SET_LEADCHNL_RV_SENSING_POLARITY: NORMAL
MDC_IDC_SET_LEADCHNL_RV_SENSING_SENSITIVITY: 1.5 MV
MDC_IDC_SET_ZONE_DETECTION_INTERVAL: 375 MS
MDC_IDC_SET_ZONE_STATUS: NORMAL
MDC_IDC_SET_ZONE_TYPE: NORMAL
MDC_IDC_SET_ZONE_VENDOR_TYPE: NORMAL
MDC_IDC_STAT_BRADY_DTM_END: NORMAL
MDC_IDC_STAT_BRADY_DTM_START: NORMAL
MDC_IDC_STAT_BRADY_RA_PERCENT_PACED: 0 %
MDC_IDC_STAT_BRADY_RV_PERCENT_PACED: 27 %
MDC_IDC_STAT_EPISODE_RECENT_COUNT: 14
MDC_IDC_STAT_EPISODE_RECENT_COUNT: 49
MDC_IDC_STAT_EPISODE_RECENT_COUNT_DTM_END: NORMAL
MDC_IDC_STAT_EPISODE_RECENT_COUNT_DTM_END: NORMAL
MDC_IDC_STAT_EPISODE_RECENT_COUNT_DTM_START: NORMAL
MDC_IDC_STAT_EPISODE_RECENT_COUNT_DTM_START: NORMAL
MDC_IDC_STAT_EPISODE_TYPE: NORMAL
MDC_IDC_STAT_EPISODE_TYPE: NORMAL
MDC_IDC_STAT_EPISODE_VENDOR_TYPE: NORMAL
MDC_IDC_STAT_EPISODE_VENDOR_TYPE: NORMAL

## 2024-07-09 DIAGNOSIS — N32.81 OVERACTIVE BLADDER: Primary | ICD-10-CM

## 2024-07-10 ENCOUNTER — ANTICOAGULATION THERAPY VISIT (OUTPATIENT)
Dept: ANTICOAGULATION | Facility: OTHER | Age: 79
End: 2024-07-10
Attending: FAMILY MEDICINE
Payer: MEDICARE

## 2024-07-10 DIAGNOSIS — I48.0 PAROXYSMAL ATRIAL FIBRILLATION (H): Primary | ICD-10-CM

## 2024-07-10 DIAGNOSIS — Z51.81 ANTICOAGULATION GOAL OF INR 2 TO 3: ICD-10-CM

## 2024-07-10 DIAGNOSIS — Z79.01 ANTICOAGULATION GOAL OF INR 2 TO 3: ICD-10-CM

## 2024-07-10 LAB — INR POINT OF CARE: 2.5 (ref 0.9–1.1)

## 2024-07-10 PROCEDURE — 36416 COLLJ CAPILLARY BLOOD SPEC: CPT | Mod: ZL

## 2024-07-10 NOTE — PROGRESS NOTES
ANTICOAGULATION MANAGEMENT     Kate Chacon 79 year old female is on warfarin with therapeutic INR result. (Goal INR 2.0-3.0)    Recent labs: (last 7 days)     07/10/24  1250   INR 2.5*       ASSESSMENT     Source(s): In person     Warfarin doses taken: Warfarin taken as instructed  Diet: No new diet changes identified  New illness, injury, or hospitalization: No  Medication/supplement changes: None noted  Signs or symptoms of bleeding or clotting: No  Previous INR: Supratherapeutic  Additional findings: None     PLAN     Recommended plan for no diet, medication or health factor changes affecting INR     Dosing Instructions: Continue your current warfarin dose with next INR in 3 weeks       Summary  As of 7/10/2024      Full warfarin instructions:  5 mg every day   Next INR check:  7/31/2024               In person    Lab visit scheduled    Education provided: Please call back if any changes to your diet, medications or how you've been taking warfarin    Plan made per [facility] anticoagulation protocol    Rose Silverio RN  Anticoagulation Clinic  7/10/2024    _______________________________________________________________________     Anticoagulation Episode Summary       Current INR goal:  2.0-3.0   TTR:  79.8% (1 y)   Target end date:  Indefinite   Send INR reminders to:  [facility]    Indications    Paroxysmal atrial fibrillation (H) [I48.0]  Anticoagulation goal of INR 2 to 3 [Z51.81  Z79.01]             Comments:               Anticoagulation Care Providers       Provider Role Specialty Phone number    Asher Campos MD Referring Family Medicine [phone]    No Ref-Primary, Physician Referring

## 2024-07-19 DIAGNOSIS — F33.40 MDD (RECURRENT MAJOR DEPRESSIVE DISORDER) IN REMISSION (H): ICD-10-CM

## 2024-07-23 NOTE — TELEPHONE ENCOUNTER
Prescription refilled per RN Medication Refill Policy..................Agatha Graves RN 7/23/2024 10:46 AM

## 2024-07-24 ENCOUNTER — LAB (OUTPATIENT)
Dept: LAB | Facility: OTHER | Age: 79
End: 2024-07-24
Attending: UROLOGY
Payer: COMMERCIAL

## 2024-07-24 ENCOUNTER — OFFICE VISIT (OUTPATIENT)
Dept: UROLOGY | Facility: OTHER | Age: 79
End: 2024-07-24
Attending: UROLOGY
Payer: MEDICARE

## 2024-07-24 VITALS
HEIGHT: 65 IN | BODY MASS INDEX: 29.99 KG/M2 | RESPIRATION RATE: 20 BRPM | WEIGHT: 180 LBS | HEART RATE: 61 BPM | OXYGEN SATURATION: 96 %

## 2024-07-24 DIAGNOSIS — N39.41 URGE INCONTINENCE: ICD-10-CM

## 2024-07-24 DIAGNOSIS — N32.81 OVERACTIVE BLADDER: Primary | ICD-10-CM

## 2024-07-24 DIAGNOSIS — N32.81 OVERACTIVE BLADDER: ICD-10-CM

## 2024-07-24 LAB
ALBUMIN UR-MCNC: NEGATIVE MG/DL
APPEARANCE UR: ABNORMAL
BILIRUB UR QL STRIP: NEGATIVE
COLOR UR AUTO: YELLOW
GLUCOSE UR STRIP-MCNC: NEGATIVE MG/DL
HGB UR QL STRIP: NEGATIVE
KETONES UR STRIP-MCNC: NEGATIVE MG/DL
LEUKOCYTE ESTERASE UR QL STRIP: ABNORMAL
NITRATE UR QL: POSITIVE
PH UR STRIP: 6.5 [PH] (ref 5–9)
SP GR UR STRIP: 1.02 (ref 1–1.03)
UROBILINOGEN UR STRIP-MCNC: NORMAL MG/DL

## 2024-07-24 PROCEDURE — 81003 URINALYSIS AUTO W/O SCOPE: CPT | Mod: ZL

## 2024-07-24 PROCEDURE — 99215 OFFICE O/P EST HI 40 MIN: CPT | Performed by: UROLOGY

## 2024-07-24 PROCEDURE — G0463 HOSPITAL OUTPT CLINIC VISIT: HCPCS | Mod: 25

## 2024-07-24 PROCEDURE — 51798 US URINE CAPACITY MEASURE: CPT | Performed by: UROLOGY

## 2024-07-24 RX ORDER — TROSPIUM CHLORIDE 20 MG/1
20 TABLET, FILM COATED ORAL
Qty: 60 TABLET | Refills: 11 | Status: SHIPPED | OUTPATIENT
Start: 2024-07-24

## 2024-07-24 ASSESSMENT — PAIN SCALES - GENERAL: PAINLEVEL: NO PAIN (0)

## 2024-07-24 NOTE — NURSING NOTE
"Chief Complaint   Patient presents with    Consult     Incontinence/ discuss Botox        Initial Pulse 61   Resp 20   Ht 1.651 m (5' 5\")   Wt 81.6 kg (180 lb)   LMP  (LMP Unknown)   SpO2 96%   BMI 29.95 kg/m   Estimated body mass index is 29.95 kg/m  as calculated from the following:    Height as of this encounter: 1.651 m (5' 5\").    Weight as of this encounter: 81.6 kg (180 lb).  Medication Reconciliation: complete   post void residual =5 ml    Abbey Edmondson, JOHN    "

## 2024-07-24 NOTE — PROGRESS NOTES
Chief Complaint: Consult (Incontinence/ discuss Botox )      HPI: Ms. Kate Chacon is a 79 year old year old female presenting today July 24, 2024 in follow up of urge incontinence and an overactive bladder.    Previous patient Dr. Fox who was treated initially with Botox 100 units.  That was given only once and she is really not sure how much it helped as she never saw him afterwards.    She was never tried on oral medications.    Her history includes A-fib on warfarin as well as a history of stroke.  She does have a pacemaker in place.  She drinks only 1 cup of decaffeinated coffee a day.  No constipation.  No history of Alzheimer's..    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    COLONOSCOPY  04/04/2011 4/4/11    COLONOSCOPY  05/07/2012    Tubular Adenomas F/U 2017    COLONOSCOPY  12/02/2019    F/U N/A as will be over 78 in 2024. Tubular adenoma    ESOPHAGOSCOPY, GASTROSCOPY, DUODENOSCOPY (EGD), COMBINED      8/19/05    OTHER SURGICAL HISTORY      8/3/05,628130,OTHER,helices on the right ear    TONSILLECTOMY      No Comments Provided       Current Outpatient Medications   Medication Sig Dispense Refill    atorvastatin (LIPITOR) 40 MG tablet Take 1 tablet (40 mg) by mouth at bedtime 90 tablet 3    calcium carbonate 750 MG CHEW Take 1 tablet by mouth every 8 hours as needed for heartburn      calcium carbonate-vitamin D 600-200 MG-UNIT TABS Take 1 tablet by mouth 2 times daily      lisinopril (ZESTRIL) 20 MG tablet Take 1 tablet by mouth once daily 90 tablet 11    metoprolol succinate ER (TOPROL XL) 50 MG 24 hr tablet Take 1 tablet  "(50 mg) by mouth daily 90 tablet 11    sertraline (ZOLOFT) 50 MG tablet TAKE 1 TABLET BY MOUTH AT BEDTIME 90 tablet 0    vitamin B complex with vitamin C (STRESS TAB) tablet Take 1 tablet by mouth daily      vitamin D3 (CHOLECALCIFEROL) 50 mcg (2000 units) tablet Take 1 tablet by mouth daily      warfarin ANTICOAGULANT (COUMADIN) 5 MG tablet TAKE 1 TABLET BY MOUTH ONCE DAILY OR  AS  DIRECTED  BY  [facility]  CLINIC 100 tablet 4       ALLERGIES: Methylpar-na bisulfite-pentazocine     GENERAL PHYSICAL EXAM:   Vitals: Pulse 61   Resp 20   Ht 1.651 m (5' 5\")   Wt 81.6 kg (180 lb)   LMP  (LMP Unknown)   SpO2 96%   BMI 29.95 kg/m    Body mass index is 29.95 kg/m .    GENERAL: Well groomed, well developed, well nourished female in NAD.  In a wheelchair  ENT:  ENT exam normal, ecchymosis of the right orbit conjunctiva  CV:  Warm Extremities   RESPIRATORY:  Normal respiratory effort   MS: Moving upper extremities, in a wheelchair  NEURO: Alert and oriented x 3.  PSYCH: Normal mood and affect, pleasant and agreeable during interview and exam.        PVR: Residual urine by ultrasound was 5 ml.        LABS: The last test results for Ms. Kate Chacon were reviewed.   Results for orders placed or performed in visit on 07/24/24 (from the past 24 hour(s))   Urinalysis Macroscopic   Result Value Ref Range    Color Urine Yellow Colorless, Straw, Light Yellow, Yellow    Appearance Urine Slightly Cloudy (A) Clear    Glucose Urine Negative Negative mg/dL    Bilirubin Urine Negative Negative    Ketones Urine Negative Negative mg/dL    Specific Gravity Urine 1.017 1.000 - 1.030    Blood Urine Negative Negative    pH Urine 6.5 5.0 - 9.0    Protein Albumin Urine Negative Negative mg/dL    Urobilinogen Urine Normal Normal, 2.0 mg/dL    Nitrite Urine Positive (A) Negative    Leukocyte Esterase Urine Large (A) Negative       BMP -   Recent Labs   Lab Test 01/09/24  1402 08/19/22  1148 03/22/22  1109 02/01/22  1034 06/21/20 2002 " 12/11/19  0424 12/10/19  0440 04/23/18  1453 11/02/17  0519    141 140 140   < > 143 142   < > 141   POTASSIUM 4.2 4.0 4.9 3.9   < > 3.7 3.5   < > 4.0   CHLORIDE 106 107 108* 104   < > 108* 110*   < > 107   CO2 25 27 26 30   < > 30 28   < > 27   BUN 13.0 17 20 13   < > 10 12   < > 13   CR 0.91 0.95 1.03 1.03   < > 0.99 0.91   < > 0.92   GLC 91 102 93 119*   < > 116* 118*   < > 101   MASHA 9.7 9.1 10.5* 10.7*   < > 9.2 8.5*   < > 10.3   MAG  --   --   --   --   --  1.9 1.7*  --  1.9   PHOS  --   --   --  3.2  --   --   --   --   --     < > = values in this interval not displayed.       CBC -   Recent Labs   Lab Test 01/09/24  1402 08/19/22  1148 03/22/22  1109   WBC 7.9 8.4 6.1   HGB 15.7 15.3 16.0*    198 209       ASSESSMENT:   OAB  Urge incontinence    PLAN:   Patient with a history of stroke and subsequent urge incontinence with a history of OAB.    We discussed treatment options including Botox.  We discussed anticholinergics with the risk of constipation, dry eyes and confusion in the elderly.    We discussed Myrbetriq which can be cost prohibitive.    She inquired about oral medicine since she has never tried it.  I think that is a reasonable option but would steer towards a medicine that does not cross into the brain such as Sanctura.  Less risk of confusion and dementia with that.    Sanctura will be sent to her pharmacy.  Follow-up in 3 months.  She was warned of constipation and dry mouth.  She was asked to use something for her bowels to prevent that.    All questions were addressed    40 minutes spent on the date of this encounter doing chart review, history and exam, documentation and further activities as noted above.        Jorgito Candelario MD  [facility]

## 2024-07-29 ENCOUNTER — ANTICOAGULATION THERAPY VISIT (OUTPATIENT)
Dept: ANTICOAGULATION | Facility: OTHER | Age: 79
End: 2024-07-29
Attending: FAMILY MEDICINE
Payer: MEDICARE

## 2024-07-29 DIAGNOSIS — Z79.01 ANTICOAGULATION GOAL OF INR 2 TO 3: ICD-10-CM

## 2024-07-29 DIAGNOSIS — Z51.81 ANTICOAGULATION GOAL OF INR 2 TO 3: ICD-10-CM

## 2024-07-29 DIAGNOSIS — I48.0 PAROXYSMAL ATRIAL FIBRILLATION (H): Primary | ICD-10-CM

## 2024-07-29 LAB — INR POINT OF CARE: 2.7 (ref 0.9–1.1)

## 2024-07-29 PROCEDURE — 36416 COLLJ CAPILLARY BLOOD SPEC: CPT | Mod: ZL

## 2024-07-29 NOTE — PROGRESS NOTES
ANTICOAGULATION MANAGEMENT     Kate Chacon 79 year old female is on warfarin with therapeutic INR result. (Goal INR 2.0-3.0)    Recent labs: (last 7 days)     07/29/24  1332   INR 2.7*       ASSESSMENT     Source(s): In person     Warfarin doses taken: Warfarin taken as instructed  Diet: No new diet changes identified  New illness, injury, or hospitalization: No  Medication/supplement changes: None noted    Signs or symptoms of bleeding or clotting: No  Previous INR: Therapeutic last 2(+) visits  Additional findings: None     PLAN     Recommended plan for no diet, medication or health factor changes affecting INR     Dosing Instructions: Continue your current warfarin dose with next INR in 4 weeks       Summary  As of 7/29/2024      Full warfarin instructions:  5 mg every day   Next INR check:  8/26/2024               In person    Lab visit scheduled    Education provided: Please call back if any changes to your diet, medications or how you've been taking warfarin, Monitoring for bleeding signs and symptoms, and When to seek medical attention/emergency care    Plan made per [facility] anticoagulation protocol    Trisha Turpin RN  Anticoagulation Clinic  7/29/2024    _______________________________________________________________________     Anticoagulation Episode Summary       Current INR goal:  2.0-3.0   TTR:  83.6% (1 y)   Target end date:  Indefinite   Send INR reminders to:  [facility]    Indications    Paroxysmal atrial fibrillation (H) [I48.0]  Anticoagulation goal of INR 2 to 3 [Z51.81  Z79.01]             Comments:               Anticoagulation Care Providers       Provider Role Specialty Phone number    Asher Campos MD Referring Family Medicine [phone]    No Ref-Primary, Physician Referring

## 2024-08-08 DIAGNOSIS — F33.40 MDD (RECURRENT MAJOR DEPRESSIVE DISORDER) IN REMISSION (H): ICD-10-CM

## 2024-08-12 NOTE — TELEPHONE ENCOUNTER
[id number] of [place] sent Rx request for the following:      Redundant refill request refused: Too soon:  sertraline (ZOLOFT) 50 MG tablet 90 tablet 0 7/23/2024 -- No   Sig - Route: TAKE 1 TABLET BY MOUTH AT BEDTIME - Oral   Sent to pharmacy as: Sertraline HCl 50 MG Oral Tablet (ZOLOFT)   Class: E-Prescribe   Order: [id number]   E-Prescribing Status: Receipt confirmed by pharmacy (7/23/2024 10:46 AM CDT)     Printout Tracking    External Result Report     Pharmacy    [id number] - [place], [place] - [address] [place] [address]     Rut Wolfe RN .............. 8/12/2024  1:44 PM

## 2024-08-30 ENCOUNTER — ANTICOAGULATION THERAPY VISIT (OUTPATIENT)
Dept: ANTICOAGULATION | Facility: OTHER | Age: 79
End: 2024-08-30
Attending: FAMILY MEDICINE
Payer: MEDICARE

## 2024-08-30 DIAGNOSIS — Z79.01 ANTICOAGULATION GOAL OF INR 2 TO 3: ICD-10-CM

## 2024-08-30 DIAGNOSIS — Z51.81 ANTICOAGULATION GOAL OF INR 2 TO 3: ICD-10-CM

## 2024-08-30 DIAGNOSIS — I48.0 PAROXYSMAL ATRIAL FIBRILLATION (H): Primary | ICD-10-CM

## 2024-08-30 LAB — INR POINT OF CARE: 3.3 (ref 0.9–1.1)

## 2024-08-30 PROCEDURE — 36416 COLLJ CAPILLARY BLOOD SPEC: CPT | Mod: ZL

## 2024-08-30 NOTE — PROGRESS NOTES
ANTICOAGULATION MANAGEMENT     Kate Chacon 79 year old female is on warfarin with supratherapeutic INR result. (Goal INR 2.0-3.0)    Recent labs: (last 7 days)     08/30/24  1407   INR 3.3*       ASSESSMENT     Source(s): In person     Warfarin doses taken: Warfarin taken as instructed  Diet: No new diet changes identified  New illness, injury, or hospitalization: No  Medication/supplement changes: None noted  Signs or symptoms of bleeding or clotting: No  Previous INR: Therapeutic last 2(+) visits  Additional findings: None     PLAN     Recommended plan for no diet, medication or health factor changes affecting INR     Dosing Instructions: Continue your current warfarin dose with next INR in 2 weeks       Summary  As of 8/30/2024      Full warfarin instructions:  5 mg every day   Next INR check:  9/13/2024               In person    Lab visit scheduled    Education provided: Please call back if any changes to your diet, medications or how you've been taking warfarin    Plan made per [facility] anticoagulation protocol    Rose Silverio RN  Anticoagulation Clinic  8/30/2024    _______________________________________________________________________     Anticoagulation Episode Summary       Current INR goal:  2.0-3.0   TTR:  80.5% (1 y)   Target end date:  Indefinite   Send INR reminders to:  [facility]    Indications    Paroxysmal atrial fibrillation (H) [I48.0]  Anticoagulation goal of INR 2 to 3 [Z51.81  Z79.01]             Comments:               Anticoagulation Care Providers       Provider Role Specialty Phone number    Asehr Campos MD Referring Family Medicine [phone]    No Ref-Primary, Physician Referring

## 2024-09-13 ENCOUNTER — ANTICOAGULATION THERAPY VISIT (OUTPATIENT)
Dept: ANTICOAGULATION | Facility: OTHER | Age: 79
End: 2024-09-13
Payer: MEDICARE

## 2024-09-13 ENCOUNTER — HOSPITAL ENCOUNTER (EMERGENCY)
Facility: OTHER | Age: 79
Discharge: ANOTHER HOSPITAL | End: 2024-09-14
Attending: PHYSICIAN ASSISTANT | Admitting: PHYSICIAN ASSISTANT
Payer: MEDICARE

## 2024-09-13 ENCOUNTER — APPOINTMENT (OUTPATIENT)
Dept: GENERAL RADIOLOGY | Facility: OTHER | Age: 79
End: 2024-09-13
Attending: PHYSICIAN ASSISTANT
Payer: MEDICARE

## 2024-09-13 DIAGNOSIS — Z51.81 ANTICOAGULATION GOAL OF INR 2 TO 3: ICD-10-CM

## 2024-09-13 DIAGNOSIS — S72.002A FRACTURE, PROXIMAL FEMUR, LEFT, CLOSED, INITIAL ENCOUNTER (H): ICD-10-CM

## 2024-09-13 DIAGNOSIS — I48.0 PAROXYSMAL ATRIAL FIBRILLATION (H): Primary | ICD-10-CM

## 2024-09-13 DIAGNOSIS — Z79.01 ANTICOAGULATION GOAL OF INR 2 TO 3: ICD-10-CM

## 2024-09-13 LAB
ANION GAP SERPL CALCULATED.3IONS-SCNC: 12 MMOL/L (ref 7–15)
BASOPHILS # BLD AUTO: 0 10E3/UL (ref 0–0.2)
BASOPHILS NFR BLD AUTO: 0 %
BUN SERPL-MCNC: 17.7 MG/DL (ref 8–23)
CALCIUM SERPL-MCNC: 9 MG/DL (ref 8.8–10.4)
CHLORIDE SERPL-SCNC: 108 MMOL/L (ref 98–107)
CREAT SERPL-MCNC: 1 MG/DL (ref 0.51–0.95)
EGFRCR SERPLBLD CKD-EPI 2021: 56 ML/MIN/1.73M2
EOSINOPHIL # BLD AUTO: 0.3 10E3/UL (ref 0–0.7)
EOSINOPHIL NFR BLD AUTO: 3 %
ERYTHROCYTE [DISTWIDTH] IN BLOOD BY AUTOMATED COUNT: 13.1 % (ref 10–15)
GLUCOSE SERPL-MCNC: 133 MG/DL (ref 70–99)
HCO3 SERPL-SCNC: 24 MMOL/L (ref 22–29)
HCT VFR BLD AUTO: 46.3 % (ref 35–47)
HGB BLD-MCNC: 15.3 G/DL (ref 11.7–15.7)
HOLD SPECIMEN: NORMAL
IMM GRANULOCYTES # BLD: 0 10E3/UL
IMM GRANULOCYTES NFR BLD: 0 %
INR POINT OF CARE: 3.1 (ref 0.9–1.1)
INR PPP: 0.9 (ref 0.85–1.15)
LYMPHOCYTES # BLD AUTO: 3.3 10E3/UL (ref 0.8–5.3)
LYMPHOCYTES NFR BLD AUTO: 33 %
MCH RBC QN AUTO: 32.3 PG (ref 26.5–33)
MCHC RBC AUTO-ENTMCNC: 33 G/DL (ref 31.5–36.5)
MCV RBC AUTO: 98 FL (ref 78–100)
MONOCYTES # BLD AUTO: 1.2 10E3/UL (ref 0–1.3)
MONOCYTES NFR BLD AUTO: 12 %
NEUTROPHILS # BLD AUTO: 5.2 10E3/UL (ref 1.6–8.3)
NEUTROPHILS NFR BLD AUTO: 52 %
NRBC # BLD AUTO: 0 10E3/UL
NRBC BLD AUTO-RTO: 0 /100
PLATELET # BLD AUTO: 232 10E3/UL (ref 150–450)
POTASSIUM SERPL-SCNC: 3.7 MMOL/L (ref 3.4–5.3)
RBC # BLD AUTO: 4.74 10E6/UL (ref 3.8–5.2)
SODIUM SERPL-SCNC: 144 MMOL/L (ref 135–145)
WBC # BLD AUTO: 10 10E3/UL (ref 4–11)

## 2024-09-13 PROCEDURE — 80048 BASIC METABOLIC PNL TOTAL CA: CPT | Performed by: PHYSICIAN ASSISTANT

## 2024-09-13 PROCEDURE — 36415 COLL VENOUS BLD VENIPUNCTURE: CPT | Performed by: EMERGENCY MEDICINE

## 2024-09-13 PROCEDURE — 96376 TX/PRO/DX INJ SAME DRUG ADON: CPT | Performed by: PHYSICIAN ASSISTANT

## 2024-09-13 PROCEDURE — 99285 EMERGENCY DEPT VISIT HI MDM: CPT | Mod: 25 | Performed by: PHYSICIAN ASSISTANT

## 2024-09-13 PROCEDURE — 85025 COMPLETE CBC W/AUTO DIFF WBC: CPT | Performed by: PHYSICIAN ASSISTANT

## 2024-09-13 PROCEDURE — 73552 X-RAY EXAM OF FEMUR 2/>: CPT | Mod: LT

## 2024-09-13 PROCEDURE — 36416 COLLJ CAPILLARY BLOOD SPEC: CPT | Mod: ZL

## 2024-09-13 PROCEDURE — 250N000011 HC RX IP 250 OP 636: Performed by: PHYSICIAN ASSISTANT

## 2024-09-13 PROCEDURE — 96374 THER/PROPH/DIAG INJ IV PUSH: CPT | Performed by: PHYSICIAN ASSISTANT

## 2024-09-13 PROCEDURE — 72170 X-RAY EXAM OF PELVIS: CPT

## 2024-09-13 PROCEDURE — 99285 EMERGENCY DEPT VISIT HI MDM: CPT | Performed by: PHYSICIAN ASSISTANT

## 2024-09-13 PROCEDURE — 85610 PROTHROMBIN TIME: CPT | Performed by: PHYSICIAN ASSISTANT

## 2024-09-13 RX ORDER — ACETAMINOPHEN 325 MG/1
650 TABLET ORAL EVERY 4 HOURS PRN
Status: DISCONTINUED | OUTPATIENT
Start: 2024-09-13 | End: 2024-09-14 | Stop reason: HOSPADM

## 2024-09-13 RX ORDER — LIDOCAINE HYDROCHLORIDE 20 MG/ML
JELLY TOPICAL
Status: COMPLETED | OUTPATIENT
Start: 2024-09-13 | End: 2024-09-14

## 2024-09-13 RX ORDER — SODIUM CHLORIDE, SODIUM LACTATE, POTASSIUM CHLORIDE, CALCIUM CHLORIDE 600; 310; 30; 20 MG/100ML; MG/100ML; MG/100ML; MG/100ML
INJECTION, SOLUTION INTRAVENOUS CONTINUOUS
Status: DISCONTINUED | OUTPATIENT
Start: 2024-09-13 | End: 2024-09-14 | Stop reason: HOSPADM

## 2024-09-13 RX ORDER — MORPHINE SULFATE 2 MG/ML
1 INJECTION, SOLUTION INTRAMUSCULAR; INTRAVENOUS
Status: DISCONTINUED | OUTPATIENT
Start: 2024-09-13 | End: 2024-09-14 | Stop reason: HOSPADM

## 2024-09-13 RX ADMIN — MORPHINE SULFATE 1 MG: 2 INJECTION, SOLUTION INTRAMUSCULAR; INTRAVENOUS at 18:21

## 2024-09-13 RX ADMIN — MORPHINE SULFATE 1 MG: 2 INJECTION, SOLUTION INTRAMUSCULAR; INTRAVENOUS at 19:24

## 2024-09-13 ASSESSMENT — COLUMBIA-SUICIDE SEVERITY RATING SCALE - C-SSRS
6. HAVE YOU EVER DONE ANYTHING, STARTED TO DO ANYTHING, OR PREPARED TO DO ANYTHING TO END YOUR LIFE?: NO
2. HAVE YOU ACTUALLY HAD ANY THOUGHTS OF KILLING YOURSELF IN THE PAST MONTH?: NO
1. IN THE PAST MONTH, HAVE YOU WISHED YOU WERE DEAD OR WISHED YOU COULD GO TO SLEEP AND NOT WAKE UP?: NO

## 2024-09-13 ASSESSMENT — ACTIVITIES OF DAILY LIVING (ADL)
ADLS_ACUITY_SCORE: 37

## 2024-09-13 NOTE — ED PROVIDER NOTES
CC:     Chief Complaint   Patient presents with    Fall    Leg Injury       ED Nurse's notes:  Pt arrives via EMS from [facility] after falling off the curb and landing on her left side. Pt is complaining of 10/10 left hip/leg pain. Left leg is internally rotated. Pt received 4mg of zofran and 15mg of Toradol in route. Pt is on blood thinners, denies hitting head.     Triage Assessment (Adult)       Row Name 09/13/24 1737          Triage Assessment    Airway WDL WDL        Respiratory WDL    Respiratory WDL WDL        Skin Circulation/Temperature WDL    Skin Circulation/Temperature WDL WDL        Cardiac WDL    Cardiac WDL WDL        Peripheral/Neurovascular WDL    Peripheral Neurovascular WDL X        Cognitive/Neuro/Behavioral WDL    Cognitive/Neuro/Behavioral WDL WDL                        HPI:    Kate Chacon is a 79 year old female with history of atrial fibrillation on warfarin, hypertension, previous ischemic stroke, hyperlipidemia, anxiety who accompanied by her  and brought to the emergency department via EMS after falling and at [facility] pharmacy today.  She tripped over a curb and landed on her left hip.  She denies hitting her head.  She did not lose consciousness.  She has been unable to move her left leg since the accident.  She was brought in by EMS and was given 15 mg of IV Toradol.  She has pain in her left hip and upper left leg.  She denies numbness, tingling.    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    COLONOSCOPY  04/04/2011 4/4/11    COLONOSCOPY  05/07/2012     Tubular Adenomas F/U 2017    COLONOSCOPY  2019    F/U N/A as will be over 78 in . Tubular adenoma    ESOPHAGOSCOPY, GASTROSCOPY, DUODENOSCOPY (EGD), COMBINED      05    OTHER SURGICAL HISTORY      8/3/05,840306,OTHER,helices on the right ear    TONSILLECTOMY      No Comments Provided        Social History:   Social History     Tobacco Use    Smoking status: Former     Current packs/day: 0.00     Average packs/day: 0.5 packs/day for 49.0 years (24.5 ttl pk-yrs)     Types: Cigarettes     Start date: 10/14/1967     Quit date: 10/14/2016     Years since quittin.9     Passive exposure: Past    Smokeless tobacco: Never   Vaping Use    Vaping status: Never Used   Substance Use Topics    Alcohol use: No    Drug use: No       Medications:    Current Outpatient Rx   Medication Sig Dispense Refill    atorvastatin (LIPITOR) 40 MG tablet Take 1 tablet (40 mg) by mouth at bedtime 90 tablet 3    calcium carbonate 750 MG CHEW Take 1 tablet by mouth every 8 hours as needed for heartburn      calcium carbonate-vitamin D 600-200 MG-UNIT TABS Take 1 tablet by mouth 2 times daily      lisinopril (ZESTRIL) 20 MG tablet Take 1 tablet by mouth once daily 90 tablet 11    metoprolol succinate ER (TOPROL XL) 50 MG 24 hr tablet Take 1 tablet (50 mg) by mouth daily 90 tablet 11    sertraline (ZOLOFT) 50 MG tablet TAKE 1 TABLET BY MOUTH AT BEDTIME 90 tablet 0    trospium (SANCTURA) 20 MG tablet Take 1 tablet (20 mg) by mouth 2 times daily (before meals) 60 tablet 11    vitamin B complex with vitamin C (STRESS TAB) tablet Take 1 tablet by mouth daily      vitamin D3 (CHOLECALCIFEROL) 50 mcg (2000 units) tablet Take 1 tablet by mouth daily      warfarin ANTICOAGULANT (COUMADIN) 5 MG tablet TAKE 1 TABLET BY MOUTH ONCE DAILY OR  AS  DIRECTED  BY  [facility]  CLINIC 100 tablet 4       Allergies:  Methylpar-na bisulfite-pentazocine      Medical records were reviewed and are summarized above.      Review of Systems     Complete  review of systems negative except as noted in HPI    Immunization status was reviewed   Immunization History   Administered Date(s) Administered    COVID-19 12+ (Pfizer) 01/09/2024    COVID-19 Bivalent 12+ (Pfizer) 10/14/2022    COVID-19 MONOVALENT 12+ (Pfizer) 02/04/2021, 02/25/2021, 12/21/2021    Flu, Unspecified 12/30/2005, 10/14/2011    Influenza (High Dose) Trivalent,PF (Fluzone) 10/18/2016, 10/01/2017, 10/11/2018, 10/29/2019    Influenza Not Indicated - By Hx 10/18/2016    Influenza Vaccine 65+ (Fluzone HD) 10/19/2020, 10/14/2022, 01/09/2024    Influenza, Whole Virus 12/30/2005    Pneumo Conj 13-V (2010&after) 01/05/2017    Pneumococcal 23 valent 10/29/2019    TDAP Vaccine (Boostrix) 03/02/2011    Td (Adult), Adsorbed 04/01/1998, 03/02/2011    Tdap (Adult) Unspecified Formulation 04/01/1998, 03/02/2011         Physical Exam     Vitals:    09/13/24 1734 09/13/24 1745 09/13/24 1800 09/13/24 1815   BP: (!) 147/120 (!) 160/95 (!) 148/90 (!) 159/93   Pulse: 60 60 63 62   Resp: 16      Temp: 98  F (36.7  C)      TempSrc: Tympanic      SpO2: 96%            GENERAL APPEARANCE: 79 year old female in mild distress  HEAD: normocephalic, no marks of trauma  EYES: Pupils are equal   NECK: c-spine non tender with palpation and ROM  RESP: normal respiratory effort. Clear breath sounds bilaterally, with good air movement throughout.  CV: irregular  ABD: soft, with no tenderness.  No rebound or guarding.  Bowel sounds are normal  MS: tender over left hip and pelvis, left lower extremity rotated inward, unable to move left leg, tender over upper left femur, no left knee/ankle/foot tenderness.  Sensation intact.  Pulses intact  SKIN: no worrisome rash  NEURO: no facial droop; no focal deficits, speech is normal  PSYCH: Normal mood and affect is congruent.  Behavior is normal. Thought content normal. Speech is not slurred.       Available Lab/Imaging Results from the past 48 hours     Results for orders placed or performed during  the hospital encounter of 09/13/24 (from the past 24 hour(s))   Outing Draw    Narrative    The following orders were created for panel order Outing Draw.  Procedure                               Abnormality         Status                     ---------                               -----------         ------                     Extra Blue Top Tube[[id number]]                              Final result               Extra Red Top Tube[[id number]]                               Final result               Extra Green Top (Lithium...[[id number]]                      Final result               Extra Purple Top Tube[[id number]]                            Final result               Extra Green Top (Lithium...[[id number]]                      Final result                 Please view results for these tests on the individual orders.   Extra Blue Top Tube   Result Value Ref Range    Hold Specimen JIC    Extra Red Top Tube   Result Value Ref Range    Hold Specimen JIC    Extra Green Top (Lithium Heparin) Tube   Result Value Ref Range    Hold Specimen JIC    Extra Purple Top Tube   Result Value Ref Range    Hold Specimen JIC    Extra Green Top (Lithium Heparin) ON ICE   Result Value Ref Range    Hold Specimen JIC    CBC with platelets differential    Narrative    The following orders were created for panel order CBC with platelets differential.  Procedure                               Abnormality         Status                     ---------                               -----------         ------                     CBC with platelets and d...[[id number]]                      Final result                 Please view results for these tests on the individual orders.   INR   Result Value Ref Range    INR 0.90 0.85 - 1.15   Basic metabolic panel   Result Value Ref Range    Sodium 144 135 - 145 mmol/L    Potassium 3.7 3.4 - 5.3 mmol/L    Chloride 108 (H) 98 - 107 mmol/L    Carbon Dioxide (CO2) 24 22 - 29 mmol/L    Anion Gap 12 7 - 15  mmol/L    Urea Nitrogen 17.7 8.0 - 23.0 mg/dL    Creatinine 1.00 (H) 0.51 - 0.95 mg/dL    GFR Estimate 56 (L) >60 mL/min/1.73m2    Calcium 9.0 8.8 - 10.4 mg/dL    Glucose 133 (H) 70 - 99 mg/dL   CBC with platelets and differential   Result Value Ref Range    WBC Count 10.0 4.0 - 11.0 10e3/uL    RBC Count 4.74 3.80 - 5.20 10e6/uL    Hemoglobin 15.3 11.7 - 15.7 g/dL    Hematocrit 46.3 35.0 - 47.0 %    MCV 98 78 - 100 fL    MCH 32.3 26.5 - 33.0 pg    MCHC 33.0 31.5 - 36.5 g/dL    RDW 13.1 10.0 - 15.0 %    Platelet Count 232 150 - 450 10e3/uL    % Neutrophils 52 %    % Lymphocytes 33 %    % Monocytes 12 %    % Eosinophils 3 %    % Basophils 0 %    % Immature Granulocytes 0 %    NRBCs per 100 WBC 0 <1 /100    Absolute Neutrophils 5.2 1.6 - 8.3 10e3/uL    Absolute Lymphocytes 3.3 0.8 - 5.3 10e3/uL    Absolute Monocytes 1.2 0.0 - 1.3 10e3/uL    Absolute Eosinophils 0.3 0.0 - 0.7 10e3/uL    Absolute Basophils 0.0 0.0 - 0.2 10e3/uL    Absolute Immature Granulocytes 0.0 <=0.4 10e3/uL    Absolute NRBCs 0.0 10e3/uL   XR Pelvis Port 1/2 Views    Narrative    XR PELVIS PORT 1/2 VIEWS, XR FEMUR PORT LEFT 2 VIEWS    HISTORY: fall .    COMPARISON: 6/13/2020.    TECHNIQUE: AP pelvis, 4 views left femur.    FINDINGS:    There is an acute impacted and medially angulated fracture of the  proximal left femur, involving the lesser trochanter. No other acute  fracture is identified. Recommend follow-up.      NA CARTAGENA MD         SYSTEM ID:  [id number]   XR Femur Port Left 2 Views    Narrative    XR PELVIS PORT 1/2 VIEWS, XR FEMUR PORT LEFT 2 VIEWS    HISTORY: fall .    COMPARISON: 6/13/2020.    TECHNIQUE: AP pelvis, 4 views left femur.    FINDINGS:    There is an acute impacted and medially angulated fracture of the  proximal left femur, involving the lesser trochanter. No other acute  fracture is identified. Recommend follow-up.      NA CARTAGENA MD         SYSTEM ID:  [id number]         Medicine used during this ED  stay:     Medications   morphine (PF) injection 1 mg (1 mg Intravenous $Given 9/13/24 1924)       Assessment and plan     Final diagnoses:   Fracture, proximal femur, left, closed, initial encounter (H)           ED Course/medical decision making    Patient first seen at 6:16 PM      ED Course as of 09/13/24 2029   Fri Sep 13, 2024   1822 Patient is here with her .  She arrived via EMS after falling on a curb at [facility] and landing on her left hip.  She was unable to get up and has not been able to move her left leg.  She is complaining of 10 out of 10 pain in her left hip area. No numbness, tingling.  She has received a dose of toradol 15 mg IV by EMS.  She has never had opioids and would rather not take any.  However, she is very uncomfortable and says she will would take a small dose of morphine.  1 mg of IV morphine given with some relief.   1929 X-rays obtained and reveals a left proximal femur fracture.  Labs drawn as she is on warfarin and currently her INR is subtherapeutic at 0.9.  Called and discussed with [facility] orthopedics who recommends ED to ED transfer to [facility] in [facility].  Called and spoke to [facility] ED to let them know she would be transferring there.  Will plan to transport when transportation is available.             New Prescriptions    No medications on file         Idalia Grijalva PA-C

## 2024-09-13 NOTE — PROGRESS NOTES
ANTICOAGULATION MANAGEMENT     Kate Chacon 79 year old female is on warfarin with therapeutic INR result. (Goal INR 2.0-3.0)    Recent labs: (last 7 days)     09/13/24  1330   INR 3.1*       ASSESSMENT     Source(s): In person     Warfarin doses taken: Warfarin taken as instructed  Diet: No new diet changes identified  New illness, injury, or hospitalization: No  Medication/supplement changes: None noted  Signs or symptoms of bleeding or clotting: No  Previous INR: Supratherapeutic  Additional findings: None     PLAN     Recommended plan for no diet, medication or health factor changes affecting INR     Dosing Instructions: decrease your warfarin dose (7.1% change) with next INR in 2 weeks       Summary  As of 9/13/2024      Full warfarin instructions:  2.5 mg every Fri; 5 mg all other days   Next INR check:  9/27/2024               In person    Lab visit scheduled    Education provided: Please call back if any changes to your diet, medications or how you've been taking warfarin    Plan made per [facility] anticoagulation protocol    Rose Silverio RN  Anticoagulation Clinic  9/13/2024    _______________________________________________________________________     Anticoagulation Episode Summary       Current INR goal:  2.0-3.0   TTR:  80.5% (1 y)   Target end date:  Indefinite   Send INR reminders to:  [facility]    Indications    Paroxysmal atrial fibrillation (H) [I48.0]  Anticoagulation goal of INR 2 to 3 [Z51.81  Z79.01]             Comments:               Anticoagulation Care Providers       Provider Role Specialty Phone number    Asher Campos MD Referring Family Medicine [phone]    No Ref-Primary, Physician Referring

## 2024-09-14 VITALS
SYSTOLIC BLOOD PRESSURE: 128 MMHG | RESPIRATION RATE: 16 BRPM | BODY MASS INDEX: 29.99 KG/M2 | OXYGEN SATURATION: 95 % | WEIGHT: 180 LBS | TEMPERATURE: 98 F | HEART RATE: 79 BPM | DIASTOLIC BLOOD PRESSURE: 66 MMHG | HEIGHT: 65 IN

## 2024-09-14 PROCEDURE — 250N000011 HC RX IP 250 OP 636: Mod: JZ | Performed by: PHYSICIAN ASSISTANT

## 2024-09-14 PROCEDURE — 250N000011 HC RX IP 250 OP 636: Performed by: FAMILY MEDICINE

## 2024-09-14 PROCEDURE — 258N000003 HC RX IP 258 OP 636: Performed by: PHYSICIAN ASSISTANT

## 2024-09-14 PROCEDURE — 96375 TX/PRO/DX INJ NEW DRUG ADDON: CPT | Performed by: PHYSICIAN ASSISTANT

## 2024-09-14 PROCEDURE — 96376 TX/PRO/DX INJ SAME DRUG ADON: CPT | Performed by: PHYSICIAN ASSISTANT

## 2024-09-14 PROCEDURE — 96361 HYDRATE IV INFUSION ADD-ON: CPT | Performed by: PHYSICIAN ASSISTANT

## 2024-09-14 PROCEDURE — 250N000013 HC RX MED GY IP 250 OP 250 PS 637: Performed by: PHYSICIAN ASSISTANT

## 2024-09-14 RX ORDER — ONDANSETRON 2 MG/ML
4 INJECTION INTRAMUSCULAR; INTRAVENOUS ONCE
Status: COMPLETED | OUTPATIENT
Start: 2024-09-14 | End: 2024-09-14

## 2024-09-14 RX ADMIN — ACETAMINOPHEN 650 MG: 325 TABLET ORAL at 00:51

## 2024-09-14 RX ADMIN — ONDANSETRON 4 MG: 2 INJECTION INTRAMUSCULAR; INTRAVENOUS at 06:53

## 2024-09-14 RX ADMIN — SERTRALINE HYDROCHLORIDE 50 MG: 50 TABLET ORAL at 00:51

## 2024-09-14 RX ADMIN — MORPHINE SULFATE 1 MG: 2 INJECTION, SOLUTION INTRAMUSCULAR; INTRAVENOUS at 00:50

## 2024-09-14 RX ADMIN — MORPHINE SULFATE 1 MG: 2 INJECTION, SOLUTION INTRAMUSCULAR; INTRAVENOUS at 06:48

## 2024-09-14 RX ADMIN — SODIUM CHLORIDE, POTASSIUM CHLORIDE, SODIUM LACTATE AND CALCIUM CHLORIDE: 600; 310; 30; 20 INJECTION, SOLUTION INTRAVENOUS at 00:50

## 2024-09-14 ASSESSMENT — ACTIVITIES OF DAILY LIVING (ADL)
ADLS_ACUITY_SCORE: 37

## 2024-09-19 ENCOUNTER — NURSING HOME VISIT (OUTPATIENT)
Dept: GERIATRICS | Facility: OTHER | Age: 79
End: 2024-09-19
Payer: COMMERCIAL

## 2024-09-19 DIAGNOSIS — E21.0 HYPERPARATHYROIDISM, PRIMARY (H): ICD-10-CM

## 2024-09-19 DIAGNOSIS — N39.41 URGE INCONTINENCE: ICD-10-CM

## 2024-09-19 DIAGNOSIS — Z51.81 ANTICOAGULATION GOAL OF INR 2 TO 3: ICD-10-CM

## 2024-09-19 DIAGNOSIS — I49.5 SA NODE DYSFUNCTION (H): ICD-10-CM

## 2024-09-19 DIAGNOSIS — Z95.0 CARDIAC PACEMAKER: ICD-10-CM

## 2024-09-19 DIAGNOSIS — E78.2 MIXED HYPERLIPIDEMIA: ICD-10-CM

## 2024-09-19 DIAGNOSIS — I69.359 HEMIPARESIS DUE TO OLD STROKE (H): ICD-10-CM

## 2024-09-19 DIAGNOSIS — M80.00XA AGE-RELATED OSTEOPOROSIS WITH CURRENT PATHOLOGICAL FRACTURE, INITIAL ENCOUNTER: ICD-10-CM

## 2024-09-19 DIAGNOSIS — I48.91 ON COUMADIN FOR ATRIAL FIBRILLATION (H): ICD-10-CM

## 2024-09-19 DIAGNOSIS — I10 ESSENTIAL HYPERTENSION: ICD-10-CM

## 2024-09-19 DIAGNOSIS — S72.002S CLOSED HIP FRACTURE, LEFT, SEQUELA: Primary | ICD-10-CM

## 2024-09-19 DIAGNOSIS — Z79.01 ON COUMADIN FOR ATRIAL FIBRILLATION (H): ICD-10-CM

## 2024-09-19 DIAGNOSIS — Z79.01 ANTICOAGULATION GOAL OF INR 2 TO 3: ICD-10-CM

## 2024-09-19 DIAGNOSIS — Z71.89 GOALS OF CARE, COUNSELING/DISCUSSION: ICD-10-CM

## 2024-09-19 PROCEDURE — 99418 PROLNG IP/OBS E/M EA 15 MIN: CPT | Performed by: NURSE PRACTITIONER

## 2024-09-19 PROCEDURE — 99310 SBSQ NF CARE HIGH MDM 45: CPT | Performed by: NURSE PRACTITIONER

## 2024-09-19 RX ORDER — ACETAMINOPHEN 500 MG
1000 TABLET ORAL 3 TIMES DAILY
COMMUNITY
Start: 2024-09-19

## 2024-09-19 RX ORDER — HYDROMORPHONE HYDROCHLORIDE 2 MG/1
1 TABLET ORAL EVERY 4 HOURS PRN
COMMUNITY
Start: 2024-09-19

## 2024-09-19 RX ORDER — UBIDECARENONE 75 MG
100 CAPSULE ORAL DAILY
COMMUNITY
Start: 2024-09-19

## 2024-09-19 RX ORDER — LISINOPRIL 20 MG/1
20 TABLET ORAL DAILY
COMMUNITY
Start: 2024-09-19

## 2024-09-19 RX ORDER — FAMOTIDINE 20 MG
1 TABLET ORAL DAILY
COMMUNITY
Start: 2024-09-19

## 2024-09-19 NOTE — PROGRESS NOTES
[facility]  Hospital follow up/Initial Assessment    Patient Name: Kate Chacon   : 1945  MRN: [id number]    Place of Service: [facility]  DOS: 2024    CC: Hospital follow up/Initial Assessment    HPI:  Kate Chacon is a 79 year old female with PMH of multiple chronic medical concerns, who is seen today regarding pt was hospitalized -24 at [facility] due to a left displaced femoral neck fracture with a Left hip trochanteric fixation nailing performed by Dr Thakur on . WBAT LLE.    Osteoporosis found on DEXA 2024 , this is likely a Pathologic fracture due to Osteoporosis.     Vit D Level drawn and results 60, will dose Pt with 1000 IU and tums for calcium     Ortho follow up Oct 29 with BRIAN Thurman      Pacemaker: due to SA node dysfunction;  cardiac device check  Hyperparathyroidism, primary (HCC)  Age related osteoporosis: outpatient Dexa Scan. Currently taking vit D and calcium  Essential hypertension: had soft blood pressures so lisinopril was put on hold at discharge, cont metoprolol succinate  Atrial fibrillation (HCC): on coumadin, Target INR range: 2.0-3.0; also taking metoprolol succinate for rate control   History of CVA: Reported some balance issues as a result of previous stroke. Denied any other residual deficits.     Pt seen in her room today after she had worked with therapy this morning.  and a friend present during visit today. She reports she is having some pain in hip after therapy. Ice applied. She gets scheduled tylenol and dilaudid PRN.   Lisinopril has been on hold due to soft blood pressures. Currently BP ranging 107-150s. HR 60s but also has pacemaker and taking metoprolol for rate control of afib.   She has hx of stroke in 2016 with a mild residual deficit affecting right side balance issues. She reports seeing Dinora Mccoy for her heart meds but has not seen her in quite a while.   She saw  Dr Candelario iiсветлана 7/2024 for urge incontinence likely after stroke. Reviewed this note and she had seen Dr Fox prior and received botox injection but was not sure it helped. She had not tried oral medications yet so she was prescribed Sanctura. She took it for about a month and not sure it has helped but also not sure botox helped. There was mention of trying myrbetriq but she has poor coverage of medications with [employer] and at this time she just wants to heal her hip fracture. She is ok restarting sanctura and will follow up with Dr Candelario after hip is healed. She has follow up scheduled with Dr Kodak Aguirre 4.    Noted POLST per nursing was listed as DNR/DNI. This is a change from last discussion of FULL code and so had a discussion with her about this. She fully understands CPR as her  has needed it twice. She reports she wants to be resuscitated but not to keep her going on machines or if not a good outcome. She understands we can't predict the outcome but to try and revive her unless no hope. Stressed importance of her  and children knowing her wishes.     Pt reports she had a bowel movement this morning and frequent bladder urination which is her norm.   +CMS intact left leg.   Denies chest pain, cough, shortness of breath.       Multidisciplinary notes, laboratory values, medications, vital signs, weight and orders arereviewed from nursing home records. I have reviewed the patient s medical history and updated the computerized patient record.     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      cyanocobalamin (VITAMIN B-12) 100 MCG tablet Take 1  tablet (100 mcg) by mouth daily.      HYDROmorphone (DILAUDID) 2 MG tablet Take 0.5 tablets (1 mg) by mouth every 4 hours as needed for pain or moderate pain. And give 1 tablet q 4 hours PRN pain severe      lisinopril (ZESTRIL) 20 MG tablet Take 1 tablet (20 mg) by mouth daily.      Vitamin D, Cholecalciferol, 25 MCG (1000 UT) CAPS Take 1 capsule by mouth daily.      atorvastatin (LIPITOR) 40 MG tablet Take 1 tablet (40 mg) by mouth at bedtime 90 tablet 3    calcium carbonate-vitamin D 600-200 MG-UNIT TABS Take 1 tablet by mouth 3 times daily.      metoprolol succinate ER (TOPROL XL) 50 MG 24 hr tablet Take 1 tablet (50 mg) by mouth daily 90 tablet 11    sertraline (ZOLOFT) 50 MG tablet TAKE 1 TABLET BY MOUTH AT BEDTIME 90 tablet 0    trospium (SANCTURA) 20 MG tablet Take 1 tablet (20 mg) by mouth 2 times daily (before meals) 60 tablet 11    warfarin ANTICOAGULANT (COUMADIN) 5 MG tablet TAKE 1 TABLET BY MOUTH ONCE DAILY OR  AS  DIRECTED  BY  PROTIME  CLINIC 100 tablet 4      Medication reconciliation complete between Epic record and NH MAR.     Allergies:  Allergies   Allergen Reactions    Methylpar-Na Bisulfite-Pentazocine Unknown     jittery       Review of Systems:  See HPI    Vital Signs:  Temp 97.0   Pulse 68   Respirations 16   /70   Oxygen Sats 97%   Weight 193.8#    Physical Exam:     Pleasant and alert without distress.    Sclera nonicteric, conjunctiva non-inflamed.  Skin color pink. Mucous membranes moist.   Lungs clear to auscultation throughout. No wheezes or rales noted.   Cardiovascular regular, S1, S2 auscultated. No murmur noted. Pacemaker noted  Abdomen soft and without tenderness   Extremities with puffy edema. CMS intact   Able to move upper and lower extremities       New Labs/Diagnostics:    HGB 10.9 09/17/2024 post surgery  (13.5 at admission)    Sodium  134 - 143 mEq/L 143   Potassium  3.4 - 5.1 mEq/L 3.7   Chloride  99 - 110 mEq/L 110   Carbon Dioxide  19 - 29 mEq/L 25   Anion  Gap  3.0 - 15.0 mEq/L 8.0   Blood Urea Nitrogen  5 - 24 mg/dL 18   Creatinine  0.40 - 1.00 mg/dL 0.86   Glomerular Filtration Rate  >60 mL/min/1.73 m*2 68   Comment: Risk of cardiovascular disease increases when GFR is abnormal; persistently reduced GFR values are a specific indication of CKD. This calculation uses CKD-EPI 2021 equation without adjustment for race; it has not been validated in pregnant women.   Calcium  8.4 - 10.5 mg/dL 8.5   Glucose  70 - 99 mg/dL 120 High    Resulting Agency [facility]   Narrative  Performed by [facility]  Current ADA criteria for Glucose:      Normal: 70-99 mg/dL      Impaired Fasting Glucose: 100-125 mg/dL      Diabetes Mellitus: at or above 126 mg/dL  The diagnosis of diabetes must be confirmed on a subsequent day by measuring Fasting Plasma Glucose, 2-hr PG or random plasma glucose (if symptoms are present).    Specimen Collected: 09/14/24  9:47 AM     Vitamin D Total  27 - 80 ng/mL 60       Assessment/Plan:  (S72.002S) Closed hip fracture, left, sequela  (primary encounter diagnosis)  Comment: Left displaced femoral neck fracture with surgical repair done 9/14/24 by Dr Thakur at [facility]  Plan: ortho follow up Oct 29, tylenol and dilaudid for pain  PT/OT  Check CBC, BMP next week to follow post op anemia    (I69.359) Hemiparesis due to old stroke - right side is weaker  Comment: chronic, affected her balance  Plan: supportive cares; PT/OT    (E21.0) Hyperparathyroidism, primary (H24)  Comment: noted  Plan: monitor    (E78.2) Mixed hyperlipidemia  Comment: chronic  Plan: cont statin    (I10) Essential hypertension  Comment: chronic  Plan: lisinopril (ZESTRIL) 20 MG tablet currently on HOLD        Cont metoprolol succinate; monitor BP and see if needs to restart lisinopril or needs a smaller dose   Check BMP next week    (I48.91,  Z79.01) On Coumadin for atrial fibrillation (H)  (Z51.81,  Z79.01) Anticoagulation goal of INR 2 to  3  Comment: Afib  Plan: cont coumadin    (I49.5) SA node dysfunction s/p pacer on 11/2/2017 at [facility]  (Z95.0) Cardiac pacemaker  Comment: noted  Plan: has pacemaker      (M80.00XA) Age-related osteoporosis with current pathological fracture, initial encounter  Comment: left hip fracture 9/2024  Plan: cont calcium, vit D, consider Dexascan outpatient    (N39.41) Urge incontinence  Comment: chronic  Plan: cont sanctura, follow up with Dr Candelario in Nov 4      (Z71.89) Goals of care, counseling/discussion  Comment: reviewed POLST  Plan: Full code; She wants CPR attempted but not kept alive on machines for prolonged period of time     and friend present for visit today.      A total of 95 minutes spent by me on the date of the encounter doing chart review from [place] summary, review of test results, interpretation of tests, review of medications, patient visit, and documentation.    JANAE Bryson, CNP ....................  9/19/2024   9:20 AM

## 2024-09-20 ENCOUNTER — TRANSFERRED RECORDS (OUTPATIENT)
Dept: HEALTH INFORMATION MANAGEMENT | Facility: OTHER | Age: 79
End: 2024-09-20

## 2024-09-20 ENCOUNTER — DOCUMENTATION ONLY (OUTPATIENT)
Dept: OTHER | Facility: CLINIC | Age: 79
End: 2024-09-20
Payer: COMMERCIAL

## 2024-09-20 ENCOUNTER — APPOINTMENT (OUTPATIENT)
Dept: GENERAL RADIOLOGY | Facility: OTHER | Age: 79
End: 2024-09-20
Attending: FAMILY MEDICINE
Payer: MEDICARE

## 2024-09-20 ENCOUNTER — ANTICOAGULATION THERAPY VISIT (OUTPATIENT)
Dept: ANTICOAGULATION | Facility: OTHER | Age: 79
End: 2024-09-20
Attending: FAMILY MEDICINE
Payer: COMMERCIAL

## 2024-09-20 ENCOUNTER — APPOINTMENT (OUTPATIENT)
Dept: CT IMAGING | Facility: OTHER | Age: 79
End: 2024-09-20
Attending: FAMILY MEDICINE
Payer: MEDICARE

## 2024-09-20 ENCOUNTER — HOSPITAL ENCOUNTER (EMERGENCY)
Facility: OTHER | Age: 79
Discharge: OTHER INSTITUTION | End: 2024-09-21
Attending: FAMILY MEDICINE
Payer: MEDICARE

## 2024-09-20 DIAGNOSIS — Z79.01 ANTICOAGULATION GOAL OF INR 2 TO 3: ICD-10-CM

## 2024-09-20 DIAGNOSIS — Z98.890 STATUS POST-OPERATIVE REPAIR OF CLOSED FRACTURE OF LEFT HIP: ICD-10-CM

## 2024-09-20 DIAGNOSIS — M25.552 HIP PAIN, LEFT: ICD-10-CM

## 2024-09-20 DIAGNOSIS — I48.0 PAROXYSMAL ATRIAL FIBRILLATION (H): Primary | ICD-10-CM

## 2024-09-20 DIAGNOSIS — Z51.81 ANTICOAGULATION GOAL OF INR 2 TO 3: ICD-10-CM

## 2024-09-20 DIAGNOSIS — Z87.81 STATUS POST-OPERATIVE REPAIR OF CLOSED FRACTURE OF LEFT HIP: ICD-10-CM

## 2024-09-20 LAB
ALBUMIN UR-MCNC: NEGATIVE MG/DL
ANION GAP SERPL CALCULATED.3IONS-SCNC: 7 MMOL/L (ref 7–15)
APPEARANCE UR: ABNORMAL
BACTERIA #/AREA URNS HPF: ABNORMAL /HPF
BASOPHILS # BLD AUTO: 0 10E3/UL (ref 0–0.2)
BASOPHILS NFR BLD AUTO: 0 %
BILIRUB UR QL STRIP: NEGATIVE
BUN SERPL-MCNC: 16.1 MG/DL (ref 8–23)
CALCIUM SERPL-MCNC: 9.8 MG/DL (ref 8.8–10.4)
CHLORIDE SERPL-SCNC: 102 MMOL/L (ref 98–107)
COLOR UR AUTO: YELLOW
CREAT SERPL-MCNC: 0.78 MG/DL (ref 0.51–0.95)
EGFRCR SERPLBLD CKD-EPI 2021: 76 ML/MIN/1.73M2
EOSINOPHIL # BLD AUTO: 0.2 10E3/UL (ref 0–0.7)
EOSINOPHIL NFR BLD AUTO: 1 %
ERYTHROCYTE [DISTWIDTH] IN BLOOD BY AUTOMATED COUNT: 14.6 % (ref 10–15)
GLUCOSE SERPL-MCNC: 127 MG/DL (ref 70–99)
GLUCOSE UR STRIP-MCNC: NEGATIVE MG/DL
HCO3 SERPL-SCNC: 31 MMOL/L (ref 22–29)
HCT VFR BLD AUTO: 34.2 % (ref 35–47)
HGB BLD-MCNC: 11.4 G/DL (ref 11.7–15.7)
HGB UR QL STRIP: NEGATIVE
HOLD SPECIMEN: NORMAL
IMM GRANULOCYTES # BLD: 0.1 10E3/UL
IMM GRANULOCYTES NFR BLD: 1 %
INR (EXTERNAL): 1.4 (ref 0.9–1.1)
KETONES UR STRIP-MCNC: NEGATIVE MG/DL
LEUKOCYTE ESTERASE UR QL STRIP: NEGATIVE
LYMPHOCYTES # BLD AUTO: 1.5 10E3/UL (ref 0.8–5.3)
LYMPHOCYTES NFR BLD AUTO: 11 %
MCH RBC QN AUTO: 32.8 PG (ref 26.5–33)
MCHC RBC AUTO-ENTMCNC: 33.3 G/DL (ref 31.5–36.5)
MCV RBC AUTO: 98 FL (ref 78–100)
MONOCYTES # BLD AUTO: 1.3 10E3/UL (ref 0–1.3)
MONOCYTES NFR BLD AUTO: 10 %
MUCOUS THREADS #/AREA URNS LPF: PRESENT /LPF
NEUTROPHILS # BLD AUTO: 9.8 10E3/UL (ref 1.6–8.3)
NEUTROPHILS NFR BLD AUTO: 77 %
NITRATE UR QL: NEGATIVE
NRBC # BLD AUTO: 0 10E3/UL
NRBC BLD AUTO-RTO: 0 /100
PH UR STRIP: 7.5 [PH] (ref 5–9)
PLATELET # BLD AUTO: 249 10E3/UL (ref 150–450)
POTASSIUM SERPL-SCNC: 3.7 MMOL/L (ref 3.4–5.3)
RBC # BLD AUTO: 3.48 10E6/UL (ref 3.8–5.2)
RBC URINE: 2 /HPF
SODIUM SERPL-SCNC: 140 MMOL/L (ref 135–145)
SP GR UR STRIP: 1.01 (ref 1–1.03)
SQUAMOUS EPITHELIAL: 14 /HPF
UROBILINOGEN UR STRIP-MCNC: 2 MG/DL
WBC # BLD AUTO: 12.8 10E3/UL (ref 4–11)
WBC URINE: 1 /HPF

## 2024-09-20 PROCEDURE — 250N000013 HC RX MED GY IP 250 OP 250 PS 637: Performed by: FAMILY MEDICINE

## 2024-09-20 PROCEDURE — 99285 EMERGENCY DEPT VISIT HI MDM: CPT | Performed by: FAMILY MEDICINE

## 2024-09-20 PROCEDURE — 85004 AUTOMATED DIFF WBC COUNT: CPT | Performed by: FAMILY MEDICINE

## 2024-09-20 PROCEDURE — 72170 X-RAY EXAM OF PELVIS: CPT

## 2024-09-20 PROCEDURE — 73700 CT LOWER EXTREMITY W/O DYE: CPT | Mod: LT,MA

## 2024-09-20 PROCEDURE — 36415 COLL VENOUS BLD VENIPUNCTURE: CPT | Performed by: FAMILY MEDICINE

## 2024-09-20 PROCEDURE — 81001 URINALYSIS AUTO W/SCOPE: CPT | Performed by: FAMILY MEDICINE

## 2024-09-20 PROCEDURE — 99285 EMERGENCY DEPT VISIT HI MDM: CPT | Mod: 25 | Performed by: FAMILY MEDICINE

## 2024-09-20 PROCEDURE — 80048 BASIC METABOLIC PNL TOTAL CA: CPT | Performed by: FAMILY MEDICINE

## 2024-09-20 RX ORDER — MORPHINE SULFATE 2 MG/ML
2 INJECTION, SOLUTION INTRAMUSCULAR; INTRAVENOUS
Status: DISCONTINUED | OUTPATIENT
Start: 2024-09-20 | End: 2024-09-21 | Stop reason: HOSPADM

## 2024-09-20 RX ORDER — ACETAMINOPHEN 500 MG
1000 TABLET ORAL ONCE
Status: COMPLETED | OUTPATIENT
Start: 2024-09-20 | End: 2024-09-20

## 2024-09-20 RX ORDER — ACETAMINOPHEN 500 MG
1000 TABLET ORAL EVERY 6 HOURS PRN
Status: DISCONTINUED | OUTPATIENT
Start: 2024-09-20 | End: 2024-09-21 | Stop reason: HOSPADM

## 2024-09-20 RX ADMIN — ACETAMINOPHEN 1000 MG: 500 TABLET, FILM COATED ORAL at 17:04

## 2024-09-20 RX ADMIN — ACETAMINOPHEN 1000 MG: 500 TABLET, FILM COATED ORAL at 23:06

## 2024-09-20 ASSESSMENT — ACTIVITIES OF DAILY LIVING (ADL)
ADLS_ACUITY_SCORE: 37

## 2024-09-20 NOTE — PROGRESS NOTES
ANTICOAGULATION MANAGEMENT     Kate Chacon 79 year old female is on warfarin with subtherapeutic INR result. (Goal INR 2.0-3.0)    Recent labs: (last 7 days)     09/20/24  1105   INR 1.4*       ASSESSMENT     Source(s): Chart Review and Home Care/Facility Nurse     Warfarin doses taken: While hospitalized on 9/13-9/17: reversed with vitamin k 10 mg on 9/14.  Discharged on: home regimen continued  Diet: No new diet changes identified  Medication/supplement changes:  increased tylenol  New illness, injury, or hospitalization: Yes: Fx left femur after a fall  Signs or symptoms of bleeding or clotting: No  Previous result: Subtherapeutic  Additional findings: None       PLAN     Recommended plan for temporary change(s) affecting INR     Dosing Instructions: booster dose then continue your current warfarin dose with next INR in 4 days       Summary  As of 9/20/2024      Full warfarin instructions:  9/20: 5 mg; Otherwise 2.5 mg every Fri; 5 mg all other days   Next INR check:  9/24/2024               Faxed dosing and follow up instructions to [facility]    Orders given to  Homecare nurse/facility to recheck    Patient not here, [employer] with screening questions and current INR received via FAX from outside agency. Results reviewed, Warfarin dosing per protocol, and recommended follow-up appointment made. Paperwork FAXED back to facility.       Plan made per [facility] anticoagulation protocol    Radha Burns RN  9/20/2024  Anticoagulation Clinic  [facility] for routing messages: p [facility]          _______________________________________________________________________     Anticoagulation Episode Summary       Current INR goal:  2.0-3.0   TTR:  81.6% (1 y)   Target end date:  Indefinite   Send INR reminders to:  [facility]    Indications    Paroxysmal atrial fibrillation (H) [I48.0]  Anticoagulation goal of INR 2 to 3 [Z51.81  Z79.01]             Comments:                Anticoagulation Care Providers       Provider Role Specialty Phone number    Asher Campos MD Referring Family Medicine [phone]    No Ref-Primary, Physician Referring

## 2024-09-20 NOTE — ED TRIAGE NOTES
"Pt presents to ED after hearing a \"Pop\" during pt at [facility].10/10 pain with movement.     Triage Assessment (Adult)       Row Name 09/20/24 1371          Triage Assessment    Airway WDL WDL        Respiratory WDL    Respiratory WDL WDL        Skin Circulation/Temperature WDL    Skin Circulation/Temperature WDL WDL        Cardiac WDL    Cardiac WDL WDL        Peripheral/Neurovascular WDL    Peripheral Neurovascular WDL X        Cognitive/Neuro/Behavioral WDL    Cognitive/Neuro/Behavioral WDL WDL                     "

## 2024-09-20 NOTE — ED PROVIDER NOTES
History     Chief Complaint   Patient presents with    Post-op Problem     The history is provided by the patient, the EMS personnel and medical records.     Kate Chacon is a 79 year old female here by EMS with left hip pain. She is staying at [facility] for rehab, and has been there since Sept 18.  Today she was in a wheelchair and the therapist was lifting her knee, flexing the left hip. They heard a pop and she had immediate pain. Since then she has 10 of 10 pain with any movement of the left hip.     She had a left displaced femoral neck fracture treated with intertrochanteric nailing Sept 14 at [facility], Dr Thakur.     Allergies:  Allergies   Allergen Reactions    Methylpar-Na Bisulfite-Pentazocine Unknown     jittery       Problem List:    Patient Active Problem List    Diagnosis Date Noted    Closed hip fracture, left, sequela 09/19/2024     Priority: Medium     Left displaced femoral neck fracture with surgical repair done 9/14/24 by Dr Thakur at [facility]      Hyperparathyroidism, primary (H24) 01/09/2024     Priority: Medium    Hemiparesis due to old stroke - right side is weaker 10/19/2020     Priority: Medium    Compression fracture of T10 vertebra with routine healing, subsequent encounter 10/19/2020     Priority: Medium    History of CVA (cerebrovascular accident) 10/19/2020     Priority: Medium    Physical deconditioning 10/19/2020     Priority: Medium    Lumbar radiculopathy 10/19/2020     Priority: Medium    Chondrodermatitis nodularis chronica helicis, right 10/09/2020     Priority: Medium    SA node dysfunction s/p pacer on 11/2/2017 at [facility] 08/13/2020     Priority: Medium    H/O sinus pause 08/13/2020     Priority: Medium    History of tobacco abuse quitting on 10/14/16 08/13/2020     Priority: Medium    Dyspnea on exertion 08/13/2020     Priority: Medium    On Coumadin for atrial fibrillation (H) 08/13/2020     Priority: Medium    Mixed hyperlipidemia 08/13/2020      Priority: Medium    CKD (chronic kidney disease) stage 3, GFR 30-59 ml/min (H) 08/13/2020     Priority: Medium    Chronic midline low back pain without sciatica 08/13/2020     Priority: Medium    Sinoatrial node dysfunction (H) 05/01/2018     Priority: Medium    Cardiac pacemaker 02/19/2018     Priority: Medium    Urge incontinence 10/05/2017     Priority: Medium    Former smoker 08/30/2017     Priority: Medium    MDD (recurrent major depressive disorder) in remission (H24) 08/30/2017     Priority: Medium    Anxiety 01/05/2017     Priority: Medium    Essential hypertension 12/08/2016     Priority: Medium    Paroxysmal atrial fibrillation (H) 11/02/2016     Priority: Medium    Anticoagulation goal of INR 2 to 3 10/28/2016     Priority: Medium    History of ischemic stroke on 10/15/2016 secondary to embolism 10/15/2016     Priority: Medium    Diverticulosis of large intestine 04/04/2011     Priority: Medium    H/O adenomatous polyp of colon 03/02/2011     Priority: Medium    Osteoporosis 02/08/2006     Priority: Medium        Past Medical History:    Past Medical History:   Diagnosis Date    Encounter for full-term uncomplicated delivery     Ganglion of wrist     Gastritis without bleeding        Past Surgical History:    Past Surgical History:   Procedure Laterality Date    APPENDECTOMY OPEN      No Comments Provided    COLONOSCOPY  08/19/2005 8/19/05,Cecal biopsy with tubular adenoma low grade dysplasia.    COLONOSCOPY  04/04/2011 4/4/11    COLONOSCOPY  05/07/2012    Tubular Adenomas F/U 2017    COLONOSCOPY  12/02/2019    F/U N/A as will be over 78 in 2024. Tubular adenoma    ESOPHAGOSCOPY, GASTROSCOPY, DUODENOSCOPY (EGD), COMBINED      8/19/05    OTHER SURGICAL HISTORY      8/3/05,546100,OTHER,helices on the right ear    TONSILLECTOMY      No Comments Provided       Family History:    Family History   Problem Relation Age of Onset    Diabetes Father         Diabetes    Hypertension Father         Hypertension  "   Other - See Comments Mother         h/o coronary artery disease/dementia    Asthma Mother         Asthma    Diabetes Maternal Grandmother         Diabetes    Cancer Maternal Aunt         Cancer,Uterine    Breast Cancer No family hx of         Cancer-breast       Social History:  Marital Status:   [2]  Social History     Tobacco Use    Smoking status: Former     Current packs/day: 0.00     Average packs/day: 0.5 packs/day for 49.0 years (24.5 ttl pk-yrs)     Types: Cigarettes     Start date: 10/14/1967     Quit date: 10/14/2016     Years since quittin.9     Passive exposure: Past    Smokeless tobacco: Never   Vaping Use    Vaping status: Never Used   Substance Use Topics    Alcohol use: No    Drug use: No        Medications:    acetaminophen (TYLENOL) 500 MG tablet  atorvastatin (LIPITOR) 40 MG tablet  calcium carbonate-vitamin D 600-200 MG-UNIT TABS  cyanocobalamin (VITAMIN B-12) 100 MCG tablet  HYDROmorphone (DILAUDID) 2 MG tablet  lisinopril (ZESTRIL) 20 MG tablet  metoprolol succinate ER (TOPROL XL) 50 MG 24 hr tablet  sertraline (ZOLOFT) 50 MG tablet  trospium (SANCTURA) 20 MG tablet  Vitamin D, Cholecalciferol, 25 MCG (1000 UT) CAPS  warfarin ANTICOAGULANT (COUMADIN) 5 MG tablet      Review of Systems   Musculoskeletal:         Left hip pain   All other systems reviewed and are negative.      Physical Exam   BP: (!) 161/90  Pulse: 68  Temp: 98.9  F (37.2  C)  Height: 167.6 cm (5' 6\")  Weight: 86.2 kg (190 lb)  SpO2: 96 %      Physical Exam  Vitals and nursing note reviewed.   Constitutional:       General: She is not in acute distress.     Appearance: She is not ill-appearing.   Cardiovascular:      Rate and Rhythm: Normal rate and regular rhythm.      Pulses: Normal pulses.      Heart sounds: Normal heart sounds.   Pulmonary:      Effort: Pulmonary effort is normal. No respiratory distress.      Breath sounds: Normal breath sounds.   Musculoskeletal:         General: Tenderness present.      " Comments: She has tenderness of left hip with palpation and pain with PROM of the left hip.   Skin:     General: Skin is warm and dry.      Comments: Two left hip surgical sites have bandages with some blood in them, no active bleeding, wounds look good.   Neurological:      Mental Status: She is alert.         Results for orders placed or performed during the hospital encounter of 09/20/24 (from the past 24 hour(s))   UA with Microscopic reflex to Culture    Specimen: Urine, Clean Catch   Result Value Ref Range    Color Urine Yellow Colorless, Straw, Light Yellow, Yellow    Appearance Urine Slightly Cloudy (A) Clear    Glucose Urine Negative Negative mg/dL    Bilirubin Urine Negative Negative    Ketones Urine Negative Negative mg/dL    Specific Gravity Urine 1.015 1.000 - 1.030    Blood Urine Negative Negative    pH Urine 7.5 5.0 - 9.0    Protein Albumin Urine Negative Negative mg/dL    Urobilinogen Urine 2.0 Normal, 2.0 mg/dL    Nitrite Urine Negative Negative    Leukocyte Esterase Urine Negative Negative    Bacteria Urine Few (A) None Seen /HPF    Mucus Urine Present (A) None Seen /LPF    RBC Urine 2 <=2 /HPF    WBC Urine 1 <=5 /HPF    Squamous Epithelials Urine 14 (H) <=1 /HPF    Narrative    Urine Culture not indicated   CBC with platelets differential    Narrative    The following orders were created for panel order CBC with platelets differential.  Procedure                               Abnormality         Status                     ---------                               -----------         ------                     CBC with platelets and d...[[id number]]  Abnormal            Final result                 Please view results for these tests on the individual orders.   Basic metabolic panel   Result Value Ref Range    Sodium 140 135 - 145 mmol/L    Potassium 3.7 3.4 - 5.3 mmol/L    Chloride 102 98 - 107 mmol/L    Carbon Dioxide (CO2) 31 (H) 22 - 29 mmol/L    Anion Gap 7 7 - 15 mmol/L    Urea Nitrogen 16.1 8.0  - 23.0 mg/dL    Creatinine 0.78 0.51 - 0.95 mg/dL    GFR Estimate 76 >60 mL/min/1.73m2    Calcium 9.8 8.8 - 10.4 mg/dL    Glucose 127 (H) 70 - 99 mg/dL   [place] Draw    Narrative    The following orders were created for panel order [place] Draw.  Procedure                               Abnormality         Status                     ---------                               -----------         ------                     Extra Blue Top Tube[[id number]]                              Final result               Extra Red Top Tube[[id number]]                               Final result               Extra Green Top (Lithium...[[id number]]                      Final result                 Please view results for these tests on the individual orders.   CBC with platelets and differential   Result Value Ref Range    WBC Count 12.8 (H) 4.0 - 11.0 10e3/uL    RBC Count 3.48 (L) 3.80 - 5.20 10e6/uL    Hemoglobin 11.4 (L) 11.7 - 15.7 g/dL    Hematocrit 34.2 (L) 35.0 - 47.0 %    MCV 98 78 - 100 fL    MCH 32.8 26.5 - 33.0 pg    MCHC 33.3 31.5 - 36.5 g/dL    RDW 14.6 10.0 - 15.0 %    Platelet Count 249 150 - 450 10e3/uL    % Neutrophils 77 %    % Lymphocytes 11 %    % Monocytes 10 %    % Eosinophils 1 %    % Basophils 0 %    % Immature Granulocytes 1 %    NRBCs per 100 WBC 0 <1 /100    Absolute Neutrophils 9.8 (H) 1.6 - 8.3 10e3/uL    Absolute Lymphocytes 1.5 0.8 - 5.3 10e3/uL    Absolute Monocytes 1.3 0.0 - 1.3 10e3/uL    Absolute Eosinophils 0.2 0.0 - 0.7 10e3/uL    Absolute Basophils 0.0 0.0 - 0.2 10e3/uL    Absolute Immature Granulocytes 0.1 <=0.4 10e3/uL    Absolute NRBCs 0.0 10e3/uL   Extra Blue Top Tube   Result Value Ref Range    Hold Specimen x    Extra Red Top Tube   Result Value Ref Range    Hold Specimen x    Extra Green Top (Lithium Heparin) Tube   Result Value Ref Range    Hold Specimen x    XR Pelvis Port 1/2 Views    Narrative    XR PELVIS PORT 1/2 VIEWS    HISTORY: 79 years Female left sided pelvic pain    COMPARISON:  9/13/2024    TECHNIQUE: Pelvis and left hip 2 views    FINDINGS: There has been dynamic intramedullary fixation of the  comminuted intertrochanteric fracture of the left hip. There is  anatomic alignment of fracture fragments. Soft tissue air is present.  Skin closure staples are present.      Impression    IMPRESSION: Postop dynamic intramedullary fixation of  intertrochanteric fracture of the left hip.    ELIZABETH PORTILLO MD         SYSTEM ID:  [id number]   CT Hip Left w/o Contrast    Narrative    CT HIP LEFT W/O CONTRAST    HISTORY: 79 years Female left hip pain (metal reduction please)  ere  by EMS with left hip pain. She is staying at [facility] for rehab,  and has been there since Sept 18.  Today she was in a wheelchair and  the therapist was lifting her knee, flexing the left hip. They heard a  pop and she had immediate pain. Since then she has 10 of 10 pain with  any movement of the left hip.   ?  She had a left displaced femoral neck fracture treated with  intertrochanteric nailing Sept 14 at Dr Blaze Bravo.     COMPARISON: Plain films 9/20/2024, 9/13/2024    TECHNIQUE: Axial CT imaging of the left hip was performed. Coronal and  sagittal reconstructions were obtained. This exam was performed using  one or more of the following dose reduction techniques:  Automated exposure control, adjustment of the RAAD and/or KV according  to patient's size, and/or use of iterative reconstruction technique.      FINDINGS: There is a comminuted intertrochanteric fracture of the left  hip. There is intramedullary dynamic fixation. Skin closure staples  and soft tissue air is present.    There is some subcutaneous and deeper soft tissue edema but there is  no evidence of an organized abscess. There is no evidence of a  significant soft tissue hematoma.    Orthopedic fixation hardware is well seated. There is no evidence of  hardware displacement. There is anatomic alignment of the various  fracture fragments.  "Alignment is improved over 9/13/2024        Impression    IMPRESSION: Dynamic intramedullary fixation of comminuted  intertrochanteric fracture of the left femur. Fracture fragment  alignment is grossly anatomic. Alignment is improved over the  prefixation images. Because the surgery was performed at an outside  institution, it is uncertain if there is a significant change in  fracture alignment now compared with the immediate postoperative  alignment.    Postoperative air within the soft tissues. No evidence of abscess or  significant hematoma.    ELIZABETH PORTILLO MD         SYSTEM ID:  [id number]       Medications   acetaminophen (TYLENOL) tablet 1,000 mg (1,000 mg Oral $Given 9/20/24 [id number])       Assessments & Plan (with Medical Decision Making)  Kate Chacon is a 79 year old female here by EMS with left hip pain. She is staying at [facility] for rehab, and has been there since Sept 18.  Today she was in a wheelchair and the therapist was lifting her knee, flexing the left hip. They heard a pop and she had immediate pain. Since then she has 10 of 10 pain with any movement of the left hip.   She had a left displaced femoral neck fracture treated with intertrochanteric nailing Sept 14 at [facility], Dr Thakur.   VS in the ED /66   Pulse 61   Temp 98.9  F (37.2  C) (Tympanic)   Ht 1.676 m (5' 6\")   Wt 86.2 kg (190 lb)   LMP  (LMP Unknown)   SpO2 94%   BMI 30.67 kg/m    Exam shows left hip pain with PROM of the left hip.  Surgical sites look good.  Otherwise exam normal.   She just wanted Tylenol for pain.  5:50 PM  Xray shows the IT pin, no comment on acute injury.  I pushed images to [facility]  Labs show CBC with WBC 12,800, hgb 11.4, BMP okay, UA negative for UTI.  6:59 PM  I got a call back from Dr Gonzalez, orthopedics, and it looks like there is new fracture with some displacement.  She would like a CT of the pelvis so they can tell if the hardware has shifted and might need revision " vs leaving the implant in place and expect healing.   7:51 PM  CT done and images pushed to .  8:44 PM  I got a call back from Dr Gonzalez who feels this patient needs to go back to the OR. She would like her transferred to [facility]. I spoke with the ED physician and we will try to transfer her to [place] when able to get transfer.     I have reviewed the nursing notes.    I have reviewed the findings, diagnosis, plan and need for follow up with the patient.  Medical Decision Making  The patient's presentation was of moderate complexity (an undiagnosed new problem with uncertain prognosis).    The patient's evaluation involved:  an assessment requiring an independent historian (see separate area of note for details)  review of external note(s) from 1 sources (see separate area of note for details)  review of 1 test result(s) ordered prior to this encounter (see separate area of note for details)  ordering and/or review of 2 test(s) in this encounter (see separate area of note for details)  discussion of management or test interpretation with another health professional (see separate area of note for details)    The patient's management necessitated further care after sign-out to Dr Juarez (see their note for further management).        Final diagnoses:   Hip pain, left   Status post-operative repair of closed fracture of left hip       9/20/2024   [facility]       Elias Parks MD  09/20/24 2032       Elias Parks MD  09/20/24 2045

## 2024-09-21 VITALS
DIASTOLIC BLOOD PRESSURE: 94 MMHG | OXYGEN SATURATION: 92 % | SYSTOLIC BLOOD PRESSURE: 149 MMHG | WEIGHT: 190 LBS | HEIGHT: 66 IN | RESPIRATION RATE: 18 BRPM | TEMPERATURE: 98.9 F | BODY MASS INDEX: 30.53 KG/M2 | HEART RATE: 63 BPM

## 2024-09-21 PROCEDURE — 250N000013 HC RX MED GY IP 250 OP 250 PS 637: Performed by: FAMILY MEDICINE

## 2024-09-21 RX ADMIN — ACETAMINOPHEN 1000 MG: 500 TABLET, FILM COATED ORAL at 05:46

## 2024-09-21 ASSESSMENT — ACTIVITIES OF DAILY LIVING (ADL)
ADLS_ACUITY_SCORE: 37

## 2024-09-21 NOTE — ED PROVIDER NOTES
History     Chief Complaint   Patient presents with    Post-op Problem     HPI  Kate Chacon is a 79 year old female who I assumed care of at shift change.  Patient transferring to higher level of care for fall and possible hardware malfunction or new fracture of femoral neck.    Allergies:  Allergies   Allergen Reactions    Methylpar-Na Bisulfite-Pentazocine Unknown     jittery       Problem List:    Patient Active Problem List    Diagnosis Date Noted    Closed hip fracture, left, sequela 09/19/2024     Priority: Medium     Left displaced femoral neck fracture with surgical repair done 9/14/24 by Dr Thakur at [facility]      Hyperparathyroidism, primary (H24) 01/09/2024     Priority: Medium    Hemiparesis due to old stroke - right side is weaker 10/19/2020     Priority: Medium    Compression fracture of T10 vertebra with routine healing, subsequent encounter 10/19/2020     Priority: Medium    History of CVA (cerebrovascular accident) 10/19/2020     Priority: Medium    Physical deconditioning 10/19/2020     Priority: Medium    Lumbar radiculopathy 10/19/2020     Priority: Medium    Chondrodermatitis nodularis chronica helicis, right 10/09/2020     Priority: Medium    SA node dysfunction s/p pacer on 11/2/2017 [facility] 08/13/2020     Priority: Medium    H/O sinus pause 08/13/2020     Priority: Medium    History of tobacco abuse quitting on 10/14/16 08/13/2020     Priority: Medium    Dyspnea on exertion 08/13/2020     Priority: Medium    On Coumadin for atrial fibrillation (H) 08/13/2020     Priority: Medium    Mixed hyperlipidemia 08/13/2020     Priority: Medium    CKD (chronic kidney disease) stage 3, GFR 30-59 ml/min (H) 08/13/2020     Priority: Medium    Chronic midline low back pain without sciatica 08/13/2020     Priority: Medium    Sinoatrial node dysfunction (H) 05/01/2018     Priority: Medium    Cardiac pacemaker 02/19/2018     Priority: Medium    Urge incontinence 10/05/2017     Priority:  Medium    Former smoker 08/30/2017     Priority: Medium    MDD (recurrent major depressive disorder) in remission (H24) 08/30/2017     Priority: Medium    Anxiety 01/05/2017     Priority: Medium    Essential hypertension 12/08/2016     Priority: Medium    Paroxysmal atrial fibrillation (H) 11/02/2016     Priority: Medium    Anticoagulation goal of INR 2 to 3 10/28/2016     Priority: Medium    History of ischemic stroke on 10/15/2016 secondary to embolism 10/15/2016     Priority: Medium    Diverticulosis of large intestine 04/04/2011     Priority: Medium    H/O adenomatous polyp of colon 03/02/2011     Priority: Medium    Osteoporosis 02/08/2006     Priority: Medium        Past Medical History:    Past Medical History:   Diagnosis Date    Encounter for full-term uncomplicated delivery     Ganglion of wrist     Gastritis without bleeding        Past Surgical History:    Past Surgical History:   Procedure Laterality Date    APPENDECTOMY OPEN      No Comments Provided    COLONOSCOPY  08/19/2005 8/19/05,Cecal biopsy with tubular adenoma low grade dysplasia.    COLONOSCOPY  04/04/2011 4/4/11    COLONOSCOPY  05/07/2012    Tubular Adenomas F/U 2017    COLONOSCOPY  12/02/2019    F/U N/A as will be over 78 in 2024. Tubular adenoma    ESOPHAGOSCOPY, GASTROSCOPY, DUODENOSCOPY (EGD), COMBINED      8/19/05    OTHER SURGICAL HISTORY      8/3/05,[id number],OTHER,helices on the right ear    TONSILLECTOMY      No Comments Provided       Family History:    Family History   Problem Relation Age of Onset    Diabetes Father         Diabetes    Hypertension Father         Hypertension    Other - See Comments Mother         h/o coronary artery disease/dementia    Asthma Mother         Asthma    Diabetes Maternal Grandmother         Diabetes    Cancer Maternal Aunt         Cancer,Uterine    Breast Cancer No family hx of         Cancer-breast       Social History:  Marital Status:   [2]  Social History     Tobacco Use    Smoking  "status: Former     Current packs/day: 0.00     Average packs/day: 0.5 packs/day for 49.0 years (24.5 ttl pk-yrs)     Types: Cigarettes     Start date: 10/14/1967     Quit date: 10/14/2016     Years since quittin.9     Passive exposure: Past    Smokeless tobacco: Never   Vaping Use    Vaping status: Never Used   Substance Use Topics    Alcohol use: No    Drug use: No        Medications:    acetaminophen (TYLENOL) 500 MG tablet  atorvastatin (LIPITOR) 40 MG tablet  calcium carbonate-vitamin D 600-200 MG-UNIT TABS  cyanocobalamin (VITAMIN B-12) 100 MCG tablet  HYDROmorphone (DILAUDID) 2 MG tablet  lisinopril (ZESTRIL) 20 MG tablet  metoprolol succinate ER (TOPROL XL) 50 MG 24 hr tablet  sertraline (ZOLOFT) 50 MG tablet  trospium (SANCTURA) 20 MG tablet  Vitamin D, Cholecalciferol, 25 MCG (1000 UT) CAPS  warfarin ANTICOAGULANT (COUMADIN) 5 MG tablet          Review of Systems    Physical Exam   BP: (!) 161/90  Pulse: 68  Temp: 98.9  F (37.2  C)  Resp: 18  Height: 167.6 cm (5' 6\")  Weight: 86.2 kg (190 lb)  SpO2: 96 %      Physical Exam    ED Course     ED Course as of 24 1008   Sat Sep 21, 2024   0759 Sign out at shift change. Accepted to [facility]. Waiting for a bed   0801 Waiting for transfer     Procedures              Critical Care time:  none               Results for orders placed or performed during the hospital encounter of 24 (from the past 24 hour(s))   UA with Microscopic reflex to Culture    Specimen: Urine, Clean Catch   Result Value Ref Range    Color Urine Yellow Colorless, Straw, Light Yellow, Yellow    Appearance Urine Slightly Cloudy (A) Clear    Glucose Urine Negative Negative mg/dL    Bilirubin Urine Negative Negative    Ketones Urine Negative Negative mg/dL    Specific Gravity Urine 1.015 1.000 - 1.030    Blood Urine Negative Negative    pH Urine 7.5 5.0 - 9.0    Protein Albumin Urine Negative Negative mg/dL    Urobilinogen Urine 2.0 Normal, 2.0 mg/dL    Nitrite Urine Negative " Negative    Leukocyte Esterase Urine Negative Negative    Bacteria Urine Few (A) None Seen /HPF    Mucus Urine Present (A) None Seen /LPF    RBC Urine 2 <=2 /HPF    WBC Urine 1 <=5 /HPF    Squamous Epithelials Urine 14 (H) <=1 /HPF    Narrative    Urine Culture not indicated   CBC with platelets differential    Narrative    The following orders were created for panel order CBC with platelets differential.  Procedure                               Abnormality         Status                     ---------                               -----------         ------                     CBC with platelets and d...[[id number]]  Abnormal            Final result                 Please view results for these tests on the individual orders.   Basic metabolic panel   Result Value Ref Range    Sodium 140 135 - 145 mmol/L    Potassium 3.7 3.4 - 5.3 mmol/L    Chloride 102 98 - 107 mmol/L    Carbon Dioxide (CO2) 31 (H) 22 - 29 mmol/L    Anion Gap 7 7 - 15 mmol/L    Urea Nitrogen 16.1 8.0 - 23.0 mg/dL    Creatinine 0.78 0.51 - 0.95 mg/dL    GFR Estimate 76 >60 mL/min/1.73m2    Calcium 9.8 8.8 - 10.4 mg/dL    Glucose 127 (H) 70 - 99 mg/dL   [facility] Draw    Narrative    The following orders were created for panel order [facility] Draw.  Procedure                               Abnormality         Status                     ---------                               -----------         ------                     Extra Blue Top Tube[[id number]]                              Final result               Extra Red Top Tube[[id number]]                               Final result               Extra Green Top (Lithium...[[id number]]                      Final result                 Please view results for these tests on the individual orders.   CBC with platelets and differential   Result Value Ref Range    WBC Count 12.8 (H) 4.0 - 11.0 10e3/uL    RBC Count 3.48 (L) 3.80 - 5.20 10e6/uL    Hemoglobin 11.4 (L) 11.7 - 15.7 g/dL    Hematocrit 34.2 (L) 35.0 - 47.0  %    MCV 98 78 - 100 fL    MCH 32.8 26.5 - 33.0 pg    MCHC 33.3 31.5 - 36.5 g/dL    RDW 14.6 10.0 - 15.0 %    Platelet Count 249 150 - 450 10e3/uL    % Neutrophils 77 %    % Lymphocytes 11 %    % Monocytes 10 %    % Eosinophils 1 %    % Basophils 0 %    % Immature Granulocytes 1 %    NRBCs per 100 WBC 0 <1 /100    Absolute Neutrophils 9.8 (H) 1.6 - 8.3 10e3/uL    Absolute Lymphocytes 1.5 0.8 - 5.3 10e3/uL    Absolute Monocytes 1.3 0.0 - 1.3 10e3/uL    Absolute Eosinophils 0.2 0.0 - 0.7 10e3/uL    Absolute Basophils 0.0 0.0 - 0.2 10e3/uL    Absolute Immature Granulocytes 0.1 <=0.4 10e3/uL    Absolute NRBCs 0.0 10e3/uL   Extra Blue Top Tube   Result Value Ref Range    Hold Specimen x    Extra Red Top Tube   Result Value Ref Range    Hold Specimen x    Extra Green Top (Lithium Heparin) Tube   Result Value Ref Range    Hold Specimen x    XR Pelvis Port 1/2 Views    Narrative    XR PELVIS PORT 1/2 VIEWS    HISTORY: 79 years Female left sided pelvic pain    COMPARISON: 9/13/2024    TECHNIQUE: Pelvis and left hip 2 views    FINDINGS: There has been dynamic intramedullary fixation of the  comminuted intertrochanteric fracture of the left hip. There is  anatomic alignment of fracture fragments. Soft tissue air is present.  Skin closure staples are present.      Impression    IMPRESSION: Postop dynamic intramedullary fixation of  intertrochanteric fracture of the left hip.    ELIZABETH PORTILLO MD         SYSTEM ID:  [id number]   CT Hip Left w/o Contrast    Narrative    CT HIP LEFT W/O CONTRAST    HISTORY: 79 years Female left hip pain (metal reduction please)  ere  by EMS with left hip pain. She is staying at [facility] for rehab,  and has been there since Sept 18.  Today she was in a wheelchair and  the therapist was lifting her knee, flexing the left hip. They heard a  pop and she had immediate pain. Since then she has 10 of 10 pain with  any movement of the left hip.   ?  She had a left displaced femoral neck fracture  [facility]     COMPARISON: Plain films 9/20/2024, 9/13/2024    TECHNIQUE: Axial CT imaging of the left hip was performed. Coronal and  sagittal reconstructions were obtained. This exam was performed using  one or more of the following dose reduction techniques:  Automated exposure control, adjustment of the RAAD and/or KV according  to patient's size, and/or use of iterative reconstruction technique.      FINDINGS: There is a comminuted intertrochanteric fracture of the left  hip. There is intramedullary dynamic fixation. Skin closure staples  and soft tissue air is present.    There is some subcutaneous and deeper soft tissue edema but there is  no evidence of an organized abscess. There is no evidence of a  significant soft tissue hematoma.    Orthopedic fixation hardware is well seated. There is no evidence of  hardware displacement. There is anatomic alignment of the various  fracture fragments. Alignment is improved over 9/13/2024        Impression    IMPRESSION: Dynamic intramedullary fixation of comminuted  intertrochanteric fracture of the left femur. Fracture fragment  alignment is grossly anatomic. Alignment is improved over the  prefixation images. Because the surgery was performed at an outside  institution, it is uncertain if there is a significant change in  fracture alignment now compared with the immediate postoperative  alignment.    Postoperative air within the soft tissues. No evidence of abscess or  significant hematoma.    ELIZABETH PORTILLO MD         SYSTEM ID:  [id number]       Medications   morphine (PF) injection 2 mg (has no administration in time range)   acetaminophen (TYLENOL) tablet 1,000 mg (1,000 mg Oral $Given 9/21/24 5022)   acetaminophen (TYLENOL) tablet 1,000 mg (1,000 mg Oral $Given 9/20/24 9201)       Assessments & Plan (with Medical Decision Making)     I have reviewed the nursing notes.    I have reviewed the  findings, diagnosis, plan and need for follow up with the patient.           Medical Decision Making  The patient's presentation was of high complexity (a chronic illness severe exacerbation, progression, or side effect of treatment).    The patient's evaluation involved:  review of external note(s) from 1 sources (see separate area of note for details)  review of 2 test result(s) ordered prior to this encounter (see separate area of note for details)  ordering and/or review of 3+ test(s) in this encounter (see separate area of note for details)    The patient's management necessitated high risk (a decision regarding hospitalization).        New Prescriptions    No medications on file       Final diagnoses:   Hip pain, left   Status post-operative repair of closed fracture of left hip   Transferring to higher level of care at this time.    9/20/2024   [facility]       Claire Queen DO  09/21/24 1008

## 2024-09-21 NOTE — ED PROVIDER NOTES
I did sign up for the patient on the track board as I was anticipating taking over for this patient but during handoff, orthopedics called back and Dr. Parks arranged acceptance to [facility] and transfer.  No medical intervention or medical change after shift change     Martinez Juarez MD  09/20/24 6490

## 2024-09-21 NOTE — ED NOTES
Pt has no needs at this time. States she would like to eat but understands she needs to stay NPO until she gets to [place] and knows the plan.

## 2024-09-24 ENCOUNTER — ANTICOAGULATION THERAPY VISIT (OUTPATIENT)
Dept: ANTICOAGULATION | Facility: OTHER | Age: 79
End: 2024-09-24
Attending: FAMILY MEDICINE
Payer: MEDICARE

## 2024-09-24 DIAGNOSIS — Z79.01 ANTICOAGULATION GOAL OF INR 2 TO 3: ICD-10-CM

## 2024-09-24 DIAGNOSIS — Z51.81 ANTICOAGULATION GOAL OF INR 2 TO 3: ICD-10-CM

## 2024-09-24 DIAGNOSIS — I48.0 PAROXYSMAL ATRIAL FIBRILLATION (H): Primary | ICD-10-CM

## 2024-09-24 LAB — INR (EXTERNAL): 1.4 (ref 0.9–1.1)

## 2024-09-24 NOTE — PROGRESS NOTES
ANTICOAGULATION MANAGEMENT     Kate Chacon 79 year old female is on warfarin with subtherapeutic INR result. (Goal INR 2.0-3.0)    Recent labs: (last 7 days)     09/24/24  0855   INR 1.4*       ASSESSMENT     Source(s): Chart Review and hospital discharge notes      Warfarin doses taken: While hospitalized on 9/20-9/24: anticoagulation calendar updated with inpatient dosing.  Discharged on: home regimen continued  Diet: No new diet changes identified  Medication/supplement changes: aspirin 81 mg two times a day for a total of 4 weeks postoperatively   New illness, injury, or hospitalization: Yes: pain after fx of left femur  Signs or symptoms of bleeding or clotting: No  Previous result: Subtherapeutic  Additional findings: None       PLAN     Recommended plan for temporary change(s) affecting INR     Dosing Instructions: Continue your current warfarin dose with next INR in 3 days       Summary  As of 9/24/2024      Full warfarin instructions:  2.5 mg every Fri; 5 mg all other days   Next INR check:  9/27/2024               Updating patients chart with discharge directions patient to go to [facility]. They will have discharge papers from [facility]    Orders given to  Homecare nurse/facility to recheck    Education provided: Please call back if any changes to your diet, medications or how you've been taking warfarin    Plan made per [facility] anticoagulation protocol    Radha Burns RN  9/24/2024  Anticoagulation Clinic  [employer] for routing messages: p [facility]          _______________________________________________________________________     Anticoagulation Episode Summary       Current INR goal:  2.0-3.0   TTR:  81.6% (1 y)   Target end date:  Indefinite   Send INR reminders to:  [facility]    Indications    Paroxysmal atrial fibrillation (H) [I48.0]  Anticoagulation goal of INR 2 to 3 [Z51.81  Z79.01]             Comments:               Anticoagulation Care Providers       Provider  Role Specialty Phone number    Kristine Jiménez MD Referring Family Medicine [phone]    Iglesia Ford MD Responsible Family Medicine [phone]

## 2024-09-26 ENCOUNTER — NURSING HOME VISIT (OUTPATIENT)
Dept: GERIATRICS | Facility: OTHER | Age: 79
End: 2024-09-26
Payer: COMMERCIAL

## 2024-09-26 ENCOUNTER — APPOINTMENT (OUTPATIENT)
Dept: GERIATRICS | Facility: OTHER | Age: 79
End: 2024-09-26
Attending: NURSE PRACTITIONER
Payer: MEDICARE

## 2024-09-26 DIAGNOSIS — I48.0 PAROXYSMAL ATRIAL FIBRILLATION (H): ICD-10-CM

## 2024-09-26 DIAGNOSIS — E21.0 HYPERPARATHYROIDISM, PRIMARY (H): ICD-10-CM

## 2024-09-26 DIAGNOSIS — S72.002S CLOSED HIP FRACTURE, LEFT, SEQUELA: Primary | ICD-10-CM

## 2024-09-26 DIAGNOSIS — I49.5 SA NODE DYSFUNCTION (H): ICD-10-CM

## 2024-09-26 DIAGNOSIS — Z95.0 CARDIAC PACEMAKER: ICD-10-CM

## 2024-09-26 DIAGNOSIS — F33.40 MDD (RECURRENT MAJOR DEPRESSIVE DISORDER) IN REMISSION (H): ICD-10-CM

## 2024-09-26 DIAGNOSIS — N39.41 URGE INCONTINENCE: ICD-10-CM

## 2024-09-26 DIAGNOSIS — I69.359 HEMIPARESIS DUE TO OLD STROKE (H): ICD-10-CM

## 2024-09-26 DIAGNOSIS — M80.00XA AGE-RELATED OSTEOPOROSIS WITH CURRENT PATHOLOGICAL FRACTURE, INITIAL ENCOUNTER: ICD-10-CM

## 2024-09-26 DIAGNOSIS — I48.91 ON COUMADIN FOR ATRIAL FIBRILLATION (H): ICD-10-CM

## 2024-09-26 DIAGNOSIS — E78.2 MIXED HYPERLIPIDEMIA: ICD-10-CM

## 2024-09-26 DIAGNOSIS — I10 ESSENTIAL HYPERTENSION: ICD-10-CM

## 2024-09-26 DIAGNOSIS — Z79.01 ON COUMADIN FOR ATRIAL FIBRILLATION (H): ICD-10-CM

## 2024-09-26 RX ORDER — SENNA AND DOCUSATE SODIUM 50; 8.6 MG/1; MG/1
2 TABLET, FILM COATED ORAL 2 TIMES DAILY
COMMUNITY
Start: 2024-09-26

## 2024-09-26 RX ORDER — TIZANIDINE 2 MG/1
2 TABLET ORAL 2 TIMES DAILY PRN
COMMUNITY
Start: 2024-09-26

## 2024-09-26 NOTE — PROGRESS NOTES
"[facility]  Hospital follow up/Readmission to Rehab    Patient Name: Kate Chacon   : 1945  MRN: [id number]    Place of Service: [facility]  DOS: 2024    CC: Hospital follow up/Readmission to Rehab    HPI:  Kate Chacon is a 79 year old female with PMH of multiple chronic medical concerns, who is seen today regarding pt was hospitalized -24 at [facility]. Pt had increased \"drainage from her left hip incision and severe left hip pain. She had been admitted from  -  with a left femur fracture, and underwent left hip trochanteric fixation with nailing with Dr. Thakur on . She was discharged to SNF on . While undergoing PT she was found to have extensive serosanguinous drainage from her incision, and then during a PROM exercise she felt a \"crack\" and had severe pain. Imaging in the ED revealed increased comminution and displacement of the femur, and she was transferred to [facility] for surgical evaluation. After both Dr Swanson and Dr Thakur review xr it was determined that a revision was not needed. She will return to [facility] for ongoing rehab.\"    Pt continues to be WBAT  On coumadin  F/u in the office on 10/29 with Gregg Desai PA-C     Pacemaker: due to SA node dysfunction;  cardiac device check  Hyperparathyroidism, primary (HCC)  Age related osteoporosis: outpatient Dexa Scan. Currently taking vit D and calcium  Essential hypertension: had soft blood pressures so lisinopril was put on hold at discharge, cont metoprolol succinate  Atrial fibrillation (HCC): on coumadin, Target INR range: 2.0-3.0; also taking metoprolol succinate for rate control   History of CVA: Reported some balance issues as a result of previous stroke. Denied any other residual deficits other than right side is weaker and she is right side dominant  Bladder urgency: currently taking trospium and follow up with Dr Candelario Nov 4      Pt seen in her room today with " PTA present and her . She is having increased pain since most recent hospitalization. She was up walking with PTA and did well but describes pain in surgical area but also on anterior thigh area feels tight/tense. No radiation of pain down to calf or foot. She has good CMS left leg. Dressing left hip and lateral thigh intact minimal shadow drainage on left hip dressing. No bruising noted. Mild swelling.   She is taking scheduled tylenol, ice to left hip. Dilaudid PRN but due to nature of pain she may benefit more from muscle relaxant.     She reports she had a bowel movement this morning. Urinating ok. Does continue to take sanctura.   She reports she is coughing at times but worse in morning with some occasional yellow/clear sputum. She reports she smoked 1/2 ppd but quit in 2016 when she had her stroke. She does not have COPD diagnosed but possible she has this with her symptoms. Encouraged cough/deep breathing exercises and incentive spirometer.         Multidisciplinary notes, laboratory values, medications, vital signs, weight and orders arereviewed from nursing home records. I have reviewed the patient s medical history and updated the computerized patient record.     PMH:  Past Medical History:   Diagnosis Date    Encounter for full-term uncomplicated delivery     x3    Ganglion of wrist     right    Gastritis without bleeding     treated and 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       Medications:  Current Outpatient Medications   Medication Sig Dispense Refill    SENNA-docusate sodium (SENNA S) 8.6-50 MG tablet Take 2 tablets by mouth 2 times daily.      acetaminophen (TYLENOL) 500 MG tablet Take 2 tablets (1,000 mg) by mouth 3 times daily.      atorvastatin (LIPITOR) 40 MG tablet Take 1 tablet (40 mg) by mouth at bedtime 90 tablet 3    calcium carbonate-vitamin D 600-200 MG-UNIT TABS Take 1 tablet by mouth 3 times daily.      cyanocobalamin (VITAMIN B-12) 100 MCG tablet Take 1 tablet  (100 mcg) by mouth daily.      HYDROmorphone (DILAUDID) 2 MG tablet Take 0.5 tablets (1 mg) by mouth every 4 hours as needed for pain or moderate pain. And give 1 tablet q 4 hours PRN pain severe      lisinopril (ZESTRIL) 20 MG tablet Take 1 tablet (20 mg) by mouth daily.      metoprolol succinate ER (TOPROL XL) 50 MG 24 hr tablet Take 1 tablet (50 mg) by mouth daily 90 tablet 11    sertraline (ZOLOFT) 50 MG tablet TAKE 1 TABLET BY MOUTH AT BEDTIME 90 tablet 0    trospium (SANCTURA) 20 MG tablet Take 1 tablet (20 mg) by mouth 2 times daily (before meals) 60 tablet 11    Vitamin D, Cholecalciferol, 25 MCG (1000 UT) CAPS Take 1 capsule by mouth daily.      warfarin ANTICOAGULANT (COUMADIN) 5 MG tablet TAKE 1 TABLET BY MOUTH ONCE DAILY OR  AS  DIRECTED  BY  PROTIME  CLINIC 100 tablet 4      Medication reconciliation complete between Epic record and NH MAR.     Allergies:  Allergies   Allergen Reactions    Methylpar-Na Bisulfite-Pentazocine Unknown     jittery       Review of Systems:  See HPI    Vital Signs:  Temp 98.5   Pulse 64   Respirations 14   /90   Oxygen Sats 94%   Weight 185.3#    Physical Exam:     Pleasant and alert with mild to mod distress due to left hip/thigh  pain.    Sclera nonicteric, conjunctiva non-inflamed.  Skin color pink. Mucous membranes moist.   Lungs clear/dim and faint crackles mayank bases to auscultation. No wheezes noted.   Cardiovascular irregular, S1, S2 auscultated. No murmur noted. Pacemaker present  Abdomen soft and without tenderness   Extremities without edema. CMS intact    Gait is stable  Able to move upper and lower extremities       New Labs/Diagnostics:  HGB (g/dL)   Date Value   09/21/2024 12.5     WBC  3.2 - 11.0 10*9/L12.0 High RBC  3.77 - 5.24 10*12/L3.85HGB  11.2 - 15.5 g/dL12.5HCT  34.3 - 46.0 %37.7MCV  81.4 - 99.0 fL97.9MCH  26.7 - 33.1 pg32.5MCHC  31.6 - 35.5 g/dL33.2RDW  11.3 - 14.6 %15.1 High PLT  130 - 375 10*9/[facility]373[facility]  [facility]      Sodium  134 - 143 mEq/L 139   Potassium  3.4 - 5.1 mEq/L 3.6   Chloride  99 - 110 mEq/L 105   Carbon Dioxide  19 - 29 mEq/L 24   Anion Gap  3.0 - 15.0 mEq/L 10.0   Blood Urea Nitrogen  5 - 24 mg/dL 13   Creatinine  0.40 - 1.00 mg/dL 0.71   Glomerular Filtration Rate  >60 mL/min/1.73 m*2 85   Comment: Risk of cardiovascular disease increases when GFR is abnormal; persistently reduced GFR values are a specific indication of CKD. This calculation uses CKD-EPI 2021 equation without adjustment for race; it has not been validated in pregnant women.   Calcium  8.4 - 10.5 mg/dL 9.7   Glucose  70 - 99 mg/dL 112 High    Protein, Total  6.0 - 8.0 g/dL 6.2   Albumin  3.5 - 5.0 g/dL 2.8 Low    Alkaline Phosphatase  40 - 150 IU/L 74   Aspartate Aminotransferase  10 - 40 IU/L 25   Alanine Aminotransferase  6 - 31 IU/L 21   Bilirubin, Total  0.2 - 1.2 mg/dL 2.5 High    Resulting Agency [facility] CLINICAL LABORATORY   Narrative      Assessment/Plan:  (S72.002S) Closed hip fracture, left, sequela  (primary encounter diagnosis)  Comment: healing  Plan: cont PT/OT, tylenol, dilaudid PRN, Calcium, vit D  Add tizanidine 2 mg BID PRN left hip/thigh pain    (I69.359) Hemiparesis due to old stroke - right side is weaker  Comment: noted--right side dominant  Plan: cont statin, coumadin    (E21.0) Hyperparathyroidism, primary (H24)  Comment: noted  Plan: monitor    (E78.2) Mixed hyperlipidemia  Comment: chronic  Plan: cont statin    (Z95.0) Cardiac pacemaker  (I49.5) SA node dysfunction s/p pacer on 11/2/2017 at [facility]  Comment: noted  Plan: device check today    (I48.0) Paroxysmal atrial fibrillation (H)  Comment: rate controlled  Plan: on coumadin    (I10) Essential hypertension  Comment: stable  Plan: cont metoprolol succinate    (I48.91,  Z79.01) On Coumadin for atrial fibrillation (H)  Comment: noted  Plan: coumadin clinic managing INR and dosing    (M80.00XA) Age-related osteoporosis with current pathological fracture,  initial encounter  Comment: noted  Plan: Dexa Scan outpatient, cont calcium and vit D    (N39.41) Urge incontinence  Comment: noted  Plan: cont sanctura and follow up with Dr Kodak Aguirre 4    (F33.40) MDD (recurrent major depressive disorder) in remission (H24)  Comment: stable  Plan: cont sertraline        A total of 66 minutes spent by me on the date of the encounter doing chart review from [facility] discharge summary, review of test results, interpretation of tests, review of medications, patient visit, and documentation.    JANAE Bryson, CNP ....................  9/26/2024   8:46 AM

## 2024-09-27 ENCOUNTER — ANTICOAGULATION THERAPY VISIT (OUTPATIENT)
Dept: ANTICOAGULATION | Facility: OTHER | Age: 79
End: 2024-09-27
Attending: FAMILY MEDICINE
Payer: MEDICARE

## 2024-09-27 ENCOUNTER — TRANSFERRED RECORDS (OUTPATIENT)
Dept: HEALTH INFORMATION MANAGEMENT | Facility: OTHER | Age: 79
End: 2024-09-27

## 2024-09-27 DIAGNOSIS — Z79.01 ANTICOAGULATION GOAL OF INR 2 TO 3: ICD-10-CM

## 2024-09-27 DIAGNOSIS — Z51.81 ANTICOAGULATION GOAL OF INR 2 TO 3: ICD-10-CM

## 2024-09-27 DIAGNOSIS — I48.0 PAROXYSMAL ATRIAL FIBRILLATION (H): Primary | ICD-10-CM

## 2024-09-27 LAB — INR (EXTERNAL): 1.7

## 2024-09-27 NOTE — PROGRESS NOTES
ANTICOAGULATION MANAGEMENT     Kate Chacon 79 year old female is on warfarin with subtherapeutic INR result. (Goal INR 2.0-3.0)    Recent labs: (last 7 days)     09/27/24  1134   INR 1.7       ASSESSMENT     Source(s): Home care/ facility nurse     Warfarin doses taken: Less warfarin taken than planned which may be affecting INR and Held for 3 days while inpatient  recently which may be affecting INR  Diet: No new diet changes identified  New illness, injury, or hospitalization: Yes: Hospitalized on 9/20/24-9/24/24 for femur fracture.   Medication/supplement changes: None noted  Signs or symptoms of bleeding or clotting: No  Previous INR: Subtherapeutic  Additional findings: None     PLAN     Recommended plan for temporary change(s) affecting INR     Dosing Instructions: booster dose then continue your current warfarin dose with next INR in 1 week       Summary  As of 9/27/2024      Full warfarin instructions:  9/27: 5 mg; Otherwise 2.5 mg every Fri; 5 mg all other days   Next INR check:  10/4/2024               Faxed dosing and follow up instructions to [facility]    Orders given to  Homecare nurse/facility to recheck    Education provided: None required    Plan made per [facility] anticoagulation protocol    Polly Sidhu RN  Anticoagulation Clinic  9/27/2024    _______________________________________________________________________     Anticoagulation Episode Summary       Current INR goal:  2.0-3.0   TTR:  81.2% (1 y)   Target end date:  Indefinite   Send INR reminders to:  [facility]    Indications    Paroxysmal atrial fibrillation (H) [I48.0]  Anticoagulation goal of INR 2 to 3 [Z51.81  Z79.01]             Comments:               Anticoagulation Care Providers       Provider Role Specialty Phone number    Kristine Jiménez MD Referring Family Medicine [phone]

## 2024-10-01 ENCOUNTER — LAB REQUISITION (OUTPATIENT)
Dept: LAB | Facility: OTHER | Age: 79
End: 2024-10-01

## 2024-10-01 DIAGNOSIS — D72.829 ELEVATED WHITE BLOOD CELL COUNT, UNSPECIFIED: ICD-10-CM

## 2024-10-01 LAB
BASOPHILS # BLD AUTO: 0.1 10E3/UL (ref 0–0.2)
BASOPHILS NFR BLD AUTO: 1 %
EOSINOPHIL # BLD AUTO: 0.2 10E3/UL (ref 0–0.7)
EOSINOPHIL NFR BLD AUTO: 3 %
ERYTHROCYTE [DISTWIDTH] IN BLOOD BY AUTOMATED COUNT: 15.9 % (ref 10–15)
HCT VFR BLD AUTO: 38 % (ref 35–47)
HGB BLD-MCNC: 12.1 G/DL (ref 11.7–15.7)
IMM GRANULOCYTES # BLD: 0 10E3/UL
IMM GRANULOCYTES NFR BLD: 0 %
LYMPHOCYTES # BLD AUTO: 1.5 10E3/UL (ref 0.8–5.3)
LYMPHOCYTES NFR BLD AUTO: 23 %
MCH RBC QN AUTO: 32.4 PG (ref 26.5–33)
MCHC RBC AUTO-ENTMCNC: 31.8 G/DL (ref 31.5–36.5)
MCV RBC AUTO: 102 FL (ref 78–100)
MONOCYTES # BLD AUTO: 0.9 10E3/UL (ref 0–1.3)
MONOCYTES NFR BLD AUTO: 13 %
NEUTROPHILS # BLD AUTO: 4 10E3/UL (ref 1.6–8.3)
NEUTROPHILS NFR BLD AUTO: 60 %
NRBC # BLD AUTO: 0 10E3/UL
NRBC BLD AUTO-RTO: 0 /100
PLATELET # BLD AUTO: 283 10E3/UL (ref 150–450)
RBC # BLD AUTO: 3.73 10E6/UL (ref 3.8–5.2)
WBC # BLD AUTO: 6.7 10E3/UL (ref 4–11)

## 2024-10-01 PROCEDURE — 36415 COLL VENOUS BLD VENIPUNCTURE: CPT | Performed by: NURSE PRACTITIONER

## 2024-10-01 PROCEDURE — 85004 AUTOMATED DIFF WBC COUNT: CPT | Performed by: NURSE PRACTITIONER

## 2024-10-03 ENCOUNTER — TELEPHONE (OUTPATIENT)
Dept: FAMILY MEDICINE | Facility: OTHER | Age: 79
End: 2024-10-03
Payer: COMMERCIAL

## 2024-10-03 LAB — INR POINT OF CARE: 1.6 (ref 0.9–1.1)

## 2024-10-03 NOTE — TELEPHONE ENCOUNTER
ANTICOAGULATION MANAGEMENT     Kate Chacon 79 year old female is on warfarin with subtherapeutic INR result. (Goal INR 2.0-3.0)    Recent labs: (last 7 days)     10/03/24  1428   INR 1.6*       ASSESSMENT     Source(s): Home care/ facility nurse     Warfarin doses taken: Less warfarin taken than planned which may be affecting INR  Diet: No new diet changes identified  New illness, injury, or hospitalization: No  Medication/supplement changes:  Missed warfarin doses on Saturday and Sunday.   Signs or symptoms of bleeding or clotting: No  Previous INR: Subtherapeutic  Additional findings: None     PLAN     Recommended plan for temporary change(s) affecting INR     Dosing Instructions: booster dose then continue your current warfarin dose with next INR in 1 week       Summary  As of 10/3/2024      Full warfarin instructions:  10/3: 7.5 mg; Otherwise 2.5 mg every Fri; 5 mg all other days   Next INR check:  10/10/2024               Telephone call with [facility] nurse who verbalizes understanding and agrees to plan and who agrees to plan and repeated back plan correctly    Orders given to  Homecare nurse/facility to recheck    Education provided: Please call back if any changes to your diet, medications or how you've been taking warfarin    Plan made per [facility] anticoagulation protocol    Yasmeen Lange RN  Anticoagulation Clinic  10/3/2024    _______________________________________________________________________     Anticoagulation Episode Summary       Current INR goal:  2.0-3.0   TTR:  79.5% (1 y)   Target end date:  Indefinite   Send INR reminders to:  [facility]    Indications    Paroxysmal atrial fibrillation (H) [I48.0]  Anticoagulation goal of INR 2 to 3 [Z51.81  Z79.01]             Comments:               Anticoagulation Care Providers       Provider Role Specialty Phone number    Kristine Jiménez MD Referring Family Medicine [phone]

## 2024-10-10 ENCOUNTER — TRANSFERRED RECORDS (OUTPATIENT)
Dept: HEALTH INFORMATION MANAGEMENT | Facility: OTHER | Age: 79
End: 2024-10-10

## 2024-10-10 ENCOUNTER — ANTICOAGULATION THERAPY VISIT (OUTPATIENT)
Dept: ANTICOAGULATION | Facility: OTHER | Age: 79
End: 2024-10-10
Attending: FAMILY MEDICINE
Payer: COMMERCIAL

## 2024-10-10 DIAGNOSIS — Z51.81 ANTICOAGULATION GOAL OF INR 2 TO 3: ICD-10-CM

## 2024-10-10 DIAGNOSIS — Z79.01 ANTICOAGULATION GOAL OF INR 2 TO 3: ICD-10-CM

## 2024-10-10 DIAGNOSIS — I48.0 PAROXYSMAL ATRIAL FIBRILLATION (H): Primary | ICD-10-CM

## 2024-10-10 LAB — INR (EXTERNAL): 2.7

## 2024-10-10 NOTE — PROGRESS NOTES
ANTICOAGULATION MANAGEMENT     Kate Chacon 79 year old female is on warfarin with therapeutic INR result. (Goal INR 2.0-3.0)    Recent labs: (last 7 days)     10/10/24  1419   INR 2.7       ASSESSMENT     Source(s): Home care/ facility nurse     Warfarin doses taken: Warfarin taken as instructed  Diet: No new diet changes identified  New illness, injury, or hospitalization: No  Medication/supplement changes: None noted  Signs or symptoms of bleeding or clotting: No  Previous INR: Subtherapeutic  Additional findings: None     PLAN     Recommended plan for no diet, medication or health factor changes affecting INR     Dosing Instructions: Continue your current warfarin dose with next INR in 1 week       Summary  As of 10/10/2024      Full warfarin instructions:  2.5 mg every Fri; 5 mg all other days   Next INR check:  10/17/2024               Faxed dosing and follow up instructions to [facility]    Orders given to  Homecare nurse/facility to recheck    Education provided: None required    Plan made per [facility] anticoagulation protocol    Polly Sidhu RN  Anticoagulation Clinic  10/10/2024    _______________________________________________________________________     Anticoagulation Episode Summary       Current INR goal:  2.0-3.0   TTR:  78.8% (1 y)   Target end date:  Indefinite   Send INR reminders to:  [facility]    Indications    Paroxysmal atrial fibrillation (H) [I48.0]  Anticoagulation goal of INR 2 to 3 [Z51.81  Z79.01]             Comments:               Anticoagulation Care Providers       Provider Role Specialty Phone number    Kristine Jiménez MD Referring Family Medicine [phone]

## 2024-10-14 DIAGNOSIS — N32.81 OVERACTIVE BLADDER: Primary | ICD-10-CM

## 2024-10-17 ENCOUNTER — TRANSFERRED RECORDS (OUTPATIENT)
Dept: HEALTH INFORMATION MANAGEMENT | Facility: OTHER | Age: 79
End: 2024-10-17

## 2024-10-17 ENCOUNTER — ANTICOAGULATION THERAPY VISIT (OUTPATIENT)
Dept: ANTICOAGULATION | Facility: OTHER | Age: 79
End: 2024-10-17
Attending: FAMILY MEDICINE
Payer: COMMERCIAL

## 2024-10-17 DIAGNOSIS — I48.0 PAROXYSMAL ATRIAL FIBRILLATION (H): Primary | ICD-10-CM

## 2024-10-17 DIAGNOSIS — Z51.81 ANTICOAGULATION GOAL OF INR 2 TO 3: ICD-10-CM

## 2024-10-17 DIAGNOSIS — Z79.01 ANTICOAGULATION GOAL OF INR 2 TO 3: ICD-10-CM

## 2024-10-17 LAB — INR (EXTERNAL): 2.8 (ref 0.9–1.1)

## 2024-10-17 NOTE — PROGRESS NOTES
ANTICOAGULATION MANAGEMENT     Kate Chacon 79 year old female is on warfarin with therapeutic INR result. (Goal INR 2.0-3.0)    Recent labs: (last 7 days)     10/17/24  0928   INR 2.8*       ASSESSMENT     Source(s): Chart Review and Home Care/Facility Nurse     Warfarin doses taken: Warfarin taken as instructed  Diet: No new diet changes identified  Medication/supplement changes: None noted  New illness, injury, or hospitalization: No  Signs or symptoms of bleeding or clotting: No  Previous result: Therapeutic last visit; previously outside of goal range  Additional findings: None       PLAN     Recommended plan for no diet, medication or health factor changes affecting INR     Dosing Instructions: Continue your current warfarin dose with next INR in 2 weeks       Summary  As of 10/17/2024      Full warfarin instructions:  2.5 mg every Fri; 5 mg all other days   Next INR check:  10/31/2024               Faxed dosing and follow up instructions to [facility]    Orders given to  Homecare nurse/facility to recheck    Patient not here, [employer] Communication sheet with screening questions and current INR received via FAX from outside agency. Results reviewed, Warfarin dosing per protocol, and recommended follow-up appointment made. Paperwork FAXED back to facility.       Plan made per [facility] anticoagulation protocol    Radha Burns RN  10/17/2024  Anticoagulation Clinic  [employer] for routing messages: p [facility]          _______________________________________________________________________     Anticoagulation Episode Summary       Current INR goal:  2.0-3.0   TTR:  78.8% (1 y)   Target end date:  Indefinite   Send INR reminders to:  [facility]    Indications    Paroxysmal atrial fibrillation (H) [I48.0]  Anticoagulation goal of INR 2 to 3 [Z51.81  Z79.01]             Comments:               Anticoagulation Care Providers       Provider Role Specialty Phone number    Kristine Jiménez  MD [facility] Family Medicine [phone]

## 2024-10-18 ENCOUNTER — MEDICAL CORRESPONDENCE (OUTPATIENT)
Dept: HEALTH INFORMATION MANAGEMENT | Facility: OTHER | Age: 79
End: 2024-10-18

## 2024-10-18 ENCOUNTER — OFFICE VISIT (OUTPATIENT)
Dept: FAMILY MEDICINE | Facility: OTHER | Age: 79
End: 2024-10-18
Attending: FAMILY MEDICINE
Payer: COMMERCIAL

## 2024-10-18 VITALS
WEIGHT: 183 LBS | DIASTOLIC BLOOD PRESSURE: 67 MMHG | TEMPERATURE: 97.8 F | SYSTOLIC BLOOD PRESSURE: 124 MMHG | RESPIRATION RATE: 20 BRPM | HEART RATE: 95 BPM | OXYGEN SATURATION: 95 % | HEIGHT: 65 IN | BODY MASS INDEX: 30.49 KG/M2

## 2024-10-18 DIAGNOSIS — S72.002S CLOSED HIP FRACTURE, LEFT, SEQUELA: ICD-10-CM

## 2024-10-18 DIAGNOSIS — I10 ESSENTIAL HYPERTENSION: ICD-10-CM

## 2024-10-18 DIAGNOSIS — E78.2 MIXED HYPERLIPIDEMIA: ICD-10-CM

## 2024-10-18 DIAGNOSIS — I48.0 PAROXYSMAL ATRIAL FIBRILLATION (H): Primary | ICD-10-CM

## 2024-10-18 PROCEDURE — G0463 HOSPITAL OUTPT CLINIC VISIT: HCPCS | Performed by: FAMILY MEDICINE

## 2024-10-18 PROCEDURE — 99214 OFFICE O/P EST MOD 30 MIN: CPT | Performed by: FAMILY MEDICINE

## 2024-10-18 RX ORDER — WARFARIN SODIUM 2.5 MG/1
2.5 TABLET ORAL WEEKLY
COMMUNITY
Start: 2024-10-17

## 2024-10-18 ASSESSMENT — ANXIETY QUESTIONNAIRES
6. BECOMING EASILY ANNOYED OR IRRITABLE: NOT AT ALL
8. IF YOU CHECKED OFF ANY PROBLEMS, HOW DIFFICULT HAVE THESE MADE IT FOR YOU TO DO YOUR WORK, TAKE CARE OF THINGS AT HOME, OR GET ALONG WITH OTHER PEOPLE?: NOT DIFFICULT AT ALL
1. FEELING NERVOUS, ANXIOUS, OR ON EDGE: NOT AT ALL
3. WORRYING TOO MUCH ABOUT DIFFERENT THINGS: NOT AT ALL
IF YOU CHECKED OFF ANY PROBLEMS ON THIS QUESTIONNAIRE, HOW DIFFICULT HAVE THESE PROBLEMS MADE IT FOR YOU TO DO YOUR WORK, TAKE CARE OF THINGS AT HOME, OR GET ALONG WITH OTHER PEOPLE: NOT DIFFICULT AT ALL
GAD7 TOTAL SCORE: 1
5. BEING SO RESTLESS THAT IT IS HARD TO SIT STILL: NOT AT ALL
7. FEELING AFRAID AS IF SOMETHING AWFUL MIGHT HAPPEN: NOT AT ALL
GAD7 TOTAL SCORE: 1
GAD7 TOTAL SCORE: 1
4. TROUBLE RELAXING: SEVERAL DAYS
7. FEELING AFRAID AS IF SOMETHING AWFUL MIGHT HAPPEN: NOT AT ALL
2. NOT BEING ABLE TO STOP OR CONTROL WORRYING: NOT AT ALL

## 2024-10-18 ASSESSMENT — PAIN SCALES - PAIN ENJOYMENT GENERAL ACTIVITY SCALE (PEG)
INTERFERED_ENJOYMENT_LIFE: 6
PEG_TOTALSCORE: 6
PEG_TOTALSCORE: 6
AVG_PAIN_PASTWEEK: 6
AVG_PAIN_PASTWEEK: 6
INTERFERED_GENERAL_ACTIVITY: 6
INTERFERED_GENERAL_ACTIVITY: 6
INTERFERED_ENJOYMENT_LIFE: 6

## 2024-10-18 ASSESSMENT — PATIENT HEALTH QUESTIONNAIRE - PHQ9
SUM OF ALL RESPONSES TO PHQ QUESTIONS 1-9: 1
SUM OF ALL RESPONSES TO PHQ QUESTIONS 1-9: 1
10. IF YOU CHECKED OFF ANY PROBLEMS, HOW DIFFICULT HAVE THESE PROBLEMS MADE IT FOR YOU TO DO YOUR WORK, TAKE CARE OF THINGS AT HOME, OR GET ALONG WITH OTHER PEOPLE: NOT DIFFICULT AT ALL

## 2024-10-18 ASSESSMENT — PAIN SCALES - GENERAL: PAINLEVEL: MODERATE PAIN (5)

## 2024-10-18 NOTE — PROGRESS NOTES
"  Assessment & Plan     Closed hip fracture, left, sequela  Status post open reduction internal fixation from 9/14/2024.  Progressing well following with orthopedics along with PT and OT.    Paroxysmal atrial fibrillation (H)  No acute issues with pacemaker placement, anticoagulated.    Essential hypertension  Well-controlled on medications.    Mixed hyperlipidemia  Controlled on medications.          BMI  Estimated body mass index is 30.45 kg/m  as calculated from the following:    Height as of this encounter: 1.651 m (5' 5\").    Weight as of this encounter: 83 kg (183 lb).             No follow-ups on file.    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    79-year-old female here with daughter for nursing home recertification examination.  She unfortunately suffered a left hip fracture with surgical intervention with open reduction internal fixation on 9/14/2024.  This was over in [place].  She has been at [facility] since that time for ongoing rehab.  She feels like it is going slower than she would like.  She still does have fair amount of pain throughout the hip and upper leg.  This is achy, mild to moderate in severity, constant, worse with activity and movement.  She feels like the swelling and bruising are improving overall.  She is still taking Tylenol as needed for pain along with some Dilaudid, which helps.  She felt like the pain should be improving faster than it should have, but it is improving slowly.  She did see orthopedics for 2-week postoperative visit and they felt like things were going okay.  She will see them " again for 6-week follow-up visit.  Sutures have been removed previously.  She is working with physical therapy and Occupational Therapy, which she will need to continue on per orthopedic recommendations.    Her other medical problems have been stable with paroxysmal atrial fibrillation status post pacemaker placement, hypertension, hyperlipidemia, and history of stroke.  Medications are without change as below.  She has no chest pain, no dyspnea, no abdominal pain.  No bowel or bladder complaints currently.  Appetite has been fine.  They also are now hoping to get approved for [facility] in [place], [place] where her  currently resides with recent fall for him along with diagnosis of dementia.  They do have paperwork to get filled out today and send to them along with a copy of this note and the medication list.  She had been a previous patient of Dr. Campos.  No other complaints at this time.    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      atorvastatin (LIPITOR) 40 MG tablet Take 1 tablet (40 mg) by mouth at bedtime 90 tablet 3    calcium carbonate-vitamin D 600-200 MG-UNIT TABS Take 1 tablet by mouth 3 times daily.      cyanocobalamin (VITAMIN B-12) 100 MCG tablet Take 1 tablet (100 mcg) by mouth daily.      HYDROmorphone (DILAUDID) 2 MG tablet Take 0.5 tablets (1 mg) by mouth every 4 hours as needed for pain or moderate pain. And give 1 tablet q 4 hours PRN pain severe      JANTOVEN ANTICOAGULANT 2.5 MG tablet Take 2.5 mg by  "mouth once a week. On Friday . Recheck INR 10/31/24      lisinopril (ZESTRIL) 20 MG tablet Take 1 tablet (20 mg) by mouth daily.      metoprolol succinate ER (TOPROL XL) 50 MG 24 hr tablet Take 1 tablet (50 mg) by mouth daily 90 tablet 11    SENNA-docusate sodium (SENNA S) 8.6-50 MG tablet Take 2 tablets by mouth 2 times daily.      sertraline (ZOLOFT) 50 MG tablet TAKE 1 TABLET BY MOUTH AT BEDTIME 90 tablet 0    tiZANidine (ZANAFLEX) 2 MG tablet Take 1 tablet (2 mg) by mouth 2 times daily as needed for muscle spasms.      trospium (SANCTURA) 20 MG tablet Take 1 tablet (20 mg) by mouth 2 times daily (before meals) 60 tablet 11    Vitamin D, Cholecalciferol, 25 MCG (1000 UT) CAPS Take 1 capsule by mouth daily.      warfarin ANTICOAGULANT (COUMADIN) 5 MG tablet TAKE 1 TABLET BY MOUTH ONCE DAILY OR  AS  DIRECTED  BY  [facility]  CLINIC 100 tablet 4                       Objective    /67   Pulse 95   Temp 97.8  F (36.6  C) (Tympanic)   Resp 20   Ht 1.651 m (5' 5\")   Wt 83 kg (183 lb)   LMP  (LMP Unknown)   SpO2 95%   BMI 30.45 kg/m    Body mass index is 30.45 kg/m .  Physical Exam   GENERAL: alert and no distress, in wheelchair  EYES: Eyes grossly normal to inspection  NECK: no adenopathy, no asymmetry, masses, or scars  RESP: lungs clear to auscultation - no rales, rhonchi or wheezes  CV: regular rhythm and rate with paced rhythm, normal S1 S2, no S3 or S4, no murmur, no click or rub  ABDOMEN: soft, nontender, no hepatosplenomegaly, no masses and bowel sounds normal  MS: no gross musculoskeletal defects noted, no edema            Signed Electronically by: Iglesia Ford MD    "

## 2024-10-23 DIAGNOSIS — S72.002S CLOSED HIP FRACTURE, LEFT, SEQUELA: Primary | ICD-10-CM

## 2024-10-24 ENCOUNTER — TELEPHONE (OUTPATIENT)
Dept: GERIATRICS | Facility: OTHER | Age: 79
End: 2024-10-24

## 2024-10-24 NOTE — TELEPHONE ENCOUNTER
HYDROmorphone (DILAUDID) 2 MG tablet -- -- 9/19/2024 -- No   Sig - Route: Take 0.5 tablets (1 mg) by mouth every 4 hours as needed for pain or moderate pain. And give 1 tablet q 4 hours PRN pain severe - Oral   Class: Historical     Last Office Visit: 10/23/2024 [facility]  Future Office visit:    Next 5 appointments (look out 90 days)      Nov 04, 2024 1:30 PM  (Arrive by 1:15 PM)  Return Visit with Jorgito Candelario MD  [facility] ([facility] ) [address]  [place] [address]  [phone]             Routing refill request to provider for review/approval because:

## 2024-10-24 NOTE — TELEPHONE ENCOUNTER
Approved by Dr. Santillan  Notification sent to [facility] [place].  Jossie Underwood, [facility]

## 2024-10-27 RX ORDER — HYDROMORPHONE HYDROCHLORIDE 2 MG/1
1 TABLET ORAL EVERY 4 HOURS PRN
Qty: 180 TABLET | Refills: 0 | Status: SHIPPED | OUTPATIENT
Start: 2024-10-27

## 2024-10-31 ENCOUNTER — TRANSFERRED RECORDS (OUTPATIENT)
Dept: HEALTH INFORMATION MANAGEMENT | Facility: OTHER | Age: 79
End: 2024-10-31

## 2024-10-31 ENCOUNTER — ANTICOAGULATION THERAPY VISIT (OUTPATIENT)
Dept: ANTICOAGULATION | Facility: OTHER | Age: 79
End: 2024-10-31
Attending: FAMILY MEDICINE
Payer: COMMERCIAL

## 2024-10-31 DIAGNOSIS — I48.0 PAROXYSMAL ATRIAL FIBRILLATION (H): Primary | ICD-10-CM

## 2024-10-31 DIAGNOSIS — Z79.01 ANTICOAGULATION GOAL OF INR 2 TO 3: ICD-10-CM

## 2024-10-31 DIAGNOSIS — Z51.81 ANTICOAGULATION GOAL OF INR 2 TO 3: ICD-10-CM

## 2024-10-31 LAB — INR (EXTERNAL): 2.6 (ref 0.9–1.1)

## 2024-10-31 NOTE — PROGRESS NOTES
ANTICOAGULATION MANAGEMENT     Kate Chacon 79 year old female is on warfarin with therapeutic INR result. (Goal INR 2.0-3.0)    Recent labs: (last 7 days)     10/31/24  1042   INR 2.6*       ASSESSMENT     Source(s): Chart Review and Home Care/Facility Nurse     Warfarin doses taken: Warfarin taken as instructed  Diet: No new diet changes identified  Medication/supplement changes: None noted  New illness, injury, or hospitalization: No  Signs or symptoms of bleeding or clotting: No  Previous result: Therapeutic last visit; previously outside of goal range  Additional findings: None       PLAN     Recommended plan for no diet, medication or health factor changes affecting INR     Dosing Instructions: Continue your current warfarin dose with next INR in 2 weeks       Summary  As of 10/31/2024      Full warfarin instructions:  2.5 mg every Fri; 5 mg all other days   Next INR check:  11/14/2024               Faxed dosing and follow up instructions to [facility] patient recently moved for LTC    Orders given to  Homecare nurse/facility to recheck    Patient not here, [employer] Communication sheet with screening questions and current INR received via FAX from outside agency. Results reviewed, Warfarin dosing per protocol, and recommended follow-up appointment made. Paperwork FAXED back to facility.       Plan made per [facility] anticoagulation protocol    Radha Burns RN  10/31/2024  Anticoagulation Clinic  [employer] for routing messages: p [facility]  [facility] patient phone line: [phone]        _______________________________________________________________________     Anticoagulation Episode Summary       Current INR goal:  2.0-3.0   TTR:  78.8% (1 y)   Target end date:  Indefinite   Send INR reminders to:  [facility]    Indications    Paroxysmal atrial fibrillation (H) [I48.0]  Anticoagulation goal of INR 2 to 3 [Z51.81  Z79.01]             Comments:  --             Anticoagulation Care  Providers       Provider Role Specialty Phone number    Kristine Jiménez MD Referring Family Medicine [phone]

## 2024-11-05 ENCOUNTER — TELEPHONE (OUTPATIENT)
Dept: GERIATRICS | Facility: OTHER | Age: 79
End: 2024-11-05

## 2024-11-05 NOTE — TELEPHONE ENCOUNTER
Approved by Dr. Santillan  Notification sent to [facility] [place].  Jossie Underwood, [facility]

## 2024-11-14 ENCOUNTER — TRANSFERRED RECORDS (OUTPATIENT)
Dept: HEALTH INFORMATION MANAGEMENT | Facility: OTHER | Age: 79
End: 2024-11-14

## 2024-11-14 ENCOUNTER — ANTICOAGULATION THERAPY VISIT (OUTPATIENT)
Dept: ANTICOAGULATION | Facility: OTHER | Age: 79
End: 2024-11-14
Attending: FAMILY MEDICINE
Payer: COMMERCIAL

## 2024-11-14 DIAGNOSIS — I48.0 PAROXYSMAL ATRIAL FIBRILLATION (H): Primary | ICD-10-CM

## 2024-11-14 DIAGNOSIS — Z79.01 ANTICOAGULATION GOAL OF INR 2 TO 3: ICD-10-CM

## 2024-11-14 DIAGNOSIS — Z51.81 ANTICOAGULATION GOAL OF INR 2 TO 3: ICD-10-CM

## 2024-11-14 LAB — INR (EXTERNAL): 3.6 (ref 0.9–1.1)

## 2024-11-14 NOTE — PROGRESS NOTES
ANTICOAGULATION MANAGEMENT     Kate Chacon 79 year old female is on warfarin with supratherapeutic INR result. (Goal INR 2.0-3.0)    Recent labs: (last 7 days)     11/14/24  0928   INR 3.6*       ASSESSMENT     Source(s): Chart Review and Home Care/Facility Nurse     Warfarin doses taken: Warfarin taken as instructed  Diet: No new diet changes identified  Medication/supplement changes: None noted  New illness, injury, or hospitalization: No  Signs or symptoms of bleeding or clotting: No  Previous result: Therapeutic last 2(+) visits  Additional findings: None       PLAN     Recommended plan for no diet, medication or health factor changes affecting INR     Dosing Instructions: decrease your warfarin dose (7.7% change) with next INR in 1 week       Summary  As of 11/14/2024      Full warfarin instructions:  2.5 mg every Mon, Fri; 5 mg all other days   Next INR check:  11/21/2024               Faxed dosing and follow up instructions to [facility]    Orders given to  Homecare nurse/facility to recheck    Patient not here, [employer] Communications[employer] with screening questions and current INR received via FAX from outside agency. Results reviewed, Warfarin dosing per protocol, and recommended follow-up appointment made. Paperwork FAXED back to facility.       Plan made per [facility] [place] anticoagulation protocol    Radha Burns RN  11/14/2024  Anticoagulation Clinic  [employer] for routing messages: p [facility]  [facility] [place] patient phone line: [phone]        _______________________________________________________________________     Anticoagulation Episode Summary       Current INR goal:  2.0-3.0   TTR:  76.5% (1 y)   Target end date:  Indefinite   Send INR reminders to:  [facility]    Indications    Paroxysmal atrial fibrillation (H) [I48.0]  Anticoagulation goal of INR 2 to 3 [Z51.81  Z79.01]             Comments:  --             Anticoagulation Care Providers       Provider Role Specialty Phone  number    Kristine Jiménez MD [facility] Family Medicine [phone]

## 2024-11-18 VITALS
BODY MASS INDEX: 30.14 KG/M2 | RESPIRATION RATE: 20 BRPM | HEART RATE: 74 BPM | DIASTOLIC BLOOD PRESSURE: 68 MMHG | WEIGHT: 180.9 LBS | TEMPERATURE: 97.9 F | OXYGEN SATURATION: 96 % | SYSTOLIC BLOOD PRESSURE: 128 MMHG | HEIGHT: 65 IN

## 2024-11-18 PROBLEM — M25.561 PAIN IN RIGHT KNEE: Status: ACTIVE | Noted: 2024-09-18

## 2024-11-18 PROBLEM — M48.062 SPINAL STENOSIS, LUMBAR REGION WITH NEUROGENIC CLAUDICATION: Status: ACTIVE | Noted: 2024-09-18

## 2024-11-18 PROBLEM — D68.59 OTHER PRIMARY THROMBOPHILIA (H): Status: ACTIVE | Noted: 2024-09-18

## 2024-11-18 PROBLEM — N32.81 OVERACTIVE BLADDER: Status: ACTIVE | Noted: 2024-09-18

## 2024-11-18 PROBLEM — Z79.01 CURRENT USE OF LONG TERM ANTICOAGULATION: Status: ACTIVE | Noted: 2024-09-14

## 2024-11-18 PROBLEM — E80.6 HYPERBILIRUBINEMIA: Status: ACTIVE | Noted: 2024-09-21

## 2024-11-18 PROBLEM — T84.9XXA: Status: ACTIVE | Noted: 2024-09-21

## 2024-11-18 RX ORDER — WARFARIN SODIUM 2.5 MG/1
2.5 TABLET ORAL DAILY
COMMUNITY

## 2024-11-18 NOTE — PROGRESS NOTES
MED REC REQUIRED{TIP  Click the link below to document or use med rec list, use list to pull in response :[id number]}  Post Medication Reconciliation Status: {MED REC LIST:[id number]}     02/19/2018     Priority: High    Essential hypertension 12/08/2016     Priority: High    Paroxysmal atrial fibrillation (H) 11/02/2016     Priority: High    Anticoagulation goal of INR 2 to 3 10/28/2016     Priority: High    History of ischemic stroke on 10/15/2016 secondary to embolism 10/15/2016     Priority: High    Hyperbilirubinemia 09/21/2024     Priority: Medium    Orthopedic device, implant, or graft complication (H) 09/21/2024     Priority: Medium    Closed hip fracture, left, sequela 09/19/2024     Priority: Medium     Left displaced femoral neck fracture with surgical repair done 9/14/24 by Dr Thakur at [facility]      Other primary thrombophilia (H) 09/18/2024     Priority: Medium    Overactive bladder 09/18/2024     Priority: Medium    Pain in right knee 09/18/2024     Priority: Medium    Spinal stenosis, lumbar region with neurogenic claudication 09/18/2024     Priority: Medium    Compression fracture of T10 vertebra with routine healing, subsequent encounter 10/19/2020     Priority: Medium    Physical deconditioning 10/19/2020     Priority: Medium    Lumbar radiculopathy 10/19/2020     Priority: Medium    Chondrodermatitis nodularis chronica helicis, right 10/09/2020     Priority: Medium    History of tobacco abuse quitting on 10/14/16 08/13/2020     Priority: Medium    Dyspnea on exertion 08/13/2020     Priority: Medium    Mixed hyperlipidemia 08/13/2020     Priority: Medium    Chronic midline low back pain without sciatica 08/13/2020     Priority: Medium    Urge incontinence 10/05/2017     Priority: Medium    Former smoker 08/30/2017     Priority: Medium    MDD (recurrent major depressive disorder) in remission (H) 08/30/2017     Priority: Medium    Anxiety 01/05/2017     Priority: Medium    Diverticulosis of large intestine 04/04/2011     Priority: Medium    H/O adenomatous polyp of colon 03/02/2011     Priority: Medium    Osteoporosis 02/08/2006     Priority: Medium    Nursing Home Visit  12/15/2024     Priority: Low          Social History     Socioeconomic History    Marital status:      Spouse name: Not on file    Number of children: Not on file    Years of education: Not on file    Highest education level: Not on file   Occupational History    Not on file   Tobacco Use    Smoking status: Former     Current packs/day: 0.00     Average packs/day: 0.5 packs/day for 49.0 years (24.5 ttl pk-yrs)     Types: Cigarettes     Start date: 10/14/1967     Quit date: 10/14/2016     Years since quittin.1     Passive exposure: Past    Smokeless tobacco: Never   Vaping Use    Vaping status: Never Used   Substance and Sexual Activity    Alcohol use: No    Drug use: No    Sexual activity: Not Currently   Other Topics Concern    Parent/sibling w/ CABG, MI or angioplasty before 65F 55M? 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  She enjoys gardening, [employer] and going to stock car [employer] that her son participates in.   to her  [place].  They run [employer].  Live in town independently.     Social Drivers of Health     Financial Resource Strain: Low Risk  (2024)    Financial Resource Strain     Within the past 12 months, have you or your family members you live with been unable to get utilities (heat, electricity) when it was really needed?: No   Food Insecurity: No Food Insecurity (2024)    Received from [facility] and [facility]    Hunger Vital Sign     Worried About Running Out of Food in the Last Year: Never true     Ran Out of Food in the Last Year: Never true   Transportation Needs: No Transportation Needs (2024)    Received from [facility] and [facility]    PRAPARE - Transportation     Lack of Transportation (Medical): No     Lack of Transportation (Non-Medical): No   Physical Activity: Not on file   Stress: Not on file   Social Connections: Not on file   Interpersonal Safety: Low Risk   (10/18/2024)    Interpersonal Safety     Do you feel physically and emotionally safe where you currently live?: Yes     Within the past 12 months, have you been hit, slapped, kicked or otherwise physically hurt by someone?: No     Within the past 12 months, have you been humiliated or emotionally abused in other ways by your partner or ex-partner?: No   Housing Stability: Low Risk  (9/21/2024)    [facility] from [facility]    Housing Stability Vital Sign     Unable to Pay for Housing in the Last Year: No     Number of Times Moved in the Last Year: 0     Homeless in the Last Year: No        Current Outpatient Medications   Medication Sig Dispense Refill    acetaminophen (TYLENOL) 500 MG tablet Take 2 tablets (1,000 mg) by mouth 3 times daily.      atorvastatin (LIPITOR) 40 MG tablet Take 1 tablet (40 mg) by mouth at bedtime 90 tablet 3    calcium carbonate-vitamin D 600-200 MG-UNIT TABS Take 1 tablet by mouth 3 times daily.      cyanocobalamin (VITAMIN B-12) 100 MCG tablet Take 1 tablet (100 mcg) by mouth daily.      HYDROmorphone (DILAUDID) 2 MG tablet Take 0.5 tablets (1 mg) by mouth every 4 hours as needed for pain or moderate pain. And give 1 tablet q 4 hours PRN pain severe 180 tablet 0    lisinopril (ZESTRIL) 20 MG tablet Take 1 tablet (20 mg) by mouth daily.      metoprolol succinate ER (TOPROL XL) 50 MG 24 hr tablet Take 1 tablet (50 mg) by mouth daily 90 tablet 11    SENNA-docusate sodium (SENNA S) 8.6-50 MG tablet Take 2 tablets by mouth 2 times daily.      sertraline (ZOLOFT) 50 MG tablet TAKE 1 TABLET BY MOUTH AT BEDTIME 90 tablet 0    tiZANidine (ZANAFLEX) 2 MG tablet Take 1 tablet (2 mg) by mouth 2 times daily as needed for muscle spasms.      trospium (SANCTURA) 20 MG tablet Take 1 tablet (20 mg) by mouth 2 times daily (before meals) 60 tablet 11    Vitamin D, Cholecalciferol, 25 MCG (1000 UT) CAPS Take 1 capsule by mouth daily.      warfarin ANTICOAGULANT (COUMADIN)  "2.5 MG tablet Take 2.5 mg by mouth daily. 1 Tablet by mouth Daily in the afternoon Every week on Monday, Friday at 3PM-6PM.      warfarin ANTICOAGULANT (COUMADIN) 5 MG tablet TAKE 1 TABLET BY MOUTH ONCE DAILY OR  AS  DIRECTED  BY  [facility] (Patient taking differently: Take 5 mg by mouth daily. 1 Tablet by mouth Daily in the afternoon Every week on Sunday, Tuesday, Wednesday, Thursday, Saturday at 3PM-6PM.) 100 tablet 4     No current facility-administered medications for this visit.       Allergies   Allergen Reactions    Methylpar-Na Bisulfite-Pentazocine Unknown     jittery       I have reviewed the MDS and I have reviewed the care plan and do agree with the plan.      ROS:  Review of Systems   Constitutional:  Negative for appetite change, fatigue, fever and unexpected weight change.   HENT:   Negative for hearing loss, trouble swallowing and voice change.    Eyes:  Negative for eye problems.   Respiratory:  Negative for cough, shortness of breath and wheezing.    Cardiovascular:  Negative for chest pain, leg swelling and palpitations.   Gastrointestinal:  Negative for abdominal pain, blood in stool, constipation and diarrhea.   Genitourinary:  Negative for bladder incontinence, dysuria, frequency and hematuria.    Musculoskeletal:  Negative for arthralgias, gait problem and myalgias.   Skin:  Negative for itching, rash and wound.   Neurological:  Negative for dizziness, extremity weakness, gait problem, light-headedness, numbness, seizures and speech difficulty.   Hematological:  Negative for adenopathy.   Psychiatric/Behavioral:  Negative for confusion, depression and sleep disturbance. The patient is not nervous/anxious.    All other systems reviewed and are negative.        OBJECTIVE:  /68   Pulse 74   Temp 97.9  F (36.6  C)   Resp 20   Ht 1.651 m (5' 5\")   Wt 82.1 kg (180 lb 14.4 oz)   LMP  (LMP Unknown)   SpO2 96%   BMI 30.10 kg/m      Physical Exam  Vitals and nursing note reviewed. "   Constitutional:       General: She is not in acute distress.     Appearance: She is not ill-appearing or toxic-appearing.   HENT:      Head: Normocephalic.      Right Ear: External ear normal.      Left Ear: External ear normal.      Nose: Congestion present.      Mouth/Throat:      Mouth: Mucous membranes are moist.   Eyes:      Extraocular Movements: Extraocular movements intact.      Conjunctiva/sclera: Conjunctivae normal.      Pupils: Pupils are equal, round, and reactive to light.   Cardiovascular:      Rate and Rhythm: Normal rate and regular rhythm.      Pulses: Normal pulses.      Heart sounds: Normal heart sounds.   Pulmonary:      Effort: Pulmonary effort is normal.      Breath sounds: Normal breath sounds. No wheezing, rhonchi or rales.   Musculoskeletal:         General: No swelling, tenderness or deformity.      Right lower leg: No edema.      Left lower leg: No edema.   Skin:     General: Skin is warm and dry.      Findings: No bruising, erythema, lesion or rash.   Neurological:      Mental Status: She is alert and oriented to person, place, and time. Mental status is at baseline.      Motor: Weakness present.   Psychiatric:         Mood and Affect: Mood normal.         Behavior: Behavior normal.         Thought Content: Thought content normal.         Judgment: Judgment normal.         Lab/Diagnostic data:    Reviewed    ASSESSMENT/ORDERS:  Nursing Home Visit  Discussed with staff. Care plan reviewed and orders updated.  Chronic issues are stable. She will be admitted short term rehabilitation. Routine 30 day Nursing Home follow up.      Closed hip fracture, left, sequela  Physical Therapy and Occupational Therapy consultation    Paroxysmal atrial fibrillation (H)  Most recent EKG, echocardiogram, and laboratory tests reviewed. She is on metoprolol for rate control and coumadin for anticoagulation. Monitor for symptoms of exacerbation.      Current use of long term anticoagulation  Per Coumadin  Clinic.  Follow INR    SA node dysfunction s/p pacer on 11/2/2017 at [facility]  Stable    History of ischemic stroke on 10/15/2016 secondary to embolism  Mild residual right hemiparesis.  Follow    Hemiparesis due to old stroke - right side is weaker  As above    Essential hypertension  Available blood pressure readings are reviewed.  Goals discussed. Will continue same antihypertensive regimen. Monitor electrolytes and renal function every 6 months.      Mixed hyperlipidemia  She currently is on atorvastatin.  Most recent lipid profile reviewed.  Role of medication therapy in the elderly discussed. Will follow LFT every 6 months with annual lipid profile.      Stage 3a chronic kidney disease (H)  Most recent GFR, creatinine, and UA reviewed.  Primary cause is felt to be long standing hypertension .  Will follow every 6 months.       Spinal stenosis, lumbar region with neurogenic claudication  Stable.  Physical Therapy and Occupational Therapy     Overactive bladder  Continue [place]    Age-related osteoporosis without current pathological fracture  Stable    MDD (recurrent major depressive disorder) in remission (H)  On sertraline (Zoloft).  Continue.        Total time spent on the day of service was 50 minutes including patient visit, review of pertinent clinical information, treatment plan, and documentation.      Philipp Santillan MD FAAFP [facility] CMD  Geriatrics    Hospice and Palliative Care  Post Medication Reconciliation Status:  Discharge medications reconciled, continue medications without change

## 2024-11-20 ENCOUNTER — NURSING HOME VISIT (OUTPATIENT)
Dept: GERIATRICS | Facility: OTHER | Age: 79
End: 2024-11-20
Attending: FAMILY MEDICINE
Payer: COMMERCIAL

## 2024-11-20 DIAGNOSIS — M48.062 SPINAL STENOSIS, LUMBAR REGION WITH NEUROGENIC CLAUDICATION: ICD-10-CM

## 2024-11-20 DIAGNOSIS — Z78.9 NURSING HOME RESIDENT: Primary | ICD-10-CM

## 2024-11-20 DIAGNOSIS — E78.2 MIXED HYPERLIPIDEMIA: ICD-10-CM

## 2024-11-20 DIAGNOSIS — I48.0 PAROXYSMAL ATRIAL FIBRILLATION (H): ICD-10-CM

## 2024-11-20 DIAGNOSIS — F33.40 MDD (RECURRENT MAJOR DEPRESSIVE DISORDER) IN REMISSION (H): ICD-10-CM

## 2024-11-20 DIAGNOSIS — I69.359 HEMIPARESIS DUE TO OLD STROKE (H): ICD-10-CM

## 2024-11-20 DIAGNOSIS — Z79.01 CURRENT USE OF LONG TERM ANTICOAGULATION: ICD-10-CM

## 2024-11-20 DIAGNOSIS — M81.0 AGE-RELATED OSTEOPOROSIS WITHOUT CURRENT PATHOLOGICAL FRACTURE: ICD-10-CM

## 2024-11-20 DIAGNOSIS — N18.31 STAGE 3A CHRONIC KIDNEY DISEASE (H): ICD-10-CM

## 2024-11-20 DIAGNOSIS — I10 ESSENTIAL HYPERTENSION: ICD-10-CM

## 2024-11-20 DIAGNOSIS — N32.81 OVERACTIVE BLADDER: ICD-10-CM

## 2024-11-20 DIAGNOSIS — S72.002S CLOSED HIP FRACTURE, LEFT, SEQUELA: ICD-10-CM

## 2024-11-20 DIAGNOSIS — I49.5 SA NODE DYSFUNCTION (H): ICD-10-CM

## 2024-11-20 DIAGNOSIS — Z86.73 HISTORY OF ISCHEMIC STROKE: ICD-10-CM

## 2024-11-20 PROCEDURE — 99306 1ST NF CARE HIGH MDM 50: CPT | Performed by: FAMILY MEDICINE

## 2024-11-21 ENCOUNTER — ANTICOAGULATION THERAPY VISIT (OUTPATIENT)
Dept: ANTICOAGULATION | Facility: OTHER | Age: 79
End: 2024-11-21
Attending: FAMILY MEDICINE
Payer: COMMERCIAL

## 2024-11-21 DIAGNOSIS — Z79.01 ANTICOAGULATION GOAL OF INR 2 TO 3: ICD-10-CM

## 2024-11-21 DIAGNOSIS — I48.0 PAROXYSMAL ATRIAL FIBRILLATION (H): Primary | ICD-10-CM

## 2024-11-21 DIAGNOSIS — Z51.81 ANTICOAGULATION GOAL OF INR 2 TO 3: ICD-10-CM

## 2024-11-21 LAB — INR (EXTERNAL): 2.5

## 2024-11-21 NOTE — PROGRESS NOTES
ANTICOAGULATION MANAGEMENT     Kate Chacon 79 year old female is on warfarin with therapeutic INR result. (Goal INR 2.0-3.0)    Recent labs: (last 7 days)     11/21/24  0816   INR 2.5       ASSESSMENT     Source(s): Home care/ facility nurse facility communication sheet faxed to [facility].     Warfarin doses taken: Warfarin taken as instructed  Diet: No new diet changes identified  New illness, injury, or hospitalization: No  Medication/supplement changes: None noted  Signs or symptoms of bleeding or clotting: No  Previous INR: Supratherapeutic  Additional findings: None     PLAN     Recommended plan for no diet, medication or health factor changes affecting INR     Dosing Instructions: Continue your current warfarin dose with next INR in 1 week       Summary  As of 11/21/2024      Full warfarin instructions:  2.5 mg every Mon, Fri; 5 mg all other days   Next INR check:  11/27/2024               Faxed dosing and follow up instructions to [facility] [place] [facility]    Orders given to  Homecare nurse/facility to recheck    Education provided: None required    Plan made per [facility] anticoagulation protocol    Polly Sidhu RN  Anticoagulation Clinic  11/21/2024    _______________________________________________________________________     Anticoagulation Episode Summary       Current INR goal:  2.0-3.0   TTR:  75.5% (1 y)   Target end date:  Indefinite   Send INR reminders to:  [facility]    Indications    Paroxysmal atrial fibrillation (H) [I48.0]  Anticoagulation goal of INR 2 to 3 [Z51.81  Z79.01]             Comments:  --             Anticoagulation Care Providers       Provider Role Specialty Phone number    Kristine Jiménez MD Referring Family Medicine [phone]

## 2024-11-27 ENCOUNTER — ANTICOAGULATION THERAPY VISIT (OUTPATIENT)
Dept: ANTICOAGULATION | Facility: OTHER | Age: 79
End: 2024-11-27
Attending: FAMILY MEDICINE
Payer: COMMERCIAL

## 2024-11-27 DIAGNOSIS — Z51.81 ANTICOAGULATION GOAL OF INR 2 TO 3: ICD-10-CM

## 2024-11-27 DIAGNOSIS — Z79.01 ANTICOAGULATION GOAL OF INR 2 TO 3: ICD-10-CM

## 2024-11-27 DIAGNOSIS — I48.0 PAROXYSMAL ATRIAL FIBRILLATION (H): Primary | ICD-10-CM

## 2024-11-27 LAB — INR (EXTERNAL): 1.8

## 2024-11-27 NOTE — PROGRESS NOTES
ANTICOAGULATION MANAGEMENT     Kate Chacon 79 year old female is on warfarin with subtherapeutic INR result. (Goal INR 2.0-3.0)    Recent labs: (last 7 days)     11/27/24  0737   INR 1.8       ASSESSMENT     Source(s): Chart Review and Home Care/Facility Nurse     Warfarin doses taken: Missed dose(s) may be affecting INR  Diet: No new diet changes identified  New illness, injury, or hospitalization: No  Medication/supplement changes: None noted  Signs or symptoms of bleeding or clotting: No  Previous INR: Therapeutic last visit; previously outside of goal range  Additional findings: None       PLAN     Recommended plan for temporary change(s) affecting INR     Dosing Instructions: booster dose then continue your current warfarin dose with next INR in 1 week       Summary  As of 11/27/2024      Full warfarin instructions:  11/27: 7.5 mg; Otherwise 2.5 mg every Mon, Fri; 5 mg all other days   Next INR check:  12/5/2024               Faxed dosing and follow up instructions to [facility]    Orders given to  Homecare nurse/facility to recheck    Education provided: Please call back if any changes to your diet, medications or how you've been taking warfarin    Plan made per [facility] anticoagulation protocol    Rose Silverio, RN  Anticoagulation Clinic  11/27/2024    _______________________________________________________________________     Anticoagulation Episode Summary       Current INR goal:  2.0-3.0   TTR:  75.0% (1 y)   Target end date:  Indefinite   Send INR reminders to:  [facility]    Indications    Paroxysmal atrial fibrillation (H) [I48.0]  Anticoagulation goal of INR 2 to 3 [Z51.81  Z79.01]             Comments:  --             Anticoagulation Care Providers       Provider Role Specialty Phone number    Kristine Jiménez MD Referring Family Medicine [phone]

## 2024-12-05 ENCOUNTER — ANTICOAGULATION THERAPY VISIT (OUTPATIENT)
Dept: ANTICOAGULATION | Facility: OTHER | Age: 79
End: 2024-12-05
Attending: FAMILY MEDICINE
Payer: COMMERCIAL

## 2024-12-05 DIAGNOSIS — Z51.81 ANTICOAGULATION GOAL OF INR 2 TO 3: ICD-10-CM

## 2024-12-05 DIAGNOSIS — Z79.01 ANTICOAGULATION GOAL OF INR 2 TO 3: ICD-10-CM

## 2024-12-05 DIAGNOSIS — I48.0 PAROXYSMAL ATRIAL FIBRILLATION (H): Primary | ICD-10-CM

## 2024-12-05 LAB — INR (EXTERNAL): 2.1

## 2024-12-05 NOTE — PROGRESS NOTES
ANTICOAGULATION MANAGEMENT     Kate Chacon 79 year old female is on warfarin with therapeutic INR result. (Goal INR 2.0-3.0)    Recent labs: (last 7 days)     12/05/24  0755   INR 2.1       ASSESSMENT     Source(s): Template Communication form filled out by facility nurse    Warfarin doses taken: Warfarin taken as instructed  Diet: No new diet changes identified  New illness, injury, or hospitalization: No  Medication/supplement changes: None noted  Signs or symptoms of bleeding or clotting: No  Previous INR: Subtherapeutic  Additional findings: None     PLAN     Recommended plan for no diet, medication or health factor changes affecting INR     Dosing Instructions: Continue your current warfarin dose with next INR in 1 week       Summary  As of 12/5/2024      Full warfarin instructions:  2.5 mg every Mon, Fri; 5 mg all other days   Next INR check:  12/12/2024               Faxed dosing and follow up instructions to [facility]    Orders given to  Homecare nurse/facility to recheck    Education provided: None required    Plan made per [facility] anticoagulation protocol    Polly Sidhu RN  Anticoagulation Clinic  12/5/2024    _______________________________________________________________________     Anticoagulation Episode Summary       Current INR goal:  2.0-3.0   TTR:  73.5% (1 y)   Target end date:  Indefinite   Send INR reminders to:  [facility]    Indications    Paroxysmal atrial fibrillation (H) [I48.0]  Anticoagulation goal of INR 2 to 3 [Z51.81  Z79.01]             Comments:  --             Anticoagulation Care Providers       Provider Role Specialty Phone number    Kristine Jiménez MD Referring Family Medicine [phone]

## 2024-12-08 NOTE — PROGRESS NOTES
ANTICOAGULATION FOLLOW-UP CLINIC VISIT    Patient Name:  Kate Chacon  Date:  8/10/2021  Contact Type:  Face to Face    SUBJECTIVE:  Patient Findings     Positives:  Change in medications (continues with Tylenol for back and leg pain)        Clinical Outcomes     Negatives:  Major bleeding event, Thromboembolic event, Anticoagulation-related hospital admission, Anticoagulation-related ED visit, Anticoagulation-related fatality           OBJECTIVE    Recent labs: (last 7 days)     08/10/21  0936   INR 2.5*       ASSESSMENT / PLAN  INR assessment THER    Recheck INR In: 6 WEEKS    INR Location Clinic      Anticoagulation Summary  As of 8/10/2021    INR goal:  2.0-3.0   TTR:  74.6 % (1 y)   INR used for dosin.5 (8/10/2021)   Warfarin maintenance plan:  2.5 mg (5 mg x 0.5) every Wed; 5 mg (5 mg x 1) all other days   Full warfarin instructions:  2.5 mg every Wed; 5 mg all other days   Weekly warfarin total:  32.5 mg   Plan last modified:  Radha Burns RN (2020)   Next INR check:  2021   Priority:  Maintenance   Target end date:  Indefinite    Indications    Paroxysmal atrial fibrillation (H) [I48.0]  Anticoagulation goal of INR 2 to 3 [Z51.81  Z79.01]             Anticoagulation Episode Summary     INR check location:      Preferred lab:      Send INR reminders to:  [facility]    Comments:        Anticoagulation Care Providers     Provider Role Specialty Phone number    Asher Campos MD [facility] Family Medicine [phone]            See the Encounter Report to view Anticoagulation Flowsheet and Dosing Calendar (Go to Encounters tab in chart review, and find the Anticoagulation Therapy Visit)        Radha Burns RN                 
DC instructions

## 2024-12-12 ENCOUNTER — ANTICOAGULATION THERAPY VISIT (OUTPATIENT)
Dept: ANTICOAGULATION | Facility: OTHER | Age: 79
End: 2024-12-12
Payer: COMMERCIAL

## 2024-12-12 DIAGNOSIS — I48.0 PAROXYSMAL ATRIAL FIBRILLATION (H): Primary | ICD-10-CM

## 2024-12-12 DIAGNOSIS — Z51.81 ANTICOAGULATION GOAL OF INR 2 TO 3: ICD-10-CM

## 2024-12-12 DIAGNOSIS — Z79.01 ANTICOAGULATION GOAL OF INR 2 TO 3: ICD-10-CM

## 2024-12-12 LAB — INR (EXTERNAL): 1.4

## 2024-12-12 NOTE — PROGRESS NOTES
ANTICOAGULATION MANAGEMENT     Kate Chacon 79 year old female is on warfarin with subtherapeutic INR result. (Goal INR 2.0-3.0)    Recent labs: (last 7 days)     12/12/24  0954   INR 1.4       ASSESSMENT     Source(s): Chart Review, Patient/Caregiver Call, Home Care/Facility Nurse, and Template   Patient not here, [facility] [employer]heet with screening questions and current INR received via FAX from outside agency. Results reviewed, Warfarin dosing per protocol, and recommended follow-up appointment made. Paperwork FAXED back to facility.     Warfarin doses taken: Warfarin taken as instructed  Diet: Increased greens/vitamin K in diet; plans to resume previous intake  Spoke with facility nurse Lexis, Patient has been eating a lot of salads  New illness, injury, or hospitalization: No  Medication/supplement changes: None noted  Signs or symptoms of bleeding or clotting: No  Previous INR: Therapeutic last visit; previously outside of goal range  Additional findings: None       PLAN     Recommended plan for temporary change(s) affecting INR     Dosing Instructions: booster dose then continue your current warfarin dose with next INR in 1 week       Summary  As of 12/12/2024      Full warfarin instructions:  12/12: 7.5 mg; Otherwise 2.5 mg every Mon, Fri; 5 mg all other days   Next INR check:  12/19/2024               Telephone call with [facility] nurse To discuss patient's diet changes  Faxed dosing and follow up instructions to [facility]    Orders given to  Homecare nurse/facility to recheck    Education provided: Please call back if any changes to your diet, medications or how you've been taking warfarin    Plan made per [facility] anticoagulation protocol    Rose Silverio, RN  Anticoagulation Clinic  12/12/2024    _______________________________________________________________________     Anticoagulation Episode Summary       Current INR goal:  2.0-3.0   TTR:  71.9% (1 y)   Target end date:  Indefinite    Send INR reminders to:  [facility]    Indications    Paroxysmal atrial fibrillation (H) [I48.0]  Anticoagulation goal of INR 2 to 3 [Z51.81  Z79.01]             Comments:  --             Anticoagulation Care Providers       Provider Role Specialty Phone number    Kristine Jiménez MD Referring Family Medicine [phone]

## 2024-12-15 PROBLEM — Z78.9 NURSING HOME RESIDENT: Status: ACTIVE | Noted: 2024-12-15

## 2024-12-15 ASSESSMENT — ENCOUNTER SYMPTOMS
ARTHRALGIAS: 0
CONSTIPATION: 0
HEMATURIA: 0
EYE PROBLEMS: 0
ADENOPATHY: 0
DIARRHEA: 0
CONFUSION: 0
NERVOUS/ANXIOUS: 0
DYSURIA: 0
NUMBNESS: 0
EXTREMITY WEAKNESS: 0
LIGHT-HEADEDNESS: 0
SEIZURES: 0
FREQUENCY: 0
FATIGUE: 0
COUGH: 0
SLEEP DISTURBANCE: 0
UNEXPECTED WEIGHT CHANGE: 0
LEG SWELLING: 0
DEPRESSION: 0
SHORTNESS OF BREATH: 0
TROUBLE SWALLOWING: 0
WHEEZING: 0
APPETITE CHANGE: 0
ABDOMINAL PAIN: 0
PALPITATIONS: 0
VOICE CHANGE: 0
SPEECH DIFFICULTY: 0
FEVER: 0
BLOOD IN STOOL: 0
DIZZINESS: 0
MYALGIAS: 0
WOUND: 0

## 2024-12-19 ENCOUNTER — ANTICOAGULATION THERAPY VISIT (OUTPATIENT)
Dept: ANTICOAGULATION | Facility: OTHER | Age: 79
End: 2024-12-19
Attending: FAMILY MEDICINE
Payer: MEDICARE

## 2024-12-19 DIAGNOSIS — Z79.01 ANTICOAGULATION GOAL OF INR 2 TO 3: ICD-10-CM

## 2024-12-19 DIAGNOSIS — I48.0 PAROXYSMAL ATRIAL FIBRILLATION (H): Primary | ICD-10-CM

## 2024-12-19 DIAGNOSIS — Z51.81 ANTICOAGULATION GOAL OF INR 2 TO 3: ICD-10-CM

## 2024-12-19 LAB — INR (EXTERNAL): 2.1 (ref 0.9–1.1)

## 2024-12-19 NOTE — PROGRESS NOTES
ANTICOAGULATION MANAGEMENT     Kate Chacon 79 year old female is on warfarin with therapeutic INR result. (Goal INR 2.0-3.0)    Recent labs: (last 7 days)     12/19/24  1006   INR 2.1*       ASSESSMENT     Source(s): Chart Review and Home Care/Facility Nurse     Warfarin doses taken: Warfarin taken as instructed  Diet: No new diet changes identified  Medication/supplement changes: None noted  New illness, injury, or hospitalization: No  Signs or symptoms of bleeding or clotting: No  Previous result: Subtherapeutic  Additional findings: None       PLAN     Recommended plan for no diet, medication or health factor changes affecting INR     Dosing Instructions: Increase your warfarin dose (8.3% change) booster dose in last 7 days going to continue with same dose with next INR in 2 weeks       Summary  As of 12/19/2024      Full warfarin instructions:  2.5 mg every Mon; 5 mg all other days   Next INR check:  1/2/2025               Faxed dosing and follow up instructions to [facility]    Orders given to  Homecare nurse/facility to recheck     Patient not here, [employer]heet with screening questions and current INR received via FAX from outside agency. Results reviewed, Warfarin dosing per protocol, and recommended follow-up appointment made. Paperwork FAXED back to facility.     Plan made per [facility] anticoagulation protocol    Radha Burns RN  12/19/2024  Anticoagulation Clinic  [place] for routing messages: p [facility]  [facility] patient phone line: [phone]        _______________________________________________________________________     Anticoagulation Episode Summary       Current INR goal:  2.0-3.0   TTR:  70.2% (1 y)   Target end date:  Indefinite   Send INR reminders to:  [facility]    Indications    Paroxysmal atrial fibrillation (H) [I48.0]  Anticoagulation goal of INR 2 to 3 [Z51.81  Z79.01]             Comments:  --             Anticoagulation Care Providers        Provider Role Specialty Phone number    Kristine Jiménez MD Referring Family Medicine [phone]

## 2024-12-23 VITALS
WEIGHT: 183 LBS | TEMPERATURE: 97.7 F | HEART RATE: 70 BPM | RESPIRATION RATE: 18 BRPM | OXYGEN SATURATION: 94 % | BODY MASS INDEX: 30.45 KG/M2 | DIASTOLIC BLOOD PRESSURE: 78 MMHG | SYSTOLIC BLOOD PRESSURE: 134 MMHG

## 2024-12-30 ENCOUNTER — NURSING HOME VISIT (OUTPATIENT)
Dept: GERIATRICS | Facility: OTHER | Age: 79
End: 2024-12-30
Attending: FAMILY MEDICINE
Payer: MEDICARE

## 2025-01-02 ENCOUNTER — ANTICOAGULATION THERAPY VISIT (OUTPATIENT)
Dept: ANTICOAGULATION | Facility: OTHER | Age: 80
End: 2025-01-02
Payer: COMMERCIAL

## 2025-01-02 DIAGNOSIS — Z79.01 ANTICOAGULATION GOAL OF INR 2 TO 3: ICD-10-CM

## 2025-01-02 DIAGNOSIS — Z51.81 ANTICOAGULATION GOAL OF INR 2 TO 3: ICD-10-CM

## 2025-01-02 DIAGNOSIS — I48.0 PAROXYSMAL ATRIAL FIBRILLATION (H): Primary | ICD-10-CM

## 2025-01-02 LAB — INR (EXTERNAL): 2 (ref 0.9–1.1)

## 2025-01-02 NOTE — PROGRESS NOTES
ANTICOAGULATION MANAGEMENT     Kate Chacon 79 year old female is on warfarin with therapeutic INR result. (Goal INR 2.0-3.0)    Recent labs: (last 7 days)     01/02/25  1044   INR 2.0*       ASSESSMENT     Source(s): Chart Review and Home Care/Facility Nurse     Warfarin doses taken: Less warfarin taken than planned which may be affecting INR  Diet: No new diet changes identified  Medication/supplement changes: None noted  New illness, injury, or hospitalization: No  Signs or symptoms of bleeding or clotting: No  Previous result: Therapeutic last visit; previously outside of goal range  Additional findings: None       PLAN     Recommended plan for temporary change(s) affecting INR     Dosing Instructions: Continue your current warfarin dose with next INR in 2 weeks       Summary  As of 1/2/2025      Full warfarin instructions:  2.5 mg every Mon; 5 mg all other days   Next INR check:  1/16/2025               Faxed dosing and follow up instructions to [facility]    Orders given to  Homecare nurse/facility to recheck    Education provided: None required    Plan made per [facility] anticoagulation protocol    Radha Burns RN  1/2/2025  Anticoagulation Clinic  [employer] for routing messages: p [facility]  [facility] patient phone line: [phone]        _______________________________________________________________________     Anticoagulation Episode Summary       Current INR goal:  2.0-3.0   TTR:  70.3% (1 y)   Target end date:  Indefinite   Send INR reminders to:  [facility]    Indications    Paroxysmal atrial fibrillation (H) [I48.0]  Anticoagulation goal of INR 2 to 3 [Z51.81  Z79.01]             Comments:  --             Anticoagulation Care Providers       Provider Role Specialty Phone number    Kristine Jiménez MD Referring Family Medicine [phone]

## 2025-01-14 ENCOUNTER — TELEPHONE (OUTPATIENT)
Dept: CARDIOLOGY | Facility: CLINIC | Age: 80
End: 2025-01-14
Payer: COMMERCIAL

## 2025-01-14 NOTE — TELEPHONE ENCOUNTER
Procedure isntructions reviewed with pt and family. They report understanding, deny any questions at this time.     Yeison Mendoza, RN   Cardiology Nurse Coordinator

## 2025-01-14 NOTE — TELEPHONE ENCOUNTER
----- Message from Lindsey COPELAND sent at 1/9/2025  2:47 PM CST -----  Regarding: FW: GEN Change with Dr Huizar on 1/28 2pm arrival  Telemed. Do you mind taking?    Bushra RIBEIRO  ----- Message -----  From: Marge Andrews  Sent: 1/9/2025  12:09 PM CST  To: Marge Andrews; Linda Castillo; Nicol Parks RN; #  Subject: FW: GEN Change with Dr Huizar on 1/28 2pm ar#      ----- Message -----  From: Marge Andrews  Sent: 1/8/2025   6:14 AM CST  To: Marge Andrews  Subject: GEN Change within a month                        Needs echo up at [facility] prior, then gen change down here with any EP ideally within the next month. Follow-up can be with [facility] in [facility].    Bushra RIBEIRO

## 2025-01-16 ENCOUNTER — TRANSFERRED RECORDS (OUTPATIENT)
Dept: HEALTH INFORMATION MANAGEMENT | Facility: OTHER | Age: 80
End: 2025-01-16

## 2025-01-16 ENCOUNTER — ANTICOAGULATION THERAPY VISIT (OUTPATIENT)
Dept: ANTICOAGULATION | Facility: OTHER | Age: 80
End: 2025-01-16
Attending: FAMILY MEDICINE
Payer: MEDICARE

## 2025-01-16 DIAGNOSIS — I48.0 PAROXYSMAL ATRIAL FIBRILLATION (H): Primary | ICD-10-CM

## 2025-01-16 DIAGNOSIS — Z79.01 ANTICOAGULATION GOAL OF INR 2 TO 3: ICD-10-CM

## 2025-01-16 DIAGNOSIS — Z51.81 ANTICOAGULATION GOAL OF INR 2 TO 3: ICD-10-CM

## 2025-01-16 LAB — INR (EXTERNAL): 2.2 (ref 0.9–1.1)

## 2025-01-16 NOTE — PROGRESS NOTES
ANTICOAGULATION MANAGEMENT     Kate Chacon 79 year old female is on warfarin with therapeutic INR result. (Goal INR 2.0-3.0)    Recent labs: (last 7 days)     01/16/25  0945   INR 2.2*       ASSESSMENT     Source(s): Chart Review and Home Care/Facility Nurse     Warfarin doses taken: Warfarin taken as instructed  Diet: No new diet changes identified  Medication/supplement changes: None noted  New illness, injury, or hospitalization: No  Signs or symptoms of bleeding or clotting: No  Previous result: Therapeutic last 2(+) visits  Additional findings: None       PLAN     Recommended plan for no diet, medication or health factor changes affecting INR     Dosing Instructions: Continue your current warfarin dose with next INR in 3 weeks       Summary  As of 1/16/2025      Full warfarin instructions:  2.5 mg every Mon; 5 mg all other days   Next INR check:  2/6/2025               Faxed dosing and follow up instructions to [facility]    Orders given to  Homecare nurse/facility to recheck    Patient not here, [employer] with screening questions and current INR received via FAX from outside agency. Results reviewed, Warfarin dosing per protocol, and recommended follow-up appointment made. Paperwork FAXED back to facility.       Plan made per [facility] anticoagulation protocol    Radha Burns RN  1/16/2025  Anticoagulation Clinic  [employer] for routing messages: p [facility]  [facility] patient phone line: [phone]        _______________________________________________________________________     Anticoagulation Episode Summary       Current INR goal:  2.0-3.0   TTR:  70.2% (1 y)   Target end date:  Indefinite   Send INR reminders to:  [facility]    Indications    Paroxysmal atrial fibrillation (H) [I48.0]  Anticoagulation goal of INR 2 to 3 [Z51.81  Z79.01]             Comments:  --             Anticoagulation Care Providers       Provider Role Specialty Phone number    Jessy  Kristine Yu MD [facility] Family Medicine [phone]

## 2025-01-21 ENCOUNTER — HOSPITAL ENCOUNTER (OUTPATIENT)
Dept: CARDIOLOGY | Facility: HOSPITAL | Age: 80
Discharge: HOME | End: 2025-01-21
Attending: INTERNAL MEDICINE
Payer: MEDICARE

## 2025-01-21 DIAGNOSIS — T82.191A MALFUNCTION OF CARDIAC PACEMAKER BATTERY: ICD-10-CM

## 2025-01-21 DIAGNOSIS — I48.20 CHRONIC A-FIB (H): ICD-10-CM

## 2025-01-21 DIAGNOSIS — I49.5 SINOATRIAL NODE DYSFUNCTION (H): ICD-10-CM

## 2025-01-21 LAB — LVEF ECHO: NORMAL

## 2025-01-21 PROCEDURE — 93306 TTE W/DOPPLER COMPLETE: CPT

## 2025-01-28 ENCOUNTER — APPOINTMENT (OUTPATIENT)
Dept: MEDSURG UNIT | Facility: CLINIC | Age: 80
End: 2025-01-28
Attending: INTERNAL MEDICINE
Payer: MEDICARE

## 2025-01-28 ENCOUNTER — APPOINTMENT (OUTPATIENT)
Dept: LAB | Facility: CLINIC | Age: 80
End: 2025-01-28
Attending: INTERNAL MEDICINE
Payer: MEDICARE

## 2025-01-28 ENCOUNTER — HOSPITAL ENCOUNTER (OUTPATIENT)
Facility: CLINIC | Age: 80
Discharge: HOME | End: 2025-01-28
Attending: INTERNAL MEDICINE | Admitting: INTERNAL MEDICINE
Payer: MEDICARE

## 2025-01-28 VITALS
HEIGHT: 67 IN | OXYGEN SATURATION: 93 % | DIASTOLIC BLOOD PRESSURE: 87 MMHG | BODY MASS INDEX: 27.94 KG/M2 | TEMPERATURE: 97.5 F | SYSTOLIC BLOOD PRESSURE: 150 MMHG | WEIGHT: 178 LBS | RESPIRATION RATE: 15 BRPM | HEART RATE: 83 BPM

## 2025-01-28 DIAGNOSIS — Z95.0 S/P CARDIAC PACEMAKER PROCEDURE: Primary | ICD-10-CM

## 2025-01-28 DIAGNOSIS — T82.191A MALFUNCTION OF CARDIAC PACEMAKER BATTERY: ICD-10-CM

## 2025-01-28 LAB
ANION GAP SERPL CALCULATED.3IONS-SCNC: 11 MMOL/L (ref 7–15)
ATRIAL RATE - MUSE: 250 BPM
BUN SERPL-MCNC: 15.4 MG/DL (ref 8–23)
CALCIUM SERPL-MCNC: 9.5 MG/DL (ref 8.8–10.4)
CHLORIDE SERPL-SCNC: 105 MMOL/L (ref 98–107)
CREAT SERPL-MCNC: 0.86 MG/DL (ref 0.51–0.95)
DIASTOLIC BLOOD PRESSURE - MUSE: NORMAL MMHG
EGFRCR SERPLBLD CKD-EPI 2021: 67 ML/MIN/1.73M2
ERYTHROCYTE [DISTWIDTH] IN BLOOD BY AUTOMATED COUNT: 13.8 % (ref 10–15)
GLUCOSE SERPL-MCNC: 94 MG/DL (ref 70–99)
HCO3 SERPL-SCNC: 22 MMOL/L (ref 22–29)
HCT VFR BLD AUTO: 45.6 % (ref 35–47)
HGB BLD-MCNC: 15.2 G/DL (ref 11.7–15.7)
INR PPP: 1.86 (ref 0.85–1.15)
INTERPRETATION ECG - MUSE: NORMAL
MCH RBC QN AUTO: 32.1 PG (ref 26.5–33)
MCHC RBC AUTO-ENTMCNC: 33.3 G/DL (ref 31.5–36.5)
MCV RBC AUTO: 96 FL (ref 78–100)
P AXIS - MUSE: NORMAL DEGREES
PLATELET # BLD AUTO: 209 10E3/UL (ref 150–450)
POTASSIUM SERPL-SCNC: 4 MMOL/L (ref 3.4–5.3)
PR INTERVAL - MUSE: NORMAL MS
QRS DURATION - MUSE: 128 MS
QT - MUSE: 408 MS
QTC - MUSE: 482 MS
R AXIS - MUSE: -57 DEGREES
RBC # BLD AUTO: 4.74 10E6/UL (ref 3.8–5.2)
SODIUM SERPL-SCNC: 138 MMOL/L (ref 135–145)
SYSTOLIC BLOOD PRESSURE - MUSE: NORMAL MMHG
T AXIS - MUSE: -15 DEGREES
VENTRICULAR RATE- MUSE: 84 BPM
WBC # BLD AUTO: 7.8 10E3/UL (ref 4–11)

## 2025-01-28 PROCEDURE — 272N000001 HC OR GENERAL SUPPLY STERILE: Performed by: INTERNAL MEDICINE

## 2025-01-28 PROCEDURE — 88300 SURGICAL PATH GROSS: CPT | Mod: TC | Performed by: INTERNAL MEDICINE

## 2025-01-28 PROCEDURE — 250N000009 HC RX 250: Performed by: INTERNAL MEDICINE

## 2025-01-28 PROCEDURE — 82374 ASSAY BLOOD CARBON DIOXIDE: CPT | Performed by: INTERNAL MEDICINE

## 2025-01-28 PROCEDURE — 88300 SURGICAL PATH GROSS: CPT | Mod: 26 | Performed by: STUDENT IN AN ORGANIZED HEALTH CARE EDUCATION/TRAINING PROGRAM

## 2025-01-28 PROCEDURE — 999N000132 HC STATISTIC PP CARE STAGE 1

## 2025-01-28 PROCEDURE — 85610 PROTHROMBIN TIME: CPT | Performed by: INTERNAL MEDICINE

## 2025-01-28 PROCEDURE — 99152 MOD SED SAME PHYS/QHP 5/>YRS: CPT | Performed by: INTERNAL MEDICINE

## 2025-01-28 PROCEDURE — 36415 COLL VENOUS BLD VENIPUNCTURE: CPT | Performed by: INTERNAL MEDICINE

## 2025-01-28 PROCEDURE — 33228 REMV&REPLC PM GEN DUAL LEAD: CPT | Performed by: INTERNAL MEDICINE

## 2025-01-28 PROCEDURE — 80048 BASIC METABOLIC PNL TOTAL CA: CPT | Performed by: INTERNAL MEDICINE

## 2025-01-28 PROCEDURE — 82310 ASSAY OF CALCIUM: CPT | Performed by: INTERNAL MEDICINE

## 2025-01-28 PROCEDURE — 258N000003 HC RX IP 258 OP 636: Performed by: INTERNAL MEDICINE

## 2025-01-28 PROCEDURE — 999N000054 HC STATISTIC EKG NON-CHARGEABLE

## 2025-01-28 PROCEDURE — C1785 PMKR, DUAL, RATE-RESP: HCPCS | Performed by: INTERNAL MEDICINE

## 2025-01-28 PROCEDURE — 93005 ELECTROCARDIOGRAM TRACING: CPT

## 2025-01-28 PROCEDURE — 85014 HEMATOCRIT: CPT | Performed by: INTERNAL MEDICINE

## 2025-01-28 PROCEDURE — 250N000011 HC RX IP 250 OP 636: Performed by: INTERNAL MEDICINE

## 2025-01-28 PROCEDURE — 999N000142 HC STATISTIC PROCEDURE PREP ONLY

## 2025-01-28 DEVICE — PCMKR CARD ACCOLADE MRI EL DR: Type: IMPLANTABLE DEVICE | Status: FUNCTIONAL

## 2025-01-28 RX ORDER — NALOXONE HYDROCHLORIDE 0.4 MG/ML
0.4 INJECTION, SOLUTION INTRAMUSCULAR; INTRAVENOUS; SUBCUTANEOUS
Status: DISCONTINUED | OUTPATIENT
Start: 2025-01-28 | End: 2025-01-28 | Stop reason: HOSPADM

## 2025-01-28 RX ORDER — LIDOCAINE 40 MG/G
CREAM TOPICAL
Status: DISCONTINUED | OUTPATIENT
Start: 2025-01-28 | End: 2025-01-28 | Stop reason: HOSPADM

## 2025-01-28 RX ORDER — NALOXONE HYDROCHLORIDE 0.4 MG/ML
0.2 INJECTION, SOLUTION INTRAMUSCULAR; INTRAVENOUS; SUBCUTANEOUS
Status: DISCONTINUED | OUTPATIENT
Start: 2025-01-28 | End: 2025-01-28 | Stop reason: HOSPADM

## 2025-01-28 RX ORDER — LIDOCAINE HYDROCHLORIDE 20 MG/ML
INJECTION, SOLUTION INFILTRATION; PERINEURAL
Status: DISCONTINUED | OUTPATIENT
Start: 2025-01-28 | End: 2025-01-28 | Stop reason: HOSPADM

## 2025-01-28 RX ORDER — SODIUM CHLORIDE 9 MG/ML
INJECTION, SOLUTION INTRAVENOUS CONTINUOUS
Status: DISCONTINUED | OUTPATIENT
Start: 2025-01-28 | End: 2025-01-28 | Stop reason: HOSPADM

## 2025-01-28 RX ORDER — CEPHALEXIN 500 MG/1
500 TABLET, FILM COATED ORAL 3 TIMES DAILY
Qty: 15 TABLET | Refills: 0 | Status: SHIPPED | OUTPATIENT
Start: 2025-01-28 | End: 2025-02-02

## 2025-01-28 RX ORDER — FENTANYL CITRATE 50 UG/ML
INJECTION, SOLUTION INTRAMUSCULAR; INTRAVENOUS
Status: DISCONTINUED | OUTPATIENT
Start: 2025-01-28 | End: 2025-01-28 | Stop reason: HOSPADM

## 2025-01-28 RX ORDER — CEFAZOLIN SODIUM 2 G/100ML
2 INJECTION, SOLUTION INTRAVENOUS
Status: COMPLETED | OUTPATIENT
Start: 2025-01-28 | End: 2025-01-28

## 2025-01-28 RX ORDER — OXYCODONE AND ACETAMINOPHEN 5; 325 MG/1; MG/1
1 TABLET ORAL EVERY 4 HOURS PRN
Status: DISCONTINUED | OUTPATIENT
Start: 2025-01-28 | End: 2025-01-28 | Stop reason: HOSPADM

## 2025-01-28 RX ADMIN — CEFAZOLIN SODIUM 2 G: 2 INJECTION, SOLUTION INTRAVENOUS at 16:51

## 2025-01-28 RX ADMIN — SODIUM CHLORIDE: 9 INJECTION, SOLUTION INTRAVENOUS at 15:28

## 2025-01-28 ASSESSMENT — ACTIVITIES OF DAILY LIVING (ADL)
ADLS_ACUITY_SCORE: 46
ADLS_ACUITY_SCORE: 44
ADLS_ACUITY_SCORE: 46

## 2025-01-28 NOTE — PRE-PROCEDURE
GENERAL PRE-PROCEDURE:   Procedure:  Pacemaker generator change  Date/Time:  1/28/2025 3:13 PM    Written consent obtained?: Yes    Risks and benefits: Risks, benefits and alternatives were discussed    Consent given by:  Patient  Patient states understanding of procedure being performed: Yes    Patient's understanding of procedure matches consent: Yes    Procedure consent matches procedure scheduled: Yes    Expected level of sedation:  Moderate  Appropriately NPO:  Yes  ASA Class:  3  Mallampati  :  Grade 2- soft palate, base of uvula, tonsillar pillars, and portion of posterior pharyngeal wall visible  Lungs:  Lungs clear with good breath sounds bilaterally  Heart:  Normal heart sounds and rate  History & Physical reviewed:  History and physical reviewed and no updates needed  Statement of review:  I have reviewed the lab findings, diagnostic data, medications, and the plan for sedation

## 2025-01-28 NOTE — Clinical Note
The ECG shows a paced rhythm. The patient has permanent pacemaker. Pacer on demand mode. ECG rate  = 80 bpm.

## 2025-01-28 NOTE — PROGRESS NOTES
Arrived to unit 2A, room 8, via litter from the cardiac cath lab s/p pacemaker generator change. Left upper chest incision covered with a Primapore dressing, one small dot of drainage noted, no hematoma. She is alert and oriented x 4 but sleepy. She does not want to eat at this time, just rest.  She did take a sip of water. Daughter is at bedside and will transport her home. Will continue to monitor until discharge criteria is met.

## 2025-01-28 NOTE — H&P
Electrophysiology Pre-Procedure History and Physical    Kate Chacon MRN# [id number]   Age: 79 year old YOB: 1945      Date of Procedure: 1/28/2025 [facility]      Date of Exam 1/28/2025 Facility (Same day)       HPI:  Ms. Chacon is an 79 year old female who has a medical history significant for permanent AF (CHADSVASC 6 on Warfarin), SSS s/p PPM 11/2017, HTN, HLD, ischemic CVA, CKD III, and gastritis. Her device generator is on advisory and is recommended to replace prior to RRT being reached. She presents today for PPM generator change.        Active problem list:     Patient Active Problem List    Diagnosis Date Noted    Nursing Home Visit 12/15/2024     Priority: Medium    Hyperbilirubinemia 09/21/2024     Priority: Medium    Orthopedic device, implant, or graft complication 09/21/2024     Priority: Medium    Closed hip fracture, left, sequela 09/19/2024     Priority: Medium     Left displaced femoral neck fracture with surgical repair done 9/14/24 by Dr Thakur at [facility]      Other primary thrombophilia 09/18/2024     Priority: Medium    Overactive bladder 09/18/2024     Priority: Medium    Pain in right knee 09/18/2024     Priority: Medium    Spinal stenosis, lumbar region with neurogenic claudication 09/18/2024     Priority: Medium    Current use of long term anticoagulation 09/14/2024     Priority: Medium    Hyperparathyroidism, primary 01/09/2024     Priority: Medium    Hemiparesis due to old stroke - right side is weaker 10/19/2020     Priority: Medium    Compression fracture of T10 vertebra with routine healing, subsequent encounter 10/19/2020     Priority: Medium    History of CVA (cerebrovascular accident) 10/19/2020     Priority: Medium    Physical deconditioning 10/19/2020     Priority: Medium    Lumbar radiculopathy 10/19/2020     Priority: Medium    Chondrodermatitis nodularis chronica helicis, right 10/09/2020      Priority: Medium    SA node dysfunction s/p pacer on 11/2/2017 at [facility] 08/13/2020     Priority: Medium    H/O sinus pause 08/13/2020     Priority: Medium    History of tobacco abuse quitting on 10/14/16 08/13/2020     Priority: Medium    Dyspnea on exertion 08/13/2020     Priority: Medium    On Coumadin for atrial fibrillation (H) 08/13/2020     Priority: Medium    Mixed hyperlipidemia 08/13/2020     Priority: Medium    CKD (chronic kidney disease) stage 3, GFR 30-59 ml/min (H) 08/13/2020     Priority: Medium    Chronic midline low back pain without sciatica 08/13/2020     Priority: Medium    Sinoatrial node dysfunction (H) 05/01/2018     Priority: Medium    Cardiac pacemaker 02/19/2018     Priority: Medium    Urge incontinence 10/05/2017     Priority: Medium    Former smoker 08/30/2017     Priority: Medium    MDD (recurrent major depressive disorder) in remission 08/30/2017     Priority: Medium    Anxiety 01/05/2017     Priority: Medium    Essential hypertension 12/08/2016     Priority: Medium    Paroxysmal atrial fibrillation (H) 11/02/2016     Priority: Medium    Anticoagulation goal of INR 2 to 3 10/28/2016     Priority: Medium    History of ischemic stroke on 10/15/2016 secondary to embolism 10/15/2016     Priority: Medium    Diverticulosis of large intestine 04/04/2011     Priority: Medium    H/O adenomatous polyp of colon 03/02/2011     Priority: Medium    Osteoporosis 02/08/2006     Priority: Medium            Medications (include herbals and vitamins):      No current facility-administered medications for this encounter.     Current Outpatient Medications   Medication Sig Dispense Refill    acetaminophen (TYLENOL) 500 MG tablet Take 2 tablets (1,000 mg) by mouth 3 times daily.      atorvastatin (LIPITOR) 40 MG tablet Take 1 tablet (40 mg) by mouth at bedtime 90 tablet 3    calcium carbonate-vitamin D 600-200 MG-UNIT TABS Take 1 tablet by mouth 3 times daily.      cyanocobalamin (VITAMIN B-12) 100 MCG  tablet Take 1 tablet (100 mcg) by mouth daily.      HYDROmorphone (DILAUDID) 2 MG tablet Take 0.5 tablets (1 mg) by mouth every 4 hours as needed for pain or moderate pain. And give 1 tablet q 4 hours PRN pain severe 180 tablet 0    lisinopril (ZESTRIL) 20 MG tablet Take 1 tablet (20 mg) by mouth daily.      metoprolol succinate ER (TOPROL XL) 50 MG 24 hr tablet Take 1 tablet (50 mg) by mouth daily 90 tablet 11    SENNA-docusate sodium (SENNA S) 8.6-50 MG tablet Take 2 tablets by mouth 2 times daily.      sertraline (ZOLOFT) 50 MG tablet TAKE 1 TABLET BY MOUTH AT BEDTIME 90 tablet 0    tiZANidine (ZANAFLEX) 2 MG tablet Take 1 tablet (2 mg) by mouth 2 times daily as needed for muscle spasms.      trospium (SANCTURA) 20 MG tablet Take 1 tablet (20 mg) by mouth 2 times daily (before meals) 60 tablet 11    Vitamin D, Cholecalciferol, 25 MCG (1000 UT) CAPS Take 1 capsule by mouth daily.      warfarin ANTICOAGULANT (COUMADIN) 2.5 MG tablet Take 2.5 mg by mouth daily. 1 Tablet by mouth Daily in the afternoon Every week on Monday, Friday at 3PM-6PM.      warfarin ANTICOAGULANT (COUMADIN) 5 MG tablet TAKE 1 TABLET BY MOUTH ONCE DAILY OR  AS  DIRECTED  BY  [facility]  CLINIC (Patient taking differently: Take 5 mg by mouth daily. 1 Tablet by mouth Daily in the afternoon Every week on , Tuesday, Wednesday, Thursday, Saturday at 3PM-6PM.) 100 tablet 4           Medication List         Notice    Cannot display discharge medications because the patient has not yet been admitted.              Allergies:      Allergies   Allergen Reactions    Methylpar-Na Bisulfite-Pentazocine Unknown     jittery             Social History:     Social History     Tobacco Use    Smoking status: Former     Current packs/day: 0.00     Average packs/day: 0.5 packs/day for 49.0 years (24.5 ttl pk-yrs)     Types: Cigarettes     Start date: 10/14/1967     Quit date: 10/14/2016     Years since quittin.2     Passive exposure: Past    Smokeless  tobacco: Never   Substance Use Topics    Alcohol use: No            Physical Exam:   All vitals have been reviewed  No data found.  No intake/output data recorded.  Airway assessment:   Patient is able to open mouth wide  Patient is able to stick out tongue}      ENT:   Normocephalic, without obvious abnormality, atraumatic, sinuses nontender on palpation, external ears without lesions, oral pharynx with moist mucous membranes, tonsils without erythema or exudates, gums normal and good dentition.     Neck:   Supple, symmetrical, trachea midline, no adenopathy, thyroid symmetric, not enlarged and no tenderness, skin normal     Lungs:   No increased work of breathing, good air exchange, clear to auscultation bilaterally, no crackles or wheezing     Cardiovascular:   Normal apical impulse, regular rate and rhythm, normal S1 and S2, no S3 or S4, and no murmur noted             Lab / Radiology Results:     Lab Results   Component Value Date    WBC 6.7 10/01/2024    WBC 9.6 06/21/2020    RBC 3.73 10/01/2024    RBC 5.18 06/21/2020    HGB 12.1 10/01/2024    HGB 13.9 06/21/2020    HCT 38.0 10/01/2024    HCT 45.4 06/21/2020     10/01/2024    MCV 88 06/21/2020    RDW 15.9 10/01/2024    RDW 18.6 06/21/2020     10/01/2024     06/21/2020      Lab Results   Component Value Date    WBC 6.7 10/01/2024    WBC 9.6 06/21/2020     Lab Results   Component Value Date     10/01/2024     06/21/2020     Lab Results   Component Value Date    HGB 12.1 10/01/2024    HGB 13.9 06/21/2020    HCT 38.0 10/01/2024    HCT 45.4 06/21/2020     Lab Results   Component Value Date     09/20/2024     06/21/2020    CO2 31 09/20/2024    CO2 27 08/19/2022    CO2 30 06/21/2020    BUN 16.1 09/20/2024    BUN 17 08/19/2022    BUN 23 06/21/2020     Lab Results   Component Value Date     09/20/2024     06/21/2020    CO2 31 09/20/2024    CO2 27 08/19/2022    CO2 30 06/21/2020    BUN 16.1 09/20/2024    BUN 17  08/19/2022    BUN 23 06/21/2020       Lab Results   Component Value Date    TSH 2.16 01/09/2024            Plan:   Patient's active problems diagnostically and therapeutically optimized for the planned procedure. Patient here for PPM generator change. Procedure explained in detail to patient including indications, risks, and benefits. Moderate sedation is required for this procedure and the risks, benefits and alternatives to moderate sedation were discussed. Patient also understands risks and complications of the procedure which include but are not limited to bruising/swelling around the incision site, infection, bleeding, allergic reaction to local anesthetic, air embolism, arterial puncture, stroke, heart attack, need for emergency surgery, death. Patient verbalized understanding of risks and benefits and has elected to proceed with pacemaker generator change.     JANAE Calderon CNP  Electrophysiology Consult Service  Securely message with [employer]   Text page via [facility] Paging/Directory

## 2025-01-28 NOTE — DISCHARGE INSTRUCTIONS
Home Care after a Pacemaker Battery Change  After you go home:  Have an adult stay with you for 24 hours.  Drink plenty of fluids.  You may eat your normal diet, unless your doctor tells you otherwise.  For 24 hours:  Relax and take it easy.  Do NOT smoke.  Do NOT make any important or legal decisions.  Do NOT drive or operate machines at home or at work.  Do NOT drink alcohol  Wound care:  Keep your incision (surgery wound) dry for 3 days.  After 3 days, you may remove the outer bandage.  Keep the strips of tape on.  They will be removed at your clinic visit.  Check for signs of infection each day.  These include increased redness, swelling, drainage or a fever over 101 F (38.3 C).  Call us immediately if you see any of these signs.  If there are no signs of infection, you may shower in 3 days.  Do not submerge the incision (in a bath tub, hot tub, or swimming pool) until fully healed.    Pain:   You may have mild to moderate pain for 3 to 5 days.  Take acetaminophen (Tylenol) or ibuprofen (Advil) for the pain.  Call us if the pain is severe or lasts more than 5 days.    Activity:  After 24 hours, slowly return to your normal activities.  Healing will take 4 to 6 weeks.  Do not drive for 24 hours.  For 3 days, do not raise your affected arm above your shoulder.  For 10 days, do not use your affected arm to push, pull or lift anything over 10 pounds.  Avoid anything that may cause rough contact or a hard hit to your chest.  This includes football, hockey, and other contact sports.    Telling others about your device:  Before you have any medical tests or treatments, tell the doctors, dentists, and other care providers about your device.  There are a few tests and treatments that may interfere with your device.  (These include MRI, radiation therapy, electrocautery, and others.)  Your care team may need to take special steps to keep you safe.  Before you leave the hospital, you will receive a temporary ID card.  A  permanent card will be mailed to you about 6 to 8 weeks later.  Always carry the ID card with you.  It has important details about your device.  You should also get a MedicAlert ID.  Please ask us for a MedicAlert brochure, or go to [email or web address].    Safety near electrical equipment:  All of these are safe to use when in good repair -  Microwaves  Radios  Cordless phone  Remote controls  Small electrical tools  Cell phones: Keep cell phones at least 6 inches from your device.  Do not carry the phone in a pocket near your device.  Security cherry: It is okay to walk through security cherry at the airports and department stores.  Tell airport security that you have a pacemaker.  They should keep the screening wand at least 6 inches from your device.  Full-body scanners are safe.    Avoid the following:  MRI tests in the hospital unless you have a MRI safe pacemaker.  Arc welding, chain saws and high-powered industrial or commercial tools.  Power lines, power plants and large power generators.  Electric body fat scales.  Magnetic mattress pads or pillow.    Follow-Up Visits:  Return to the clinic in 7 to 10 days to have your device and wound checked.    Device follow-up after your initial clinic visit will take place every 3 months and as needed until your battery reaches end of service. The device follow up will take place in person or remotely utilizing your home monitor.    Questions?  Please call [facility].   Device Nurse:          Business Hours:  [phone]                       After Hours:  [phone]   Choose option 4, then ask for the on-call device nurse at job code [id number].    Your next device clinic appointment is scheduled on:     ___________________Feb 5th________ at _______10:30am______.   [facility]  [facility] [address]  [address] [place] [address]  [id number]

## 2025-01-28 NOTE — PROGRESS NOTES
Pt arrived on 2A, room 8 for pacemaker generator change accompanied by daughter Tony. Vitals stable. EKG reviewed by [facility] JEREMY. Denies any pain. IVF running. Consent signed and H and P is up to date. Labs reviewed-INR 1.86-pt on warfarin with last dose 1/27-Anamaria, JEREMY aware.

## 2025-01-29 LAB
ATRIAL RATE - MUSE: 77 BPM
DIASTOLIC BLOOD PRESSURE - MUSE: NORMAL MMHG
INTERPRETATION ECG - MUSE: NORMAL
P AXIS - MUSE: 52 DEGREES
PR INTERVAL - MUSE: 260 MS
QRS DURATION - MUSE: 128 MS
QT - MUSE: 424 MS
QTC - MUSE: 479 MS
R AXIS - MUSE: -51 DEGREES
SYSTOLIC BLOOD PRESSURE - MUSE: NORMAL MMHG
T AXIS - MUSE: 2 DEGREES
VENTRICULAR RATE- MUSE: 77 BPM

## 2025-01-29 NOTE — PROGRESS NOTES
Kate tolerated recovery well. She has ambulated, voided, and tolerated a regular diet. She is alert and oriented x 4 and able to make needs known. PIV removed. Left chest incision stable. Previous RN went through all discharge instructions, patient and daughter have no questions at this time. Kate was assisted to main entrance with all belongings via wheelchair accompanied by this RN and her daughter to get car from [facility] for discharge back to the skilled nursing facility. She has her prescription.

## 2025-01-29 NOTE — PROGRESS NOTES
Pt alert and oriented. Vitals stable. Denies any pain. Left upper chest incision covered with primapore soft, dry and intact with small shadow drainage. Pt eating/drinking. Discharge instructions were reviewed with pt and daughter Tony at bedside and they verbalizes understanding. Copy of AVS given. Pt already has follow up appointment scheduled on the 5th. Pt seen by Device rep at bedside. Prescription x 1 picked up and given to pt. Pt still needs to ambulate and void. Pt handed off to GLENN Freedman.

## 2025-01-30 LAB
PATH REPORT.COMMENTS IMP SPEC: NORMAL
PATH REPORT.COMMENTS IMP SPEC: NORMAL
PATH REPORT.FINAL DX SPEC: NORMAL
PATH REPORT.GROSS SPEC: NORMAL
PATH REPORT.RELEVANT HX SPEC: NORMAL
PHOTO IMAGE: NORMAL

## 2025-02-03 ENCOUNTER — TELEPHONE (OUTPATIENT)
Dept: FAMILY MEDICINE | Facility: OTHER | Age: 80
End: 2025-02-03
Payer: COMMERCIAL

## 2025-02-03 NOTE — TELEPHONE ENCOUNTER
Call from nurse at [facility] patient moved there this weekend from [facility]. Patients current dose is 2.5 mg Mon and 5 mg all other days, continue this dose until next recheck date on 2/11/25. Spoke with Temo ELIZABETH with [facility] he will facilitate patient to have his INR rechecked on 2/11/25.

## 2025-02-05 ENCOUNTER — ALLIED HEALTH/NURSE VISIT (OUTPATIENT)
Dept: CARDIOLOGY | Facility: OTHER | Age: 80
End: 2025-02-05
Attending: FAMILY MEDICINE
Payer: COMMERCIAL

## 2025-02-05 VITALS
OXYGEN SATURATION: 97 % | HEART RATE: 76 BPM | DIASTOLIC BLOOD PRESSURE: 68 MMHG | TEMPERATURE: 96.7 F | BODY MASS INDEX: 28.62 KG/M2 | SYSTOLIC BLOOD PRESSURE: 110 MMHG | WEIGHT: 180 LBS | RESPIRATION RATE: 16 BRPM

## 2025-02-05 DIAGNOSIS — T82.191A MALFUNCTION OF CARDIAC PACEMAKER BATTERY: ICD-10-CM

## 2025-02-05 DIAGNOSIS — I49.5 SINOATRIAL NODE DYSFUNCTION (H): Primary | ICD-10-CM

## 2025-02-05 ASSESSMENT — PAIN SCALES - GENERAL: PAINLEVEL_OUTOF10: NO PAIN (0)

## 2025-02-05 NOTE — NURSING NOTE
Incision check: pacemaker generator change done 1/28/25 by Dr. Huizar    Description:  Type: steri-strips intact  Location: left upper chest  History:    Cause of laceration: pacemaker generator change done 1/28/25  Accompanying Signs & Symptoms: (staff: if yes-describe)  Redness: No  Warmth: No  Drainage: No  Still bleeding: No  Fevers: No  Edges: No

## 2025-02-09 ENCOUNTER — HEALTH MAINTENANCE LETTER (OUTPATIENT)
Age: 80
End: 2025-02-09

## 2025-02-11 ENCOUNTER — ANTICOAGULATION THERAPY VISIT (OUTPATIENT)
Dept: ANTICOAGULATION | Facility: OTHER | Age: 80
End: 2025-02-11
Payer: MEDICARE

## 2025-02-11 DIAGNOSIS — Z79.01 ANTICOAGULATION GOAL OF INR 2 TO 3: ICD-10-CM

## 2025-02-11 DIAGNOSIS — Z51.81 ANTICOAGULATION GOAL OF INR 2 TO 3: ICD-10-CM

## 2025-02-11 DIAGNOSIS — I48.0 PAROXYSMAL ATRIAL FIBRILLATION (H): Primary | ICD-10-CM

## 2025-02-11 LAB — INR (EXTERNAL): 3.2 (ref 0.9–1.1)

## 2025-02-11 NOTE — PROGRESS NOTES
ANTICOAGULATION MANAGEMENT     Kate Chacon 79 year old female is on warfarin with supratherapeutic INR result. (Goal INR 2.0-3.0)    Recent labs: (last 7 days)     02/11/25  1428   INR 3.2*       ASSESSMENT     Source(s): Chart Review     Warfarin doses taken: Warfarin taken as instructed  Diet: No new diet changes identified  Medication/supplement changes: None noted  New illness, injury, or hospitalization: Yes: had a pacemaker generator change on 1/28  Signs or symptoms of bleeding or clotting: No  Previous result: Subtherapeutic  Additional findings: None       PLAN     Recommended plan for ongoing change(s) affecting INR     Dosing Instructions: decrease your warfarin dose (7.7% change) with next INR in 2 weeks       Summary  As of 2/11/2025      Full warfarin instructions:  2.5 mg every Mon, Fri; 5 mg all other days   Next INR check:  2/25/2025               Faxed dosing and follow up instructions to [facility]    Orders given to  Homecare nurse/facility to recheck    Education provided: Written instructions provided AVS faxed to [facility]    Plan made per [facility] anticoagulation protocol    Radha Burns RN  2/11/2025  Anticoagulation Clinic  [id number] for routing messages: p [facility]  [facility] patient phone line: [phone]        _______________________________________________________________________     Anticoagulation Episode Summary       Current INR goal:  2.0-3.0   TTR:  67.9% (1 y)   Target end date:  Indefinite   Send INR reminders to:  [facility]    Indications    Paroxysmal atrial fibrillation (H) [I48.0]  Anticoagulation goal of INR 2 to 3 [Z51.81  Z79.01]             Comments:  Patient lives at [facility] labs done there 2nd and 4th Tues of the month             Anticoagulation Care Providers       Provider Role Specialty Phone number    Kristine Jiménez MD Referring Family Medicine [phone]

## 2025-02-19 NOTE — PATIENT INSTRUCTIONS
Patient Education   Preventive Care Advice   This is general advice given by our system to help you stay healthy. However, your care team may have specific advice just for you. Please talk to your care team about your preventive care needs.  Nutrition  Eat 5 or more servings of fruits and vegetables each day.  Try wheat bread, brown rice and whole grain pasta (instead of white bread, rice, and pasta).  Get enough calcium and vitamin D. Check the label on foods and aim for 100% of the RDA (recommended daily allowance).  Lifestyle  Exercise at least 150 minutes each week  (30 minutes a day, 5 days a week).  Do muscle strengthening activities 2 days a week. These help control your weight and prevent disease.  No smoking.  Wear sunscreen to prevent skin cancer.  Have a dental exam and cleaning every 6 months.  Yearly exams  See your health care team every year to talk about:  Any changes in your health.  Any medicines your care team has prescribed.  Preventive care, family planning, and ways to prevent chronic diseases.  Shots (vaccines)   HPV shots (up to age 26), if you've never had them before.  Hepatitis B shots (up to age 59), if you've never had them before.  COVID-19 shot: Get this shot when it's due.  Flu shot: Get a flu shot every year.  Tetanus shot: Get a tetanus shot every 10 years.  Pneumococcal, hepatitis A, and RSV shots: Ask your care team if you need these based on your risk.  Shingles shot (for age 50 and up)  General health tests  Diabetes screening:  Starting at age 35, Get screened for diabetes at least every 3 years.  If you are younger than age 35, ask your care team if you should be screened for diabetes.  Cholesterol test: At age 39, start having a cholesterol test every 5 years, or more often if advised.  Bone density scan (DEXA): At age 50, ask your care team if you should have this scan for osteoporosis (brittle bones).  Hepatitis C: Get tested at least once in your life.  STIs (sexually  transmitted infections)  Before age 24: Ask your care team if you should be screened for STIs.  After age 24: Get screened for STIs if you're at risk. You are at risk for STIs (including HIV) if:  You are sexually active with more than one person.  You don't use condoms every time.  You or a partner was diagnosed with a sexually transmitted infection.  If you are at risk for HIV, ask about PrEP medicine to prevent HIV.  Get tested for HIV at least once in your life, whether you are at risk for HIV or not.  Cancer screening tests  Cervical cancer screening: If you have a cervix, begin getting regular cervical cancer screening tests starting at age 21.  Breast cancer scan (mammogram): If you've ever had breasts, begin having regular mammograms starting at age 40. This is a scan to check for breast cancer.  Colon cancer screening: It is important to start screening for colon cancer at age 45.  Have a colonoscopy test every 10 years (or more often if you're at risk) Or, ask your provider about stool tests like a FIT test every year or Cologuard test every 3 years.  To learn more about your testing options, visit:   .  For help making a decision, visit:   https://bit.ly/fm94406.  Prostate cancer screening test: If you have a prostate, ask your care team if a prostate cancer screening test (PSA) at age 55 is right for you.  Lung cancer screening: If you are a current or former smoker ages 50 to 80, ask your care team if ongoing lung cancer screenings are right for you.  For informational purposes only. Not to replace the advice of your health care provider. Copyright   2023 [employer]. All rights reserved. Clinically reviewed by the [facility]. [employer] [id number] - REV 01/24.

## 2025-02-20 ENCOUNTER — OFFICE VISIT (OUTPATIENT)
Dept: GERIATRICS | Facility: OTHER | Age: 80
End: 2025-02-20
Attending: NURSE PRACTITIONER
Payer: COMMERCIAL

## 2025-02-20 DIAGNOSIS — Z00.00 ENCOUNTER FOR MEDICARE ANNUAL WELLNESS EXAM: Primary | ICD-10-CM

## 2025-02-20 DIAGNOSIS — N18.31 STAGE 3A CHRONIC KIDNEY DISEASE (H): ICD-10-CM

## 2025-02-20 DIAGNOSIS — Z00.00 HEALTHCARE MAINTENANCE: ICD-10-CM

## 2025-02-20 DIAGNOSIS — I69.359 HEMIPARESIS DUE TO OLD STROKE (H): ICD-10-CM

## 2025-02-20 DIAGNOSIS — Z71.89 GOALS OF CARE, COUNSELING/DISCUSSION: ICD-10-CM

## 2025-02-20 DIAGNOSIS — I50.20 HEART FAILURE WITH REDUCED EJECTION FRACTION (H): ICD-10-CM

## 2025-02-20 DIAGNOSIS — Z13.220 SCREENING FOR HYPERLIPIDEMIA: ICD-10-CM

## 2025-02-20 DIAGNOSIS — I49.5 SINOATRIAL NODE DYSFUNCTION (H): ICD-10-CM

## 2025-02-20 PROCEDURE — G0439 PPPS, SUBSEQ VISIT: HCPCS | Performed by: NURSE PRACTITIONER

## 2025-02-20 RX ORDER — UBIDECARENONE 75 MG
100 CAPSULE ORAL DAILY
Qty: 90 TABLET | Refills: 3 | Status: SHIPPED | OUTPATIENT
Start: 2025-02-20

## 2025-02-20 RX ORDER — FAMOTIDINE 20 MG
1 TABLET ORAL DAILY
Qty: 90 CAPSULE | Refills: 3 | Status: SHIPPED | OUTPATIENT
Start: 2025-02-20

## 2025-02-20 SDOH — HEALTH STABILITY: PHYSICAL HEALTH: ON AVERAGE, HOW MANY MINUTES DO YOU ENGAGE IN EXERCISE AT THIS LEVEL?: 10 MIN

## 2025-02-20 SDOH — HEALTH STABILITY: PHYSICAL HEALTH: ON AVERAGE, HOW MANY DAYS PER WEEK DO YOU ENGAGE IN MODERATE TO STRENUOUS EXERCISE (LIKE A BRISK WALK)?: 3 DAYS

## 2025-02-20 ASSESSMENT — SOCIAL DETERMINANTS OF HEALTH (SDOH): HOW OFTEN DO YOU GET TOGETHER WITH FRIENDS OR RELATIVES?: THREE TIMES A WEEK

## 2025-02-20 NOTE — PROGRESS NOTES
"Preventive Care Visit  [facility]  JANAE Garay CNP, Family Medicine  Feb 20, 2025      Assessment & Plan     Encounter for Medicare annual wellness exam at [facility]  Wellness Visit Notes:    -Mammo done 1/19/24 (impression: benign).   -DEXA done 4/29/24 (impression: osteoporosis).   -Colon cancer screening done via colonoscopy on 12/2/19 (impression: tubular adenomas). No further testing  -Immunizations: Patient is due for the following: RSV, TDAP  -Derm: Does patient regularly see dermatologist? No    -Labs pended. Microalbumin, Lipid    Screening for hyperlipidemia   Lipid Profile; Future    Stage 3a chronic kidney disease (H)    - Albumin Random Urine Quantitative with Creat Ratio; Future    Healthcare maintenance    - cyanocobalamin (VITAMIN B-12) 100 MCG tablet; Take 1 tablet (100 mcg) by mouth daily.  - Vitamin D, Cholecalciferol, 25 MCG (1000 UT) CAPS; Take 1 capsule by mouth daily.    Goals of care, counseling/discussion    - Full Code    Patient has been advised of split billing requirements and indicates understanding: Yes        BMI  Estimated body mass index is 28.62 kg/m  as calculated from the following:    Height as of 1/28/25: 1.689 m (5' 6.5\").    Weight as of 2/5/25: 81.6 kg (180 lb).       Counseling  Appropriate preventive services were addressed with this patient via screening, questionnaire, or discussion as appropriate for fall prevention, nutrition, physical activity, Tobacco-use cessation, social engagement, weight loss and cognition.  Checklist reviewing preventive services available has been given to the patient.  Reviewed patient's diet, addressing concerns and/or questions.   She is at risk for lack of exercise and has been provided with information to increase physical activity for the benefit of her well-being.   Updated plan of care.  Patient reported difficulty with activities of daily living were addressed today.      Return if symptoms worsen or " fail to improve.    Subjective   Kate is a 79 year old, presenting for the following:  Medicare Visit      Health Care Directive  Patient has a Health Care Directive on file  Advance care planning document is on file and is current.      2/20/2025   General Health   How would you rate your overall physical health? Good   Feel stress (tense, anxious, or unable to sleep) Only a little   (!) STRESS CONCERN      2/20/2025   Nutrition   Diet: Regular (no restrictions)         2/20/2025   Exercise   Days per week of moderate/strenous exercise 3 days   Average minutes spent exercising at this level 10 min         2/20/2025   Social Factors   Frequency of gathering with friends or relatives Three times a week   Worry food won't last until get money to buy more No   Food not last or not have enough money for food? No   Do you have housing? (Housing is defined as stable permanent housing and does not include staying ouside in a car, in a tent, in an abandoned building, in an overnight shelter, or couch-surfing.) Yes   Are you worried about losing your housing? No   Lack of transportation? No   Unable to get utilities (heat,electricity)? No         2/20/2025   Fall Risk   Fallen 2 or more times in the past year? No    Trouble with walking or balance? No        Proxy-reported          2/20/2025   Activities of Daily Living- Home Safety   Needs help with the following daily activites Shopping    Housework    Laundry    Medication administration    Money management   Safety concerns in the home None of the above       Multiple values from one day are sorted in reverse-chronological order         2/20/2025   Dental   Dentist two times every year? Yes         2/20/2025   Hearing Screening   Hearing concerns? None of the above         2/20/2025   Driving Risk Screening   Patient/family members have concerns about driving No         2/20/2025   General Alertness/Fatigue Screening   Have you been more tired than usual lately? No          2025   Urinary Incontinence Screening   Bothered by leaking urine in past 6 months No            Today's PHQ-9 Score:       10/18/2024     1:05 PM   PHQ-9 SCORE   PHQ-9 Total Score MyChart 1 (Minimal depression)   PHQ-9 Total Score 1           2025   Substance Use   Alcohol more than 3/day or more than 7/wk Not Applicable   Do you have a current opioid prescription? No   How severe/bad is pain from 1 to 10? 4/10   Do you use any other substances recreationally? No     Social History     Tobacco Use    Smoking status: Former     Current packs/day: 0.00     Average packs/day: 0.5 packs/day for 49.0 years (24.5 ttl pk-yrs)     Types: Cigarettes     Start date: 10/14/1967     Quit date: 10/14/2016     Years since quittin.3     Passive exposure: Past    Smokeless tobacco: Never   Vaping Use    Vaping status: Never Used   Substance Use Topics    Alcohol use: No    Drug use: No           2024   LAST FHS-7 RESULTS   1st degree relative breast or ovarian cancer No   Any relative bilateral breast cancer No   Any male have breast cancer No   Any ONE woman have BOTH breast AND ovarian cancer No   Any woman with breast cancer before 50yrs No   2 or more relatives with breast AND/OR ovarian cancer No   2 or more relatives with breast AND/OR bowel cancer No            Reviewed and updated as needed this visit by Provider                  Current providers sharing in care for this patient include:  Patient Care Team:  Kristine Jiménez MD as PCP - General (Family Medicine)  Asher Campos MD as Assigned PCP    The following health maintenance items are reviewed in Epic and correct as of today:  Health Maintenance   Topic Date Due    MICROALBUMIN  Never done    URINE DRUG SCREEN  Never done    ZOSTER IMMUNIZATION (1 of 2) Never done    DTAP/TDAP/TD IMMUNIZATION (5 - Td or Tdap) 2021    COVID-19 Vaccine ( season) 2025    PHQ-9  2025    BMP  2026    HEMOGLOBIN   "01/28/2026    MEDICARE ANNUAL WELLNESS VISIT  02/20/2026    FALL RISK ASSESSMENT  02/20/2026    LIPID  02/25/2026    ADVANCE CARE PLANNING  02/24/2030    DEXA  04/29/2039    DEPRESSION ACTION PLAN  Completed    INFLUENZA VACCINE  Completed    Pneumococcal Vaccine: 50+ Years  Completed    URINALYSIS  Completed    RSV VACCINE  Completed    HPV IMMUNIZATION  Aged Out    MENINGITIS IMMUNIZATION  Aged Out    MAMMO SCREENING  Discontinued    COLORECTAL CANCER SCREENING  Discontinued    LUNG CANCER SCREENING  Discontinued            Objective    Exam  LMP  (LMP Unknown)    Estimated body mass index is 28.62 kg/m  as calculated from the following:    Height as of 1/28/25: 1.689 m (5' 6.5\").    Weight as of 2/5/25: 81.6 kg (180 lb).            2/20/2025   Mini Cog   Mini-Cog Not Completed (choose reason) Patient declines              Signed Electronically by: JANAE Garay CNP    "

## 2025-02-24 ENCOUNTER — DOCUMENTATION ONLY (OUTPATIENT)
Dept: OTHER | Facility: CLINIC | Age: 80
End: 2025-02-24
Payer: COMMERCIAL

## 2025-02-25 ENCOUNTER — ANTICOAGULATION THERAPY VISIT (OUTPATIENT)
Dept: ANTICOAGULATION | Facility: OTHER | Age: 80
End: 2025-02-25
Payer: MEDICARE

## 2025-02-25 ENCOUNTER — LAB REQUISITION (OUTPATIENT)
Dept: LAB | Facility: OTHER | Age: 80
End: 2025-02-25
Payer: COMMERCIAL

## 2025-02-25 DIAGNOSIS — Z79.01 ANTICOAGULATION GOAL OF INR 2 TO 3: ICD-10-CM

## 2025-02-25 DIAGNOSIS — Z79.01 LONG TERM (CURRENT) USE OF ANTICOAGULANTS: ICD-10-CM

## 2025-02-25 DIAGNOSIS — I48.0 PAROXYSMAL ATRIAL FIBRILLATION (H): Primary | ICD-10-CM

## 2025-02-25 DIAGNOSIS — Z51.81 ANTICOAGULATION GOAL OF INR 2 TO 3: ICD-10-CM

## 2025-02-25 LAB
CHOLEST SERPL-MCNC: 95 MG/DL
FASTING STATUS PATIENT QL REPORTED: ABNORMAL
HDLC SERPL-MCNC: 37 MG/DL
HOLD SPECIMEN: NORMAL
INR PPP: 1.95 (ref 0.85–1.15)
LDLC SERPL CALC-MCNC: 43 MG/DL
NONHDLC SERPL-MCNC: 58 MG/DL
TRIGL SERPL-MCNC: 76 MG/DL

## 2025-02-25 PROCEDURE — 80061 LIPID PANEL: CPT | Performed by: NURSE PRACTITIONER

## 2025-02-25 PROCEDURE — 85610 PROTHROMBIN TIME: CPT | Performed by: NURSE PRACTITIONER

## 2025-02-25 PROCEDURE — 36415 COLL VENOUS BLD VENIPUNCTURE: CPT | Performed by: NURSE PRACTITIONER

## 2025-02-25 NOTE — PROGRESS NOTES
ANTICOAGULATION MANAGEMENT     Kate Chacon 79 year old female is on warfarin with therapeutic INR result. (Goal INR 2.0-3.0)    Recent labs: (last 7 days)     02/25/25  1011   INR 1.95*       ASSESSMENT     Source(s): Chart Review     Warfarin doses taken: Reviewed in chart  Diet: No new diet changes identified  Medication/supplement changes: None noted  New illness, injury, or hospitalization: No  Signs or symptoms of bleeding or clotting: No  Previous result: Supratherapeutic  Additional findings: None       PLAN     Recommended plan for no diet, medication or health factor changes affecting INR     Dosing Instructions: Continue your current warfarin dose with next INR in 2 weeks       Summary  As of 2/25/2025      Full warfarin instructions:  2.5 mg every Mon, Fri; 5 mg all other days   Next INR check:  3/11/2025               Faxed dosing and follow up instructions to [facility]    Orders given to  Homecare nurse/facility to recheck labs done at the facility 2nd and 4th Tuesdays of  the month    Education provided: Written instructions provided AVS faxed to [facility]    Plan made per [facility] anticoagulation protocol    Radha Burns RN  2/25/2025  Anticoagulation Clinic  [employer] for routing messages: [facility]  [facility] patient phone line: [phone]        _______________________________________________________________________     Anticoagulation Episode Summary       Current INR goal:  2.0-3.0   TTR:  67.1% (1 y)   Target end date:  Indefinite   Send INR reminders to:  [facility]    Indications    Paroxysmal atrial fibrillation (H) [I48.0]  Anticoagulation goal of INR 2 to 3 [Z51.81  Z79.01]             Comments:  Patient lives at [facility] labs done there 2nd and 4th Tues of the month             Anticoagulation Care Providers       Provider Role Specialty Phone number    Kristine Jiménez MD Referring Family Medicine [phone]

## 2025-02-26 PROBLEM — I50.20 HEART FAILURE WITH REDUCED EJECTION FRACTION (H): Status: ACTIVE | Noted: 2025-02-26

## 2025-03-03 RX ORDER — SENNA AND DOCUSATE SODIUM 50; 8.6 MG/1; MG/1
TABLET, FILM COATED ORAL
Qty: 90 TABLET | Refills: 11 | Status: SHIPPED | OUTPATIENT
Start: 2025-03-03

## 2025-03-06 ENCOUNTER — OFFICE VISIT (OUTPATIENT)
Dept: FAMILY MEDICINE | Facility: OTHER | Age: 80
End: 2025-03-06
Attending: FAMILY MEDICINE
Payer: MEDICARE

## 2025-03-06 VITALS
TEMPERATURE: 97.9 F | SYSTOLIC BLOOD PRESSURE: 116 MMHG | OXYGEN SATURATION: 96 % | HEART RATE: 68 BPM | RESPIRATION RATE: 18 BRPM | BODY MASS INDEX: 29.57 KG/M2 | WEIGHT: 186 LBS | DIASTOLIC BLOOD PRESSURE: 74 MMHG

## 2025-03-06 DIAGNOSIS — S72.002S CLOSED HIP FRACTURE, LEFT, SEQUELA: Primary | ICD-10-CM

## 2025-03-06 DIAGNOSIS — I48.0 PAROXYSMAL ATRIAL FIBRILLATION (H): ICD-10-CM

## 2025-03-06 DIAGNOSIS — N39.41 URGE INCONTINENCE: ICD-10-CM

## 2025-03-06 DIAGNOSIS — Z95.0 CARDIAC PACEMAKER: ICD-10-CM

## 2025-03-06 PROCEDURE — G0463 HOSPITAL OUTPT CLINIC VISIT: HCPCS

## 2025-03-06 RX ORDER — CALCIUM CARBONATE/VITAMIN D3 600MG-5MCG
1 TABLET ORAL
COMMUNITY
Start: 2025-02-20

## 2025-03-06 RX ORDER — WARFARIN SODIUM 5 MG/1
5 TABLET ORAL DAILY
COMMUNITY
Start: 2025-03-06

## 2025-03-06 NOTE — LETTER
My Heart Failure Action Plan  Name: Kate Chacon   YOB: 1945  Date: 3/6/2025   My doctor:   Kristine Jiménez [facility]   [address]  [place] [address] [phone] My Diagnosis: HF-rEF (EF < 40%)  My Ejection Fraction:   Lab Results   Component Value Date    LVEF 50-55% (borderline) 01/21/2025     Over 50%  My Exercise Goal: Start exercise slowly - to begin, do a few minutes of exercise, several times a day. Increase your time and speed [id number] to build tolerance, with a goal of 30 minutes of exercise daily. Steady, slow, and consistent exercise is both safe and healthy. Stop and rest when you feel tired or become short of breath. Do not push yourself on days when you don t feel well.       My Weight Plan:   Wt Readings from Last 2 Encounters:   03/06/25 84.4 kg (186 lb)   02/05/25 81.6 kg (180 lb)     Weigh yourself daily using the same scale. If you gain more than 2 pounds in 24 hours or 5 pounds in 7 days. call the clinic    My Diet Goal: No added salt    Emergency Room Visits:    Our goal is to improve your quality of life and help you avoid a visit to the emergency room or hospital.  If we work together, we can achieve this goal. But, if you feel you need to call 911 or go to the emergency room, please do so.  If you go to the emergency room, please bring your list of medicines and your daily weight chart with you.    Each day ask yourself:  Is my weight up?  Do I have swelling?  Do I have trouble breathing?  How did I sleep?  Other problems?       GREEN ZONE     Weight gained is no more than 2 pounds a day or 5 pounds a in 7 days.  No swelling in feet, ankles, legs or stomach.  No more swelling than usual.  No more trouble breathing than usual.  No change in my sleep.  No other problems. What should I do?  I am doing fine. I will take my medicine, follow my diet, see my doctor, exercise, and watch for symptoms.           YELLOW  ZONE         Weight gain of more than 2 pounds in one day or 5 pounds in 7 days.  New swelling in ankle, leg, knee or thigh.  Bloating in belly, pants feel tighter.  Swelling in hands or face.  Coughing or trouble breathing while walking or talking.  Harder to breathe last night.  Have trouble sleeping, wake up short of breath.  Unusually tired.  Not eating.  Nausea, vomiting, or diarrhea  Pain in my chest or bad  leg cramps.  Feel weak or dizzy. What should I do?  I need to take action and call my doctor or nurse today.                 RED ZONE         Weight gain of 5 pounds overnight.  Chest pain or pressure that does not go away.  Feel less alert.  Wheezing or have trouble breathing when at rest.  Cannot sleep lying down.  Cannot take my medicines.  Pass out or faint. What should I do?  I need to call my doctor or nurse now!  Call 911 if I have chest pain or cannot breathe.

## 2025-03-06 NOTE — NURSING NOTE
Chief Complaint   Patient presents with    RE-CERT         Medication Reconciliation: complete    Cristina Torres, LPN

## 2025-03-06 NOTE — PROGRESS NOTES
Nursing Notes:   Cristina Torres LPN  3/6/2025  2:10 PM  Signed  Chief Complaint   Patient presents with    RE-CERT         Medication Reconciliation: complete    Cristina ARTIS. 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  They are accompanied by their daughter Tony Saucedo.  They transferred to [facility] 1/30/[facility].  They are in an apartment.  They only have assistance with medications in the morning.  Kate had fallen last fall and fractured her left proximal femur.  This was repaired with ORIF/intramedullary davy.  Still following up with Orthopedics after her L hip fracture.      Had a new pacemaker generator placed recently and will be following up next week with Cardiology.  She has underlying atrial fibrillation as well.    She has had urinary frequency for a long time.  She had had a botox injection through Urology in the past, which helped.  She had seen Dr. Candelario, who gave her medications (trospium), which isn't helping.  She is still getting up multiple times a night to urinate.  She is interested in trying another botox injection if possible.    History of Present Illness       Reason for visit:  Check up for assissted living facility   She is taking medications regularly.          RECERT        Review of Systems  Constitutional, HEENT, cardiovascular, pulmonary, GI, , musculoskeletal, neuro, skin, endocrine and psych systems are negative, except as otherwise noted.      Objective    /74   Pulse 68   Temp 97.9  F (36.6  C) (Tympanic)   Resp 18   Wt 84.4 kg (186 lb)   LMP  (LMP Unknown)   SpO2 96%   Breastfeeding No   BMI 29.57 kg/m    Body mass index is 29.57 kg/m .  Physical Exam  Constitutional:       Appearance: Normal appearance.   HENT:      Head:  Normocephalic.   Eyes:      Extraocular Movements: Extraocular movements intact.      Pupils: Pupils are equal, round, and reactive to light.   Cardiovascular:      Rate and Rhythm: Normal rate and regular rhythm.      Heart sounds: Normal heart sounds. No murmur heard.  Pulmonary:      Effort: Pulmonary effort is normal.      Breath sounds: Normal breath sounds. No wheezing, rhonchi or rales.   Musculoskeletal:      Cervical back: Normal range of motion and neck supple.      Right lower leg: Edema (1+) present.      Left lower leg: Edema (1+) present.      Comments: Left leg is shorter than right.   Lymphadenopathy:      Cervical: No cervical adenopathy.   Neurological:      Mental Status: She is alert.   Psychiatric:         Mood and Affect: Mood normal.         Behavior: Behavior normal.            ICD-10-CM    1. Closed hip fracture, left, sequela  S72.002S       2. Cardiac pacemaker  Z95.0       3. Paroxysmal atrial fibrillation (H)  I48.0       4. Urge incontinence  N39.41 Adult Urology  Referral          She is doing relatively well.  She does feel that she is getting a little weaker since not moving around as much.  She is interested in starting therapy again since she is at the [facility] now after her left hip fracture to continue making progress with improving her walking.  This is ordered.  Her back is also hurting her some and she has had chronic problems with this, so we will have them work on her back additionally.  Stable.  Follows with cardiology and will be seeing them again next week.  Stable.  Rate controlled.  On warfarin.  Sees cardiology next week as noted above.  Referred back to urology for consideration of Botox injections since she did not get good relief with Sanctura.    She declines Shingrix and Tdap at this time.    No follow-ups on file.     The longitudinal plan of care for the diagnosis(es)/condition(s) as documented were addressed during this visit. Due to the added  complexity in care, I will continue to support Kate in the subsequent management and with ongoing continuity of care.      Kristine Jiménez MD

## 2025-03-11 ENCOUNTER — HOSPITAL ENCOUNTER (OUTPATIENT)
Dept: CARDIOLOGY | Facility: OTHER | Age: 80
Discharge: HOME | End: 2025-03-11
Attending: INTERNAL MEDICINE
Payer: MEDICARE

## 2025-03-11 ENCOUNTER — ANTICOAGULATION THERAPY VISIT (OUTPATIENT)
Dept: ANTICOAGULATION | Facility: OTHER | Age: 80
End: 2025-03-11
Attending: FAMILY MEDICINE
Payer: MEDICARE

## 2025-03-11 ENCOUNTER — LAB REQUISITION (OUTPATIENT)
Dept: LAB | Facility: OTHER | Age: 80
End: 2025-03-11
Payer: MEDICARE

## 2025-03-11 DIAGNOSIS — Z51.81 ANTICOAGULATION GOAL OF INR 2 TO 3: ICD-10-CM

## 2025-03-11 DIAGNOSIS — I48.20 CHRONIC A-FIB (H): ICD-10-CM

## 2025-03-11 DIAGNOSIS — Z79.01 ANTICOAGULATION GOAL OF INR 2 TO 3: ICD-10-CM

## 2025-03-11 DIAGNOSIS — I48.20 CHRONIC ATRIAL FIBRILLATION, UNSPECIFIED (H): ICD-10-CM

## 2025-03-11 DIAGNOSIS — I48.0 PAROXYSMAL ATRIAL FIBRILLATION (H): Primary | ICD-10-CM

## 2025-03-11 LAB
INR PPP: 1.83 (ref 0.85–1.15)
MDC_IDC_LEAD_CONNECTION_STATUS: NORMAL
MDC_IDC_LEAD_CONNECTION_STATUS: NORMAL
MDC_IDC_LEAD_IMPLANT_DT: NORMAL
MDC_IDC_LEAD_IMPLANT_DT: NORMAL
MDC_IDC_LEAD_LOCATION: NORMAL
MDC_IDC_LEAD_LOCATION: NORMAL
MDC_IDC_LEAD_LOCATION_DETAIL_1: NORMAL
MDC_IDC_LEAD_LOCATION_DETAIL_1: NORMAL
MDC_IDC_LEAD_MFG: NORMAL
MDC_IDC_LEAD_MFG: NORMAL
MDC_IDC_LEAD_MODEL: NORMAL
MDC_IDC_LEAD_MODEL: NORMAL
MDC_IDC_LEAD_POLARITY_TYPE: NORMAL
MDC_IDC_LEAD_POLARITY_TYPE: NORMAL
MDC_IDC_LEAD_SERIAL: NORMAL
MDC_IDC_LEAD_SERIAL: NORMAL
MDC_IDC_LEAD_SPECIAL_FUNCTION: NORMAL
MDC_IDC_LEAD_SPECIAL_FUNCTION: NORMAL
MDC_IDC_MSMT_BATTERY_DTM: NORMAL
MDC_IDC_MSMT_BATTERY_REMAINING_LONGEVITY: 132 MO
MDC_IDC_MSMT_BATTERY_REMAINING_PERCENTAGE: 100 %
MDC_IDC_MSMT_BATTERY_STATUS: NORMAL
MDC_IDC_MSMT_LEADCHNL_RA_IMPEDANCE_VALUE: 740 OHM
MDC_IDC_MSMT_LEADCHNL_RV_IMPEDANCE_VALUE: 673 OHM
MDC_IDC_MSMT_LEADCHNL_RV_PACING_THRESHOLD_AMPLITUDE: 0.7 V
MDC_IDC_MSMT_LEADCHNL_RV_PACING_THRESHOLD_PULSEWIDTH: 0.4 MS
MDC_IDC_MSMT_LEADCHNL_RV_SENSING_INTR_AMPL: 9.8 MV
MDC_IDC_PG_IMPLANT_DTM: NORMAL
MDC_IDC_PG_MFG: NORMAL
MDC_IDC_PG_MODEL: NORMAL
MDC_IDC_PG_SERIAL: NORMAL
MDC_IDC_PG_TYPE: NORMAL
MDC_IDC_SESS_CLINIC_NAME: NORMAL
MDC_IDC_SESS_DTM: NORMAL
MDC_IDC_SESS_TYPE: NORMAL
MDC_IDC_SET_BRADY_AT_MODE_SWITCH_MODE: NORMAL
MDC_IDC_SET_BRADY_LOWRATE: 70 {BEATS}/MIN
MDC_IDC_SET_BRADY_MAX_SENSOR_RATE: 130 {BEATS}/MIN
MDC_IDC_SET_BRADY_MODE: NORMAL
MDC_IDC_SET_BRADY_PAV_DELAY_HIGH: 160 MS
MDC_IDC_SET_BRADY_PAV_DELAY_LOW: 250 MS
MDC_IDC_SET_BRADY_SAV_DELAY_LOW: 220 MS
MDC_IDC_SET_LEADCHNL_RA_PACING_CAPTURE_MODE: NORMAL
MDC_IDC_SET_LEADCHNL_RA_PACING_POLARITY: NORMAL
MDC_IDC_SET_LEADCHNL_RA_SENSING_ADAPTATION_MODE: NORMAL
MDC_IDC_SET_LEADCHNL_RA_SENSING_POLARITY: NORMAL
MDC_IDC_SET_LEADCHNL_RA_SENSING_SENSITIVITY: 0.25 MV
MDC_IDC_SET_LEADCHNL_RV_PACING_AMPLITUDE: 3.5 V
MDC_IDC_SET_LEADCHNL_RV_PACING_CAPTURE_MODE: NORMAL
MDC_IDC_SET_LEADCHNL_RV_PACING_POLARITY: NORMAL
MDC_IDC_SET_LEADCHNL_RV_PACING_PULSEWIDTH: 0.4 MS
MDC_IDC_SET_LEADCHNL_RV_SENSING_ADAPTATION_MODE: NORMAL
MDC_IDC_SET_LEADCHNL_RV_SENSING_POLARITY: NORMAL
MDC_IDC_SET_LEADCHNL_RV_SENSING_SENSITIVITY: 1.5 MV
MDC_IDC_SET_ZONE_DETECTION_INTERVAL: 375 MS
MDC_IDC_SET_ZONE_STATUS: NORMAL
MDC_IDC_SET_ZONE_TYPE: NORMAL
MDC_IDC_SET_ZONE_VENDOR_TYPE: NORMAL
MDC_IDC_STAT_BRADY_DTM_END: NORMAL
MDC_IDC_STAT_BRADY_DTM_START: NORMAL
MDC_IDC_STAT_BRADY_RA_PERCENT_PACED: 0 %
MDC_IDC_STAT_BRADY_RV_PERCENT_PACED: 10 %
MDC_IDC_STAT_EPISODE_RECENT_COUNT: 0
MDC_IDC_STAT_EPISODE_RECENT_COUNT_DTM_END: NORMAL
MDC_IDC_STAT_EPISODE_RECENT_COUNT_DTM_START: NORMAL
MDC_IDC_STAT_EPISODE_TOTAL_COUNT: 0
MDC_IDC_STAT_EPISODE_TOTAL_COUNT_DTM_END: NORMAL
MDC_IDC_STAT_EPISODE_TYPE: NORMAL
MDC_IDC_STAT_EPISODE_TYPE: NORMAL
MDC_IDC_STAT_EPISODE_VENDOR_TYPE: NORMAL
MDC_IDC_STAT_EPISODE_VENDOR_TYPE: NORMAL

## 2025-03-11 PROCEDURE — 85610 PROTHROMBIN TIME: CPT | Performed by: NURSE PRACTITIONER

## 2025-03-11 PROCEDURE — 36415 COLL VENOUS BLD VENIPUNCTURE: CPT | Performed by: NURSE PRACTITIONER

## 2025-03-11 NOTE — PROGRESS NOTES
ANTICOAGULATION MANAGEMENT     Kate Chacon 79 year old female is on warfarin with subtherapeutic INR result. (Goal INR 2.0-3.0)    Recent labs: (last 7 days)     03/11/25  0927   INR 1.83*       ASSESSMENT     Source(s): Chart Review and Patient/Caregiver Call     Warfarin doses taken: Warfarin taken as instructed  Diet: Increased greens/vitamin K in diet; ongoing change   salads 2-3-Patient verbalized she would like to keep doing this  New illness, injury, or hospitalization: No  Medication/supplement changes: None noted  Signs or symptoms of bleeding or clotting: No  Previous INR: Subtherapeutic  Additional findings:  has a follow up with urology on 4/11/25       PLAN     Recommended plan for no diet, medication or health factor changes affecting INR     Dosing Instructions: Increase your warfarin dose (6.7% change) with next INR in 2 weeks       Summary  As of 3/11/2025      Full warfarin instructions:  4 mg every Mon, Wed, Fri; 5 mg all other days   Next INR check:  3/25/2025               Faxed dosing and follow up instructions to [facility]    Orders given to  Homecare nurse/facility to recheck    Education provided: Please call back if any changes to your diet, medications or how you've been taking warfarin and Importance of consistent vitamin K intake    Plan made per [facility] anticoagulation protocol    Rose Silverio RN  Anticoagulation Clinic  3/11/2025    _______________________________________________________________________     Anticoagulation Episode Summary       Current INR goal:  2.0-3.0   TTR:  63.3% (1 y)   Target end date:  Indefinite   Send INR reminders to:  [facility]    Indications    Paroxysmal atrial fibrillation (H) [I48.0]  Anticoagulation goal of INR 2 to 3 [Z51.81  Z79.01]             Comments:  Patient lives at [facility] Living labs done there 2nd and 4th Tues of the month             Anticoagulation Care Providers       Provider Role Specialty Phone  number    Kristine Jiménez MD [facility] Family Medicine [phone]

## 2025-03-24 DIAGNOSIS — K59.01 SLOW TRANSIT CONSTIPATION: Primary | ICD-10-CM

## 2025-03-24 RX ORDER — SENNA AND DOCUSATE SODIUM 50; 8.6 MG/1; MG/1
1 TABLET, FILM COATED ORAL 2 TIMES DAILY
Qty: 90 TABLET | Refills: 11 | Status: SHIPPED | OUTPATIENT
Start: 2025-03-24 | End: 2025-03-24

## 2025-03-24 RX ORDER — SENNA AND DOCUSATE SODIUM 50; 8.6 MG/1; MG/1
1 TABLET, FILM COATED ORAL 2 TIMES DAILY
Qty: 90 TABLET | Refills: 11 | Status: SHIPPED | OUTPATIENT
Start: 2025-03-24

## 2025-03-25 ENCOUNTER — ANTICOAGULATION THERAPY VISIT (OUTPATIENT)
Dept: ANTICOAGULATION | Facility: OTHER | Age: 80
End: 2025-03-25
Payer: MEDICARE

## 2025-03-25 ENCOUNTER — LAB REQUISITION (OUTPATIENT)
Dept: LAB | Facility: OTHER | Age: 80
End: 2025-03-25
Payer: COMMERCIAL

## 2025-03-25 DIAGNOSIS — I48.0 PAROXYSMAL ATRIAL FIBRILLATION (H): ICD-10-CM

## 2025-03-25 DIAGNOSIS — Z79.01 ANTICOAGULATION GOAL OF INR 2 TO 3: ICD-10-CM

## 2025-03-25 DIAGNOSIS — Z51.81 ANTICOAGULATION GOAL OF INR 2 TO 3: ICD-10-CM

## 2025-03-25 DIAGNOSIS — I48.0 PAROXYSMAL ATRIAL FIBRILLATION (H): Primary | ICD-10-CM

## 2025-03-25 LAB — INR PPP: 1.93 (ref 0.85–1.15)

## 2025-03-25 PROCEDURE — 85610 PROTHROMBIN TIME: CPT | Performed by: NURSE PRACTITIONER

## 2025-03-25 PROCEDURE — 36415 COLL VENOUS BLD VENIPUNCTURE: CPT | Performed by: NURSE PRACTITIONER

## 2025-03-25 NOTE — PROGRESS NOTES
ANTICOAGULATION MANAGEMENT     Kate Chacon 79 year old female is on warfarin with subtherapeutic INR result. (Goal INR 2.0-3.0)    Recent labs: (last 7 days)     03/25/25  0910   INR 1.93*       ASSESSMENT     Source(s): Chart Review  Previous INR was Subtherapeutic  Medication, diet, health changes since last INR chart reviewed; none identified         PLAN     Recommended plan for no diet, medication or health factor changes affecting INR     Dosing Instructions: Increase your warfarin dose (9.4% change) with next INR in 2 weeks       Summary  As of 3/25/2025      Full warfarin instructions:  5 mg every day   Next INR check:  4/8/2025               Faxed dosing and follow up instructions to [facility]    Orders given to  Homecare nurse/facility to recheck lab at facility 2nd and 4th Tuesdays order per Phyllis Liriano NP    Education provided: Written instructions provided AVS sent to [facility]    Plan made per [facility] anticoagulation protocol    Radha Burns RN  3/25/2025  Anticoagulation Clinic  [employer] for routing messages: [facility] [place]  [facility] patient phone line: [phone]        _______________________________________________________________________     Anticoagulation Episode Summary       Current INR goal:  2.0-3.0   TTR:  59.5% (1 y)   Target end date:  Indefinite   Send INR reminders to:  [facility]    Indications    Paroxysmal atrial fibrillation (H) [I48.0]  Anticoagulation goal of INR 2 to 3 [Z51.81  Z79.01]             Comments:  Patient lives at [facility] labs done there 2nd and 4th Tues of the month             Anticoagulation Care Providers       Provider Role Specialty Phone number    Kristine Jiménez MD Referring Family Medicine [phone]

## 2025-03-31 LAB
MDC_IDC_LEAD_CONNECTION_STATUS: NORMAL
MDC_IDC_LEAD_CONNECTION_STATUS: NORMAL
MDC_IDC_LEAD_IMPLANT_DT: NORMAL
MDC_IDC_LEAD_IMPLANT_DT: NORMAL
MDC_IDC_LEAD_LOCATION: NORMAL
MDC_IDC_LEAD_LOCATION: NORMAL
MDC_IDC_LEAD_LOCATION_DETAIL_1: NORMAL
MDC_IDC_LEAD_LOCATION_DETAIL_1: NORMAL
MDC_IDC_LEAD_MFG: NORMAL
MDC_IDC_LEAD_MFG: NORMAL
MDC_IDC_LEAD_MODEL: NORMAL
MDC_IDC_LEAD_MODEL: NORMAL
MDC_IDC_LEAD_POLARITY_TYPE: NORMAL
MDC_IDC_LEAD_POLARITY_TYPE: NORMAL
MDC_IDC_LEAD_SERIAL: NORMAL
MDC_IDC_LEAD_SERIAL: NORMAL
MDC_IDC_LEAD_SPECIAL_FUNCTION: NORMAL
MDC_IDC_LEAD_SPECIAL_FUNCTION: NORMAL
MDC_IDC_MSMT_BATTERY_DTM: NORMAL
MDC_IDC_MSMT_BATTERY_REMAINING_LONGEVITY: 132 MO
MDC_IDC_MSMT_BATTERY_REMAINING_PERCENTAGE: 100 %
MDC_IDC_MSMT_BATTERY_STATUS: NORMAL
MDC_IDC_MSMT_LEADCHNL_RA_IMPEDANCE_VALUE: 740 OHM
MDC_IDC_MSMT_LEADCHNL_RV_IMPEDANCE_VALUE: 673 OHM
MDC_IDC_MSMT_LEADCHNL_RV_PACING_THRESHOLD_AMPLITUDE: 0.7 V
MDC_IDC_MSMT_LEADCHNL_RV_PACING_THRESHOLD_PULSEWIDTH: 0.4 MS
MDC_IDC_MSMT_LEADCHNL_RV_SENSING_INTR_AMPL: 9.8 MV
MDC_IDC_PG_IMPLANT_DTM: NORMAL
MDC_IDC_PG_MFG: NORMAL
MDC_IDC_PG_MODEL: NORMAL
MDC_IDC_PG_SERIAL: NORMAL
MDC_IDC_PG_TYPE: NORMAL
MDC_IDC_SESS_CLINIC_NAME: NORMAL
MDC_IDC_SESS_DTM: NORMAL
MDC_IDC_SESS_TYPE: NORMAL
MDC_IDC_SET_BRADY_AT_MODE_SWITCH_MODE: NORMAL
MDC_IDC_SET_BRADY_LOWRATE: 70 {BEATS}/MIN
MDC_IDC_SET_BRADY_MAX_SENSOR_RATE: 130 {BEATS}/MIN
MDC_IDC_SET_BRADY_MODE: NORMAL
MDC_IDC_SET_BRADY_PAV_DELAY_HIGH: 160 MS
MDC_IDC_SET_BRADY_PAV_DELAY_LOW: 250 MS
MDC_IDC_SET_BRADY_SAV_DELAY_LOW: 220 MS
MDC_IDC_SET_LEADCHNL_RA_PACING_CAPTURE_MODE: NORMAL
MDC_IDC_SET_LEADCHNL_RA_PACING_POLARITY: NORMAL
MDC_IDC_SET_LEADCHNL_RA_SENSING_ADAPTATION_MODE: NORMAL
MDC_IDC_SET_LEADCHNL_RA_SENSING_POLARITY: NORMAL
MDC_IDC_SET_LEADCHNL_RA_SENSING_SENSITIVITY: 0.25 MV
MDC_IDC_SET_LEADCHNL_RV_PACING_AMPLITUDE: 3.5 V
MDC_IDC_SET_LEADCHNL_RV_PACING_CAPTURE_MODE: NORMAL
MDC_IDC_SET_LEADCHNL_RV_PACING_POLARITY: NORMAL
MDC_IDC_SET_LEADCHNL_RV_PACING_PULSEWIDTH: 0.4 MS
MDC_IDC_SET_LEADCHNL_RV_SENSING_ADAPTATION_MODE: NORMAL
MDC_IDC_SET_LEADCHNL_RV_SENSING_POLARITY: NORMAL
MDC_IDC_SET_LEADCHNL_RV_SENSING_SENSITIVITY: 1.5 MV
MDC_IDC_SET_ZONE_DETECTION_INTERVAL: 375 MS
MDC_IDC_SET_ZONE_STATUS: NORMAL
MDC_IDC_SET_ZONE_TYPE: NORMAL
MDC_IDC_SET_ZONE_VENDOR_TYPE: NORMAL
MDC_IDC_STAT_BRADY_DTM_END: NORMAL
MDC_IDC_STAT_BRADY_DTM_START: NORMAL
MDC_IDC_STAT_BRADY_RA_PERCENT_PACED: 0 %
MDC_IDC_STAT_BRADY_RV_PERCENT_PACED: 10 %
MDC_IDC_STAT_EPISODE_RECENT_COUNT: 0
MDC_IDC_STAT_EPISODE_RECENT_COUNT_DTM_END: NORMAL
MDC_IDC_STAT_EPISODE_RECENT_COUNT_DTM_START: NORMAL
MDC_IDC_STAT_EPISODE_TOTAL_COUNT: 0
MDC_IDC_STAT_EPISODE_TOTAL_COUNT_DTM_END: NORMAL
MDC_IDC_STAT_EPISODE_TYPE: NORMAL
MDC_IDC_STAT_EPISODE_TYPE: NORMAL
MDC_IDC_STAT_EPISODE_VENDOR_TYPE: NORMAL
MDC_IDC_STAT_EPISODE_VENDOR_TYPE: NORMAL

## 2025-04-01 DIAGNOSIS — N39.41 URGE INCONTINENCE: Primary | ICD-10-CM

## 2025-04-05 ENCOUNTER — HEALTH MAINTENANCE LETTER (OUTPATIENT)
Age: 80
End: 2025-04-05

## 2025-04-08 ENCOUNTER — ANTICOAGULATION THERAPY VISIT (OUTPATIENT)
Dept: ANTICOAGULATION | Facility: OTHER | Age: 80
End: 2025-04-08
Attending: FAMILY MEDICINE
Payer: MEDICARE

## 2025-04-08 ENCOUNTER — LAB REQUISITION (OUTPATIENT)
Dept: LAB | Facility: OTHER | Age: 80
End: 2025-04-08
Payer: MEDICARE

## 2025-04-08 DIAGNOSIS — I48.0 PAROXYSMAL ATRIAL FIBRILLATION (H): Primary | ICD-10-CM

## 2025-04-08 DIAGNOSIS — Z79.01 LONG TERM (CURRENT) USE OF ANTICOAGULANTS: ICD-10-CM

## 2025-04-08 DIAGNOSIS — Z51.81 ANTICOAGULATION GOAL OF INR 2 TO 3: ICD-10-CM

## 2025-04-08 DIAGNOSIS — Z79.01 ANTICOAGULATION GOAL OF INR 2 TO 3: ICD-10-CM

## 2025-04-08 LAB — INR PPP: 2.37 (ref 0.85–1.15)

## 2025-04-08 PROCEDURE — 36415 COLL VENOUS BLD VENIPUNCTURE: CPT | Performed by: NURSE PRACTITIONER

## 2025-04-08 PROCEDURE — 85610 PROTHROMBIN TIME: CPT | Performed by: NURSE PRACTITIONER

## 2025-04-08 NOTE — PROGRESS NOTES
ANTICOAGULATION MANAGEMENT     Kate Chacon 79 year old female is on warfarin with therapeutic INR result. (Goal INR 2.0-3.0)    Recent labs: (last 7 days)     04/08/25  0904   INR 2.37*       ASSESSMENT     Source(s): Chart Review     Warfarin doses taken: Warfarin taken as instructed  Diet: No new diet changes identified  Medication/supplement changes: None noted  New illness, injury, or hospitalization: No  Signs or symptoms of bleeding or clotting: No  Previous result: Subtherapeutic  Additional findings: None       PLAN     Recommended plan for no diet, medication or health factor changes affecting INR     Dosing Instructions: Continue your current warfarin dose with next INR in 2 weeks       Summary  As of 4/8/2025      Full warfarin instructions:  5 mg every day   Next INR check:  4/22/2025               Faxed dosing and follow up instructions to [facility]    Orders given to  Homecare nurse/facility to recheck    Education provided: Written instructions providedAVS sent to facility labs done at facility per Phyllis Liriano NP    Plan made per [facility] anticoagulation protocol    Radha Burns RN  4/8/2025  Anticoagulation Clinic  [facility] for routing messages: p [facility]  [facility] patient phone line: [phone]        _______________________________________________________________________     Anticoagulation Episode Summary       Current INR goal:  2.0-3.0   TTR:  58.9% (1 y)   Target end date:  Indefinite   Send INR reminders to:  [facility]    Indications    Paroxysmal atrial fibrillation (H) [I48.0]  Anticoagulation goal of INR 2 to 3 [Z51.81  Z79.01]             Comments:  Patient lives at [facility] labs done there 2nd and 4th Tues of the month             Anticoagulation Care Providers       Provider Role Specialty Phone number    Kristine Jiménez MD Referring Family Medicine [phone]

## 2025-04-11 ENCOUNTER — LAB (OUTPATIENT)
Dept: LAB | Facility: OTHER | Age: 80
End: 2025-04-11
Payer: MEDICARE

## 2025-04-11 DIAGNOSIS — N39.41 URGE INCONTINENCE: ICD-10-CM

## 2025-04-16 DIAGNOSIS — N39.41 URGE INCONTINENCE: Primary | ICD-10-CM

## 2025-04-22 ENCOUNTER — LAB REQUISITION (OUTPATIENT)
Dept: LAB | Facility: OTHER | Age: 80
End: 2025-04-22
Payer: MEDICARE

## 2025-04-22 ENCOUNTER — ANTICOAGULATION THERAPY VISIT (OUTPATIENT)
Dept: ANTICOAGULATION | Facility: OTHER | Age: 80
End: 2025-04-22
Payer: MEDICARE

## 2025-04-22 DIAGNOSIS — I48.0 PAROXYSMAL ATRIAL FIBRILLATION (H): Primary | ICD-10-CM

## 2025-04-22 DIAGNOSIS — Z79.01 ANTICOAGULATION GOAL OF INR 2 TO 3: ICD-10-CM

## 2025-04-22 DIAGNOSIS — Z79.01 LONG TERM (CURRENT) USE OF ANTICOAGULANTS: ICD-10-CM

## 2025-04-22 DIAGNOSIS — Z51.81 ANTICOAGULATION GOAL OF INR 2 TO 3: ICD-10-CM

## 2025-04-22 LAB — INR PPP: 2.33 (ref 0.85–1.15)

## 2025-04-22 PROCEDURE — 36415 COLL VENOUS BLD VENIPUNCTURE: CPT | Performed by: NURSE PRACTITIONER

## 2025-04-22 PROCEDURE — 85610 PROTHROMBIN TIME: CPT | Performed by: NURSE PRACTITIONER

## 2025-04-22 NOTE — PROGRESS NOTES
ANTICOAGULATION MANAGEMENT     Kate Chacon 79 year old female is on warfarin with therapeutic INR result. (Goal INR 2.0-3.0)    Recent labs: (last 7 days)     04/22/25  1042   INR 2.33*       ASSESSMENT     Source(s): Chart Review     Warfarin doses taken: Reviewed in chart  Diet: No new diet changes identified  Medication/supplement changes:  started miralax 4/11/25  New illness, injury, or hospitalization: No  Signs or symptoms of bleeding or clotting: No  Previous result: Therapeutic last visit; previously outside of goal range  Additional findings: None       PLAN     Recommended plan for ongoing change(s) affecting INR     Dosing Instructions: Continue your current warfarin dose with next INR in 3 weeks       Summary  As of 4/22/2025      Full warfarin instructions:  5 mg every day   Next INR check:  5/13/2025               Faxed dosing and follow up instructions to [facility]    Orders given to  Homecare nurse/facility to recheck labs done at facility per order from Phyllis Liriano NP    Education provided: Please call back if any changes to your diet, medications or how you've been taking warfarin  Written instructions provided    Plan made per [facility] anticoagulation protocol    Radha Burns RN  4/22/2025  Anticoagulation Clinic  [employer] for routing messages: p [facility]  [facility] patient phone line: [phone]        _______________________________________________________________________     Anticoagulation Episode Summary       Current INR goal:  2.0-3.0   TTR:  58.9% (1 y)   Target end date:  Indefinite   Send INR reminders to:  [facility]    Indications    Paroxysmal atrial fibrillation (H) [I48.0]  Anticoagulation goal of INR 2 to 3 [Z51.81  Z79.01]             Comments:  Patient lives at [facility] labs done there 2nd and 4th Tues of the month             Anticoagulation Care Providers       Provider Role Specialty Phone number    Kristine iJménez,  MD [facility] [phone]

## 2025-04-23 DIAGNOSIS — R33.9 URINARY RETENTION: ICD-10-CM

## 2025-04-23 DIAGNOSIS — M85.80 OSTEOPENIA, UNSPECIFIED LOCATION: Primary | ICD-10-CM

## 2025-04-23 NOTE — PROGRESS NOTES
Sara nurse from the [facility] called and left a voice message that they did not get our faxed dosing instructions yesterday in which patient did not receive warfarin last night. Updated anticoagulation calendar. Refaxed dosing instructions. Rose Silverio RN on 4/23/2025 at 8:43 AM

## 2025-05-12 ENCOUNTER — RESULTS FOLLOW-UP (OUTPATIENT)
Dept: UROLOGY | Facility: OTHER | Age: 80
End: 2025-05-12

## 2025-05-12 ENCOUNTER — OFFICE VISIT (OUTPATIENT)
Dept: UROLOGY | Facility: OTHER | Age: 80
End: 2025-05-12
Payer: COMMERCIAL

## 2025-05-12 ENCOUNTER — ANCILLARY PROCEDURE (OUTPATIENT)
Dept: CARDIOLOGY | Facility: CLINIC | Age: 80
End: 2025-05-12
Attending: INTERNAL MEDICINE
Payer: MEDICARE

## 2025-05-12 ENCOUNTER — LAB (OUTPATIENT)
Dept: LAB | Facility: OTHER | Age: 80
End: 2025-05-12
Payer: MEDICARE

## 2025-05-12 VITALS
DIASTOLIC BLOOD PRESSURE: 84 MMHG | BODY MASS INDEX: 28.81 KG/M2 | RESPIRATION RATE: 16 BRPM | WEIGHT: 181.2 LBS | SYSTOLIC BLOOD PRESSURE: 130 MMHG | HEART RATE: 88 BPM | OXYGEN SATURATION: 96 % | TEMPERATURE: 98.2 F

## 2025-05-12 DIAGNOSIS — N39.41 URGE INCONTINENCE: Primary | ICD-10-CM

## 2025-05-12 DIAGNOSIS — K59.00 CONSTIPATION, UNSPECIFIED CONSTIPATION TYPE: ICD-10-CM

## 2025-05-12 DIAGNOSIS — N32.81 OAB (OVERACTIVE BLADDER): ICD-10-CM

## 2025-05-12 DIAGNOSIS — R33.9 URINARY RETENTION: ICD-10-CM

## 2025-05-12 DIAGNOSIS — N39.41 URGE INCONTINENCE: ICD-10-CM

## 2025-05-12 DIAGNOSIS — I48.20 CHRONIC A-FIB (H): ICD-10-CM

## 2025-05-12 LAB
ALBUMIN UR-MCNC: NEGATIVE MG/DL
APPEARANCE UR: CLEAR
BILIRUB UR QL STRIP: NEGATIVE
COLOR UR AUTO: YELLOW
GLUCOSE UR STRIP-MCNC: NEGATIVE MG/DL
HGB UR QL STRIP: NEGATIVE
KETONES UR STRIP-MCNC: NEGATIVE MG/DL
LEUKOCYTE ESTERASE UR QL STRIP: NEGATIVE
MDC_IDC_EPISODE_DTM: NORMAL
MDC_IDC_EPISODE_ID: NORMAL
MDC_IDC_EPISODE_TYPE: NORMAL
MDC_IDC_LEAD_CONNECTION_STATUS: NORMAL
MDC_IDC_LEAD_CONNECTION_STATUS: NORMAL
MDC_IDC_LEAD_IMPLANT_DT: NORMAL
MDC_IDC_LEAD_IMPLANT_DT: NORMAL
MDC_IDC_LEAD_LOCATION: NORMAL
MDC_IDC_LEAD_LOCATION: NORMAL
MDC_IDC_LEAD_LOCATION_DETAIL_1: NORMAL
MDC_IDC_LEAD_LOCATION_DETAIL_1: NORMAL
MDC_IDC_LEAD_MFG: NORMAL
MDC_IDC_LEAD_MFG: NORMAL
MDC_IDC_LEAD_MODEL: NORMAL
MDC_IDC_LEAD_MODEL: NORMAL
MDC_IDC_LEAD_POLARITY_TYPE: NORMAL
MDC_IDC_LEAD_POLARITY_TYPE: NORMAL
MDC_IDC_LEAD_SERIAL: NORMAL
MDC_IDC_LEAD_SERIAL: NORMAL
MDC_IDC_LEAD_SPECIAL_FUNCTION: NORMAL
MDC_IDC_LEAD_SPECIAL_FUNCTION: NORMAL
MDC_IDC_MSMT_BATTERY_DTM: NORMAL
MDC_IDC_MSMT_BATTERY_REMAINING_LONGEVITY: 180 MO
MDC_IDC_MSMT_BATTERY_REMAINING_PERCENTAGE: 100 %
MDC_IDC_MSMT_BATTERY_STATUS: NORMAL
MDC_IDC_MSMT_LEADCHNL_RA_IMPEDANCE_VALUE: 714 OHM
MDC_IDC_MSMT_LEADCHNL_RV_IMPEDANCE_VALUE: 679 OHM
MDC_IDC_MSMT_LEADCHNL_RV_PACING_THRESHOLD_AMPLITUDE: 0.9 V
MDC_IDC_MSMT_LEADCHNL_RV_PACING_THRESHOLD_PULSEWIDTH: 0.4 MS
MDC_IDC_PG_IMPLANT_DTM: NORMAL
MDC_IDC_PG_MFG: NORMAL
MDC_IDC_PG_MODEL: NORMAL
MDC_IDC_PG_SERIAL: NORMAL
MDC_IDC_PG_TYPE: NORMAL
MDC_IDC_SESS_CLINIC_NAME: NORMAL
MDC_IDC_SESS_DTM: NORMAL
MDC_IDC_SESS_TYPE: NORMAL
MDC_IDC_SET_BRADY_AT_MODE_SWITCH_RATE: 170 {BEATS}/MIN
MDC_IDC_SET_BRADY_LOWRATE: 70 {BEATS}/MIN
MDC_IDC_SET_BRADY_MAX_SENSOR_RATE: 130 {BEATS}/MIN
MDC_IDC_SET_BRADY_MODE: NORMAL
MDC_IDC_SET_LEADCHNL_RA_SENSING_ADAPTATION_MODE: NORMAL
MDC_IDC_SET_LEADCHNL_RA_SENSING_SENSITIVITY: 0.25 MV
MDC_IDC_SET_LEADCHNL_RV_PACING_AMPLITUDE: 1.3 V
MDC_IDC_SET_LEADCHNL_RV_PACING_CAPTURE_MODE: NORMAL
MDC_IDC_SET_LEADCHNL_RV_PACING_POLARITY: NORMAL
MDC_IDC_SET_LEADCHNL_RV_PACING_PULSEWIDTH: 0.4 MS
MDC_IDC_SET_LEADCHNL_RV_SENSING_ADAPTATION_MODE: NORMAL
MDC_IDC_SET_LEADCHNL_RV_SENSING_POLARITY: NORMAL
MDC_IDC_SET_LEADCHNL_RV_SENSING_SENSITIVITY: 1.5 MV
MDC_IDC_SET_ZONE_DETECTION_INTERVAL: 375 MS
MDC_IDC_SET_ZONE_STATUS: NORMAL
MDC_IDC_SET_ZONE_TYPE: NORMAL
MDC_IDC_SET_ZONE_VENDOR_TYPE: NORMAL
MDC_IDC_STAT_BRADY_DTM_END: NORMAL
MDC_IDC_STAT_BRADY_DTM_START: NORMAL
MDC_IDC_STAT_BRADY_RA_PERCENT_PACED: 0 %
MDC_IDC_STAT_BRADY_RV_PERCENT_PACED: 52 %
MDC_IDC_STAT_EPISODE_RECENT_COUNT: 0
MDC_IDC_STAT_EPISODE_RECENT_COUNT_DTM_END: NORMAL
MDC_IDC_STAT_EPISODE_RECENT_COUNT_DTM_START: NORMAL
MDC_IDC_STAT_EPISODE_TYPE: NORMAL
MDC_IDC_STAT_EPISODE_VENDOR_TYPE: NORMAL
MDC_IDC_STAT_EPISODE_VENDOR_TYPE: NORMAL
NITRATE UR QL: NEGATIVE
PH UR STRIP: 6 [PH] (ref 5–9)
SP GR UR STRIP: 1.02 (ref 1–1.03)
UROBILINOGEN UR STRIP-MCNC: NORMAL MG/DL

## 2025-05-12 PROCEDURE — 3079F DIAST BP 80-89 MM HG: CPT

## 2025-05-12 PROCEDURE — 93294 REM INTERROG EVL PM/LDLS PM: CPT | Performed by: INTERNAL MEDICINE

## 2025-05-12 PROCEDURE — 93296 REM INTERROG EVL PM/IDS: CPT

## 2025-05-12 PROCEDURE — G0463 HOSPITAL OUTPT CLINIC VISIT: HCPCS

## 2025-05-12 PROCEDURE — 51798 US URINE CAPACITY MEASURE: CPT

## 2025-05-12 PROCEDURE — 81003 URINALYSIS AUTO W/O SCOPE: CPT | Mod: ZL

## 2025-05-12 PROCEDURE — 1126F AMNT PAIN NOTED NONE PRSNT: CPT

## 2025-05-12 PROCEDURE — G0463 HOSPITAL OUTPT CLINIC VISIT: HCPCS | Mod: 25

## 2025-05-12 PROCEDURE — 99214 OFFICE O/P EST MOD 30 MIN: CPT

## 2025-05-12 PROCEDURE — 3075F SYST BP GE 130 - 139MM HG: CPT

## 2025-05-12 RX ORDER — POLYETHYLENE GLYCOL 3350 17 G/17G
1 POWDER, FOR SOLUTION ORAL DAILY
Qty: 17 G | Refills: 0 | Status: SHIPPED | OUTPATIENT
Start: 2025-05-12

## 2025-05-12 ASSESSMENT — PAIN SCALES - GENERAL: PAINLEVEL_OUTOF10: NO PAIN (0)

## 2025-05-12 NOTE — PATIENT INSTRUCTIONS
Drink plenty of fluids, > 2 liters/day  Avoid constipation.  Start Miralax one cap daily.  Limit bladder irritants including alcohol, caffeine, and heavily seasoned foods.  Consider double-voiding with leaning forward and pressing on bladder.   Start Gemtesa 75 mg daily. Monitor blood pressure while on this medication. If two readings >140/90 contact our office.  Follow up in 12 weeks or sooner if symptoms worsen. [phone].

## 2025-05-12 NOTE — PROGRESS NOTES
ELECTROPHYSIOLOGY CLINIC VISIT    Assessment/Recommendations   Assessment/Plan:    Ms. Chacon is an 79 year old female who has a medical history significant for permanent AF (CHADSVASC 6 on Warfarin), SSS s/p PPM 11/2017, HTN, HLD, ischemic CVA, CKD III, and gastritis. Her device generator is on advisory and is recommended to replace prior to RRT being reached. She had PPM generator change on 1/28/25.   Permanent Atrial Fibrillation  SSS s/p PPM 11/2017:   1. Stroke Prophylaxis: CHADSVASC ++age, +gender, +HTN, ++CVA 6, corresponding to a 9.8% annual stroke / systemic embolism event rate. She is appropriately on Warfarin. No bleeding issues. Managed by [facility]madin Clinic.   2. Rate Control: Continue Toprol XL, appears to have adequate rate control.   3. Rhythm Control: Cardioversion, Antiarrhythmics and/or ablation are options for rhythm control. She has had permanent AF and has been under a rate control strategy as LVEF is preserved and minimal symptoms.   4. Risk Factor Management: Statin, BP control, and YAAKOV evaluation as indicated.   5. PPM cares: had generator change on 1/28/25. Device site well healed. Normal device function, stable lead parameters.     Follow up annually or sooner if need arises.        History of Present Illness/Subjective    Ms. Kate Chacon is a 79 year old female who comes in today for EP consultation of PPM cares, AF.    Ms. Chacon is an 79 year old female who has a medical history significant for permanent AF (CHADSVASC 6 on Warfarin), SSS s/p PPM 11/2017, HTN, HLD, ischemic CVA, CKD III, and gastritis. Her device generator is on advisory and is recommended to replace prior to RRT being reached. She had PPM generator change on 1/28/25.     She presents today for follow up. Device site well healed. She reports feeling at baseline. She denies chest discomfort, palpitations, abdominal fullness/bloating or peripheral edema, shortness of breath, paroxysmal nocturnal dyspnea, orthopnea,  "lightheadedness, dizziness, pre-syncope, or syncope. An echo from 1/2025 showed LVEF 50-55%, normal RV function, moderate/severe LA enlargement, and mild RA enlargement. A device transmission shows normal device function, stable lead parameters, known permanent AF, and  52%. Current cardiac medications include: Lipitor, Lisinopril, Toprol XL, and Warfarin.         I have reviewed and updated the patient's Past Medical History, Social History, Family History and Medication List.     Cardiographics (Personally Reviewed) :   1/21/25 Echo:   Interpretation Summary  Left ventricular function is decreased. The ejection fraction is 50-55%  (borderline).  Global right ventricular function is normal.  A pacemaker lead is noted in the right ventricle.  Moderate to severe left atrial enlargement is present.  Mild right atrial enlargement is present.  Mild aortic valve calcification is present.  Trace tricuspid insufficiency is present.  The right ventricular systolic pressure is 19.3mmHg above the right atrial  pressure.    6/2019 NM Stress Test:   FINDINGS: Left ventricular ejection fraction is 72%.    There is a moderate-sized fixed perfusion defect of the anterior wall.  No reversible perfusion defects are seen.    Impression   IMPRESSION: Moderate-sized anterior wall infarct.        Physical Examination   /72 (BP Location: Right arm, Patient Position: Chair, Cuff Size: Adult Regular)   Pulse 74   Temp 97.2  F (36.2  C) (Temporal)   Resp 16   Ht 1.689 m (5' 6.5\")   Wt 81.2 kg (179 lb)   LMP  (LMP Unknown)   SpO2 97%   BMI 28.46 kg/m    Wt Readings from Last 3 Encounters:   04/11/25 84.4 kg (186 lb)   03/06/25 84.4 kg (186 lb)   02/05/25 81.6 kg (180 lb)     CONSITUTIONAL: no acute distress  HEENT: no icterus, no redness or discharge, neck supple  CV: no visible edema of visualized extremities. No JVD.   RESPIRATORY: respirations nonlabored, no cough  NEURO: AA&Ox3, speech fluent/appropriate, motor grossly " nonfocal  PSYCH: cooperative, affect appropriate  DERM: no rashes on visualized face/neck/upper extremities         Medications  Allergies   Current Outpatient Medications   Medication Sig Dispense Refill    acetaminophen (TYLENOL) 500 MG tablet TAKE 2 TABS (1,000MG) BY MOUTH THREE TIMES DAILY 186 tablet 11    atorvastatin (LIPITOR) 40 MG tablet TAKE 1 TAB BY MOUTH ONCE DAILY 31 tablet 11    CALCIUM + VITAMIN D3 600-5 MG-MCG TABS TAKE 1 TABLET BY MOUTH THREE TIMES DAILY 93 tablet 11    calcium carbonate-vitamin D 600-200 MG-UNIT TABS Take 1 tablet by mouth 3 times daily.      cyanocobalamin (VITAMIN B-12) 100 MCG tablet Take 1 tablet (100 mcg) by mouth daily. 90 tablet 3    HYDROmorphone (DILAUDID) 2 MG tablet Take 0.5 tablets (1 mg) by mouth every 4 hours as needed for pain or moderate pain. And give 1 tablet q 4 hours PRN pain severe 180 tablet 0    Incontinence Supply Disposable (DEPEND UNDERWEAR LARGE) MISC 1 each as needed (for incontinence). Can use up to 14 a day 420 each 11    lisinopril (ZESTRIL) 20 MG tablet TAKE 1 TAB BY MOUTH ONCE DAILY 31 tablet 11    metoprolol succinate ER (TOPROL XL) 50 MG 24 hr tablet TAKE 1 TAB BY MOUTH ONCE DAILY 31 tablet 11    polyethylene glycol (MIRALAX) 17 GM/Dose powder Take 17 g (1 Capful) by mouth daily as needed for constipation (Use if not having daily bowel movements.). 17 g 0    SENNA-docusate sodium (SENNA S) 8.6-50 MG tablet Take 1 tablet by mouth 2 times daily. HOLD FOR LOOSE STOOLS 90 tablet 11    sertraline (ZOLOFT) 50 MG tablet TAKE 1 TAB BY MOUTH ONCE DAILY 31 tablet 11    tiZANidine (ZANAFLEX) 2 MG tablet Take 1 tablet (2 mg) by mouth 2 times daily as needed for muscle spasms.      Vitamin D, Cholecalciferol, 25 MCG (1000 UT) CAPS Take 1 capsule by mouth daily. 90 capsule 3    vitamin D3 (CHOLECALCIFEROL) 50 mcg (2000 units) tablet TAKE 1 TAB BY MOUTH ONCE DAILY 31 tablet 11    warfarin ANTICOAGULANT (COUMADIN) 2.5 MG tablet Take 1 tablet (2.5 mg) by mouth  daily. 1 Tablet by mouth Daily in the afternoon Every week on Monday, Friday at 3PM-6PM. 90 tablet 11    warfarin ANTICOAGULANT (COUMADIN) 5 MG tablet Take 1 tablet (5 mg) by mouth daily. 1 Tablet by mouth Daily in the afternoon Every week on Sunday, Tuesday, Wednesday, Thursday, Saturday at 3PM-6PM. 90 tablet 11    Allergies   Allergen Reactions    Methylpar-Na Bisulfite-Pentazocine Unknown     jittery         Lab Results (Personally Reviewed)    Chemistry/lipid CBC Cardiac Enzymes/BNP/TSH/INR   Lab Results   Component Value Date    BUN 15.4 01/28/2025     01/28/2025    CO2 22 01/28/2025     Creatinine   Date Value Ref Range Status   01/28/2025 0.86 0.51 - 0.95 mg/dL Final   06/21/2020 1.10 0.60 - 1.20 mg/dL Final       Lab Results   Component Value Date    CHOL 95 02/25/2025    HDL 37 (L) 02/25/2025    LDL 43 02/25/2025      Lab Results   Component Value Date    WBC 7.8 01/28/2025    HGB 15.2 01/28/2025    HCT 45.6 01/28/2025    MCV 96 01/28/2025     01/28/2025    Lab Results   Component Value Date    TSH 2.16 01/09/2024    INR 2.33 (H) 04/22/2025        The patient states understanding and is agreeable with the plan.   JANAE Calderon CNP  Electrophysiology Consult Service  Securely message with [employer]   Text page via [employer]

## 2025-05-12 NOTE — NURSING NOTE
"Chief Complaint   Patient presents with    Follow Up     1 month urine retention    Patient presents to the clinic today for a 1 month follow up for urine retention.    Post-Void Residual  A post-void residual was measured by ultrasonic bladder scanner.  42 mL  Linda Ocampo LPN  5/12/2025 3:25 PM      Initial LMP  (LMP Unknown)  Estimated body mass index is 29.57 kg/m  as calculated from the following:    Height as of 4/11/25: 1.689 m (5' 6.5\").    Weight as of 4/11/25: 84.4 kg (186 lb).  Meds Reconciled: complete      Linda Ocampo LPN, LPN on 5/12/2025 at 3:04 PM  Ext. [phone]        Linda Ocampo LPN  "

## 2025-05-12 NOTE — PROGRESS NOTES
Chief Complaint: Follow Up (1 month urine retention )      HPI: Ms. Kate Chacon is a 79 year old year old female presenting today May 12, 2025 in follow up of urinary retention and OAB.    She was previously seen by me on 04/11/25. At that visit it was recommended that she stop Sanctura, implement MiraLAX to use as needed. Follow-up in 1 month to assess level of retention.     Today patient endorses worsening of symptoms. She has been able to adhere to the recommended treatment, no longer taking Sanctura. Has been more constipated. Has been taking stool softeners which seem to help.  Patient reports that she has been leaking multiple times per day.  She often visits to the bathroom and her pad is wet each and every time.  She also notices leakage with coughing sneezing and bearing down in addition to urgency and being unable to get to the restroom in time.    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    COLONOSCOPY  04/04/2011 4/4/11    COLONOSCOPY  05/07/2012    Tubular Adenomas F/U 2017    COLONOSCOPY  12/02/2019    F/U N/A as will be over 78 in 2024. Tubular adenoma    EP PACEMAKER GENERATOR REPLACEMENT- SINGLE N/A 01/28/2025    Procedure: Pacemaker Generator Replacement - Single;  Surgeon: Nilson Huizar MD;  Location:  [facility]    ESOPHAGOSCOPY, GASTROSCOPY, DUODENOSCOPY (EGD), COMBINED      8/19/05    OTHER SURGICAL HISTORY      8/3/05,197028,OTHER,helices on the right ear    TONSILLECTOMY       No Comments Provided       Current Outpatient Medications   Medication Sig Dispense Refill    acetaminophen (TYLENOL) 500 MG tablet TAKE 2 TABS (1,000MG) BY MOUTH THREE TIMES DAILY 186 tablet 11    atorvastatin (LIPITOR) 40 MG tablet TAKE 1 TAB BY MOUTH ONCE DAILY 31 tablet 11    CALCIUM + VITAMIN D3 600-5 MG-MCG TABS TAKE 1 TABLET BY MOUTH THREE TIMES DAILY 93 tablet 11    calcium carbonate-vitamin D 600-200 MG-UNIT TABS Take 1 tablet by mouth 3 times daily.      cyanocobalamin (VITAMIN B-12) 100 MCG tablet Take 1 tablet (100 mcg) by mouth daily. 90 tablet 3    HYDROmorphone (DILAUDID) 2 MG tablet Take 0.5 tablets (1 mg) by mouth every 4 hours as needed for pain or moderate pain. And give 1 tablet q 4 hours PRN pain severe 180 tablet 0    Incontinence Supply Disposable (DEPEND UNDERWEAR LARGE) MISC 1 each as needed (for incontinence). Can use up to 14 a day 420 each 11    lisinopril (ZESTRIL) 20 MG tablet TAKE 1 TAB BY MOUTH ONCE DAILY 31 tablet 11    metoprolol succinate ER (TOPROL XL) 50 MG 24 hr tablet TAKE 1 TAB BY MOUTH ONCE DAILY 31 tablet 11    polyethylene glycol (MIRALAX) 17 GM/Dose powder Take 17 g (1 Capful) by mouth daily as needed for constipation (Use if not having daily bowel movements.). 17 g 0    SENNA-docusate sodium (SENNA S) 8.6-50 MG tablet Take 1 tablet by mouth 2 times daily. HOLD FOR LOOSE STOOLS 90 tablet 11    sertraline (ZOLOFT) 50 MG tablet TAKE 1 TAB BY MOUTH ONCE DAILY 31 tablet 11    tiZANidine (ZANAFLEX) 2 MG tablet Take 1 tablet (2 mg) by mouth 2 times daily as needed for muscle spasms.      Vitamin D, Cholecalciferol, 25 MCG (1000 UT) CAPS Take 1 capsule by mouth daily. 90 capsule 3    vitamin D3 (CHOLECALCIFEROL) 50 mcg (2000 units) tablet TAKE 1 TAB BY MOUTH ONCE DAILY 31 tablet 11    warfarin ANTICOAGULANT (COUMADIN) 2.5 MG tablet Take 1 tablet (2.5 mg) by mouth daily. 1 Tablet by mouth Daily in the afternoon Every week on Monday, Friday at 3PM-6PM. 90 tablet 11    warfarin  ANTICOAGULANT (COUMADIN) 5 MG tablet Take 1 tablet (5 mg) by mouth daily. 1 Tablet by mouth Daily in the afternoon Every week on Sunday, Tuesday, Wednesday, Thursday, Saturday at 3PM-6PM. 90 tablet 11       ALLERGIES: Methylpar-na bisulfite-pentazocine     GENERAL PHYSICAL EXAM:   Vitals: /84 (BP Location: Right arm, Patient Position: Sitting, Cuff Size: Adult Regular)   Pulse 88   Temp 98.2  F (36.8  C) (Temporal)   Resp 16   Wt 82.2 kg (181 lb 3.2 oz)   LMP  (LMP Unknown)   SpO2 96%   BMI 28.81 kg/m    Body mass index is 28.81 kg/m .    GENERAL: Well groomed, well developed, well nourished female in NAD.  ENT:  ENT exam normal  CV:  Warm Extremities   RESPIRATORY:  Normal respiratory effort   GI:  Soft, ND, NT  MS: Moving all four  NEURO: Alert and oriented x 3.  PSYCH: Normal mood and affect, pleasant and agreeable during interview and exam.    : Negative for suprapubic tenderness, negative for CVA tenderness.  Nonpalpable bladder.      PVR: Residual urine by ultrasound was 42 ml.           RADIOLOGY: The following tests were reviewed:     LABS: The last test results for Ms. Kate Chacon were reviewed.   Results for orders placed or performed in visit on 05/12/25 (from the past 24 hours)   Cardiac Device Check - Remote   Result Value Ref Range    Date Time Interrogation Session [id number]     Implantable Pulse Generator  [employer]     Implantable Pulse Generator Model L331 ACCOLADE MRI EL     Implantable Pulse Generator Serial Number [id number]     Type Interrogation Session Remote Scheduled     Clinic Name [facility]     Implantable Pulse Generator Type Pacemaker     Implantable Pulse Generator Implant Date 20250128     Implantable Lead  [employer]     Implantable Lead Model 7740 Ingevity MRI     Implantable Lead Serial Number [id number]     Implantable Lead Implant Date 20171103     Implantable Lead Polarity Type Bipolar Lead      Implantable Lead Location Detail 1 UNKNOWN     Implantable Lead Special Function 45cm lenght     Implantable Lead Location Right Atrium     Implantable Lead Connection Status Connected     Implantable Lead  [employer]     Implantable Lead Model 7741 MediaSpike MRI     Implantable Lead Serial Number [id number]     Implantable Lead Implant Date 20171103     Implantable Lead Polarity Type Bipolar Lead     Implantable Lead Location Detail 1 UNKNOWN     Implantable Lead Special Function 52cm length     Implantable Lead Location Right Ventricle     Implantable Lead Connection Status Connected     Breezy Setting Mode (NBG Code) VVIR     Breezy Setting Lower Rate Limit 70 [beats]/min    Breezy Setting Maximum Sensor Rate 130 [beats]/min    Breezy Setting AT Mode Switch Rate 170 [beats]/min    Lead Channel Setting Sensing Sensitivity 0.25 mV    Lead Channel Setting Sensing Adaptation Mode Adaptive     Lead Channel Setting Sensing Polarity Bipolar     Lead Channel Setting Sensing Sensitivity 1.5 mV    Lead Channel Setting Sensing Adaptation Mode Adaptive     Lead Channel Setting Pacing Polarity Bipolar     Lead Channel Setting Pacing Pulse Width 0.4 ms    Lead Channel Setting Pacing Amplitude 1.3 V    Lead Channel Setting Pacing Capture Mode Adaptive     Zone Setting Type Category VT     Zone Setting Vendor Type Category VT     Zone Setting Status Monitor     Zone Setting Detection Interval 375 ms    Lead Channel Impedance Value 714 ohm    Lead Channel Impedance Value 679 ohm    Lead Channel Pacing Threshold Amplitude 0.9 V    Lead Channel Pacing Threshold Pulse Width 0.4 ms    Battery Date Time of Measurements 51892438502558     Battery Status Beginning of Service     Battery Remaining Longevity 180 mo    Battery Remaining Percentage 100 %    Breezy Statistic Date Time Start 08184407616864     Breezy Statistic Date Time End [id number]     Breezy Statistic RA Percent Paced 0 %    Breezy Statistic RV Percent Paced 52  %    Episode Statistic Recent Count 0     Episode Statistic Type Category Other     Episode Statistic Recent Count 0     Episode Statistic Type Category VT     Episode Statistic Vendor Type Category NSVT     Episode Statistic Recent Count 0     Episode Statistic Type Category VT     Episode Statistic Vendor Type Category VT     Episode Statistic Recent Date Time Start 20250311000000     Episode Statistic Recent Date Time End 20250512000000     Episode Statistic Recent Date Time Start 20250311000000     Episode Statistic Recent Date Time End 20250512000000     Episode Statistic Recent Date Time Start 20250311000000     Episode Statistic Recent Date Time End 20250512000000     Episode Identifier APM-7     Episode Type Category Periodic EGM     Episode Date Time 20250512014000     Episode Identifier [id number]     Episode Type Category Other     Episode Date Time 20250511235800     Episode Identifier APM-5     Episode Type Category Periodic EGM     Episode Date Time 20250312014000     Episode Identifier [id number]-[id number]     Episode Type Category Other     Episode Date Time 20250311205400     Narrative    Remote pacemaker transmission received and reviewed. Device transmission sent per MD orders.    Device: [employer] L331 ACCOLADE MRI EL  Normal Device Function  Mode: VVIR  bpm  : 52%  Presenting EGM:  @ 70 bpm  Lead Trends Appear Stable  Estimated battery longevity to RRT = 15 years.   Atrial Arrhythmia: Permanent AF  Anticoagulant: Warfarin  Ventricular Arrhythmia: None    Plan: Patient has an appt with Anamaria Valladares on 5/13/25. Next automatic remote transmission as scheduled on 8/19/25.  TEVIN Fortune, Pacemaker ICD Specialist     Remote pacemaker transmission    I have reviewed and interpreted the device interrogation, settings, programming and nurse's summary. The device is functioning within normal device parameters. I agree with the current findings, assessment and plan.       BMP -   Recent Labs   Lab Test  "01/28/25  1359 09/20/24  1714 09/13/24  1742 03/22/22  1109 02/01/22  1034 06/21/20 2002 12/11/19  0424 12/10/19  0440 04/23/18  1453 11/02/17  0519    140 144   < > 140   < > 143 142   < > 141   POTASSIUM 4.0 3.7 3.7   < > 3.9   < > 3.7 3.5   < > 4.0   CHLORIDE 105 102 108*   < > 104   < > 108* 110*   < > 107   CO2 22 31* 24   < > 30   < > 30 28   < > 27   BUN 15.4 16.1 17.7   < > 13   < > 10 12   < > 13   CR 0.86 0.78 1.00*   < > 1.03   < > 0.99 0.91   < > 0.92   GLC 94 127* 133*   < > 119*   < > 116* 118*   < > 101   MASHA 9.5 9.8 9.0   < > 10.7*   < > 9.2 8.5*   < > 10.3   MAG  --   --   --   --   --   --  1.9 1.7*  --  1.9   PHOS  --   --   --   --  3.2  --   --   --   --   --     < > = values in this interval not displayed.       CBC -   Recent Labs   Lab Test 01/28/25  1359 10/01/24  0707 09/20/24  1714   WBC 7.8 6.7 12.8*   HGB 15.2 12.1 11.4*    283 249       ASSESSMENT:   Patient has very mild urinary retention in the presence of constipation.  Additionally she has had  worsening of urinary leakage with stopping Sanctura.  Seems to be both stress and urge in nature.  She has urinary frequency.  Behaviorally she is doing the right things.    PLAN:   1. Urge incontinence (Primary)  Extensive discussion with patient about her current urinary symptoms and leakage.  Explained the concept of \"overactive bladder\" and typical symptoms such as urgency, frequency, urge incontinence and nocturia.  Discussed the difference between primary stress leakage and urge leakage.    Etiologies discussed including excessive caffeine use, smoking, constipation, certain medications, conditions such as Diabetes, Parkinson's disease and other neurologic conditions such as MS, and previous pelvic surgery such as ROM, A&P repair and sling placement.    Treatment discussed including:   -Conservative measures such as weight loss, timed voiding, avoidance of constipation and avoidance of dietary triggers (elimination " diet).  -Pelvic floor therapy.   -Medications such as anticholinergics and/or mirabegron.  -Botox intradetrusor injections, Interstim and/or PTNS therapy.    Recommendations made for behavioral therapy including limitation of caffeine use, alcohol use, avoidance of constipation, avoidance of spicy or acidic foods, better control of diabetes and blood sugars and avoidance of certain medications used to treat fluid overload(diuretics) and Diabetes (Invokana,etc).     Risks and benefits of each discussed including common ones such as infection, bleeding, dry mouth, constipation, dry eyes, retention of urine and failure.    Patient reports that she would rather not undergo a procedure including Botox or InterStim to address incontinence as a time.  She would prefer medications.  We will trial Gemtesa .  She should monitor her blood pressure while on this medication.  Should she have 2 readings greater than 140/90 she should contact our office.  I also encouraged her to take MiraLAX daily to avoid constipation.  We will see her back in 12 weeks      2. Urinary retention  I suspect her very mild retention is secondary to constipation.  We will increase her MiraLAX to 1 cap daily rather than as needed.  I encouraged her to increase her fluid intake to 2 L daily.  We will see her back in 12 weeks.  - MEASUREMENT, POST-VOIDING RESIDUAL URINE &/OR BLADDER CAPACITY, US, NON-IMAGING    3. OAB (overactive bladder)  See #1  - MEASUREMENT, POST-VOIDING RESIDUAL URINE &/OR BLADDER CAPACITY, US, NON-IMAGING  - vibegron (GEMTESA) 75 MG TABS tablet; Take 1 tablet (75 mg) by mouth daily. Monitor blood pressure weekly.  Dispense: 90 tablet; Refill: 3    4. Constipation, unspecified constipation type  See #2.  - polyethylene glycol (MIRALAX) 17 GM/Dose powder; Take 17 g (1 Capful) by mouth daily.  Dispense: 17 g; Refill: 0      26 minutes spent on the date of this encounter doing chart review, history and exam, documentation and further  activities as noted above.        JANAE Lamb [facility]

## 2025-05-13 ENCOUNTER — LAB REQUISITION (OUTPATIENT)
Dept: LAB | Facility: OTHER | Age: 80
End: 2025-05-13
Payer: MEDICARE

## 2025-05-13 ENCOUNTER — TRANSFERRED RECORDS (OUTPATIENT)
Dept: HEALTH INFORMATION MANAGEMENT | Facility: OTHER | Age: 80
End: 2025-05-13

## 2025-05-13 ENCOUNTER — ANTICOAGULATION THERAPY VISIT (OUTPATIENT)
Dept: ANTICOAGULATION | Facility: OTHER | Age: 80
End: 2025-05-13
Attending: FAMILY MEDICINE
Payer: MEDICARE

## 2025-05-13 ENCOUNTER — OFFICE VISIT (OUTPATIENT)
Dept: CARDIOLOGY | Facility: OTHER | Age: 80
End: 2025-05-13
Attending: NURSE PRACTITIONER
Payer: MEDICARE

## 2025-05-13 VITALS
DIASTOLIC BLOOD PRESSURE: 72 MMHG | HEART RATE: 74 BPM | OXYGEN SATURATION: 97 % | HEIGHT: 67 IN | RESPIRATION RATE: 16 BRPM | WEIGHT: 179 LBS | TEMPERATURE: 97.2 F | SYSTOLIC BLOOD PRESSURE: 128 MMHG | BODY MASS INDEX: 28.09 KG/M2

## 2025-05-13 DIAGNOSIS — I45.5 SINUS PAUSE: ICD-10-CM

## 2025-05-13 DIAGNOSIS — I49.5 SINOATRIAL NODE DYSFUNCTION (H): Primary | ICD-10-CM

## 2025-05-13 DIAGNOSIS — Z51.81 ANTICOAGULATION GOAL OF INR 2 TO 3: ICD-10-CM

## 2025-05-13 DIAGNOSIS — Z79.01 ANTICOAGULATION GOAL OF INR 2 TO 3: ICD-10-CM

## 2025-05-13 DIAGNOSIS — Z95.0 CARDIAC PACEMAKER: ICD-10-CM

## 2025-05-13 DIAGNOSIS — Z79.01 LONG TERM (CURRENT) USE OF ANTICOAGULANTS: ICD-10-CM

## 2025-05-13 DIAGNOSIS — I48.0 PAROXYSMAL ATRIAL FIBRILLATION (H): Primary | ICD-10-CM

## 2025-05-13 LAB
HOLD SPECIMEN: NORMAL
HOLD SPECIMEN: NORMAL
INR PPP: 1.99 (ref 0.85–1.15)
PROTHROMBIN TIME: 22.4 SECONDS (ref 11.8–14.8)

## 2025-05-13 PROCEDURE — 85610 PROTHROMBIN TIME: CPT | Performed by: NURSE PRACTITIONER

## 2025-05-13 PROCEDURE — 36415 COLL VENOUS BLD VENIPUNCTURE: CPT | Performed by: NURSE PRACTITIONER

## 2025-05-13 PROCEDURE — G0463 HOSPITAL OUTPT CLINIC VISIT: HCPCS

## 2025-05-13 ASSESSMENT — PAIN SCALES - GENERAL: PAINLEVEL_OUTOF10: NO PAIN (0)

## 2025-05-13 NOTE — NURSING NOTE
"Chief Complaint   Patient presents with    Follow Up     EP Follow up Pacemaker;Atrial Fib        Initial /72 (BP Location: Right arm, Patient Position: Chair, Cuff Size: Adult Regular)   Pulse 74   Temp 97.2  F (36.2  C) (Temporal)   Resp 16   Ht 1.689 m (5' 6.5\")   Wt 81.2 kg (179 lb)   LMP  (LMP Unknown)   SpO2 97%   BMI 28.46 kg/m   Estimated body mass index is 28.46 kg/m  as calculated from the following:    Height as of this encounter: 1.689 m (5' 6.5\").    Weight as of this encounter: 81.2 kg (179 lb).      Medication Reconciliation: Complete.       Debbie Guajardo LPN on 5/13/2025 at 12:19 PM     "

## 2025-05-13 NOTE — PROGRESS NOTES
ANTICOAGULATION MANAGEMENT     Kate Chacon 79 year old female is on warfarin with subtherapeutic INR result. (Goal INR 2.0-3.0)    Recent labs: (last 7 days)     05/13/25  0938   INR 1.99*       ASSESSMENT     Source(s): Chart Review and Home Care/Facility Nurse     Warfarin doses taken: Warfarin taken as instructed  Diet: No new diet changes identified  Medication/supplement changes: Vibegrom/gemtesa started on 5/12/25 No interaction anticipated. Patient hasn't started this medication yet due to insurance.   Miralax started on 5/12. No interaction anticipated.   New illness, injury, or hospitalization: No  Signs or symptoms of bleeding or clotting: No  Previous result: Therapeutic last 2(+) visits  Additional findings: None       PLAN     Recommended plan for no diet, medication or health factor changes affecting INR     Dosing Instructions: Continue your current warfarin dose with next INR in 2 weeks       Summary  As of 5/13/2025      Full warfarin instructions:  5 mg every day   Next INR check:  5/27/2025               Faxed dosing and follow up instructions to [facility]    Orders given to  Homecare nurse/facility to recheck    Education provided: Please call back if any changes to your diet, medications or how you've been taking warfarin  Written instructions provided    Plan made with [facility] Pharmacist Olamide Gomes, RN  5/13/2025  Anticoagulation Clinic  [employer] for routing messages: andrei  ANTICOAGULATION NURSE  [facility] patient phone line: [phone]        _______________________________________________________________________     Anticoagulation Episode Summary       Current INR goal:  2.0-3.0   TTR:  58.7% (1 y)   Target end date:  Indefinite   Send INR reminders to:   ANTICOAGULATION NURSE    Indications    Paroxysmal atrial fibrillation (H) [I48.0]  Anticoagulation goal of INR 2 to 3 [Z51.81  Z79.01]             Comments:  Patient lives at [facility] labs done there 2nd  and 4th Tues of the month             Anticoagulation Care Providers       Provider Role Specialty Phone number    Kristine Jiménez MD Referring Family Medicine [phone]

## 2025-05-13 NOTE — PATIENT INSTRUCTIONS
You were seen in the Electrophysiology Clinic today by: Anamaria Reyna NP    Plan:   Medication Changes: None.    Follow up Visit: with Anamaria in 1 year or sooner if needed.     Device Follow up: Remote device check on 25    If you have further questions, please utilize [employer] to contact us.     Your Care Team:    EP Cardiology   Telephone Number       [facility]:       Nurse line: [phone]    Scheduling line: [phone]      [facility]:    Nurse line: [phone]    Scheduling line: [phone]     [facility]:  Amanda CARRENO RN   Scheduling/Nurse line: [phone]    Procedure [phone]  Natasha Andrews     For the Device Clinic (Pacemakers, ICDs, Loop Recorders)    During business hours: [phone]    After business hours:   [phone]- select option 4 and ask for job code [phone].       On-call cardiologist for after hours or on weekends:   [phone], option #4  Ask to speak to the on-call cardiologist         As always, Thank you for trusting us with your health care needs!

## 2025-05-15 ENCOUNTER — CARE COORDINATION (OUTPATIENT)
Dept: GERIATRICS | Facility: OTHER | Age: 80
End: 2025-05-15
Payer: COMMERCIAL

## 2025-05-15 RX ORDER — VITAMIN B COMPLEX
1 TABLET ORAL DAILY
Qty: 90 TABLET | Refills: 1 | Status: SHIPPED | OUTPATIENT
Start: 2025-05-15

## 2025-05-27 ENCOUNTER — ANTICOAGULATION THERAPY VISIT (OUTPATIENT)
Dept: ANTICOAGULATION | Facility: OTHER | Age: 80
End: 2025-05-27
Attending: FAMILY MEDICINE
Payer: MEDICARE

## 2025-05-27 ENCOUNTER — TRANSFERRED RECORDS (OUTPATIENT)
Dept: HEALTH INFORMATION MANAGEMENT | Facility: OTHER | Age: 80
End: 2025-05-27

## 2025-05-27 ENCOUNTER — LAB REQUISITION (OUTPATIENT)
Dept: LAB | Facility: OTHER | Age: 80
End: 2025-05-27
Payer: COMMERCIAL

## 2025-05-27 DIAGNOSIS — Z79.01 ANTICOAGULATION GOAL OF INR 2 TO 3: ICD-10-CM

## 2025-05-27 DIAGNOSIS — I48.0 PAROXYSMAL ATRIAL FIBRILLATION (H): Primary | ICD-10-CM

## 2025-05-27 DIAGNOSIS — Z51.81 ANTICOAGULATION GOAL OF INR 2 TO 3: ICD-10-CM

## 2025-05-27 DIAGNOSIS — Z79.01 LONG TERM (CURRENT) USE OF ANTICOAGULANTS: ICD-10-CM

## 2025-05-27 LAB
INR PPP: 2 (ref 0.85–1.15)
PROTHROMBIN TIME: 22.5 SECONDS (ref 11.8–14.8)

## 2025-05-27 PROCEDURE — 36415 COLL VENOUS BLD VENIPUNCTURE: CPT | Performed by: NURSE PRACTITIONER

## 2025-05-27 PROCEDURE — 85610 PROTHROMBIN TIME: CPT | Performed by: NURSE PRACTITIONER

## 2025-05-27 NOTE — PROGRESS NOTES
ANTICOAGULATION MANAGEMENT     Kate Chacon 79 year old female is on warfarin with therapeutic INR result. (Goal INR 2.0-3.0)    Recent labs: (last 7 days)     05/27/25  1018   INR 2.00*       ASSESSMENT     Source(s): Chart Review and Home Care/Facility Nurse     Warfarin doses taken: Warfarin taken as instructed  Diet: No new diet changes identified  New illness, injury, or hospitalization: No  Medication/supplement changes: None noted  Signs or symptoms of bleeding or clotting: No  Previous INR: Therapeutic last 2(+) visits  Additional findings: Patient has not started Gemtesa.        PLAN     Recommended plan for no diet, medication or health factor changes affecting INR     Dosing Instructions: Continue your current warfarin dose with next INR in 2 weeks       Summary  As of 5/27/2025      Full warfarin instructions:  5 mg every day   Next INR check:  6/10/2025               Faxed dosing and follow up instructions to [facility]    Orders given to  Homecare nurse/facility to recheck    Education provided: None required    Plan made per [facility] anticoagulation protocol    Polly Sidhu RN  Anticoagulation Clinic  5/27/2025    _______________________________________________________________________     Anticoagulation Episode Summary       Current INR goal:  2.0-3.0   TTR:  54.9% (1 y)   Target end date:  Indefinite   Send INR reminders to:   ANTICOAGULATION NURSE    Indications    Paroxysmal atrial fibrillation (H) [I48.0]  Anticoagulation goal of INR 2 to 3 [Z51.81  Z79.01]             Comments:  Patient lives at [facility] labs done there 2nd and 4th Tues of the month             Anticoagulation Care Providers       Provider Role Specialty Phone number    Kristine Jiménez MD Referring Family Medicine [phone]

## 2025-05-30 ENCOUNTER — RESULTS FOLLOW-UP (OUTPATIENT)
Dept: GERIATRICS | Facility: OTHER | Age: 80
End: 2025-05-30

## 2025-06-03 DIAGNOSIS — N32.81 OAB (OVERACTIVE BLADDER): ICD-10-CM

## 2025-06-03 DIAGNOSIS — N39.41 URGE INCONTINENCE: ICD-10-CM

## 2025-06-05 ENCOUNTER — ANTICOAGULATION THERAPY VISIT (OUTPATIENT)
Dept: ANTICOAGULATION | Facility: OTHER | Age: 80
End: 2025-06-05
Payer: COMMERCIAL

## 2025-06-05 DIAGNOSIS — Z79.01 ANTICOAGULATION GOAL OF INR 2 TO 3: ICD-10-CM

## 2025-06-05 DIAGNOSIS — I48.0 PAROXYSMAL ATRIAL FIBRILLATION (H): Primary | ICD-10-CM

## 2025-06-05 DIAGNOSIS — Z51.81 ANTICOAGULATION GOAL OF INR 2 TO 3: ICD-10-CM

## 2025-06-05 LAB — INR HOME MONITORING: 1.9 RATIO (ref 2–3)

## 2025-06-05 NOTE — PROGRESS NOTES
"ANTICOAGULATION MANAGEMENT     Kate Chacon 79 year old female is on warfarin with subtherapeutic INR result. (Goal INR 2.0-3.0)    Recent labs: (last 7 days)     06/05/25  1507   INR 1.9*       ASSESSMENT     Source(s): Chart Review and Home Care/Facility Nurse     Warfarin doses taken: Warfarin taken as instructed  Diet: No new diet changes identified  Medication/supplement changes: None noted  New illness, injury, or hospitalization: No  Signs or symptoms of bleeding or clotting: No  Previous result: Therapeutic last visit; previously outside of goal range  Additional findings: None       PLAN     Recommended plan for no diet, medication or health factor changes affecting INR     Dosing Instructions: Continue your current warfarin dose with next INR in 5 days       Summary  As of 6/5/2025      Full warfarin instructions:  5 mg every day   Next INR check:  6/10/2025               Telephone call with Sara [facility] facility nurse who verbalizes understanding and agrees to plan  Faxed dosing and follow up instructions to [facility]    Orders given to  Homecare nurse/facility to recheck    Education provided: [employer] monitor result received. Reviewed home monitoring testing expectations:   Ordered INR testing frequency is:  weekly  INR should be tested Monday-Friday prior to 2 pm and submitted directly to home monitoring company on day of testing  BRUCE/Sushma will continue to receive and manage INR results  In clinic lab visits may be periodically required if a home result is an error, INR  > 8, or testing supplies are not available.  Home monitoring services may be discontinue if testing requirements of insurance/home monitoring company are not met.  Responsible group changed to [employer] Home monitoring. Will receive future result management calls from this RN team  [facility] Health Maintenance Modifier: \"Anticoagulation: Home Monitoring\" added to chart       Plan made per [facility] anticoagulation " protocol    Radha Burns RN  6/5/2025  Anticoagulation Clinic  [facility] for routing messages: p  ANTICOAGULATION NURSE  [facility] patient phone line: [phone]        _______________________________________________________________________     Anticoagulation Episode Summary       Current INR goal:  2.0-3.0   TTR:  52.5% (1 y)   Target end date:  Indefinite   Send INR reminders to:   ANTICOAGULATION NURSE    Indications    Paroxysmal atrial fibrillation (H) [I48.0]  Anticoagulation goal of INR 2 to 3 [Z51.81  Z79.01]             Comments:  Patient lives at [facility] labs done there 2nd and 4th Tues of the month             Anticoagulation Care Providers       Provider Role Specialty Phone number    Kristine Jiménez MD Referring Family Medicine [phone]

## 2025-06-10 ENCOUNTER — TRANSFERRED RECORDS (OUTPATIENT)
Dept: HEALTH INFORMATION MANAGEMENT | Facility: OTHER | Age: 80
End: 2025-06-10

## 2025-06-10 ENCOUNTER — ANTICOAGULATION THERAPY VISIT (OUTPATIENT)
Dept: ANTICOAGULATION | Facility: OTHER | Age: 80
End: 2025-06-10
Payer: COMMERCIAL

## 2025-06-10 DIAGNOSIS — Z79.01 ANTICOAGULATION GOAL OF INR 2 TO 3: ICD-10-CM

## 2025-06-10 DIAGNOSIS — Z51.81 ANTICOAGULATION GOAL OF INR 2 TO 3: ICD-10-CM

## 2025-06-10 DIAGNOSIS — I48.0 PAROXYSMAL ATRIAL FIBRILLATION (H): Primary | ICD-10-CM

## 2025-06-10 LAB — INR HOME MONITORING: 1.9 RATIO (ref 2–3)

## 2025-06-10 NOTE — PROGRESS NOTES
ANTICOAGULATION MANAGEMENT     Kate Chacon 79 year old female is on warfarin with subtherapeutic INR result. (Goal INR 2.0-3.0)    Recent labs: (last 7 days)     06/10/25  1257   INR 1.9*       ASSESSMENT     Source(s): Chart Review and Home Care/Facility Nurse     Warfarin doses taken: Warfarin taken as instructed  Diet: No new diet changes identified  Medication/supplement changes: None noted  New illness, injury, or hospitalization: No  Signs or symptoms of bleeding or clotting: No  Previous result: Subtherapeutic  Additional findings: None       PLAN     Recommended plan for no diet, medication or health factor changes affecting INR     Dosing Instructions: Increase your warfarin dose (7.1% change) with next INR in 1 week       Summary  As of 6/10/2025      Full warfarin instructions:  7.5 mg every Tue; 5 mg all other days   Next INR check:  6/17/2025               Faxed dosing and follow up instructions to [facility]    Orders given to  Homecare nurse/facility to recheck    Patient not here, [employer]heet with screening questions and current INR received via FAX from outside agency. Results reviewed, Warfarin dosing per protocol, and recommended follow-up appointment made. Paperwork FAXED back to facility.       Plan made per [facility] anticoagulation protocol    Radha Burns RN  6/10/2025  Anticoagulation Clinic  [employer] for routing messages: andrei  ANTICOAGULATION NURSE  [facility] patient phone line: [phone]        _______________________________________________________________________     Anticoagulation Episode Summary       Current INR goal:  2.0-3.0   TTR:  52.5% (1 y)   Target end date:  Indefinite   Send INR reminders to:   ANTICOAGULATION NURSE    Indications    Paroxysmal atrial fibrillation (H) [I48.0]  Anticoagulation goal of INR 2 to 3 [Z51.81  Z79.01]             Comments:  Patient lives at [facility] labs done there 2nd and 4th Tues of the month              Anticoagulation Care Providers       Provider Role Specialty Phone number    Kristine Jiménez MD Referring Family Medicine [phone]

## 2025-06-17 ENCOUNTER — ANTICOAGULATION THERAPY VISIT (OUTPATIENT)
Dept: ANTICOAGULATION | Facility: OTHER | Age: 80
End: 2025-06-17
Attending: FAMILY MEDICINE
Payer: COMMERCIAL

## 2025-06-17 ENCOUNTER — TRANSFERRED RECORDS (OUTPATIENT)
Dept: HEALTH INFORMATION MANAGEMENT | Facility: OTHER | Age: 80
End: 2025-06-17

## 2025-06-17 DIAGNOSIS — I48.0 PAROXYSMAL ATRIAL FIBRILLATION (H): Primary | ICD-10-CM

## 2025-06-17 DIAGNOSIS — Z79.01 ANTICOAGULATION GOAL OF INR 2 TO 3: ICD-10-CM

## 2025-06-17 DIAGNOSIS — Z51.81 ANTICOAGULATION GOAL OF INR 2 TO 3: ICD-10-CM

## 2025-06-17 LAB — INR HOME MONITORING: 2.2 RATIO (ref 2–3)

## 2025-06-17 NOTE — PROGRESS NOTES
ANTICOAGULATION MANAGEMENT     Kate Chacon 79 year old female is on warfarin with therapeutic INR result. (Goal INR 2.0-3.0)    Recent labs: (last 7 days)     06/17/25  1038   INR 2.2       ASSESSMENT     Source(s): Chart Review and Home Care/Facility Nurse     Warfarin doses taken: Warfarin taken as instructed  Diet: No new diet changes identified  Medication/supplement changes: None noted  New illness, injury, or hospitalization: No  Signs or symptoms of bleeding or clotting: No  Previous result: Subtherapeutic  Additional findings: None       PLAN     Recommended plan for no diet, medication or health factor changes affecting INR     Dosing Instructions: Continue your current warfarin dose with next INR in 1 week       Summary  As of 6/17/2025      Full warfarin instructions:  7.5 mg every Tue; 5 mg all other days   Next INR check:  6/24/2025               Faxed dosing and follow up instructions to [facility]    Orders given to  Homecare nurse/facility to recheck    Patient not here, [employer] Communicationsheet with screening questions and current INR received via FAX from outside agency. Results reviewed, Warfarin dosing per protocol, and recommended follow-up appointment made. Paperwork FAXED back to facility.       Plan made per [facility] anticoagulation protocol    Radha Burns RN  6/17/2025  Anticoagulation Clinic  [employer] for routing messages: andrei  ANTICOAGULATION NURSE  [facility] patient phone line: [phone]        _______________________________________________________________________     Anticoagulation Episode Summary       Current INR goal:  2.0-3.0   TTR:  53.8% (1 y)   Target end date:  Indefinite   Send INR reminders to:   ANTICOAGULATION NURSE    Indications    Paroxysmal atrial fibrillation (H) [I48.0]  Anticoagulation goal of INR 2 to 3 [Z51.81  Z79.01]             Comments:  Patient lives at [facility] labs done there 2nd and 4th Tues of the month              Anticoagulation Care Providers       Provider Role Specialty Phone number    Kristine iJménez MD Referring Family Medicine [phone]

## 2025-06-20 ENCOUNTER — TELEPHONE (OUTPATIENT)
Dept: UROLOGY | Facility: OTHER | Age: 80
End: 2025-06-20
Payer: COMMERCIAL

## 2025-06-20 DIAGNOSIS — N32.81 OAB (OVERACTIVE BLADDER): ICD-10-CM

## 2025-06-20 DIAGNOSIS — N39.41 URGE INCONTINENCE: ICD-10-CM

## 2025-06-20 NOTE — TELEPHONE ENCOUNTER
[facility] - Accessed patients chart regarding Rx prior authorization.  Submitted PA Request via cover my meds for vibegron (GEMTESA) 75 MG TABS tablet   Message from [employer]: Drug is not covered by plan

## 2025-06-23 NOTE — TELEPHONE ENCOUNTER
Talked with Ira, . She stated that she will contact the patient.  Sara Vidales LPN on 6/23/2025 at 10:11 AM

## 2025-06-23 NOTE — TELEPHONE ENCOUNTER
I resent the prescription to [facility] [place] so the medication could be transitioned to outreach program.

## 2025-06-24 ENCOUNTER — TRANSFERRED RECORDS (OUTPATIENT)
Dept: HEALTH INFORMATION MANAGEMENT | Facility: OTHER | Age: 80
End: 2025-06-24

## 2025-06-24 ENCOUNTER — ANTICOAGULATION THERAPY VISIT (OUTPATIENT)
Dept: ANTICOAGULATION | Facility: OTHER | Age: 80
End: 2025-06-24
Attending: FAMILY MEDICINE
Payer: COMMERCIAL

## 2025-06-24 DIAGNOSIS — Z79.01 ANTICOAGULATION GOAL OF INR 2 TO 3: ICD-10-CM

## 2025-06-24 DIAGNOSIS — I48.0 PAROXYSMAL ATRIAL FIBRILLATION (H): Primary | ICD-10-CM

## 2025-06-24 DIAGNOSIS — Z51.81 ANTICOAGULATION GOAL OF INR 2 TO 3: ICD-10-CM

## 2025-06-24 LAB — INR HOME MONITORING: 2.2 RATIO (ref 2–3)

## 2025-06-24 NOTE — PROGRESS NOTES
ANTICOAGULATION MANAGEMENT     Kate Chacon 79 year old female is on warfarin with therapeutic INR result. (Goal INR 2.0-3.0)    Recent labs: (last 7 days)     06/24/25  1018   INR 2.2       ASSESSMENT     Source(s): Chart Review and Home Care/Facility Nurse     Warfarin doses taken: Warfarin taken as instructed  Diet: No new diet changes identified  Medication/supplement changes: None noted  New illness, injury, or hospitalization: No  Signs or symptoms of bleeding or clotting: No  Previous result: Therapeutic last visit; previously outside of goal range  Additional findings: None       PLAN     Recommended plan for no diet, medication or health factor changes affecting INR     Dosing Instructions: Continue your current warfarin dose with next INR in 1 week       Summary  As of 6/24/2025      Full warfarin instructions:  7.5 mg every Tue; 5 mg all other days   Next INR check:  7/1/2025               Faxed dosing and follow up instructions to [facility]    Orders given to  Homecare nurse/facility to recheck    Patient not here, [employer] Communicationsheet with screening questions and current INR received via FAX from outside agency. Results reviewed, Warfarin dosing per protocol, and recommended follow-up appointment made. Paperwork FAXED back to facility.       Plan made per [facility] anticoagulation protocol    Radha Burns RN  6/24/2025  Anticoagulation Clinic  [employer] for routing messages: andrei  ANTICOAGULATION NURSE  [facility] patient phone line: [phone]        _______________________________________________________________________     Anticoagulation Episode Summary       Current INR goal:  2.0-3.0   TTR:  55.7% (1 y)   Target end date:  Indefinite   Send INR reminders to:   ANTICOAGULATION NURSE    Indications    Paroxysmal atrial fibrillation (H) [I48.0]  Anticoagulation goal of INR 2 to 3 [Z51.81  Z79.01]             Comments:  Patient lives at [facility] labs done there 2nd and 4th Tues of the  month             Anticoagulation Care Providers       Provider Role Specialty Phone number    Kristine Jiménez MD Referring Family Medicine [phone]

## 2025-07-01 ENCOUNTER — TRANSFERRED RECORDS (OUTPATIENT)
Dept: HEALTH INFORMATION MANAGEMENT | Facility: OTHER | Age: 80
End: 2025-07-01

## 2025-07-01 ENCOUNTER — ANTICOAGULATION THERAPY VISIT (OUTPATIENT)
Dept: ANTICOAGULATION | Facility: OTHER | Age: 80
End: 2025-07-01
Attending: FAMILY MEDICINE
Payer: COMMERCIAL

## 2025-07-01 DIAGNOSIS — Z79.01 ANTICOAGULATION GOAL OF INR 2 TO 3: ICD-10-CM

## 2025-07-01 DIAGNOSIS — Z51.81 ANTICOAGULATION GOAL OF INR 2 TO 3: ICD-10-CM

## 2025-07-01 DIAGNOSIS — I48.0 PAROXYSMAL ATRIAL FIBRILLATION (H): Primary | ICD-10-CM

## 2025-07-01 LAB — INR HOME MONITORING: 1.8 RATIO (ref 2–3)

## 2025-07-01 NOTE — PROGRESS NOTES
ANTICOAGULATION MANAGEMENT     Kate Chacon 79 year old female is on warfarin with subtherapeutic INR result. (Goal INR 2.0-3.0)    Recent labs: (last 7 days)     07/01/25  1243   INR 1.8*       ASSESSMENT     Source(s): Chart Review and Home Care/Facility Nurse     Warfarin doses taken: Warfarin taken as instructed  Diet: No new diet changes identified  Medication/supplement changes: None noted  New illness, injury, or hospitalization: No  Signs or symptoms of bleeding or clotting: No  Previous result: Therapeutic last visit; previously outside of goal range  Additional findings: None       PLAN     Recommended plan for no diet, medication or health factor changes affecting INR     Dosing Instructions: Increase your warfarin dose (6.7% change) with next INR in 1 week       Summary  As of 7/1/2025      Full warfarin instructions:  7.5 mg every Tue, Fri; 5 mg all other days   Next INR check:  7/8/2025               Faxed dosing and follow up instructions to [facility]    Orders given to  Homecare nurse/facility to recheck    Patient not here, [employer] Communicationsheet with screening questions and current INR received via FAX from outside agency. Results reviewed, Warfarin dosing per protocol, and recommended follow-up appointment made. Paperwork FAXED back to facility.       Plan made per [facility] anticoagulation protocol    Radha Burns RN  7/1/2025  Anticoagulation Clinic  [employer] for routing messages: andrei  ANTICOAGULATION NURSE  [facility] patient phone line: [phone]        _______________________________________________________________________     Anticoagulation Episode Summary       Current INR goal:  2.0-3.0   TTR:  56.6% (1 y)   Target end date:  Indefinite   Send INR reminders to:   ANTICOAGULATION NURSE    Indications    Paroxysmal atrial fibrillation (H) [I48.0]  Anticoagulation goal of INR 2 to 3 [Z51.81  Z79.01]             Comments:  Patient lives at [facility] labs done  there 2nd and 4th Tues of the month             Anticoagulation Care Providers       Provider Role Specialty Phone number    Kristine Jiménez MD Referring Family Medicine [phone]

## 2025-07-08 ENCOUNTER — ANTICOAGULATION THERAPY VISIT (OUTPATIENT)
Dept: ANTICOAGULATION | Facility: OTHER | Age: 80
End: 2025-07-08
Attending: FAMILY MEDICINE
Payer: COMMERCIAL

## 2025-07-08 ENCOUNTER — TRANSFERRED RECORDS (OUTPATIENT)
Dept: HEALTH INFORMATION MANAGEMENT | Facility: OTHER | Age: 80
End: 2025-07-08

## 2025-07-08 DIAGNOSIS — Z79.01 ANTICOAGULATION GOAL OF INR 2 TO 3: ICD-10-CM

## 2025-07-08 DIAGNOSIS — Z51.81 ANTICOAGULATION GOAL OF INR 2 TO 3: ICD-10-CM

## 2025-07-08 DIAGNOSIS — I48.0 PAROXYSMAL ATRIAL FIBRILLATION (H): Primary | ICD-10-CM

## 2025-07-08 LAB — INR HOME MONITORING: 2 RATIO (ref 2–3)

## 2025-07-08 NOTE — PROGRESS NOTES
ANTICOAGULATION MANAGEMENT     Kate Chacon 80 year old female is on warfarin with therapeutic INR result. (Goal INR 2.0-3.0)    Recent labs: (last 7 days)     07/08/25  1108   INR 2       ASSESSMENT     Source(s): Chart Review and Home Care/Facility Nurse     Warfarin doses taken: Warfarin taken as instructed  Diet: No new diet changes identified  Medication/supplement changes: None noted  New illness, injury, or hospitalization: No  Signs or symptoms of bleeding or clotting: No  Previous result: Subtherapeutic  Additional findings: None       PLAN     Recommended plan for no diet, medication or health factor changes affecting INR     Dosing Instructions: Continue your current warfarin dose with next INR in 1 week       Summary  As of 7/8/2025      Full warfarin instructions:  7.5 mg every Tue, Fri; 5 mg all other days   Next INR check:  7/15/2025               Faxed dosing and follow up instructions to [facility]    Orders given to  Homecare nurse/facility to recheck    Patient not here, [employer] Communication sheet with screening questions and current INR received via FAX from outside agency. Results reviewed, Warfarin dosing per protocol, and recommended follow-up appointment made. Paperwork FAXED back to facility.       Plan made per [facility] anticoagulation protocol    Radha Burns RN  7/8/2025  Anticoagulation Clinic  [employer] for routing messages: andrei  ANTICOAGULATION NURSE  [facility] patient phone line: [phone]        _______________________________________________________________________     Anticoagulation Episode Summary       Current INR goal:  2.0-3.0   TTR:  55.4% (1 y)   Target end date:  Indefinite   Send INR reminders to:   ANTICOAGULATION NURSE    Indications    Paroxysmal atrial fibrillation (H) [I48.0]  Anticoagulation goal of INR 2 to 3 [Z51.81  Z79.01]             Comments:  Patient lives at [facility] labs done there 2nd and 4th Tues of the month             Anticoagulation  Care Providers       Provider Role Specialty Phone number    Kristine Jiménez MD Referring Family Medicine [phone]

## 2025-07-10 DIAGNOSIS — N32.81 OAB (OVERACTIVE BLADDER): Primary | ICD-10-CM

## 2025-07-10 DIAGNOSIS — N39.41 URGE INCONTINENCE: ICD-10-CM

## 2025-07-10 RX ORDER — TROSPIUM CHLORIDE 20 MG/1
20 TABLET, FILM COATED ORAL
Qty: 180 TABLET | Refills: 3 | Status: SHIPPED | OUTPATIENT
Start: 2025-07-10 | End: 2026-07-10

## 2025-07-10 NOTE — PROGRESS NOTES
Patient's daughter reports that they are unable to get Gemtesa due to cost.  I do not think that Myrbetriq will be any more cost effective for them.  We could restart trospium and assess her for retention when she is in next month.

## 2025-07-15 ENCOUNTER — TRANSFERRED RECORDS (OUTPATIENT)
Dept: HEALTH INFORMATION MANAGEMENT | Facility: OTHER | Age: 80
End: 2025-07-15

## 2025-07-15 ENCOUNTER — ANTICOAGULATION THERAPY VISIT (OUTPATIENT)
Dept: ANTICOAGULATION | Facility: OTHER | Age: 80
End: 2025-07-15
Attending: FAMILY MEDICINE
Payer: MEDICARE

## 2025-07-15 DIAGNOSIS — I48.0 PAROXYSMAL ATRIAL FIBRILLATION (H): Primary | ICD-10-CM

## 2025-07-15 DIAGNOSIS — Z79.01 ANTICOAGULATION GOAL OF INR 2 TO 3: ICD-10-CM

## 2025-07-15 DIAGNOSIS — Z51.81 ANTICOAGULATION GOAL OF INR 2 TO 3: ICD-10-CM

## 2025-07-15 LAB — INR HOME MONITORING: 2.4 RATIO (ref 2–3)

## 2025-07-15 NOTE — PROGRESS NOTES
ANTICOAGULATION MANAGEMENT     Kate Chacon 80 year old female is on warfarin with therapeutic INR result. (Goal INR 2.0-3.0)    Recent labs: (last 7 days)     07/15/25  1028   INR 2.4       ASSESSMENT     Source(s): Chart Review and Home Care/Facility Nurse     Warfarin doses taken: Warfarin taken as instructed  Diet: No new diet changes identified  Medication/supplement changes: trospium started 7/13 no interactions noted  New illness, injury, or hospitalization: No  Signs or symptoms of bleeding or clotting: No  Previous result: Therapeutic last visit; previously outside of goal range  Additional findings: None       PLAN     Recommended plan for ongoing change(s) affecting INR     Dosing Instructions: Continue your current warfarin dose with next INR in 1 week       Summary  As of 7/15/2025      Full warfarin instructions:  7.5 mg every Tue, Fri; 5 mg all other days   Next INR check:  7/22/2025               Faxed dosing and follow up instructions to [facility]    Orders given to  Homecare nurse/facility to recheck    Patient not here, [employer] Communicationsheet with screening questions and current INR received via FAX from outside agency. Results reviewed, Warfarin dosing per protocol, and recommended follow-up appointment made. Paperwork FAXED back to facility.       Plan made per [facility] anticoagulation protocol    Radha Burns RN  7/15/2025  Anticoagulation Clinic  [employer] [place] for routing messages: andrei  ANTICOAGULATION NURSE  [facility] patient phone line: [phone]        _______________________________________________________________________     Anticoagulation Episode Summary       Current INR goal:  2.0-3.0   TTR:  55.4% (1 y)   Target end date:  Indefinite   Send INR reminders to:   ANTICOAGULATION NURSE    Indications    Paroxysmal atrial fibrillation (H) [I48.0]  Anticoagulation goal of INR 2 to 3 [Z51.81  Z79.01]             Comments:  Patient lives at [facility] labs done there 2nd and  4th Tues of the month             Anticoagulation Care Providers       Provider Role Specialty Phone number    Kristine Jiménez MD Referring Family Medicine [phone]

## 2025-07-22 ENCOUNTER — ANTICOAGULATION THERAPY VISIT (OUTPATIENT)
Dept: ANTICOAGULATION | Facility: OTHER | Age: 80
End: 2025-07-22
Attending: FAMILY MEDICINE
Payer: COMMERCIAL

## 2025-07-22 ENCOUNTER — TRANSFERRED RECORDS (OUTPATIENT)
Dept: HEALTH INFORMATION MANAGEMENT | Facility: OTHER | Age: 80
End: 2025-07-22

## 2025-07-22 DIAGNOSIS — Z51.81 ANTICOAGULATION GOAL OF INR 2 TO 3: ICD-10-CM

## 2025-07-22 DIAGNOSIS — I48.0 PAROXYSMAL ATRIAL FIBRILLATION (H): Primary | ICD-10-CM

## 2025-07-22 DIAGNOSIS — Z79.01 ANTICOAGULATION GOAL OF INR 2 TO 3: ICD-10-CM

## 2025-07-22 DIAGNOSIS — K59.00 CONSTIPATION, UNSPECIFIED CONSTIPATION TYPE: ICD-10-CM

## 2025-07-22 LAB — INR HOME MONITORING: 3 RATIO (ref 2–3)

## 2025-07-22 NOTE — PROGRESS NOTES
ANTICOAGULATION MANAGEMENT     Kate Chacon 80 year old female is on warfarin with therapeutic INR result. (Goal INR 2.0-3.0)    Recent labs: (last 7 days)     07/22/25  1117   INR 3       ASSESSMENT     Source(s): Chart Review and Home Care/Facility Nurse     Warfarin doses taken: Warfarin taken as instructed  Diet: No new diet changes identified  Medication/supplement changes: None noted  New illness, injury, or hospitalization: No  Signs or symptoms of bleeding or clotting: No  Previous result: Therapeutic last visit; previously outside of goal range  Additional findings: None       PLAN     Recommended plan for no diet, medication or health factor changes affecting INR     Dosing Instructions: Continue your current warfarin dose with next INR in 1 week       Summary  As of 7/22/2025      Full warfarin instructions:  7.5 mg every Tue, Fri; 5 mg all other days   Next INR check:  7/29/2025               Faxed dosing and follow up instructions to [facility]    Orders given to  Homecare nurse/facility to recheck    Patient not here, [employer] Communicationsheet with screening questions and current INR received via FAX from outside agency. Results reviewed, Warfarin dosing per protocol, and recommended follow-up appointment made. Paperwork FAXED back to facility.       Plan made per [facility] anticoagulation protocol    Radha Burns RN  7/22/2025  Anticoagulation Clinic  [employer] for routing messages: andrei  ANTICOAGULATION NURSE  [facility] patient phone line: [phone]        _______________________________________________________________________     Anticoagulation Episode Summary       Current INR goal:  2.0-3.0   TTR:  55.4% (1 y)   Target end date:  Indefinite   Send INR reminders to:   ANTICOAGULATION NURSE    Indications    Paroxysmal atrial fibrillation (H) [I48.0]  Anticoagulation goal of INR 2 to 3 [Z51.81  Z79.01]             Comments:  Patient lives at [facility] labs done there 2nd and 4th Tues  of the month             Anticoagulation Care Providers       Provider Role Specialty Phone number    Kristine Jiménez MD Referring Family Medicine [phone]

## 2025-07-28 DIAGNOSIS — N32.81 OAB (OVERACTIVE BLADDER): Primary | ICD-10-CM

## 2025-07-29 ENCOUNTER — ANTICOAGULATION THERAPY VISIT (OUTPATIENT)
Dept: ANTICOAGULATION | Facility: OTHER | Age: 80
End: 2025-07-29
Attending: FAMILY MEDICINE
Payer: MEDICARE

## 2025-07-29 ENCOUNTER — TRANSFERRED RECORDS (OUTPATIENT)
Dept: HEALTH INFORMATION MANAGEMENT | Facility: OTHER | Age: 80
End: 2025-07-29

## 2025-07-29 DIAGNOSIS — Z51.81 ANTICOAGULATION GOAL OF INR 2 TO 3: ICD-10-CM

## 2025-07-29 DIAGNOSIS — I48.0 PAROXYSMAL ATRIAL FIBRILLATION (H): Primary | ICD-10-CM

## 2025-07-29 DIAGNOSIS — Z79.01 ANTICOAGULATION GOAL OF INR 2 TO 3: ICD-10-CM

## 2025-07-29 LAB — INR HOME MONITORING: 2.4 RATIO (ref 2–3)

## 2025-07-29 NOTE — PROGRESS NOTES
ANTICOAGULATION MANAGEMENT     Kate Chacon 80 year old female is on warfarin with therapeutic INR result. (Goal INR 2.0-3.0)    Recent labs: (last 7 days)     07/29/25  1106   INR 2.4       ASSESSMENT     Source(s): Chart Review and Home Care/Facility Nurse     Warfarin doses taken: Warfarin taken as instructed  Diet: No new diet changes identified  Medication/supplement changes: None noted  New illness, injury, or hospitalization: No  Signs or symptoms of bleeding or clotting: No  Previous result: Therapeutic last visit; previously outside of goal range  Additional findings: None       PLAN     Recommended plan for no diet, medication or health factor changes affecting INR     Dosing Instructions: Continue your current warfarin dose with next INR in 1 week       Summary  As of 7/29/2025      Full warfarin instructions:  7.5 mg every Tue, Fri; 5 mg all other days   Next INR check:  8/5/2025               Faxed dosing and follow up instructions to [facility]    Orders given to  Homecare nurse/facility to recheck    Patient not here, [employer] Communicationsheet with screening questions and current INR received via FAX from outside agency. Results reviewed, Warfarin dosing per protocol, and recommended follow-up appointment made. Paperwork FAXED back to facility.       Plan made per [facility] anticoagulation protocol    Radha Burns RN  7/29/2025  Anticoagulation Clinic  [employer] for routing messages: andrei  ANTICOAGULATION NURSE  [facility] patient phone line: [phone]        _______________________________________________________________________     Anticoagulation Episode Summary       Current INR goal:  2.0-3.0   TTR:  55.4% (1 y)   Target end date:  Indefinite   Send INR reminders to:   ANTICOAGULATION NURSE    Indications    Paroxysmal atrial fibrillation (H) [I48.0]  Anticoagulation goal of INR 2 to 3 [Z51.81  Z79.01]             Comments:  Patient lives at [facility] labs done there 2nd and 4th Tues  of the month             Anticoagulation Care Providers       Provider Role Specialty Phone number    Kristine Jiménez MD Referring Family Medicine [phone]

## 2025-07-30 RX ORDER — POLYETHYLENE GLYCOL 3350 17 G/17G
POWDER, FOR SOLUTION ORAL
Qty: 17 G | Refills: 10 | Status: SHIPPED | OUTPATIENT
Start: 2025-07-30 | End: 2025-07-31

## 2025-07-30 NOTE — TELEPHONE ENCOUNTER
Last Office Visit:              5/12/25 Kati   Future Office visit:           8/13/25 Kati    Requested Prescriptions   Pending Prescriptions Disp Refills    GAVILAX 17 GM/SCOOP powder [Pharmacy Med Name: GAVILAX POWDER FOR SOLUTION]  0     Sig: MIX 17 GRAMS IN 4-8 oz OF LIQUID AND DRINK BY MOUTH EVERY DAY       L[employer] Protocol Failed - 7/30/2025  1:16 PM        Failed - Medication is active on med list and the sig matches. RN to manually verify dose and sig if red X/fail.     If the protocol passes (green check), you do not need to verify med dose and sig.    A prescription matches if they are the same clinical intention.    For Example: once daily and every morning are the same.    The protocol can not identify upper and lower case letters as matching and will fail.     For Example: Take 1 tablet (50 mg) by mouth daily     TAKE 1 TABLET (50 MG) BY MOUTH DAILY    For all fails (red x), verify dose and sig.    If the refill does match what is on file, the RN can still proceed to approve the refill request.       If they do not match, route to the appropriate provider.          Last Prescription Date:   5/12/25   Last Fill Qty/Refills:         17 g  /  0

## 2025-07-31 DIAGNOSIS — K59.00 CONSTIPATION, UNSPECIFIED CONSTIPATION TYPE: ICD-10-CM

## 2025-07-31 DIAGNOSIS — N32.81 OAB (OVERACTIVE BLADDER): Primary | ICD-10-CM

## 2025-07-31 RX ORDER — POLYETHYLENE GLYCOL 3350 17 G/17G
17 POWDER, FOR SOLUTION ORAL DAILY
Qty: 510 G | Refills: 10 | Status: SHIPPED | OUTPATIENT
Start: 2025-07-31

## 2025-08-05 ENCOUNTER — TRANSFERRED RECORDS (OUTPATIENT)
Dept: HEALTH INFORMATION MANAGEMENT | Facility: OTHER | Age: 80
End: 2025-08-05

## 2025-08-05 ENCOUNTER — ANTICOAGULATION THERAPY VISIT (OUTPATIENT)
Dept: ANTICOAGULATION | Facility: OTHER | Age: 80
End: 2025-08-05
Attending: FAMILY MEDICINE
Payer: MEDICARE

## 2025-08-05 DIAGNOSIS — Z51.81 ANTICOAGULATION GOAL OF INR 2 TO 3: ICD-10-CM

## 2025-08-05 DIAGNOSIS — Z79.01 ANTICOAGULATION GOAL OF INR 2 TO 3: ICD-10-CM

## 2025-08-05 DIAGNOSIS — I48.0 PAROXYSMAL ATRIAL FIBRILLATION (H): Primary | ICD-10-CM

## 2025-08-05 LAB — INR HOME MONITORING: 2.8 RATIO (ref 2–3)

## 2025-08-12 ENCOUNTER — ANTICOAGULATION THERAPY VISIT (OUTPATIENT)
Dept: ANTICOAGULATION | Facility: OTHER | Age: 80
End: 2025-08-12
Attending: FAMILY MEDICINE
Payer: MEDICARE

## 2025-08-12 ENCOUNTER — TELEPHONE (OUTPATIENT)
Dept: FAMILY MEDICINE | Facility: OTHER | Age: 80
End: 2025-08-12

## 2025-08-12 ENCOUNTER — TRANSFERRED RECORDS (OUTPATIENT)
Dept: HEALTH INFORMATION MANAGEMENT | Facility: OTHER | Age: 80
End: 2025-08-12

## 2025-08-12 DIAGNOSIS — I48.0 PAROXYSMAL ATRIAL FIBRILLATION (H): Primary | ICD-10-CM

## 2025-08-12 DIAGNOSIS — Z51.81 ANTICOAGULATION GOAL OF INR 2 TO 3: ICD-10-CM

## 2025-08-12 DIAGNOSIS — Z79.01 CURRENT USE OF LONG TERM ANTICOAGULATION: ICD-10-CM

## 2025-08-12 DIAGNOSIS — Z79.01 ANTICOAGULATION GOAL OF INR 2 TO 3: ICD-10-CM

## 2025-08-12 LAB — INR HOME MONITORING: 2.9 RATIO (ref 2–3)

## 2025-08-13 ENCOUNTER — LAB (OUTPATIENT)
Dept: LAB | Facility: OTHER | Age: 80
End: 2025-08-13
Payer: MEDICARE

## 2025-08-13 ENCOUNTER — OFFICE VISIT (OUTPATIENT)
Dept: UROLOGY | Facility: OTHER | Age: 80
End: 2025-08-13
Payer: MEDICARE

## 2025-08-13 VITALS
HEART RATE: 100 BPM | OXYGEN SATURATION: 96 % | TEMPERATURE: 98.5 F | SYSTOLIC BLOOD PRESSURE: 118 MMHG | DIASTOLIC BLOOD PRESSURE: 76 MMHG | RESPIRATION RATE: 18 BRPM

## 2025-08-13 DIAGNOSIS — N32.81 OAB (OVERACTIVE BLADDER): ICD-10-CM

## 2025-08-13 DIAGNOSIS — N32.81 OAB (OVERACTIVE BLADDER): Primary | ICD-10-CM

## 2025-08-13 LAB
ALBUMIN UR-MCNC: 10 MG/DL
APPEARANCE UR: CLEAR
BILIRUB UR QL STRIP: NEGATIVE
COLOR UR AUTO: YELLOW
GLUCOSE UR STRIP-MCNC: NEGATIVE MG/DL
HGB UR QL STRIP: NEGATIVE
KETONES UR STRIP-MCNC: NEGATIVE MG/DL
LEUKOCYTE ESTERASE UR QL STRIP: NEGATIVE
NITRATE UR QL: NEGATIVE
PH UR STRIP: 7 [PH] (ref 5–9)
SP GR UR STRIP: 1.02 (ref 1–1.03)
UROBILINOGEN UR STRIP-MCNC: NORMAL MG/DL

## 2025-08-13 PROCEDURE — G0463 HOSPITAL OUTPT CLINIC VISIT: HCPCS | Mod: 25

## 2025-08-13 PROCEDURE — 51798 US URINE CAPACITY MEASURE: CPT

## 2025-08-13 RX ORDER — MIRABEGRON 25 MG/1
25 TABLET, FILM COATED, EXTENDED RELEASE ORAL DAILY
Qty: 90 TABLET | Refills: 3 | Status: SHIPPED | OUTPATIENT
Start: 2025-08-13 | End: 2026-08-13

## 2025-08-13 ASSESSMENT — PAIN SCALES - GENERAL: PAINLEVEL_OUTOF10: SEVERE PAIN (9)

## 2025-08-19 ENCOUNTER — TRANSFERRED RECORDS (OUTPATIENT)
Dept: HEALTH INFORMATION MANAGEMENT | Facility: OTHER | Age: 80
End: 2025-08-19

## 2025-08-19 ENCOUNTER — ANCILLARY PROCEDURE (OUTPATIENT)
Dept: CARDIOLOGY | Facility: CLINIC | Age: 80
End: 2025-08-19
Attending: INTERNAL MEDICINE
Payer: MEDICARE

## 2025-08-19 ENCOUNTER — TELEPHONE (OUTPATIENT)
Dept: FAMILY MEDICINE | Facility: OTHER | Age: 80
End: 2025-08-19

## 2025-08-19 ENCOUNTER — ANTICOAGULATION THERAPY VISIT (OUTPATIENT)
Dept: ANTICOAGULATION | Facility: OTHER | Age: 80
End: 2025-08-19
Attending: FAMILY MEDICINE
Payer: COMMERCIAL

## 2025-08-19 DIAGNOSIS — I48.20 CHRONIC A-FIB (H): ICD-10-CM

## 2025-08-19 DIAGNOSIS — I48.0 PAROXYSMAL ATRIAL FIBRILLATION (H): Primary | ICD-10-CM

## 2025-08-19 DIAGNOSIS — Z51.81 ANTICOAGULATION GOAL OF INR 2 TO 3: ICD-10-CM

## 2025-08-19 DIAGNOSIS — Z79.01 CURRENT USE OF LONG TERM ANTICOAGULATION: ICD-10-CM

## 2025-08-19 DIAGNOSIS — Z79.01 ANTICOAGULATION GOAL OF INR 2 TO 3: ICD-10-CM

## 2025-08-19 LAB — INR HOME MONITORING: 6.5 RATIO (ref 2–3)

## 2025-08-19 PROCEDURE — 93296 REM INTERROG EVL PM/IDS: CPT

## 2025-08-20 LAB
MDC_IDC_EPISODE_DTM: NORMAL
MDC_IDC_EPISODE_DURATION: 15 S
MDC_IDC_EPISODE_DURATION: 20 S
MDC_IDC_EPISODE_ID: NORMAL
MDC_IDC_EPISODE_TYPE: NORMAL
MDC_IDC_EPISODE_TYPE_INDUCED: NO
MDC_IDC_EPISODE_TYPE_INDUCED: NO
MDC_IDC_LEAD_CONNECTION_STATUS: NORMAL
MDC_IDC_LEAD_CONNECTION_STATUS: NORMAL
MDC_IDC_LEAD_IMPLANT_DT: NORMAL
MDC_IDC_LEAD_IMPLANT_DT: NORMAL
MDC_IDC_LEAD_LOCATION: NORMAL
MDC_IDC_LEAD_LOCATION: NORMAL
MDC_IDC_LEAD_LOCATION_DETAIL_1: NORMAL
MDC_IDC_LEAD_LOCATION_DETAIL_1: NORMAL
MDC_IDC_LEAD_MFG: NORMAL
MDC_IDC_LEAD_MFG: NORMAL
MDC_IDC_LEAD_MODEL: NORMAL
MDC_IDC_LEAD_MODEL: NORMAL
MDC_IDC_LEAD_POLARITY_TYPE: NORMAL
MDC_IDC_LEAD_POLARITY_TYPE: NORMAL
MDC_IDC_LEAD_SERIAL: NORMAL
MDC_IDC_LEAD_SERIAL: NORMAL
MDC_IDC_LEAD_SPECIAL_FUNCTION: NORMAL
MDC_IDC_LEAD_SPECIAL_FUNCTION: NORMAL
MDC_IDC_MSMT_BATTERY_DTM: NORMAL
MDC_IDC_MSMT_BATTERY_REMAINING_LONGEVITY: 180 MO
MDC_IDC_MSMT_BATTERY_REMAINING_PERCENTAGE: 100 %
MDC_IDC_MSMT_BATTERY_STATUS: NORMAL
MDC_IDC_MSMT_LEADCHNL_RA_IMPEDANCE_VALUE: 647 OHM
MDC_IDC_MSMT_LEADCHNL_RV_IMPEDANCE_VALUE: 687 OHM
MDC_IDC_MSMT_LEADCHNL_RV_PACING_THRESHOLD_AMPLITUDE: 0.8 V
MDC_IDC_MSMT_LEADCHNL_RV_PACING_THRESHOLD_PULSEWIDTH: 0.4 MS
MDC_IDC_PG_IMPLANT_DTM: NORMAL
MDC_IDC_PG_MFG: NORMAL
MDC_IDC_PG_MODEL: NORMAL
MDC_IDC_PG_SERIAL: NORMAL
MDC_IDC_PG_TYPE: NORMAL
MDC_IDC_SESS_CLINIC_NAME: NORMAL
MDC_IDC_SESS_DTM: NORMAL
MDC_IDC_SESS_TYPE: NORMAL
MDC_IDC_SET_BRADY_AT_MODE_SWITCH_RATE: 170 {BEATS}/MIN
MDC_IDC_SET_BRADY_LOWRATE: 70 {BEATS}/MIN
MDC_IDC_SET_BRADY_MAX_SENSOR_RATE: 130 {BEATS}/MIN
MDC_IDC_SET_BRADY_MODE: NORMAL
MDC_IDC_SET_LEADCHNL_RA_SENSING_ADAPTATION_MODE: NORMAL
MDC_IDC_SET_LEADCHNL_RA_SENSING_SENSITIVITY: 0.25 MV
MDC_IDC_SET_LEADCHNL_RV_PACING_AMPLITUDE: 1.2 V
MDC_IDC_SET_LEADCHNL_RV_PACING_CAPTURE_MODE: NORMAL
MDC_IDC_SET_LEADCHNL_RV_PACING_POLARITY: NORMAL
MDC_IDC_SET_LEADCHNL_RV_PACING_PULSEWIDTH: 0.4 MS
MDC_IDC_SET_LEADCHNL_RV_SENSING_ADAPTATION_MODE: NORMAL
MDC_IDC_SET_LEADCHNL_RV_SENSING_POLARITY: NORMAL
MDC_IDC_SET_LEADCHNL_RV_SENSING_SENSITIVITY: 1.5 MV
MDC_IDC_SET_ZONE_DETECTION_INTERVAL: 375 MS
MDC_IDC_SET_ZONE_STATUS: NORMAL
MDC_IDC_SET_ZONE_TYPE: NORMAL
MDC_IDC_SET_ZONE_VENDOR_TYPE: NORMAL
MDC_IDC_STAT_BRADY_DTM_END: NORMAL
MDC_IDC_STAT_BRADY_DTM_START: NORMAL
MDC_IDC_STAT_BRADY_RA_PERCENT_PACED: 0 %
MDC_IDC_STAT_BRADY_RV_PERCENT_PACED: 77 %
MDC_IDC_STAT_EPISODE_RECENT_COUNT: 0
MDC_IDC_STAT_EPISODE_RECENT_COUNT: 0
MDC_IDC_STAT_EPISODE_RECENT_COUNT: 2
MDC_IDC_STAT_EPISODE_RECENT_COUNT_DTM_END: NORMAL
MDC_IDC_STAT_EPISODE_RECENT_COUNT_DTM_START: NORMAL
MDC_IDC_STAT_EPISODE_TYPE: NORMAL
MDC_IDC_STAT_EPISODE_VENDOR_TYPE: NORMAL
MDC_IDC_STAT_EPISODE_VENDOR_TYPE: NORMAL

## 2025-08-21 ENCOUNTER — ANTICOAGULATION THERAPY VISIT (OUTPATIENT)
Dept: ANTICOAGULATION | Facility: OTHER | Age: 80
End: 2025-08-21
Attending: FAMILY MEDICINE
Payer: MEDICARE

## 2025-08-21 ENCOUNTER — OFFICE VISIT (OUTPATIENT)
Dept: FAMILY MEDICINE | Facility: OTHER | Age: 80
End: 2025-08-21
Attending: FAMILY MEDICINE
Payer: MEDICARE

## 2025-08-21 ENCOUNTER — NURSING HOME VISIT (OUTPATIENT)
Dept: GERIATRICS | Facility: OTHER | Age: 80
End: 2025-08-21
Attending: NURSE PRACTITIONER
Payer: MEDICARE

## 2025-08-21 ENCOUNTER — RESULTS FOLLOW-UP (OUTPATIENT)
Dept: FAMILY MEDICINE | Facility: OTHER | Age: 80
End: 2025-08-21

## 2025-08-21 VITALS
OXYGEN SATURATION: 98 % | TEMPERATURE: 98.3 F | WEIGHT: 196.8 LBS | DIASTOLIC BLOOD PRESSURE: 69 MMHG | BODY MASS INDEX: 31.29 KG/M2 | SYSTOLIC BLOOD PRESSURE: 123 MMHG | HEART RATE: 70 BPM | RESPIRATION RATE: 16 BRPM

## 2025-08-21 VITALS
RESPIRATION RATE: 18 BRPM | TEMPERATURE: 97.7 F | SYSTOLIC BLOOD PRESSURE: 138 MMHG | DIASTOLIC BLOOD PRESSURE: 98 MMHG | OXYGEN SATURATION: 98 % | HEART RATE: 82 BPM

## 2025-08-21 DIAGNOSIS — R29.898: ICD-10-CM

## 2025-08-21 DIAGNOSIS — Z51.81 ANTICOAGULATION GOAL OF INR 2 TO 3: ICD-10-CM

## 2025-08-21 DIAGNOSIS — M46.1 SACROILIITIS: ICD-10-CM

## 2025-08-21 DIAGNOSIS — M25.552 HIP PAIN, LEFT: Primary | ICD-10-CM

## 2025-08-21 DIAGNOSIS — I48.0 PAROXYSMAL ATRIAL FIBRILLATION (H): Primary | ICD-10-CM

## 2025-08-21 DIAGNOSIS — Z79.01 ANTICOAGULATION GOAL OF INR 2 TO 3: ICD-10-CM

## 2025-08-21 DIAGNOSIS — R79.1 ELEVATED INR: ICD-10-CM

## 2025-08-21 DIAGNOSIS — Z79.01 CURRENT USE OF LONG TERM ANTICOAGULATION: ICD-10-CM

## 2025-08-21 DIAGNOSIS — M53.3 SACROILIAC JOINT PAIN: Primary | ICD-10-CM

## 2025-08-21 LAB — INR HOME MONITORING: 4.3 RATIO (ref 2–3)

## 2025-08-21 PROCEDURE — 99349 HOME/RES VST EST MOD MDM 40: CPT | Performed by: NURSE PRACTITIONER

## 2025-08-21 RX ORDER — TRAMADOL HYDROCHLORIDE 50 MG/1
50 TABLET ORAL EVERY 6 HOURS PRN
Qty: 10 TABLET | Refills: 0 | Status: SHIPPED | OUTPATIENT
Start: 2025-08-21 | End: 2025-08-24

## 2025-08-21 RX ORDER — LIDOCAINE 4 G/G
1 PATCH TOPICAL EVERY 24 HOURS
Qty: 10 PATCH | Refills: 0 | Status: SHIPPED | OUTPATIENT
Start: 2025-08-21

## 2025-08-21 ASSESSMENT — PAIN SCALES - GENERAL
PAINLEVEL_OUTOF10: SEVERE PAIN (10)
PAINLEVEL_OUTOF10: SEVERE PAIN (10)

## 2025-08-22 ENCOUNTER — TRANSFERRED RECORDS (OUTPATIENT)
Dept: HEALTH INFORMATION MANAGEMENT | Facility: OTHER | Age: 80
End: 2025-08-22
Payer: COMMERCIAL

## 2025-08-26 ENCOUNTER — ANTICOAGULATION THERAPY VISIT (OUTPATIENT)
Dept: ANTICOAGULATION | Facility: OTHER | Age: 80
End: 2025-08-26
Attending: FAMILY MEDICINE
Payer: COMMERCIAL

## 2025-08-26 ENCOUNTER — TRANSFERRED RECORDS (OUTPATIENT)
Dept: HEALTH INFORMATION MANAGEMENT | Facility: OTHER | Age: 80
End: 2025-08-26

## 2025-08-26 DIAGNOSIS — Z79.01 ANTICOAGULATION GOAL OF INR 2 TO 3: ICD-10-CM

## 2025-08-26 DIAGNOSIS — Z51.81 ANTICOAGULATION GOAL OF INR 2 TO 3: ICD-10-CM

## 2025-08-26 DIAGNOSIS — Z79.01 CURRENT USE OF LONG TERM ANTICOAGULATION: ICD-10-CM

## 2025-08-26 DIAGNOSIS — I48.0 PAROXYSMAL ATRIAL FIBRILLATION (H): Primary | ICD-10-CM

## 2025-08-26 LAB — INR HOME MONITORING: 6.5 RATIO (ref 2–3)

## 2025-08-28 ENCOUNTER — ANTICOAGULATION THERAPY VISIT (OUTPATIENT)
Dept: ANTICOAGULATION | Facility: OTHER | Age: 80
End: 2025-08-28
Payer: COMMERCIAL

## 2025-08-28 DIAGNOSIS — Z79.01 ANTICOAGULATION GOAL OF INR 2 TO 3: ICD-10-CM

## 2025-08-28 DIAGNOSIS — Z51.81 ANTICOAGULATION GOAL OF INR 2 TO 3: ICD-10-CM

## 2025-08-28 DIAGNOSIS — Z79.01 CURRENT USE OF LONG TERM ANTICOAGULATION: ICD-10-CM

## 2025-08-28 DIAGNOSIS — I48.0 PAROXYSMAL ATRIAL FIBRILLATION (H): Primary | ICD-10-CM

## 2025-08-28 LAB — INR HOME MONITORING: 4.3 RATIO (ref 2–3)

## 2025-09-02 ENCOUNTER — TRANSFERRED RECORDS (OUTPATIENT)
Dept: HEALTH INFORMATION MANAGEMENT | Facility: OTHER | Age: 80
End: 2025-09-02

## 2025-09-02 ENCOUNTER — ANTICOAGULATION THERAPY VISIT (OUTPATIENT)
Dept: ANTICOAGULATION | Facility: OTHER | Age: 80
End: 2025-09-02
Payer: COMMERCIAL

## 2025-09-02 DIAGNOSIS — I48.0 PAROXYSMAL ATRIAL FIBRILLATION (H): Primary | ICD-10-CM

## 2025-09-02 DIAGNOSIS — Z51.81 ANTICOAGULATION GOAL OF INR 2 TO 3: ICD-10-CM

## 2025-09-02 DIAGNOSIS — Z79.01 CURRENT USE OF LONG TERM ANTICOAGULATION: ICD-10-CM

## 2025-09-02 DIAGNOSIS — Z79.01 ANTICOAGULATION GOAL OF INR 2 TO 3: ICD-10-CM

## 2025-09-02 LAB — INR HOME MONITORING: 3.2 RATIO (ref 2–3)

## 2025-09-03 ENCOUNTER — DOCUMENTATION ONLY (OUTPATIENT)
Dept: FAMILY MEDICINE | Facility: OTHER | Age: 80
End: 2025-09-03
Payer: COMMERCIAL

## 2025-09-03 DIAGNOSIS — I48.0 PAROXYSMAL ATRIAL FIBRILLATION (H): Primary | ICD-10-CM

## 2025-09-03 DIAGNOSIS — Z51.81 ANTICOAGULATION GOAL OF INR 2 TO 3: ICD-10-CM

## 2025-09-03 DIAGNOSIS — Z79.01 ANTICOAGULATION GOAL OF INR 2 TO 3: ICD-10-CM

## (undated) DEVICE — CABLE PACING ALLIGATOR CLIP 301-CG

## (undated) DEVICE — SUCTION MANIFOLD NEPTUNE 2 SYS 4 PORT 0702-020-000

## (undated) DEVICE — ENDO BRUSH CHANNEL MASTER CLEANING 2-4.2MM BW-412T

## (undated) DEVICE — Device

## (undated) DEVICE — ESU PENCIL SMOKE EVAC W/ROCKER SWITCH 0703-047-000

## (undated) DEVICE — ESU ENDO FORCEP BX HOT FD-210U

## (undated) DEVICE — TUBING SUCTION 10'X3/16" N510

## (undated) DEVICE — ENDO KIT COMPLIANCE DYKENDOCMPLY

## (undated) DEVICE — ELECTRODE DEFIB CADENCE 22550R

## (undated) DEVICE — SOL WATER 1500ML

## (undated) DEVICE — ESU GROUND PAD ADULT W/CORD E7507

## (undated) RX ORDER — CYANOCOBALAMIN 1000 UG/ML
INJECTION, SOLUTION INTRAMUSCULAR; SUBCUTANEOUS
Status: DISPENSED
Start: 2020-08-19

## (undated) RX ORDER — FENTANYL CITRATE 50 UG/ML
INJECTION, SOLUTION INTRAMUSCULAR; INTRAVENOUS
Status: DISPENSED
Start: 2019-10-26

## (undated) RX ORDER — CEFUROXIME AXETIL 250 MG/1
TABLET ORAL
Status: DISPENSED
Start: 2020-09-30

## (undated) RX ORDER — TRIAMCINOLONE ACETONIDE 40 MG/ML
INJECTION, SUSPENSION INTRA-ARTICULAR; INTRAMUSCULAR
Status: DISPENSED
Start: 2020-11-12

## (undated) RX ORDER — LIDOCAINE HYDROCHLORIDE 10 MG/ML
INJECTION, SOLUTION EPIDURAL; INFILTRATION; INTRACAUDAL; PERINEURAL
Status: DISPENSED
Start: 2020-01-06

## (undated) RX ORDER — FENTANYL CITRATE 50 UG/ML
INJECTION, SOLUTION INTRAMUSCULAR; INTRAVENOUS
Status: DISPENSED
Start: 2025-01-28

## (undated) RX ORDER — CEFAZOLIN SODIUM 1 G/3ML
INJECTION, POWDER, FOR SOLUTION INTRAMUSCULAR; INTRAVENOUS
Status: DISPENSED
Start: 2025-01-28

## (undated) RX ORDER — PANTOPRAZOLE SODIUM 40 MG/10ML
INJECTION, POWDER, LYOPHILIZED, FOR SOLUTION INTRAVENOUS
Status: DISPENSED
Start: 2019-12-09

## (undated) RX ORDER — LIDOCAINE HYDROCHLORIDE 10 MG/ML
INJECTION, SOLUTION EPIDURAL; INFILTRATION; INTRACAUDAL; PERINEURAL
Status: DISPENSED
Start: 2020-11-12

## (undated) RX ORDER — ACETAMINOPHEN 500 MG
TABLET ORAL
Status: DISPENSED
Start: 2024-09-20

## (undated) RX ORDER — SODIUM CHLORIDE 9 MG/ML
INJECTION, SOLUTION INTRAVENOUS
Status: DISPENSED
Start: 2019-12-09

## (undated) RX ORDER — CYCLOBENZAPRINE HCL 10 MG
TABLET ORAL
Status: DISPENSED
Start: 2020-06-12

## (undated) RX ORDER — BUPIVACAINE HYDROCHLORIDE 5 MG/ML
INJECTION, SOLUTION EPIDURAL; INTRACAUDAL
Status: DISPENSED
Start: 2020-11-12

## (undated) RX ORDER — BUPIVACAINE HYDROCHLORIDE 5 MG/ML
INJECTION, SOLUTION EPIDURAL; INTRACAUDAL
Status: DISPENSED
Start: 2020-09-17

## (undated) RX ORDER — LIDOCAINE HYDROCHLORIDE 10 MG/ML
INJECTION, SOLUTION EPIDURAL; INFILTRATION; INTRACAUDAL; PERINEURAL
Status: DISPENSED
Start: 2020-05-21

## (undated) RX ORDER — CYCLOBENZAPRINE HCL 10 MG
TABLET ORAL
Status: DISPENSED
Start: 2019-10-26

## (undated) RX ORDER — BUPIVACAINE HYDROCHLORIDE 5 MG/ML
INJECTION, SOLUTION EPIDURAL; INTRACAUDAL
Status: DISPENSED
Start: 2024-03-07

## (undated) RX ORDER — MORPHINE SULFATE 2 MG/ML
INJECTION, SOLUTION INTRAMUSCULAR; INTRAVENOUS
Status: DISPENSED
Start: 2024-09-13

## (undated) RX ORDER — CIPROFLOXACIN 500 MG/1
TABLET, FILM COATED ORAL
Status: DISPENSED
Start: 2018-08-02

## (undated) RX ORDER — CEFUROXIME AXETIL 250 MG/1
TABLET ORAL
Status: DISPENSED
Start: 2022-09-07

## (undated) RX ORDER — PROPOFOL 10 MG/ML
INJECTION, EMULSION INTRAVENOUS
Status: DISPENSED
Start: 2019-12-02

## (undated) RX ORDER — DEXAMETHASONE SODIUM PHOSPHATE 10 MG/ML
INJECTION, SOLUTION INTRAMUSCULAR; INTRAVENOUS
Status: DISPENSED
Start: 2020-03-16

## (undated) RX ORDER — MORPHINE SULFATE 2 MG/ML
INJECTION, SOLUTION INTRAMUSCULAR; INTRAVENOUS
Status: DISPENSED
Start: 2024-09-14

## (undated) RX ORDER — LIDOCAINE HYDROCHLORIDE 10 MG/ML
INJECTION, SOLUTION INFILTRATION; PERINEURAL
Status: DISPENSED
Start: 2020-12-07

## (undated) RX ORDER — LIDOCAINE HYDROCHLORIDE 10 MG/ML
INJECTION, SOLUTION EPIDURAL; INFILTRATION; INTRACAUDAL; PERINEURAL
Status: DISPENSED
Start: 2020-12-07

## (undated) RX ORDER — LIDOCAINE HYDROCHLORIDE 10 MG/ML
INJECTION, SOLUTION INFILTRATION; PERINEURAL
Status: DISPENSED
Start: 2021-02-23

## (undated) RX ORDER — MORPHINE SULFATE 4 MG/ML
INJECTION, SOLUTION INTRAMUSCULAR; INTRAVENOUS
Status: DISPENSED
Start: 2020-06-13

## (undated) RX ORDER — LIDOCAINE HYDROCHLORIDE 10 MG/ML
INJECTION, SOLUTION INFILTRATION; PERINEURAL
Status: DISPENSED
Start: 2020-03-16

## (undated) RX ORDER — REGADENOSON 0.08 MG/ML
INJECTION, SOLUTION INTRAVENOUS
Status: DISPENSED
Start: 2019-06-07

## (undated) RX ORDER — TRIAMCINOLONE ACETONIDE 40 MG/ML
INJECTION, SUSPENSION INTRA-ARTICULAR; INTRAMUSCULAR
Status: DISPENSED
Start: 2024-03-07

## (undated) RX ORDER — DEXAMETHASONE SODIUM PHOSPHATE 10 MG/ML
INJECTION, SOLUTION INTRAMUSCULAR; INTRAVENOUS
Status: DISPENSED
Start: 2020-12-07

## (undated) RX ORDER — LIDOCAINE HYDROCHLORIDE 10 MG/ML
INJECTION, SOLUTION EPIDURAL; INFILTRATION; INTRACAUDAL; PERINEURAL
Status: DISPENSED
Start: 2020-03-16

## (undated) RX ORDER — LIDOCAINE HYDROCHLORIDE 10 MG/ML
INJECTION, SOLUTION INFILTRATION; PERINEURAL
Status: DISPENSED
Start: 2020-11-12

## (undated) RX ORDER — CEFAZOLIN SODIUM 2 G/100ML
INJECTION, SOLUTION INTRAVENOUS
Status: DISPENSED
Start: 2025-01-28

## (undated) RX ORDER — CYANOCOBALAMIN 1000 UG/ML
INJECTION, SOLUTION INTRAMUSCULAR; SUBCUTANEOUS
Status: DISPENSED
Start: 2020-11-04

## (undated) RX ORDER — KETOROLAC TROMETHAMINE 30 MG/ML
INJECTION, SOLUTION INTRAMUSCULAR; INTRAVENOUS
Status: DISPENSED
Start: 2020-06-12

## (undated) RX ORDER — HYDROCODONE BITARTRATE AND ACETAMINOPHEN 5; 325 MG/1; MG/1
TABLET ORAL
Status: DISPENSED
Start: 2020-06-21

## (undated) RX ORDER — KETOROLAC TROMETHAMINE 30 MG/ML
INJECTION, SOLUTION INTRAMUSCULAR; INTRAVENOUS
Status: DISPENSED
Start: 2019-10-26

## (undated) RX ORDER — LIDOCAINE HYDROCHLORIDE 10 MG/ML
INJECTION, SOLUTION INFILTRATION; PERINEURAL
Status: DISPENSED
Start: 2020-09-17

## (undated) RX ORDER — ONDANSETRON 2 MG/ML
INJECTION INTRAMUSCULAR; INTRAVENOUS
Status: DISPENSED
Start: 2024-09-14

## (undated) RX ORDER — ACETAMINOPHEN 500 MG
TABLET ORAL
Status: DISPENSED
Start: 2024-09-21

## (undated) RX ORDER — SODIUM CHLORIDE 9 MG/ML
INJECTION, SOLUTION INTRAVENOUS
Status: DISPENSED
Start: 2020-06-21

## (undated) RX ORDER — METHYLPREDNISOLONE ACETATE 80 MG/ML
INJECTION, SUSPENSION INTRA-ARTICULAR; INTRALESIONAL; INTRAMUSCULAR; SOFT TISSUE
Status: DISPENSED
Start: 2020-01-06

## (undated) RX ORDER — MORPHINE SULFATE 2 MG/ML
INJECTION, SOLUTION INTRAMUSCULAR; INTRAVENOUS
Status: DISPENSED
Start: 2024-09-20

## (undated) RX ORDER — CYANOCOBALAMIN 1000 UG/ML
INJECTION, SOLUTION INTRAMUSCULAR; SUBCUTANEOUS
Status: DISPENSED
Start: 2019-10-29

## (undated) RX ORDER — LIDOCAINE HYDROCHLORIDE 10 MG/ML
INJECTION, SOLUTION INFILTRATION; PERINEURAL
Status: DISPENSED
Start: 2020-01-06

## (undated) RX ORDER — LIDOCAINE HYDROCHLORIDE 10 MG/ML
INJECTION, SOLUTION INFILTRATION; PERINEURAL
Status: DISPENSED
Start: 2024-03-07

## (undated) RX ORDER — ONDANSETRON 2 MG/ML
INJECTION INTRAMUSCULAR; INTRAVENOUS
Status: DISPENSED
Start: 2020-06-13

## (undated) RX ORDER — SODIUM CHLORIDE, SODIUM LACTATE, POTASSIUM CHLORIDE, CALCIUM CHLORIDE 600; 310; 30; 20 MG/100ML; MG/100ML; MG/100ML; MG/100ML
INJECTION, SOLUTION INTRAVENOUS
Status: DISPENSED
Start: 2019-12-02

## (undated) RX ORDER — LIDOCAINE HYDROCHLORIDE 20 MG/ML
INJECTION, SOLUTION INFILTRATION; PERINEURAL
Status: DISPENSED
Start: 2025-01-28

## (undated) RX ORDER — CYANOCOBALAMIN 1000 UG/ML
INJECTION, SOLUTION INTRAMUSCULAR; SUBCUTANEOUS
Status: DISPENSED
Start: 2019-05-31

## (undated) RX ORDER — TRIAMCINOLONE ACETONIDE 40 MG/ML
INJECTION, SUSPENSION INTRA-ARTICULAR; INTRAMUSCULAR
Status: DISPENSED
Start: 2020-09-17

## (undated) RX ORDER — DEXAMETHASONE SODIUM PHOSPHATE 10 MG/ML
INJECTION, SOLUTION INTRAMUSCULAR; INTRAVENOUS
Status: DISPENSED
Start: 2020-06-13

## (undated) RX ORDER — LIDOCAINE HYDROCHLORIDE 10 MG/ML
INJECTION, SOLUTION EPIDURAL; INFILTRATION; INTRACAUDAL; PERINEURAL
Status: DISPENSED
Start: 2020-09-17

## (undated) RX ORDER — LIDOCAINE HYDROCHLORIDE 10 MG/ML
INJECTION, SOLUTION INFILTRATION; PERINEURAL
Status: DISPENSED
Start: 2023-07-17

## (undated) RX ORDER — METHYLPREDNISOLONE ACETATE 80 MG/ML
INJECTION, SUSPENSION INTRA-ARTICULAR; INTRALESIONAL; INTRAMUSCULAR; SOFT TISSUE
Status: DISPENSED
Start: 2020-11-12